# Patient Record
Sex: MALE | Race: WHITE | NOT HISPANIC OR LATINO | Employment: OTHER | URBAN - METROPOLITAN AREA
[De-identification: names, ages, dates, MRNs, and addresses within clinical notes are randomized per-mention and may not be internally consistent; named-entity substitution may affect disease eponyms.]

---

## 2017-01-26 ENCOUNTER — OFFICE VISIT - RIVER FALLS (OUTPATIENT)
Dept: FAMILY MEDICINE | Facility: CLINIC | Age: 37
End: 2017-01-26
Payer: MEDICARE

## 2017-01-30 ENCOUNTER — OFFICE VISIT - RIVER FALLS (OUTPATIENT)
Dept: FAMILY MEDICINE | Facility: CLINIC | Age: 37
End: 2017-01-30
Payer: MEDICARE

## 2017-01-30 ASSESSMENT — MIFFLIN-ST. JEOR: SCORE: 2211.48

## 2017-02-03 ENCOUNTER — OFFICE VISIT - RIVER FALLS (OUTPATIENT)
Dept: FAMILY MEDICINE | Facility: CLINIC | Age: 37
End: 2017-02-03
Payer: MEDICARE

## 2017-02-03 ASSESSMENT — MIFFLIN-ST. JEOR: SCORE: 2179.72

## 2017-02-24 ENCOUNTER — OFFICE VISIT - RIVER FALLS (OUTPATIENT)
Dept: FAMILY MEDICINE | Facility: CLINIC | Age: 37
End: 2017-02-24
Payer: MEDICARE

## 2017-02-24 ASSESSMENT — MIFFLIN-ST. JEOR: SCORE: 2202.4

## 2017-03-02 ENCOUNTER — OFFICE VISIT - RIVER FALLS (OUTPATIENT)
Dept: FAMILY MEDICINE | Facility: CLINIC | Age: 37
End: 2017-03-02
Payer: MEDICARE

## 2017-03-02 ASSESSMENT — MIFFLIN-ST. JEOR: SCORE: 2204.22

## 2017-03-09 ENCOUNTER — OFFICE VISIT - RIVER FALLS (OUTPATIENT)
Dept: FAMILY MEDICINE | Facility: CLINIC | Age: 37
End: 2017-03-09
Payer: MEDICARE

## 2017-03-17 ENCOUNTER — OFFICE VISIT - RIVER FALLS (OUTPATIENT)
Dept: FAMILY MEDICINE | Facility: CLINIC | Age: 37
End: 2017-03-17
Payer: MEDICARE

## 2017-03-17 ASSESSMENT — MIFFLIN-ST. JEOR: SCORE: 2224.18

## 2017-04-03 ENCOUNTER — OFFICE VISIT - RIVER FALLS (OUTPATIENT)
Dept: FAMILY MEDICINE | Facility: CLINIC | Age: 37
End: 2017-04-03
Payer: MEDICARE

## 2017-05-12 ENCOUNTER — OFFICE VISIT - RIVER FALLS (OUTPATIENT)
Dept: FAMILY MEDICINE | Facility: CLINIC | Age: 37
End: 2017-05-12
Payer: MEDICARE

## 2017-05-24 ENCOUNTER — OFFICE VISIT - RIVER FALLS (OUTPATIENT)
Dept: FAMILY MEDICINE | Facility: CLINIC | Age: 37
End: 2017-05-24
Payer: MEDICARE

## 2017-05-24 ASSESSMENT — MIFFLIN-ST. JEOR: SCORE: 2211.48

## 2017-06-29 ENCOUNTER — OFFICE VISIT - RIVER FALLS (OUTPATIENT)
Dept: FAMILY MEDICINE | Facility: CLINIC | Age: 37
End: 2017-06-29
Payer: MEDICARE

## 2017-06-29 ASSESSMENT — MIFFLIN-ST. JEOR: SCORE: 2206.94

## 2017-07-21 ENCOUNTER — OFFICE VISIT - RIVER FALLS (OUTPATIENT)
Dept: FAMILY MEDICINE | Facility: CLINIC | Age: 37
End: 2017-07-21
Payer: MEDICARE

## 2017-07-21 ASSESSMENT — MIFFLIN-ST. JEOR: SCORE: 2213.29

## 2017-08-24 ENCOUNTER — OFFICE VISIT - RIVER FALLS (OUTPATIENT)
Dept: FAMILY MEDICINE | Facility: CLINIC | Age: 37
End: 2017-08-24
Payer: MEDICARE

## 2017-09-18 ENCOUNTER — OFFICE VISIT - RIVER FALLS (OUTPATIENT)
Dept: FAMILY MEDICINE | Facility: CLINIC | Age: 37
End: 2017-09-18
Payer: MEDICARE

## 2017-09-18 ASSESSMENT — MIFFLIN-ST. JEOR: SCORE: 2247.76

## 2017-10-06 ENCOUNTER — OFFICE VISIT - RIVER FALLS (OUTPATIENT)
Dept: FAMILY MEDICINE | Facility: CLINIC | Age: 37
End: 2017-10-06
Payer: MEDICARE

## 2017-10-13 ENCOUNTER — OFFICE VISIT - RIVER FALLS (OUTPATIENT)
Dept: FAMILY MEDICINE | Facility: CLINIC | Age: 37
End: 2017-10-13
Payer: MEDICARE

## 2017-10-13 ASSESSMENT — MIFFLIN-ST. JEOR: SCORE: 2247.76

## 2017-11-01 ENCOUNTER — OFFICE VISIT - RIVER FALLS (OUTPATIENT)
Dept: FAMILY MEDICINE | Facility: CLINIC | Age: 37
End: 2017-11-01
Payer: MEDICARE

## 2017-11-01 ASSESSMENT — MIFFLIN-ST. JEOR: SCORE: 2252.3

## 2017-11-07 ENCOUNTER — OFFICE VISIT - RIVER FALLS (OUTPATIENT)
Dept: FAMILY MEDICINE | Facility: CLINIC | Age: 37
End: 2017-11-07
Payer: MEDICARE

## 2017-11-07 ASSESSMENT — MIFFLIN-ST. JEOR: SCORE: 2270.44

## 2017-12-04 ENCOUNTER — OFFICE VISIT - RIVER FALLS (OUTPATIENT)
Dept: FAMILY MEDICINE | Facility: CLINIC | Age: 37
End: 2017-12-04
Payer: MEDICARE

## 2017-12-04 ASSESSMENT — MIFFLIN-ST. JEOR: SCORE: 2235.97

## 2017-12-05 ENCOUNTER — OFFICE VISIT - RIVER FALLS (OUTPATIENT)
Dept: FAMILY MEDICINE | Facility: CLINIC | Age: 37
End: 2017-12-05
Payer: MEDICARE

## 2018-02-02 ENCOUNTER — AMBULATORY - RIVER FALLS (OUTPATIENT)
Dept: FAMILY MEDICINE | Facility: CLINIC | Age: 38
End: 2018-02-02
Payer: MEDICARE

## 2018-02-07 ENCOUNTER — OFFICE VISIT - RIVER FALLS (OUTPATIENT)
Dept: FAMILY MEDICINE | Facility: CLINIC | Age: 38
End: 2018-02-07
Payer: MEDICARE

## 2018-02-07 ASSESSMENT — MIFFLIN-ST. JEOR: SCORE: 2245.04

## 2018-02-09 ENCOUNTER — OFFICE VISIT - RIVER FALLS (OUTPATIENT)
Dept: FAMILY MEDICINE | Facility: CLINIC | Age: 38
End: 2018-02-09
Payer: MEDICARE

## 2018-02-13 ENCOUNTER — OFFICE VISIT - RIVER FALLS (OUTPATIENT)
Dept: FAMILY MEDICINE | Facility: CLINIC | Age: 38
End: 2018-02-13
Payer: MEDICARE

## 2018-02-15 ENCOUNTER — OFFICE VISIT - RIVER FALLS (OUTPATIENT)
Dept: FAMILY MEDICINE | Facility: CLINIC | Age: 38
End: 2018-02-15
Payer: MEDICARE

## 2018-02-16 ENCOUNTER — OFFICE VISIT - RIVER FALLS (OUTPATIENT)
Dept: FAMILY MEDICINE | Facility: CLINIC | Age: 38
End: 2018-02-16
Payer: MEDICARE

## 2018-02-20 ENCOUNTER — OFFICE VISIT - RIVER FALLS (OUTPATIENT)
Dept: FAMILY MEDICINE | Facility: CLINIC | Age: 38
End: 2018-02-20
Payer: MEDICARE

## 2018-02-20 ASSESSMENT — MIFFLIN-ST. JEOR: SCORE: 2229.62

## 2018-02-21 ENCOUNTER — OFFICE VISIT - RIVER FALLS (OUTPATIENT)
Dept: FAMILY MEDICINE | Facility: CLINIC | Age: 38
End: 2018-02-21
Payer: MEDICARE

## 2018-02-26 ENCOUNTER — AMBULATORY - RIVER FALLS (OUTPATIENT)
Dept: FAMILY MEDICINE | Facility: CLINIC | Age: 38
End: 2018-02-26
Payer: MEDICARE

## 2018-02-27 ENCOUNTER — OFFICE VISIT - RIVER FALLS (OUTPATIENT)
Dept: FAMILY MEDICINE | Facility: CLINIC | Age: 38
End: 2018-02-27
Payer: MEDICARE

## 2018-03-05 ENCOUNTER — OFFICE VISIT - RIVER FALLS (OUTPATIENT)
Dept: FAMILY MEDICINE | Facility: CLINIC | Age: 38
End: 2018-03-05
Payer: MEDICARE

## 2018-03-05 ASSESSMENT — MIFFLIN-ST. JEOR: SCORE: 2225.31

## 2018-03-12 ENCOUNTER — OFFICE VISIT - RIVER FALLS (OUTPATIENT)
Dept: FAMILY MEDICINE | Facility: CLINIC | Age: 38
End: 2018-03-12
Payer: MEDICARE

## 2018-03-12 ASSESSMENT — MIFFLIN-ST. JEOR: SCORE: 2225.31

## 2018-03-13 ENCOUNTER — OFFICE VISIT - RIVER FALLS (OUTPATIENT)
Dept: FAMILY MEDICINE | Facility: CLINIC | Age: 38
End: 2018-03-13
Payer: MEDICARE

## 2018-03-14 ENCOUNTER — OFFICE VISIT - RIVER FALLS (OUTPATIENT)
Dept: FAMILY MEDICINE | Facility: CLINIC | Age: 38
End: 2018-03-14
Payer: MEDICARE

## 2018-03-22 ENCOUNTER — OFFICE VISIT - RIVER FALLS (OUTPATIENT)
Dept: FAMILY MEDICINE | Facility: CLINIC | Age: 38
End: 2018-03-22
Payer: MEDICARE

## 2018-03-29 ENCOUNTER — OFFICE VISIT - RIVER FALLS (OUTPATIENT)
Dept: FAMILY MEDICINE | Facility: CLINIC | Age: 38
End: 2018-03-29
Payer: MEDICARE

## 2018-03-29 ASSESSMENT — MIFFLIN-ST. JEOR: SCORE: 2215.33

## 2018-04-10 ENCOUNTER — OFFICE VISIT - RIVER FALLS (OUTPATIENT)
Dept: FAMILY MEDICINE | Facility: CLINIC | Age: 38
End: 2018-04-10
Payer: MEDICARE

## 2018-04-12 ENCOUNTER — OFFICE VISIT - RIVER FALLS (OUTPATIENT)
Dept: FAMILY MEDICINE | Facility: CLINIC | Age: 38
End: 2018-04-12
Payer: MEDICARE

## 2018-04-13 ENCOUNTER — OFFICE VISIT - RIVER FALLS (OUTPATIENT)
Dept: FAMILY MEDICINE | Facility: CLINIC | Age: 38
End: 2018-04-13
Payer: MEDICARE

## 2018-04-17 ENCOUNTER — OFFICE VISIT - RIVER FALLS (OUTPATIENT)
Dept: FAMILY MEDICINE | Facility: CLINIC | Age: 38
End: 2018-04-17
Payer: MEDICARE

## 2018-04-24 ENCOUNTER — OFFICE VISIT - RIVER FALLS (OUTPATIENT)
Dept: FAMILY MEDICINE | Facility: CLINIC | Age: 38
End: 2018-04-24
Payer: MEDICARE

## 2018-04-24 ASSESSMENT — MIFFLIN-ST. JEOR: SCORE: 2180.86

## 2018-04-27 ENCOUNTER — OFFICE VISIT - RIVER FALLS (OUTPATIENT)
Dept: FAMILY MEDICINE | Facility: CLINIC | Age: 38
End: 2018-04-27
Payer: MEDICARE

## 2018-04-27 ASSESSMENT — MIFFLIN-ST. JEOR: SCORE: 2190.83

## 2018-04-28 LAB
CREAT SERPL-MCNC: 0.98 MG/DL (ref 0.6–1.35)
GLUCOSE BLD-MCNC: 83 MG/DL (ref 65–99)

## 2018-05-01 ENCOUNTER — OFFICE VISIT - RIVER FALLS (OUTPATIENT)
Dept: FAMILY MEDICINE | Facility: CLINIC | Age: 38
End: 2018-05-01
Payer: MEDICARE

## 2018-05-02 ENCOUNTER — OFFICE VISIT - RIVER FALLS (OUTPATIENT)
Dept: FAMILY MEDICINE | Facility: CLINIC | Age: 38
End: 2018-05-02
Payer: MEDICARE

## 2018-05-02 ASSESSMENT — MIFFLIN-ST. JEOR: SCORE: 2187.21

## 2018-05-04 ENCOUNTER — OFFICE VISIT - RIVER FALLS (OUTPATIENT)
Dept: FAMILY MEDICINE | Facility: CLINIC | Age: 38
End: 2018-05-04
Payer: MEDICARE

## 2018-05-09 ENCOUNTER — OFFICE VISIT - RIVER FALLS (OUTPATIENT)
Dept: FAMILY MEDICINE | Facility: CLINIC | Age: 38
End: 2018-05-09
Payer: MEDICARE

## 2018-05-11 ENCOUNTER — OFFICE VISIT - RIVER FALLS (OUTPATIENT)
Dept: FAMILY MEDICINE | Facility: CLINIC | Age: 38
End: 2018-05-11
Payer: MEDICARE

## 2018-05-15 ENCOUNTER — OFFICE VISIT - RIVER FALLS (OUTPATIENT)
Dept: FAMILY MEDICINE | Facility: CLINIC | Age: 38
End: 2018-05-15
Payer: MEDICARE

## 2018-05-16 ENCOUNTER — OFFICE VISIT - RIVER FALLS (OUTPATIENT)
Dept: FAMILY MEDICINE | Facility: CLINIC | Age: 38
End: 2018-05-16
Payer: MEDICARE

## 2018-05-22 ENCOUNTER — OFFICE VISIT - RIVER FALLS (OUTPATIENT)
Dept: FAMILY MEDICINE | Facility: CLINIC | Age: 38
End: 2018-05-22
Payer: MEDICARE

## 2018-05-25 ENCOUNTER — OFFICE VISIT - RIVER FALLS (OUTPATIENT)
Dept: FAMILY MEDICINE | Facility: CLINIC | Age: 38
End: 2018-05-25
Payer: MEDICARE

## 2018-05-25 ASSESSMENT — MIFFLIN-ST. JEOR: SCORE: 2179.95

## 2018-05-29 ENCOUNTER — OFFICE VISIT - RIVER FALLS (OUTPATIENT)
Dept: FAMILY MEDICINE | Facility: CLINIC | Age: 38
End: 2018-05-29
Payer: MEDICARE

## 2018-05-30 ENCOUNTER — OFFICE VISIT - RIVER FALLS (OUTPATIENT)
Dept: FAMILY MEDICINE | Facility: CLINIC | Age: 38
End: 2018-05-30
Payer: MEDICARE

## 2018-06-01 ENCOUNTER — OFFICE VISIT - RIVER FALLS (OUTPATIENT)
Dept: FAMILY MEDICINE | Facility: CLINIC | Age: 38
End: 2018-06-01
Payer: MEDICARE

## 2018-06-05 ENCOUNTER — OFFICE VISIT - RIVER FALLS (OUTPATIENT)
Dept: FAMILY MEDICINE | Facility: CLINIC | Age: 38
End: 2018-06-05
Payer: MEDICARE

## 2018-06-05 ASSESSMENT — MIFFLIN-ST. JEOR: SCORE: 2175.41

## 2018-06-12 ENCOUNTER — OFFICE VISIT - RIVER FALLS (OUTPATIENT)
Dept: FAMILY MEDICINE | Facility: CLINIC | Age: 38
End: 2018-06-12
Payer: MEDICARE

## 2018-06-19 ENCOUNTER — OFFICE VISIT - RIVER FALLS (OUTPATIENT)
Dept: FAMILY MEDICINE | Facility: CLINIC | Age: 38
End: 2018-06-19
Payer: MEDICARE

## 2018-06-29 ENCOUNTER — OFFICE VISIT - RIVER FALLS (OUTPATIENT)
Dept: FAMILY MEDICINE | Facility: CLINIC | Age: 38
End: 2018-06-29
Payer: MEDICARE

## 2018-07-02 ENCOUNTER — OFFICE VISIT - RIVER FALLS (OUTPATIENT)
Dept: FAMILY MEDICINE | Facility: CLINIC | Age: 38
End: 2018-07-02
Payer: MEDICARE

## 2018-07-02 ASSESSMENT — MIFFLIN-ST. JEOR: SCORE: 2193.56

## 2018-07-03 LAB
CHOLEST SERPL-MCNC: 182 MG/DL
CHOLEST/HDLC SERPL: 3.8 {RATIO}
HBA1C MFR BLD: 5 %
HDLC SERPL-MCNC: 48 MG/DL
LDLC SERPL CALC-MCNC: 108 MG/DL
NONHDLC SERPL-MCNC: 134 MG/DL
TRIGL SERPL-MCNC: 146 MG/DL

## 2018-07-06 ENCOUNTER — OFFICE VISIT - RIVER FALLS (OUTPATIENT)
Dept: FAMILY MEDICINE | Facility: CLINIC | Age: 38
End: 2018-07-06
Payer: MEDICARE

## 2018-07-09 ENCOUNTER — OFFICE VISIT - RIVER FALLS (OUTPATIENT)
Dept: FAMILY MEDICINE | Facility: CLINIC | Age: 38
End: 2018-07-09
Payer: MEDICARE

## 2018-07-09 ASSESSMENT — MIFFLIN-ST. JEOR: SCORE: 2199

## 2018-07-24 ENCOUNTER — OFFICE VISIT - RIVER FALLS (OUTPATIENT)
Dept: FAMILY MEDICINE | Facility: CLINIC | Age: 38
End: 2018-07-24
Payer: MEDICARE

## 2018-07-25 ENCOUNTER — OFFICE VISIT - RIVER FALLS (OUTPATIENT)
Dept: FAMILY MEDICINE | Facility: CLINIC | Age: 38
End: 2018-07-25
Payer: MEDICARE

## 2018-07-25 ASSESSMENT — MIFFLIN-ST. JEOR: SCORE: 2189.93

## 2018-07-26 ENCOUNTER — OFFICE VISIT - RIVER FALLS (OUTPATIENT)
Dept: FAMILY MEDICINE | Facility: CLINIC | Age: 38
End: 2018-07-26
Payer: MEDICARE

## 2018-07-31 ENCOUNTER — OFFICE VISIT - RIVER FALLS (OUTPATIENT)
Dept: FAMILY MEDICINE | Facility: CLINIC | Age: 38
End: 2018-07-31
Payer: MEDICARE

## 2018-08-02 ENCOUNTER — OFFICE VISIT - RIVER FALLS (OUTPATIENT)
Dept: FAMILY MEDICINE | Facility: CLINIC | Age: 38
End: 2018-08-02
Payer: MEDICARE

## 2018-08-30 ENCOUNTER — OFFICE VISIT - RIVER FALLS (OUTPATIENT)
Dept: FAMILY MEDICINE | Facility: CLINIC | Age: 38
End: 2018-08-30
Payer: MEDICARE

## 2018-09-10 ENCOUNTER — OFFICE VISIT - RIVER FALLS (OUTPATIENT)
Dept: FAMILY MEDICINE | Facility: CLINIC | Age: 38
End: 2018-09-10
Payer: MEDICARE

## 2018-09-14 ENCOUNTER — OFFICE VISIT - RIVER FALLS (OUTPATIENT)
Dept: FAMILY MEDICINE | Facility: CLINIC | Age: 38
End: 2018-09-14
Payer: MEDICARE

## 2018-09-25 ENCOUNTER — OFFICE VISIT - RIVER FALLS (OUTPATIENT)
Dept: FAMILY MEDICINE | Facility: CLINIC | Age: 38
End: 2018-09-25
Payer: MEDICARE

## 2018-09-25 ASSESSMENT — MIFFLIN-ST. JEOR: SCORE: 2229.84

## 2018-10-02 ENCOUNTER — OFFICE VISIT - RIVER FALLS (OUTPATIENT)
Dept: FAMILY MEDICINE | Facility: CLINIC | Age: 38
End: 2018-10-02
Payer: MEDICARE

## 2018-10-02 ASSESSMENT — MIFFLIN-ST. JEOR: SCORE: 2228.94

## 2018-10-08 ENCOUNTER — COMMUNICATION - RIVER FALLS (OUTPATIENT)
Dept: FAMILY MEDICINE | Facility: CLINIC | Age: 38
End: 2018-10-08
Payer: MEDICARE

## 2018-10-08 ENCOUNTER — OFFICE VISIT - RIVER FALLS (OUTPATIENT)
Dept: FAMILY MEDICINE | Facility: CLINIC | Age: 38
End: 2018-10-08
Payer: MEDICARE

## 2018-10-17 ENCOUNTER — OFFICE VISIT - RIVER FALLS (OUTPATIENT)
Dept: FAMILY MEDICINE | Facility: CLINIC | Age: 38
End: 2018-10-17
Payer: MEDICARE

## 2018-10-26 ENCOUNTER — OFFICE VISIT - RIVER FALLS (OUTPATIENT)
Dept: FAMILY MEDICINE | Facility: CLINIC | Age: 38
End: 2018-10-26
Payer: MEDICARE

## 2018-10-26 ASSESSMENT — MIFFLIN-ST. JEOR: SCORE: 2235.29

## 2018-10-30 ENCOUNTER — OFFICE VISIT - RIVER FALLS (OUTPATIENT)
Dept: FAMILY MEDICINE | Facility: CLINIC | Age: 38
End: 2018-10-30
Payer: MEDICARE

## 2019-05-01 ENCOUNTER — OFFICE VISIT - RIVER FALLS (OUTPATIENT)
Dept: FAMILY MEDICINE | Facility: CLINIC | Age: 39
End: 2019-05-01
Payer: MEDICARE

## 2019-08-15 ENCOUNTER — OFFICE VISIT - RIVER FALLS (OUTPATIENT)
Dept: FAMILY MEDICINE | Facility: CLINIC | Age: 39
End: 2019-08-15
Payer: MEDICARE

## 2019-08-15 ASSESSMENT — MIFFLIN-ST. JEOR: SCORE: 2243.45

## 2019-09-20 ENCOUNTER — OFFICE VISIT - RIVER FALLS (OUTPATIENT)
Dept: FAMILY MEDICINE | Facility: CLINIC | Age: 39
End: 2019-09-20
Payer: MEDICARE

## 2019-09-20 ASSESSMENT — MIFFLIN-ST. JEOR: SCORE: 2221.68

## 2019-10-01 ENCOUNTER — OFFICE VISIT - RIVER FALLS (OUTPATIENT)
Dept: FAMILY MEDICINE | Facility: CLINIC | Age: 39
End: 2019-10-01
Payer: MEDICARE

## 2019-10-01 ASSESSMENT — MIFFLIN-ST. JEOR: SCORE: 2244.36

## 2019-10-14 ENCOUNTER — COMMUNICATION - RIVER FALLS (OUTPATIENT)
Dept: FAMILY MEDICINE | Facility: CLINIC | Age: 39
End: 2019-10-14
Payer: MEDICARE

## 2019-10-31 ENCOUNTER — OFFICE VISIT - RIVER FALLS (OUTPATIENT)
Dept: FAMILY MEDICINE | Facility: CLINIC | Age: 39
End: 2019-10-31
Payer: MEDICARE

## 2019-10-31 ASSESSMENT — MIFFLIN-ST. JEOR: SCORE: 2248.9

## 2019-11-06 ENCOUNTER — OFFICE VISIT - RIVER FALLS (OUTPATIENT)
Dept: FAMILY MEDICINE | Facility: CLINIC | Age: 39
End: 2019-11-06
Payer: MEDICARE

## 2019-11-06 ASSESSMENT — MIFFLIN-ST. JEOR: SCORE: 2239.82

## 2019-11-08 ENCOUNTER — OFFICE VISIT - RIVER FALLS (OUTPATIENT)
Dept: FAMILY MEDICINE | Facility: CLINIC | Age: 39
End: 2019-11-08
Payer: MEDICARE

## 2019-11-08 ASSESSMENT — MIFFLIN-ST. JEOR: SCORE: 2265.22

## 2019-11-19 ENCOUNTER — OFFICE VISIT - RIVER FALLS (OUTPATIENT)
Dept: FAMILY MEDICINE | Facility: CLINIC | Age: 39
End: 2019-11-19
Payer: MEDICARE

## 2019-11-19 ASSESSMENT — MIFFLIN-ST. JEOR: SCORE: 2244.36

## 2020-02-13 ENCOUNTER — OFFICE VISIT - RIVER FALLS (OUTPATIENT)
Dept: FAMILY MEDICINE | Facility: CLINIC | Age: 40
End: 2020-02-13
Payer: MEDICARE

## 2020-02-13 ASSESSMENT — MIFFLIN-ST. JEOR: SCORE: 2292.44

## 2020-04-09 ENCOUNTER — OFFICE VISIT - RIVER FALLS (OUTPATIENT)
Dept: FAMILY MEDICINE | Facility: CLINIC | Age: 40
End: 2020-04-09
Payer: MEDICARE

## 2020-05-08 ENCOUNTER — OFFICE VISIT - RIVER FALLS (OUTPATIENT)
Dept: FAMILY MEDICINE | Facility: CLINIC | Age: 40
End: 2020-05-08
Payer: MEDICARE

## 2020-05-13 ENCOUNTER — OFFICE VISIT - RIVER FALLS (OUTPATIENT)
Dept: FAMILY MEDICINE | Facility: CLINIC | Age: 40
End: 2020-05-13
Payer: MEDICARE

## 2020-05-13 ASSESSMENT — MIFFLIN-ST. JEOR: SCORE: 2332.36

## 2020-05-20 ENCOUNTER — OFFICE VISIT - RIVER FALLS (OUTPATIENT)
Dept: FAMILY MEDICINE | Facility: CLINIC | Age: 40
End: 2020-05-20
Payer: MEDICARE

## 2020-06-01 ENCOUNTER — OFFICE VISIT - RIVER FALLS (OUTPATIENT)
Dept: FAMILY MEDICINE | Facility: CLINIC | Age: 40
End: 2020-06-01
Payer: MEDICARE

## 2020-06-04 ENCOUNTER — OFFICE VISIT - RIVER FALLS (OUTPATIENT)
Dept: FAMILY MEDICINE | Facility: CLINIC | Age: 40
End: 2020-06-04
Payer: MEDICARE

## 2020-06-04 ASSESSMENT — MIFFLIN-ST. JEOR: SCORE: 2326.01

## 2020-06-07 ENCOUNTER — COMMUNICATION - RIVER FALLS (OUTPATIENT)
Dept: FAMILY MEDICINE | Facility: CLINIC | Age: 40
End: 2020-06-07
Payer: MEDICARE

## 2020-06-07 LAB — SARS-COV-2 RNA SPEC QL NAA+PROBE: NOT DETECTED

## 2020-06-10 ENCOUNTER — COMMUNICATION - RIVER FALLS (OUTPATIENT)
Dept: FAMILY MEDICINE | Facility: CLINIC | Age: 40
End: 2020-06-10
Payer: MEDICARE

## 2020-06-15 ENCOUNTER — OFFICE VISIT - RIVER FALLS (OUTPATIENT)
Dept: FAMILY MEDICINE | Facility: CLINIC | Age: 40
End: 2020-06-15
Payer: MEDICARE

## 2020-06-25 ENCOUNTER — COMMUNICATION - RIVER FALLS (OUTPATIENT)
Dept: FAMILY MEDICINE | Facility: CLINIC | Age: 40
End: 2020-06-25
Payer: MEDICARE

## 2020-06-29 ENCOUNTER — OFFICE VISIT - RIVER FALLS (OUTPATIENT)
Dept: FAMILY MEDICINE | Facility: CLINIC | Age: 40
End: 2020-06-29
Payer: MEDICARE

## 2020-08-05 ENCOUNTER — OFFICE VISIT - RIVER FALLS (OUTPATIENT)
Dept: FAMILY MEDICINE | Facility: CLINIC | Age: 40
End: 2020-08-05
Payer: MEDICARE

## 2020-08-05 ASSESSMENT — MIFFLIN-ST. JEOR: SCORE: 2326.91

## 2020-08-06 ENCOUNTER — OFFICE VISIT - RIVER FALLS (OUTPATIENT)
Dept: FAMILY MEDICINE | Facility: CLINIC | Age: 40
End: 2020-08-06
Payer: MEDICARE

## 2020-09-02 ENCOUNTER — COMMUNICATION - RIVER FALLS (OUTPATIENT)
Dept: FAMILY MEDICINE | Facility: CLINIC | Age: 40
End: 2020-09-02
Payer: MEDICARE

## 2020-09-10 ENCOUNTER — OFFICE VISIT - RIVER FALLS (OUTPATIENT)
Dept: FAMILY MEDICINE | Facility: CLINIC | Age: 40
End: 2020-09-10
Payer: MEDICARE

## 2020-09-10 ASSESSMENT — MIFFLIN-ST. JEOR: SCORE: 2330.54

## 2020-10-09 ENCOUNTER — OFFICE VISIT - RIVER FALLS (OUTPATIENT)
Dept: FAMILY MEDICINE | Facility: CLINIC | Age: 40
End: 2020-10-09
Payer: MEDICARE

## 2020-10-09 ASSESSMENT — MIFFLIN-ST. JEOR: SCORE: 2321.47

## 2020-10-13 ENCOUNTER — OFFICE VISIT - RIVER FALLS (OUTPATIENT)
Dept: FAMILY MEDICINE | Facility: CLINIC | Age: 40
End: 2020-10-13
Payer: MEDICARE

## 2020-10-13 ENCOUNTER — AMBULATORY - RIVER FALLS (OUTPATIENT)
Dept: FAMILY MEDICINE | Facility: CLINIC | Age: 40
End: 2020-10-13
Payer: MEDICARE

## 2020-10-16 LAB — SARS-COV-2 RNA SPEC QL NAA+PROBE: NOT DETECTED

## 2020-10-20 ENCOUNTER — OFFICE VISIT - RIVER FALLS (OUTPATIENT)
Dept: FAMILY MEDICINE | Facility: CLINIC | Age: 40
End: 2020-10-20
Payer: MEDICARE

## 2020-10-20 ASSESSMENT — MIFFLIN-ST. JEOR: SCORE: 2344.15

## 2020-11-11 ENCOUNTER — OFFICE VISIT - RIVER FALLS (OUTPATIENT)
Dept: FAMILY MEDICINE | Facility: CLINIC | Age: 40
End: 2020-11-11
Payer: MEDICARE

## 2020-11-11 ASSESSMENT — MIFFLIN-ST. JEOR: SCORE: 2353.22

## 2020-11-30 ENCOUNTER — OFFICE VISIT - RIVER FALLS (OUTPATIENT)
Dept: FAMILY MEDICINE | Facility: CLINIC | Age: 40
End: 2020-11-30
Payer: MEDICARE

## 2020-11-30 ASSESSMENT — MIFFLIN-ST. JEOR: SCORE: 2343.24

## 2021-01-05 ENCOUNTER — OFFICE VISIT - RIVER FALLS (OUTPATIENT)
Dept: FAMILY MEDICINE | Facility: CLINIC | Age: 41
End: 2021-01-05
Payer: MEDICARE

## 2021-01-05 ASSESSMENT — MIFFLIN-ST. JEOR: SCORE: 2352.32

## 2021-01-26 ENCOUNTER — OFFICE VISIT - HEALTHEAST (OUTPATIENT)
Dept: BEHAVIORAL HEALTH | Facility: CLINIC | Age: 41
End: 2021-01-26

## 2021-01-26 DIAGNOSIS — F32.A DEPRESSION, UNSPECIFIED DEPRESSION TYPE: ICD-10-CM

## 2021-01-26 DIAGNOSIS — F41.1 GAD (GENERALIZED ANXIETY DISORDER): ICD-10-CM

## 2021-01-26 ASSESSMENT — ANXIETY QUESTIONNAIRES
2. NOT BEING ABLE TO STOP OR CONTROL WORRYING: NEARLY EVERY DAY
3. WORRYING TOO MUCH ABOUT DIFFERENT THINGS: NEARLY EVERY DAY
1. FEELING NERVOUS, ANXIOUS, OR ON EDGE: NEARLY EVERY DAY
IF YOU CHECKED OFF ANY PROBLEMS ON THIS QUESTIONNAIRE, HOW DIFFICULT HAVE THESE PROBLEMS MADE IT FOR YOU TO DO YOUR WORK, TAKE CARE OF THINGS AT HOME, OR GET ALONG WITH OTHER PEOPLE: VERY DIFFICULT
7. FEELING AFRAID AS IF SOMETHING AWFUL MIGHT HAPPEN: NEARLY EVERY DAY
5. BEING SO RESTLESS THAT IT IS HARD TO SIT STILL: NEARLY EVERY DAY
6. BECOMING EASILY ANNOYED OR IRRITABLE: NEARLY EVERY DAY
4. TROUBLE RELAXING: NEARLY EVERY DAY
GAD7 TOTAL SCORE: 21

## 2021-01-26 ASSESSMENT — PATIENT HEALTH QUESTIONNAIRE - PHQ9: SUM OF ALL RESPONSES TO PHQ QUESTIONS 1-9: 18

## 2021-02-10 ENCOUNTER — AMBULATORY - HEALTHEAST (OUTPATIENT)
Dept: BEHAVIORAL HEALTH | Facility: CLINIC | Age: 41
End: 2021-02-10

## 2021-02-16 ENCOUNTER — OFFICE VISIT - RIVER FALLS (OUTPATIENT)
Dept: FAMILY MEDICINE | Facility: CLINIC | Age: 41
End: 2021-02-16
Payer: MEDICARE

## 2021-03-24 ENCOUNTER — OFFICE VISIT - RIVER FALLS (OUTPATIENT)
Dept: FAMILY MEDICINE | Facility: CLINIC | Age: 41
End: 2021-03-24
Payer: MEDICARE

## 2021-05-18 ENCOUNTER — OFFICE VISIT - RIVER FALLS (OUTPATIENT)
Dept: FAMILY MEDICINE | Facility: CLINIC | Age: 41
End: 2021-05-18
Payer: MEDICARE

## 2021-05-26 ENCOUNTER — OFFICE VISIT - HEALTHEAST (OUTPATIENT)
Dept: BEHAVIORAL HEALTH | Facility: TELEHEALTH | Age: 41
End: 2021-05-26

## 2021-05-26 DIAGNOSIS — F41.1 GAD (GENERALIZED ANXIETY DISORDER): ICD-10-CM

## 2021-05-27 ASSESSMENT — PATIENT HEALTH QUESTIONNAIRE - PHQ9: SUM OF ALL RESPONSES TO PHQ QUESTIONS 1-9: 18

## 2021-05-28 ASSESSMENT — ANXIETY QUESTIONNAIRES: GAD7 TOTAL SCORE: 21

## 2021-06-02 ENCOUNTER — OFFICE VISIT - RIVER FALLS (OUTPATIENT)
Dept: FAMILY MEDICINE | Facility: CLINIC | Age: 41
End: 2021-06-02
Payer: MEDICARE

## 2021-06-03 ENCOUNTER — OFFICE VISIT - HEALTHEAST (OUTPATIENT)
Dept: BEHAVIORAL HEALTH | Facility: TELEHEALTH | Age: 41
End: 2021-06-03

## 2021-06-03 DIAGNOSIS — F41.1 GAD (GENERALIZED ANXIETY DISORDER): ICD-10-CM

## 2021-06-09 ENCOUNTER — OFFICE VISIT - RIVER FALLS (OUTPATIENT)
Dept: FAMILY MEDICINE | Facility: CLINIC | Age: 41
End: 2021-06-09
Payer: MEDICARE

## 2021-06-09 ENCOUNTER — COMMUNICATION - RIVER FALLS (OUTPATIENT)
Dept: FAMILY MEDICINE | Facility: CLINIC | Age: 41
End: 2021-06-09
Payer: MEDICARE

## 2021-06-09 ASSESSMENT — MIFFLIN-ST. JEOR: SCORE: 2362.29

## 2021-06-14 NOTE — PROGRESS NOTES
"Mental Health Psychotherapy Telephone Note    Patient: Daron Bond    : 1980 MRN: [id number]    Completed a 2 point identification verification: patient verbalized full name and date of birth.     Date: 21  Start time: 1500   Stop Time: 1552   Session # 1    The patient has been notified of the following:   \"We have found that certain health care needs can be provided without the need for a face to face visit.  This service lets us provide the care you need with a phone conversation.  I will have full access to your [facility] medical record during this entire phone call.   I will be taking notes for your medical record. Since this is like an office visit, we will bill your insurance company for this service.  There are potential benefits and risks of telephone visits (e.g. limits to patient confidentiality) that differ from in-person visits.?  Confidentiality still applies for telephone services, and nobody will record the visit.  It is important to be in a quiet, private space that is free of distractions (including cell phone or other devices) during the visit.?? If during the course of the call I believe a telephone visit is not appropriate, you will not be charged for this service\"  Consent has been obtained for this service by care team member: Yes, per verbal agreement   Session Type: Patient is participating in a telemedicine phone visit     Those present for this session: Patient and Therapist    Chief Complaint Patient is a 40 yr.old male who was referred by Dr. Mata Hearn for Psychotherapy in order to assist with mood instability and psychosocial stressors.  Patient stated that he has longstanding multiple medical issues which continue to cause him severe pain and anguish.  He also indicated that he is experiencing ongoing issues with neighbors that reside in his apartment complex who he believes purposely caused him distress making continual noise and negative comments.  Patient has " contacted the area police and apartment  landlord voicing his concerns although, stated that they are not taking his concerns seriously.  Patient has 2 children ages 10 and 8 who reside with their mother and visit patient on weekends.      New symptoms or complaints: Little interest and pleasure in doing things, feeling down and depressed and hopeless, issues with neighbor, trouble falling asleep and staying asleep, feeling tired with little energy, feeling bad about self, trouble concentrating on things,    Functional Impairment:   Personal: 4  Family: 3  Work: 0  Social:4    PHQ-9 scoring 18 indicating moderate depression  TYRELL-7 scoring 21 indicating severe anxiety   C-SSRS  patient did not indicate any past or current suicidal ideations or intentions    ASSESSMENT: Current Emotional / Mental Status (status of significant symptoms):              Risk status (Self / Other harm or suicidal ideation)              Patient indicated that he has concerns regarding issues with neighbors              Patient denies current or recent suicidal ideation or behaviors.              Patient denies current or recent homicidal ideation or behaviors.              Patient denies current or recent self injurious behavior or ideation.              Patient denies other safety concerns.              Patient has severe back issues               Patient has not indicated who his               Recommended that patient call 911 or go to the local ED should there be a change in any of these risk factors.                Attitude:                                   Cooperative               Orientation:                             Oriented x3              Speech                                   Clear              Rate / Production:                   Talkative              Volume:                                   Normal               Mood:                                      Irritable  Agitated              Thought Content:                    " Clear               Thought Form:                        Coherent  Obsessive               Insight:                                     Poor       Patient's impression of their current status: Patient stated, \"I was in so much pain life is terrible right now no longer I am feeling so bad.\"    Therapist impression of patients current state: The patient presented for an initial session with provider.  Patient is a 40-year-old male who was referred by   Dr. Mata Hearn to engage in individual psychotherapy to address symptoms of depression, anxiety, irritability and psychosocial stressors.  Patient indicated that he has multiple medical issues (chronic bilateral low back pain with bilateral sciatica) and a history of brain injury with cerebellar tonsillar ectopia) per medical record that are impacting his mood and disposition.    Patient indicated that it was important for him to talk to a Therapist in order to help decrease his frustration, irritability and anxiousness. Patient indicated that he is convinced that his neighbors are determined to disrupt his life and feels frustrated that authorities and his landlord or not taking his concerns seriously.  Throughout the intake and easily engaged in dialogue.  Therapist and patient verbally reviewed consent to privacy policy.  Patient reported understanding and provided verbal consent.  The patient has not engaged in outpatient psychotherapy services in the community so there is no diagnostic assessment on file available.  The patient completed the following questionnaires today PHQ-9, TYRELL-7 and C-SS RS. Patient indicated that he has no past or current plans of harming himself nor any intentions of harming others. Therapist will review patient's further history, mail out intake form and follow-up in order to complete a standard diagnostic assessment.  Goals for treatment will be established after the second or third follow-up session with this provider.    Type of " psychotherapeutic technique provided: Insight oriented and Solution-focused    Progress toward short term goals Not yet established until DA is completed.    Review of long term goals: Not yet established until DA is completed.        Diagnosis:  1. Generalized Anxiety Disorder   2. Depression unspecified    Plan and Follow up:    1.  Patient will complete Adult Intake form    2.  Patient will attempt to find alternate living    3.  Develop therapeutic relationship   4.  Next appointment in 2 weeks    Discharge Criteria/Planning: Patient will continue with follow-up until therapy can be discontinued without return of signs and symptoms.    I have reviewed the note as documented above. This accurately captures the substance of my conversation with the patient. As the provider I attest to compliance with applicable laws and regulations related to telemedicine.  Yumiko Keita [facility]  1/26/21

## 2021-06-15 NOTE — PROGRESS NOTES
Patient was scheduled for a follow-up visit today.  Unable to reach patient.  No voicemail available to leave message.    Yumiko BYNUM  2/10/21

## 2021-06-25 NOTE — PROGRESS NOTES
[facility] Primary Care: : Integrated Behavioral Health  05/26/2021      Behavioral Health Clinician Progress Note    Patient Name: Daron Bond           Service Type:  Individual      Service Location:   Phone call (patient / identified key support person reached)     Session Start Time: 10:00am  Session End Time: 10:57am      Session Length: 38 - 52     Attendees: Client    Visit Activities (Refresh list every visit): NEW and [facility] Only    Diagnostic Assessment Date: Next Session  Treatment Plan Review Date: NA  See Flowsheets for today's PHQ-9 and TYRELL-7 results  Previous PHQ-9:   PHQ-9 SCORE 1/26/2021   PHQ-9 Total Score 18     Previous TYRELL-7:   TYRELL-7 Total Score 1/26/2021   TYRELL-7 Total Score 21       DILIA LEVEL:  No flowsheet data found.    DATA  Extended Session (60+ minutes): No  Interactive Complexity: No  Crisis: No  [facility] Patient: No    Treatment Objective(s) Addressed in This Session:  Target Behavior(s): disease management/lifestyle changes related to mental health    Depressed Mood: Increase interest, engagement, and pleasure in doing things  Decrease frequency and intensity of feeling down, depressed, hopeless  Improve quantity and quality of night time sleep / decrease daytime naps  Feel less tired and more energy during the day   Improve diet, appetite, mindful eating, and / or meal planning  Identify negative self-talk and behaviors: challenge core beliefs, myths, and actions  Improve concentration, focus, and mindfulness in daily activities   Feel less fidgety, restless or slow in daily activities / interpersonal interactions  Anxiety: will experience a reduction in anxiety, will develop more effective coping skills to manage anxiety symptoms, will develop healthy cognitive patterns and beliefs and will increase ability to function adaptively  Anger Management: will learn strategies to resolve conflict adaptively and will learn and practice positive anger management  skills  Psychological distress related to Pain    Current Stressors / Issues:  [facility] introduced self and role to patient. Patient reported that he was actually hoping to get connected to a  and someone who could help him find a new place to live, but patient is possibly interested in working with a therapist. Patient reported that he is living in an unsafe environment and has been trying to find a new place to live for awhile now. Patient reported numerous stressors that have been going on over the past few years and with his chronic pain it has been hard to manage the stressors. Patient also reported experiencing trauma most of his life. [facility] and patient spent session processing through his current symptoms and stressors and discussing what may be helpful to patient.       Progress on Treatment Objective(s) / Homework:  New Objective established this session - CONTEMPLATION (Considering change and yet undecided); Intervened by assessing the negative and positive thinking (ambivalence) about behavior change    Motivational Interviewing    MI Intervention: Expressed Empathy/Understanding, Supported Autonomy, Collaboration, Evocation, Permission to raise concern or advise, Open-ended questions and Reflections: simple and complex     Change Talk Expressed by the Patient: Desire to change Ability to change Reasons to change    Provider Response to Change Talk: E - Evoked more info from patient about behavior change, A - Affirmed patient's thoughts, decisions, or attempts at behavior change, R - Reflected patient's change talk and S - Summarized patient's change talk statements    Also provided psychoeducation about behavioral health condition, symptoms, and treatment options    Care Plan review completed: Yes    Medication Review:  No changes to current psychiatric medication(s)    Medication Compliance:  Yes      Changes in Health Issues:   None reported    Chemical Use Review:   Substance Use: No substance use  concerns reported / identified     Tobacco Use: No current tobacco use.      Assessment: Current Emotional / Mental Status (status of significant symptoms):  Risk status (Self / Other harm or suicidal ideation)  Patient denies a history of suicidal ideation, suicide attempts, self-injurious behavior, homicidal ideation, homicidal behavior and and other safety concerns  Patient denies current concerns for personal safety.  Patient denies current or recent suicidal ideation or behaviors.  Patient denies current or recent homicidal ideation or behaviors.  Patient denies current or recent self injurious behavior or ideation.  Patient denies other safety concerns.  A safety and risk management plan has not been developed at this time, however patient was encouraged to call [place] [phone] should there be a change in any of these risk factors.    Appearance:   Unable to gather due to phone visit   Eye Contact:   Unable to gather due to phone visit   Psychomotor Behavior: Unable to gather due to phone visit   Attitude:   Cooperative   Orientation:   All  Speech   Rate / Production: Normal    Volume:  Normal   Mood:    Angry  Normal  Affect:    Appropriate   Thought Content:  Clear  Perservative   Thought Form:  Coherent  Logical   Insight:    Good     Diagnoses:  1. TYRELL (generalized anxiety disorder)        Collateral Reports Completed:  Routed note to PCP    Plan: (Homework, other):  Patient was given information about behavioral services and encouraged to schedule a follow up appointment with the clinic [facility] as needed.  He was also given information about mental health symptoms and treatment options .  CD Recommendations: No indications of CD issues. DA to be completed at next session. FLETCHER Oneil, [facility]      ______________________________________________________________________

## 2021-06-26 NOTE — PROGRESS NOTES
[facility] Primary Care: : Integrated Behavioral Health  06/03/2021      Behavioral Health Clinician Progress Note    Patient Name: Daron Bond           Service Type:  Individual      Service Location:   Phone call (patient / identified key support person reached)     Session Start Time: 11:00am  Session End Time: 11:18am      Session Length: 16 - 37     Attendees: Client    Visit Activities (Refresh list every visit): [facility] Only    Diagnostic Assessment Date: Next Session  Treatment Plan Review Date: NA  See Flowsheets for today's PHQ-9 and TYRELL-7 results  Previous PHQ-9:   PHQ-9 SCORE 1/26/2021   PHQ-9 Total Score 18     Previous TYRELL-7:   TYRELL-7 Total Score 1/26/2021   TYRELL-7 Total Score 21       DILIA LEVEL:  No flowsheet data found.    DATA  Extended Session (60+ minutes): No  Interactive Complexity: No  Crisis: No  [facility] Patient: No    Treatment Objective(s) Addressed in This Session:  Target Behavior(s): disease management/lifestyle changes related to mental health    Depressed Mood: Increase interest, engagement, and pleasure in doing things  Decrease frequency and intensity of feeling down, depressed, hopeless  Improve quantity and quality of night time sleep / decrease daytime naps  Feel less tired and more energy during the day   Improve diet, appetite, mindful eating, and / or meal planning  Identify negative self-talk and behaviors: challenge core beliefs, myths, and actions  Improve concentration, focus, and mindfulness in daily activities   Feel less fidgety, restless or slow in daily activities / interpersonal interactions  Anxiety: will experience a reduction in anxiety, will develop more effective coping skills to manage anxiety symptoms, will develop healthy cognitive patterns and beliefs and will increase ability to function adaptively  Anger Management: will learn strategies to resolve conflict adaptively and will learn and practice positive anger management skills  Psychological  distress related to Pain    Current Stressors / Issues:  Patient reported that he continues to remain the same. He reported that there continues to be many issues with his landlord and his neighbors. Patient continues to struggle with all of the symptoms. [facility] and patient spent session processing through his current stressors. [facility] did provided Beatriz Del Valles Kalee Curtis's number in case he would like to see what services they offer.     Progress on Treatment Objective(s) / Homework:  Minimal progress - CONTEMPLATION (Considering change and yet undecided); Intervened by assessing the negative and positive thinking (ambivalence) about behavior change    Motivational Interviewing    MI Intervention: Expressed Empathy/Understanding, Supported Autonomy, Collaboration, Evocation, Permission to raise concern or advise, Open-ended questions and Reflections: simple and complex     Change Talk Expressed by the Patient: Desire to change Ability to change Reasons to change    Provider Response to Change Talk: E - Evoked more info from patient about behavior change, A - Affirmed patient's thoughts, decisions, or attempts at behavior change, R - Reflected patient's change talk and S - Summarized patient's change talk statements    Also provided psychoeducation about behavioral health condition, symptoms, and treatment options    Care Plan review completed: Yes    Medication Review:  No changes to current psychiatric medication(s)    Medication Compliance:  Yes      Changes in Health Issues:   None reported    Chemical Use Review:   Substance Use: No substance use concerns reported / identified     Tobacco Use: No current tobacco use.      Assessment: Current Emotional / Mental Status (status of significant symptoms):  Risk status (Self / Other harm or suicidal ideation)  Patient denies a history of suicidal ideation, suicide attempts, self-injurious behavior, homicidal ideation, homicidal behavior and and other safety  concerns  Patient denies current concerns for personal safety.  Patient denies current or recent suicidal ideation or behaviors.  Patient denies current or recent homicidal ideation or behaviors.  Patient denies current or recent self injurious behavior or ideation.  Patient denies other safety concerns.  A safety and risk management plan has not been developed at this time, however patient was encouraged to call [place] [phone] should there be a change in any of these risk factors.    Appearance:   Unable to gather due to phone visit   Eye Contact:   Unable to gather due to phone visit   Psychomotor Behavior: Unable to gather due to phone visit   Attitude:   Cooperative   Orientation:   All  Speech   Rate / Production: Normal    Volume:  Normal   Mood:    Angry  Normal  Affect:    Appropriate   Thought Content:  Clear  Perservative   Thought Form:  Coherent  Logical   Insight:    Good     Diagnoses:  1. TYRELL (generalized anxiety disorder)        Collateral Reports Completed:  Not Applicable    Plan: (Homework, other):  Patient was given information about behavioral services and encouraged to schedule a follow up appointment with the clinic [facility] as needed.  He was also given information about mental health symptoms and treatment options .  CD Recommendations: No indications of CD issues. DA to be completed at next session. Polina Muniz, FLETCHER, [facility]      ______________________________________________________________________

## 2021-08-04 ENCOUNTER — COMMUNICATION - RIVER FALLS (OUTPATIENT)
Dept: FAMILY MEDICINE | Facility: CLINIC | Age: 41
End: 2021-08-04
Payer: MEDICARE

## 2021-08-04 ENCOUNTER — OFFICE VISIT - RIVER FALLS (OUTPATIENT)
Dept: FAMILY MEDICINE | Facility: CLINIC | Age: 41
End: 2021-08-04
Payer: MEDICARE

## 2021-09-20 ENCOUNTER — COMMUNICATION - RIVER FALLS (OUTPATIENT)
Dept: FAMILY MEDICINE | Facility: CLINIC | Age: 41
End: 2021-09-20
Payer: MEDICARE

## 2021-11-23 ENCOUNTER — COMMUNICATION - RIVER FALLS (OUTPATIENT)
Dept: FAMILY MEDICINE | Facility: CLINIC | Age: 41
End: 2021-11-23
Payer: MEDICARE

## 2021-11-24 ENCOUNTER — OFFICE VISIT - RIVER FALLS (OUTPATIENT)
Dept: FAMILY MEDICINE | Facility: CLINIC | Age: 41
End: 2021-11-24
Payer: MEDICARE

## 2021-11-29 ENCOUNTER — OFFICE VISIT - RIVER FALLS (OUTPATIENT)
Dept: FAMILY MEDICINE | Facility: CLINIC | Age: 41
End: 2021-11-29
Payer: MEDICARE

## 2022-01-04 ENCOUNTER — OFFICE VISIT - RIVER FALLS (OUTPATIENT)
Dept: FAMILY MEDICINE | Facility: CLINIC | Age: 42
End: 2022-01-04
Payer: MEDICARE

## 2022-01-08 LAB
6-ACETYL MORPHINE - HISTORICAL: NEGATIVE NG/ML
AMPHETAMINES UR QL: NEGATIVE NG/ML
BENZODIAZ UR QL SCN: NEGATIVE NG/ML
BUPRENORPHINE: NEGATIVE NG/ML
CANNABINOIDS QUAL UR: 411 NG/ML
COCAINE METAB URINE - HISTORICAL: NEGATIVE NG/ML
CODEINE CONFIRM URINE: NEGATIVE NG/ML
CREAT UR-MCNC: 137.8 MG/DL
ETHYL GLUCURONIDE UR QL CFM: NEGATIVE NG/ML
HYDROCODONE UR CFM-MCNC: NEGATIVE NG/ML
HYDROMORPHONE UR QL: NEGATIVE NG/ML
MDMA: NEGATIVE NG/ML
MORPHINE URINE: NEGATIVE NG/ML
NORHYDROCODONE - HISTORICAL: NEGATIVE NG/ML
NOROXYCODONE: 639 NG/ML
OPIATES UR QL SCN: ABNORMAL NG/ML
OXIDANTS URINE: NEGATIVE MCG/ML
OXYCODONE URINE - HISTORICAL: POSITIVE NG/ML
OXYCODONE, UR: 475 NG/ML
OXYMORPHONE - HISTORICAL: 956 NG/ML
PH UR STRIP: 5.3 [PH] (ref 4.5–9)
THC 50 URINE - HISTORICAL: POSITIVE NG/ML

## 2022-01-26 ENCOUNTER — OFFICE VISIT - RIVER FALLS (OUTPATIENT)
Dept: FAMILY MEDICINE | Facility: CLINIC | Age: 42
End: 2022-01-26
Payer: MEDICARE

## 2022-01-31 ENCOUNTER — COMMUNICATION - RIVER FALLS (OUTPATIENT)
Dept: FAMILY MEDICINE | Facility: CLINIC | Age: 42
End: 2022-01-31
Payer: MEDICARE

## 2022-02-02 ENCOUNTER — OFFICE VISIT - RIVER FALLS (OUTPATIENT)
Dept: FAMILY MEDICINE | Facility: CLINIC | Age: 42
End: 2022-02-02
Payer: MEDICARE

## 2022-02-11 VITALS
TEMPERATURE: 97.8 F | BODY MASS INDEX: 35.78 KG/M2 | TEMPERATURE: 97.3 F | BODY MASS INDEX: 36.11 KG/M2 | HEART RATE: 88 BPM | SYSTOLIC BLOOD PRESSURE: 140 MMHG | WEIGHT: 273.2 LBS | OXYGEN SATURATION: 97 % | OXYGEN SATURATION: 96 % | DIASTOLIC BLOOD PRESSURE: 88 MMHG | WEIGHT: 271.8 LBS | TEMPERATURE: 98.5 F | BODY MASS INDEX: 36.35 KG/M2 | HEIGHT: 73 IN | WEIGHT: 274 LBS | SYSTOLIC BLOOD PRESSURE: 132 MMHG | DIASTOLIC BLOOD PRESSURE: 72 MMHG | WEIGHT: 270 LBS | HEART RATE: 88 BPM | DIASTOLIC BLOOD PRESSURE: 87 MMHG | BODY MASS INDEX: 36.11 KG/M2 | WEIGHT: 274 LBS | HEART RATE: 102 BPM | SYSTOLIC BLOOD PRESSURE: 135 MMHG | HEART RATE: 100 BPM | HEART RATE: 82 BPM | SYSTOLIC BLOOD PRESSURE: 128 MMHG | HEART RATE: 100 BPM | DIASTOLIC BLOOD PRESSURE: 74 MMHG | HEIGHT: 73 IN | WEIGHT: 275.4 LBS | HEART RATE: 78 BPM | BODY MASS INDEX: 36.31 KG/M2 | DIASTOLIC BLOOD PRESSURE: 84 MMHG | BODY MASS INDEX: 36.21 KG/M2 | HEIGHT: 73 IN | WEIGHT: 273.2 LBS | DIASTOLIC BLOOD PRESSURE: 78 MMHG | DIASTOLIC BLOOD PRESSURE: 87 MMHG | SYSTOLIC BLOOD PRESSURE: 124 MMHG | HEART RATE: 86 BPM | SYSTOLIC BLOOD PRESSURE: 128 MMHG | SYSTOLIC BLOOD PRESSURE: 122 MMHG | TEMPERATURE: 97.2 F | DIASTOLIC BLOOD PRESSURE: 78 MMHG | TEMPERATURE: 97.7 F | BODY MASS INDEX: 36.47 KG/M2 | HEART RATE: 102 BPM | WEIGHT: 272.2 LBS | SYSTOLIC BLOOD PRESSURE: 118 MMHG | SYSTOLIC BLOOD PRESSURE: 130 MMHG | DIASTOLIC BLOOD PRESSURE: 90 MMHG | HEART RATE: 88 BPM | HEART RATE: 92 BPM | BODY MASS INDEX: 36.24 KG/M2 | WEIGHT: 275.2 LBS | TEMPERATURE: 98.2 F | WEIGHT: 272.2 LBS | DIASTOLIC BLOOD PRESSURE: 78 MMHG | SYSTOLIC BLOOD PRESSURE: 122 MMHG | BODY MASS INDEX: 36.53 KG/M2 | DIASTOLIC BLOOD PRESSURE: 88 MMHG | BODY MASS INDEX: 36.36 KG/M2 | DIASTOLIC BLOOD PRESSURE: 84 MMHG | HEIGHT: 73 IN | SYSTOLIC BLOOD PRESSURE: 122 MMHG | HEART RATE: 105 BPM | SYSTOLIC BLOOD PRESSURE: 120 MMHG

## 2022-02-12 VITALS
HEART RATE: 84 BPM | SYSTOLIC BLOOD PRESSURE: 142 MMHG | DIASTOLIC BLOOD PRESSURE: 80 MMHG | WEIGHT: 280.4 LBS | DIASTOLIC BLOOD PRESSURE: 90 MMHG | HEART RATE: 90 BPM | HEART RATE: 72 BPM | WEIGHT: 281.8 LBS | TEMPERATURE: 98.7 F | TEMPERATURE: 97.8 F | OXYGEN SATURATION: 97 % | TEMPERATURE: 98.6 F | BODY MASS INDEX: 37.25 KG/M2 | WEIGHT: 286 LBS | TEMPERATURE: 97.2 F | WEIGHT: 280.8 LBS | DIASTOLIC BLOOD PRESSURE: 78 MMHG | SYSTOLIC BLOOD PRESSURE: 142 MMHG | HEIGHT: 73 IN | SYSTOLIC BLOOD PRESSURE: 131 MMHG | SYSTOLIC BLOOD PRESSURE: 140 MMHG | BODY MASS INDEX: 38.74 KG/M2 | TEMPERATURE: 97.5 F | OXYGEN SATURATION: 93 % | HEART RATE: 80 BPM | HEART RATE: 99 BPM | HEART RATE: 100 BPM | HEART RATE: 86 BPM | DIASTOLIC BLOOD PRESSURE: 85 MMHG | BODY MASS INDEX: 36.88 KG/M2 | WEIGHT: 278 LBS | SYSTOLIC BLOOD PRESSURE: 130 MMHG | BODY MASS INDEX: 37.35 KG/M2 | WEIGHT: 282 LBS | OXYGEN SATURATION: 97 % | BODY MASS INDEX: 38.49 KG/M2 | HEIGHT: 73 IN | SYSTOLIC BLOOD PRESSURE: 120 MMHG | BODY MASS INDEX: 37.78 KG/M2 | DIASTOLIC BLOOD PRESSURE: 88 MMHG | TEMPERATURE: 97.7 F | TEMPERATURE: 98.3 F | DIASTOLIC BLOOD PRESSURE: 80 MMHG | DIASTOLIC BLOOD PRESSURE: 85 MMHG | BODY MASS INDEX: 37.37 KG/M2 | BODY MASS INDEX: 37.2 KG/M2 | HEIGHT: 72 IN | SYSTOLIC BLOOD PRESSURE: 134 MMHG | WEIGHT: 284.8 LBS | HEART RATE: 105 BPM | DIASTOLIC BLOOD PRESSURE: 82 MMHG | WEIGHT: 283.8 LBS | SYSTOLIC BLOOD PRESSURE: 126 MMHG | SYSTOLIC BLOOD PRESSURE: 130 MMHG | HEART RATE: 80 BPM | DIASTOLIC BLOOD PRESSURE: 76 MMHG

## 2022-02-12 VITALS
WEIGHT: 279 LBS | SYSTOLIC BLOOD PRESSURE: 126 MMHG | SYSTOLIC BLOOD PRESSURE: 128 MMHG | SYSTOLIC BLOOD PRESSURE: 128 MMHG | HEART RATE: 88 BPM | HEART RATE: 84 BPM | SYSTOLIC BLOOD PRESSURE: 126 MMHG | DIASTOLIC BLOOD PRESSURE: 74 MMHG | OXYGEN SATURATION: 97 % | HEART RATE: 100 BPM | TEMPERATURE: 97.1 F | HEIGHT: 73 IN | BODY MASS INDEX: 36.23 KG/M2 | WEIGHT: 269.8 LBS | SYSTOLIC BLOOD PRESSURE: 130 MMHG | DIASTOLIC BLOOD PRESSURE: 82 MMHG | TEMPERATURE: 98.3 F | HEART RATE: 83 BPM | HEART RATE: 93 BPM | HEART RATE: 80 BPM | SYSTOLIC BLOOD PRESSURE: 130 MMHG | WEIGHT: 269.4 LBS | HEIGHT: 73 IN | TEMPERATURE: 98 F | DIASTOLIC BLOOD PRESSURE: 84 MMHG | SYSTOLIC BLOOD PRESSURE: 132 MMHG | HEIGHT: 73 IN | DIASTOLIC BLOOD PRESSURE: 88 MMHG | HEART RATE: 72 BPM | TEMPERATURE: 97.2 F | OXYGEN SATURATION: 98 % | TEMPERATURE: 97.5 F | DIASTOLIC BLOOD PRESSURE: 84 MMHG | BODY MASS INDEX: 35.94 KG/M2 | HEART RATE: 84 BPM | WEIGHT: 281.4 LBS | BODY MASS INDEX: 36.95 KG/M2 | DIASTOLIC BLOOD PRESSURE: 80 MMHG | DIASTOLIC BLOOD PRESSURE: 92 MMHG | BODY MASS INDEX: 36.88 KG/M2 | SYSTOLIC BLOOD PRESSURE: 120 MMHG | HEART RATE: 80 BPM | DIASTOLIC BLOOD PRESSURE: 82 MMHG | BODY MASS INDEX: 36.08 KG/M2 | DIASTOLIC BLOOD PRESSURE: 66 MMHG | BODY MASS INDEX: 35.74 KG/M2 | BODY MASS INDEX: 36.08 KG/M2 | DIASTOLIC BLOOD PRESSURE: 86 MMHG | WEIGHT: 272 LBS | HEIGHT: 73 IN | WEIGHT: 272 LBS | BODY MASS INDEX: 36.13 KG/M2 | DIASTOLIC BLOOD PRESSURE: 100 MMHG | RESPIRATION RATE: 20 BRPM | BODY MASS INDEX: 35.79 KG/M2 | HEART RATE: 90 BPM | DIASTOLIC BLOOD PRESSURE: 78 MMHG | DIASTOLIC BLOOD PRESSURE: 76 MMHG | DIASTOLIC BLOOD PRESSURE: 82 MMHG | BODY MASS INDEX: 37.33 KG/M2 | SYSTOLIC BLOOD PRESSURE: 124 MMHG | BODY MASS INDEX: 35.92 KG/M2 | BODY MASS INDEX: 35.58 KG/M2 | OXYGEN SATURATION: 98 % | SYSTOLIC BLOOD PRESSURE: 142 MMHG | TEMPERATURE: 98.6 F | HEART RATE: 99 BPM | HEIGHT: 73 IN | OXYGEN SATURATION: 96 % | BODY MASS INDEX: 37.01 KG/M2 | DIASTOLIC BLOOD PRESSURE: 78 MMHG | SYSTOLIC BLOOD PRESSURE: 126 MMHG | TEMPERATURE: 98.4 F | WEIGHT: 273.4 LBS | TEMPERATURE: 98.2 F | HEART RATE: 84 BPM | HEART RATE: 84 BPM | BODY MASS INDEX: 37.22 KG/M2 | HEART RATE: 80 BPM | WEIGHT: 281 LBS | SYSTOLIC BLOOD PRESSURE: 130 MMHG | SYSTOLIC BLOOD PRESSURE: 126 MMHG | DIASTOLIC BLOOD PRESSURE: 80 MMHG | SYSTOLIC BLOOD PRESSURE: 126 MMHG | SYSTOLIC BLOOD PRESSURE: 130 MMHG | HEART RATE: 84 BPM | DIASTOLIC BLOOD PRESSURE: 78 MMHG | HEART RATE: 74 BPM | HEART RATE: 84 BPM | BODY MASS INDEX: 36.11 KG/M2 | WEIGHT: 272.6 LBS | HEART RATE: 96 BPM | TEMPERATURE: 98.2 F | HEIGHT: 73 IN | SYSTOLIC BLOOD PRESSURE: 128 MMHG | HEART RATE: 76 BPM | BODY MASS INDEX: 37.24 KG/M2 | HEART RATE: 76 BPM | WEIGHT: 274.8 LBS | SYSTOLIC BLOOD PRESSURE: 130 MMHG | DIASTOLIC BLOOD PRESSURE: 84 MMHG | HEIGHT: 73 IN | WEIGHT: 271.2 LBS | DIASTOLIC BLOOD PRESSURE: 82 MMHG | HEART RATE: 72 BPM | BODY MASS INDEX: 35.82 KG/M2 | TEMPERATURE: 98 F | WEIGHT: 272.2 LBS | TEMPERATURE: 97 F | BODY MASS INDEX: 36.45 KG/M2 | DIASTOLIC BLOOD PRESSURE: 82 MMHG | SYSTOLIC BLOOD PRESSURE: 132 MMHG | TEMPERATURE: 98.5 F | DIASTOLIC BLOOD PRESSURE: 80 MMHG | SYSTOLIC BLOOD PRESSURE: 126 MMHG | WEIGHT: 278.8 LBS | SYSTOLIC BLOOD PRESSURE: 124 MMHG | TEMPERATURE: 97.4 F | WEIGHT: 280.6 LBS | TEMPERATURE: 97.6 F | TEMPERATURE: 97.4 F | SYSTOLIC BLOOD PRESSURE: 122 MMHG | HEART RATE: 88 BPM | WEIGHT: 271 LBS | WEIGHT: 278 LBS | RESPIRATION RATE: 16 BRPM | WEIGHT: 270 LBS | SYSTOLIC BLOOD PRESSURE: 122 MMHG | WEIGHT: 281 LBS | WEIGHT: 278 LBS | OXYGEN SATURATION: 97 % | TEMPERATURE: 96.4 F | WEIGHT: 268.2 LBS | BODY MASS INDEX: 36.88 KG/M2 | TEMPERATURE: 98.7 F | TEMPERATURE: 97.9 F | BODY MASS INDEX: 37.24 KG/M2 | DIASTOLIC BLOOD PRESSURE: 68 MMHG

## 2022-02-12 VITALS
TEMPERATURE: 97.3 F | TEMPERATURE: 98.4 F | HEIGHT: 73 IN | HEART RATE: 98 BPM | WEIGHT: 279 LBS | HEART RATE: 88 BPM | BODY MASS INDEX: 37.35 KG/M2 | TEMPERATURE: 97.7 F | SYSTOLIC BLOOD PRESSURE: 120 MMHG | DIASTOLIC BLOOD PRESSURE: 83 MMHG | WEIGHT: 281.8 LBS | DIASTOLIC BLOOD PRESSURE: 80 MMHG | SYSTOLIC BLOOD PRESSURE: 128 MMHG | HEIGHT: 73 IN | HEART RATE: 80 BPM | DIASTOLIC BLOOD PRESSURE: 80 MMHG | BODY MASS INDEX: 36.98 KG/M2 | DIASTOLIC BLOOD PRESSURE: 88 MMHG | TEMPERATURE: 98.6 F | SYSTOLIC BLOOD PRESSURE: 114 MMHG | SYSTOLIC BLOOD PRESSURE: 130 MMHG | HEART RATE: 72 BPM | WEIGHT: 279.2 LBS | SYSTOLIC BLOOD PRESSURE: 136 MMHG | BODY MASS INDEX: 37.35 KG/M2 | HEIGHT: 72 IN | BODY MASS INDEX: 42.31 KG/M2 | DIASTOLIC BLOOD PRESSURE: 82 MMHG | RESPIRATION RATE: 16 BRPM | HEART RATE: 100 BPM | WEIGHT: 278.8 LBS | BODY MASS INDEX: 37.29 KG/M2

## 2022-02-12 VITALS
RESPIRATION RATE: 16 BRPM | HEART RATE: 88 BPM | WEIGHT: 283 LBS | DIASTOLIC BLOOD PRESSURE: 90 MMHG | HEART RATE: 72 BPM | HEIGHT: 72 IN | WEIGHT: 287 LBS | HEART RATE: 100 BPM | TEMPERATURE: 97.2 F | DIASTOLIC BLOOD PRESSURE: 88 MMHG | TEMPERATURE: 97.5 F | HEIGHT: 72 IN | BODY MASS INDEX: 39.01 KG/M2 | HEART RATE: 72 BPM | WEIGHT: 292.6 LBS | BODY MASS INDEX: 39.63 KG/M2 | HEIGHT: 72 IN | DIASTOLIC BLOOD PRESSURE: 90 MMHG | TEMPERATURE: 98.5 F | DIASTOLIC BLOOD PRESSURE: 98 MMHG | DIASTOLIC BLOOD PRESSURE: 80 MMHG | HEART RATE: 76 BPM | RESPIRATION RATE: 16 BRPM | BODY MASS INDEX: 38.87 KG/M2 | SYSTOLIC BLOOD PRESSURE: 140 MMHG | BODY MASS INDEX: 38.33 KG/M2 | HEIGHT: 72 IN | WEIGHT: 288 LBS | BODY MASS INDEX: 39.01 KG/M2 | HEIGHT: 72 IN | DIASTOLIC BLOOD PRESSURE: 84 MMHG | HEIGHT: 72 IN | TEMPERATURE: 98.1 F | HEART RATE: 74 BPM | SYSTOLIC BLOOD PRESSURE: 144 MMHG | TEMPERATURE: 98.2 F | TEMPERATURE: 97.3 F | SYSTOLIC BLOOD PRESSURE: 156 MMHG | SYSTOLIC BLOOD PRESSURE: 140 MMHG | WEIGHT: 288 LBS | WEIGHT: 289 LBS | SYSTOLIC BLOOD PRESSURE: 146 MMHG | RESPIRATION RATE: 16 BRPM | BODY MASS INDEX: 39.14 KG/M2 | RESPIRATION RATE: 16 BRPM | SYSTOLIC BLOOD PRESSURE: 146 MMHG

## 2022-02-12 VITALS
HEIGHT: 72 IN | OXYGEN SATURATION: 93 % | BODY MASS INDEX: 42.23 KG/M2 | HEIGHT: 72 IN | BODY MASS INDEX: 41.96 KG/M2 | DIASTOLIC BLOOD PRESSURE: 98 MMHG | SYSTOLIC BLOOD PRESSURE: 138 MMHG | DIASTOLIC BLOOD PRESSURE: 80 MMHG | SYSTOLIC BLOOD PRESSURE: 156 MMHG | OXYGEN SATURATION: 98 % | WEIGHT: 311.8 LBS | HEART RATE: 83 BPM | TEMPERATURE: 97.6 F | TEMPERATURE: 98.4 F | WEIGHT: 309.8 LBS | HEART RATE: 88 BPM

## 2022-02-12 VITALS
TEMPERATURE: 97.5 F | HEART RATE: 75 BPM | HEIGHT: 73 IN | HEART RATE: 70 BPM | BODY MASS INDEX: 37.59 KG/M2 | WEIGHT: 286.4 LBS | DIASTOLIC BLOOD PRESSURE: 82 MMHG | SYSTOLIC BLOOD PRESSURE: 130 MMHG | HEART RATE: 70 BPM | TEMPERATURE: 96.5 F | WEIGHT: 282 LBS | HEART RATE: 91 BPM | SYSTOLIC BLOOD PRESSURE: 124 MMHG | BODY MASS INDEX: 37.51 KG/M2 | DIASTOLIC BLOOD PRESSURE: 82 MMHG | BODY MASS INDEX: 37.72 KG/M2 | DIASTOLIC BLOOD PRESSURE: 87 MMHG | OXYGEN SATURATION: 97 % | TEMPERATURE: 98 F | HEART RATE: 81 BPM | SYSTOLIC BLOOD PRESSURE: 125 MMHG | WEIGHT: 286.4 LBS | DIASTOLIC BLOOD PRESSURE: 80 MMHG | BODY MASS INDEX: 37.69 KG/M2 | BODY MASS INDEX: 38.82 KG/M2 | OXYGEN SATURATION: 97 % | BODY MASS INDEX: 37.96 KG/M2 | SYSTOLIC BLOOD PRESSURE: 124 MMHG | DIASTOLIC BLOOD PRESSURE: 84 MMHG | TEMPERATURE: 97.7 F | WEIGHT: 284.4 LBS | WEIGHT: 281 LBS | BODY MASS INDEX: 38.31 KG/M2 | OXYGEN SATURATION: 98 % | SYSTOLIC BLOOD PRESSURE: 141 MMHG | SYSTOLIC BLOOD PRESSURE: 130 MMHG | WEIGHT: 281 LBS | BODY MASS INDEX: 37.59 KG/M2 | HEIGHT: 73 IN | WEIGHT: 283 LBS | DIASTOLIC BLOOD PRESSURE: 84 MMHG | HEART RATE: 76 BPM | TEMPERATURE: 98.3 F | TEMPERATURE: 98.1 F | TEMPERATURE: 97.1 F | WEIGHT: 290.2 LBS | SYSTOLIC BLOOD PRESSURE: 128 MMHG | HEART RATE: 74 BPM | WEIGHT: 283.6 LBS | HEART RATE: 80 BPM | BODY MASS INDEX: 37.93 KG/M2 | HEIGHT: 73 IN | TEMPERATURE: 98.1 F | SYSTOLIC BLOOD PRESSURE: 126 MMHG | DIASTOLIC BLOOD PRESSURE: 82 MMHG | DIASTOLIC BLOOD PRESSURE: 82 MMHG | HEART RATE: 88 BPM | SYSTOLIC BLOOD PRESSURE: 120 MMHG | TEMPERATURE: 97.8 F | DIASTOLIC BLOOD PRESSURE: 72 MMHG

## 2022-02-12 VITALS
SYSTOLIC BLOOD PRESSURE: 144 MMHG | BODY MASS INDEX: 41.99 KG/M2 | HEART RATE: 86 BPM | WEIGHT: 307 LBS | WEIGHT: 310 LBS | DIASTOLIC BLOOD PRESSURE: 88 MMHG | HEIGHT: 72 IN | TEMPERATURE: 98.9 F | HEIGHT: 72 IN | HEART RATE: 88 BPM | WEIGHT: 312 LBS | BODY MASS INDEX: 41.37 KG/M2 | TEMPERATURE: 97.1 F | OXYGEN SATURATION: 96 % | HEIGHT: 72 IN | BODY MASS INDEX: 41.31 KG/M2 | BODY MASS INDEX: 41.58 KG/M2 | WEIGHT: 305 LBS | DIASTOLIC BLOOD PRESSURE: 78 MMHG | HEIGHT: 72 IN | DIASTOLIC BLOOD PRESSURE: 98 MMHG | BODY MASS INDEX: 42.26 KG/M2 | RESPIRATION RATE: 16 BRPM | HEIGHT: 72 IN | OXYGEN SATURATION: 95 % | SYSTOLIC BLOOD PRESSURE: 166 MMHG | DIASTOLIC BLOOD PRESSURE: 88 MMHG | HEART RATE: 86 BPM | SYSTOLIC BLOOD PRESSURE: 142 MMHG | TEMPERATURE: 97.4 F | SYSTOLIC BLOOD PRESSURE: 142 MMHG | HEART RATE: 64 BPM

## 2022-02-12 VITALS
HEART RATE: 85 BPM | TEMPERATURE: 97.8 F | DIASTOLIC BLOOD PRESSURE: 82 MMHG | DIASTOLIC BLOOD PRESSURE: 94 MMHG | WEIGHT: 277 LBS | BODY MASS INDEX: 36.05 KG/M2 | SYSTOLIC BLOOD PRESSURE: 126 MMHG | BODY MASS INDEX: 36.77 KG/M2 | HEART RATE: 100 BPM | HEART RATE: 48 BPM | TEMPERATURE: 97.5 F | DIASTOLIC BLOOD PRESSURE: 88 MMHG | OXYGEN SATURATION: 94 % | HEIGHT: 73 IN | SYSTOLIC BLOOD PRESSURE: 142 MMHG | SYSTOLIC BLOOD PRESSURE: 128 MMHG | BODY MASS INDEX: 36.71 KG/M2 | HEIGHT: 73 IN | HEIGHT: 73 IN | OXYGEN SATURATION: 96 % | SYSTOLIC BLOOD PRESSURE: 150 MMHG | DIASTOLIC BLOOD PRESSURE: 98 MMHG | WEIGHT: 277.4 LBS | BODY MASS INDEX: 36.98 KG/M2 | TEMPERATURE: 97.9 F | BODY MASS INDEX: 36.97 KG/M2 | SYSTOLIC BLOOD PRESSURE: 138 MMHG | HEART RATE: 94 BPM | HEIGHT: 73 IN | TEMPERATURE: 96.9 F | WEIGHT: 272 LBS | HEART RATE: 80 BPM | WEIGHT: 279 LBS | DIASTOLIC BLOOD PRESSURE: 82 MMHG | WEIGHT: 276.4 LBS

## 2022-02-12 VITALS
HEART RATE: 87 BPM | DIASTOLIC BLOOD PRESSURE: 86 MMHG | BODY MASS INDEX: 38.17 KG/M2 | TEMPERATURE: 96.2 F | HEIGHT: 73 IN | DIASTOLIC BLOOD PRESSURE: 86 MMHG | HEART RATE: 72 BPM | SYSTOLIC BLOOD PRESSURE: 128 MMHG | DIASTOLIC BLOOD PRESSURE: 89 MMHG | HEIGHT: 73 IN | TEMPERATURE: 97.4 F | HEART RATE: 64 BPM | WEIGHT: 288.8 LBS | HEART RATE: 78 BPM | BODY MASS INDEX: 38.7 KG/M2 | SYSTOLIC BLOOD PRESSURE: 122 MMHG | RESPIRATION RATE: 16 BRPM | WEIGHT: 292 LBS | BODY MASS INDEX: 38.04 KG/M2 | HEIGHT: 73 IN | WEIGHT: 288 LBS | OXYGEN SATURATION: 96 % | RESPIRATION RATE: 16 BRPM | TEMPERATURE: 97.5 F | HEIGHT: 73 IN | SYSTOLIC BLOOD PRESSURE: 126 MMHG | BODY MASS INDEX: 38.04 KG/M2 | BODY MASS INDEX: 38.63 KG/M2 | SYSTOLIC BLOOD PRESSURE: 138 MMHG | TEMPERATURE: 96.5 F | WEIGHT: 287 LBS | DIASTOLIC BLOOD PRESSURE: 86 MMHG | WEIGHT: 287 LBS

## 2022-02-12 VITALS
HEIGHT: 72 IN | DIASTOLIC BLOOD PRESSURE: 86 MMHG | TEMPERATURE: 97.8 F | BODY MASS INDEX: 41.63 KG/M2 | WEIGHT: 307.4 LBS | HEART RATE: 89 BPM | OXYGEN SATURATION: 95 % | SYSTOLIC BLOOD PRESSURE: 136 MMHG

## 2022-02-12 VITALS
HEART RATE: 94 BPM | DIASTOLIC BLOOD PRESSURE: 82 MMHG | WEIGHT: 306 LBS | BODY MASS INDEX: 41.5 KG/M2 | HEIGHT: 72 IN | SYSTOLIC BLOOD PRESSURE: 136 MMHG | BODY MASS INDEX: 41.47 KG/M2 | TEMPERATURE: 97.6 F | HEIGHT: 72 IN | DIASTOLIC BLOOD PRESSURE: 62 MMHG | SYSTOLIC BLOOD PRESSURE: 140 MMHG | BODY MASS INDEX: 41.45 KG/M2 | TEMPERATURE: 97.6 F | WEIGHT: 306.2 LBS | HEART RATE: 76 BPM | HEIGHT: 72 IN | OXYGEN SATURATION: 95 %

## 2022-02-12 VITALS — HEIGHT: 72 IN | BODY MASS INDEX: 42.29 KG/M2

## 2022-02-12 VITALS
WEIGHT: 298.6 LBS | TEMPERATURE: 98.8 F | SYSTOLIC BLOOD PRESSURE: 148 MMHG | HEIGHT: 72 IN | BODY MASS INDEX: 40.44 KG/M2 | HEART RATE: 92 BPM | DIASTOLIC BLOOD PRESSURE: 76 MMHG

## 2022-02-12 VITALS
BODY MASS INDEX: 37.03 KG/M2 | WEIGHT: 278 LBS | SYSTOLIC BLOOD PRESSURE: 138 MMHG | HEIGHT: 73 IN | TEMPERATURE: 97.3 F | DIASTOLIC BLOOD PRESSURE: 80 MMHG | OXYGEN SATURATION: 96 % | HEART RATE: 112 BPM | HEART RATE: 76 BPM | DIASTOLIC BLOOD PRESSURE: 92 MMHG | SYSTOLIC BLOOD PRESSURE: 128 MMHG | HEIGHT: 73 IN | TEMPERATURE: 96.9 F | WEIGHT: 279.4 LBS | BODY MASS INDEX: 36.84 KG/M2 | HEART RATE: 95 BPM | DIASTOLIC BLOOD PRESSURE: 86 MMHG | SYSTOLIC BLOOD PRESSURE: 119 MMHG

## 2022-02-12 VITALS
WEIGHT: 288 LBS | OXYGEN SATURATION: 98 % | SYSTOLIC BLOOD PRESSURE: 124 MMHG | HEART RATE: 104 BPM | BODY MASS INDEX: 39.06 KG/M2 | TEMPERATURE: 97.3 F | DIASTOLIC BLOOD PRESSURE: 78 MMHG

## 2022-02-12 VITALS
HEART RATE: 88 BPM | HEIGHT: 72 IN | DIASTOLIC BLOOD PRESSURE: 82 MMHG | BODY MASS INDEX: 38.98 KG/M2 | TEMPERATURE: 97.5 F | SYSTOLIC BLOOD PRESSURE: 132 MMHG | WEIGHT: 287.8 LBS

## 2022-02-12 VITALS — HEIGHT: 72 IN | HEIGHT: 72 IN | BODY MASS INDEX: 42.53 KG/M2 | BODY MASS INDEX: 42.29 KG/M2 | WEIGHT: 314 LBS

## 2022-02-15 NOTE — PROGRESS NOTES
Patient:   JOEL SUTTON            MRN: [id number]            FIN: [id number]               Age:   39 years     Sex:  Male     :  1980   Associated Diagnoses:   Pre-operative examination; Chronic back pain; Lumbar herniated disc   Author:   Jaskaran CONWAY, Cricket MEDINA      Preoperative Information   Procedure/ Case: L4-5 hemilaminotomy   Location scheduled: [facility]   Date/ Time scheduled: 2019 8:00:00 AM   surgeon= Dr Whitley      Chief Complaint   10/31/2019 12:11 PM CDT  Pt here for a Preop Px for L4 to L5 Hemilaminotomy/Discectomy by Dr Whitley at [facility].  DOS 19        Review of Systems   Constitutional:  Negative.    Ear/Nose/Mouth/Throat:  Negative.    Respiratory:  Negative.    Cardiovascular:  Negative.    Gastrointestinal:  Negative.    Musculoskeletal:       Back pain: On the right side, In the lower region.       Health Status   Allergies:    Allergic Reactions (Selected)  Severity Not Documented  Bananas (No reactions were documented)  Bee Stings (No reactions were documented)  Latex (No reactions were documented)   Medications:  (Selected)   Prescriptions  Prescribed  Medrol Dosepak 4 mg oral tablet: = 1 packet(s), Oral, once, Instructions: as directed on package labeling, # 21 tab(s), 0 Refill(s), Type: Soft Stop, Pharmacy: [facility], [facility], 1 packet(s) Oral once,Instr:as directed on package labeling  ProAir HFA 90 mcg/inh inhalation aerosol: 2 puff(s), inh, qid, PRN: as needed for wheezing, # 1 EA, 2 Refill(s), Type: Maintenance, Pharmacy: [facility], [facility], 2 puff(s) Inhale qid,PRN:as needed for wheezing  diazePAM 5 mg oral tablet: = 1 tab(s) ( 5 mg ), Oral, q8 hrs, PRN: as needed for anxiety, # 10 tab(s), 0 Refill(s), Type: Maintenance, Pharmacy: [facility], [facility], 1 tab(s) Oral q8 hrs,PRN:as needed for anxiety   Problem list:    All Problems  Myalgia / SNOMED CT 204390549 / Confirmed  Smoking / SNOMED CT 818290258 /  Confirmed  Trochanteric bursitis, right hip / SNOMED CT 78439786 / Confirmed  Dysplastic nevus / SNOMED CT 3015191693 / Confirmed  Tobacco user / SNOMED CT 831706457 / Probable  Tension headache / SNOMED CT 3021716413 / Confirmed  Chronic back pain / SNOMED CT 919902825 / Confirmed  Marijuana use / SNOMED CT 5151638587 / Confirmed  Obese / SNOMED CT 0108955903 / Probable  Sebaceous cyst / SNOMED CT 9491800364 / Confirmed  Asthma, moderate persistent / SNOMED CT 1735133649 / Confirmed  Chronic insomnia / SNOMED CT 297327590 / Confirmed  Anxiety / SNOMED CT 71185807 / Confirmed  Low back pain / SNOMED CT 688218658 / Confirmed  Cerebellar tonsillar ectopia / SNOMED CT 58082805 / Confirmed      Histories   Past Medical History:    Active  Cerebellar tonsillar ectopia (57808243)  Anxiety (46736808)  Obese (2266838698)   Family History:    Heart disease  Father (Conor)  Diabetes mellitus type 2  Mother (Obdulia)     Procedure history:    Injection of cortisone (2672649318) on 6/8/2016 at 36 Years.  Transanal hemorrhoidal dearterialization (THD) on 9/8/2015 at 35 Years.  Esophagogastroduodenoscopy (139754730) on 8/4/2015 at 35 Years.  Comments:  8/5/2015 12:30 PM CDT - Mata Merino MD  Normal  Colonoscopy (909443858) on 8/4/2015 at 35 Years.  Comments:  8/13/2015 3:29 PM CDT - Genia Mac RN  Sedation: MAC  Indication: rectal bleeding  External & internal hemorrhoids; otherwise normal  Resume routine screening at age 50.  Hospitalized at [facility] in 2002 at 22 Years.  Right knee arthroscopy.  Surgery on eyes x3.  Comments:  12/15/2010 4:16 PM CST - Genia Ely RN  as infant   Social History:        Alcohol Assessment            Current                     Comments:                      06/16/2015 - Randall Salcedo MD                     6 drinks pks per weekend      Tobacco Assessment            Current                     Comments:                      06/16/2015 - Randall Salcedo MD                      1 ppd      Substance Abuse Assessment            Current                     Comments:                      06/16/2015 - Denisse ORTEGA, Randall                     Marijuana      Employment and Education Assessment            Work/School description: disabled.                     Comments:                      02/21/2011 - Krysten ORTEGA Wong                     has 1 child- 10 weeks old -      Home and Environment Assessment            Lives with Self, Significant other.                     Comments:                      02/21/2011 - Wong Bashir MD                     2 kids in household       Has  no history of anemia.  Has  no history of DVT or pulmonary embolism.  Has  no personal history of bleeding problems.   Has no personal history of anesthesia reactions.  Has  no  personal history of sleep apnea  Patient  does not have active tuberculosis.    S/he has not taken aspirin or aspirin-containing products in the last week.     S/he  has not taken Plavix (Clopidogrel) in the last 2 weeks.    S/he  has not taken warfarin in the past week.    S/he has  been on corticosteroids for more than 2 weeks recently. (will finish Medrol dosepak on 11/4)  S/he is not DNR before, during or after surgery.          Chest pain / SOB walking up 2 flights of steps? No  Pain in neck or jaw? No  CAD MI?  No  Afib? No  Heart Failure? No  Asthma  or Bronchitis? has asthma  Diabetes? No       Insulin/Orals?   Seizure Disorder? No  CKD?No  Thyroid Disease? No  Liver Disease? No  CVA? No      Physical Examination   Vital Signs   10/31/2019 12:11 PM CDT Temperature Tympanic 97.3 DegF  LOW    Peripheral Pulse Rate 72 bpm    Pulse Site Radial artery    Respiratory Rate 16 br/min    Systolic Blood Pressure 144 mmHg  HI    Diastolic Blood Pressure 90 mmHg  HI    Mean Arterial Pressure 108 mmHg    BP Site Right arm      Measurements from flowsheet : Measurements   10/31/2019 12:11 PM CDT Height Measured - Standard 72 in    Weight Measured -  Standard 289 lb    BSA 2.58 m2    Body Mass Index 39.19 kg/m2  HI      General:  No acute distress.    Eye:  Pupils are equal, round and reactive to light, Extraocular movements are intact.    HENT:  Tympanic membranes are clear, No pharyngeal erythema, No sinus tenderness.    Neck:  Supple, Non-tender, No lymphadenopathy.    Respiratory:  Lungs are clear to auscultation.    Cardiovascular:  Normal rate, Regular rhythm, No murmur.    Gastrointestinal:  Soft, Non-tender, Non-distended, Normal bowel sounds, No organomegaly.    Musculoskeletal:  Normal range of motion, pain over right low back.    Integumentary:  No rash.    Neurologic:  Cranial Nerves II-XII are grossly intact, Normal deep tendon reflexes.    Psychiatric:  Appropriate mood & affect.       Impression and Plan   Diagnosis     Pre-operative examination (RQO12-EB Z01.818).     Chronic back pain (MJG12-OH M54.9).     Lumbar herniated disc (GVI46-GW M51.26).     Condition:  Stable.    Orders     Orders   Charges (Evaluation and Management):  90506 office outpatient visit 25 minutes (Charge) (Order): Quantity: 1, Pre-operative examination  Lumbar herniated disc  Chronic back pain.     Approved for surgery without restrictions.

## 2022-02-15 NOTE — PROGRESS NOTES
Chief Complaint    TPI and cyst removal.  History of Present Illness          HPI pt has history of chronic myofascial pain and has found trigger point injections to be a useful tool to help control pain.  Would like to have repeat trigger point injections today.                       Review of systems is negative except as per HPI      Exam:      General: alert and oriented ×3 no acute distress.      HEENT: pupils are equal round and reactive to light extraocular motion is intact. Normocephalic and atraumatic.       Hearing is grossly normal and there is no otorrhea.       Nares are patent there is no rhinorrhea.       Mucous membranes are moist and pink.      Chest: has bilateral rise with no increased work of breathing.      Cardiovascular: normal perfusion and brisk capillary refill.      Musculoskeletal: no gross focal abnormalities and normal gait.      Neuro: no gross focal abnormalities and memory seems intact.      Psychiatric: speech is clear and coherent and fluent. Patient dressed appropriately for the weather. Mood is appropriate and affect is full.           Procedure note trigger point injections          Informed consent obtained including risks of pain, bleeding, infection, injury to surrounding tissue, and need for further treatment. Benefit of a trigger point injection goal is to reduce pain. This is just part of a treatment plan and for best results patient should also complete physical therapy. I cannot guarantee that will will be any pain relief.           Alternatives would include doing nothing, physical therapy, acupuncture, using heat or ice, continuing with oral medications such as muscle relaxants or nonsteroidal anti-inflammatory medications or topical medications such as a lidocaine patch or cream or menthol for diclofenac. She would like to try the trigger point injections.           Prep: Alcohol          Location identified by my palpation and communication with patient.  Symptomatic  trigger points that patient desired treatment at were located at:mid lumbar back and right lumbar back                      Each of these was injected with  a one-to-one solution of 1% lidocaine without epinephrine and 0.5% Marcaine without epinephrine.          Total number of injections:2          Total number of milliliters of the 1:1 solution of lidocaine and marcaine:8           Encouraged patient to treat the area with [place] cup ice massage tonight and to try to stretch the area out.           pain prior to treatment: moderate          pain following treatment at the trigger point injection sites:improved          Complications: none          Tolerated procedure well.       Procedure note excision of sebaceous cyst      Informed consent obtained  including risks of pain, bleeding, infection, injury to surrounding tissue, and need for further treatment. Benefit of this excision is to remove the suspected cyst so it does not continue to cycle and become inflamed and to confirm the diagnosis.  I cannot guarantee that the entire cyst will be excised.  Alternatives would include doing nothing, be referred to another physician or to be referred to a surgeon or dermatologist .  Pt would like to proceed with the excision today .       Location confirmed with patient, lesion on the left lower chin      Prep: Betadine       anesthesia provided with 1 ml 1%lidocaine with epinephrine after area prepped with alcohol      pt then prepped and draped in usual sterile fashion, circular blade used to excise the lesion in its entirety      skin reapproximated with 2 simple interrupted sutures of 5-black ethilon monofilament      bacitracin and bandaid placed, told to keep area clean and dry today then do bacitracin and bandaid changes daily, rtc in 3-5 days to check if sutures are ready to come out but sooner if any increased redness or drainage.      specimen sent to pathology to confirm diagnosis      EBL:<3ml      Tolerated  procedure well.           Assessment and Plan: myalgia and myofascial pain, s/p trigger point injections today.  Encouraged home exercise program and to bring in his paperwork from [place] [facility] so we can help him complete this and have him make appt with pain clinic, cont with the cymbalta,      left  chin lesion, suspect  sebaceous cyst s/p excision today, may rtc for similar procedure on right lower chin if he chooses to,         Physical Exam   Vitals & Measurements    T: 98.4   F (Tympanic)  HR: 84(Peripheral)  BP: 130/84        Assessment/Plan       Orders:         17120 unlisted px skin muc membrane +subq tissue (Charge), Quantity: 1, Sebaceous cyst         28782 unlisted px skin muc membrane +subq tissue (Charge), Quantity: 1, Low back pain  Myalgia         25277 office outpatient visit 25 minutes (Charge), Quantity: 1, Periorbital cellulitis  Myalgia  Conjunctivitis  Sebaceous cyst  Tobacco user  Low back pain  Patient Information     Name:JOEL SUTTON      Address:      [address] [place] [id number]     Sex:Male     YOB: 1980     [phone]     Emergency Contact:LEESA SUTTON     MRN:[id number]     FIN:[id number]     [facility]     Date of Service:05/16/2018      Primary Care Physician:       Nena King MD, [phone]      Attending Physician:       Nena King MD, [phone]  Problem List/Past Medical History    Ongoing     Anxiety     Asthma, moderate persistent     Cerebellar tonsillar ectopia     Chronic insomnia     Dysplastic nevus       Comments: removed from back     Low back pain     Marijuana use     Myalgia     Obese     Sebaceous cyst     Smoking     Tobacco user     Trochanteric bursitis, right hip    Historical     No qualifying data  Procedure/Surgical History     Injection of cortisone (06/08/2016)           Transanal hemorrhoidal dearterialization (THD) (09/08/2015)            Colonoscopy (08/04/2015)             Comments: Sedation: MAC<br/>Indication: rectal bleeding<br/>External &amp; internal hemorrhoids; otherwise normal<br/>Resume routine screening at age 50.     Esophagogastroduodenoscopy (08/04/2015)             Comments: Normal     Hospitalized at [facility] (2002)           Right knee arthroscopy           Surgery on eyes x3             Comments: as infant  Medications     Advil 200 mg oral tablet: 400 mg, 2 tab(s), po, q8 hrs, PRN: as needed for pain, 0 Refill(s).     Shoes for low back pain: See Instructions, Use as directed, 2 EA, 0 Refill(s).     Silvadene 1% topical cream: 1 mal, TOP, BID, 20 g, 0 Refill(s).     ProAir HFA 90 mcg/inh inhalation aerosol: 2 puff(s), inh, qid, PRN: as needed for wheezing, 1 EA, 2 Refill(s).     Qvar 80 mcg/inh inhalation aerosol: 1 puff(s), inh, bid, 1 EA, 5 Refill(s).     Symbicort 160 mcg-4.5 mcg/inh inhalation aerosol: 2 puff(s), inh, bid, to replace qvar  in the morning and the evening  use with spacer chamber  rinse mouth and throat after use, 3 EA, 3 Refill(s).     DULoxetine 40 mg oral delayed release capsule: 40 mg, 1 cap(s), po, daily, 30 cap(s), 1 Refill(s).     clindamycin 300 mg oral capsule: 300 mg, 1 cap(s), PO, q6hr, for 7 day(s), 28 cap(s), 0 Refill(s).     Vigamox 0.5% ophthalmic solution: 1 drop(s), right eye, tid, for 7 day(s), 3 mL, 0 Refill(s).          Allergies    Bee Stings    Latex  Social History    Smoking Status - 05/15/2018     Current every day smoker     Alcohol      Current, 06/16/2015     Employment and Education      Work/School description: disabled., 02/21/2011     Home and Environment      Lives with Self, Significant other., 02/21/2011     Substance Abuse      Current, 06/16/2015     Tobacco      Current, 06/16/2015  Family History    Diabetes mellitus type 2: Mother.    Heart disease: Father.  Immunizations      Vaccine Date Status      tetanus/diphth/pertuss (Tdap) adult/adol 11/17/2016 Given       influenza virus vaccine, inactivated 11/17/2016 Given      influenza virus vaccine, inactivated 01/06/2015 Given  Lab Results       Lab Results (Last 4 results within 90 days)        Sodium Level: 140 mmol/L [135 mmol/L - 146 mmol/L] (04/27/18 14:54:00 CDT)       Potassium Level: 4.4 mmol/L [3.5 mmol/L - 5.3 mmol/L] (04/27/18 14:54:00 CDT)       Chloride Level: 104 mmol/L [98 mmol/L - 110 mmol/L] (04/27/18 14:54:00 CDT)       CO2 Level: 31 mmol/L [20 mmol/L - 31 mmol/L] (04/27/18 14:54:00 CDT)       Glucose Level: 83 mg/dL [65 mg/dL - 99 mg/dL] (04/27/18 14:54:00 CDT)       BUN: 15 mg/dL [7 mg/dL - 25 mg/dL] (04/27/18 14:54:00 CDT)       Creatinine Level: 0.98 mg/dL [0.6 mg/dL - 1.35 mg/dL] (04/27/18 14:54:00 CDT)       BUN/Creat Ratio: NOT APPLICABLE [6  - 22] (04/27/18 14:54:00 CDT)       eGFR: 97 mL/min/1.73m2 [8.6 mg/dL - 10.3 mg/dL] (04/27/18 14:54:00 CDT)       eGFR African American: 113 mL/min/1.73m2 [6  - 22] (04/27/18 14:54:00 CDT)       Calcium Level: 9.7 mg/dL [8.6 mg/dL - 10.3 mg/dL] (04/27/18 14:54:00 CDT)

## 2022-02-15 NOTE — PROGRESS NOTES
Chief Complaint    discuss ongoing back pain issues.  having little relief post back surgery.  now having left leg pain/numbness.  has been using Oxycodone prn.  History of Present Illness      Here with ongoing low back pain.  He tried an inversion swing with some worsening of pain.  Pain has moved up to the thoracic area.  He was seen by spine surgery about 2 weeks ago and weight loss recommended before spinal fusion.  He would like a second opinion.  Anxiety has been worse due to the pain.  Review of Systems          ROS reviewed and negative except for symptoms noted in HPI.  Physical Exam   Vitals & Measurements    T: 97.6   F (Tympanic)  HR: 94(Peripheral)  BP: 140/62  SpO2: 95%     HT: 72 in  HT: 72 in  WT: 306.2 lb  BMI: 41.52             General:  Alert and oriented, No acute distress.             Eye: Normal conjunctiva.             HENT:  Oral mucosa is moist.             Neck:  Supple.             Respiratory:  Respirations are non-labored.             Cardiovascular:  Normal rate           Musculoskeletal:  Normal gait.  Paraspinous muscle tenderness, mid thoracic tender trigger point           Psychiatric:  Cooperative, Appropriate mood & affect, Normal judgment.  Assessment/Plan       1. Low back pain (M54.5)         This is a chronic problem and complete cervical, thoracic and lumbar MRI requested by spine surgery.  His anxiety has prevented him from completing the imaging.       2. Chronic insomnia (F51.04)        improve with better pain management       3. Anxiety (F41.1)         add duloxetine for better control of anxiety and pain       4. Myofascial pain (M79.18)        mid thoracic paraspinous trigger point injected with 1cc of bupivacaine after cleansing area with alcohol, adhesive dressing applied        Orders:         diclofenac topical, ( 2 gm ), Topical, qid, # 240 gm, 2 Refill(s), Type: Maintenance, Pharmacy: [facility], 2 gm Topical qid, 72, in, 08/05/20  13:48:00 CDT, Height Measured, Weight Measured, (Ordered)         diclofenac topical, ( 2 gm ), Topical, qid, # 240 gm, 0 Refill(s), Type: Hard Stop, Pharmacy: [facility], 72, in, 06/15/20 13:54:00 CDT, Height Measured, Weight Measured, (Completed)         DULoxetine, = 1 cap(s) ( 30 mg ), Oral, bid, # 60 cap(s), 0 Refill(s), Type: Maintenance, Pharmacy: [facility], 1 cap(s) Oral bid, 72, in, 08/05/20 13:48:00 CDT, Height Measured, 306.2, lb, 08/05/20 13:48:00 CDT, Weight Measured, (Ordered)         omeprazole, = 1 cap(s) ( 20 mg ), Oral, daily, # 30 cap(s), 1 Refill(s), Type: Maintenance, Pharmacy: [facility], 1 cap(s) Oral daily,x30 day(s), 72, in, 08/05/20 13:48:00 CDT, Height Measured, 306.2, lb, 08/05/20 13:48:00 CDT, Weight [place]..., (Ordered)         oxyCODONE, = 1 tab(s) ( 5 mg ), Oral, q8 hrs, PRN: as needed for pain, # 20 tab(s), 0 Refill(s), Type: Maintenance, Pharmacy: [facility], 1 tab(s) Oral q8 hrs,PRN:as needed for pain, 72, in, 08/05/20 13:48:00 CDT, Height Measured, Weight..., (Ordered)  Patient Information     Name:JOEL SUTTON      Address:      [address] [place] [id number]     Sex:Male     YOB: 1980     [phone]     Emergency Contact:[facility], CONTACT     MRN:[id number]     FIN:[id number]     Location:[facility]     Date of Service:08/05/2020      Primary Care Physician:       Jaskaran CONWAY, Cricket MEDINA, [phone]      Attending Physician:       Mata Hearn MD, [phone]  Problem List/Past Medical History    Ongoing     Anxiety     Asthma, moderate persistent     Cerebellar tonsillar ectopia     Chronic back pain     Chronic insomnia     Degenerative lumbar disc     Dysplastic nevus       Comments: removed from back     Low back pain     Marijuana use     Myalgia     Obese     Sebaceous cyst     Smoking      Tension headache     Tobacco user     Trochanteric bursitis, right hip    Historical     No qualifying data  Procedure/Surgical History     Injection of cortisone (06/08/2016)     Transanal hemorrhoidal dearterialization (THD) (09/08/2015)     Colonoscopy (08/04/2015)      Comments: Sedation: MAC      Indication: rectal bleeding      External & internal hemorrhoids; otherwise normal      Resume routine screening at age 50..     Esophagogastroduodenoscopy (08/04/2015)      Comments: Normal.     Hospitalized at [facility] (2002)     Right knee arthroscopy     Surgery on eyes x3      Comments: as infant.  Medications    acetaminophen 500 mg oral tablet, 1000 mg= 2 tab(s), Oral, q6 hrs, PRN,   Not taking    diazePAM 5 mg oral tablet, 5 mg= 1 tab(s), Oral, q8 hrs, PRN, 1 refills,   Not taking: takes only as needed.    diclofenac 1% topical gel, 2 gm, Topical, qid, 2 refills    DULoxetine 30 mg oral delayed release capsule, 30 mg= 1 cap(s), Oral, bid    omeprazole 20 mg oral delayed release capsule, 20 mg= 1 cap(s), Oral, daily, 1 refills    Orthotics, See Instructions    oxyCODONE 5 mg oral tablet, 5 mg= 1 tab(s), Oral, q8 hrs, PRN    Ventolin HFA 90 mcg/inh inhalation aerosol, 2 puff(s), NEB, qid, PRN  Allergies    Bananas    Bee Stings    Latex  Social History    Smoking Status - 08/05/2020     Current every day smoker     Alcohol      Past, 02/13/2020     Employment/School      Work/School description: disabled., 02/21/2011     Home/Environment      Lives with Self, Significant other., 02/21/2011     Substance Abuse      Current, 06/16/2015     Tobacco      Current, 06/16/2015  Family History    Diabetes mellitus type 2: Mother.    Heart disease: Father.  Immunizations      Vaccine Date Status          tetanus/diphth/pertuss (Tdap) adult/adol 11/17/2016 Given          influenza virus vaccine, inactivated 11/17/2016 Given          influenza virus vaccine, inactivated 01/06/2015 Given  Lab Results       Lab  Results (Last 4 results within 90 days)        Coronavirus SARS-CoV-2 (COVID-19): NOT DETECTED (06/04/20 12:32:00)

## 2022-02-15 NOTE — PROGRESS NOTES
Chief Complaint    Patient presents for trigger point injection.  History of Present Illness          HPI pt has history of chronic myofascial pain and has found trigger point injections to be a useful tool to help control pain.  Would like to have repeat trigger point injections today.  He also requests injection of toradol.                     Review of systems is negative except as per HPI      Exam:      General: alert and oriented ×3 no acute distress.      HEENT: pupils are equal round and reactive to light extraocular motion is intact. Normocephalic and atraumatic.       Hearing is grossly normal and there is no otorrhea.       Nares are patent there is no rhinorrhea.       Mucous membranes are moist and pink.      Chest: has bilateral rise with no increased work of breathing.      Cardiovascular: normal perfusion and brisk capillary refill.      Musculoskeletal: no gross focal abnormalities and normal gait.      Neuro: no gross focal abnormalities and memory seems intact.      Psychiatric: speech is clear and coherent and fluent. Patient dressed appropriately for the weather. Mood is appropriate and affect is full.           Procedure note           Informed consent obtained including risks of pain, bleeding, infection, injury to surrounding tissue, and need for further treatment. Benefit of a trigger point injection goal is to reduce pain. This is just part of a treatment plan and for best results patient should also complete physical therapy. I cannot guarantee that will will be any pain relief.           Alternatives would include doing nothing, physical therapy, acupuncture, using heat or ice, continuing with oral medications such as muscle relaxants or nonsteroidal anti-inflammatory medications or topical medications such as a lidocaine patch or cream or menthol for diclofenac. She would like to try the trigger point injections.           Prep: Alcohol          Location identified by my palpation and  communication with patient.  Symptomatic trigger points that patient desired treatment at were located at:           right upper GLuteous jyoti x 2 and right lortissimus dorsi x 1           Each of these was injected with  a one-to-one solution of 1% lidocaine without epinephrine and 0.5% Marcaine without epinephrine.          Total number of injections:3          Total number of milliliters of the 1:1 solution of lidocaine and marcaine:8           Encouraged patient to treat the area with [place]ia cup ice massage tonight and to try to stretch the area out.           pain prior to treatment: severe          pain following treatment at the trigger point injection sites:no improvement in right lower back, significant improvemen tin right lortissimus dorsi          Complications: none          Tolerated procedure well.          Asessment and Plan: myalgia and myofascial pain, s/p trigger point injections today.  Encouraged home exercise program and to keep annual exam appt as indicated and RTC if symptoms worsen or do not improve.   Physical Exam   Vitals & Measurements    T: 97.2(Tympanic)  HR: 83(Peripheral)  BP: 130/80  SpO2: 98%     HT: 72.8 in  WT: 271.2 lb  BMI: 35.97   Assessment/Plan       Chronic pain (G89.29)         Orders:          Referral (Request), 04/24/18 15:11:00 CDT, Referred to: Pain Management, Myalgia  Chronic pain                Low back pain (M54.5)         Orders:          ketorolac, 60 mg, im, once, (Completed)          71758 therapeutic prophylactic/dx injection subq/im (Charge), Quantity: 1, Low back pain           ketorolac tromethamine inj, 15 mg (Charge), Quantity: 4, Low back pain                Myalgia (M79.1)          encouraged pt to make appt with Pain specialist and also to make4 appt with Parris mendieta skin exam,         Orders:          Referral (Request), 04/24/18 15:11:00 CDT, Referred to: Pain Management, Myalgia  Chronic pain           Patient Information      Name:JOEL SUTTON      Address:      [address] [place] [address] [address] [place] [id number]     Sex:Male     YOB: 1980     [phone]     Emergency Contact:LEESA SUTTON     MRN:[id number]     FIN:[id number]     Location[email or web address]     Date of Service:04/24/2018      Primary Care Physician:       Nena King MD, [phone]      Attending Physician:       Nena King MD, [phone]  Problem List/Past Medical History    Ongoing     Anxiety     Cerebellar tonsillar ectopia     Chronic insomnia     Low back pain     Marijuana use     Myalgia     Obese     Smoking     Tobacco user     Trochanteric bursitis, right hip    Historical     No qualifying data  Procedure/Surgical History     Injection of cortisone (06/08/2016)           Transanal hemorrhoidal dearterialization (THD) (09/08/2015)           Colonoscopy (08/04/2015)             Comments: Sedation: MAC<br/>Indication: rectal bleeding<br/>External &amp; internal hemorrhoids; otherwise normal<br/>Resume routine screening at age 50.     Esophagogastroduodenoscopy (08/04/2015)             Comments: Normal     Hospitalized at [facility] (2002)           Right knee arthroscopy           Surgery on eyes x3             Comments: as infant  Medications     hydrocortisone 2.5% topical cream: 1 mal, top, tid, 30 gm, 1 Refill(s).     Qvar 80 mcg/inh inhalation aerosol: 1 puff(s), inh, bid, 1 EA, 3 Refill(s).     Advil 200 mg oral tablet: 400 mg, 2 tab(s), po, q8 hrs, PRN: as needed for pain, 0 Refill(s).     Vitamin B-12 1000 mcg oral tablet: 1,000 mcg, 1 tab(s), po, daily, 0 Refill(s).     ProAir HFA 90 mcg/inh inhalation aerosol: 2 puff(s), inh, qid, PRN: as needed for wheezing, 1 EA, 2 Refill(s).     DULoxetine 20 mg oral delayed release capsule: 20 mg, 1 cap(s), po, daily, 30 cap(s), 1 Refill(s).     Shoes for low back pain: See Instructions, Use as directed, 2 EA, 0 Refill(s).          Allergies     Bee Stings    Latex  Social History    Smoking Status - 04/24/2018     Current every day smoker     Alcohol - Denies Alcohol Use, 04/04/2011      Current, 06/16/2015     Employment and Education      Work/School description: disabled., 02/21/2011     Home and Environment      Lives with Self, Significant other., 02/21/2011     Substance Abuse      Current, 06/16/2015     Tobacco - Current, 12/15/2010      Current, 06/16/2015  Family History    Diabetes mellitus type 2: Mother.    Heart disease: Father.  Immunizations      Vaccine Date Status      tetanus/diphth/pertuss (Tdap) adult/adol 11/17/2016 Given      influenza virus vaccine, inactivated 11/17/2016 Given      influenza virus vaccine, inactivated 01/06/2015 Given

## 2022-02-15 NOTE — PROGRESS NOTES
"Chief Complaint    Pt here for FU on back pain. Pt Right eye pain and swelling from yesterday. Pt states his \"landlord was using a leaf blower yesterday\" and some of the debris blw in through his window into his eye  History of Present Illness      Patient presents to clinic today with right eye pain.  He says that earlier in the week his landlord was using a leaf blower and blue some debris in through his window.  Yesterday evening he developed some pain so went to the emergency room.  He says they did not use any drops in his eyes.  His symptoms have worsened since last night with increased pain and now also having some blurred vision.  He denies any foreign body sensation.  He has no photophobia.  He says that his eyelid has more swelling.  There was some crusting on his eyelashes this morning.  Physical Exam   Vitals & Measurements    T: 96.4   F (Tympanic)  HR: 80(Peripheral)  RR: 16  BP: 126/100            Review of systems is negative except as per HPI      Exam:      General: alert and oriented ×3 no acute distress.      HEENT: pupils are equal round and reactive to light extraocular motion is intact. Normocephalic and atraumatic.  Also see images captured today.  The right eye conjunctiva is injected.  His eyelid are both swollen and reddish purple in color and tender to the touch.  His funduscopic exam is normal.  His fluorescein strip with Wood lamp exam shows no evidence of corneal injury.  I do not see a foreign body.      Hearing is grossly normal and there is no otorrhea.       Nares are patent there is no rhinorrhea.       Mucous membranes are moist and pink.      Chest: has bilateral rise with no increased work of breathing.      Cardiovascular: normal perfusion and brisk capillary refill.      Musculoskeletal: no gross focal abnormalities and normal gait.      Neuro: no gross focal abnormalities and memory seems intact.      Psychiatric: speech is clear and coherent and fluent. Patient dressed " appropriately for the weather. Mood is appropriate and affect is full.         Assessment/Plan       Conjunctivitis (H10.31)        An very concerned about the possibility of a retained foreign body considering that his symptoms started with some debris being flown into his bedroom.  Encouraged patient to have stat referral to ophthalmology but he declines for now saying that he just wants to go home and get some rest.  I did place some erythromycin eye ointment into his right eye today and asked him to please make sure that he picked up his Vigamox before going to bed.  Also explained that I would like him to return to clinic tomorrow for him to be reevaluated however if his symptoms worsen overnight and he needs to go back to the emergency room.         Orders:          clindamycin, 1 cap(s) ( 300 mg ), PO, q6hr, # 28 cap(s), 0 Refill(s), Type: Maintenance, Pharmacy: [facility], 1 cap(s) po q6 hrs,x7 day(s), (Ordered)          moxifloxacin ophthalmic, 1 drop(s), right eye, tid, # 3 mL, 0 Refill(s), Type: Maintenance, Pharmacy: [facility], 1 drop(s) right eye tid,x7 day(s), (Ordered)          sulfamethoxazole-trimethoprim, 1 tab(s), PO, BID, # 14 tab(s), 0 Refill(s), Type: Maintenance, Pharmacy: [facility], 1 tab(s) po bid,x7 day(s), (Ordered)          18733 office outpatient visit 25 minutes (Charge), Quantity: 1, Conjunctivitis  Periorbital cellulitis          Return to Clinic (Request), RFV: recheck eye                Periorbital cellulitis (L03.213)         Orders:          clindamycin, 1 cap(s) ( 300 mg ), PO, q6hr, # 28 cap(s), 0 Refill(s), Type: Maintenance, Pharmacy: [facility], 1 cap(s) po q6 hrs,x7 day(s), (Ordered)          moxifloxacin ophthalmic, 1 drop(s), right eye, tid, # 3 mL, 0 Refill(s), Type: Maintenance, Pharmacy: [facility], 1 drop(s) right eye tid,x7 day(s),  (Ordered)          sulfamethoxazole-trimethoprim, 1 tab(s), PO, BID, # 14 tab(s), 0 Refill(s), Type: Maintenance, Pharmacy: [facility] [place], 1 tab(s) po bid,x7 day(s), (Ordered)          47930 office outpatient visit 25 minutes (Charge), Quantity: 1, Conjunctivitis  Periorbital cellulitis          Return to Clinic (Request), RFV: recheck eye           Patient Information     Name:JOEL SUTTON      Address:      [address] [place] [address] [address] [place] [id number]     Sex:Male     YOB: 1980     [phone]     Emergency Contact:LEESA SUTTON     MRN:[id number]     FIN:[id number]     Location:[facility]     Date of Service:05/11/2018      Primary Care Physician:       Nena King MD, [phone]      Attending Physician:       Nena King MD, [phone]  Problem List/Past Medical History    Ongoing     Anxiety     Asthma, moderate persistent     Cerebellar tonsillar ectopia     Chronic insomnia     Dysplastic nevus       Comments: removed from back     Low back pain     Marijuana use     Myalgia     Obese     Smoking     Tobacco user     Trochanteric bursitis, right hip    Historical     No qualifying data  Procedure/Surgical History     Injection of cortisone (06/08/2016)           Transanal hemorrhoidal dearterialization (THD) (09/08/2015)           Colonoscopy (08/04/2015)             Comments: Sedation: MAC<br/>Indication: rectal bleeding<br/>External &amp; internal hemorrhoids; otherwise normal<br/>Resume routine screening at age 50.     Esophagogastroduodenoscopy (08/04/2015)             Comments: Normal     Hospitalized at [facility] unit (2002)           Right knee arthroscopy           Surgery on eyes x3             Comments: as infant  Medications     Advil 200 mg oral tablet: 400 mg, 2 tab(s), po, q8 hrs, PRN: as needed for pain, 0 Refill(s).     Shoes for low back pain: See Instructions, Use as directed, 2 EA, 0  Refill(s).     Silvadene 1% topical cream: 1 mal, TOP, BID, 20 g, 0 Refill(s).     ProAir HFA 90 mcg/inh inhalation aerosol: 2 puff(s), inh, qid, PRN: as needed for wheezing, 1 EA, 2 Refill(s).     Qvar 80 mcg/inh inhalation aerosol: 1 puff(s), inh, bid, 1 EA, 5 Refill(s).     Symbicort 160 mcg-4.5 mcg/inh inhalation aerosol: 2 puff(s), inh, bid, to replace qvar  in the morning and the evening  use with spacer chamber  rinse mouth and throat after use, 3 EA, 3 Refill(s).     DULoxetine 40 mg oral delayed release capsule: 40 mg, 1 cap(s), po, daily, 30 cap(s), 1 Refill(s).     clindamycin 300 mg oral capsule: 300 mg, 1 cap(s), PO, q6hr, for 7 day(s), 28 cap(s), 0 Refill(s).     Vigamox 0.5% ophthalmic solution: 1 drop(s), right eye, tid, for 7 day(s), 3 mL, 0 Refill(s).     Bactrim  mg-160 mg oral tablet: 1 tab(s), PO, BID, for 7 day(s), 14 tab(s), 0 Refill(s).          Allergies    Bee Stings    Latex  Social History    Smoking Status - 05/11/2018     Current every day smoker     Alcohol      Current, 06/16/2015     Employment and Education      Work/School description: disabled., 02/21/2011     Home and Environment      Lives with Self, Significant other., 02/21/2011     Substance Abuse      Current, 06/16/2015     Tobacco      Current, 06/16/2015  Family History    Diabetes mellitus type 2: Mother.    Heart disease: Father.  Immunizations      Vaccine Date Status      tetanus/diphth/pertuss (Tdap) adult/adol 11/17/2016 Given      influenza virus vaccine, inactivated 11/17/2016 Given      influenza virus vaccine, inactivated 01/06/2015 Given  Lab Results       Lab Results (Last 4 results within 90 days)        Sodium Level: 140 mmol/L [135 mmol/L - 146 mmol/L] (04/27/18 14:54:00 CDT)       Potassium Level: 4.4 mmol/L [3.5 mmol/L - 5.3 mmol/L] (04/27/18 14:54:00 CDT)       Chloride Level: 104 mmol/L [98 mmol/L - 110 mmol/L] (04/27/18 14:54:00 CDT)       CO2 Level: 31 mmol/L [20 mmol/L - 31 mmol/L] (04/27/18  14:54:00 CDT)       Glucose Level: 83 mg/dL [65 mg/dL - 99 mg/dL] (04/27/18 14:54:00 CDT)       BUN: 15 mg/dL [7 mg/dL - 25 mg/dL] (04/27/18 14:54:00 CDT)       Creatinine Level: 0.98 mg/dL [0.6 mg/dL - 1.35 mg/dL] (04/27/18 14:54:00 CDT)       BUN/Creat Ratio: NOT APPLICABLE [6  - 22] (04/27/18 14:54:00 CDT)       eGFR: 97 mL/min/1.73m2 [8.6 mg/dL - 10.3 mg/dL] (04/27/18 14:54:00 CDT)       eGFR African American: 113 mL/min/1.73m2 [6  - 22] (04/27/18 14:54:00 CDT)       Calcium Level: 9.7 mg/dL [8.6 mg/dL - 10.3 mg/dL] (04/27/18 14:54:00 CDT)

## 2022-02-15 NOTE — PROGRESS NOTES
Patient:   JOEL SUTTON            MRN: [id number]            FIN: [id number]               Age:   40 years     Sex:  Male     :  1980   Associated Diagnoses:   None   Author:   Mata Hearn MD       -   Today's date:    2020.        -   To whom it may concern:        This patient is currently under my care and Was seen in my office on  2020.  .  He has chronic medical issues that have been worsening due to the constant noise coming from  the apartment directly above him.  If there is any way to solve this his health would be improved.       -   Shawn Hearn MD

## 2022-02-15 NOTE — PROGRESS NOTES
Patient:   JOEL SUTTON            MRN: [id number]            FIN: [id number]               Age:   41 years     Sex:  Male     :  1980   Associated Diagnoses:   None   Author:   Mata Hearn MD       -   Today's date:    2021.        -   To whom it may concern:        This patient is currently under my care and Was seen in my office on  2020.  .  He has chronic medical issues that have been worsening due to the constant noise coming from  the apartment directly above him.  If there is any way to solve this his health would be improved.       -   Shawn Hearn MD

## 2022-02-15 NOTE — PROGRESS NOTES
Patient:   JOEL SUTTON            MRN: [id number]            FIN: [id number]               Age:   38 years     Sex:  Male     :  1980   Associated Diagnoses:   None   Author:   Nena King MD      Visit Information      Date of Service: 02/15/2018 01:40 pm  Performing Location: [facility]  Encounter#: [id number]      Primary Care Provider (PCP):  Mata Hearn MD    NPI# [id number]      Referring Provider:  Nena King MD    NPI# [id number]      Procedure   Procedure   Time.     Indication: myofascial pain.     Informed consent: signed by patient.     Confirmed: patient, procedure, side, site.     Type of procedure: trigger point injection.     Physical Exam: vital signs Vital Signs   2/15/2018 1:43 PM CST Temperature Tympanic 98.1 DegF    Peripheral Pulse Rate 74 bpm    Pulse Site Radial artery    HR Method Manual    Systolic Blood Pressure 130 mmHg    Diastolic Blood Pressure 82 mmHg  HI    Mean Arterial Pressure 98 mmHg    BP Site Right arm    BP Method Manual      .     Preparation and technique: informed consent obtained, position sitting, sterile preparation of site with 70 % alcohol, hemostasis achieved using direct pressure, estimated blood loss minimal, adhesive bandagedressing applied.     Findings: painful and tender myofascial trigger points identified by palpation with communication from patient    Location: bilateral rhomboid x 2 each    Number of injections:4    pain prior to procedure:     moderate    pain following procedure:improved    number of milliliters of the 1:1 solution of 1% lidocaine without epiniephrine and 0.5% marcaine without epinephrine used in total: 8.     Procedure tolerated: well.     No Complications.     Biopsy procedure   Confirmed: patient.     Informed consent: signed by patient.     Indication: abnormal physical findings, itching.     Preparation and technique: skin prepped with alcohol, local anesthesia 1% lidocaine with epi in 1:1  solution with 0.5% marcaine total of 1 ml   , technique shave biopsy, hemostasis achieved using medication:  dry sol.     Site #  1: back, specimen sent to pathology, left upper back 6 mm x 8 mm.     Procedure tolerated: well.     No Complications.     No qualifying data available.          Impression and Plan   Diagnosis     return to clinic if symptoms worsen or do not improve.     Plan:  keep the wound covered with bacitracin and a bandaid while healing, change the bandaids daily and also as needed if they get wet or dirty.  return to clinic if there is increased pain or drainage or concernfor infection..    Counseled:  Patient, Family, use ice or heat as needed to help with pain, continue with home exercise program, .

## 2022-02-15 NOTE — NURSING NOTE
Comprehensive Intake Entered On:  8/15/2019 1:35 PM CDT    Performed On:  8/15/2019 1:25 PM CDT by Khadra Umanzor CMA               Summary   Chief Complaint :   c/o blood in stool since yesterday, increase in fatigue, also having back pain/right leg pain requesting MRI order   Menstrual Status :   N/A   Weight Measured :   287.8 lb(Converted to: 287 lb 13 oz, 130.54 kg)    Height Measured :   72 in(Converted to: 6 ft 0 in, 182.88 cm)    Body Mass Index :   39.03 kg/m2 (HI)    Body Surface Area :   2.57 m2   Systolic Blood Pressure :   132 mmHg (HI)    Diastolic Blood Pressure :   82 mmHg (HI)    Mean Arterial Pressure :   99 mmHg   Peripheral Pulse Rate :   88 bpm   BP Site :   Right arm   Pulse Site :   Radial artery   BP Method :   Manual   HR Method :   Manual   Temperature Tympanic :   97.5 DegF(Converted to: 36.4 DegC)  (LOW)    Khadra Umanzor CMA - 8/15/2019 1:25 PM CDT   Health Status   Allergies Verified? :   Yes   Medication History Verified? :   Yes   Medical History Verified? :   No   Pre-Visit Planning Status :   Not completed   Tobacco Use? :   Current every day smoker   Khadra Umanzor CMA - 8/15/2019 1:25 PM CDT   Demographics   Last Name :   Varun   Address :   [address] [place] [address]   First Name :   Daron Goyal Initial :   P   Responsible Party Date of Birth () :   1980 CST   City :   [place]   State :   [place]   Zip Code :   [id number]   Khadra Umanzor CMA - 8/15/2019 1:25 PM CDT   Providers Grid   Provider Name :    Dr. Cory David              Provider Specialty :    Ortho              Reason for Seeing Provider :    Right trochanteric bursitis                Khadra Umanzor CMA - 8/15/2019 1:25 PM CDT         Meds / Allergies   (As Of: 8/15/2019 1:35:15 PM CDT)   Allergies (Active)   Bananas  Estimated Onset Date:   Unspecified ; Created By:   Rowena Hooker LPN; Reaction Status:   Active ; Category:   Drug ; Substance:   Bananas ; Type:   Allergy ; Updated By:   Rowena Hooker LPN;  Reviewed Date:   8/15/2019 1:32 PM CDT      Bee Stings  Estimated Onset Date:   Unspecified ; Created By:   Ruthie Nam; Reaction Status:   Active ; Category:   Drug ; Substance:   Bee Stings ; Type:   Allergy ; Updated By:   Ruthie Nam; Reviewed Date:   8/15/2019 1:32 PM CDT      Latex  Estimated Onset Date:   Unspecified ; Created By:   Ruthie Nam; Reaction Status:   Active ; Category:   Drug ; Substance:   Latex ; Type:   Allergy ; Updated By:   Ruthie Nam; Reviewed Date:   8/15/2019 1:32 PM CDT        Medication List   (As Of: 8/15/2019 1:35:15 PM CDT)   Prescription/Discharge Order    albuterol  :   albuterol ; Status:   Prescribed ; Ordered As Mnemonic:   ProAir HFA 90 mcg/inh inhalation aerosol ; Simple Display Line:   2 puff(s), inh, qid, PRN: as needed for wheezing, 1 EA, 2 Refill(s) ; Ordering Provider:   Nena King MD; Catalog Code:   albuterol ; Order Dt/Tm:   8/30/2018 2:42:03 PM          omeprazole  :   omeprazole ; Status:   Prescribed ; Ordered As Mnemonic:   omeprazole 40 mg oral delayed release capsule ; Simple Display Line:   40 mg, 1 cap(s), PO, Daily, 90 cap(s), 3 Refill(s) ; Ordering Provider:   Nena King MD; Catalog Code:   omeprazole ; Order Dt/Tm:   10/17/2018 2:48:14 PM

## 2022-02-15 NOTE — PROGRESS NOTES
Patient:   JOEL SUTTON            MRN: [id number]            FIN: [id number]               Age:   36 years     Sex:  Male     :  1980   Associated Diagnoses:   Lumbar pain   Author:   Mata Hearn MD      Visit Information      Date of Service: 2017 04:33 pm  Performing Location: [facility]  Encounter#: [id number]      Primary Care Provider (PCP):  Mata Hearn MD    NPI# [id number]      Referring Provider:  No referring provider recorded for selected visit.      Chief Complaint   2017 4:41 PM CST    Pt c/o lower back spasms and shooting pains into his right leg. Flare up started about two weeks ago      History of Present Illness             The patient presents with back pain.  The back pain is located on the right and lumbar region.  The back pain is described as aching, colicky and cramping.  The severity of the back pain is moderate.  The back pain is improving.  Radiation of pain: to the buttocks and lateral thighs.  The context of the back pain: occurred not after starting new medication, not after trauma and not in association with illness.  Exacerbating factors consist of prolonged sitting and walking.  Relieving factors consist of analgesics and cold application.  Associated symptoms consist of denies bladder dysfunction, denies bowel dysfunction and denies decreased sensation.  Prior treatment consists of pain management.        Review of Systems   All other systems were reviewed and are negative.      Health Status   Allergies:    Allergic Reactions (Selected)  Severity Not Documented  Bee Stings (No reactions were documented)  Latex (No reactions were documented)   Medications:  (Selected)   Prescriptions  Prescribed  ProAir HFA 90 mcg/inh inhalation aerosol: 2 puff(s), inh, qid, # 1 EA, 3 Refill(s), Type: Maintenance, Pharmacy: [facility], 2 puff(s) inh qid  Qvar 80 mcg/inh inhalation aerosol: 1 puff(s), inh, bid, # 1 EA, 3  Refill(s), Type: Maintenance, Pharmacy: [facility], 1 puff(s) inh bid  cyclobenzaprine 10 mg oral tablet: 1 tab(s) ( 10 mg ), PO, TID, PRN: for spasm, # 30 tab(s), 1 Refill(s), Type: Maintenance, Pharmacy: [facility], 1 tab(s) po tid,PRN:for spasm  diazePAM 5 mg oral tablet: 1 tab(s) ( 5 mg ), PO, tid, PRN: for anxiety, # 10 tab(s), 1 Refill(s), Type: Maintenance, Pharmacy: [facility], 1 tab(s) po tid,PRN:for anxiety  hydrocortisone 2.5% topical cream: 1 mal, top, tid, # 30 gm, 1 Refill(s), Type: Maintenance, Pharmacy: [facility], 1 mal top tid   Problem list:    All Problems  Cerebellar tonsillar ectopia / SNOMED CT 27654012 / Confirmed  Anxiety / SNOMED CT 18344922 / Confirmed  Obese / ICD-9-.00 / Probable  Smoking / SNOMED CT 227585240 / Confirmed  Tobacco user / SNOMED CT 230035825 / Probable      Histories   Family History:    Heart disease  Father (Conor)  Diabetes mellitus type 2  Mother (Obdulia)     Procedure history:    Injection of cortisone (SNOMED CT 8431915693) on 6/8/2016 at 36 Years.  Transanal hemorrhoidal dearterialization (THD) on 9/8/2015 at 35 Years.  Esophagogastroduodenoscopy (SNOMED CT 880175629) on 8/4/2015 at 35 Years.  Comments:  8/5/2015 12:30 PM - Mata Merino MD  Normal  Colonoscopy (SNOMED CT 483378031) on 8/4/2015 at 35 Years.  Comments:  8/13/2015 3:29 PM - Genia Mac RN  Sedation: MAC  Indication: rectal bleeding  External & internal hemorrhoids; otherwise normal  Resume routine screening at age 50.  Hospitalized at [facility] in 2002 at 22 Years.  Right knee arthroscopy.  Surgery on eyes x3.  Comments:  12/15/2010 4:16 PM - Genia Ely RN  as infant   Social History:        Alcohol Assessment: Denies Alcohol Use            Current            Current                     Comments:                      06/16/2015 - Denisse ORTEGA, Randall                     6 drinks  pks per weekend      Tobacco Assessment: Current            Current                     Comments:                      06/16/2015 - Randall Salcedo MD                     1 ppd      Substance Abuse Assessment            Current, Marijuana            Current                     Comments:                      06/16/2015 - Randall Salcedo MD                     Marijuana      Employment and Education Assessment            Work/School description: disabled.                     Comments:                      02/21/2011 - Krysten ORTEGA Wong                     has 1 child- 10 weeks old -      Home and Environment Assessment            Lives with Self, Significant other.                     Comments:                      02/21/2011 - Wong Bashir MD                     2 kids in household        Physical Examination   Vital Signs   1/26/2017 4:41 PM CST Temperature Tympanic 97.9 DegF    Peripheral Pulse Rate 48 bpm  LOW    Pulse Site Radial artery    HR Method Manual    Systolic Blood Pressure 128 mmHg    Diastolic Blood Pressure 88 mmHg    Mean Arterial Pressure 101 mmHg    BP Site Right arm    BP Method Manual    Oxygen Saturation 94 %      Measurements from flowsheet : Measurements   1/26/2017 4:41 PM CST    Weight Measured - Standard                276.4 lb     General:  Alert and oriented, No acute distress.    Musculoskeletal:       Spine/torso exam: Lumbar ( Right, Tenderness, Numbness, Strength  5 /5, Normal range of motion ).    Neurologic:  Alert, Oriented.    Psychiatric:  Appropriate mood & affect.       Review / Management   Results review:  Lab results   12/15/2016 7:27 PM CST HSV I IgG <0.90    HSV II IgG <0.90   .       Impression and Plan   Diagnosis     Lumbar pain (SOI43-ZW M54.5).     Course:  Worsening.    Plan:  continue with PT, ketorolac today, prednisone, follow up if not improving.    Orders     Orders   Pharmacy:  predniSONE 20 mg oral tablet (Prescribe): 1 tab(s) ( 20 mg ), PO, Daily, x 7 day(s),  # 7 tab(s), 0 Refill(s), Type: Acute, Pharmacy: [facility] [place], 1 tab(s) po daily,x7 day(s)  Charges (Evaluation and Management):  87163 office outpatient visit 15 minutes (Charge) (Order): Quantity: 1, Lumbar pain.

## 2022-02-15 NOTE — PROGRESS NOTES
Patient:   JOEL SUTTON            MRN: [id number]            FIN: [id number]               Age:   38 years     Sex:  Male     :  1980   Associated Diagnoses:   Chronic back pain; Fatigue   Author:   Mata Merino MD      Visit Information      Date of Service: 2018 03:20 pm  Performing Location: [facility]  Encounter#: [id number]      Primary Care Provider (PCP):  Nena King MD    NPI# [id number]      Referring Provider:  Mata Merino MD    NPI# [id number]      Chief Complaint   2018 3:33 PM CDT     Medication consult per [facility]        History of Present Illness   Back pain began a couple years ago following recovery from surgery. Posture has been bad given his anxiety for past many years. MRI lumbar spine 2017 with spinal stenosis but no nerve impingement. Pain is in the right lower back and upper right leg. Has been getting trigger point injections  with some benefit. Has done Physical Therapy with Claudette Moran. Uses flexeril sparingly. Takes 1/2 tab couple times a week.    Feels tired all the time. TSH is normal.      Review of Systems   Constitutional:  No fever.    Gastrointestinal:  No vomiting.    Denies weakness in the legs  No incontinence      Health Status   Allergies:    Allergic Reactions (Selected)  Severity Not Documented  Bee Stings (No reactions were documented)  Latex (No reactions were documented)   Medications:  (Selected)   Prescriptions  Prescribed  DULoxetine 60 mg oral delayed release capsule: 1 cap(s) ( 60 mg ), po, daily, # 60 cap(s), 1 Refill(s), Type: Maintenance, Pharmacy: [facility], 1 cap(s) po daily  ProAir HFA 90 mcg/inh inhalation aerosol: 2 puff(s), inh, qid, PRN: as needed for wheezing, # 1 EA, 2 Refill(s), Type: Maintenance, Pharmacy: [facility], 2 puff(s) inh qid,PRN:as needed for wheezing  Qvar 80 mcg/inh inhalation aerosol: 1 puff(s), inh, bid, # 1 EA, 5 Refill(s), Type:  Maintenance, Pharmacy: [facility], 1 puff(s) inh bid  Shoes for low back pain: Shoes for low back pain, See Instructions, Instructions: Use as directed, Supply, # 2 EA, 0 Refill(s), Type: Maintenance, Use as directed  Symbicort 160 mcg-4.5 mcg/inh inhalation aerosol: 2 puff(s), inh, bid, Instructions: to replace qvar  in the morning and the evening  use with spacer chamber  rinse mouth and throat after use, # 3 EA, 3 Refill(s), Type: Maintenance, Pharmacy: [facility], 2 puff(s) inh bid,Ins...  cyclobenzaprine 5 mg oral tablet: 1 tab(s) ( 5 mg ), po, bid, PRN: for muscle spasm, # 60 tab(s), 1 Refill(s), Type: Maintenance, Pharmacy: [facility], 1 tab(s) po bid,PRN:for muscle spasm  lidocaine 5% topical film: 3 patch(es), Topical, daily, Instructions: remove patches after 12 hours, # 90 patch(es), 11 Refill(s), Type: Maintenance, Pharmacy: [facility], 3 patch(es) Topical daily,x30 day(s),Instr:remove patches after 12 hours  meloxicam 15 mg oral tablet: 1 tab(s) ( 15 mg ), PO, Daily, PRN: for pain, # 90 tab(s), 3 Refill(s), Type: Maintenance, Pharmacy: [facility], 1 tab(s) Oral daily,PRN:for pain   Problem list:    All Problems  Cerebellar tonsillar ectopia / SNOMED CT 72005453 / Confirmed  Sebaceous cyst / SNOMED CT 3857139333 / Confirmed  Marijuana use / SNOMED CT 1190536233 / Confirmed  Anxiety / SNOMED CT 89922190 / Confirmed  Low back pain / SNOMED CT 548842800 / Confirmed  Asthma, moderate persistent / SNOMED CT 3055184653 / Confirmed  Myalgia / SNOMED CT 193154660 / Confirmed  Dysplastic nevus / SNOMED CT 6816737663 / Confirmed  Obese / SNOMED CT 0329536619 / Probable  Chronic insomnia / SNOMED CT 993041021 / Confirmed  Smoking / SNOMED CT 376130382 / Confirmed  Tension headache / SNOMED CT 1080755899 / Confirmed  Tobacco user / SNOMED CT 550088489 / Probable  Trochanteric bursitis, right  hip / SNOMED CT 17391070 / Confirmed      Histories   Family History:    Heart disease  Father (Conor)  Diabetes mellitus type 2  Mother (Obdulia)     Procedure history:    Injection of cortisone (SNOMED CT 4576358129) performed by Joann on 6/8/2016 at 36 Years.  Transanal hemorrhoidal dearterialization (THD) performed by Vik Chavez MD on 9/8/2015 at 35 Years.  Esophagogastroduodenoscopy (SNOMED CT 167495068) on 8/4/2015 at 35 Years.  Comments:  8/5/2015 12:30 PM - Mata Merino MD  Normal  Colonoscopy (SNOMED CT 694915648) performed by Mata Merino MD on 8/4/2015 at 35 Years.  Comments:  8/13/2015 3:29 PM - Genia Mac RN  Sedation: MAC  Indication: rectal bleeding  External & internal hemorrhoids; otherwise normal  Resume routine screening at age 50.  Hospitalized at [facility] in 2002 at 22 Years.  Right knee arthroscopy.  Surgery on eyes x3.  Comments:  12/15/2010 4:16 PM - Genia Ely RN  as infant     Social History: Lives in apartment. Disability 2005 from Cerebellar Tonsillar ectopia. Single. Has two children (living with mom currently)      Physical Examination   Vital Signs   7/9/2018 3:33 PM CDT Peripheral Pulse Rate 86 bpm    Pulse Site Radial artery    HR Method Manual    Systolic Blood Pressure 132 mmHg  HI    Diastolic Blood Pressure 84 mmHg  HI    Mean Arterial Pressure 100 mmHg    BP Site Right arm    BP Method Manual      Measurements from flowsheet : Measurements   7/9/2018 3:33 PM CDT Height Measured - Standard 72.8 in    Weight Measured - Standard 275.2 lb    BSA 2.53 m2    Body Mass Index 36.5 kg/m2  HI      Neck:  No lymphadenopathy.    Respiratory:  Lungs are clear to auscultation.    Cardiovascular:  Normal rate, Regular rhythm.    Gastrointestinal:  Soft, Non-tender, Non-distended.    Musculoskeletal:  Normal range of motion, Normal strength, Normal gait, tender across the lower back.    Neurologic:  No focal deficits, Normal deep tendon reflexes, SLR negative.        Impression and Plan   Diagnosis     Chronic back pain (IXM84-DE M54.9).     Fatigue (USI72-PQ R53.83).       Chronic back pain: discussed Physical Therapy. Start amitriptyline and consider Butrans. Follow up in one month  Fatigue: check a CBC

## 2022-02-15 NOTE — LETTER
(Inserted Image. Unable to display)   November 17, 2021  JOEL SUTTON  [address] [place] [id number]        Dear JOEL,    Thank you for selecting [facility] for your healthcare needs.    Our records indicate you are due for the following services:     Follow-up office visit      (FYI   Regarding office visits: In some instances, a video visit or telephone visit may be offered as an option.)    To schedule an appointment or if you have further questions, please contact your clinic at [phone].    Powered by [employer]    Sincerely,    Mata Hearn MD

## 2022-02-15 NOTE — PROGRESS NOTES
"   Patient:   JOEL SUTTON            MRN: [id number]            FIN: [id number]               Age:   38 years     Sex:  Male     :  1980   Associated Diagnoses:   Anxiety; Cerebellar tonsillar ectopia; Chronic bilateral low back pain; Low back pain; Marijuana use; Myalgia   Author:   Nena King MD      Visit Information      Date of Service: 04/10/2018 01:40 pm  Performing Location: [facility]  Encounter#: [id number]      Primary Care Provider (PCP):  Nena King MD    NPSADE# [id number]      Referring Provider:  Nena King MD# [id number]      Chief Complaint   4/10/2018 1:46 PM CDT    Pt here for FU on his back pain.  Pt states he did not take his prednisone or his cymbalta given at last visit because he is \"worried about side effects\"        History of Present Illness   Patient here today with report of continued back and leg pain.  He did not start his prednisone or his duloxetine.  He reports that he was not sure how to take medicine because his dog tried to eat to the pill bottles which tore off the instructions.  He did bring his pill boxes with him today and asked me to help him set these up so that he could take medication appropriately.  He is feeling pretty down about how severe his pain as.  He plans to ask his kids mom to start walking them more often so that she can further concentrate on his own medical needs.  2.  Thinning and dad expressed that he has been on but he feels like his pain is so severe that he cannot keep up with them very well right now.  For example his morning 5-year-old walk-in with left by himself.  He says that she has a lock across the parking lot there are the appearance of mild that can help to watch over her but he prefers to wait with her control she gets on the back.  He would also like a shot of Toradol today.  He is not yet up to committing to seeing the pain medicine doctor over in [place].  He does not have here in [place] " and is not interested in counseling services at this time.  He reports that his pain is difficult to quantify because he tries to ignore it.  This morning his dog started to make some retching noises so he lunged for the dog to try to pick it up and move it to an area that would be hard for her instead of carpeting so that when the dog vomited it would be easier to clean up.  This causes some acute worsening back pain.  He has not yet started physical therapy.       he would like some trigger point injectins to some of theknots in his back and his right leg.        Review of Systems   Constitutional:  Negative except as documented in history of present illness.    Eye:  Negative except as documented in history of present illness.    Ear/Nose/Mouth/Throat:  Negative except as documented in history of present illness.    Respiratory:  Negative except as documented in history of present illness.    Cardiovascular:  Negative except as documented in history of present illness.    Gastrointestinal:  Negative except as documented in history of present illness.    Immunologic:  Negative except as documented in history of present illness.    Integumentary:  Negative except as documented in history of present illness.              Health Status   Allergies:    Allergic Reactions (Selected)  Severity Not Documented  Bee Stings (No reactions were documented)  Latex (No reactions were documented)   Medications:  (Selected)   Prescriptions  Prescribed  DULoxetine 20 mg oral delayed release capsule: 1 cap(s) ( 20 mg ), po, daily, # 30 cap(s), 1 Refill(s), Type: Maintenance, Pharmacy: [facility], 1 cap(s) po daily  ProAir HFA 90 mcg/inh inhalation aerosol: 2 puff(s), inh, qid, PRN: as needed for wheezing, # 1 EA, 2 Refill(s), Type: Maintenance, Pharmacy: [facility]  Qvar 80 mcg/inh inhalation aerosol: 1 puff(s), inh, bid, # 1 EA, 3 Refill(s), Type: Maintenance, [facility]: [facility], 1 puff(s) inh bid  hydrocortisone 2.5% topical cream: 1 mal, top, tid, # 30 gm, 1 Refill(s), Type: Maintenance, [facility]: [facility], 1 mal top tid  predniSONE 50 mg oral tablet: 1 tab(s) ( 50 mg ), po, daily, # 5 tab(s), 0 Refill(s), Type: Maintenance, [facility]acy: [facility], 1 tab(s) po daily,x5 day(s)  Documented Medications  Documented  Advil 200 mg oral tablet: 2 tab(s) ( 400 mg ), po, q8 hrs, PRN: as needed for pain, 0 Refill(s), Type: Maintenance  Vitamin B-12 1000 mcg oral tablet: 1 tab(s) ( 1,000 mcg ), po, daily, 0 Refill(s), Type: Maintenance   Problem list:    All Problems  Anxiety / SNOMED CT 05186909 / Confirmed  Cerebellar tonsillar ectopia / SNOMED CT 62492521 / Confirmed  Chronic insomnia / SNOMED CT 717964572 / Confirmed  Low back pain / SNOMED CT 661499502 / Confirmed  Marijuana use / SNOMED CT 1888148063 / Confirmed  Myalgia / SNOMED CT 153409005 / Confirmed  Obese / ICD-9-.00 / Probable  Smoking / SNOMED CT 917624775 / Confirmed  Tobacco user / SNOMED CT 358504839 / Probable  Trochanteric bursitis, right hip / SNOMED CT 47342689 / Confirmed      Histories   Past Medical History:    Active  Cerebellar tonsillar ectopia (19266958)  Anxiety (80000396)   Family History:    Heart disease  Father (Conor)  Diabetes mellitus type 2  Mother (Obdulia)     Procedure history:    Injection of cortisone (8596415309) on 6/8/2016 at 36 Years.  Transanal hemorrhoidal dearterialization (THD) on 9/8/2015 at 35 Years.  Esophagogastroduodenoscopy (308288591) on 8/4/2015 at 35 Years.  Comments:  8/5/2015 12:30 PM - Mata Merino MD  Normal  Colonoscopy (566027463) on 8/4/2015 at 35 Years.  Comments:  8/13/2015 3:29 PM - Estefania RN, Genia  Sedation: MAC  Indication: rectal bleeding  External & internal hemorrhoids; otherwise normal  Resume routine screening at age 50.  Hospitalized at [facility] unit in 2002 at 22 Years.  Right  knee arthroscopy.  Surgery on eyes x3.  Comments:  12/15/2010 4:16 PM - Jhoana RN, Genia  as infant   Social History:        Alcohol Assessment: Denies Alcohol Use            Current                     Comments:                      06/16/2015 - Randall Salcedo MD                     6 drinks pks per weekend      Tobacco Assessment: Current            Current                     Comments:                      06/16/2015 - Randall Salcedo MD                     1 ppd      Substance Abuse Assessment            Current                     Comments:                      06/16/2015 - Randall Salcedo MD                     Marijuana      Employment and Education Assessment            Work/School description: disabled.                     Comments:                      02/21/2011 - Gayathri Bashir MDrick                     has 1 child- 10 weeks old -      Home and Environment Assessment            Lives with Self, Significant other.                     Comments:                      02/21/2011 - Wong Bashir MD                     2 kids in household        Physical Examination   Vital Signs   4/10/2018 1:46 PM CDT Temperature Tympanic 98.5 DegF    Peripheral Pulse Rate 80 bpm    Pulse Site Radial artery    Respiratory Rate 20 br/min    Systolic Blood Pressure 130 mmHg    Diastolic Blood Pressure 82 mmHg  HI    Mean Arterial Pressure 98 mmHg    BP Site Right arm      Measurements from flowsheet : Measurements   4/10/2018 1:46 PM CDT    Weight Measured - Standard                278 lb     General:  Alert and oriented, No acute distress.    Eye:  Pupils are equal, round and reactive to light, Extraocular movements are intact, Normal conjunctiva.    HENT:  Normocephalic, hearing grossly normal during our conversation.    Respiratory:  Respirations are non-labored, Symmetrical chest wall expansion.    Cardiovascular:  Normal peripheral perfusion, brisk capillary refill.    Musculoskeletal:  Normal gait.    Integumentary:  Warm,  Dry, No rash.    Neurologic:  Alert, Oriented, No focal deficits, Cranial Nerves II-XII are grossly intact.    Cognition and Speech:  Speech clear and coherent, Functional cognition intact.    Psychiatric:  Cooperative, Appropriate mood & affect, Normal judgment, Non-suicidal.         Mood and affect: Flat.         Behavior: Pressured speech.         Judgment: Impulsive.         Abnormal / Psychotic thoughts: No homicidal ideation, No suicidal ideation.         Thought process: Distractible.       Health Maintenance      Recommendations     Pending (in the next year)        OverDue           Alcohol Misuse Screen (Male) due  11/17/17  and every 1  year(s)           Depression Screen (Male) due  11/17/17  and every 1  year(s)        Due            HIV Screen (if sexually active) (Male) due  04/10/18  and every 1  year(s)           Lipid Disorders Screen (Male) due  04/10/18  and every 1  year(s)           STD Counseling (if sexually active) (Male) due  04/10/18  and every 1  year(s)           Syphilis Screen (if sexually active) (Male) due  04/10/18  and every 1  year(s)           Type 2 Diabetes Mellitus Screen (Male) due  04/10/18  Variable frequency        Due In Future            Body Mass Index Check (Male) not due until  03/29/19  and every 1  year(s)     Satisfied (in the past 1 year)        Satisfied            Body Mass Index Check (Male) on  03/29/18.           Body Mass Index Check (Male) on  03/12/18.           Body Mass Index Check (Male) on  03/05/18.           Body Mass Index Check (Male) on  02/20/18.           Body Mass Index Check (Male) on  02/07/18.           Body Mass Index Check (Male) on  12/04/17.           Body Mass Index Check (Male) on  11/07/17.           Body Mass Index Check (Male) on  11/01/17.           Body Mass Index Check (Male) on  10/13/17.           Body Mass Index Check (Male) on  09/18/17.           Body Mass Index Check (Male) on  07/21/17.           Body Mass Index Check  (Male) on  06/29/17.           Body Mass Index Check (Male) on  05/24/17.           High Blood Pressure Screen (Male) on  04/10/18.           High Blood Pressure Screen (Male) on  03/29/18.           High Blood Pressure Screen (Male) on  03/22/18.           High Blood Pressure Screen (Male) on  03/14/18.           High Blood Pressure Screen (Male) on  03/12/18.           High Blood Pressure Screen (Male) on  03/05/18.           High Blood Pressure Screen (Male) on  02/27/18.           High Blood Pressure Screen (Male) on  02/21/18.           High Blood Pressure Screen (Male) on  02/20/18.           High Blood Pressure Screen (Male) on  02/16/18.           High Blood Pressure Screen (Male) on  02/15/18.           High Blood Pressure Screen (Male) on  02/13/18.           High Blood Pressure Screen (Male) on  02/09/18.           High Blood Pressure Screen (Male) on  02/07/18.           High Blood Pressure Screen (Male) on  12/04/17.           High Blood Pressure Screen (Male) on  11/01/17.           High Blood Pressure Screen (Male) on  10/13/17.           High Blood Pressure Screen (Male) on  10/06/17.           High Blood Pressure Screen (Male) on  09/18/17.           High Blood Pressure Screen (Male) on  08/24/17.           High Blood Pressure Screen (Male) on  07/21/17.           High Blood Pressure Screen (Male) on  06/29/17.           High Blood Pressure Screen (Male) on  05/24/17.           High Blood Pressure Screen (Male) on  05/12/17.           Obesity Screen and Counseling (Male) on  04/10/18.           Obesity Screen and Counseling (Male) on  03/29/18.           Obesity Screen and Counseling (Male) on  03/22/18.           Obesity Screen and Counseling (Male) on  03/14/18.           Obesity Screen and Counseling (Male) on  03/12/18.           Obesity Screen and Counseling (Male) on  03/05/18.           Obesity Screen and Counseling (Male) on  02/27/18.           Obesity Screen and Counseling (Male) on   02/21/18.           Obesity Screen and Counseling (Male) on  02/20/18.           Obesity Screen and Counseling (Male) on  02/16/18.           Obesity Screen and Counseling (Male) on  02/15/18.           Obesity Screen and Counseling (Male) on  02/13/18.           Obesity Screen and Counseling (Male) on  02/09/18.           Obesity Screen and Counseling (Male) on  02/07/18.           Obesity Screen and Counseling (Male) on  12/04/17.           Obesity Screen and Counseling (Male) on  11/07/17.           Obesity Screen and Counseling (Male) on  11/01/17.           Obesity Screen and Counseling (Male) on  10/13/17.           Obesity Screen and Counseling (Male) on  10/06/17.           Obesity Screen and Counseling (Male) on  09/18/17.           Obesity Screen and Counseling (Male) on  07/21/17.           Obesity Screen and Counseling (Male) on  06/29/17.           Obesity Screen and Counseling (Male) on  05/24/17.           Obesity Screen and Counseling (Male) on  05/12/17.          Procedure   Procedure   Time.     Indication: myofascial pain.     Informed consent: signed by patient.     Confirmed: patient, procedure, side, site.     Type of procedure: trigger point injection.     Physical Exam: vital signs Vital Signs   4/10/2018 1:46 PM CDT Temperature Tympanic 98.5 DegF    Peripheral Pulse Rate 80 bpm    Pulse Site Radial artery    Respiratory Rate 20 br/min    Systolic Blood Pressure 130 mmHg    Diastolic Blood Pressure 82 mmHg  HI    Mean Arterial Pressure 98 mmHg    BP Site Right arm      .     Preparation and technique: informed consent obtained, position sitting, sterile preparation of site with 70 % alcohol, hemostasis achieved using direct pressure, estimated blood loss minimal, adhesive bandagedressing applied.     Findings: painful and tender myofascial trigger points identified by palpation with communication from patient    Location:bilateral upper traps x 1 each, bilateral rhomboids x 1 each, right  upper lateral leg x 2    Number of injections:6    pain prior to procedure:    severe     pain following procedure:improved    number of milliliters of the 1:1 solution of 1% lidocaine without epiniephrine and 0.5% marcaine without epinephrine used in total: 8.     Procedure tolerated: well.     No Complications.     No qualifying data available.          Impression and Plan   Diagnosis     Anxiety (EWG31-KX F41.1).     Cerebellar tonsillar ectopia (EIQ57-VW Q04.8).     Chronic bilateral low back pain (ABF29-JI M54.5).     Low back pain (VUP90-TY M54.5).     Marijuana use (BXD48-QM F12.90).     Myalgia (WCE35-CY M79.1).     Summary:  Encouraged patient to try to establish care with pain specialist.  Also encouraged patient to do physical therapy.  I helped him set up his med box with his prednisone and duloxetine for the next week.  Would like patient to return to clinic in 1 week for follow-up.  He is doing well and adjusting his 20 mg of duloxetine daily at that time we can try to possibly increase this to 40 mg daily.  Chart review also shows that patient has been on gabapentin once before however he does not recall if it helped at all and how well it helps him with his pain and how well he tolerated it.   .    Course:  Improving.    Patient Instructions:       Counseled: Patient, Regarding diagnosis, Regarding treatment, Verbalized understanding.    Diagnosis     return to clinic if symptoms worsen or do not improve.     Plan:  15 minutes spent with patient in direct face to face contact >50% spent counseling in addition to time spent performing procedure today.    Counseled:  Patient, Family, use ice or heat as needed to help with pain, continue with home exercise program, .

## 2022-02-15 NOTE — NURSING NOTE
Comprehensive Intake Entered On:  6/29/2020 1:11 PM CDT    Performed On:  6/29/2020 1:06 PM CDT by Gonzalez RAMSAY, Fariba               Summary   Chief Complaint :   verbal consent given for telemed visit. f/u back pain, did see back specialist, recommended pt to lose weight prior to having any back surgery.    Menstrual Status :   N/A   Gonzalez RAMSAY, Fariba - 6/29/2020 1:06 PM CDT   Health Status   Allergies Verified? :   Yes   Medication History Verified? :   Yes   Medical History Verified? :   Yes   Pre-Visit Planning Status :   Completed   Tobacco Use? :   Current every day smoker   Gonzalez RAMSAY, Fariba - 6/29/2020 1:06 PM CDT   Consents   Consent for Immunization Exchange :   Consent Granted   Consent for Immunizations to Providers :   Consent Granted   Fariba Roth MA - 6/29/2020 1:06 PM CDT   Meds / Allergies   (As Of: 6/29/2020 1:11:20 PM CDT)   Allergies (Active)   Bananas  Estimated Onset Date:   Unspecified ; Created By:   Rowena Hooker LPN; Reaction Status:   Active ; Category:   Drug ; Substance:   Bananas ; Type:   Allergy ; Updated By:   Rowena Hooker LPN; Reviewed Date:   6/15/2020 1:55 PM CDT      Bee Stings  Estimated Onset Date:   Unspecified ; Created By:   Ruthie Nam; Reaction Status:   Active ; Category:   Drug ; Substance:   Bee Stings ; Type:   Allergy ; Updated By:   Ruthie Nam; Reviewed Date:   6/15/2020 1:55 PM CDT      Latex  Estimated Onset Date:   Unspecified ; Created By:   Ruthie Nam; Reaction Status:   Active ; Category:   Drug ; Substance:   Latex ; Type:   Allergy ; Updated By:   Ruthie Nam; Reviewed Date:   6/15/2020 1:55 PM CDT        Medication List   (As Of: 6/29/2020 1:11:20 PM CDT)   Prescription/Discharge Order    albuterol  :   albuterol ; Status:   Prescribed ; Ordered As Mnemonic:   Ventolin HFA 90 mcg/inh inhalation aerosol ; Simple Display Line:   2 puff(s), NEB, qid, PRN: AS NEEDED FOR WHEEZING, 1 EA, 0 Refill(s) ; Ordering Provider:   Dhiraj ORTEGA,  Mata; Catalog Code:   albuterol ; Order Dt/Tm:   4/9/2020 2:03:28 PM CDT          diazePAM  :   diazePAM ; Status:   Prescribed ; Ordered As Mnemonic:   diazePAM 5 mg oral tablet ; Simple Display Line:   5 mg, 1 tab(s), Oral, q8 hrs, PRN: as needed for anxiety, 12 tab(s), 1 Refill(s) ; Ordering Provider:   Mata Hearn MD; Catalog Code:   diazePAM ; Order Dt/Tm:   5/13/2020 2:53:28 PM CDT          diclofenac topical  :   diclofenac topical ; Status:   Prescribed ; Ordered As Mnemonic:   diclofenac 1% topical gel ; Simple Display Line:   2 gm, Topical, qid, 240 gm, 0 Refill(s) ; Ordering Provider:   Mata Hearn MD; Catalog Code:   diclofenac topical ; Order Dt/Tm:   6/15/2020 2:25:17 PM CDT          Miscellaneous Rx Supply  :   Miscellaneous Rx Supply ; Status:   Prescribed ; Ordered As Mnemonic:   Orthotics ; Simple Display Line:   See Instructions, to use as directed with shoes, 2 EA, 0 Refill(s) ; Ordering Provider:   Mata Hearn MD; Catalog Code:   Miscellaneous Rx Supply ; Order Dt/Tm:   2/13/2020 3:16:54 PM CST          oxyCODONE  :   oxyCODONE ; Status:   Prescribed ; Ordered As Mnemonic:   oxyCODONE 5 mg oral tablet ; Simple Display Line:   5 mg, 1 tab(s), Oral, q8 hrs, for 10 day(s), PRN: as needed for pain, 30 tab(s), 0 Refill(s) ; Ordering Provider:   Cricket Jessica PA-C; Catalog Code:   oxyCODONE ; Order Dt/Tm:   6/25/2020 11:58:17 AM CDT            Home Meds    acetaminophen  :   acetaminophen ; Status:   Documented ; Ordered As Mnemonic:   acetaminophen 500 mg oral tablet ; Simple Display Line:   1,000 mg, 2 tab(s), Oral, q6 hrs, PRN: as needed for pain, 0 Refill(s) ; Catalog Code:   acetaminophen ; Order Dt/Tm:   11/6/2019 1:37:34 PM CST            ID Risk Screen   Recent Travel History :   No recent travel   Family Member Travel History :   No recent travel   Other Exposure to Infectious Disease :   Unknown   Feyereisen MA, Fariba - 6/29/2020 1:06 PM CDT

## 2022-02-15 NOTE — PROGRESS NOTES
Patient:   JOEL SUTTON            MRN: [id number]            FIN: [id number]               Age:   38 years     Sex:  Male     :  1980   Associated Diagnoses:   None   Author:   Nena King MD      Visit Information      Date of Service: 2018 03:40 pm  Performing Location: [facility]  Encounter#: [id number]      Primary Care Provider (PCP):  Nena King MD    NPI# [id number]      Referring Provider:  Nena King MD, NPI# [id number]      Procedure   Procedure   Time.     Indication: myofascial pain.     Informed consent: signed by patient.     Confirmed: patient, procedure, side, site.     Type of procedure: trigger point injection.     Physical Exam: vital signs Vital Signs   2018 3:58 PM CDT Temperature Tympanic 97.6 DegF  LOW    Peripheral Pulse Rate 84 bpm    HR Method Electronic    Systolic Blood Pressure 142 mmHg  HI    Diastolic Blood Pressure 80 mmHg    Mean Arterial Pressure 101 mmHg    BP Site Right arm    BP Method Manual   2018 12:10 PM CDT Temperature Tympanic 97.0 DegF  LOW    Peripheral Pulse Rate 88 bpm    Pulse Site Radial artery    HR Method Electronic    Systolic Blood Pressure 126 mmHg    Diastolic Blood Pressure 82 mmHg  HI    Mean Arterial Pressure 97 mmHg    BP Site Right arm    BP Method Electronic      .     Preparation and technique: informed consent obtained, position sitting, sterile preparation of site with 70 % alcohol, hemostasis achieved using direct pressure, estimated blood loss minimal, adhesive bandagedressing applied.     Findings: painful and tender myofascial trigger points identified by palpation with communication from patient    Location:right thoracic paraspinal, lumbar midback, left upper gluteus max,     Number of injections:4    pain prior to procedure:  moderate       pain following procedure:improved    number of milliliters of the 1:1 solution of 1% lidocaine without epiniephrine and 0.5% marcaine without  epinephrine used in total: 6    attention then turned to right SI area, this was prepped with betadine, and this was injected with 2 ml of 1% lidocaine, 2 ml 0.5% marcaine and 1 ml of 40 mg / 1 ml kenalog.     Procedure tolerated: well.     pt also asked for a prescription for shoes for his low back pain, this was provided today..     No Complications.     No qualifying data available.          Impression and Plan   Diagnosis     return to clinic if symptoms worsen or do not improve.     Counseled:  Patient, Family, use ice or heat as needed to help with pain, continue with home exercise program, .

## 2022-02-15 NOTE — LETTER
(Inserted Image. Unable to display)   July 12, 2021    JOEL SUTTON  [address] [place] [address]            Dear JOEL,      Thank you for selecting [facility] for your healthcare needs.    Your Healthcare Provider has recommended that you schedule an appointment with the registered dietitian, Jillian Russo RD, CD, CDE.     Available services include:  - Medical nutrition therapy for a variety of chronic health conditions (hyperlipidemia, obesity, metabolic syndrome, pre-diabetes, hypertension, kidney disease, celiac disease, food allergies, and malnutrition.    - Nutritional education guides including: mindful-based eating practices, portion control, reading food labels and meal planning  - Motivational tools to help set and achieve personal health goals  - Weight loss strategies  - Risk reduction of chronic and acute health complications through healthy lifestyle choices  - Strategies to incorporate healthy eating and physical activity into everyday life    To schedule an appointment or if you have further questions, please contact your clinic at [phone].      Powered by [employer] [facility]    Sincerely,    [facility]

## 2022-02-15 NOTE — TELEPHONE ENCOUNTER
---------------------  From: Abbi Smallwood CMA (Phone Messages Pool ([address] [place] [address]))   To: [facility] Message Pool ([address] [place] [address]);     Sent: 5/24/2021 1:08:06 PM CDT  Subject: phone note- Diclofenac topical     Phone Message    PCP:    IRWIN      Time of Call:  1pm       Person Calling:  Daron  Phone number:  [phone]    Note:  Patient called asking for a stronger Diclofenac topical cream. States he and [facility] discussed sending in a 3%. Patient states pharmacy did not receive.     [facility]---------------------  From: Abbi Chong LPN ([facility] Message Pool ([address] [place] [address]))   To: Mata Hearn MD;     Sent: 5/24/2021 1:19:37 PM CDT  Subject: FW: phone note- Diclofenac topical---------------------  From: Mata Hearn MD   To: [facility] Message Pool ([address] [place] [address]);     Sent: 5/24/2021 1:58:04 PM CDT  Subject: RE: phone note- Diclofenac topical     Higher strength is not covered by his insurance.tried to notified patient no answer and voice mail set up.---------------------  From: Fariba Roht MA ([facility] Message Pool ([address] [place] [address]))   To: Mata Hearn MD;     Sent: 5/27/2021 2:17:52 PM CDT  Subject: FW: phone note- Diclofenac topical     Pt informed of [facility] recommendation.  Pt would still like rx to be sent in and if it's too expensive, then he won't get it.  Please advise sending in rx for 3% cream.---------------------  From: Mata Hearn MD   To: [facility] Message Pool ([address] [place] [address]);     Sent: 5/27/2021 2:58:44 PM CDT  Subject: RE: phone note- Diclofenac topical     OK to send in 3% cream

## 2022-02-15 NOTE — TELEPHONE ENCOUNTER
---------------------  From: Shana Mcfarland LPN (Phone Messages Pool ([address]))   To: Mata Hearn MD;     Sent: 5/7/2021 1:54:21 PM CDT  Subject: oxycodone refill     Phone Message    PCP:   IRWIN      Time of Call:  1:45pm       Person Calling:  pt  Phone number:  [phone]    Note:   Pt calling requesting refill of oxycodone. Told pt IRWIN is out of clinic until 5/18. Advised appt to discuss refill with another provider since he will be due this month for pain management med check. Pt asked if message can be sent to IRWIN. Told pt I can send the message but cannot say that he will be checking/responding to his messages. Pt asked that message be sent anyway and if he does not hear back by Monday or Tuesday he will schedule an appointment.    Pt says he knows if IRWIN gets the message he will just fill it.    Last Rx 3/24/21 oxycodone 5mg 1 tab PO q8hrs PRN for pain #20    Last office visit and reason:  3/24/21 Back Pain, anxiety

## 2022-02-15 NOTE — NURSING NOTE
Depression Screening Entered On:  2/13/2020 3:12 PM CST    Performed On:  2/13/2020 3:12 PM CST by Gonzalez RAMSAY, Fariba               Depression Screening   Little Interest - Pleasure in Activities :   Nearly every day   Feeling Down, Depressed, Hopeless :   More than half the days   Initial Depression Screen Score :   5    Trouble Falling or Staying Asleep :   Nearly every day   Feeling Tired or Little Energy :   Nearly every day   Poor Appetite or Overeating :   More than half the days   Feeling Bad About Yourself :   More than half the days   Trouble Concentrating :   More than half the days   Moving or Speaking Slowly :   Not at all   Thoughts Better Off Dead or Hurting Self :   Not at all   Detailed Depression Screen Score :   12    Total Depression Screen Score :   17    TYRELL Difficulty with Work, Home, Others :   Very difficult   Gonzalez RAMSAY, Fariba - 2/13/2020 3:12 PM CST

## 2022-02-15 NOTE — PROGRESS NOTES
Patient:   JOEL SUTTON            MRN: [id number]            FIN: [id number]               Age:   38 years     Sex:  Male     :  1980   Associated Diagnoses:   None   Author:   Nena King MD      Visit Information      Date of Service: 2018 01:37 pm  Performing Location: [facility]  Encounter#: [id number]      Primary Care Provider (PCP):  Nena King MD    NPI# [id number]      Referring Provider:  Nena King MD, NPI# [id number]   HPI pt here for repeat  trigger point injections, his back is not as much of a bother as it used to be, his leg is more of a problem, would like to concentrate on his leg, but does have one on his back he'd like us to inject today.  He would also like a shot of toradol for his back pain      Procedure   Procedure   Time.     Indication: myofascial pain.     Informed consent: signed by patient.     Confirmed: patient, procedure, side, site.     Type of procedure: trigger point injection.     Physical Exam: vital signs Vital Signs   3/22/2018 1:52 PM CDT Temperature Tympanic 98.2 DegF    Peripheral Pulse Rate 93 bpm    HR Method Electronic    Systolic Blood Pressure 126 mmHg    Diastolic Blood Pressure 78 mmHg    Mean Arterial Pressure 94 mmHg    BP Site Right arm    BP Method Manual    Oxygen Saturation 97 %      .     Preparation and technique: informed consent obtained, position sitting, sterile preparation of site with 70 % alcohol, hemostasis achieved using direct pressure, estimated blood loss minimal, adhesive bandagedressing applied.     Findings: painful and tender myofascial trigger points identified by palpation with communication from patient    Location: right mid back x 1 and right upper leg x 4    Number of injections:5    pain prior to procedure:     severe    pain following procedure:improved    number of milliliters of the 1:1 solution of 1% lidocaine without epiniephrine and 0.5% marcaine without epinephrine used in  total: 8.     Procedure tolerated: well.     No Complications.     No qualifying data available.          Impression and Plan   Diagnosis     return to clinic if symptoms worsen or do not improve.     Counseled:  Patient, Family, use ice or heat as needed to help with pain, continue with home exercise program, .

## 2022-02-15 NOTE — PROGRESS NOTES
Chief Complaint    verbal consent given for telemed visit.  would like MRI order for back and COVID testing.  was to see chiropractor but appt was cancelled d/t COVID outbreak close to where pt lives.   Today's visit was conducted via telephone due to the COVID-19 pandemic.  Patient's consent to telephone visit was obtained and documented.   Call Start Time: 1521   Call End Time:   1529  History of Present Illness      Patient is in need of orders for an MRI of the cervical, thoracic, and lumbar spine with IV sedation.  This is requested by spine surgeon.  We will get this ordered.  He will need a preprocedure examination COVID19 testing.  Assessment/Plan       Chronic back pain (M54.9)        Referral for procedure has been sent in.  Will schedule his testing and exam once we have the procedure scheduled.          Ordered:          Referral (Request), 05/20/20 15:30:00 CDT, Referred to: Radiology, Reason for referral: back and neck pain Patient needs MRI of cervical, throacic and lumbar spine with IV sedation., Additional instructions: He sees Dr. [facility]. Check to see if thi...           Patient Information     Name:JOEL SUTTON      Address:      [address] [place] [id number]     Sex:Male     YOB: 1980     [phone]     Emergency Contact:[facility]     MRN:[id number]     FIN:[id number]     Location:[facility]     Date of Service:05/20/2020      Primary Care Physician:       Cricket Jessica PA-C, [phone]      Attending Physician:       Mata Hearn MD, [phone]  Problem List/Past Medical History    Ongoing     Anxiety     Asthma, moderate persistent     Cerebellar tonsillar ectopia     Chronic back pain     Chronic insomnia     Dysplastic nevus       Comments: removed from back     Low back pain     Marijuana use     Myalgia     Obese     Sebaceous cyst     Smoking     Tension headache      Tobacco user     Trochanteric bursitis, right hip    Historical     No qualifying data  Procedure/Surgical History     Injection of cortisone (06/08/2016)     Transanal hemorrhoidal dearterialization (THD) (09/08/2015)     Colonoscopy (08/04/2015)      Comments: Sedation: MAC      Indication: rectal bleeding      External & internal hemorrhoids; otherwise normal      Resume routine screening at age 50..     Esophagogastroduodenoscopy (08/04/2015)      Comments: Normal.     Hospitalized at [facility] (2002)     Right knee arthroscopy     Surgery on eyes x3      Comments: as infant.  Medications    acetaminophen 500 mg oral tablet, 1000 mg= 2 tab(s), Oral, q6 hrs, PRN,   Not taking    diazePAM 5 mg oral tablet, 5 mg= 1 tab(s), Oral, q8 hrs, PRN, 1 refills,   Not taking    Orthotics, See Instructions    oxyCODONE 5 mg oral tablet, 5 mg= 1 tab(s), Oral, q6 hrs    Ventolin HFA 90 mcg/inh inhalation aerosol, 2 puff(s), NEB, qid, PRN  Allergies    Bananas    Bee Stings    Latex  Social History    Smoking Status - 05/20/2020     Current every day smoker     Alcohol      Past, 02/13/2020     Employment/School      Work/School description: disabled., 02/21/2011     Home/Environment      Lives with Self, Significant other., 02/21/2011     Substance Abuse      Current, 06/16/2015     Tobacco      Current, 06/16/2015  Family History    Diabetes mellitus type 2: Mother.    Heart disease: Father.  Immunizations      Vaccine Date Status          tetanus/diphth/pertuss (Tdap) adult/adol 11/17/2016 Given          influenza virus vaccine, inactivated 11/17/2016 Given          influenza virus vaccine, inactivated 01/06/2015 Given

## 2022-02-15 NOTE — PROGRESS NOTES
Chief Complaint    verbal consent given for telemed visit.  pain mgt--refill pain meds.  also needs letters for emotional support animals--now has a cat/kitten.   Visit type:  Telephone visit   Participants during visit:  patient   Location of patient:  home   Location of physician:  office   Call start time:  1445   Call end time:  1450   Today's visit was conducted by telephone conference due to the COVID-19 pandemic.  The patient's consent to proceed with the telephone conference was obtained and documented.      History of Present Illness       Patient requests refill of oxycodone today.  He has degenerative disc disease and chronic back pain.  He is trying to improve his situation with activity and exercise.  He also has generalized anxiety disorder and requests a letter for emotional support animal.       He has not filled the oxycodone since first week of June.  He uses it very sparingly.  Review of Systems       See HPI.  All other review of systems negative.  Assessment/Plan       1. Anxiety (F41.1)       2. Chronic back pain (M54.9)       3. Degenerative lumbar disc (M51.36)       Orders:         oxyCODONE, = 1 tab(s) ( 5 mg ), Oral, q8 hrs, PRN: as needed for pain, # 20 tab(s), 0 Refill(s), Type: Maintenance, [facility], 1 tab(s) Oral q8 hrs,PRN:as needed for pain, 72, in, 06/09/21 10:04:00 CDT, Height Measured, 314, l..., (Ordered)         oxyCODONE, = 1 tab(s) ( 5 mg ), Oral, q8 hrs, PRN: as needed for pain, # 20 tab(s), 0 Refill(s), Type: Hard Stop, [facility], 72, in, 06/02/21 9:18:00 CDT, Height Measured, 311.8, lb, 01/05/21 14:29:00 CST, Weight Measured, (Completed)       Oxycodone refilled for #20.  Letter dictated for emotional support animal  Patient Information     Name:JOEL SUTTON      Address:      [address] [place] [id number]     Sex:Male     YOB: 1980     [phone]     Emergency Contact:[facility], CONTACT     MRN:[id number]     FIN:[id number]     Location:[facility]     Date of Service:08/04/2021      Primary Care Physician:       Mata Hearn MD, [phone]      Attending Physician:       Mata Hearn MD, [phone]  Problem List/Past Medical History    Ongoing     Anxiety     Asthma, moderate persistent     Cerebellar tonsillar ectopia     Chronic back pain     Chronic insomnia     Degenerative lumbar disc     Dysplastic nevus       Comments: removed from back     Low back pain     Marijuana use     Myalgia     Myofascial pain syndrome     Obese     Sebaceous cyst     Smoking     Tension headache     Tobacco user     Trochanteric bursitis, right hip    Historical     No qualifying data  Procedure/Surgical History     Injection of cortisone (06/08/2016)     Transanal hemorrhoidal dearterialization (THD) (09/08/2015)     Colonoscopy (08/04/2015)      Comments: Sedation: MAC      Indication: rectal bleeding      External & internal hemorrhoids; otherwise normal      Resume routine screening at age 50..     Esophagogastroduodenoscopy (08/04/2015)      Comments: Normal.     Hospitalized at [facility] (2002)     Right knee arthroscopy     Surgery on eyes x3      Comments: as infant.  Medications    acetaminophen 500 mg oral tablet, 1000 mg= 2 tab(s), Oral, q6 hrs, PRN    Acular 0.5% ophthalmic solution, 1 drop(s), Eye-Right, qid, PRN, 1 refills    albuterol 90 mcg/inh inhalation aerosol, 2 puff(s), Inhale, qid, 3 refills    cyclobenzaprine 10 mg oral tablet, 10 mg= 1 tab(s), Oral, bid, PRN, 1 refills    diazePAM 5 mg oral tablet, 5 mg= 1 tab(s), Oral, daily, PRN    diclofenac 3% topical gel, 3 gm, Topical, qid, 2 refills    Orthotics, See Instructions    oxyCODONE 5 mg oral tablet, 5 mg= 1 tab(s), Oral, q8 hrs, PRN  Allergies    Bananas    Bee Stings    Latex  Social History    Smoking Status     Former smoker     Alcohol      Past      Electronic Cigarette/Vaping      Electronic Cigarette Use: Never.     Employment/School      Work/School description: disabled.     Home/Environment      Lives with Self, Significant other.     Substance Abuse      Current     Tobacco      10 or more cigarettes (1/2 pack or more)/day in last 30 days  Family History    Diabetes mellitus type 2: Mother.    Heart disease: Father.  Immunizations          Other Immunizations          Administration Dosage Date(s)          influenza virus vaccine, inactivated          01/06/2015, 11/17/2016          tetanus/diphth/pertuss (Tdap) adult/adol          11/17/2016

## 2022-02-15 NOTE — PROGRESS NOTES
Patient:   JOEL SUTTON            MRN: [id number]            FIN: [id number]               Age:   37 years     Sex:  Male     :  1980   Associated Diagnoses:   None   Author:   Mata Hearn MD           Chief Complaint  Would like a Toradol injection for back, pt has surgical consult in [place] at the end of the month. See phone message from today  History of Present Illness  Patient is here requesting an injection of ketorolac for his persistent back pain.  He has spinal stenosis and is scheduled to see Dr. Frias for pain consult.  He continues to have problems with anxiety.  Intermittent diazepam is been helpful for him.  Review of Systems  See HPI.  All other review of systems negative.  Problem List/Past Medical History  Ongoing  Anxiety  Cerebellar tonsillar ectopia  Obese  Smoking  Tobacco user  Resolved  No resolved problems  Procedure/Surgical History  Injection of cortisone (2016),  Transanal hemorrhoidal dearterialization (THD) (2015),  Colonoscopy (2015),  Esophagogastroduodenoscopy (2015),  Hospitalized at [facility] (),  Right knee arthroscopy,  Surgery on eyes x3.  Home Medications  cyclobenzaprine 10 mg oral tablet, 10 mg, 1 tab(s), po, tid, PRN  diazePAM 5 mg oral tablet, 5 mg, 1 tab(s), po, tid, PRN  hydrocortisone 2.5% topical cream, 1 mal, top, tid, 1 refills  ProAir HFA 90 mcg/inh inhalation aerosol, 2 puff(s), inh, qid, 3 refills  Qvar 80 mcg/inh inhalation aerosol, 1 puff(s), inh, bid, 3 refills  Allergies  Bee Stings  Latex  Social History  Alcohol  Current  Home and Environment  Lives with Self, Significant other.  Substance Abuse  Current, Marijuana  Tobacco - Current  Family History  Diabetes mellitus type 2: Mother.  Heart disease: Father.  Physical Exam  Vitals & Measurements  T: 96.9 (Tympanic)   HR: 80 (Peripheral)   BP: 126/82   HT: 72.5 in   WT: 277.4 lb   BMI: 37.1   Alert, oriented, anxious  Normal heart rate  Lungs are  clear  Tenderness in the low back  Normal strength of lower extremities  Assessment/Plan  Low back pain  Ketorolac injection ordered  Refill diazepam   Encouraged to keep his appointment with pain management  Follow-up if symptoms are not improving  Ordered:  Physical Therapy (Request)  Orders:  diazePAM, 1 tab(s) ( 5 mg ), PO, tid, PRN: for anxiety, # 10 tab(s), 0 Refill(s), Type: Maintenance, Pharmacy: [facility], 1 tab(s) po tid,PRN:for anxiety    Signature Line      Signed and Authored by Mata Hearn MD on 03/03/2017 06:39 AM CST    Charted Date: March 03, 2017 6:36 AM CST  Subject / Title: Back pain and anxiety  Performed By: Mata Hearn MD on March 03, 2017 6:39 AM CST  Electronically Signed By: Mata Hearn MD on March 03, 2017 6:39 AM CST  Visit Information: [id number], [facility], Outpatient, 3/2/2017 - 3/4/2017

## 2022-02-15 NOTE — TELEPHONE ENCOUNTER
---------------------  From: Kasie/Ankita RAMSAY (Phone Messages Pool ([facility]))   To: [facility] Message Pool ([facility]);     Sent: 6/2/2021 11:18:07 AM CDT  Subject: General Message     Phone Message    PCP: IRWIN    Time of Call: 1115    Phone Number: [facility]     Returned call at: n/a    Note: Romina from [facility] calls stating that she was trying to fill the Oxycodone Rx that was sent over but accidently canceled it.    Romina wondering if [facility] can resent rx over.     Please advise    Pharmacy: [facility]---------------------  From: Cecily Robb LPN ([facility] Message Pool ([facility]))   To: Mata Hearn MD;     Sent: 6/2/2021 11:34:26 AM CDT  Subject: FW: General Message  ** Submitted: **  Order:oxyCODONE (oxyCODONE 5 mg oral tablet)  1 tab(s)  Oral  q8 hrs  Qty:  20 tab(s)        Refills:  0          Substitutions Allowed     PRN  as needed for pain      Route To Pharmacy - [facility]    Signed by Mata Hearn MD  6/2/2021 5:45:00 PM [facility]---------------------  From: Mata Hearn MD   To: [facility] Message Pool ([facility]);     Sent: 6/2/2021 12:46:37 PM CDT  Subject: RE: General [id number] LM letting Romina know that new rx has been sent. They should let us know if not received.

## 2022-02-15 NOTE — TELEPHONE ENCOUNTER
Informed patient that [facility] & [facility] do not use IV Sedation.  [facility] does at certain locations (not [place]).  Patient is willing to schedule at [facility].  Orders are faxed to [facility] and they will contact patient.

## 2022-02-15 NOTE — PROGRESS NOTES
Patient:   JOEL SUTTON            MRN: [id number]            FIN: [id number]               Age:   38 years     Sex:  Male     :  1980   Associated Diagnoses:   Low back pain   Author:   Jules Garcia MD      Visit Information      Date of Service: 2018 02:58 pm  Performing Location: [facility]  Encounter#: [id number]      Primary Care Provider (PCP):  Nena King MD    NPI# [id number]      Referring Provider:  Jules Garcia MD    NPI# [id number]      Chief Complaint   3/12/2018 3:02 PM CDT    Injections-      Subjective   Chief complaint 3/12/2018 3:02 PM CDT    Injections-.      The patient is a 38-year-old here with some back pain.  He has been getting repeat trigger point injections for some chronic low back pain.  He also describes doing some stretching.  He has taken some muscle relaxers without a lot of benefit and has been on gabapentin at a relatively low dose.  His regular physician is not in the office this week, he says he is having a tough day and wants to be able to feel better.  He has found the trigger points really help a lot and he specifically is pointing to his lower back.  He describes the pain as bilateral.  There is no bowel or bladder dysfunction.  There has been no fever or chills.  There has been no specific trauma.          Health Status   Allergies:    Allergic Reactions (Selected)  Severity Not Documented  Bee Stings (No reactions were documented)  Latex (No reactions were documented)   Medications:  (Selected)   Prescriptions  Prescribed  ProAir HFA 90 mcg/inh inhalation aerosol: 2 puff(s), inh, qid, PRN: as needed for wheezing, # 1 EA, 2 Refill(s), Type: Maintenance, Pharmacy: [facility]  Qvar 80 mcg/inh inhalation aerosol: 1 puff(s), inh, bid, # 1 EA, 3 Refill(s), Type: Maintenance, Pharmacy: [facility], 1 puff(s) inh bid  cyclobenzaprine 10 mg oral tablet: 1 tab(s) ( 10 mg ), PO, TID, PRN:  for spasm, # 30 tab(s), 0 Refill(s), Type: Maintenance, [facility], 1 tab(s) po tid,PRN:for spasm  diazePAM 5 mg oral tablet: See Instructions, Instructions: TAKE ONE TABLET BY MOUTH THREE TIMES A DAY AS NEEDED FOR ANXIETY, # 10 tab(s), 0 Refill(s), Type: Soft Stop, [facility], TAKE ONE TABLET BY MOUTH THREE TIMES A DAY AS NEEDED FOR ANXIETY...  gabapentin 300 mg oral capsule: See Instructions, Instructions: 1 cap(s) po qd on day 1  1 cap po bid on day 2  then 1 cap po tid, # 90 EA, 1 Refill(s), Type: Maintenance, [facility], 1 cap(s) po qd on day 1; 1 cap po bid on day 2; then 1 cap po tid...  hydrocortisone 2.5% topical cream: 1 mal, top, tid, # 30 gm, 1 Refill(s), Type: Maintenance, [facility], 1 mal top tid  Documented Medications  Documented  Advil 200 mg oral tablet: 2 tab(s) ( 400 mg ), po, q8 hrs, PRN: as needed for pain, 0 Refill(s), Type: Maintenance  Vitamin B-12 1000 mcg oral tablet: 1 tab(s) ( 1,000 mcg ), po, daily, 0 Refill(s), Type: Maintenance   Problem list:    All Problems  Cerebellar tonsillar ectopia / 80591468 / Confirmed  Anxiety / 29313307 / Confirmed  Obese / 278.00 / Probable  Tobacco user / 004406948 / Probable  Smoking / 870435441 / Confirmed  Low back pain / 935895554 / Confirmed  Chronic insomnia / 740557179 / Confirmed  Myalgia / 645020260 / Confirmed  Trochanteric bursitis, right hip / 10872018 / Confirmed      Objective   Vital Signs   3/12/2018 3:02 PM CDT Peripheral Pulse Rate 72 bpm    Systolic Blood Pressure 126 mmHg    Diastolic Blood Pressure 68 mmHg    Mean Arterial Pressure 87 mmHg      Measurements from flowsheet : Measurements   3/12/2018 3:02 PM CDT Height Measured - Standard 72.8 in    Weight Measured - Standard 281 lb    BSA 2.56 m2    Body Mass Index 37.27 kg/m2  HI      He points to two areas in the lumbar area about 3 inches right  and left from the spine itself near the SI joint and then up superiorly another 3 inches for a total of four spots that he identifies as the tender knots.     General:  Alert and oriented, Mild distress.    Psychiatric:  Cooperative, Appropriate mood & affect, Normal judgment.       Impression and Plan   Assessment and Plan:          Diagnosis: Low back pain ([id number] M54.5).         Course: Low back pain.  I have identified the four more localized tender areas, especially right lower I can feel significant spasm.  I injected each one using lidocaine and then did some gentle massage and encouraged him to do more into the area.  The patient reported as he was leaving that he could already feel some better.  Also at his request I gave him Toradol.  He will follow up with his regular physician.  He seems to be developing an idea that he can drop in for trigger points almost on a daily basis and I tried to talk to him a little bit about maintenance at home and other possibilities he could do.  I certainly encouraged him to follow up with his regular physician for ongoing care.

## 2022-02-15 NOTE — PROGRESS NOTES
Chief Complaint    Patient is here for requesting trigger point injections and toradol.  History of Present Illness          HPI pt has history of chronic myofascial pain and has found trigger point injections to be a useful tool to help control pain.  Would like to have repeat trigger point injections today.  He has received paperwork from the [place] [facility] however he has not opened the envelope to see what the paperwork says.  She reports that his upper back pain is feeling pretty good but he is having more pain in his very lower back and upper buttocks region and this pain seems to be radiating down his right leg.  He has had minimal relief with trochanteric bursitis injections in the past.  He has been resistant to doing physical therapy in the past.  Discussed this with him again today and encouraged him to again consider physical therapy as injections have not been helpful and it seems to be challenging for him to follow through with establishing care with the pain clinic.  He agrees that the leg pain is affecting his quality of life and would like to be referred back to physical therapy to see if they have any suggestions for helping with his pain.  He reports no changes in the quality of the pain.  He has no loss of control of his bowel or bladder, no history of cancer, no saddle paresthesias.  Also noted today to have some thinning at the lateral edges of his bilateral eyebrows.  Chart review shows no recent thyroid testing.  Patient does also have however this may be related to his chronic marijuana use.  We have discussed cessation and encouraged this in the past, patient is not willing to make any changes with this at this time.  He does find the marijuana use helps him with chronic pain.  He did not have time to get thyroid testing done but says that he would like to be reminded to do this following her next appointment.  Today he identifies the areas that his upper buttocks that he would like to  try to have trigger point injections done.  Palpation of this area is limited due to body habitus so difficult to identify a discrete trigger point.  Discussed with patient that it would be less likely to be of benefit if I cannot find a palpable trigger point.  He would like to proceed with injections anyway.  Would also like Toradol needed.  We discussed that as he does bring such great relief with the shot of Toradol we could certainly reconsider him taking a daily meloxicam.  Did discuss that he would not then take that for the shots of Toradol.  He reports that he finds enough relief from the shots of Toradol when he is allowed to get them that he does not want to give these up to do a retrial of oral meloxicam or any other nonsteroidal anti-inflammatory.  He is currently tolerating his duloxetine.  It does seem to be improving his pain.  He also thinks that perhaps his mood is improved but says that this might be because he has not stressed with his kids not living with him now.  We will plan to continue the duloxetine but not change the dose at this time.       He also has a history of tension headaches, neck pain, upper back pain, thoracic back pain and lower back pain and bilateral leg pain.  Will include the physical therapy to incorporate this.            Chart review also reveals that patient has not had any screening for hyperlipidemia or diabetes mellitus so will plan to check this also when he gets his labs drawn with his next visit.           Review of systems is negative except as per HPI       Exam:       General: alert and oriented ×3 no acute distress.       HEENT: pupils are equal round and reactive to light extraocular motion is intact. Normocephalic and atraumatic.        Hearing is grossly normal and there is no otorrhea.        Nares are patent there is no rhinorrhea.        Mucous membranes are moist and pink.       Chest: has bilateral rise with no increased work of breathing.        Cardiovascular: normal perfusion and brisk capillary refill.       Musculoskeletal: no gross focal abnormalities and normal gait.       Neuro: no gross focal abnormalities and memory seems intact.       Psychiatric: speech is clear and coherent and fluent. Patient dressed appropriately for the weather. Mood is appropriate and affect is full.            Procedure note            Informed consent obtained including risks of pain, bleeding, infection, injury to surrounding tissue, and need for further treatment. Benefit of a trigger point injection goal is to reduce pain. This is just part of a treatment plan and for best results patient should also complete physical therapy. I cannot guarantee that will will be any pain relief.            Alternatives would include doing nothing, physical therapy, acupuncture, using heat or ice, continuing with oral medications such as muscle relaxants or nonsteroidal anti-inflammatory medications or topical medications such as a lidocaine patch or cream or menthol for diclofenac. She would like to try the trigger point injections.            Prep: Alcohol           Location identified by my palpation and communication with patient.  Symptomatic trigger points that patient desired treatment at were located at: bilateral upper buttok, right x 2 and left x 1                        Each of these was injected with  a one-to-one solution of 1% lidocaine without epinephrine and 0.5% Marcaine without epinephrine.           Total number of injections:3           Total number of milliliters of the 1:1 solution of lidocaine and marcaine:8            Encouraged patient to treat the area with [place] cup ice massage tonight and to try to stretch the area out.            pain prior to treatment: severe           pain following treatment at the trigger point injection sites: improved right leg pain           Complications: none           Tolerated procedure well.           Asessment and Plan: myalgia  and myofascial pain, s/p trigger point injections today.  Encouraged home exercise program and to keep annual exam appt as indicated and RTC if symptoms worsen or do not improve.   Physical Exam   Vitals & Measurements    T: 97.7   F (Tympanic)  HR: 88(Peripheral)  BP: 124/72        Assessment/Plan       Back pain (M54.5)         Orders:          ketorolac, 60 mg, im, once, (Completed)          92912 therapeutic prophylactic/dx injection subq/im (Charge), Quantity: 1, Back pain  Pain, upper back  Neck pain  Headache  Pain in right leg           ketorolac tromethamine inj, 15 mg (Charge), Quantity: 4, Back pain  Pain, upper back  Neck pain  Headache  Pain in right leg          Physical Therapy Evaluation and Treatment (Request), Back pain  Pain, upper back  Neck pain  Headache  Myalgia  Pain in right leg                Headache (R51)         Orders:          ketorolac, 60 mg, im, once, (Completed)          40009 therapeutic prophylactic/dx injection subq/im (Charge), Quantity: 1, Back pain  Pain, upper back  Neck pain  Headache  Pain in right leg           ketorolac tromethamine inj, 15 mg (Charge), Quantity: 4, Back pain  Pain, upper back  Neck pain  Headache  Pain in right leg          Physical Therapy Evaluation and Treatment (Request), Back pain  Pain, upper back  Neck pain  Headache  Myalgia  Pain in right leg                Myalgia (M79.1)         Orders:          Physical Therapy Evaluation and Treatment (Request), Back pain  Pain, upper back  Neck pain  Headache  Myalgia  Pain in right leg                Neck pain (M54.2)         Orders:          ketorolac, 60 mg, im, once, (Completed)          16056 therapeutic prophylactic/dx injection subq/im (Charge), Quantity: 1, Back pain  Pain, upper back  Neck pain  Headache  Pain in right leg           ketorolac tromethamine inj, 15 mg (Charge), Quantity: 4, Back pain  Pain, upper back  Neck pain  Headache  Pain  in right leg          Physical Therapy Evaluation and Treatment (Request), Back pain  Pain, upper back  Neck pain  Headache  Myalgia  Pain in right leg                Pain in right leg (M79.604)         Orders:          ketorolac, 60 mg, im, once, (Completed)          30659 therapeutic prophylactic/dx injection subq/im (Charge), Quantity: 1, Back pain  Pain, upper back  Neck pain  Headache  Pain in right leg           ketorolac tromethamine inj, 15 mg (Charge), Quantity: 4, Back pain  Pain, upper back  Neck pain  Headache  Pain in right leg          Physical Therapy Evaluation and Treatment (Request), Back pain  Pain, upper back  Neck pain  Headache  Myalgia  Pain in right leg                Pain, upper back (M54.6)         Orders:          ketorolac, 60 mg, im, once, (Completed)          76662 therapeutic prophylactic/dx injection subq/im (Charge), Quantity: 1, Back pain  Pain, upper back  Neck pain  Headache  Pain in right leg           ketorolac tromethamine inj, 15 mg (Charge), Quantity: 4, Back pain  Pain, upper back  Neck pain  Headache  Pain in right leg          Physical Therapy Evaluation and Treatment (Request), Back pain  Pain, upper back  Neck pain  Headache  Myalgia  Pain in right leg                Screening for diabetes mellitus (Z13.1)                Orders:         Return to Clinic (Request), RFV: rtc for direct ldl and hgba1c for screening, TSH for fatigue, Return in 1 week      Fifteen minutes spent with patient in direct face to face contact, in addition to the time spent performing the procedure today, greater than 50% of that time was  spent counseling and coordinating care.   Patient Information     Name:JOEL SUTTON      Address:      [address] [place] [id number]     Sex:Male     YOB: 1980     [phone]     Emergency Contact:LEESA SUTTON     MRN:[id number]     FIN:[id number]     Location:[facility]     Date of Service:06/19/2018      Primary Care Physician:       Nena King MD, [phone]      Attending Physician:       Nena King MD, [phone]  Problem List/Past Medical History    Ongoing     Anxiety     Asthma, moderate persistent     Cerebellar tonsillar ectopia     Chronic insomnia     Dysplastic nevus       Comments: removed from back     Low back pain     Marijuana use     Myalgia     Obese     Sebaceous cyst     Smoking     Tension headache     Tobacco user     Trochanteric bursitis, right hip    Historical     No qualifying data  Procedure/Surgical History     Injection of cortisone (06/08/2016)           Transanal hemorrhoidal dearterialization (THD) (09/08/2015)           Colonoscopy (08/04/2015)             Comments: Sedation: MAC<br/>Indication: rectal bleeding<br/>External &amp; internal hemorrhoids; otherwise normal<br/>Resume routine screening at age 50.     Esophagogastroduodenoscopy (08/04/2015)             Comments: Normal     Hospitalized at [facility] (2002)           Right knee arthroscopy           Surgery on eyes x3             Comments: as infant  Medications     Advil 200 mg oral tablet: 400 mg, 2 tab(s), po, q8 hrs, PRN: as needed for pain, 0 Refill(s).     Shoes for low back pain: See Instructions, Use as directed, 2 EA, 0 Refill(s).     ProAir HFA 90 mcg/inh inhalation aerosol: 2 puff(s), inh, qid, PRN: as needed for wheezing, 1 EA, 2 Refill(s).     Qvar 80 mcg/inh inhalation aerosol: 1 puff(s), inh, bid, 1 EA, 5 Refill(s).     Symbicort 160 mcg-4.5 mcg/inh inhalation aerosol: 2 puff(s), inh, bid, to replace qvar  in the morning and the evening  use with spacer chamber  rinse mouth and throat after use, 3 EA, 3 Refill(s).     DULoxetine 60 mg oral delayed release capsule: 60 mg, 1 cap(s), po, daily, 60 cap(s), 1 Refill(s).     cyclobenzaprine: po, 0 Refill(s).          Allergies    Bee Stings    Latex  Social History    Smoking  Status - 06/19/2018     Current every day smoker     Alcohol      Current, 06/16/2015     Employment and Education      Work/School description: disabled., 02/21/2011     Home and Environment      Lives with Self, Significant other., 02/21/2011     Substance Abuse      Current, 06/16/2015     Tobacco      Current, 06/16/2015  Family History    Diabetes mellitus type 2: Mother.    Heart disease: Father.  Immunizations      Vaccine Date Status      tetanus/diphth/pertuss (Tdap) adult/adol 11/17/2016 Given      influenza virus vaccine, inactivated 11/17/2016 Given      influenza virus vaccine, inactivated 01/06/2015 Given  Lab Results       Lab Results (Last 4 results within 90 days)        Sodium Level: 140 mmol/L [135 mmol/L - 146 mmol/L] (04/27/18 14:54:00 CDT)       Potassium Level: 4.4 mmol/L [3.5 mmol/L - 5.3 mmol/L] (04/27/18 14:54:00 CDT)       Chloride Level: 104 mmol/L [98 mmol/L - 110 mmol/L] (04/27/18 14:54:00 CDT)       CO2 Level: 31 mmol/L [20 mmol/L - 31 mmol/L] (04/27/18 14:54:00 CDT)       Glucose Level: 83 mg/dL [65 mg/dL - 99 mg/dL] (04/27/18 14:54:00 CDT)       BUN: 15 mg/dL [7 mg/dL - 25 mg/dL] (04/27/18 14:54:00 CDT)       Creatinine Level: 0.98 mg/dL [0.6 mg/dL - 1.35 mg/dL] (04/27/18 14:54:00 CDT)       BUN/Creat Ratio: NOT APPLICABLE [6  - 22] (04/27/18 14:54:00 CDT)       eGFR: 97 mL/min/1.73m2 [8.6 mg/dL - 10.3 mg/dL] (04/27/18 14:54:00 CDT)       eGFR African American: 113 mL/min/1.73m2 [6  - 22] (04/27/18 14:54:00 CDT)       Calcium Level: 9.7 mg/dL [8.6 mg/dL - 10.3 mg/dL] (04/27/18 14:54:00 CDT)

## 2022-02-15 NOTE — PROGRESS NOTES
"Chief Complaint    Verbal consent given for a telephone visit.  Pt is calling for refills on his pain medication and albuterol inhaler. Pt is also asking for a 'Note for my landlord regarding my condition\"   Visit type:  Telephone visit   Participants during visit:  patient   Location of patient:  home   Location of physician:  office   Call start time:  1505   Call end time:  1516   Today's visit was conducted by telephone conference due to the COVID-19 pandemic.  The patient's consent to proceed with the telephone conference was obtained and documented.      History of Present Illness      Telemedicine visit today for pain management.  Will has been doing a bit better lately.  He has been able to take his dog out for a walk.  Uses oxycodone very sparingly.  He continues to have troubles with his neighbors.  Review of Systems          ROS reviewed and negative except for symptoms noted in HPI.  Physical Exam   Vitals & Measurements    HT: 72 in  HT: 72 in   Assessment/Plan       1. Anxiety (F41.1)         advised starting SSRI/SNRI, declines medical treatment       2. Chronic back pain (M54.9)         limited supply of oxycodone       3. Myofascial pain syndrome (M79.18)       Orders:         oxyCODONE, = 1 tab(s) ( 5 mg ), Oral, q8 hrs, PRN: as needed for pain, # 20 tab(s), 0 Refill(s), Type: Maintenance, [facility] 1 tab(s) Oral q8 hrs,PRN:as needed for pain, 72, in, 02/16/21 14:36:00 CST, Height Measured, 311.8,..., (Ordered)         oxyCODONE, = 1 tab(s) ( 5 mg ), Oral, q8 hrs, PRN: as needed for pain, # 20 tab(s), 0 Refill(s), Type: Hard Stop, [facility], 72, in, 01/05/21 14:29:00 CST, Height Measured, 311.8, lb, 01/05/21 14:29:00 CST, Weight Measured, (Completed)  Patient Information     Name:JOEL SUTTON      Address:      [address] [place] [phone]     Sex:Male     YOB: 1980     " [phone]     Emergency Contact:[facility], CONTACT     MRN:[id number]     FIN:[id number]     Location:[facility]     Date of Service:02/16/2021      Primary Care Physician:       Mata Hearn MD, [phone]      Attending Physician:       Mata Hearn MD, [phone]  Problem List/Past Medical History    Ongoing     Anxiety     Asthma, moderate persistent     Cerebellar tonsillar ectopia     Chronic back pain     Chronic insomnia     Degenerative lumbar disc     Dysplastic nevus       Comments: removed from back     Low back pain     Marijuana use     Myalgia     Myofascial pain syndrome     Obese     Sebaceous cyst     Smoking     Tension headache     Tobacco user     Trochanteric bursitis, right hip    Historical     No qualifying data  Procedure/Surgical History     Injection of cortisone (06/08/2016)           Transanal hemorrhoidal dearterialization (THD) (09/08/2015)           Colonoscopy (08/04/2015)            Comments: Sedation: MAC      Indication: rectal bleeding      External & internal hemorrhoids; otherwise normal      Resume routine screening at age 50..     Esophagogastroduodenoscopy (08/04/2015)            Comments: Normal.     Hospitalized at [facility] (2002)           Right knee arthroscopy           Surgery on eyes x3            Comments: as infant.  Medications    acetaminophen 500 mg oral tablet, 1000 mg= 2 tab(s), Oral, q6 hrs, PRN    Acular 0.5% ophthalmic solution, 1 drop(s), Eye-Right, qid, PRN    diazePAM 5 mg oral tablet, 5 mg= 1 tab(s), Oral, daily, PRN    diclofenac 1% topical gel, 2 gm, Topical, qid, 2 refills    Orthotics, See Instructions    oxyCODONE 5 mg oral tablet, 5 mg= 1 tab(s), Oral, q8 hrs, PRN    Ventolin HFA 90 mcg/inh inhalation aerosol, 2 puff(s), NEB, qid, PRN  Allergies    Bananas    Bee Stings    Latex  Social History    Smoking Status     Current every day smoker     Alcohol      Past     Electronic  Cigarette/Vaping      Electronic Cigarette Use: Never.     Employment/School      Work/School description: disabled.     Home/Environment      Lives with Self, Significant other.     Substance Abuse      Current     Tobacco      10 or more cigarettes (1/2 pack or more)/day in last 30 days  Family History    Diabetes mellitus type 2: Mother.    Heart disease: Father.  Immunizations      Vaccine Date Status          tetanus/diphth/pertuss (Tdap) adult/adol 11/17/2016 Given          influenza virus vaccine, inactivated 11/17/2016 Given          influenza virus vaccine, inactivated 01/06/2015 Given
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
## 2022-02-15 NOTE — PROGRESS NOTES
Chief Complaint    F/u. Asking for trigger point injections.  History of Present Illness          HPI pt has history of chronic myofascial pain and has found trigger point injections to be a useful tool to help control pain.  Would like to have repeat trigger point injections today.                       Review of systems is negative except as per HPI      Exam:      General: alert and oriented ×3 no acute distress.      HEENT: pupils are equal round and reactive to light extraocular motion is intact. Normocephalic and atraumatic.       Hearing is grossly normal and there is no otorrhea.       Nares are patent there is no rhinorrhea.       Mucous membranes are moist and pink.      Chest: has bilateral rise with no increased work of breathing.      Cardiovascular: normal perfusion and brisk capillary refill.      Musculoskeletal: no gross focal abnormalities and normal gait.      Neuro: no gross focal abnormalities and memory seems intact.      Psychiatric: speech is clear and coherent and fluent. Patient dressed appropriately for the weather. Mood is appropriate and affect is full.           Procedure note           Informed consent obtained including risks of pain, bleeding, infection, injury to surrounding tissue, and need for further treatment. Benefit of a trigger point injection goal is to reduce pain. This is just part of a treatment plan and for best results patient should also complete physical therapy. I cannot guarantee that will will be any pain relief.           Alternatives would include doing nothing, physical therapy, acupuncture, using heat or ice, continuing with oral medications such as muscle relaxants or nonsteroidal anti-inflammatory medications or topical medications such as a lidocaine patch or cream or menthol for diclofenac. She would like to try the trigger point injections.           Prep: Alcohol          Location identified by my palpation and communication with patient.  Symptomatic  trigger points that patient desired treatment at were located at: pt's bilateral thoracic spine x 1 each and left upper back x 1                      Each of these was injected with  a one-to-one solution of 1% lidocaine without epinephrine and 0.5% Marcaine without epinephrine.          Total number of injections:3          Total number of milliliters of the 1:1 solution of lidocaine and marcaine:8           Encouraged patient to treat the area with Karen cup ice massage tonight and to try to stretch the area out.           pain prior to treatment: moderate          pain following treatment at the trigger point injection sites:upper back improved, mid back minimal change          Complications: none          Tolerated procedure well.          Asessment and Plan: myalgia and myofascial pain, s/p trigger point injections today.  Encouraged home exercise program and to keep annual exam appt as indicated and RTC if symptoms worsen or do not improve.   Physical Exam   Vitals & Measurements    HR: 82(Peripheral)  BP: 135/87     HT: 72.8 in  WT: 274 lb  BMI: 36.34   Assessment/Plan       Diabetes mellitus screening (Z13.1)         Orders:          Hemoglobin A1c* ([facility]), Specimen Type: Blood, Collection Date: 07/02/18 16:22:00 CDT                Fatigue (R53.83), Fatigue (R53.83)         Orders:          75062 office outpatient visit 15 minutes (Charge), Quantity: 1, Low back pain  Myalgia  Fatigue          TSH* ([facility]), Specimen Type: Serum, Collection Date: 07/02/18 16:22:00 CDT                Low back pain (M54.5)         Orders:          ketorolac, 60 mg, im, once, (Completed)          98875 therapeutic prophylactic/dx injection subq/im (Charge), Quantity: 1, Low back pain          17592 office outpatient visit 15 minutes (Charge), Quantity: 1, Low back pain  Myalgia  Fatigue           ketorolac tromethamine inj, 15 mg (Charge), Quantity: 4, Low back pain                Myalgia (M79.1)         Orders:           76946 office outpatient visit 15 minutes (Charge), Quantity: 1, Low back pain  Myalgia  Fatigue                Screening cholesterol level (Z13.220)         Orders:          Lipid panel with reflex to direct ldl* ([facility]), Specimen Type: Serum, Collection Date: 07/02/18 16:22:00 CDT           Patient Information     Name:JOEL SUTTON      Address:      [address] [place] [id number]     Sex:Male     YOB: 1980     [phone]     Emergency Contact:LEESA SUTTON     MRN:[id number]     FIN:[id number]     Location:[facility]     Date of Service:07/02/2018      Primary Care Physician:       Nena King MD, [phone]      Attending Physician:       Nena King MD, [phone]  Problem List/Past Medical History    Ongoing     Anxiety     Asthma, moderate persistent     Cerebellar tonsillar ectopia     Chronic insomnia     Dysplastic nevus       Comments: removed from back     Low back pain     Marijuana use     Myalgia     Obese     Sebaceous cyst     Smoking     Tension headache     Tobacco user     Trochanteric bursitis, right hip    Historical     No qualifying data  Procedure/Surgical History     Injection of cortisone (06/08/2016)           Transanal hemorrhoidal dearterialization (THD) (09/08/2015)           Colonoscopy (08/04/2015)            Comments:      Sedation: MAC      Indication: rectal bleeding      External & internal hemorrhoids; otherwise normal      Resume routine screening at age 50.     Esophagogastroduodenoscopy (08/04/2015)            Comments:      Normal     Hospitalized at [facility] unit (2002)           Right knee arthroscopy           Surgery on eyes x3            Comments:      as infant  Medications     Shoes for low back pain: See Instructions, Use as directed, 2 EA, 0 Refill(s).     ProAir HFA 90 mcg/inh inhalation aerosol: 2 puff(s), inh, qid, PRN: as needed for wheezing, 1 EA, 2 Refill(s).      Qvar 80 mcg/inh inhalation aerosol: 1 puff(s), inh, bid, 1 EA, 5 Refill(s).     Symbicort 160 mcg-4.5 mcg/inh inhalation aerosol: 2 puff(s), inh, bid, to replace qvar  in the morning and the evening  use with spacer chamber  rinse mouth and throat after use, 3 EA, 3 Refill(s).     DULoxetine 60 mg oral delayed release capsule: 60 mg, 1 cap(s), po, daily, 60 cap(s), 1 Refill(s).     cyclobenzaprine 5 mg oral tablet: 5 mg, 1 tab(s), po, bid, PRN: for muscle spasm, 60 tab(s), 1 Refill(s).     lidocaine 5% topical film: 3 patch(es), Topical, daily, for 30 day(s), remove patches after 12 hours, 90 patch(es), 11 Refill(s).     meloxicam 15 mg oral tablet: 15 mg, 1 tab(s), PO, Daily, PRN: for pain, 90 tab(s), 3 Refill(s).          Allergies    Bee Stings    Latex  Social History    Smoking Status - 07/02/2018     Current every day smoker     Alcohol      Current, 06/16/2015     Employment and Education      Work/School description: disabled., 02/21/2011     Home and Environment      Lives with Self, Significant other., 02/21/2011     Substance Abuse      Current, 06/16/2015     Tobacco      Current, 06/16/2015  Family History    Diabetes mellitus type 2: Mother.    Heart disease: Father.  Immunizations      Vaccine Date Status      tetanus/diphth/pertuss (Tdap) adult/adol 11/17/2016 Given      influenza virus vaccine, inactivated 11/17/2016 Given      influenza virus vaccine, inactivated 01/06/2015 Given  Lab Results       Lab Results (Last 4 results within 90 days)        Sodium Level: 140 mmol/L [135 mmol/L - 146 mmol/L] (04/27/18 14:54:00 CDT)       Potassium Level: 4.4 mmol/L [3.5 mmol/L - 5.3 mmol/L] (04/27/18 14:54:00 CDT)       Chloride Level: 104 mmol/L [98 mmol/L - 110 mmol/L] (04/27/18 14:54:00 CDT)       CO2 Level: 31 mmol/L [20 mmol/L - 31 mmol/L] (04/27/18 14:54:00 CDT)       Glucose Level: 83 mg/dL [65 mg/dL - 99 mg/dL] (04/27/18 14:54:00 CDT)       BUN: 15 mg/dL [7 mg/dL - 25 mg/dL] (04/27/18 14:54:00 CDT)        Creatinine Level: 0.98 mg/dL [0.6 mg/dL - 1.35 mg/dL] (04/27/18 14:54:00 CDT)       BUN/Creat Ratio: NOT APPLICABLE [6  - 22] (04/27/18 14:54:00 CDT)       eGFR: 97 mL/min/1.73m2 [8.6 mg/dL - 10.3 mg/dL] (04/27/18 14:54:00 CDT)       eGFR African American: 113 mL/min/1.73m2 [6  - 22] (04/27/18 14:54:00 CDT)       Calcium Level: 9.7 mg/dL [8.6 mg/dL - 10.3 mg/dL] (04/27/18 14:54:00 CDT)

## 2022-02-15 NOTE — RESULTS
-------------------------------- Original Report -------------------------------    EXAM:  MR LUMBAR SPINE WITHOUT CONTRAST  0.6T OPEN UPRIGHT    CLINICAL INFORMATION:  Low back and right lower extremity pain, tingling,  weakness and numbness.    CORRELATIVE IMAGES:  No prior imaging submitted.        TECHNICAL INFORMATION: T1, T2 and STIR sagittal and T1/T2 axial sections in  sitting position. Motion artifact present.     CONTRAST:  None.    SEDATION:  None.     INTERPRETATION:  Segmentation and Alignment:  No segmentation anomaly evident.   Flattening of lumbar lordosis may reflect seated position.      Disc Degeneration/Operative changes:  Moderate to marked vertical narrowing and  desiccation L4-5 and L5-S1.  Chronic endplate changes at both levels.        Sacrum and Sacroiliac joints:  Minimally included.         Conus/distal cord:  Motion artifact with no gross pathology. Conus terminates at  L1-2.    Osseous and paraspinous structures:  Bone marrow signal is unremarkable.  Paraspinal soft tissues are unremarkable.        L5-S1:  Trace retrolisthesis and mild annular bulging without significant  central compromise. Mild left foraminal stenosis. Facet joints are unremarkable.    L4-L5:  9 mm AP, broader right paracentral disc herniation with severe  impingement of right L5 nerve root, mild displacement of S1 nerve root. No  significant foraminal compromise. Facet joints are unremarkable.    L3-L4:  Normal disc height.  No bulge or herniation. No significant foraminal  compromise. Facet joints are unremarkable.    L2-L3:  Normal disc height.  No bulge or herniation. No significant foraminal  compromise. Facet joints are unremarkable.    L1-L2:  Shallow broad right paracentral protrusion less than 3 mm AP dimension  without stenosis. No significant foraminal compromise. Facet joints are  unremarkable.       Lower thoracic:  No significant lower thoracic disc pathology.    CONCLUSION:  Study challenged by motion  artifact, with these findings:  1.  Moderate to marked L4-5 disc degeneration with moderate broad rightward disc  herniation impinging L5 and S1 nerve roots.  2.  Marked L5-S1 disc degeneration without neural compression.  3.  Small right L1-2 disc protrusion without neural impingement.    RSP      Read by: Timbo Chavez M.D.  Reviewed and Electronically Signed by: Timbo Chavez M.D.

## 2022-02-15 NOTE — PROGRESS NOTES
Chief Complaint    TPI for back pain  History of Present Illness      Patient presents to clinic today feeling rather frustrated.  There is a neighbor in his apartment complex that has been behaving in a threatening manner her and very confrontational.  It has made him feel hypervigilant and has caused more tension in his life.  He is doing his best to avoid a however she has made it difficult to do this.  She confronted him frequently.  She is also done things like left clean tampons outside his door.  She has had some very foul and vulgar and threatening things about a 3-year-old child that lives in their complex.       Reports that he feels that his gastritis is flaring up.  He has had episodes of this in the past.  The last time he was and he was in the middle of an asthma exacerbation and was provided with some prednisone.  Discussed with him that his gastritis is likely worsened secondary to his prednisone use.  He reports that his breathing is much better.  He would like a shot of Toradol today however we discussed that given the inflammation and is got right now would be best to avoid this.  He would like to have some trigger point injections done today.  He has had these done in the past and has found them to be helpful.  We have had better success in his upper back and his lower back however he would like to have some injections done to his bilateral trapezii and his lumbar paraspinal muscles.       Risks of pain bleeding infection injury to surrounding tissue and need for further treatment were discussed with patient.  We also discussed alternatives thoroughly in the past including his referrals to neurosurgery and the pain clinic.  We have tried various different oral medications in the past and I continue to try to refer him to physical therapy.  For now the only thing she wants is trigger point injections.  Review of Systems      No fevers chills sore throat runny nose nausea vomiting diarrhea  constipation altered mental status suicidal or homicidal ideation  Physical Exam   Vitals & Measurements    T: 98.7   F (Tympanic)  HR: 105(Peripheral)  BP: 142/82  SpO2: 97%       General patient is alert and oriented x3 in no acute distress      HEENT normocephalic and atraumatic, pupils are equally round and reactive to light, extraocular motion is intact, hearing is grossly normal, mucous membranes are moist and pink.       Chest has bilateral rise with no increased work of breathing cardiovascular regular rate and rhythm no murmurs gallops or rubs normal tissue perfusion and brisk capillary refill       Neuro cranial nerves II through XII are grossly intact.  Memory seems intact.       Musculoskeletal patient has hypertonicity in his bilateral trapezius, rhomboids and lumbar paraspinal muscles.  Patient reports series she was most symptomatic at work on his bilateral trapezii and bilateral lumbar spinal muscles.       Procedure noTe trigger point injections       Risk benefits and alternatives were discussed with patient.       Areas to be injected were noted at the bilateral trapezii and the bilateral paraspinal lumbar muscles.  Total of 4 muscle groups.  Total of 4 injections.       These areas were first cleaned with rubbing alcohol and then the trigger points at each of these locations were each injected with 1.5 mL of a one-to-one solution of 1% lidocaine with 0.5% Marcaine.  The area was first prepped with rubbing alcohol.  Complications none, EBL less than 3 mL, patient tolerated procedure well and reports that there was some loosening of his muscles.  Assessment/Plan       Gastritis (K29.70)         Orders:          omeprazole, = 1 cap(s) ( 40 mg ), PO, Daily, # 90 cap(s), 3 Refill(s), Type: Maintenance, Pharmacy: [facility], 1 cap(s) Oral daily, (Ordered)                Orders:         azithromycin, Take 2 tablets on Day 1 and then 1 tablet, PO, Daily, Instructions: until  finished, # 6 tab(s), 0 Refill(s), Type: Maintenance, Pharmacy: [facility], Take 2 tablets on Day 1 and then 1 tablet Oral daily,Instr:until finished, (Completed)         predniSONE, = 1 tab(s) ( 50 mg ), PO, Daily, # 5 tab(s), 0 Refill(s), Type: Maintenance, Pharmacy: [facility], 1 tab(s) Oral daily,x5 day(s), (Completed)   Patient with chronic pain and myalgia her and she should some anxiety and mood disorder.  There is increased irritability at this time.  He is status post trigger point injections today for his pain which has improved his pain from today.  25 minutes was spent with patient in direct face-to-face contact of which greater than 50% of the time was spent counseling patient in addition to the time spent performing the procedure today.  Patient Information     Name:JOEL SUTTON      Address:      [address] [place] [id number]     Sex:Male     YOB: 1980     [phone]     Emergency Contact:LEESA SUTTON     MRN:[id number]     FIN:[id number]     Location:[facility]     Date of Service:10/17/2018      Primary Care Physician:       Nena King MD, [phone]      Attending Physician:       Nena King MD, [phone]  Problem List/Past Medical History    Ongoing     Anxiety     Asthma, moderate persistent     Cerebellar tonsillar ectopia     Chronic back pain     Chronic insomnia     Dysplastic nevus       Comments: removed from back     Low back pain     Marijuana use     Myalgia     Obese     Sebaceous cyst     Smoking     Tension headache     Tobacco user     Trochanteric bursitis, right hip    Historical     No qualifying data  Procedure/Surgical History     Injection of cortisone (06/08/2016)     Transanal hemorrhoidal dearterialization (THD) (09/08/2015)     Colonoscopy (08/04/2015)      Comments:      Sedation: MAC      Indication: rectal bleeding      External  & internal hemorrhoids; otherwise normal      Resume routine screening at age 50.     Esophagogastroduodenoscopy (08/04/2015)      Comments:      Normal     Hospitalized at [facility] (2002)     Right knee arthroscopy     Surgery on eyes x3      Comments:      as infant  Medications        ProAir HFA 90 mcg/inh inhalation aerosol: 2 puff(s), inh, qid, PRN: as needed for wheezing, 1 EA, 2 Refill(s).        omeprazole 40 mg oral delayed release capsule: 40 mg, 1 cap(s), PO, Daily, 90 cap(s), 3 Refill(s).                Allergies    Bananas    Bee Stings    Latex  Social History    Smoking Status - 10/17/2018     Current every day smoker     Alcohol      Current, 06/16/2015     Employment and Education      Work/School description: disabled., 02/21/2011     Home and Environment      Lives with Self, Significant other., 02/21/2011     Substance Abuse      Current, 06/16/2015     Tobacco      Current, 06/16/2015  Family History    Diabetes mellitus type 2: Mother.    Heart disease: Father.  Immunizations      Vaccine Date Status      tetanus/diphth/pertuss (Tdap) adult/adol 11/17/2016 Given      influenza virus vaccine, inactivated 11/17/2016 Given      influenza virus vaccine, inactivated 01/06/2015 Given  Lab Results          Lab Results (Last 4 results within 90 days)           Group A Strep POC: NOT DETECTED (10/08/18 12:11:00)          Culture Strep A: See comment (10/08/18 12:23:00)

## 2022-02-15 NOTE — TELEPHONE ENCOUNTER
---------------------  From: Kasie/Ankita RAMSAY (Phone Messages [employer] ([id number]_[facility]))   To: Jensen Murphy MD;     Sent: 10/16/2020 12:08:20 PM CDT  Subject: Multiple      Phone Message    PCP: IRWIN    Time of Call: 1148    Phone Number: [phone]    Returned call at: 1200    Note: Patient called stating that he is out of his pain med- oxycodone Last filled 9/10/20 #20. Patient also requesting a refill of diclofenac topical gel Last filled 8/5/20 #240 R2. Patient also wanting to get in to see IRWIN for an injection in back for muscle spasms or local pain.    Patient wanting to get in for injection today but no available appointments today.    Please advise on refill of oxycodone. Patient should have refills remaining of topical gel.---------------------  From: Jensen Murphy MD   To: Phone Messages Pool ([id number]_[place] [facility] [place]);     Sent: 10/16/2020 1:06:20 PM CDT  Subject: RE: Multiple      would need to be assessed in clinic prior to authorizing short term opiates w/o pain contractNotified pt. Has an appointment next week.

## 2022-02-15 NOTE — NURSING NOTE
Comprehensive Intake Entered On:  11/8/2019 3:43 PM CST    Performed On:  11/8/2019 3:36 PM CST by Noé ROSE Khadra               Summary   Temperature Tympanic :   98.2 DegF(Converted to: 36.8 DegC)    Noé ROSE Khadra - 11/8/2019 3:44 PM CST   Chief Complaint :   Follow up back surgery, pt states he has not had a bowel movement since surgery   Menstrual Status :   N/A   Weight Measured :   292.6 lb(Converted to: 292 lb 10 oz, 132.72 kg)    Height Measured :   72 in(Converted to: 6 ft 0 in, 182.88 cm)    Body Mass Index :   39.68 kg/m2 (HI)    Body Surface Area :   2.59 m2   Systolic Blood Pressure :   140 mmHg (HI)    Diastolic Blood Pressure :   80 mmHg   Mean Arterial Pressure :   100 mmHg   Peripheral Pulse Rate :   76 bpm   BP Site :   Right arm   Pulse Site :   Radial artery   BP Method :   Manual   HR Method :   Electronic   Noé ROSE Khadra - 11/8/2019 3:36 PM CST   Health Status   Allergies Verified? :   Yes   Medication History Verified? :   Yes   Medical History Verified? :   No   Pre-Visit Planning Status :   Completed   Tobacco Use? :   Current every day smoker   Noé ROSE Khadra - 11/8/2019 3:36 PM CST   Meds / Allergies   (As Of: 11/8/2019 3:43:44 PM CST)   Allergies (Active)   Bananas  Estimated Onset Date:   Unspecified ; Created By:   Rowena Hooker LPN; Reaction Status:   Active ; Category:   Drug ; Substance:   Bananas ; Type:   Allergy ; Updated By:   Rowena Hooker LPN; Reviewed Date:   11/8/2019 3:40 PM CST      Bee Stings  Estimated Onset Date:   Unspecified ; Created By:   Ruthie Nam CMA; Reaction Status:   Active ; Category:   Drug ; Substance:   Bee Stings ; Type:   Allergy ; Updated By:   Ruthie Nam CMA; Reviewed Date:   11/8/2019 3:40 PM CST      Latex  Estimated Onset Date:   Unspecified ; Created By:   Ruthie Nam CMA; Reaction Status:   Active ; Category:   Drug ; Substance:   Latex ; Type:   Allergy ; Updated By:   Ruthie Nam CMA; Reviewed Date:   11/8/2019  3:40 PM CST        Medication List   (As Of: 11/8/2019 3:43:44 PM CST)   Prescription/Discharge Order    albuterol  :   albuterol ; Status:   Prescribed ; Ordered As Mnemonic:   ProAir HFA 90 mcg/inh inhalation aerosol ; Simple Display Line:   2 puff(s), inh, qid, PRN: as needed for wheezing, 1 EA, 2 Refill(s) ; Ordering Provider:   Nena King MD; Catalog Code:   albuterol ; Order Dt/Tm:   8/30/2018 2:42:03 PM CDT          diazePAM  :   diazePAM ; Status:   Prescribed ; Ordered As Mnemonic:   diazePAM 5 mg oral tablet ; Simple Display Line:   5 mg, 1 tab(s), Oral, q8 hrs, PRN: as needed for anxiety, 10 tab(s), 0 Refill(s) ; Ordering Provider:   Cricket Jessica PA-C; Catalog Code:   diazePAM ; Order Dt/Tm:   10/1/2019 3:21:56 PM CDT            Home Meds    acetaminophen  :   acetaminophen ; Status:   Documented ; Ordered As Mnemonic:   acetaminophen 500 mg oral tablet ; Simple Display Line:   1,000 mg, 2 tab(s), Oral, q6 hrs, PRN: as needed for pain, 0 Refill(s) ; Catalog Code:   acetaminophen ; Order Dt/Tm:   11/6/2019 1:37:34 PM CST          cyclobenzaprine  :   cyclobenzaprine ; Status:   Documented ; Ordered As Mnemonic:   cyclobenzaprine 10 mg oral tablet ; Simple Display Line:   See Instructions, 0.5 tab(5mg) po TID PRN for muscle spasm, PRN: for spasm, 0 Refill(s) ; Catalog Code:   cyclobenzaprine ; Order Dt/Tm:   11/6/2019 1:46:42 PM CST          oxyCODONE  :   oxyCODONE ; Status:   Documented ; Ordered As Mnemonic:   oxyCODONE 5 mg oral tablet ; Simple Display Line:   See Instructions, take 1-2 tablets po every 4 hours PRN for pain, 0 Refill(s) ; Catalog Code:   oxyCODONE ; Order Dt/Tm:   11/6/2019 1:33:24 PM CST          polyethylene glycol 3350  :   polyethylene glycol 3350 ; Status:   Documented ; Ordered As Mnemonic:   MiraLax oral powder for reconstitution ; Simple Display Line:   17 gm, Oral, daily, 0 Refill(s) ; Catalog Code:   polyethylene glycol 3350 ; Order Dt/Tm:   11/8/2019 3:39:43 PM  CST

## 2022-02-15 NOTE — PROGRESS NOTES
Patient:   JOEL SUTTON            MRN: [id number]            FIN: [id number]               Age:   38 years     Sex:  Male     :  1980   Associated Diagnoses:   None   Author:   Nena King MD      Visit Information      Date of Service: 2018 02:20 pm  Performing Location: [facility]  Encounter#: [id number]      Primary Care Provider (PCP):  Nena King MD    NPI# [id number]      Referring Provider:  Nena King MD, NPI# [id number]      Procedure   Procedure   Indication: myofascial pain.     Informed consent: signed by patient.     Confirmed: patient, procedure, side, site.     Type of procedure: trigger point injection.     Physical Exam: vital signs Vital Signs   2018 2:29 PM CDT Temperature Tympanic 98.0 DegF    Peripheral Pulse Rate 100 bpm    Pulse Site Brachial artery    HR Method Manual    Systolic Blood Pressure 132 mmHg  HI    Diastolic Blood Pressure 78 mmHg    Mean Arterial Pressure 96 mmHg    BP Site Right arm    BP Method Manual      .     Preparation and technique: informed consent obtained, position sitting, sterile preparation of site with 70 % alcohol, hemostasis achieved using direct pressure, estimated blood loss minimal, adhesive bandagedressing applied.     Findings: painful and tender myofascial trigger points identified by palpation with communication from patient    Location:right rhomboid x 1, right lateral lower back near iliac crest x 1, right low back near upper gluteus jyoti     Number of injections:4    pain prior to procedure:  moderate       pain following procedure:improved, especially at the site near the iliac crest    number of milliliters of the 1:1 solution of 1% lidocaine without epiniephrine and 0.5% marcaine without epinephrine used in total: 6      also discussed recent BMP with patient, ok to have toradol 60 mg IM per patient request.  .     Procedure tolerated: well.     No Complications.     No qualifying data  available.          Impression and Plan   Diagnosis     return to clinic if symptoms worsen or do not improve.     Counseled:  Patient, Family, use ice or heat as needed to help with pain, continue with home exercise program, .

## 2022-02-15 NOTE — TELEPHONE ENCOUNTER
---------------------  From: Ruthie Crouch CMA (eRx Pool ([id number]_[facility]))   To: [facility] Message Pool ([id number]_[place] [facility] [place]);     Sent: 11/13/2019 2:11:21 PM CST  Subject: FW: Medication Management   Due Date/Time: 11/14/2019 1:39:00 PM CST     Medication Refill needing approval    PCP:   N/A    Medication:   cyclobenzaprine  Last Filled:  documented in chart    CSA on file?   no     Date of last office visit and reason:   11/8/19; s/p laminectomy, back pain  Date of last labs pertaining to condition:  7/2/18    Return to Clinic order placed?  no        ------------------------------------------  From: [facility]  To: Nena King MD  Sent: November 13, 2019 1:39:11 PM CST  Subject: Medication Management  Due: November 14, 2019 1:39:11 PM CST    ** On Hold Pending Signature **  Drug: cyclobenzaprine (cyclobenzaprine 5 mg oral tablet)  TAKE ONE TABLET BY MOUTH TWICE A DAY AS NEEDED FOR MUSCLE SPASM  Quantity: 60 unknown unit  Days Supply: 30  Refills: 1  Substitutions Allowed  Notes from Pharmacy:     Dispensed Drug: cyclobenzaprine (cyclobenzaprine 5 mg oral tablet)  TAKE ONE TABLET BY MOUTH TWICE A DAY AS NEEDED FOR MUSCLE SPASM  Quantity: 60 unknown unit  Days Supply: 30  Refills: 1  Substitutions Allowed  Notes from Pharmacy:   ---------------------------------------------------------------  From: Baron Sheffield ([facility] Message Pool ([id number]_[facility]))   To: Nena King MD;     Sent: 11/13/2019 4:30:24 PM CST  Subject: FW: Medication Management   Due Date/Time: 11/14/2019 1:39:00 PM CST     Please review. Ok to send?---------------------  From: Nena King MD   To: [facility] Message Pool [id number]     Sent: 11/13/2019 5:09:02 PM CST  Subject: FW: Medication Management   Due Date/Time: 11/14/2019 1:39:00 PM CST     please ask patient to request meds from his spinal surgeon.attempted to call patient at [phone] VM not set up.Spoke to patient at  [phone]. He states he will ask his surgeon for the medication. He would not like to be called in the mornings.

## 2022-02-15 NOTE — PROGRESS NOTES
Patient:   JOEL SUTTON            MRN: [id number]            FIN: [id number]               Age:   39 years     Sex:  Male     :  1980   Associated Diagnoses:   Rectal bleeding; Chronic back pain   Author:   Mata Merino MD      Visit Information      Date of Service: 08/15/2019 01:20 pm  Performing Location: [facility]  Encounter#: [id number]      Primary Care Provider (PCP):  Nena King MD    NPI# [id number]      Referring Provider:  Mata Merino MD    NPI# [id number]      Chief Complaint   8/15/2019 1:25 PM CDT    c/o blood in stool since yesterday, increase in fatigue, also having back pain/right leg pain requesting MRI order      History of Present Illness   Continues to have chronic back pain. Has been to chiropractor and physical therapy. Concerned about herniated disc. States getting weaker on the right leg. Has consult with back specialist in two weeks    Had diarrhea last night with some blood bright red likely from hemorrhoids. Bleeding has already decreased.    Gained weight on seroquel. Had side effects from clonazepam.      Review of Systems   Constitutional:  No fever.    Gastrointestinal:  No vomiting.    Integumentary:  No rash.    No incontinence      Health Status   Allergies:    Allergic Reactions (Selected)  Severity Not Documented  Bananas (No reactions were documented)  Bee Stings (No reactions were documented)  Latex (No reactions were documented)   Medications:  (Selected)   Prescriptions  Prescribed  ProAir HFA 90 mcg/inh inhalation aerosol: 2 puff(s), inh, qid, PRN: as needed for wheezing, # 1 EA, 2 Refill(s), Type: Maintenance, Pharmacy: [facility], 2 puff(s) Inhale qid,PRN:as needed for wheezing  omeprazole 40 mg oral delayed release capsule: = 1 cap(s) ( 40 mg ), PO, Daily, # 90 cap(s), 3 Refill(s), Type: Maintenance, Pharmacy: [facility], 1 cap(s) Oral daily   Problem list:    All Problems  Chronic  back pain / SNOMED CT 687024434 / Confirmed  Cerebellar tonsillar ectopia / SNOMED CT 09895677 / Confirmed  Sebaceous cyst / SNOMED CT 1669668245 / Confirmed  Marijuana use / SNOMED CT 0102718827 / Confirmed  Anxiety / SNOMED CT 58114228 / Confirmed  Low back pain / SNOMED CT 201294886 / Confirmed  Asthma, moderate persistent / SNOMED CT 2705687787 / Confirmed  Myalgia / SNOMED CT 873984170 / Confirmed  Dysplastic nevus / SNOMED CT 3317582281 / Confirmed  Obese / SNOMED CT 3733751814 / Probable  Chronic insomnia / SNOMED CT 672985131 / Confirmed  Smoking / SNOMED CT 760893951 / Confirmed  Tension headache / SNOMED CT 2918990084 / Confirmed  Tobacco user / SNOMED CT 763172143 / Probable  Trochanteric bursitis, right hip / SNOMED CT 64815834 / Confirmed      Histories   Procedure history:    Injection of cortisone (SNOMED CT 3775738598) performed by Joann on 6/8/2016 at 36 Years.  Transanal hemorrhoidal dearterialization (THD) performed by Vik Chavez MD on 9/8/2015 at 35 Years.  Esophagogastroduodenoscopy (SNOMED CT 435774542) on 8/4/2015 at 35 Years.  Comments:  8/5/2015 12:30 PM CDT - Mata Merino MD  Normal  Colonoscopy (SNOMED CT 411002699) performed by Mata Merino MD on 8/4/2015 at 35 Years.  Comments:  8/13/2015 3:29 PM CDT - Genia Mac RN  Sedation: MAC  Indication: rectal bleeding  External & internal hemorrhoids; otherwise normal  Resume routine screening at age 50.  Hospitalized at [facility] in 2002 at 22 Years.  Right knee arthroscopy.  Surgery on eyes x3.  Comments:  12/15/2010 4:16 PM [facility] - Genia Ely RN  as infant     Social History: Lives in apartment with children (3-4 days a week). Smoker. Quit marijuana      Physical Examination   Vital Signs   8/15/2019 1:25 PM CDT Temperature Tympanic 97.5 DegF  LOW    Peripheral Pulse Rate 88 bpm    Pulse Site Radial artery    HR Method Manual    Systolic Blood Pressure 132 mmHg  HI    Diastolic Blood Pressure 82 mmHg  HI    Mean  Arterial Pressure 99 mmHg    BP Site Right arm    BP Method Manual      Measurements from flowsheet : Measurements   8/15/2019 1:25 PM CDT Height Measured - Standard 72 in    Weight Measured - Standard 287.8 lb    BSA 2.57 m2    Body Mass Index 39.03 kg/m2  HI      General:  Alert and oriented, No acute distress.    Respiratory:  Lungs are clear to auscultation.    Cardiovascular:  Normal rate, Regular rhythm.    Gastrointestinal:  rectal normal.    Musculoskeletal:  moving slowly. mild tenderness paraspinus muscles on the right.    Neurologic:  No focal deficits.    Psychiatric:  Cooperative.       Review / Management   Results review:  Lab results   8/15/2019 2:20 PM CDT WBC 11.0    Hgb 16.4 g/dL    Platelet 167   .       Impression and Plan   Diagnosis     Rectal bleeding (RRZ79-PV K62.5).     Chronic back pain (NFW35-KX M54.9).       Chronic back pain: will schedule follow up MRI  Rectal bleeding: will monitor and follow up if persists. Had hemorrhoid surgery and colonoscopy 2015  Anxiety/Depression: states medications and counseling do not help him

## 2022-02-15 NOTE — NURSING NOTE
Comprehensive Intake Entered On:  6/1/2020 4:31 PM CDT    Performed On:  6/1/2020 4:28 PM CDT by Gonzalez RAMSAY, Fariba               Summary   Chief Complaint :   verbal consent given for telemed visit.  med check, needs refills on Oxycodone.   Menstrual Status :   N/A   Fariba Roth MA - 6/1/2020 4:28 PM CDT   Health Status   Allergies Verified? :   Yes   Medication History Verified? :   Yes   Medical History Verified? :   Yes   Pre-Visit Planning Status :   Completed   Tobacco Use? :   Current every day smoker   Fariba Roth MA - 6/1/2020 4:28 PM CDT   Consents   Consent for Immunization Exchange :   Consent Granted   Consent for Immunizations to Providers :   Consent Granted   Fariba Roth MA - 6/1/2020 4:28 PM CDT   Meds / Allergies   (As Of: 6/1/2020 4:31:59 PM CDT)   Allergies (Active)   Bananas  Estimated Onset Date:   Unspecified ; Created By:   Rowena Hooker LPN; Reaction Status:   Active ; Category:   Drug ; Substance:   Bananas ; Type:   Allergy ; Updated By:   Rowena Hooker LPN; Reviewed Date:   11/19/2019 5:25 PM CST      Bee Stings  Estimated Onset Date:   Unspecified ; Created By:   Ruthie Nam; Reaction Status:   Active ; Category:   Drug ; Substance:   Bee Stings ; Type:   Allergy ; Updated By:   uRthie Nam; Reviewed Date:   11/19/2019 5:25 PM CST      Latex  Estimated Onset Date:   Unspecified ; Created By:   Ruthie Nam; Reaction Status:   Active ; Category:   Drug ; Substance:   Latex ; Type:   Allergy ; Updated By:   Ruthie Nam; Reviewed Date:   11/19/2019 5:25 PM CST        Medication List   (As Of: 6/1/2020 4:31:59 PM CDT)   Prescription/Discharge Order    albuterol  :   albuterol ; Status:   Prescribed ; Ordered As Mnemonic:   Ventolin HFA 90 mcg/inh inhalation aerosol ; Simple Display Line:   2 puff(s), NEB, qid, PRN: AS NEEDED FOR WHEEZING, 1 EA, 0 Refill(s) ; Ordering Provider:   Mata Hearn MD; Catalog Code:   albuterol ; Order Dt/Tm:   4/9/2020 2:03:28  PM CDT          diazePAM  :   diazePAM ; Status:   Prescribed ; Ordered As Mnemonic:   diazePAM 5 mg oral tablet ; Simple Display Line:   5 mg, 1 tab(s), Oral, q8 hrs, PRN: as needed for anxiety, 12 tab(s), 1 Refill(s) ; Ordering Provider:   Mata Hearn MD; Catalog Code:   diazePAM ; Order Dt/Tm:   5/13/2020 2:53:28 PM CDT          Miscellaneous Rx Supply  :   Miscellaneous Rx Supply ; Status:   Prescribed ; Ordered As Mnemonic:   Orthotics ; Simple Display Line:   See Instructions, to use as directed with shoes, 2 EA, 0 Refill(s) ; Ordering Provider:   Mata Hearn MD; Catalog Code:   Miscellaneous Rx Supply ; Order Dt/Tm:   2/13/2020 3:16:54 PM CST          oxyCODONE  :   oxyCODONE ; Status:   Prescribed ; Ordered As Mnemonic:   oxyCODONE 5 mg oral tablet ; Simple Display Line:   5 mg, 1 tab(s), Oral, q6 hrs, 20 tab(s), 0 Refill(s) ; Ordering Provider:   Mata Hearn MD; Catalog Code:   oxyCODONE ; Order Dt/Tm:   5/13/2020 2:53:34 PM CDT            Home Meds    acetaminophen  :   acetaminophen ; Status:   Documented ; Ordered As Mnemonic:   acetaminophen 500 mg oral tablet ; Simple Display Line:   1,000 mg, 2 tab(s), Oral, q6 hrs, PRN: as needed for pain, 0 Refill(s) ; Catalog Code:   acetaminophen ; Order Dt/Tm:   11/6/2019 1:37:34 PM CST            ID Risk Screen   Recent Travel History :   No recent travel   Family Member Travel History :   No recent travel   Other Exposure to Infectious Disease :   Unknown   Gonzalez RAMSAY, Fariba - 6/1/2020 4:28 PM CDT

## 2022-02-15 NOTE — PROGRESS NOTES
Chief Complaint    verbal cconsent given for telemed visit. discuss MRI results, Continues to have back pain.   Today's visit was conducted via telephone due to the COVID-19 pandemic.  Patient's consent to telephone visit was obtained and documented.   Call Start Time: 1412   Call End Time:   1427  History of Present Illness      Patient was not able to complete his spine MRI last week.  He reports he was not sedated well enough.  He received midazolam and fentanyl.  He reports his neck and back are hurting more now than they did prior to the MRI.  It is unclear how much of the imaging was completed.  Review of Systems      See HPI.  All other review of systems negative.  Assessment/Plan       Anxiety (F41.1)         Currently his anxiety is worsening due to recent trouble obtaining MRI.       Chronic back pain (M54.9)         Trial of topical diclofenac         Review of imaging that has been completed, attempt to schedule another MRI using monitored anesthesia care due to his extreme anxiety       Orders:         diclofenac topical, ( 2 gm ), Topical, qid, # 240 gm, 0 Refill(s), Type: Maintenance, Pharmacy: [facility], 2 gm Topical qid, 72, in, 06/15/20 13:54:00 CDT, Height Measured, 306, lb, 06/04/20 11:59:00 CDT, Weight Measured, (Ordered)  Patient Information     Name:JOEL SUTTON      Address:      [address] [place] [id number]     Sex:Male     YOB: 1980     [phone]     Emergency Contact:[facility], CONTACT     MRN:[id number]     FIN:[id number]     Location:[facility]     Date of Service:06/15/2020      Primary Care Physician:       Jaskaran CONWAY, Cricket MEDINA, [phone]      Attending Physician:       Mata Hearn MD, [phone]  Problem List/Past Medical History    Ongoing     Anxiety     Asthma, moderate persistent     Cerebellar tonsillar ectopia     Chronic back pain     Chronic insomnia      Dysplastic nevus       Comments: removed from back     Low back pain     Marijuana use     Myalgia     Obese     Sebaceous cyst     Smoking     Tension headache     Tobacco user     Trochanteric bursitis, right hip    Historical     No qualifying data  Procedure/Surgical History     Injection of cortisone (06/08/2016)     Transanal hemorrhoidal dearterialization (THD) (09/08/2015)     Colonoscopy (08/04/2015)      Comments: Sedation: MAC      Indication: rectal bleeding      External & internal hemorrhoids; otherwise normal      Resume routine screening at age 50..     Esophagogastroduodenoscopy (08/04/2015)      Comments: Normal.     Hospitalized at [facility] (2002)     Right knee arthroscopy     Surgery on eyes x3      Comments: as infant.  Medications    acetaminophen 500 mg oral tablet, 1000 mg= 2 tab(s), Oral, q6 hrs, PRN,   Not taking    diazePAM 5 mg oral tablet, 5 mg= 1 tab(s), Oral, q8 hrs, PRN, 1 refills,   Still taking, not as prescribed: takes only as needed.    diclofenac 1% topical gel, 2 gm, Topical, qid    Orthotics, See Instructions    Ventolin HFA 90 mcg/inh inhalation aerosol, 2 puff(s), NEB, qid, PRN  Allergies    Bananas    Bee Stings    Latex  Social History    Smoking Status - 06/01/2020     Current every day smoker     Alcohol      Past, 02/13/2020     Employment/School      Work/School description: disabled., 02/21/2011     Home/Environment      Lives with Self, Significant other., 02/21/2011     Substance Abuse      Current, 06/16/2015     Tobacco      Current, 06/16/2015  Family History    Diabetes mellitus type 2: Mother.    Heart disease: Father.  Immunizations      Vaccine Date Status          tetanus/diphth/pertuss (Tdap) adult/adol 11/17/2016 Given          influenza virus vaccine, inactivated 11/17/2016 Given          influenza virus vaccine, inactivated 01/06/2015 Given  Lab Results          Lab Results (Last 4 results within 90 days)           Coronavirus SARS-CoV-2  (COVID-19): NOT DETECTED (06/04/20 12:32:00)

## 2022-02-15 NOTE — NURSING NOTE
Quick Intake Entered On:  6/9/2021 10:04 AM CDT    Performed On:  6/9/2021 10:04 AM CDT by Jillian Russo               Summary   Menstrual Status :   N/A   Weight Measured :   314 lb(Converted to: 314 lb 0 oz, 142.428 kg)    Height Measured :   72 in(Converted to: 6 ft 0 in, 182.88 cm)    Body Mass Index :   42.58 kg/m2 (HI)    Body Surface Area :   2.69 m2   Height/Length Estimated :   72 in(Converted to: 6 ft 0 in, 182.88 cm)    Jillian Russo - 6/9/2021 10:04 AM CDT

## 2022-02-15 NOTE — PROGRESS NOTES
Chief Complaint    discuss ongoing back pain.  also wants injections again.  History of Present Illness       Patient is here for follow up on his chronic back pain.  He has had some improvement with trigger point injections.       He continues to have trouble with his living situation and is looking for a new apartment       He would like refills of medications and inhaler  Review of Systems          ROS reviewed and negative except for symptoms noted in HPI.  Physical Exam   Vitals & Measurements    T: 97.6  F (Tympanic)  HR: 83 (Peripheral)  BP: 138/80  SpO2: 98%     HT: 72 in  HT: 72 in  WT: 311.8 lb  BMI: 42.28           General:  Alert and oriented, No acute distress.            Eye:  Normal conjunctiva, Vision unchanged.            HENT:  Normocephalic          Neck:  Supple, Non-tender           Respiratory:  Lungs are clear to auscultation, Respirations are non-labored.            Cardiovascular:  Normal rate, Regular rhythm, Normal peripheral perfusion.            Gastrointestinal:  Soft, Non-tender.             Musculoskeletal:  Normal range of motion, Normal strength.  tender trigger points over bilateral SI joints, right paraspinous area of mid back          Integumentary:  Warm, Dry, No rash.              Neurologic:  Alert, Oriented.            Psychiatric:  Cooperative, Appropriate mood & affect.    Assessment/Plan       1. Anxiety (F41.1)         Worsening due to living situation issues, advised counseling         Ordered:          Referral (Request), 01/05/21 15:08:00 CST, Referred to: Behavioral Health, Reason for referral: anxiety disorder, Anxiety                2. Chronic back pain (M54.9)         Encourage increasing activity and exercise                3. Degenerative lumbar disc (M51.36)         Referral for epidural steroid injection, pain well controlled         Ordered:          Referral (Request), 01/05/21 15:15:00 CST, Referred to: Pain Management, Referred to: Dr Frias, Reason for  referral: THOMAS, right L4, L5, Degenerative lumbar disc                4. Myofascial pain syndrome (M79.18)                  3 trigger point(s) injected with 1cc of bupivacaine after cleansing area with alcohol, adhesive dressing applied                5. Asthma, moderate persistent (J45.40)          stable, refill albuterol         symptoms worse with smoking         advised smoking cessation                Orders:         albuterol, 2 puff(s), NEB, qid, PRN: AS NEEDED FOR WHEEZING, # 1 EA, 0 Refill(s), Type: Maintenance, Pharmacy: [facility], 2 puff(s) NEB qid,PRN:AS NEEDED FOR WHEEZING, 72, in, 01/05/21 14:29:00 CST, Height Measured, 311.8, lb, 01/05/21..., (Ordered)         albuterol, 2 puff(s), NEB, qid, PRN: AS NEEDED FOR WHEEZING, # 1 EA, 0 Refill(s), Type: Hard Stop, Pharmacy: [facility], (Completed)         oxyCODONE, = 1 tab(s) ( 5 mg ), Oral, q8 hrs, PRN: as needed for pain, # 20 tab(s), 0 Refill(s), Type: Maintenance, Pharmacy: [facility], 1 tab(s) Oral q8 hrs,PRN:as needed for pain, 72, in, 01/05/21 14:29:00 CST, Height Measured, 311.8,..., (Ordered)         oxyCODONE, = 1 tab(s) ( 5 mg ), Oral, q8 hrs, PRN: as needed for pain, # 20 tab(s), 0 Refill(s), Type: Hard Stop, Pharmacy: [facility], 72, in, 11/30/20 14:30:00 CST, Height Measured, 309.8, lb, 11/30/20 14:30:00 CST, Weight Measured, (Completed)  Patient Information     Name:JOEL SUTTON      Address:      [address] [place] [id number]     Sex:Male     YOB: 1980     [phone]     Emergency Contact:[facility], CONTACT     MRN:[id number]     FIN:[id number]     Location:[facility]     Date of Service:01/05/2021      Primary Care Physician:       Mata Hearn MD, [phone]      Attending Physician:       Mata Hearn MD, [phone]  Problem List/Past Medical  History    Ongoing     Anxiety     Asthma, moderate persistent     Cerebellar tonsillar ectopia     Chronic back pain     Chronic insomnia     Degenerative lumbar disc     Dysplastic nevus       Comments: removed from back     Low back pain     Marijuana use     Myalgia     Myofascial pain syndrome     Obese     Sebaceous cyst     Smoking     Tension headache     Tobacco user     Trochanteric bursitis, right hip    Historical     No qualifying data  Procedure/Surgical History     Injection of cortisone (06/08/2016)     Transanal hemorrhoidal dearterialization (THD) (09/08/2015)     Colonoscopy (08/04/2015)      Comments: Sedation: MAC      Indication: rectal bleeding      External & internal hemorrhoids; otherwise normal      Resume routine screening at age 50..     Esophagogastroduodenoscopy (08/04/2015)      Comments: Normal.     Hospitalized at [facility] (2002)     Right knee arthroscopy     Surgery on eyes x3      Comments: as infant.  Medications    acetaminophen 500 mg oral tablet, 1000 mg= 2 tab(s), Oral, q6 hrs, PRN    Acular 0.5% ophthalmic solution, 1 drop(s), Eye-Right, qid, PRN    diazePAM 5 mg oral tablet, 5 mg= 1 tab(s), Oral, daily, PRN    diclofenac 1% topical gel, 2 gm, Topical, qid, 2 refills    Orthotics, See Instructions    oxyCODONE 5 mg oral tablet, 5 mg= 1 tab(s), Oral, q8 hrs, PRN    Ventolin HFA 90 mcg/inh inhalation aerosol, 2 puff(s), NEB, qid, PRN  Allergies    Bananas    Bee Stings    Latex  Social History    Smoking Status     Current every day smoker     Alcohol      Past     Electronic Cigarette/Vaping      Electronic Cigarette Use: Never.     Employment/School      Work/School description: disabled.     Home/Environment      Lives with Self, Significant other.     Substance Abuse      Current     Tobacco      10 or more cigarettes (1/2 pack or more)/day in last 30 days  Family History    Diabetes mellitus type 2: Mother.    Heart disease: Father.  Immunizations      Vaccine  Date Status          tetanus/diphth/pertuss (Tdap) adult/adol 11/17/2016 Given          influenza virus vaccine, inactivated 11/17/2016 Given          influenza virus vaccine, inactivated 01/06/2015 Given  Lab Results          Lab Results (Last 4 results within 90 days)           Coronavirus SARS-CoV-2 (COVID-19): NOT DETECTED (10/13/20 14:39:00)

## 2022-02-15 NOTE — PROGRESS NOTES
"   Patient:   JOEL SUTTON            MRN: [id number]            FIN: [id number]               Age:   37 years     Sex:  Male     :  1980   Associated Diagnoses:   None   Author:   Bang Woody MD      Visit Information      Date of Service: 2017 01:26 pm  Performing Location: [facility]  Encounter#: [id number]      Primary Care Provider (PCP):  Mata Hearn MD    NPI# [id number]      Referring Provider:  No referring provider recorded for selected visit.      Chief Complaint   4/3/2017 1:43 PM CDT     Pt c/o back pain. Would like toradol injection. Would also like meds refilled.        History of Present Illness   Pt comes in for a toradol injection for his low back pain. He periodically gets these when his back pain gets worse and he states it helps for 12 hrs. He denies numbness weakness or tingling in the legs or bowel/bladder symptoms. MRI showed L5-S1 degenerative changes and foraminal stenosis but pt declined an epidural steroid injection because systemic steroids at one point gave him \"'roid rage\"/    He has concerns about wrosening cyst on his face - procedure was done but he feels as though the issue has returned and there is another inflamed area nearby,      Review of Systems            Health Status   Allergies:    Allergic Reactions (Selected)  Severity Not Documented  Bee Stings (No reactions were documented)  Latex (No reactions were documented)   Medications:  (Selected)   Prescriptions  Prescribed  ProAir HFA 90 mcg/inh inhalation aerosol: 2 puff(s), inh, qid, # 1 EA, 3 Refill(s), Type: Maintenance, Pharmacy: [facility], 2 puff(s) inh qid  Qvar 80 mcg/inh inhalation aerosol: 1 puff(s), inh, bid, # 1 EA, 3 Refill(s), Type: Maintenance, Pharmacy: [facility], 1 puff(s) inh bid  bacitracin 500 units/g topical ointment: 1 mal, TOP, QID, # 15 g, 0 Refill(s), Type: Maintenance, Pharmacy: [facility], 1 mal top qid  cyclobenzaprine 10 mg oral tablet: 1 tab(s) ( 10 mg ), PO, TID, PRN: for spasm, # 30 tab(s), 0 Refill(s), Type: Maintenance, Pharmacy: [facility], 1 tab(s) po tid,PRN:for spasm  diazePAM 5 mg oral tablet: 1 tab(s) ( 5 mg ), PO, tid, PRN: for anxiety, # 10 tab(s), 0 Refill(s), Type: Maintenance  hydrocortisone 2.5% topical cream: 1 mal, top, tid, # 30 gm, 1 Refill(s), Type: Maintenance, Pharmacy: [facility], 1 mal top tid   Problem list:    All Problems  Cerebellar tonsillar ectopia / SNOMED CT 34138462 / Confirmed  Anxiety / SNOMED CT 80110102 / Confirmed  Obese / ICD-9-.00 / Probable  Smoking / SNOMED CT 936034274 / Confirmed  Tobacco user / SNOMED CT 389626205 / Probable      Histories   Past Medical History:    Active  Cerebellar tonsillar ectopia (75213340)  Anxiety (52799779)   Family History:    Heart disease  Father (Conor)  Diabetes mellitus type 2  Mother (Obdulia)     Procedure history:    Injection of cortisone (4143024046) on 6/8/2016 at 36 Years.  Transanal hemorrhoidal dearterialization (THD) on 9/8/2015 at 35 Years.  Esophagogastroduodenoscopy ([id number]) on 8/4/2015 at 35 Years.  Comments:  8/5/2015 12:30 PM - Mata Merino MD  Normal  Colonoscopy ([id number]) on 8/4/2015 at 35 Years.  Comments:  8/13/2015 3:29 PM - Genia Mac RN  Sedation: MAC  Indication: rectal bleeding  External & internal hemorrhoids; otherwise normal  Resume routine screening at age 50.  Hospitalized at [facility] unit in 2002 at 22 Years.  Right knee arthroscopy.  Surgery on eyes x3.  Comments:  12/15/2010 4:16 PM - Genia Ely RN  as infant   Social History:        Alcohol Assessment: Denies Alcohol Use            Current            Current                     Comments:                      06/16/2015 - Randall Salcedo MD                     6 drinks pks per weekend      Tobacco Assessment: Current            Current                      Comments:                      06/16/2015 - Randall Salcedo MD                     1 ppd      Substance Abuse Assessment            Current, Marijuana            Current                     Comments:                      06/16/2015 - Randall Salcedo MD                     Marijuana      Employment and Education Assessment            Work/School description: disabled.                     Comments:                      02/21/2011 - Krysten ORTEGA Wong                     has 1 child- 10 weeks old -      Home and Environment Assessment            Lives with Self, Significant other.                     Comments:                      02/21/2011 - Wong Bashir MD                     2 kids in household        Physical Examination   Vital Signs   4/3/2017 1:43 PM CDT Temperature Tympanic 97.7 DegF  LOW    Peripheral Pulse Rate 88 bpm    Systolic Blood Pressure 128 mmHg    Diastolic Blood Pressure 82 mmHg    Mean Arterial Pressure 97 mmHg      Low back - nontender  Neuro - strength, sensation, and reflexes ar eintact in the lwoer extremities  Skin - inflamed likely ingrown hair left jaw line      Impression and Plan   Low back pain - toradol administered 60 mg IM. Recomended check of kdiney functino but pt declined. Advised against overuse of NSAIDs. Advised him to reconsider epidural injection as this would be locally administered and hopefully not systemically absorbed to a major degree  Inflamed cyst - discussed again with patient that I do not do procedures like this on the face. Referred him to dermatology. Again recommended topical antibiotic ointment.

## 2022-02-15 NOTE — PROGRESS NOTES
Chief Complaint    Patient presents for trigger point injection.  History of Present Illness      Patient presents to clinic today with continued back pain at the right L1 area, also continued right leg pain.  He would like a steroid injection today.  He reports of the leg pain is now his biggest problem is the anterior upper legs.  The right is always worse than the left.  Patient does have point tenderness over his bilateral trochanteric bursa however on February 15 approximately 1-1/2 months ago we did a right trochanteric bursa injection and patient return to clinic 1 week later and reported no improvement in his pain.  He reports that overall a lot of his back pain has improved however he thinks that perhaps his lower back is a little worse.         Together we reviewed his previous MRI which does show some L4 and S1 central canal stenosis, facet arthropathy, and foraminal stenosis.         We discussed again that duloxetine might be a good choice because it can help with both his mood disorder and with no enough pain.  It can also help with myalgia type pain.         He reports that he is not able to go to [facility] to see a pain specialist to have further evaluation and treatment because he has too much anxiety to be able to get to [facility].  He is also angry and that public places or not allowing him to bring his copy into the building.  He feels that his puppy helps him with his social anxiety.  He also feels that his puppy helps him with controlling his anger.         He would like a shot of anything that I am willing to give him today to help with his pain.         We discussed that we seemed to have gotten the trigger point on his back that are going to respond to our trigger point injections and that the pain in his legs is most likely related to some radiculopathy.  Oral steroids are as effective as intramuscular steroids and are low risk.  Therefore, I am steroids are not really indicated.  I would be  happy to give him a shot of Toradol today.  This will be able to help him with some of the pain that he is feeling right now and hopefully will help him until he is able to start his prednisone.  Patient does report that he has occasionally had increased irritability while on.  Counseled patient that he should try to avoid settings in which he knows he is going to have a trigger that might make him more irritable.         If he has any thoughts of wanting to hurt himself or anybody else while on the prednisone should go to the emergency room.  His kids are out of town for the weekend or with her mom, so he would like to try to do the steroids now.  I did inform him that taking a course of oral steroids for greater than 5 days is of no additional benefit.  Patient today has also agreed to try a low dose of Cymbalta.  I originally recommended 30 mg p.o. daily as a starting dose however patient reported that he is very sensitive to medications.  When I said the 30 mg is the usual starting dose and that as a full-grown adult male he should be large enough to be able to tolerate that dose he said that that gave him a flash back to when he was younger and people told him to use acid and gave him several strips of acid to use it one time.  I agreed that we could certainly decrease his dose down to 20 mg.  For the first time today patient was also willing to have a referral placed to physical therapy.        Review of systems is as per HPI and otherwise negative        Exam general patient is alert and oriented ×3 in no acute distress        HEENT pupils equal round reactive to light extraocular motion is intact hearing is grossly normal mucous membranes are moist and pink        Chest has bilateral rise with no increased work of breathing        Cardiovascular normal tissue perfusion and brisk capillary refill        Musculoskeletal normal gait        Neuro cranial nerves II through XII are grossly intact        Psych  patient is a little disheveled, his memory is poor, his mood is angry, his affect at times is with laughter and humor and at other time with expression of hostility.  Insight is poor.        Assessment and plan        Patient with chronic pain suspect both of radiculopathy as well as some myalgia.  Patient will start on Cymbalta 20 mg daily and return to clinic in 1-2 weeks for follow-up or go to ER or return to clinic if symptoms worsen in the meantime.  We will also try a short burst of oral prednisone.  Physical therapy consult was placed today and patient was provided with a shot of Toradol.        25 minutes was spent with patient in direct face-to-face contact of which greater than 50% of the time was spent counseling patient.  Physical Exam   Vitals & Measurements    T: 97.4(Tympanic)  HR: 99(Peripheral)  BP: 128/80  SpO2: 98%     HT: 72.8 in  WT: 278.8 lb  BMI: 36.98   Assessment/Plan       Back pain         Ordered:          ketorolac, 60 mg, im, once          54254 therapeutic prophylactic/dx injection subq/im (Charge), Quantity: 1, Back pain           ketorolac tromethamine inj, 15 mg (Charge), Quantity: 4, Back pain          Physical Therapy Evaluation and Treatment (Request), Back pain  Pain, upper back  Neck pain  Headache  Myalgia                Headache         Ordered:          Physical Therapy Evaluation and Treatment (Request), Back pain  Pain, upper back  Neck pain  Headache  Myalgia                Lumbar radiculopathy         Ordered:          predniSONE, 1 tab(s) ( 50 mg ), po, daily, # 5 tab(s), 0 Refill(s), Type: Maintenance, Pharmacy: [facility], 1 tab(s) po daily,x5 day(s)                Myalgia         Ordered:          Physical Therapy Evaluation and Treatment (Request), Back pain  Pain, upper back  Neck pain  Headache  Myalgia                Neck pain         Ordered:          Physical Therapy Evaluation and Treatment (Request), Back pain  Pain,  upper back  Neck pain  Headache  Myalgia                Pain, upper back         Ordered:          Physical Therapy Evaluation and Treatment (Request), Back pain  Pain, upper back  Neck pain  Headache  Myalgia                Orders:         DULoxetine, 1 cap(s) ( 20 mg ), po, daily, # 30 cap(s), 1 Refill(s), Type: Maintenance, Pharmacy: [facility] [place], 1 cap(s) po daily  Patient Information     Name:JOEL SUTTON      Address:      [address] [place] [id number]     Sex:Male     YOB: 1980     [phone]     Emergency Contact:LEESA SUTTON     MRN:[id number]     FIN:[id number]     Location:[facility]     Date of Service:03/29/2018      Primary Care Physician:       Fernando ORTEGA [facility], [phone]  Problem List/Past Medical History    Ongoing     Anxiety     Cerebellar tonsillar ectopia     Chronic insomnia     Low back pain     Marijuana use     Myalgia     Obese     Smoking     Tobacco user     Trochanteric bursitis, right hip    Historical  Procedure/Surgical History     Injection of cortisone (06/08/2016)     Transanal hemorrhoidal dearterialization (THD) (09/08/2015)     Colonoscopy (08/04/2015)     Esophagogastroduodenoscopy (08/04/2015)     Hospitalized at [facility] (2002)     Right knee arthroscopy     Surgery on eyes x3  Medications        hydrocortisone 2.5% topical cream: 1 mal, top, tid, 30 gm, 1 Refill(s).        Qvar 80 mcg/inh inhalation aerosol: 1 puff(s), inh, bid, 1 EA, 3 Refill(s).        Advil 200 mg oral tablet: 400 mg, 2 tab(s), po, q8 hrs, PRN: as needed for pain, 0 Refill(s).        Vitamin B-12 1000 mcg oral tablet: 1,000 mcg, 1 tab(s), po, daily, 0 Refill(s).        ProAir HFA 90 mcg/inh inhalation aerosol: 2 puff(s), inh, qid, PRN: as needed for wheezing, 1 EA, 2 Refill(s).        DULoxetine 20 mg oral delayed release capsule: 20 mg, 1 cap(s), po, daily, 30 cap(s), 1 Refill(s).         predniSONE 50 mg oral tablet: 50 mg, 1 tab(s), po, daily, for 5 day(s), 5 tab(s), 0 Refill(s).                Allergies    Bee Stings    Latex  Social History    Smoking Status - 03/29/2018     Current every day smoker     Alcohol - Denies Alcohol Use, 12/04/2017      Current     Employment and Education - 12/04/2017      Work/School description: disabled.     Home and Environment - 12/04/2017      Lives with Self, Significant other.     Substance Abuse - 12/04/2017      Current     Tobacco - Current, 12/04/2017      Current  Family History    Diabetes mellitus type 2: Mother.    Heart disease: Father.  Immunizations      Vaccine Date Status      tetanus/diphth/pertuss (Tdap) adult/adol 11/17/2016 Given      influenza virus vaccine, inactivated 11/17/2016 Given      influenza virus vaccine, inactivated 01/06/2015 Given  Lab Results      Results (Last 90 days)      No results located.

## 2022-02-15 NOTE — PROGRESS NOTES
Patient:   JOEL SUTTON            MRN: [id number]            FIN: [id number]               Age:   38 years     Sex:  Male     :  1980   Associated Diagnoses:   Dysplastic nevus; Myalgia   Author:   Nena King MD      Visit Information      Date of Service: 2018 01:00 pm  Performing Location: [facility]  Encounter#: [id number]      Primary Care Provider (PCP):  Nena King MD    NPI# [id number]      Referring Provider:  Nena King MD    NPI# [id number]   shared with patient thathis biopsy shows a moderatley dysplastic nevus, he would like photofinder appt with Parris Terrazas      Procedure   Procedure   Indication: myofascial pain.     Informed consent: signed by patient.     Confirmed: patient, procedure, side, site.     Type of procedure: trigger point injection.     Physical Exam: vital signs Vital Signs   2018 1:09 PM CST Temperature Tympanic 97.7 DegF  LOW    Peripheral Pulse Rate 70 bpm    Pulse Site Radial artery    HR Method Manual    Systolic Blood Pressure 124 mmHg    Diastolic Blood Pressure 72 mmHg    Mean Arterial Pressure 89 mmHg    BP Site Right arm    BP Method Manual      .     Preparation and technique: informed consent obtained, position sitting, sterile preparation of site with 70 % alcohol, hemostasis achieved using direct pressure, estimated blood loss minimal, adhesive bandagedressing applied.     Findings: painful and tender myofascial trigger points identified by palpation with communication from patient    Location: lower lumbar back, right upper gluteus jyoti and left latissimus dorsi    Number of injections:3    pain prior to procedure:     moderate    pain following procedure:improved    number of milliliters of the 1:1 solution of 1% lidocaine without epiniephrine and 0.5% marcaine without epinephrine used in total: 8.     Procedure tolerated: well.     No Complications.     No qualifying data available.          Impression and Plan    Diagnosis     Dysplastic nevus (ESW17-QC D22.5).     Myalgia (PGX98-CC M79.1).     Diagnosis     return to clinic if symptoms worsen or do not improve.     Course:  Improving.    Orders     Orders (Selected)   Outpatient Orders  Ordered  RTC (Request): RFV: 40 min appt with juventino edwards for photofinder,  oderate dysplastic nevus of the back.     Counseled:  Patient, Family, use ice or heat as needed to help with pain, continue with home exercise program, .

## 2022-02-15 NOTE — TELEPHONE ENCOUNTER
Entered by Ruthie Crouch CMA on February 28, 2020 1:03:12 PM CST  ---------------------  From: Ruthie Crouch CMA   To: [facility]    Sent: 2/28/2020 1:03:12 PM CST  Subject: Medication Management     ** Submitted: **  Order:albuterol (Ventolin HFA 90 mcg/inh inhalation aerosol)  2 puff(s)  NEB  qid  Qty:  1 EA        Refills:  0          Substitutions Allowed     PRN  AS NEEDED FOR WHEEZING      Route To Pharmacy - [facility]    Signed by Ruthie Crouch CMA  2/28/2020 1:02:00 PM    ** Submitted: **  Complete:albuterol (ProAir HFA 90 mcg/inh inhalation aerosol)   Signed by Ruthie Crouch CMA  2/28/2020 1:03:00 PM    ** Not Approved:  **  albuterol (VENTOLIN HFA 108MCG/ACT AERS)  INHALE TWO PUFFS BY MOUTH FOUR TIMES A DAY AS NEEDED FOR WHEEZING  Qty:  18 gm        Days Supply:  25        Refills:  2          Substitutions Allowed     Route To [facility]   Signed by Ruthie Crouch CMA            ------------------------------------------  From: [facility]  To: Nena King MD  Sent: February 28, 2020 12:12:52 PM CST  Subject: Medication Management  Due: February 29, 2020 12:12:52 PM CST    ** On Hold Pending Signature **  Drug: albuterol (ProAir HFA 90 mcg/inh inhalation aerosol)  INHALE TWO PUFFS BY MOUTH FOUR TIMES A DAY AS NEEDED FOR WHEEZING  Quantity: 18 gm  Days Supply: 25  Refills: 2  Substitutions Allowed  Notes from Pharmacy:     Dispensed Drug: albuterol (Ventolin HFA 90 mcg/inh inhalation aerosol)  INHALE TWO PUFFS BY MOUTH FOUR TIMES A DAY AS NEEDED FOR WHEEZING  Quantity: 18 gm  Days Supply: 25  Refills: 2  Substitutions Allowed  Notes from Pharmacy:   ---------------------------------------------------------------  From: Ruthie Crouch CMA (eRx Pool ([id number]_[facility]))   To: IRWIN Kemp Pool ([id number]_[place] [facility] [place]);     Sent: 2/28/2020 1:03:44 PM CST  Subject: FW: Medication Management   Due  Date/Time: 2/29/2020 12:12:00 PM CST     Med Refill      Date of last office visit and reason:  2/13/20; anxiety f/u      Date of last Med Check / Px:   2/13/20  Date of last labs pertaining to med:  n/a    RTC order in chart:  no - please advise    For Protocol refill, has patient been contacted:  _---------------------  From: Gonzalez RAMSAY, Fariba (You[employer] Pool [id number]_[facility]))   To: Mata Hearn MD;     Sent: 3/2/2020 7:53:03 AM CST  Subject: FW: Medication Management   Due Date/Time: 2/29/2020 12:12:00 PM CST

## 2022-02-15 NOTE — TELEPHONE ENCOUNTER
---------------------  From: Peyton Jacobson CMA   Sent: 4/29/2019 3:57:25 PM CDT  Subject: Service dog      Patient walked into clinic today asking to speak with P or CSS.    I brought patient back to room were he stated he needed a note to have his dog with him at all times due to his condition. He states he does not have insurance and can not see P. I told patient I would discuss with her and give him a call.    Spoke to patient at [phone]. Informed patient that [facility] would need proof of dog being a registered service animal. For example, the dog needs to pass all necessary tests. Patient states that the training websites are fake and he just needs a note saying he can have the dog with him. I explained multiple times to patient that [facility] is not willing to write a letter for this at this time without registered proof of the animal. Patient was frustrated and asked to be transferred to scheduling to set something up.

## 2022-02-15 NOTE — PROGRESS NOTES
Chief Complaint    discuss anxiety, had spinal surgery 11/5/19.  needs note regarding service dog/ animal.  would also like rx refills.  History of Present Illness      Patient is here for his anxiety follow up.  He states that he doesn't feel well rested after sleeping 9hrs every night.  He would like his diazepam refilled to help control his anxiety.  He takes only as needed, especially if he has to go to appointments or with shopping.  He is also needing a letter for a service dog/ animal so that he can get EBT benefits at his apartment.      He still smokes but would like to cut down, has cut down on ETOH intake, does state that he has cut down on fast food consumption, is eating more raw vegetables and fruits, stopped drinking soda 5yrs ago.       He had spinal surgery, laminectomy, done in November 2019, does still have some back pain but not as bad as before.  Currently has some leg pain, mainly in his calves.  He feels he may have torn some muscles while doing squats.  He limits his exercise to walking his dog daily as well as cleaning his apartment.  He is interested in getting orthotics for his shoes so that his shoes fit better.         Review of Systems           See HPI.  All other review of systems negative.              Physical Exam   Vitals & Measurements    T: 98.8   F (Tympanic)  HR: 92(Peripheral)  BP: 148/76     HT: 72 in  WT: 298.6 lb  BMI: 40.49           General:  Alert and oriented, No acute distress.            Eye:  Pupils are equal, round and reactive to light, Normal conjunctiva.            HENT:  Oral mucosa is moist.            Neck:  Supple.            Respiratory:  Respirations are non-labored.            Cardiovascular:  Normal rate, Regular rhythm, No edema.            Gastrointestinal:  Non-distended.            Musculoskeletal:  Normal gait.  Bilateral calf pain, tenderness.          Integumentary:  Warm, No rash.            Psychiatric:  Cooperative,  Appropriate mood & affect, Normal judgment.           Patient s TYRELL, PHQ-9 and CAGE questionnaire reviewed and discussed with patient.     PHQ-9:  17    TYRELL:  12                Assessment/Plan       1. Anxiety (F41.1)         Will send in diazepam refills.  Will dictate letter for service/ animal.                2. Chronic back pain (M54.9)         Stable.                3. Obese (E66.9)         Continue watching diet, try to get 30+ minutes of daily activity to lose weight and to help improve BP.                4. Foot pain (M79.673)        Also meet up with PT to discuss orthotics fitting., prescription given to patient.               I, Fariba Roth MA, acted solely as a scribe for, and in the presence of Dr. Mata Hearn who performed the services.             I, Mata Hearn MD, personally performed the services described in this documentation.  The documentation was scribed in my presence and is both accurate and complete.                Patient Information     Name:JOEL SUTTON      Address:      [address] [place] [id number]     Sex:Male     YOB: 1980     [phone]     Emergency Contact:[facility] EMERGENCY, CONTACT     MRN:[id number]     FIN:[id number]     Location:[facility]     Date of Service:02/13/2020      Primary Care Physician:       NONE ,       Attending Physician:       Mata Hearn MD, [phone]  Problem List/Past Medical History    Ongoing     Anxiety     Asthma, moderate persistent     Cerebellar tonsillar ectopia     Chronic back pain     Chronic insomnia     Dysplastic nevus       Comments: removed from back     Low back pain     Marijuana use     Myalgia     Obese     Sebaceous cyst     Smoking     Tension headache     Tobacco user     Trochanteric bursitis, right hip    Historical     No qualifying data  Procedure/Surgical History     Injection of cortisone (06/08/2016)     Transanal  hemorrhoidal dearterialization (THD) (09/08/2015)     Colonoscopy (08/04/2015)      Comments: Sedation: MAC      Indication: rectal bleeding      External & internal hemorrhoids; otherwise normal      Resume routine screening at age 50..     Esophagogastroduodenoscopy (08/04/2015)      Comments: Normal.     Hospitalized at [facility] (2002)     Right knee arthroscopy     Surgery on eyes x3      Comments: as infant.  Medications    acetaminophen 500 mg oral tablet, 1000 mg= 2 tab(s), Oral, q6 hrs, PRN    cyclobenzaprine 10 mg oral tablet, See Instructions, PRN    diazePAM 5 mg oral tablet, 5 mg= 1 tab(s), Oral, q8 hrs, PRN    MiraLax oral powder for reconstitution, 17 gm, Oral, daily    Orthotics, See Instructions    oxyCODONE 5 mg oral tablet, See Instructions    ProAir HFA 90 mcg/inh inhalation aerosol, 2 puff(s), Inhale, qid, PRN, 2 refills  Allergies    Bananas    Bee Stings    Latex  Social History    Smoking Status - 02/13/2020     Current every day smoker     Alcohol      Past, 02/13/2020     Employment/School      Work/School description: disabled., 02/21/2011     Home/Environment      Lives with Self, Significant other., 02/21/2011     Substance Abuse      Current, 06/16/2015     Tobacco      Current, 06/16/2015  Family History    Diabetes mellitus type 2: Mother.    Heart disease: Father.  Immunizations      Vaccine Date Status          tetanus/diphth/pertuss (Tdap) adult/adol 11/17/2016 Given          influenza virus vaccine, inactivated 11/17/2016 Given          influenza virus vaccine, inactivated 01/06/2015 Given

## 2022-02-15 NOTE — PROGRESS NOTES
Chief Complaint    discuss ongoing back and shoulder issues.  has also been under more stress.  History of Present Illness       Here for follow up on his back pain and anxiety disorder.  He as no been sleeping well due to noisy neighbors.  He is requesting trigger point injections in trapezius.  He has been cautious with use of oxycodone.  Requests permission for a hot tub in his apartment.  Review of Systems          ROS reviewed and negative except for symptoms noted in HPI.  Physical Exam   Vitals & Measurements    T: 97.1  F (Tympanic)  HR: 86 (Peripheral)  BP: 166/88  SpO2: 96%     HT: 72 in  HT: 72 in  WT: 312 lb  BMI: 42.31             General:  Alert and oriented, No acute distress.             Eye: Normal conjunctiva.             HENT:  Oral mucosa is moist.             Neck:  Supple.             Respiratory:  Respirations are non-labored.             Cardiovascular:  Normal rate           Musculoskeletal:  Normal gait.   Tender trigger points bilateral trapezii and medial to the scapulae           Psychiatric:  Cooperative, Appropriate mood & affect, Normal judgment.  Assessment/Plan       1. Anxiety (F41.1)         We will continue with his relaxation therapies.       2. Myofascial pain syndrome (M79.18)          Trigger point injection with 1 cc of bupivacaine in each trapezial and scapular trigger point for a total of 4 injections today  Patient Information     Name:JOEL SUTTON      Address:      [address] [place] [phone]     Sex:Male     YOB: 1980     [phone]     Emergency Contact:[facility]GENC[facility], CONTACT     MRN:[id number]     FIN:[id number]     Location:[facility]     Date of Service:11/11/2020      Primary Care Physician:       Mata Hearn MD, [phone]      Attending Physician:       Mata Hearn MD, [phone]  Problem List/Past Medical History    Ongoing     Anxiety     Asthma, moderate  persistent     Cerebellar tonsillar ectopia     Chronic back pain     Chronic insomnia     Degenerative lumbar disc     Dysplastic nevus       Comments: removed from back     Low back pain     Marijuana use     Myalgia     Myofascial pain syndrome     Obese     Sebaceous cyst     Smoking     Tension headache     Tobacco user     Trochanteric bursitis, right hip    Historical     No qualifying data  Procedure/Surgical History     Injection of cortisone (06/08/2016)     Transanal hemorrhoidal dearterialization (THD) (09/08/2015)     Colonoscopy (08/04/2015)      Comments: Sedation: MAC      Indication: rectal bleeding      External & internal hemorrhoids; otherwise normal      Resume routine screening at age 50..     Esophagogastroduodenoscopy (08/04/2015)      Comments: Normal.     Hospitalized at [facility] (2002)     Right knee arthroscopy     Surgery on eyes x3      Comments: as infant.  Medications    acetaminophen 500 mg oral tablet, 1000 mg= 2 tab(s), Oral, q6 hrs, PRN    Acular 0.5% ophthalmic solution, 1 drop(s), Eye-Right, qid, PRN    diazePAM 5 mg oral tablet    diclofenac 1% topical gel, 2 gm, Topical, qid, 2 refills    Orthotics, See Instructions    oxyCODONE 5 mg oral tablet, 5 mg= 1 tab(s), Oral, q8 hrs, PRN    Ventolin HFA 90 mcg/inh inhalation aerosol, 2 puff(s), NEB, qid, PRN  Allergies    Bananas    Bee Stings    Latex  Social History    Smoking Status     Current every day smoker     Alcohol      Past     Employment/School      Work/School description: disabled.     Home/Environment      Lives with Self, Significant other.     Substance Abuse      Current     Tobacco      Current  Family History    Diabetes mellitus type 2: Mother.    Heart disease: Father.  Immunizations      Vaccine Date Status          tetanus/diphth/pertuss (Tdap) adult/adol 11/17/2016 Given          influenza virus vaccine, inactivated 11/17/2016 Given          influenza virus vaccine, inactivated 01/06/2015 Given  Lab  Results          Lab Results (Last 4 results within 90 days)           Coronavirus SARS-CoV-2 (COVID-19): NOT DETECTED (10/13/20 14:39:00)

## 2022-02-15 NOTE — NURSING NOTE
CAGE Assessment Entered On:  2/13/2020 3:12 PM CST    Performed On:  2/13/2020 3:12 PM CST by Fariba Roth MA               Assessment   Have you ever felt you should cut down on your drinking :   No   Have people annoyed you by criticizing your drinking :   No   Have you ever felt bad or guilty about your drinking :   No   Have you ever taken a drink first thing in the morning to steady your nerves or get rid of a hangover (Eye-opener) :   No   CAGE Score :   0    Fariba Roth MA - 2/13/2020 3:12 PM CST

## 2022-02-15 NOTE — NURSING NOTE
Comprehensive Intake Entered On:  8/5/2020 1:54 PM CDT    Performed On:  8/5/2020 1:48 PM CDT by Gonzalez RAMSAY, Fariba               Summary   Chief Complaint :   discuss ongoing back pain issues.  having little relief post back surgery.  now having left leg pain/numbness.  has been using Oxycodone prn.   Menstrual Status :   N/A   Weight Measured :   306.2 lb(Converted to: 306 lb 3 oz, 138.89 kg)    Height Measured :   72 in(Converted to: 6 ft 0 in, 182.88 cm)    Body Mass Index :   41.52 kg/m2 (HI)    Body Surface Area :   2.65 m2   Height/Length Estimated :   72 in(Converted to: 6 ft 0 in, 182.88 cm)    Systolic Blood Pressure :   140 mmHg (HI)    Diastolic Blood Pressure :   62 mmHg   Mean Arterial Pressure :   88 mmHg   Peripheral Pulse Rate :   94 bpm   BP Site :   Right arm   Pulse Site :   Radial artery   BP Method :   Manual   HR Method :   Manual   Temperature Tympanic :   97.6 DegF(Converted to: 36.4 DegC)  (LOW)    Oxygen Saturation :   95 %   Fariba Roth MA - 8/5/2020 1:48 PM CDT   Health Status   Allergies Verified? :   Yes   Medication History Verified? :   Yes   Medical History Verified? :   Yes   Pre-Visit Planning Status :   Completed   Tobacco Use? :   Current every day smoker   Fariba Roth MA - 8/5/2020 1:48 PM CDT   Consents   Consent for Immunization Exchange :   Consent Granted   Consent for Immunizations to Providers :   Consent Granted   Fariba Roth MA 8/5/2020 1:48 PM CDT   Meds / Allergies   (As Of: 8/5/2020 1:54:52 PM CDT)   Allergies (Active)   Bananas  Estimated Onset Date:   Unspecified ; Created By:   Rowena Hooker LPN; Reaction Status:   Active ; Category:   Drug ; Substance:   Bananas ; Type:   Allergy ; Updated By:   Rowena Hooker LPN; Reviewed Date:   6/15/2020 1:55 PM CDT      Bee Stings  Estimated Onset Date:   Unspecified ; Created By:   Ruthie Nam; Reaction Status:   Active ; Category:   Drug ; Substance:   Bee Stings ; Type:   Allergy ; Updated By:   Viv  Ruthie; Reviewed Date:   6/15/2020 1:55 PM CDT      Latex  Estimated Onset Date:   Unspecified ; Created By:   Ruthie Nam; Reaction Status:   Active ; Category:   Drug ; Substance:   Latex ; Type:   Allergy ; Updated By:   Ruthie Nam; Reviewed Date:   6/15/2020 1:55 PM CDT        Medication List   (As Of: 8/5/2020 1:54:52 PM CDT)   Prescription/Discharge Order    albuterol  :   albuterol ; Status:   Prescribed ; Ordered As Mnemonic:   Ventolin HFA 90 mcg/inh inhalation aerosol ; Simple Display Line:   2 puff(s), NEB, qid, PRN: AS NEEDED FOR WHEEZING, 1 EA, 0 Refill(s) ; Ordering Provider:   Mata Hearn MD; Catalog Code:   albuterol ; Order Dt/Tm:   4/9/2020 2:03:28 PM CDT          diazePAM  :   diazePAM ; Status:   Prescribed ; Ordered As Mnemonic:   diazePAM 5 mg oral tablet ; Simple Display Line:   5 mg, 1 tab(s), Oral, q8 hrs, PRN: as needed for anxiety, 12 tab(s), 1 Refill(s) ; Ordering Provider:   Mata Hearn MD; Catalog Code:   diazePAM ; Order Dt/Tm:   5/13/2020 2:53:28 PM CDT          diclofenac topical  :   diclofenac topical ; Status:   Prescribed ; Ordered As Mnemonic:   diclofenac 1% topical gel ; Simple Display Line:   2 gm, Topical, qid, 240 gm, 0 Refill(s) ; Ordering Provider:   Mata Hearn MD; Catalog Code:   diclofenac topical ; Order Dt/Tm:   6/29/2020 1:30:47 PM CDT          Miscellaneous Rx Supply  :   Miscellaneous Rx Supply ; Status:   Prescribed ; Ordered As Mnemonic:   Orthotics ; Simple Display Line:   See Instructions, to use as directed with shoes, 2 EA, 0 Refill(s) ; Ordering Provider:   Mata Hearn MD; Catalog Code:   Miscellaneous Rx Supply ; Order Dt/Tm:   2/13/2020 3:16:54 PM CST            Home Meds    acetaminophen  :   acetaminophen ; Status:   Documented ; Ordered As Mnemonic:   acetaminophen 500 mg oral tablet ; Simple Display Line:   1,000 mg, 2 tab(s), Oral, q6 hrs, PRN: as needed for pain, 0 Refill(s) ; Catalog Code:   acetaminophen ;  Order Dt/Tm:   11/6/2019 1:37:34 PM CST            ID Risk Screen   Recent Travel History :   No recent travel   Family Member Travel History :   No recent travel   Other Exposure to Infectious Disease :   Unknown   Gonzalez RAMSAY, Fariba - 8/5/2020 1:48 PM CDT   Social History   Social History   (As Of: 8/5/2020 1:54:52 PM CDT)   Alcohol:        Past   Comments:  6/16/2015 3:17 PM - Randall Salcedo MD: 6 drinks pks per weekend   (Last Updated: 2/13/2020 3:10:22 PM CST by Fariba Roth MA)          Tobacco:        Current   Comments:  6/16/2015 3:17 PM - Randall Salcedo MD: 1 ppd   (Last Updated: 6/16/2015 3:17:14 PM CDT by Randall Salceod MD)          Substance Abuse:        Current   Comments:  6/16/2015 3:18 PM - Randall Salcedo MD: Marijuana   (Last Updated: 6/16/2015 3:18:44 PM CDT by Randall Salcedo MD)          Employment/School:        Work/School description: disabled.   Comments:  2/21/2011 5:54 PM - Wong Bashir MD: has 1 child- 10 weeks old -   (Last Updated: 2/21/2011 5:54:14 PM CST by Wong Bashir MD)          Home/Environment:        Lives with Self, Significant other.   Comments:  2/21/2011 5:54 PM - Wong Bashir MD: 2 kids in household   (Last Updated: 2/21/2011 5:54:34 PM CST by Wong Bashir MD)

## 2022-02-15 NOTE — PROGRESS NOTES
Patient Information     Name:JOEL SUTTON      Address:      [address] [place] [id number]     Sex:Male     YOB: 1980     [phone]     Emergency Contact:LEESA SUTTON     MRN:[id number]     FIN:[id number]     Location:[facility]     Date of Service:06/12/2018      Primary Care Physician:       Nena King MD, [phone]      Attending Physician:       Nena King MD, [phone]  Procedure Name       HPI pt has history of chronic myofascial pain and has found trigger point injections to be a useful tool to help control pain.  Would like to have repeat trigger point injections today.  He also finds the toradol helpful and would liek to get another dose today, last was about 1 week ago.  His leg pain is better, says it was better for a few days, his low back continues to have pain.  We have had better success with treating his upper back wit the trigger point injections that the lower back.               Review of systems is negative except as per HPI   Exam:   General: alert and oriented ×3 no acute distress.   HEENT: pupils are equal round and reactive to light extraocular motion is intact. Normocephalic and atraumatic.    Hearing is grossly normal and there is no otorrhea.    Nares are patent there is no rhinorrhea.    Mucous membranes are moist and pink.   Chest: has bilateral rise with no increased work of breathing.   Cardiovascular: normal perfusion and brisk capillary refill.   Musculoskeletal: no gross focal abnormalities and normal gait.   Neuro: no gross focal abnormalities and memory seems intact.   Psychiatric: speech is clear and coherent and fluent. Patient dressed appropriately for the weather. Mood is appropriate and affect is full.        Procedure note        Informed consent obtained including risks of pain, bleeding, infection, injury to surrounding tissue, and need for further treatment. Benefit of a  trigger point injection goal is to reduce pain. This is just part of a treatment plan and for best results patient should also complete physical therapy. I cannot guarantee that will will be any pain relief.        Alternatives would include doing nothing, physical therapy, acupuncture, using heat or ice, continuing with oral medications such as muscle relaxants or nonsteroidal anti-inflammatory medications or topical medications such as a lidocaine patch or cream or menthol for diclofenac. He would like to try the trigger point injections.        Prep: Alcohol       Location identified by my palpation and communication with patient.  Symptomatic trigger points that patient desired treatment at were located at: bilateral upper traps x 1 each and bilateral rhomboids x 1 each                Each of these was injected with  a one-to-one solution of 1% lidocaine without epinephrine and 0.5% Marcaine without epinephrine.       Total number of injections:4       Total number of milliliters of the 1:1 solution of lidocaine and marcaine:        Encouraged patient to treat the area with [place] cup ice massage tonight and to try to stretch the area out.        pain prior to treatment: severe       pain following treatment at the trigger point injection sites:improved       Complications: none       Tolerated procedure well.       Assessment and Plan: myalgia and myofascial pain, s/p trigger point injections today.  Encouraged home exercise program and to keep annual exam appt as indicated and RTC if symptoms worsen or do not improve.   Also encouraged pt to contact [place] [facility] to see if they have received his completed paperwork.  suspect the lowerback pain is more radicular in nature and would like pt to see pain clinic, also encouraged PT again today, pt declined.  Assessment/Plan       Low back pain (M54.5)         Orders:          ketorolac, 60 mg, im, once, (Completed)          88612 therapeutic prophylactic/dx  injection subq/im (Charge), Quantity: 1, Low back pain           ketorolac tromethamine inj, 15 mg (Charge), Quantity: 4, Low back pain                Orders:         predniSONE, 1 tab(s) ( 50 mg ), PO, Daily, # 5 tab(s), 0 Refill(s), Type: Maintenance, Pharmacy: [facility], 1 tab(s) po daily,x5 day(s), (Completed)      Fifteen minutes spent with patient in direct face to face contact, in addition to the time spent performing the procedure today, greater than 50% of that time was  spent counseling and coordinating care.

## 2022-02-15 NOTE — PROGRESS NOTES
"   Patient:   JOEL SUTTON            MRN: [id number]            FIN: [id number]               Age:   39 years     Sex:  Male     :  1980   Associated Diagnoses:   Chronic back pain   Author:   Crciket Jessica PA-C      Chief Complaint   10/1/2019 2:50 PM CDT    Pt here for FU on back pain from .  Pt states pain is not getting any better.  Pt states pain is still radiating down into his right leg.  Pt states he stopped taking his gabapentin because he \"didnt think it was doing anything for him\"        History of Present Illness   Chief complaint and symptoms noted above and confirmed with patient   he was seen on ,  referred for MRI and to a back specialist, he is trying to arrange rides for those visits  he was started on gabapentin on  but he stopped because he didn't think it was doing anything for him  still has pain radiating into right leg  he did take 1/2 tab of 5 mg cyclobenzaprine today which helped a little, he does not tolerate the full dose      Review of Systems   Musculoskeletal:       Back pain: On the right side.    Psychiatric:  Anxiety.       Health Status   Allergies:    Allergic Reactions (All)  Severity Not Documented  Bananas (No reactions were documented)  Bee Stings (No reactions were documented)  Latex (No reactions were documented)   Medications:  (Selected)   Prescriptions  Prescribed  ProAir HFA 90 mcg/inh inhalation aerosol: 2 puff(s), inh, qid, PRN: as needed for wheezing, # 1 EA, 2 Refill(s), Type: Maintenance, Pharmacy: [facility], 2 puff(s) Inhale qid,PRN:as needed for wheezing   Problem list:    All Problems  Myalgia / SNOMED CT 973994918 / Confirmed  Smoking / SNOMED CT 854520834 / Confirmed  Trochanteric bursitis, right hip / SNOMED CT 40942069 / Confirmed  Dysplastic nevus / SNOMED CT 3015049937 / Confirmed  Tobacco user / SNOMED CT 736511306 / Probable  Tension headache / SNOMED CT 1736543744 / Confirmed  Chronic back pain / SNOMED CT " 997381793 / Confirmed  Marijuana use / SNOMED CT 7796024944 / Confirmed  Obese / SNOMED CT 6505298779 / Probable  Sebaceous cyst / SNOMED CT 4674424578 / Confirmed  Asthma, moderate persistent / SNOMED CT 3564504473 / Confirmed  Chronic insomnia / SNOMED CT 538936091 / Confirmed  Anxiety / SNOMED CT 70020269 / Confirmed  Low back pain / SNOMED CT 084962303 / Confirmed  Cerebellar tonsillar ectopia / SNOMED CT 63711259 / Confirmed      Histories   Past Medical History:    Active  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)  Anxiety (50254554)  Obese (4919429556)   Family History:    Heart disease  Father (Conor)  Diabetes mellitus type 2  Mother (Obdulia)        Physical Examination   Vital Signs   10/1/2019 2:50 PM CDT Temperature Tympanic 97.2 DegF  LOW    Peripheral Pulse Rate 72 bpm    Pulse Site Radial artery    Respiratory Rate 16 br/min    Systolic Blood Pressure 146 mmHg  HI    Diastolic Blood Pressure 98 mmHg  HI    Mean Arterial Pressure 114 mmHg    BP Site Right arm      Measurements from flowsheet : Measurements   10/1/2019 2:50 PM CDT Height Measured - Standard 72 in    Weight Measured - Standard 288 lb    BSA 2.57 m2    Body Mass Index 39.06 kg/m2  HI      General:  No acute distress.    Musculoskeletal:  good strength with heel and toe raise.  Patella and achilles DTRs symmetrical.  Good strength with resisted dorsal and plantar flexion. Good strength with resisted flexion and extension of lower legs. From a sitting position, straight leg raise on right leg causes some pain,  there is pain with palpation over L5/S1 region, with right side worse.       Impression and Plan   Diagnosis     Chronic back pain (CAH85-JW M54.9).     Summary:  he needs to get an MRI and consult with spine center, those orders are in place  will do a medrol dosepak and will give a small supply of 5 mg valium to use for anxiety[facility]  .    Orders     Orders   Pharmacy:  diazePAM 5 mg oral  tablet (Prescribe): = 1 tab(s) ( 5 mg ), Oral, q8 hrs, PRN: as needed for anxiety, # 10 tab(s), 0 Refill(s), Type: Maintenance, Pharmacy: [facility], 1 tab(s) Oral q8 hrs,PRN:as needed for anxiety  Medrol Dosepak 4 mg oral tablet (Prescribe): = 1 packet(s), Oral, once, Instructions: as directed on package labeling, # 21 tab(s), 0 Refill(s), Type: Soft Stop, Pharmacy: [facility], 1 packet(s) Oral once,Instr:as directed on package labeling.     Orders   Charges (Evaluation and Management):  66891 office outpatient visit 15 minutes (Charge) (Order): Quantity: 1, Chronic back pain.

## 2022-02-15 NOTE — NURSING NOTE
Comprehensive Intake Entered On:  6/15/2020 1:56 PM CDT    Performed On:  6/15/2020 1:54 PM CDT by Abbi Chong LPN               Summary   Chief Complaint :   verbal cconsent given for telemed visit. discuss MRI results, Continues to have back pain.    Menstrual Status :   N/A   Height Measured :   72 in(Converted to: 6 ft 0 in, 182.88 cm)    Height/Length Estimated :   72 in(Converted to: 6 ft 0 in, 182.88 cm)    Abbi Chong LPN - 6/15/2020 1:54 PM CDT   Health Status   Allergies Verified? :   Yes   Medication History Verified? :   Yes   Pre-Visit Planning Status :   Completed   Abbi Chong LPN - 6/15/2020 1:54 PM CDT   Consents   Consent for Immunization Exchange :   Consent Granted   Consent for Immunizations to Providers :   Consent Granted   Abbi Chong LPN - 6/15/2020 1:54 PM CDT   Meds / Allergies   (As Of: 6/15/2020 1:56:27 PM CDT)   Allergies (Active)   Bananas  Estimated Onset Date:   Unspecified ; Created By:   Rowena Hooker LPN; Reaction Status:   Active ; Category:   Drug ; Substance:   Bananas ; Type:   Allergy ; Updated By:   Rowena Hooker LPN; Reviewed Date:   6/15/2020 1:55 PM CDT      Bee Stings  Estimated Onset Date:   Unspecified ; Created By:   Ruthie Nam; Reaction Status:   Active ; Category:   Drug ; Substance:   Bee Stings ; Type:   Allergy ; Updated By:   Ruthie Nam; Reviewed Date:   6/15/2020 1:55 PM CDT      Latex  Estimated Onset Date:   Unspecified ; Created By:   Ruthie Nam; Reaction Status:   Active ; Category:   Drug ; Substance:   Latex ; Type:   Allergy ; Updated By:   Ruthie Nam; Reviewed Date:   6/15/2020 1:55 PM CDT        Medication List   (As Of: 6/15/2020 1:56:27 PM CDT)   Prescription/Discharge Order    diazePAM  :   diazePAM ; Status:   Prescribed ; Ordered As Mnemonic:   diazePAM 5 mg oral tablet ; Simple Display Line:   5 mg, 1 tab(s), Oral, q8 hrs, PRN: as needed for anxiety, 12 tab(s), 1 Refill(s) ; Ordering Provider:    Mata Hearn MD; Catalog Code:   diazePAM ; Order Dt/Tm:   5/13/2020 2:53:28 PM CDT          albuterol  :   albuterol ; Status:   Prescribed ; Ordered As Mnemonic:   Ventolin HFA 90 mcg/inh inhalation aerosol ; Simple Display Line:   2 puff(s), NEB, qid, PRN: AS NEEDED FOR WHEEZING, 1 EA, 0 Refill(s) ; Ordering Provider:   Mata Hearn MD; Catalog Code:   albuterol ; Order Dt/Tm:   4/9/2020 2:03:28 PM CDT          Miscellaneous Rx Supply  :   Miscellaneous Rx Supply ; Status:   Prescribed ; Ordered As Mnemonic:   Orthotics ; Simple Display Line:   See Instructions, to use as directed with shoes, 2 EA, 0 Refill(s) ; Ordering Provider:   Mata Hearn MD; Catalog Code:   Miscellaneous Rx Supply ; Order Dt/Tm:   2/13/2020 3:16:54 PM CST            Home Meds    acetaminophen  :   acetaminophen ; Status:   Documented ; Ordered As Mnemonic:   acetaminophen 500 mg oral tablet ; Simple Display Line:   1,000 mg, 2 tab(s), Oral, q6 hrs, PRN: as needed for pain, 0 Refill(s) ; Catalog Code:   acetaminophen ; Order Dt/Tm:   11/6/2019 1:37:34 PM CST            ID Risk Screen   Recent Travel History :   No recent travel   Family Member Travel History :   No recent travel   Other Exposure to Infectious Disease :   Unknown   Abbi Chong LPN - 6/15/2020 1:54 PM CDT

## 2022-02-15 NOTE — TELEPHONE ENCOUNTER
---------------------  From: Ankita Gill (Phone Messages Pool ([id number]_[facility]))   To: [facility] ([id number]_[place]);     Sent: 11/23/2021 4:16:21 PM CST  Subject: oxy refill      Phone Message    PCP: IRWIN    Time of Call: 1611    Phone Number: [phone]    Returned call at: n/a    Note: Call from pt. states that he is needing a refill of Oxy LF 11/9/21 #20. Per chart, due now for visit. patient states that he only has 1 tab left.    Please advise on extension until pt can get in with [facility]     Pharmacy: [facility]     Last office visit and reason: back pain, anxiety disorder 8/4/21     Transferred to: [facility] [place]---------------------  From: Gonzalez RAMSAY, Fariba ([facility] Message Pool ([id number]_[facility]))   To: Mata Hearn MD;     Sent: 11/23/2021 4:45:30 PM CST  Subject: FW: oxy refillPt calling at 8:42am stating he needs refill of oxycodone. Pt says he is in bad shape and needs this Rx. Told pt that he will need appt to get this filled since [facility] is out of clinic today. Told pt that typically with pain medications an appt is needed every 2months. Told him he has not been seen since August so covering provider will likely not refill without appt to discuss.    Pt transferred to scheduling

## 2022-02-15 NOTE — PROGRESS NOTES
Patient:   JOEL SUTTON            MRN: [id number]            FIN: [id number]               Age:   40 years     Sex:  Male     :  1980   Associated Diagnoses:   Eye redness   Author:   Cricket Jessica PA-C      Chief Complaint   10/9/2020 11:33 AM CDT   Pt here for Right eye edema,pain and some discharge that started this morning.      History of Present Illness   Chief complaint and symptoms noted above and confirmed with patient   right eye pain and some discharge (crusty)  that started this morning, noticed change in vision in right eye  has used some visine drops         Review of Systems   Constitutional:  Negative.    Eye:  Negative except as documented in history of present illness.    Ear/Nose/Mouth/Throat:  Negative.    Respiratory:  Cough.       Health Status   Allergies:    Allergic Reactions (Selected)  Severity Not Documented  Bananas (No reactions were documented)  Bee Stings (No reactions were documented)  Latex (No reactions were documented)   Medications:  (Selected)   Prescriptions  Prescribed  Orthotics: Orthotics, See Instructions, Instructions: to use as directed with shoes, Supply, # 2 EA, 0 Refill(s), Type: Maintenance  Ventolin HFA 90 mcg/inh inhalation aerosol: 2 puff(s), NEB, qid, PRN: AS NEEDED FOR WHEEZING, # 1 EA, 0 Refill(s), Type: Maintenance, Pharmacy: [facility], 2 puff(s) NEB qid,PRN:AS NEEDED FOR WHEEZING  diclofenac 1% topical gel: ( 2 gm ), Topical, qid, # 240 gm, 2 Refill(s), Type: Maintenance, Pharmacy: [facility], 2 gm Topical qid, 72, in, 20 13:48:00 CDT, Height Measured, Weight Measured  oxyCODONE 5 mg oral tablet: = 1 tab(s) ( 5 mg ), Oral, q8 hrs, PRN: as needed for pain, # 20 tab(s), 0 Refill(s), Type: Maintenance, Pharmacy: [facility], 1 tab(s) Oral q8 hrs,PRN:as needed for pain, 72, in, 09/10/20 12:29:00 CDT, Height Measured, 307, l...  Documented  Medications  Documented  acetaminophen 500 mg oral tablet: = 2 tab(s) ( 1,000 mg ), Oral, q6 hrs, PRN: as needed for pain, 0 Refill(s), Type: Maintenance   Problem list:    All Problems  Myalgia / SNOMED CT 921700999 / Confirmed  Smoking / SNOMED CT 383286890 / Confirmed  Trochanteric bursitis, right hip / SNOMED CT 68666937 / Confirmed  Dysplastic nevus / SNOMED CT 0736127394 / Confirmed  Tobacco user / SNOMED CT 447380305 / Probable  Tension headache / SNOMED CT 0343602877 / Confirmed  Chronic back pain / SNOMED CT 447993727 / Confirmed  Marijuana use / SNOMED CT 3255514621 / Confirmed  Obese / SNOMED CT 6743522260 / Probable  Sebaceous cyst / SNOMED CT 8742741700 / Confirmed  Asthma, moderate persistent / SNOMED CT 8558508210 / Confirmed  Chronic insomnia / SNOMED CT 921770767 / Confirmed  Anxiety / SNOMED CT 12174237 / Confirmed  Low back pain / SNOMED CT 088183479 / Confirmed  Degenerative lumbar disc / SNOMED CT 15140347 / Confirmed  Cerebellar tonsillar ectopia / SNOMED CT 19570237 / Confirmed      Histories   Past Medical History:    Active  Cerebellar tonsillar ectopia (34418052)  Anxiety (65102591)  Obese (1540407912)   Family History:    Heart disease  Father (Conor)  Diabetes mellitus type 2  Mother (Obdulia)     Procedure history:    Injection of cortisone (7025072914) on 6/8/2016 at 36 Years.  Transanal hemorrhoidal dearterialization (THD) on 9/8/2015 at 35 Years.  Esophagogastroduodenoscopy (997673954) on 8/4/2015 at 35 Years.  Comments:  8/5/2015 12:30 PM MARLENAT - Mata Merino MD  Normal  Colonoscopy (532765062) on 8/4/2015 at 35 Years.  Comments:  8/13/2015 3:29 PM Genia Boyce RN  Sedation: MAC  Indication: rectal bleeding  External & internal hemorrhoids; otherwise normal  Resume routine screening at age 50.  Hospitalized at [facility] in 2002 at 22 Years.  Right knee arthroscopy.  Surgery on eyes x3.  Comments:  12/15/2010 4:16 PM Genia Lemon RN  as infant      Physical  Examination   Vital Signs   10/9/2020 11:33 AM CDT Peripheral Pulse Rate 64 bpm    Pulse Site Radial artery    Respiratory Rate 16 br/min    Systolic Blood Pressure 142 mmHg  HI    Diastolic Blood Pressure 98 mmHg  HI    Mean Arterial Pressure 113 mmHg    BP Site Right arm      General:  No acute distress.    Eye:  Pupils are equal, round and reactive to light, Extraocular movements are intact, visual acuity without correction: OD: 20/40; OS: 20/25: OU: 20/25, right conjunctiva mildly injected, no mattering, Alcaine drops are placed in right eye.  Upper lid is everted, no foreign objects are seen.  Eye is stained with fluoroscein and illuminated with a black light, no corneal abrasion seen.   Flouoroscein is flushed away..    Psychiatric:  Appropriate mood & affect.       Impression and Plan   Diagnosis     Eye redness (WWC94-FQ H57.89).     Summary:  will treat with acular eye drops to help with the irritation, follow up if not improving or if vision worsens.    Orders     Orders   Pharmacy:  Acular 0.5% ophthalmic solution (Prescribe): 1 drop(s), right eye, QID, x 7 day(s), PRN: for itching, # 10 mL, 0 Refill(s), Type: Maintenance, Pharmacy: [facility], 1 drop(s) Eye-Right qid,x7 day(s),PRN:for itching, 72, in, 10/9/2020 11:33 AM CDT, Height Measured, 305, lb, 10/9/2020 11:33 AM CDT, Weight Measured.     Orders   Charges (Evaluation and Management):  22356 office outpatient visit 15 minutes (Charge) (Order): Quantity: 1, Eye redness.

## 2022-02-15 NOTE — PROGRESS NOTES
Patient:   JOEL SUTTON            MRN: [id number]            FIN: [id number]               Age:   38 years     Sex:  Male     :  1980   Associated Diagnoses:   Marijuana use; Myalgia; Low back pain   Author:   Nena King MD      Visit Information      Date of Service: 2018 01:36 pm  Performing Location: [facility]  Encounter#: [id number]      Primary Care Provider (PCP):  Nena King MD    NPI# [id number]      Referring Provider:  Nena King MD, NPI# [id number]      Chief Complaint   3/14/2018 1:43 PM CDT    Trigger Point Injections and Diazepam refill.        History of Present Illness             The patient presents for ER patient presents to clinic today for follow-up of his pain.  He reports that he spends most of his day trying not to think about how severe his pain is so he is unable to tell me for sure what his pain is coming into clinic today but thinks it is probably around 5 he is not sure following her injections how much they have improved but he thinks that overall his pain has been improving.  He also reports that he has been using marijuana to help him with his mood disorder.  He seems to find it improves his ability to deal with stressful situations.  She reports that he uses daily and has been beeping.  However despite this he is used up his prescriptions of diazepam and is wondering if I might refill this today.  I explained to him that I would not be able to give him diazepam for anxiety because it could interact with his marijuana increasing his risk of death.  Also explained again that I felt that duloxetine might be a very good choice for him both for his pain and for his mood.  Also asked him to consider referral for counseling, however at this time he late would like to decline.  He also reports that when he was younger he was told that he had to take medications for his mental health either because was court ordered or because he was a  minor and he does not want to be on any mental health medications besides some as needed diazepam at this time.   .        Review of Systems   Constitutional:  Negative except as documented in history of present illness.    Eye:  Negative except as documented in history of present illness.    Ear/Nose/Mouth/Throat:  Negative except as documented in history of present illness.    Respiratory:  Negative except as documented in history of present illness.    Cardiovascular:  Negative except as documented in history of present illness.    Gastrointestinal:  Negative except as documented in history of present illness.    Immunologic:  Negative except as documented in history of present illness.    Integumentary:  Negative except as documented in history of present illness.              Health Status   Allergies:    Allergic Reactions (Selected)  Severity Not Documented  Bee Stings (No reactions were documented)  Latex (No reactions were documented)   Medications:  (Selected)   Prescriptions  Prescribed  ProAir HFA 90 mcg/inh inhalation aerosol: 2 puff(s), inh, qid, PRN: as needed for wheezing, # 1 EA, 2 Refill(s), Type: Maintenance, Pharmacy: [facility]  Qvar 80 mcg/inh inhalation aerosol: 1 puff(s), inh, bid, # 1 EA, 3 Refill(s), Type: Maintenance, Pharmacy: [facility], 1 puff(s) inh bid  cyclobenzaprine 10 mg oral tablet: 1 tab(s) ( 10 mg ), PO, TID, PRN: for spasm, # 30 tab(s), 0 Refill(s), Type: Maintenance, Pharmacy: [facility], 1 tab(s) po tid,PRN:for spasm  diazePAM 5 mg oral tablet: See Instructions, Instructions: TAKE ONE TABLET BY MOUTH THREE TIMES A DAY AS NEEDED FOR ANXIETY, # 10 tab(s), 0 Refill(s), Type: Soft Stop, Pharmacy: [facility], TAKE ONE TABLET BY MOUTH THREE TIMES A DAY AS NEEDED FOR ANXIETY...  gabapentin 300 mg oral capsule: See Instructions, Instructions: 1 cap(s) po qd on day 1  1 cap po bid on  day 2  then 1 cap po tid, # 90 EA, 1 Refill(s), Type: Maintenance, Pharmacy: [facility], 1 cap(s) po qd on day 1; 1 cap po bid on day 2; then 1 cap po tid...  hydrocortisone 2.5% topical cream: 1 mal, top, tid, # 30 gm, 1 Refill(s), Type: Maintenance, Pharmacy: [facility], 1 mal top tid  Documented Medications  Documented  Advil 200 mg oral tablet: 2 tab(s) ( 400 mg ), po, q8 hrs, PRN: as needed for pain, 0 Refill(s), Type: Maintenance  Vitamin B-12 1000 mcg oral tablet: 1 tab(s) ( 1,000 mcg ), po, daily, 0 Refill(s), Type: Maintenance   Problem list:    All Problems  Anxiety / SNOMED CT 15212808 / Confirmed  Cerebellar tonsillar ectopia / SNOMED CT 37837136 / Confirmed  Chronic insomnia / SNOMED CT 942556286 / Confirmed  Low back pain / SNOMED CT 987011930 / Confirmed  Myalgia / SNOMED CT 866405170 / Confirmed  Obese / ICD-9-.00 / Probable  Smoking / SNOMED CT 877542332 / Confirmed  Tobacco user / SNOMED CT 045808742 / Probable  Trochanteric bursitis, right hip / SNOMED CT 57536487 / Confirmed      Histories   Past Medical History:    Active  Cerebellar tonsillar ectopia (52456658)  Anxiety (09793551)   Family History:    Heart disease  Father (Conor)  Diabetes mellitus type 2  Mother (Obdulia)     Procedure history:    Injection of cortisone (7334740718) on 6/8/2016 at 36 Years.  Transanal hemorrhoidal dearterialization (THD) on 9/8/2015 at 35 Years.  Esophagogastroduodenoscopy (717866488) on 8/4/2015 at 35 Years.  Comments:  8/5/2015 12:30 PM - Mata Merino MD  Normal  Colonoscopy ([id number]) on 8/4/2015 at 35 Years.  Comments:  8/13/2015 3:29 PM - Genia Mac RN  Sedation: MAC  Indication: rectal bleeding  External & internal hemorrhoids; otherwise normal  Resume routine screening at age 50.  Hospitalized at [facility] in 2002 at 22 Years.  Right knee arthroscopy.  Surgery on eyes x3.  Comments:  12/15/2010 4:16 PM - Jhoana FLANNERY, Genia  as  infant   Social History:        Alcohol Assessment: Denies Alcohol Use            Current                     Comments:                      06/16/2015 - Randall Salcedo MD                     6 drinks pks per weekend      Tobacco Assessment: Current            Current                     Comments:                      06/16/2015 - Randall Salcedo MD                     1 ppd      Substance Abuse Assessment            Current                     Comments:                      06/16/2015 - Randall Salcedo MD                     Marijuana      Employment and Education Assessment            Work/School description: disabled.                     Comments:                      02/21/2011 - Krysten ORTEGA Wong                     has 1 child- 10 weeks old -      Home and Environment Assessment            Lives with Self, Significant other.                     Comments:                      02/21/2011 - Wong Bashir MD                     2 kids in household        Physical Examination   Vital Signs   3/14/2018 1:43 PM CDT Temperature Tympanic 98.6 DegF    Peripheral Pulse Rate 76 bpm    Systolic Blood Pressure 124 mmHg    Diastolic Blood Pressure 76 mmHg    Mean Arterial Pressure 92 mmHg    BP Site Right arm    BP Method Manual    Oxygen Saturation 96 %      Measurements from flowsheet : Measurements   3/14/2018 1:43 PM CDT Height/Length Estimated 72.5 in    Weight Measured - Standard 278 lb      General:  Alert and oriented, No acute distress.    Eye:  Pupils are equal, round and reactive to light, Extraocular movements are intact, Normal conjunctiva.    HENT:  Normocephalic, hearing grossly normal during our conversation.    Respiratory:  Respirations are non-labored, Symmetrical chest wall expansion.    Cardiovascular:  Normal peripheral perfusion, brisk capillary refill.    Musculoskeletal:  Normal gait.    Integumentary:  Warm, Dry, No rash.    Neurologic:  Alert, Oriented, No focal deficits, Cranial Nerves II-XII are  grossly intact.    Cognition and Speech:  Speech clear and coherent, Functional cognition intact.    Psychiatric:  Cooperative, Appropriate mood & affect, Normal judgment, Non-suicidal.       Health Maintenance      Recommendations     Pending (in the next year)        OverDue           Alcohol Misuse Screen (Male) due  11/17/17  and every 1  year(s)           Depression Screen (Male) due  11/17/17  and every 1  year(s)        Due            HIV Screen (if sexually active) (Male) due  03/14/18  and every 1  year(s)           Lipid Disorders Screen (Male) due  03/14/18  and every 1  year(s)           STD Counseling (if sexually active) (Male) due  03/14/18  and every 1  year(s)           Syphilis Screen (if sexually active) (Male) due  03/14/18  and every 1  year(s)           Type 2 Diabetes Mellitus Screen (Male) due  03/14/18  Variable frequency        Due In Future            Body Mass Index Check (Male) not due until  03/12/19  and every 1  year(s)     Satisfied (in the past 1 year)        Satisfied            Body Mass Index Check (Male) on  03/12/18.           Body Mass Index Check (Male) on  03/05/18.           Body Mass Index Check (Male) on  02/20/18.           Body Mass Index Check (Male) on  02/07/18.           Body Mass Index Check (Male) on  12/04/17.           Body Mass Index Check (Male) on  11/07/17.           Body Mass Index Check (Male) on  11/01/17.           Body Mass Index Check (Male) on  10/13/17.           Body Mass Index Check (Male) on  09/18/17.           Body Mass Index Check (Male) on  07/21/17.           Body Mass Index Check (Male) on  06/29/17.           Body Mass Index Check (Male) on  05/24/17.           Body Mass Index Check (Male) on  03/17/17.           High Blood Pressure Screen (Male) on  03/14/18.           High Blood Pressure Screen (Male) on  03/12/18.           High Blood Pressure Screen (Male) on  03/05/18.           High Blood Pressure Screen (Male) on  02/27/18.            High Blood Pressure Screen (Male) on  02/21/18.           High Blood Pressure Screen (Male) on  02/20/18.           High Blood Pressure Screen (Male) on  02/16/18.           High Blood Pressure Screen (Male) on  02/15/18.           High Blood Pressure Screen (Male) on  02/13/18.           High Blood Pressure Screen (Male) on  02/09/18.           High Blood Pressure Screen (Male) on  02/07/18.           High Blood Pressure Screen (Male) on  12/04/17.           High Blood Pressure Screen (Male) on  11/01/17.           High Blood Pressure Screen (Male) on  10/13/17.           High Blood Pressure Screen (Male) on  10/06/17.           High Blood Pressure Screen (Male) on  09/18/17.           High Blood Pressure Screen (Male) on  08/24/17.           High Blood Pressure Screen (Male) on  07/21/17.           High Blood Pressure Screen (Male) on  06/29/17.           High Blood Pressure Screen (Male) on  05/24/17.           High Blood Pressure Screen (Male) on  05/12/17.           High Blood Pressure Screen (Male) on  04/03/17.           High Blood Pressure Screen (Male) on  03/17/17.           Obesity Screen and Counseling (Male) on  03/14/18.           Obesity Screen and Counseling (Male) on  03/12/18.           Obesity Screen and Counseling (Male) on  03/05/18.           Obesity Screen and Counseling (Male) on  02/27/18.           Obesity Screen and Counseling (Male) on  02/21/18.           Obesity Screen and Counseling (Male) on  02/20/18.           Obesity Screen and Counseling (Male) on  02/16/18.           Obesity Screen and Counseling (Male) on  02/15/18.           Obesity Screen and Counseling (Male) on  02/13/18.           Obesity Screen and Counseling (Male) on  02/09/18.           Obesity Screen and Counseling (Male) on  02/07/18.           Obesity Screen and Counseling (Male) on  12/04/17.           Obesity Screen and Counseling (Male) on  11/07/17.           Obesity Screen and Counseling (Male) on  11/01/17.            Obesity Screen and Counseling (Male) on  10/13/17.           Obesity Screen and Counseling (Male) on  10/06/17.           Obesity Screen and Counseling (Male) on  09/18/17.           Obesity Screen and Counseling (Male) on  07/21/17.           Obesity Screen and Counseling (Male) on  06/29/17.           Obesity Screen and Counseling (Male) on  05/24/17.           Obesity Screen and Counseling (Male) on  05/12/17.           Obesity Screen and Counseling (Male) on  03/17/17.          Procedure   Procedure   Time.     Indication: myofascial pain.     Informed consent: signed by patient.     Confirmed: patient, procedure, side, site.     Type of procedure: trigger point injection.     Physical Exam: vital signs Vital Signs   3/14/2018 1:43 PM CDT Temperature Tympanic 98.6 DegF    Peripheral Pulse Rate 76 bpm    Systolic Blood Pressure 124 mmHg    Diastolic Blood Pressure 76 mmHg    Mean Arterial Pressure 92 mmHg    BP Site Right arm    BP Method Manual    Oxygen Saturation 96 %      .     Preparation and technique: informed consent obtained, position sitting, sterile preparation of site with 70 % alcohol, hemostasis achieved using direct pressure, estimated blood loss minimal, adhesive bandagedressing applied.     Findings: painful and tender myofascial trigger points identified by palpation with communication from patient    Location: bilateral lateral latissimus dorsi x 1 each, right upper leg x 2, right lower back x 1    Number of injections:5    pain prior to procedure:  around 5     pain following procedure: unable to quantify but reports imporved    number of milliliters of the 1:1 solution of 1% lidocaine without epiniephrine and 0.5% marcaine without epinephrine used in total: 8.     Procedure tolerated: well.     No Complications.     ER patient presents to clinic today for follow-up of his pain.  He reports that he spends most of his day trying not to think about how severe his pain is so he is unable to  tell me for sure what his pain is coming into clinic today but thinks it is probably around 5 he is not sure following her injections how much they have improved but he thinks that overall his pain has been improving.  He also reports that he has been using marijuana to help him with his mood disorder.  He seems to find it improves his ability to deal with stressful situations.  She reports that he uses daily and has been beeping.  However despite this he is used up his prescriptions of diazepam and is wondering if I might refill this today.  I explained to him that I would not be able to give him diazepam for anxiety because it could interact with his marijuana increasing his risk of death.  Also explained again that I felt that duloxetine might be a very good choice for him both for his pain and for his mood.  Also asked him to consider referral for counseling, however at this time he late would like to decline.  He also reports that when he was younger he was told that he had to take medications for his mental health either because was court ordered or because he was a minor and he does not want to be on any mental health medications besides some as needed diazepam at this time.         Impression and Plan   Diagnosis     Marijuana use (GZW84-GF F12.90).     Myalgia (FMW90-RB M79.1).     Low back pain (QCZ60-SC M54.5).     Patient Instructions:       Counseled: Patient, Regarding diagnosis, Regarding treatment, Verbalized understanding.    Diagnosis     return to clinic if symptoms worsen or do not improve.     return to clinic in 1 year, sooner if needed, and may return to [facility] this Fall for annual influenza vaccine.     return to clinic if symptoms worsen or do not improve.     Counseled:  Patient, Family, use ice or heat as needed to help with pain, continue with home exercise program, .
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
## 2022-02-15 NOTE — NURSING NOTE
Comprehensive Intake Entered On:  6/4/2020 12:03 PM CDT    Performed On:  6/4/2020 11:59 AM CDT by Abbi Chong LPN               Summary   Chief Complaint :   Pre-op MRI with sedation 06/11/20 at [facility]. does need covid testing.    Menstrual Status :   N/A   Weight Measured :   306 lb(Converted to: 306 lb 0 oz, 138.80 kg)    Height Measured :   72 in(Converted to: 6 ft 0 in, 182.88 cm)    Body Mass Index :   41.5 kg/m2 (HI)    Body Surface Area :   2.65 m2   Systolic Blood Pressure :   136 mmHg   Diastolic Blood Pressure :   82 mmHg   Mean Arterial Pressure :   100 mmHg   Peripheral Pulse Rate :   76 bpm   BP Site :   Right arm   Pulse Site :   Radial artery   BP Method :   Manual   HR Method :   Manual   Temperature Tympanic :   97.6 DegF(Converted to: 36.4 DegC)  (LOW)    Abbi Chong LPN - 6/4/2020 11:59 AM CDT   Health Status   Allergies Verified? :   Yes   Medication History Verified? :   Yes   Pre-Visit Planning Status :   Completed   Abbi Chong LPN - 6/4/2020 11:59 AM CDT   Consents   Consent for Immunization Exchange :   Consent Granted   Consent for Immunizations to Providers :   Consent Granted   Abbi Chong LPN - 6/4/2020 11:59 AM CDT   Meds / Allergies   (As Of: 6/4/2020 12:03:23 PM CDT)   Allergies (Active)   Bananas  Estimated Onset Date:   Unspecified ; Created By:   Rowena Hooker LPN; Reaction Status:   Active ; Category:   Drug ; Substance:   Bananas ; Type:   Allergy ; Updated By:   Rowena Hooker LPN; Reviewed Date:   6/4/2020 12:02 PM CDT      Bee Stings  Estimated Onset Date:   Unspecified ; Created By:   Ruthie Nam; Reaction Status:   Active ; Category:   Drug ; Substance:   Bee Stings ; Type:   Allergy ; Updated By:   Ruthie Nam; Reviewed Date:   6/4/2020 12:02 PM CDT      Latex  Estimated Onset Date:   Unspecified ; Created By:   Ruthie Nam; Reaction Status:   Active ; Category:   Drug ; Substance:   Latex ; Type:   Allergy ; Updated By:   Viv  Ruthie; Reviewed Date:   6/4/2020 12:02 PM CDT        Medication List   (As Of: 6/4/2020 12:03:23 PM CDT)   Prescription/Discharge Order    oxyCODONE  :   oxyCODONE ; Status:   Prescribed ; Ordered As Mnemonic:   oxyCODONE 5 mg oral tablet ; Simple Display Line:   5 mg, 1 tab(s), Oral, q8 hrs, for 10 day(s), 30 tab(s), 0 Refill(s) ; Ordering Provider:   Mata Hearn MD; Catalog Code:   oxyCODONE ; Order Dt/Tm:   6/1/2020 4:53:17 PM CDT          diazePAM  :   diazePAM ; Status:   Prescribed ; Ordered As Mnemonic:   diazePAM 5 mg oral tablet ; Simple Display Line:   5 mg, 1 tab(s), Oral, q8 hrs, PRN: as needed for anxiety, 12 tab(s), 1 Refill(s) ; Ordering Provider:   Mata Hearn MD; Catalog Code:   diazePAM ; Order Dt/Tm:   5/13/2020 2:53:28 PM CDT          albuterol  :   albuterol ; Status:   Prescribed ; Ordered As Mnemonic:   Ventolin HFA 90 mcg/inh inhalation aerosol ; Simple Display Line:   2 puff(s), NEB, qid, PRN: AS NEEDED FOR WHEEZING, 1 EA, 0 Refill(s) ; Ordering Provider:   Mata Hearn MD; Catalog Code:   albuterol ; Order Dt/Tm:   4/9/2020 2:03:28 PM CDT          Miscellaneous Rx Supply  :   Miscellaneous Rx Supply ; Status:   Prescribed ; Ordered As Mnemonic:   Orthotics ; Simple Display Line:   See Instructions, to use as directed with shoes, 2 EA, 0 Refill(s) ; Ordering Provider:   Mata Hearn MD; Catalog Code:   Miscellaneous Rx Supply ; Order Dt/Tm:   2/13/2020 3:16:54 PM CST            Home Meds    acetaminophen  :   acetaminophen ; Status:   Documented ; Ordered As Mnemonic:   acetaminophen 500 mg oral tablet ; Simple Display Line:   1,000 mg, 2 tab(s), Oral, q6 hrs, PRN: as needed for pain, 0 Refill(s) ; Catalog Code:   acetaminophen ; Order Dt/Tm:   11/6/2019 1:37:34 PM CST            ID Risk Screen   Recent Travel History :   No recent travel   Family Member Travel History :   No recent travel   Other Exposure to Infectious Disease :   Unknown   Castillo AKERS,  Abbi - 6/4/2020 11:59 AM CDT

## 2022-02-15 NOTE — PROGRESS NOTES
"   Patient:   JOEL SUTTON            MRN: [id number]            FIN: [id number]               Age:   37 years     Sex:  Male     :  1980   Associated Diagnoses:   Obese   Author:   Jillian Russo      Visit Information   Visit type:  Medical Nutrition Therapy.    Referral source:  Jaskaran CONWAY, Cricket MEDINA.       Chief Complaint   Obesity       Interval History   Pt is here today motivated to learn lifestyle interventions to help promote weight loss.    Intake: skips breakfast, in general dislikes most fruits and vegetables but will have them mixed in dishes, loves meat will typically have 4# of steak per week and \"gorges\" on it.    Fluids: sparkling calorie free water, diet energy drinks, soda 1-2x/ week, occ sweets   Has not recorded intake   Exercise: none (chronic back pain)  Sleep: \"Insomnia\" per pt - very hard time going to sleep     Stress: mod/ high - health       Health Status   Allergies:    Allergic Reactions (Selected)  Severity Not Documented  Bee Stings (No reactions were documented)  Latex (No reactions were documented)   Medications:  (Selected)   Prescriptions  Prescribed  ProAir HFA 90 mcg/inh inhalation aerosol: 2 puff(s), inh, qid, PRN: as needed for wheezing, # 1 EA, 2 Refill(s), Type: Maintenance, Pharmacy: [facility]  Qvar 80 mcg/inh inhalation aerosol: 1 puff(s), inh, bid, # 1 EA, 3 Refill(s), Type: Maintenance, Pharmacy: [facility], 1 puff(s) inh bid  cyclobenzaprine 10 mg oral tablet: 1 tab(s) ( 10 mg ), PO, TID, PRN: for spasm, # 30 tab(s), 0 Refill(s), Type: Maintenance, Pharmacy: [facility], 1 tab(s) po tid,PRN:for spasm  diazePAM 5 mg oral tablet: See Instructions, Instructions: TAKE ONE TABLET BY MOUTH THREE TIMES A DAY AS NEEDED FOR ANXIETY, # 10 tab(s), 0 Refill(s), Type: Soft Stop, Pharmacy: [facility], TAKE ONE TABLET BY MOUTH THREE TIMES A DAY AS NEEDED FOR ANXIETY...  gabapentin " 300 mg oral capsule: See Instructions, Instructions: 1 cap(s) po qd on day 1  1 cap po bid on day 2  then 1 cap po tid, # 90 EA, 1 Refill(s), Type: Maintenance, Pharmacy: [facility], 1 cap(s) po qd on day 1; 1 cap po bid on day 2; then 1 cap po tid...  hydrocortisone 2.5% topical cream: 1 mal, top, tid, # 30 gm, 1 Refill(s), Type: Maintenance, Pharmacy: [facility], 1 mal top tid  Documented Medications  Documented  Advil 200 mg oral tablet: 2 tab(s) ( 400 mg ), po, q8 hrs, PRN: as needed for pain, 0 Refill(s), Type: Maintenance  Vitamin B-12 1000 mcg oral tablet: 1 tab(s) ( 1,000 mcg ), po, daily, 0 Refill(s), Type: Maintenance   Problem list:    All Problems  Cerebellar tonsillar ectopia / SNOMED CT 64871421 / Confirmed  Anxiety / SNOMED CT 72757685 / Confirmed  Low back pain / SNOMED CT 390675079 / Confirmed  Obese / ICD-9-.00 / Probable  Chronic insomnia / SNOMED CT 954599801 / Confirmed  Smoking / SNOMED CT 725550814 / Confirmed  Tobacco user / SNOMED CT 310654947 / Probable      Histories   Past Medical History:    Active  Cerebellar tonsillar ectopia (16442305)  Anxiety (41266175)   Family History:    Heart disease  Father (Conor)  Diabetes mellitus type 2  Mother (Obdulia)     Procedure history:    Injection of cortisone (SNOMED CT 5326476246) performed by Joann on 6/8/2016 at 36 Years.  Transanal hemorrhoidal dearterialization (THD) performed by Vik Chavez MD on 9/8/2015 at 35 Years.  Esophagogastroduodenoscopy (SNOMED CT 845325487) on 8/4/2015 at 35 Years.  Comments:  8/5/2015 12:30 PM - Mata Merino MD  Normal  Colonoscopy (SNOMED CT 475560896) performed by Mata Merino MD on 8/4/2015 at 35 Years.  Comments:  8/13/2015 3:29 PM - Genia Mac RN  Sedation: MAC  Indication: rectal bleeding  External & internal hemorrhoids; otherwise normal  Resume routine screening at age 50.  Hospitalized at [facility] in 2002 at 22  Years.  Right knee arthroscopy.  Surgery on eyes x3.  Comments:  12/15/2010 4:16 PM - Jhoana RN, Genia  as infant   Social History:        Alcohol Assessment: Denies Alcohol Use            Current            Current                     Comments:                      06/16/2015 - Randall Salcedo MD                     6 drinks pks per weekend      Tobacco Assessment: Current            Current                     Comments:                      06/16/2015 - Randall Salcedo MD                     1 ppd      Substance Abuse Assessment            Current, Marijuana            Current                     Comments:                      06/16/2015 - Randall Salcedo MD                     Marijuana      Employment and Education Assessment            Work/School description: disabled.                     Comments:                      02/21/2011 - Wong Bashir MD                     has 1 child- 10 weeks old -      Home and Environment Assessment            Lives with Self, Significant other.                     Comments:                      02/21/2011 - Wong Bashir MD                     2 kids in household        Physical Examination   Measurements from flowsheet : Measurements   11/7/2017 4:16 PM CST Height Measured - Standard 72.5 in    Weight Measured - Standard 292 lb    BSA 2.6 m2    Body Mass Index 39.05 kg/m2         Impression and Plan   Diagnosis     Obese (OIQ84-KA E66.9).       Today patient was instructed on lifestlyle interventions to help reduce the risks associated with being obese.  Family hx of diabetes.  Education of the following topis:  - Myplate, 2170 calorie meal plan, portion sizes, label reading   - weight loss tips, mindful eating  - daily weights, recording intake - myfitness pal  - exercise recommendations - as able yoga     Goals:   1.  Practice healthy stress management and get good quality sleep with the goal of 7-8 hours per night.    2.  Eat in a healthy way, per food plate method .   Keep a food record.  Eat 3 meals/ day.  A meal is three or more food groups; make it colorful for better nutrition.  Focus on portion control.  ~2170 calorie meal plan.  Follow weight loss tips and mindful eating.    3.  Goal weight 262 by  5/ 2018  4.  Follow up in 1 month      Professional Services   Time spent with pt 30 min   cc Cricket Jessica PA-C

## 2022-02-15 NOTE — PROGRESS NOTES
Chief Complaint    f/u back pain, would like MRI done.  not sleeping well d/t pain.  History of Present Illness       Patient is here for further discussion of his neck and shoulder pain.  He reports pain across the trapezii with radiation into his right upper extremity.  He describes the pain as tingling and shooting.  He has paresthesias in the median nerve distribution of the right hand.  He also has a tender trigger point in the right mid back.  He continues to have back pain.  Anxiety persists.  He still feels he needs an MRI of his neck.  This has been problematic due to his significant anxiety disorder.  Review of Systems       See HPI.  All other review of systems negative.  Physical Exam   Vitals & Measurements    T: 98.9  F (Tympanic)  HR: 88 (Peripheral)  BP: 144/88  SpO2: 95%     HT: 72 in  HT: 72 in  WT: 310 lb  BMI: 42.04        Alert, oriented, no acute distress       Normal heart rate       Nonlabored breathing       Tender trigger point mid back chest to the right of the spine       Bilateral tender trigger points in the trapezius       Upper extremity exam reveals normal strength, decreased sensation in the median nerve distribution on the right, positive Phalen test on the right, negative Tinel sign       He moves easily about the room  Assessment/Plan       1. Anxiety (F41.1)          He continues to decline medical treatment of his anxiety.       2. Carpal tunnel syndrome, right (G56.01)          Discussed etiology of his symptoms and the possibility that this may be radiating cervical spine.  His symptoms are more consistent with carpal tunnel syndrome.  Nighttime wrist splint advised.  He was dispensed 1 splint.  I have advised treatment of the carpal tunnel syndrome prior to pursuing MRI of the cervical spine.       3. Myofascial pain syndrome (M79.18)          Trigger point injection with 1 cc of bupivacaine in each of the trapezial trigger points and the paraspinous trigger point.  This was  done using sterile technique followed by adhesive bandage.  He tolerated the procedure well.       Orders:         oxyCODONE, = 1 tab(s) ( 5 mg ), Oral, q8 hrs, PRN: as needed for pain, # 20 tab(s), 0 Refill(s), Type: Hard Stop, Pharmacy: [facility], 72, in, 09/10/20 12:29:00 CDT, Height Measured, 307, lb, 09/10/20 12:29:00 CDT, Weight Measured, (Completed)         oxyCODONE, = 1 tab(s) ( 5 mg ), Oral, q8 hrs, PRN: as needed for pain, # 20 tab(s), 0 Refill(s), Type: Maintenance, Pharmacy: [facility], 1 tab(s) Oral q8 hrs,PRN:as needed for pain, 72, in, 10/20/20 14:51:00 CDT, Height Measured, 310, l..., (Ordered)  Patient Information     Name:JOEL SUTTON      Address:      [address] [place] [phone]     Sex:Male     YOB: 1980     [phone]     Emergency Contact:[facility], CONTACT     MRN:[id number]     FIN:[id number]     Location:[facility]     Date of Service:10/20/2020      Primary Care Physician:       Mata Hearn MD, [phone]      Attending Physician:       Mata Hearn MD, [phone]  Problem List/Past Medical History    Ongoing     Anxiety     Asthma, moderate persistent     Cerebellar tonsillar ectopia     Chronic back pain     Chronic insomnia     Degenerative lumbar disc     Dysplastic nevus       Comments: removed from back     Low back pain     Marijuana use     Myalgia     Obese     Sebaceous cyst     Smoking     Tension headache     Tobacco user     Trochanteric bursitis, right hip    Historical     No qualifying data  Procedure/Surgical History     Injection of cortisone (06/08/2016)     Transanal hemorrhoidal dearterialization (THD) (09/08/2015)     Colonoscopy (08/04/2015)      Comments: Sedation: MAC      Indication: rectal bleeding      External & internal hemorrhoids; otherwise normal      Resume routine screening at age 50..      Esophagogastroduodenoscopy (08/04/2015)      Comments: Normal.     Hospitalized at [facility] (2002)     Right knee arthroscopy     Surgery on eyes x3      Comments: as infant.  Medications    acetaminophen 500 mg oral tablet, 1000 mg= 2 tab(s), Oral, q6 hrs, PRN    Acular 0.5% ophthalmic solution, 1 drop(s), Eye-Right, qid, PRN    diclofenac 1% topical gel, 2 gm, Topical, qid, 2 refills    Orthotics, See Instructions    oxyCODONE 5 mg oral tablet, 5 mg= 1 tab(s), Oral, q8 hrs, PRN    Ventolin HFA 90 mcg/inh inhalation aerosol, 2 puff(s), NEB, qid, PRN  Allergies    Bananas    Bee Stings    Latex  Social History    Smoking Status     Current every day smoker     Alcohol      Past     Employment/School      Work/School description: disabled.     Home/Environment      Lives with Self, Significant other.     Substance Abuse      Current     Tobacco      Current  Family History    Diabetes mellitus type 2: Mother.    Heart disease: Father.  Immunizations      Vaccine Date Status          tetanus/diphth/pertuss (Tdap) adult/adol 11/17/2016 Given          influenza virus vaccine, inactivated 11/17/2016 Given          influenza virus vaccine, inactivated 01/06/2015 Given  Lab Results          Lab Results (Last 4 results within 90 days)           Coronavirus SARS-CoV-2 (COVID-19): NOT DETECTED (10/13/20 14:39:00)

## 2022-02-15 NOTE — PROGRESS NOTES
Chief Complaint    c/o worsening back pain over the past couple of days. worse w/ getting up in AM and sitting, recently seen by chiropractor today. received Toradol shot last Friday. also needs refill on Diazepam  History of Present Illness      Patient presents with this patient is low back pain.  He has known spinal stenosis.  THOMAS has been recommended in the past.  He never had this done due to transportation issues.  He has been more physically active in the last few weeks which has exacerbated his pain.  He was in last week and received ketorolac without much improvement.  Denies bowel or bladder problems.  He has no weakness of the lower extremities.  Review of Systems      See HPI.  All other review of systems negative.  Physical Exam   Vitals & Measurements    T: 98(Tympanic)  HR: 80(Peripheral)  BP: 120/82     HT: 72.5 in  WT: 286.4 lb  BMI: 38.3       Alert, oriented, no acute distress      Mildly anxious       Lower back tenderness with good range of motion       Negative straight leg raise       Normal lower extremity exam  Assessment/Plan       Back pain         Morphine 10 mg IM today        Referral for THOMAS        Follow-up if symptoms are not improving or worsen         Ordered:          Referral (Request), 02/07/18 14:42:00 CST, Referred to: Other, Reason for referral: THOMAS, spinal stenosis, Back pain           Patient Information     Name:JOEL SUTTON      Address:      [address] [place] [id number]     Sex:Male     YOB: 1980     [phone]     Emergency Contact:LEESA SUTTON     MRN:[id number]     FIN:[id number]     [facility]     Date of Service:02/07/2018      Primary Care Physician:       Mata Hearn MD, [phone]  Problem List/Past Medical History    Ongoing     Anxiety     Cerebellar tonsillar ectopia     Chronic insomnia     Low back pain     Obese     Smoking     Tobacco user     Historical  Procedure/Surgical History     Injection of cortisone (06/08/2016)     Transanal hemorrhoidal dearterialization (THD) (09/08/2015)     Colonoscopy (08/04/2015)     Esophagogastroduodenoscopy (08/04/2015)     Hospitalized at [facility] (2002)     Right knee arthroscopy     Surgery on eyes x3  Medications        ProAir HFA 90 mcg/inh inhalation aerosol: 2 puff(s), inh, qid, PRN: as needed for wheezing, 1 EA, 2 Refill(s).        Qvar 80 mcg/inh inhalation aerosol: 1 puff(s), inh, bid, 1 EA, 3 Refill(s).        Vitamin B-12 1000 mcg oral tablet: 1,000 mcg, 1 tab(s), po, daily, 0 Refill(s).        cyclobenzaprine 10 mg oral tablet: 10 mg, 1 tab(s), PO, TID, PRN: for spasm, 30 tab(s), 0 Refill(s).        diazePAM 5 mg oral tablet: See Instructions, TAKE ONE TABLET BY MOUTH THREE TIMES A DAY AS NEEDED FOR ANXIETY, 10 tab(s), 0 Refill(s).        gabapentin 300 mg oral capsule: See Instructions, 1 cap(s) po qd on day 1  1 cap po bid on day 2  then 1 cap po tid, 90 EA, 1 Refill(s).        hydrocortisone 2.5% topical cream: 1 mal, top, tid, 30 gm, 1 Refill(s).        Advil 200 mg oral tablet: 400 mg, 2 tab(s), po, q8 hrs, PRN: as needed for pain, 0 Refill(s).                Allergies    Bee Stings    Latex  Social History    Smoking Status - 02/07/2018     Current every day smoker     Alcohol - Denies Alcohol Use, 12/04/2017      Current     Employment and Education - 12/04/2017      Work/School description: disabled.     Home and Environment - 12/04/2017      Lives with Self, Significant other.     Substance Abuse - 12/04/2017      Current     Tobacco - Current, 12/04/2017      Current  Family History    Diabetes mellitus type 2: Mother.    Heart disease: Father.  Immunizations      Vaccine Date Status      tetanus/diphth/pertuss (Tdap) adult/adol 11/17/2016 Given      influenza virus vaccine, inactivated 11/17/2016 Given      influenza virus vaccine, inactivated 01/06/2015 Given  Lab Results      Results  (Last 90 days)      No results located.

## 2022-02-15 NOTE — PROGRESS NOTES
"   Patient:   JOEL SUTTON            MRN: [id number]            FIN: [id number]               Age:   37 years     Sex:  Male     :  1980   Associated Diagnoses:   Low back pain; Obese   Author:   Cricket Jessica PA-C      Chief Complaint   2017 4:52 PM CDT    Pt here for FU on low back pain that radiates into Right leg.  Pt states the back pain isnt as bad today but is having \"shooting pain\" down into Right leg      History of Present Illness   Mr. Sutton is a 38 yo Male with significant pmh of low  back pain that has recently been treated with a toradol injection three weeks ago who presents today with lower back pain that radiates down his right leg. He reports his pain is at a constant 3 that exacerbates to a 5 that starts near his right hip/low  back that radiates down his lateral right leg. He reported that it feels deep almost to his bone like multiple muscle tears. Described his low back pain as a sheering sensation like being cut by glass. Endorsed a limp with his right leg especially with longer walks. He mentioned cutting out red meat in his diet, and is working on eating more fruits and vegetables. He would like a referral for a Nutrionist.   He has been going to chiropractor about 2-3x/week  MRI in 2017 indicated degenerative disc disease at  L4-S1        He also reported a panic attack 2 weeks ago where he feared about death and how  he would eventually die. The attack lasted 5-10 minutes, and resolved on its own. Despite this recent attack, he hasn't had a panic attack in years and does not wish to talk about it. He described shame on his body image and a lack of exercise tolerance with chores around the house which has him feeling fairly depressed.       Review of Systems   Constitutional:  Negative.    Eye:  Negative.    Ear/Nose/Mouth/Throat:  Negative.    Respiratory:  Negative.    Cardiovascular:  Negative.    Gastrointestinal:  Negative.    Genitourinary:  Negative.  "   Hematology/Lymphatics:  Negative.    Immunologic:  Negative.    Musculoskeletal:  Negative except as documented in history of present illness.    Integumentary:  Negative.    Neurologic:  Negative.    Psychiatric:  Negative except as documented in history of present illness.          All other systems are negative      Health Status   Allergies:    Allergic Reactions (Selected)  Severity Not Documented  Bee Stings (No reactions were documented)  Latex (No reactions were documented)   Medications:  (Selected)   Prescriptions  Prescribed  ProAir HFA 90 mcg/inh inhalation aerosol: 2 puff(s), inh, qid, # 1 EA, 3 Refill(s), Type: Maintenance, Pharmacy: [facility], 2 puff(s) inh qid  Qvar 80 mcg/inh inhalation aerosol: 1 puff(s), inh, bid, # 1 EA, 3 Refill(s), Type: Maintenance, Pharmacy: [facility], 1 puff(s) inh bid  cyclobenzaprine 10 mg oral tablet: 1 tab(s) ( 10 mg ), PO, TID, PRN: for spasm, # 30 tab(s), 0 Refill(s), Type: Maintenance, Pharmacy: [facility], 1 tab(s) po tid,PRN:for spasm  diazePAM 5 mg oral tablet: See Instructions, Instructions: TAKE ONE TABLET BY MOUTH THREE TIMES A DAY AS NEEDED FOR ANXIETY, # 10 tab(s), 0 Refill(s), Type: Soft Stop, Pharmacy: [facility], TAKE ONE TABLET BY MOUTH THREE TIMES A DAY AS NEEDED FOR ANXIETY...  hydrocortisone 2.5% topical cream: 1 mal, top, tid, # 30 gm, 1 Refill(s), Type: Maintenance, Pharmacy: [facility], 1 mal top tid  Documented Medications  Documented  Advil 200 mg oral tablet: 2 tab(s) ( 400 mg ), po, q8 hrs, PRN: as needed for pain, 0 Refill(s), Type: Maintenance  Vitamin B-12 1000 mcg oral tablet: 1 tab(s) ( 1,000 mcg ), po, daily, 0 Refill(s), Type: Maintenance   Problem list:    All Problems  Cerebellar tonsillar ectopia / SNOMED CT 40134293 / Confirmed  Anxiety / SNOMED CT 78827357 / Confirmed  Low back pain / SNOMED CT 232916424 /  Confirmed  Obese / ICD-9-.00 / Probable  Chronic insomnia / SNOMED CT 392069733 / Confirmed  Smoking / SNOMED CT 585772580 / Confirmed  Tobacco user / SNOMED CT 940967759 / Probable      Histories   Past Medical History:    Active  Cerebellar tonsillar ectopia (05052943)  Anxiety (94353710)   Family History:    Heart disease  Father (Conor)  Diabetes mellitus type 2  Mother (Obdulia)     Procedure history:    Injection of cortisone (9264007295) on 6/8/2016 at 36 Years.  Transanal hemorrhoidal dearterialization (THD) on 9/8/2015 at 35 Years.  Esophagogastroduodenoscopy (253303822) on 8/4/2015 at 35 Years.  Comments:  8/5/2015 12:30 PM - aMta Merino MD  Normal  Colonoscopy (242190911) on 8/4/2015 at 35 Years.  Comments:  8/13/2015 3:29 PM - Genia Mac RN  Sedation: MAC  Indication: rectal bleeding  External & internal hemorrhoids; otherwise normal  Resume routine screening at age 50.  Hospitalized at [facility] in 2002 at 22 Years.  Right knee arthroscopy.  Surgery on eyes x3.  Comments:  12/15/2010 4:16 PM - Genia Ely RN  as infant   Social History:        Alcohol Assessment: Denies Alcohol Use            Current            Current                     Comments:                      06/16/2015 - Randall Salcedo MD                     6 drinks pks per weekend      Tobacco Assessment: Current            Current                     Comments:                      06/16/2015 - Randall Salcedo MD                     1 ppd      Substance Abuse Assessment            Current, Marijuana            Current                     Comments:                      06/16/2015 - Randall Salcedo MD                     Marijuana      Employment and Education Assessment            Work/School description: disabled.                     Comments:                      02/21/2011 - Wong Bashir MD                     has 1 child- 10 weeks old -      Home and Environment Assessment            Lives with Self,  Significant other.                     Comments:                      02/21/2011 - Krysten ORTEGA, Wong                     2 kids in household        Physical Examination   Vital Signs   11/1/2017 4:52 PM CDT Temperature Tympanic 97.5 DegF  LOW    Peripheral Pulse Rate 72 bpm    Pulse Site Radial artery    Respiratory Rate 16 br/min    Systolic Blood Pressure 128 mmHg    Diastolic Blood Pressure 86 mmHg    Mean Arterial Pressure 100 mmHg    BP Site Right arm      Measurements from flowsheet : Measurements   11/1/2017 4:52 PM CDT Height Measured - Standard 72.5 in    Weight Measured - Standard 288 lb    BSA 2.58 m2    Body Mass Index 38.52 kg/m2      General:  No acute distress.    Respiratory:  Lungs are clear to auscultation.    Cardiovascular:  Normal rate, Regular rhythm, No murmur.    Musculoskeletal:  good strength with heel and toe raise.  Patella and achilles DTRs symmetrical.  Good strength with resisted dorsal and plantar flexion. Good strength with resisted flexion and extension of lower legs. Straight leg raise elicits pain at 45 degrees on right leg, no pain until 75 degrees on left leg  pain with palpation across low back, more pain over right SIJ.    Psychiatric:  Appropriate mood & affect.       Impression and Plan   Diagnosis     Low back pain (PAU69-NR M54.5).     Obese (CZF93-RX E66.9).     Course:  Unchanged.    Plan:  will do a trial of gabapentin to help with radiating pain  will refer to spine specialist  will refer to dietitian for weight loss     will give him 60 mg toradol im for relief of acute back pain.    Orders     Orders   Pharmacy:  gabapentin 300 mg oral capsule (Prescribe): See Instructions, Instructions: 1 cap(s) po qd on day 1  1 cap po bid on day 2  then 1 cap po tid, # 90 EA, 1 Refill(s), Type: Maintenance, Pharmacy: [facility], 1 cap(s) po qd on day 1; 1 cap po bid on day 2; then 1 cap po tid.     Orders   Requests (Consults / Referrals):  Referral  (Request) (Order): 11/1/2017 5:49 PM CDT, Referred to: [facility], Obese.     Orders   Requests (Consults / Referrals):  Referral (Request) (Order): 11/1/2017 5:50 PM CDT, Referred to: [facility], Low back pain.     Orders   Charges (Evaluation and Management):  37733 office outpatient visit 15 minutes (Charge) (Order): Quantity: 1, Obese  Low back pain.     Orders   Charges (Evaluation and Management):  82774 office outpatient visit 15 minutes (Charge) (Order): Quantity: 1, Low back pain  Obese.

## 2022-02-15 NOTE — TELEPHONE ENCOUNTER
---------------------  From: Kait Jesnen CMA (eRx Pool ([id number]_[facility]))   To: [facility] Message Pool ([id number]_[place] [facility] [place]);     Sent: 12/22/2020 10:41:40 AM CST  Subject: FW: Medication Management   Due Date/Time: 12/21/2020 11:55:00 AM CST     Medication Refill needing approval    PCP:   [facility]    Medication:   diclofenac 1% gel  Last Filled:  8/5/20    Quantity:  240gm  Refills:  2    Date of last office visit and reason:   11/30/20 myofascial pain/anxiety GTG    Return to Clinic order placed?  no          ------------------------------------------  From: [facility]  To: Mata Hearn MD  Sent: December 20, 2020 11:55:41 AM CST  Subject: Medication Management  Due: December 16, 2020 8:25:31 PM CST     ** On Hold Pending Signature **     Drug: diclofenac topical (diclofenac 1% topical gel), APPLY 2 GRAMS TOPICALLY TO THE AFFECTED JOINTS AS DIRECTED FOUR TIMES DAILY  Quantity: 240 gm  Days Supply: 30  Refills: 2  Substitutions Allowed  Notes from Pharmacy:     Dispensed Drug: diclofenac topical (diclofenac 1% topical gel), APPLY 2 GRAMS TOPICALLY TO THE AFFECTED JOINTS AS DIRECTED FOUR TIMES DAILY  Quantity: 240 gm  Days Supply: 30  Refills: 2  Substitutions Allowed  Notes from Pharmacy:  ---------------------------------------------------------------  From: Abbi Chong LPN ([facility] Message Pool ([id number]_[facility]))   To: Mata Hearn MD;     Sent: 12/22/2020 10:52:24 AM CST  Subject: FW: Medication Management   Due Date/Time: 12/21/2020 11:55:00 AM CST---------------------  From: Mata Hearn MD   To: [facility]    Sent: 12/22/2020 1:25:03 PM CST  Subject: FW: Medication Management     ** Submitted: **  Complete:diclofenac topical (diclofenac 1% topical gel)   Signed by Mata Hearn MD  12/22/2020 7:25:00 PM [facility]    ** Approved with modifications: **  diclofenac topical (DICLOFENAC SODIUM 1% GEL)  APPLY 2 GRAMS TOPICALLY TO THE  AFFECTED JOINTS AS DIRECTED FOUR TIMES DAILY  Qty:  240 gm        Days Supply:  30        Refills:  2          Substitutions Allowed     Route To Pharmacy - [facility]

## 2022-02-15 NOTE — NURSING NOTE
Generalized Anxiety Disorder Screening Entered On:  2/13/2020 3:12 PM CST    Performed On:  2/13/2020 3:12 PM CST by Gonzalez RAMSAY, Fariba               Generalized Anxiety Disorder Screening   TYRELL Nervous, Anxious On Edge :   More than half the days   TYRELL Control Worrying B :   More than half the days   TYRELL Worrying Too Much :   More than half the days   TYRELL Restless :   More than half the days   TYRELL Easily Annoyed/Irritable :   Not at all   TYRELL Afraid :   Several days   TYRELL Trouble Relaxing :   Nearly every day   TYRELL Total Screening Score :   12    TYRELL Difficulty with Work, Home, Others :   Extremely difficult   Gonzalez RAMSAY, Fariba - 2/13/2020 3:12 PM CST

## 2022-02-15 NOTE — PROGRESS NOTES
Chief Complaint    Patient is here for trigger point injections. c/o feeling of torn muscle in right leg.  History of Present Illness          HPI pt has history of chronic myofascial pain and has found trigger point injections to be a useful tool to help control pain.  Would like to have repeat trigger point injections today.  he also reports yesterday he fell about a foot from a hammock onto the ground and overnight had worsening of his radicular leg pain, now severe.  says he smoked some marijuana early this am which allowed him to finally get some sleep.  he has had some relief with steroid bursts in the past. no loss of control of bowel or bladder and no saddle paresthesias.                         Review of systems is negative except as per HPI      Exam:      General: alert and oriented ×3 no acute distress.      HEENT: pupils are equal round and reactive to light extraocular motion is intact. Normocephalic and atraumatic.       Hearing is grossly normal and there is no otorrhea.       Nares are patent there is no rhinorrhea.       Mucous membranes are moist and pink.      Chest: has bilateral rise with no increased work of breathing.      Cardiovascular: normal perfusion and brisk capillary refill.      Musculoskeletal: no gross focal abnormalities and normal gait.      Neuro: no gross focal abnormalities and memory seems intact.      Psychiatric: speech is clear and coherent and fluent. Patient dressed appropriately for the weather. Mood is appropriate and affect is full.           Procedure note           Informed consent obtained including risks of pain, bleeding, infection, injury to surrounding tissue, and need for further treatment. Benefit of a trigger point injection goal is to reduce pain. This is just part of a treatment plan and for best results patient should also complete physical therapy. I cannot guarantee that will will be any pain relief.           Alternatives would include doing nothing,  physical therapy, acupuncture, using heat or ice, continuing with oral medications such as muscle relaxants or nonsteroidal anti-inflammatory medications or topical medications such as a lidocaine patch or cream or menthol for diclofenac. She would like to try the trigger point injections.           Prep: Alcohol          Location identified by my palpation and communication with patient.  Symptomatic trigger points that patient desired treatment at were located at:                      Each of these was injected with  a one-to-one solution of 1% lidocaine without epinephrine and 0.5% Marcaine without epinephrine.right gluteus jyoti, right vastus lateralis, right rectus femoris          Total number of injections:3          Total number of milliliters of the 1:1 solution of lidocaine and marcaine:8           Encouraged patient to treat the area with [place] cup ice massage tonight and to try to stretch the area out.           pain prior to treatment: severe          pain following treatment at the trigger point injection sites: improved at vastus lateralis, no change at the other 2 sites          Complications: none          Tolerated procedure well.          Asessment and Plan: myalgia and myofascial pain, s/p trigger point injections today.  Encouraged home exercise program and to keep annual exam appt as indicated and RTC if symptoms worsen or do not improve.   also radiculopathy, bay give prednisone burst, keep appt with NS and pain clinic  Physical Exam   Vitals & Measurements    T: 97.2   F (Tympanic)  HR: 100(Peripheral)  BP: 140/88     WT: 272.2 lb   Assessment/Plan       Marijuana use (F12.90)         Orders:          20957 office outpatient visit 15 minutes (Charge), Quantity: 1, Myalgia  Marijuana use  Radiculopathy                Myalgia (M79.1)         Orders:          19451 office outpatient visit 15 minutes (Charge), Quantity: 1, Myalgia  Marijuana use  Radiculopathy                Radiculopathy  (M54.10)         Orders:          ketorolac, 60 mg, im, once, (Completed)          predniSONE, 1 tab(s) ( 50 mg ), PO, Daily, # 5 tab(s), 0 Refill(s), Type: Maintenance, Pharmacy: [facility], 1 tab(s) Oral daily,x5 day(s), (Ordered)          58389 therapeutic prophylactic/dx injection subq/im (Charge), Quantity: 1, Radiculopathy          48197 office outpatient visit 15 minutes (Charge), Quantity: 1, Myalgia  Marijuana use  Radiculopathy           ketorolac tromethamine inj, 15 mg (Charge), Quantity: 4, Radiculopathy               Fifteen minutes spent with patient in direct face to face contact, in addition to the time spent performing the procedure today, greater than 50% of that time was  spent counseling and coordinating care.   Patient Information     Name:JOEL SUTTON      Address:      [address] [place] [id number]     Sex:Male     YOB: 1980     [phone]     Emergency Contact:LEESA SUTTON     MRN:[id number]     FIN:[id number]     Location:[facility]     Date of Service:07/31/2018      Primary Care Physician:       Nena King MD, [phone]      Attending Physician:       Nena King MD, [phone]  Problem List/Past Medical History    Ongoing     Anxiety     Asthma, moderate persistent     Cerebellar tonsillar ectopia     Chronic insomnia     Dysplastic nevus       Comments: removed from back     Low back pain     Marijuana use     Myalgia     Obese     Sebaceous cyst     Smoking     Tension headache     Tobacco user     Trochanteric bursitis, right hip    Historical     No qualifying data  Procedure/Surgical History     Injection of cortisone (06/08/2016)           Transanal hemorrhoidal dearterialization (THD) (09/08/2015)           Colonoscopy (08/04/2015)            Comments:      Sedation: MAC      Indication: rectal bleeding      External & internal hemorrhoids; otherwise  normal      Resume routine screening at age 50.     Esophagogastroduodenoscopy (08/04/2015)            Comments:      Normal     Hospitalized at [facility] (2002)           Right knee arthroscopy           Surgery on eyes x3            Comments:      as infant  Medications     Shoes for low back pain: See Instructions, Use as directed, 2 EA, 0 Refill(s).     ProAir HFA 90 mcg/inh inhalation aerosol: 2 puff(s), inh, qid, PRN: as needed for wheezing, 1 EA, 2 Refill(s).     Qvar 80 mcg/inh inhalation aerosol: 1 puff(s), inh, bid, 1 EA, 5 Refill(s).     Symbicort 160 mcg-4.5 mcg/inh inhalation aerosol: 2 puff(s), inh, bid, to replace qvar  in the morning and the evening  use with spacer chamber  rinse mouth and throat after use, 3 EA, 3 Refill(s).     DULoxetine 60 mg oral delayed release capsule: 60 mg, 1 cap(s), po, daily, 60 cap(s), 1 Refill(s).     cyclobenzaprine 5 mg oral tablet: 5 mg, 1 tab(s), po, bid, PRN: for muscle spasm, 60 tab(s), 1 Refill(s).     lidocaine 5% topical film: 3 patch(es), Topical, daily, for 30 day(s), remove patches after 12 hours, 90 patch(es), 11 Refill(s).     DULoxetine 60 mg oral delayed release capsule: 60 mg, 1 cap(s), po, bid, 60 cap(s), 3 Refill(s).     Keflex 500 mg oral capsule: 500 mg, 1 cap(s), PO, QID, for 10 day(s), 40 cap(s), 0 Refill(s).     predniSONE 50 mg oral tablet: 50 mg, 1 tab(s), PO, Daily, for 5 day(s), 5 tab(s), 0 Refill(s).          Allergies    Bee Stings    Latex  Social History    Smoking Status - 07/31/2018     Current every day smoker     Alcohol      Current, 06/16/2015     Employment and Education      Work/School description: disabled., 02/21/2011     Home and Environment      Lives with Self, Significant other., 02/21/2011     Substance Abuse      Current, 06/16/2015     Tobacco      Current, 06/16/2015  Family History    Diabetes mellitus type 2: Mother.    Heart disease: Father.  Immunizations      Vaccine Date Status      tetanus/diphth/pertuss  (Tdap) adult/adol 11/17/2016 Given      influenza virus vaccine, inactivated 11/17/2016 Given      influenza virus vaccine, inactivated 01/06/2015 Given  Lab Results       Lab Results (Last 4 results within 90 days)        Hgb A1c: 5 [0.4 mIU/L - 4.5 mIU/L] (07/02/18 16:24:00 CDT)       Cholesterol: 182 mg/dL [0.4 mIU/L - 4.5 mIU/L] (07/02/18 16:24:00 CDT)       Non-HDL Cholesterol: 134 High [0.4 mIU/L - 4.5 mIU/L] (07/02/18 16:24:00 CDT)       HDL: 48 mg/dL [0.4 mIU/L - 4.5 mIU/L] (07/02/18 16:24:00 CDT)       Cholesterol/HDL Ratio: 3.8 (07/02/18 16:24:00 CDT)       LDL: 108 High (07/02/18 16:24:00 CDT)       Triglyceride: 146 mg/dL [0.4 mIU/L - 4.5 mIU/L] (07/02/18 16:24:00 CDT)       TSH: 2.14 mIU/L [0.4 mIU/L - 4.5 mIU/L] (07/02/18 16:24:00 CDT)

## 2022-02-15 NOTE — LETTER
(Inserted Image. Unable to display)   February 13, 2020      JOEL SUTTON  [address] [place] [phone]        Dear JOEL,      Thank you for selecting [facility] for your healthcare needs.       You have generalized anxiety disorder and panic disorder.  A  animal is necessary to maintain control of your anxiety.          Please contact me or my assistant at [phone] if you have any questions or concerns.     Sincerely,        Mata Hearn MD

## 2022-02-15 NOTE — PROGRESS NOTES
Chief Complaint    c/o chest pain at night when lying down, worsening back/leg pain, has tried to have MRI done w/ no success.  affecting walking/standing.  also c/o right hip/leg swelling.  History of Present Illness      Continues to have trouble with his chest and back.  Has history of lumbar disc disease and MRI of the lumbar and cervical spine.  He was not able to complete the exam due to anxiety and movement during the exam.  He did take diazepam prior to his imaging procedure.       He has been experiencing mid chest pain when he lies on his back.  Pain resolves when he is up.  No SOB, angina like symptoms.  He reports generalized myalgia and arthralgia.  Feeling a bit better today.      He is requesting a higher level of sedation for an MRI  Review of Systems          ROS reviewed an negative except for symptoms noted in HPI.            Physical Exam   Vitals & Measurements    T: 97.8   F (Tympanic)  HR: 89(Peripheral)  BP: 136/86  SpO2: 95%     HT: 72 in  WT: 307.4 lb  BMI: 41.69       Alert, oriented, no acute distress       Normal heart rate       Nonlabored breathing        Diffuse lumbar and low thoracic tenderness        Cooperative, anxious affect  Assessment/Plan       1. Anxiety (F41.1)         I have refilled his diazepam.  He will discuss imaging with his spine surgeon.  He is requesting IV sedation for MRI of the spine so an adequate exam can be performed.  He was informed he will need to discuss this with his spine surgeon.                2. Chronic back pain (M54.9)         I have prescribed a limited number of oxycodone.  He has tolerated this in the past.         Ordered:          diazePAM, = 1 tab(s) ( 5 mg ), Oral, q8 hrs, PRN: as needed for anxiety, # 12 tab(s), 1 Refill(s), Type: Hard Stop, Pharmacy: [facility], (Completed)          diazePAM, = 1 tab(s) ( 5 mg ), Oral, q8 hrs, PRN: as needed for anxiety, # 12 tab(s), 1 Refill(s), Type: Maintenance, Pharmacy:  [facility], 1 tab(s) Oral q8 hrs,PRN:as needed for anxiety, (Ordered)                Orders:         oxyCODONE, = 1 tab(s) ( 5 mg ), Oral, q6 hrs, # 20 tab(s), 0 Refill(s), Type: Acute, Pharmacy: [facility], 1 tab(s) Oral q6 hrs, (Ordered)  Patient Information     Name:JOEL SUTTON      Address:      [address] [place] [address] [address] [place] [phone]     Sex:Male     YOB: 1980     [phone]     Emergency Contact:[facility] EMERGENCY, CONTACT     MRN:[id number]     FIN:[id number]     Location:[facility]     Date of Service:05/13/2020      Primary Care Physician:       Jaskaran CONWAY, Cricket MEDINA, [phone]      Attending Physician:       Mata Hearn MD, [phone]  Problem List/Past Medical History    Ongoing     Anxiety     Asthma, moderate persistent     Cerebellar tonsillar ectopia     Chronic back pain     Chronic insomnia     Dysplastic nevus       Comments: removed from back     Low back pain     Marijuana use     Myalgia     Obese     Sebaceous cyst     Smoking     Tension headache     Tobacco user     Trochanteric bursitis, right hip    Historical     No qualifying data  Procedure/Surgical History     Injection of cortisone (06/08/2016)     Transanal hemorrhoidal dearterialization (THD) (09/08/2015)     Colonoscopy (08/04/2015)      Comments: Sedation: MAC      Indication: rectal bleeding      External & internal hemorrhoids; otherwise normal      Resume routine screening at age 50..     Esophagogastroduodenoscopy (08/04/2015)      Comments: Normal.     Hospitalized at [facility] unit (2002)     Right knee arthroscopy     Surgery on eyes x3      Comments: as infant.  Medications    acetaminophen 500 mg oral tablet, 1000 mg= 2 tab(s), Oral, q6 hrs, PRN,   Not taking    diazePAM 5 mg oral tablet, 5 mg= 1 tab(s), Oral, q8 hrs, PRN, 1 refills    Orthotics, See Instructions    oxyCODONE 5 mg oral tablet, 5 mg=  1 tab(s), Oral, q6 hrs    Ventolin HFA 90 mcg/inh inhalation aerosol, 2 puff(s), NEB, qid, PRN  Allergies    Bananas    Bee Stings    Latex  Social History    Smoking Status - 05/13/2020     Current every day smoker     Alcohol      Past, 02/13/2020     Employment/School      Work/School description: disabled., 02/21/2011     Home/Environment      Lives with Self, Significant other., 02/21/2011     Substance Abuse      Current, 06/16/2015     Tobacco      Current, 06/16/2015  Family History    Diabetes mellitus type 2: Mother.    Heart disease: Father.  Immunizations      Vaccine Date Status          tetanus/diphth/pertuss (Tdap) adult/adol 11/17/2016 Given          influenza virus vaccine, inactivated 11/17/2016 Given          influenza virus vaccine, inactivated 01/06/2015 Given

## 2022-02-15 NOTE — TELEPHONE ENCOUNTER
---------------------  From: Baron Sheffield (Phone Messages Pool ([id number]_[facility]))   To: ALLYSSA Message Pool ([id number]_[facility]);     Sent: 6/25/2020 12:00:37 PM CDT  Subject: med refill      Medication Refill needing approval    PCP:   ALLYSSA but requested GTG (OC)     Medication:   Oxycodone (listed as 'completed' on med list)  Last Filled:  06/01/2020                  Quantity:  30                   Refills:  0  CSA on file?   No     Date of last office visit and reason:   06/15/2020; Chronic back pain, anxiety disorder   Date of last labs pertaining to condition:  n/a     Note:  Pt called requesting refill. Please advise.     Return to Clinic order placed?  No     Resource:   Pt   Phone:   [phone] called pt and informed him that refills were sent to his pharmacy.  Pt asked to be transferred to scheduling for appt with GTG next week.  Bruce Whiting CMA.

## 2022-02-15 NOTE — TELEPHONE ENCOUNTER
---------------------  From: Fariba Roth MA (Raoulrip Jovanni ([id number]_[facility]))   To: Mata Hearn MD;     Sent: 6/1/2020 4:51:08 PM CDT  Subject: FW: Medication Management   Due Date/Time: 6/2/2020 2:48:00 PM CDT           ------------------------------------------  From: [facility]  To: Mata Hearn MD  Sent: June 1, 2020 2:48:03 PM CDT  Subject: Medication Management  Due: May 30, 2020 3:28:37 PM CDT     ** On Hold Pending Signature **     Drug: oxyCODONE (oxyCODONE 5 mg oral tablet), TAKE ONE TABLET BY MOUTH EVERY 6 HOURS  Quantity: 20 unknown unit  Days Supply: 5  Refills: 0  Substitutions Allowed  Notes from Pharmacy:     Dispensed Drug: oxyCODONE (oxyCODONE 5 mg oral tablet), TAKE ONE TABLET BY MOUTH EVERY 6 HOURS  Quantity: 20 unknown unit  Days Supply: 5  Refills: 0  Substitutions Allowed  Notes from Pharmacy:  ------------------------------------------

## 2022-02-15 NOTE — TELEPHONE ENCOUNTER
---------------------  From: Makayla Anna RN (Phone Messages Pool ([id number]_[facility]))   Sent: 6/10/2020 2:33:58 PM CDT  Subject: Phone Message     Phone Message    PCP:   ALLYSSA      Time of Call:  1408       Person Calling:  Pt  Phone number:  [phone]    Returned call at: 1428    Note:   Pt called wanting to make sure that his COVID testing results were forwarded to [facility] prior to his MRI tomorrow. Called [facility] - results had not been received, they were unaware that he had done testing at clinic. Faxed results. Returned call to patient and informed him results being faxed.     Last office visit and reason:  6-4-20 Pre-op w/ZIM

## 2022-02-15 NOTE — PROGRESS NOTES
Patient:   JOEL SUTTON            MRN: [id number]            FIN: [id number]               Age:   37 years     Sex:  Male     :  1980   Associated Diagnoses:   Tobacco user; Anxiety; Back pain, chronic; Lumbago without sciatica   Author:   Jensen Murphy MD      Visit Information      Date of Service: 2017 04:51 pm  Performing Location: [facility]  Encounter#: [id number]      Primary Care Provider (PCP):  Mata Hearn MD    NPI# [id number]      Referring Provider:  No referring provider recorded for selected visit.      Chief Complaint   3/9/2017 4:58 PM CST     increased pains in lower back and moving down right leg and some in left leg.              Additional Information:No additional information recorded during visit.   Chief complaint and symptoms as noted above and confirmed with patient.  Recent lab and diagnostic studies reviewed with patient      History of Present Illness   3/24/2016: Vince presents to clinic with 48 hour history of typical for flulike symptoms including malaise, myalgias, nonproductive tight cough, shortness of breath in diffuse audible wheezing.  His 2 kids at home thankfully have not developed same symptoms.  He has been using albuterol much more liberally over the past several days.  He has been using Qvar with some modest improvement in breathing in the past.  He is not able to smoke because of throat soreness.  He has been using DayQuil and NyQuil.    2016: Presents with one-week history of right-sided lateral hip pains that he noticed after recent yoga stretching exercises.  Areas of exquisitely tender to touch over greater trochanteric region.  Painful elevate his leg.  No recent trauma.  He has taken ibuprofen intermittently with some improvement in pains.  He has been trying to reduce his amount of caloric beverages.  Questions whether using vaporized marijuana as a safer means of inhalation.    2016: Returns to clinic with present right  sided lateral pains; especially worse when getting up from a seated position.  Using ibuprofen 400mg 2-3x/day - no substantial relief.  Feels worse.    8/9/2016: Vince presents to clinic with multiple concerns.  He describes having anterior throat neck pain after wrapping a flotation device around his neck and trying to submerge his head swimming pool with his children 2 weeks ago.  No hoarseness or throat swelling.  No stridor or problems with swallowing.  He describes having increased stressors primarily related to having sole custody of his children over the summer time.  He feels like he has too many tasks to do and to little amount of time that he dedicates to complete them.  Also describes it can stressors from a friend currently addicted to multiple narcotics.    11/17/2016: Vince presents to clinic with return of right-sided lateral leg pain exacerbated by hip flexion.  He was seen by Snehal morrison in June who supported the diagnosis of trochanteric bursitis.  He has undergone intrabursal steroid injections that have helped transiently.  He has been working with a chiropractor related to his back who brought up concerns regarding a potential pinched nerve    3/9/2017: Vince presents to clinic with aggravation of acute on chronic mid and lower back pains.  He underwent an MRI this past month showing significant left-sided foraminal stenosis at the L5-S1 region.  He is scheduled to see a spine surgeon in the relatively near future.  He has had some sustaining benefit with intermittent Toradol injections.  Today describing pains more in the mid scapular region.         Review of Systems   Constitutional:  No fever, No chills, No sweats, No weakness, No fatigue, No decreased activity.    Eye:  Negative except as documented in history of present illness.    Ear/Nose/Mouth/Throat:  Negative except as documented in history of present illness.    Respiratory:  No shortness of breath, No cough, No wheezing.     Cardiovascular:  No chest pain, No palpitations, No peripheral edema, No syncope.    Gastrointestinal:  No nausea, No vomiting, No abdominal pain.    Genitourinary:  No dysuria, No hematuria.    Hematology/Lymphatics:  Negative except as documented in history of present illness.    Endocrine:  No excessive thirst, No polyuria.    Immunologic:  No recurrent fevers, No malaise.    Musculoskeletal:  Back pain, Joint pain, No neck pain, No muscle pain, No trauma.    Neurologic:  Alert and oriented X4, No numbness, No tingling, No headache.       Health Status   Allergies:    Allergic Reactions (Selected)  Severity Not Documented  Bee Stings (No reactions were documented)  Latex (No reactions were documented)   Medications:  (Selected)   Prescriptions  Prescribed  ProAir HFA 90 mcg/inh inhalation aerosol: 2 puff(s), inh, qid, # 1 EA, 3 Refill(s), Type: Maintenance, [facility], [facility](s) inh qid  Qvar 80 mcg/inh inhalation aerosol: 1 puff(s), inh, bid, # 1 EA, 3 Refill(s), Type: Maintenance, [facility], 1 puff(s) inh bid  cyclobenzaprine 10 mg oral tablet: 1 tab(s) ( 10 mg ), PO, TID, PRN: for spasm, # 30 tab(s), 0 Refill(s), Type: Maintenance, [facility], 1 tab(s) po tid,PRN:for spasm  diazePAM 5 mg oral tablet: 1 tab(s) ( 5 mg ), PO, tid, PRN: for anxiety, # 10 tab(s), 0 Refill(s), Type: Maintenance, [facility], 1 tab(s) po tid,PRN:for anxiety  hydrocortisone 2.5% topical cream: 1 mal, top, tid, # 30 gm, 1 Refill(s), Type: Maintenance, [facility], 1 mal top tid   Problem list:    All Problems  Cerebellar tonsillar ectopia / SNOMED CT 47707399 / Confirmed  Anxiety / SNOMED CT 80816982 / Confirmed  Obese / ICD-9-.00 / Probable  Smoking / SNOMED CT 743916103 / Confirmed  Tobacco user / SNOMED CT 804043795 / Probable      Histories   Past  Medical History:    Active  Cerebellar tonsillar ectopia (23056199)  Anxiety (71002305)   Family History:    Heart disease  Father (Conor)  Diabetes mellitus type 2  Mother (Obdulia)     Procedure history:    Injection of cortisone (8700365098) on 6/8/2016 at 36 Years.  Transanal hemorrhoidal dearterialization (THD) on 9/8/2015 at 35 Years.  Esophagogastroduodenoscopy (614580345) on 8/4/2015 at 35 Years.  Comments:  8/5/2015 12:30 PM - Mtaa Merino MD  Normal  Colonoscopy (464015203) on 8/4/2015 at 35 Years.  Comments:  8/13/2015 3:29 PM - Genia Mac RN  Sedation: MAC  Indication: rectal bleeding  External & internal hemorrhoids; otherwise normal  Resume routine screening at age 50.  Hospitalized at [facility] in 2002 at 22 Years.  Right knee arthroscopy.  Surgery on eyes x3.  Comments:  12/15/2010 4:16 PM - Genia Ely RN  as infant   Social History:        Alcohol Assessment: Denies Alcohol Use            Current            Current                     Comments:                      06/16/2015 - Randall Salcedo MD                     6 drinks pks per weekend      Tobacco Assessment: Current            Current                     Comments:                      06/16/2015 - Randall Salcedo MD                     1 ppd      Substance Abuse Assessment            Current, Marijuana            Current                     Comments:                      06/16/2015 - Randall Salcedo MD                     Marijuana      Employment and Education Assessment            Work/School description: disabled.                     Comments:                      02/21/2011 - Wong Bashir MD                     has 1 child- 10 weeks old -      Home and Environment Assessment            Lives with Self, Significant other.                     Comments:                      02/21/2011 - Wong Bashir MD                     2 kids in household        Physical Examination   vital signs stable, as noted above   Vital Signs    3/9/2017 4:58 PM CST Temperature Tympanic 98.4 DegF    Peripheral Pulse Rate 80 bpm    Systolic Blood Pressure 120 mmHg    Diastolic Blood Pressure 80 mmHg    Mean Arterial Pressure 93 mmHg    BP Site Right arm      Measurements from flowsheet : Measurements   3/9/2017 4:58 PM CST     Weight Measured - Standard                278.8 lb     General:  Alert and oriented, No acute distress.    Eye:  Extraocular movements are intact.    HENT:  Normocephalic.    Neck:  Supple.    Respiratory:  Lungs are clear to auscultation.    Cardiovascular:  No edema.    Musculoskeletal:  focal pains to palpation along mid-scapular region, positive straight leg raise bilaterally.    Neurologic:  Alert, Oriented.    Cognition and Speech:  Oriented, Speech clear and coherent.    Psychiatric:  Appropriate mood & affect.       Review / Management   Results review:  Lab results   12/15/2016 7:27 PM CST HSV I IgG <0.90    HSV II IgG <0.90   .       Impression and Plan   Diagnosis     Tobacco user (WLL17-VT Z72.0).     Anxiety (LIV06-GF F41.9).     Back pain, chronic (EDT83-FT M54.9).     Lumbago without sciatica (OPU10-EG M54.5).       .) acute on chronic back pains; both paraspinal features and chronic lumbar radicular pains   - toradol 60mg IM given in clinic; recommended oral NSAIDs prn   - using flexeril prn   - MRI (2/2017): focal L5-S1 foraminal stenosis - scheduled to see neurosurgery in the near future

## 2022-02-15 NOTE — CARE COORDINATION
Pt brought in two release of information forms that need to be completed for [facility]. They wanted to receive note from Dr. Frias in addtion to trigger point injection notes. He signed both releases-I filled out and fax to [facility] w/ fax conf. received. I also mailed originals to [facility].

## 2022-02-15 NOTE — TELEPHONE ENCOUNTER
Per patient's message request, order from August is sent to [facility] (interface) and he will contact [facility] and schedule.

## 2022-02-15 NOTE — PROGRESS NOTES
Chief Complaint    Trigger point injection, wondering if prednisone would help.  History of Present Illness      had Right SI steroid injection last week did not seem to help more than a  day or 2.  now has a tender knot at the bilateral upper ant thigh.      He does think that perhaps the prednisone worked but that its effects from only lasted for a day or 2 after he completed the medication.  Is wondering if he can have another course of prednisone.  He has not yet scheduled an appointment with the pain clinic.  He also has not yet scheduled an appointment for his skin exam.  He wonders if liposuction might help him lose some weight so that perhaps he can have less pain.  He is working with his insurance to cover this.  He denies any recurring rashes under his pannus.  Chart review does show that he had an appointment with the dietitian in November and was supposed to schedule a follow-up with her.  He reports that he got about that.  He also reports that he had a cookie last week with some marijuana in it and that it seemed to be quite helpful for his pain.  He is wondering why that might be.  Today he would like another shot of Toradol and also some trigger point injections on his bilateral upper legs.  We did discuss that it is possible that the pain in his legs resolved coming from his back.  He reports that the trigger point injections have been quite helpful in the past and would like these repeated today.  He is aware of the risks of pain bleeding infection injury to surrounding tissue.  He was also again encouraged to follow up where the pain clinic and with physical therapy.  Patient reports that he has been tolerating his duloxetine 20 mg daily.  He has not noticed any change in his mood or his pain with this medication.  However, he does not want to increase it to 40 mg daily yet.       Review of systems as per HPI and otherwise negative exam general patient is alert and oriented ×3 in no acute distress  HEENT hearing is grossly normal normocephalic and pupils are equally round and reactive to light and extraocular motion is intact mucous membranes are pink nares are patent there is no rhinorrhea chest has bilateral rise with his work of breathing cardiovascular normal tissue with brisk capillary refill neuro cranial nerves II through XII are grossly intact and there are no gross focal neurological deficits.  Psych patient is slightly disheveled, makes good eye contact, speech is little pressured, she does have poor insight, mood is irritable blunted.  No suicidal or homicidal ideation.       Procedure informed was obtained, Emilio is to be treated for 5 minutes on bilateral anterior upper thigh.  These were prepped with rubbing alcohol then to painful trigger point was palpated and mobilized the head with palpation on the right anterior upper leg anterior upper.  Each of these was injected with approximately 2 mL's of a one-to-one solution of 1% lidocaine without epinephrine and 0.5% Marcaine without epinephrine.  Hemostasis was achieved with direct pressure.       Patient tolerated the procedure well.  Reports that he thinks they have helped but it usually takes a day or 2 to be able to determine for sure.       Assessment and plan #1 chronic pain disorder, multifactorial related to lumbar stenosis and some degenerative disc disease myofascial pain.  Encouraged patient to reconsider counseling, he declines.  Patient will be walked over to referrals today to set up an appointment clinic.  He is also aware that he should make a follow-up appointment for a skin recheck with the dietitian for his obesity.  #2 obesity.  Shared with patient that I did not think to cover light both suction.  This is usually considered a cosmetic procedure unless someone has a recurring rash associated with her obesity.  He denies the presence of this.  Encouraged patient to become more physically active and to improve his eating.  Also  encouraged patient to follow-up with the dietitian.  Suggested that he follows a healthy plate campaign.  Physical Exam   Vitals & Measurements    T: 98.7(Tympanic)  HR: 90(Peripheral)  BP: 130/78     WT: 272 lb   Assessment/Plan       Myalgia (M79.1)       Obese (E66.9)       Orders:         predniSONE, 1 tab(s) ( 50 mg ), po, daily, # 5 tab(s), 0 Refill(s), Type: Maintenance, Pharmacy: [facility] [place], 1 tab(s) po daily,x5 day(s), (Completed)  Patient Information     Name:JOEL SUTTON      Address:      [address] [place] [id number]     Sex:Male     YOB: 1980     [phone]     Emergency Contact:LEESA SUTTON     MRN:[id number]     FIN:[id number]     Location:[facility]     Date of Service:04/17/2018      Primary Care Physician:       Nena King MD, [phone]      Attending Physician:       Nena King MD, [phone]  Problem List/Past Medical History    Ongoing     Anxiety     Cerebellar tonsillar ectopia     Chronic insomnia     Low back pain     Marijuana use     Myalgia     Obese     Smoking     Tobacco user     Trochanteric bursitis, right hip    Historical     No qualifying data  Procedure/Surgical History     Injection of cortisone (06/08/2016)           Transanal hemorrhoidal dearterialization (THD) (09/08/2015)           Colonoscopy (08/04/2015)             Comments: Sedation: MAC<br/>Indication: rectal bleeding<br/>External &amp; internal hemorrhoids; otherwise normal<br/>Resume routine screening at age 50.     Esophagogastroduodenoscopy (08/04/2015)             Comments: Normal     Hospitalized at [facility] (2002)           Right knee arthroscopy           Surgery on eyes x3             Comments: as infant  Medications     hydrocortisone 2.5% topical cream: 1 mal, top, tid, 30 gm, 1 Refill(s).     Qvar 80 mcg/inh inhalation aerosol: 1 puff(s), inh, bid, 1 EA, 3 Refill(s).     Advil  200 mg oral tablet: 400 mg, 2 tab(s), po, q8 hrs, PRN: as needed for pain, 0 Refill(s).     Vitamin B-12 1000 mcg oral tablet: 1,000 mcg, 1 tab(s), po, daily, 0 Refill(s).     ProAir HFA 90 mcg/inh inhalation aerosol: 2 puff(s), inh, qid, PRN: as needed for wheezing, 1 EA, 2 Refill(s).     DULoxetine 20 mg oral delayed release capsule: 20 mg, 1 cap(s), po, daily, 30 cap(s), 1 Refill(s).     Shoes for low back pain: See Instructions, Use as directed, 2 EA, 0 Refill(s).          Allergies    Bee Stings    Latex  Social History    Smoking Status - 04/17/2018     Current every day smoker     Alcohol - Denies Alcohol Use, 04/04/2011      Current, 06/16/2015     Employment and Education      Work/School description: disabled., 02/21/2011     Home and Environment      Lives with Self, Significant other., 02/21/2011     Substance Abuse      Current, 06/16/2015     Tobacco - Current, 12/15/2010      Current, 06/16/2015  Family History    Diabetes mellitus type 2: Mother.    Heart disease: Father.  Immunizations      Vaccine Date Status      tetanus/diphth/pertuss (Tdap) adult/adol 11/17/2016 Given      influenza virus vaccine, inactivated 11/17/2016 Given      influenza virus vaccine, inactivated 01/06/2015 Given  procedure note:  informed consent obtained, risks of pain, fever infection and need for further treatment, symptomatic trigger points identified in bilateral upper thighs, 2 on the right and 1 on the left, each prepped with rubbing alcohol, then each injected with 2 ml of a 1:1 solution of 1% lidocaine without epi and 0.5% marcaine, toelrated well.

## 2022-02-15 NOTE — PROGRESS NOTES
Chief Complaint    discuss getting neck injections  History of Present Illness       [facility].  He still feels that he needs surgery to fix his back.  Most recent MRI did not show significant changes.        He is requesting trigger point injections in the posterior neck.  This has helped in the past.        Lateral hip pain has been mildly problematic and exacerbated by chiropractic treatments.       Anxiety has worsened and he is interested in resuming therapy.  Review of Systems          ROS reviewed and negative except for symptoms noted in HPI.  Physical Exam   Vitals & Measurements    T: 98.4  F (Tympanic)  HR: 88 (Peripheral)  BP: 156/98  SpO2: 93%     HT: 72 in  HT: 72 in  WT: 309.8 lb  BMI: 42.01        Alert, oriented, no acute distress       Neck is supple with good range of motion, posterior trigger points present, most tender in the suboccipital region       Normal heart rate       Nonlabored breathing       Tenderness over the greater trochanters bilaterally left greater than right  Assessment/Plan       1. Anxiety (F41.1)          Anxiety seems to be a very big issue for him.  This affects his life on a daily basis.  He is not interested in pursuing medication.  Referral for behavioral therapy.       2. Myofascial pain syndrome (M79.18)          After informed consent was obtained I proceeded with 2 trigger point injections of 1 cc of bupivacaine in each of the suboccipital trigger points.  He tolerated this well.  This was done using sterile technique.       3. Neck pain (M54.2)          See above         I believe a lot of his neck pain is from poor posture and body mechanics.  He spends a lot of time looking down playing video games and using his phone.  We discussed the appropriate head positioning for use of his electronic devices.  I have advised physical therapy for strengthening of his neck and upper back.         Further imaging and surgery is  not my recommendation at this time.  PT and a weight loss program are advised as primary treatment.       Orders:         diazePAM, = 1 tab(s) ( 5 mg ), Oral, daily, PRN: as needed for anxiety, # 12 tab(s), 0 Refill(s), Type: Maintenance, Pharmacy: [facility], 1 tab(s) Oral daily,PRN:as needed for anxiety, 72, in, 11/30/20 14:30:00 CST, Height Measured, 30..., (Ordered)         oxyCODONE, = 1 tab(s) ( 5 mg ), Oral, q8 hrs, PRN: as needed for pain, # 20 tab(s), 0 Refill(s), Type: Maintenance, Pharmacy: [facility], 1 tab(s) Oral q8 hrs,PRN:as needed for pain, 72, in, 11/30/20 14:30:00 CST, Height Measured, 309.8,..., (Ordered)         oxyCODONE, = 1 tab(s) ( 5 mg ), Oral, q8 hrs, PRN: as needed for pain, # 20 tab(s), 0 Refill(s), Type: Hard Stop, Pharmacy: [facility], 72, in, 10/20/20 14:51:00 CDT, Height Measured, 310, lb, 10/20/20 14:51:00 CDT, Weight Measured, (Completed)         Referral (Request), 11/30/20 21:51:00 CST, Referred to: Psychology, Anxiety  Patient Information     Name:JOEL SUTTON      Address:      [address] [place] [id number]     Sex:Male     YOB: 1980     [phone]     Emergency Contact:[facility], CONTACT     MRN:[id number]     FIN:[id number]     Location:[facility]     Date of Service:11/30/2020      Primary Care Physician:       Mata Hearn MD, [phone]      Attending Physician:       Mata Hearn MD, [phone]  Problem List/Past Medical History    Ongoing     Anxiety     Asthma, moderate persistent     Cerebellar tonsillar ectopia     Chronic back pain     Chronic insomnia     Degenerative lumbar disc     Dysplastic nevus       Comments: removed from back     Low back pain     Marijuana use     Myalgia     Myofascial pain syndrome     Obese     Sebaceous cyst     Smoking     Tension headache     Tobacco user      Trochanteric bursitis, right hip    Historical     No qualifying data  Procedure/Surgical History     Injection of cortisone (06/08/2016)     Transanal hemorrhoidal dearterialization (THD) (09/08/2015)     Colonoscopy (08/04/2015)      Comments: Sedation: MAC      Indication: rectal bleeding      External & internal hemorrhoids; otherwise normal      Resume routine screening at age 50..     Esophagogastroduodenoscopy (08/04/2015)      Comments: Normal.     Hospitalized at [facility] (2002)     Right knee arthroscopy     Surgery on eyes x3      Comments: as infant.  Medications    acetaminophen 500 mg oral tablet, 1000 mg= 2 tab(s), Oral, q6 hrs, PRN    Acular 0.5% ophthalmic solution, 1 drop(s), Eye-Right, qid, PRN    diazePAM 5 mg oral tablet    diclofenac 1% topical gel, 2 gm, Topical, qid, 2 refills    Orthotics, See Instructions    oxyCODONE 5 mg oral tablet, 5 mg= 1 tab(s), Oral, q8 hrs, PRN    Ventolin HFA 90 mcg/inh inhalation aerosol, 2 puff(s), NEB, qid, PRN  Allergies    Bananas    Bee Stings    Latex  Social History    Smoking Status     Current every day smoker     Alcohol      Past     Electronic Cigarette/Vaping      Electronic Cigarette Use: Never.     Employment/School      Work/School description: disabled.     Home/Environment      Lives with Self, Significant other.     Substance Abuse      Current     Tobacco      10 or more cigarettes (1/2 pack or more)/day in last 30 days  Family History    Diabetes mellitus type 2: Mother.    Heart disease: Father.  Immunizations      Vaccine Date Status          tetanus/diphth/pertuss (Tdap) adult/adol 11/17/2016 Given          influenza virus vaccine, inactivated 11/17/2016 Given          influenza virus vaccine, inactivated 01/06/2015 Given  Lab Results          Lab Results (Last 4 results within 90 days)          Coronavirus SARS-CoV-2 (COVID-19): NOT DETECTED (10/13/20 14:39:00)
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
## 2022-02-15 NOTE — PROGRESS NOTES
Patient:   JOEL SUTTON            MRN: [id number]            FIN: [id number]               Age:   38 years     Sex:  Male     :  1980   Associated Diagnoses:   Acne; Hemorrhoids   Author:   Mata Merino MD      Visit Information      Date of Service: 09/10/2018 02:16 pm  Performing Location: [facility]  Encounter#: [id number]      Primary Care Provider (PCP):  Nena King MD    NPI# [id number]      Referring Provider:  Mata Merino MD    NPI# [id number]      Chief Complaint   9/10/2018 2:20 PM CDT    c/o hemorrhoid flare up, discuss right leg pain      History of Present Illness   Hemorrhoids have returned. Bowel movement stringy. Hemorrhoids returned over the past week. Has constipation. Diet is poor.    Has decreased from 2ppd to 3/4ppd smoking.  Moved from apartment to another (across the yo)    Hemorrhoid surgery 2015.       Review of Systems   Constitutional:  No fever.    Respiratory:  No shortness of breath.    Gastrointestinal:  No vomiting.       Health Status   Allergies:    Allergic Reactions (Selected)  Severity Not Documented  Bee Stings (No reactions were documented)  Latex (No reactions were documented)   Medications:  (Selected)   Prescriptions  Prescribed  Ativan 2 mg oral tablet: 1 tab(s) ( 2 mg ), PO, once, Instructions: take 1 our prior to MRI, PRN: for anxiety, # 1 tab(s), 0 Refill(s), Type: Soft Stop, Pharmacy: [facility], 1 tab(s) Oral once,PRN:for anxiety,Instr:take 1 our prior to MRI  DULoxetine 60 mg oral delayed release capsule: 1 cap(s) ( 60 mg ), po, daily, # 60 cap(s), 1 Refill(s), Type: Maintenance, Pharmacy: [facility], 1 cap(s) po daily  Lyrica 50 mg oral capsule: = 1 cap(s) ( 50 mg ), Oral, bid, # 60 cap(s), 1 Refill(s), Type: Maintenance, Pharmacy: [facility], 1 cap(s) Oral bid  ProAir HFA 90 mcg/inh inhalation aerosol: 2 puff(s), inh, qid, PRN: as needed for  wheezing, # 1 EA, 2 Refill(s), Type: Maintenance, Pharmacy: [facility], 2 puff(s) Inhale qid,PRN:as needed for wheezing  Qvar 80 mcg/inh inhalation aerosol: 1 puff(s), inh, bid, # 1 EA, 5 Refill(s), Type: Maintenance, Pharmacy: [facility], 1 puff(s) inh bid  Shoes for low back pain: Shoes for low back pain, See Instructions, Instructions: Use as directed, Supply, # 2 EA, 0 Refill(s), Type: Maintenance, Use as directed  Symbicort 160 mcg-4.5 mcg/inh inhalation aerosol: 2 puff(s), inh, bid, Instructions: to replace qvar  in the morning and the evening  use with spacer chamber  rinse mouth and throat after use, # 3 EA, 3 Refill(s), Type: Maintenance, Pharmacy: [facility], 2 puff(s) inh bid,Ins...  cyclobenzaprine 5 mg oral tablet: 1 tab(s) ( 5 mg ), po, bid, PRN: for muscle spasm, # 60 tab(s), 1 Refill(s), Type: Maintenance, Pharmacy: [facility], 1 tab(s) po bid,PRN:for muscle spasm  lidocaine 5% topical film: 3 patch(es), Topical, daily, Instructions: remove patches after 12 hours, # 90 patch(es), 11 Refill(s), Type: Maintenance, Pharmacy: [facility], 3 patch(es) Topical daily,x30 day(s),Instr:remove patches after 12 hours  valved holding chamber spacer: valved holding chamber spacer, See Instructions, Instructions: use with albuterol, Supply, # 1 EA, 0 Refill(s), Type: Maintenance, Pharmacy: [facility], use with albuterol   Problem list:    All Problems  Chronic back pain / SNOMED CT 241078906 / Confirmed  Cerebellar tonsillar ectopia / SNOMED CT 19433061 / Confirmed  Sebaceous cyst / SNOMED CT 3719884192 / Confirmed  Marijuana use / SNOMED CT 9456337577 / Confirmed  Anxiety / SNOMED CT 28441636 / Confirmed  Low back pain / SNOMED CT 029716706 / Confirmed  Asthma, moderate persistent / SNOMED CT 8325097046 / Confirmed  Myalgia / SNOMED CT 829617761 /  Confirmed  Dysplastic nevus / SNOMED CT 3541943184 / Confirmed  Obese / SNOMED CT 7202440704 / Probable  Chronic insomnia / SNOMED CT 044956632 / Confirmed  Smoking / SNOMED CT 380085671 / Confirmed  Tension headache / SNOMED CT 2451225326 / Confirmed  Tobacco user / SNOMED CT 541993487 / Probable  Trochanteric bursitis, right hip / SNOMED CT 84619054 / Confirmed      Histories   Procedure history:    Injection of cortisone (SNOMED CT 5911234298) performed by Joann on 6/8/2016 at 36 Years.  Transanal hemorrhoidal dearterialization (THD) performed by Vik Chavez MD on 9/8/2015 at 35 Years.  Esophagogastroduodenoscopy (SNOMED CT 704323126) on 8/4/2015 at 35 Years.  Comments:  8/5/2015 12:30 PM - Mata Merino MD  Normal  Colonoscopy (SNOMED CT 072735680) performed by Mata Merino MD on 8/4/2015 at 35 Years.  Comments:  8/13/2015 3:29 PM - Genia Mac RN  Sedation: MAC  Indication: rectal bleeding  External & internal hemorrhoids; otherwise normal  Resume routine screening at age 50.  Hospitalized at [facility] in 2002 at 22 Years.  Right knee arthroscopy.  Surgery on eyes x3.  Comments:  12/15/2010 4:16 PM - Genia Ely RN  as infant      Physical Examination   Vital Signs   9/10/2018 2:20 PM CDT Temperature Tympanic 97.7 DegF  LOW    Peripheral Pulse Rate 80 bpm    Pulse Site Radial artery    HR Method Manual    Systolic Blood Pressure 130 mmHg    Diastolic Blood Pressure 78 mmHg    Mean Arterial Pressure 95 mmHg    BP Site Right arm    BP Method Manual      Measurements from flowsheet : Measurements   9/10/2018 2:20 PM CDT    Weight Measured - Standard                280.8 lb     Respiratory:  Lungs are clear to auscultation.    Cardiovascular:  Normal rate, Regular rhythm.    Gastrointestinal:  Soft, Non-tender, Non-distended, rectal with external hemorrhoids. No anal fissure.    Musculoskeletal:  Normal gait.    Skin: inflammatory acne on the face      Review / Management   Results  review:  Lab results: 4/27/2018 2:54 PM CDT    Creatinine Level          0.98 mg/dL  .       Impression and Plan   Diagnosis     Acne (CUF99-SI L70.9).     Hemorrhoids (UPM69-TB K64.9).       Hemorrhoids: Anusol with Miralax. Follow up if not improving and consider General Surgery consult  Acne: start clindamycin cream    Requested a trochanteric bursa steroid injection. Discussed need to further evaluate and return for possible injection. Then asked for Toradol 60mg IM and given

## 2022-02-15 NOTE — TELEPHONE ENCOUNTER
---------------------  From: Rema Ruffin   To: Wilber ORTEGA Mata;     Sent: 9/16/2019 10:50:55 AM CDT  Subject: General Message     For your information, we have no record that your patient completed the MRI LUMBAR SPINE.  Per [facility], the procedure was cancelled.  Rema RuffinNoted

## 2022-02-15 NOTE — PROGRESS NOTES
Chief Complaint  Patient presents today for a cyst removal located on his right lateral mouth. The cyst has been present x 6 months. It is not painful but is annoying. He woke up with a pressure headache this AM, took Tylenol. She started a new fabric softner recently.  History of Present Illness  Patient is here for removal of an epidermoid cyst in the left cheek. ?Is been present for some time. ?Occasionally gets inflamed and enlarged.  Allergies  Bee Stings  Latex  Physical Exam  Vitals & Measurements  T:?97.5?(Tympanic)?  HR:?85?(Peripheral)?  BP:?142/94?  SpO2:?96%?  HT:?72.5?in?  WT:?279?lb?  BMI:?37.32?  Alert, oriented, no acute distress  6 mm subcutaneous cyst on the left cheek  Assessment/Plan  Epidermoid cyst  The area is prepped with Betadine. ?Anesthesia obtained with 1 cc of 1% lidocaine with epinephrine. ?A 4 mm incision was made over the cyst. ?The area was opened with blunt dissection and the cyst contents removed. ?The incision was closed with 2 interrupted 6-0 nylon sutures. ?Wound care as discussed. ?Follow-up in 1 week for removal of sutures.

## 2022-02-15 NOTE — NURSING NOTE
Comprehensive Intake Entered On:  11/6/2019 3:14 PM CST    Performed On:  11/6/2019 3:06 PM CST by Bruce Whiting CMA               Summary   Chief Complaint :   Pt here for post op visit for L4-L5 hemilaminotomy/Discectomy.  DOS 11/5/19.  Pt c/o swelling in back and pain since procedure.   Menstrual Status :   N/A   Weight Measured :   287 lb(Converted to: 287 lb 0 oz, 130.18 kg)    Height Measured :   72 in(Converted to: 6 ft 0 in, 182.88 cm)    Body Mass Index :   38.92 kg/m2 (HI)    Body Surface Area :   2.57 m2   Systolic Blood Pressure :   156 mmHg (HI)    Diastolic Blood Pressure :   88 mmHg (HI)    Mean Arterial Pressure :   111 mmHg   Peripheral Pulse Rate :   88 bpm   BP Site :   Right arm   Pulse Site :   Radial artery   Temperature Tympanic :   98.1 DegF(Converted to: 36.7 DegC)    Respiratory Rate :   16 br/min   Bruce Whiting CMA - 11/6/2019 3:06 PM CST   Health Status   Allergies Verified? :   Yes   Medication History Verified? :   Yes   Medical History Verified? :   Yes   Pre-Visit Planning Status :   Not completed   Tobacco Use? :   Current every day smoker   Tobacco Cessation Review :   Not ready to quit   Bruce Whiting CMA - 11/6/2019 3:06 PM CST   Meds / Allergies   (As Of: 11/6/2019 3:14:36 PM CST)   Allergies (Active)   Bananas  Estimated Onset Date:   Unspecified ; Created By:   Rowena Hooker LPN; Reaction Status:   Active ; Category:   Drug ; Substance:   Bananas ; Type:   Allergy ; Updated By:   Rowena Hooker LPN; Reviewed Date:   11/6/2019 3:14 PM CST      Bee Stings  Estimated Onset Date:   Unspecified ; Created By:   Ruthie Nam; Reaction Status:   Active ; Category:   Drug ; Substance:   Bee Stings ; Type:   Allergy ; Updated By:   Ruthie Nam; Reviewed Date:   11/6/2019 3:14 PM CST      Latex  Estimated Onset Date:   Unspecified ; Created By:   Ruthie Nam; Reaction Status:   Active ; Category:   Drug ; Substance:   Latex ; Type:   Allergy ; Updated By:   Viv  Ruthie; Reviewed Date:   11/6/2019 3:14 PM CST        Medication List   (As Of: 11/6/2019 3:14:36 PM CST)   Prescription/Discharge Order    methylPREDNISolone  :   methylPREDNISolone ; Status:   Completed ; Ordered As Mnemonic:   Medrol Dosepak 4 mg oral tablet ; Simple Display Line:   1 packet(s), Oral, once, as directed on package labeling, 21 tab(s), 0 Refill(s) ; Ordering Provider:   Cricket Jessica PA-C; Catalog Code:   methylPREDNISolone ; Order Dt/Tm:   10/1/2019 3:21:40 PM CDT          diazePAM  :   diazePAM ; Status:   Prescribed ; Ordered As Mnemonic:   diazePAM 5 mg oral tablet ; Simple Display Line:   5 mg, 1 tab(s), Oral, q8 hrs, PRN: as needed for anxiety, 10 tab(s), 0 Refill(s) ; Ordering Provider:   Cricket Jessica PA-C; Catalog Code:   diazePAM ; Order Dt/Tm:   10/1/2019 3:21:56 PM CDT          albuterol  :   albuterol ; Status:   Prescribed ; Ordered As Mnemonic:   ProAir HFA 90 mcg/inh inhalation aerosol ; Simple Display Line:   2 puff(s), inh, qid, PRN: as needed for wheezing, 1 EA, 2 Refill(s) ; Ordering Provider:   Nena King MD; Catalog Code:   albuterol ; Order Dt/Tm:   8/30/2018 2:42:03 PM CDT            Home Meds    aspirin-caffeine  :   aspirin-caffeine ; Status:   Processing ; Ordered As Mnemonic:   aspirin-caffeine 500 mg-32 mg oral tablet ; Action Display:   Complete ; Catalog Code:   aspirin-caffeine ; Order Dt/Tm:   11/6/2019 3:12:11 PM CST          cyclobenzaprine  :   cyclobenzaprine ; Status:   Documented ; Ordered As Mnemonic:   cyclobenzaprine 10 mg oral tablet ; Simple Display Line:   See Instructions, 0.5 tab(5mg) po TID PRN for muscle spasm, PRN: for spasm, 0 Refill(s) ; Catalog Code:   cyclobenzaprine ; Order Dt/Tm:   11/6/2019 1:46:42 PM CST          acetaminophen  :   acetaminophen ; Status:   Documented ; Ordered As Mnemonic:   acetaminophen 500 mg oral tablet ; Simple Display Line:   1,000 mg, 2 tab(s), Oral, q6 hrs, PRN: as needed for pain, 0 Refill(s) ; Catalog  Code:   acetaminophen ; Order Dt/Tm:   11/6/2019 1:37:34 PM CST          oxyCODONE  :   oxyCODONE ; Status:   Documented ; Ordered As Mnemonic:   oxyCODONE 5 mg oral tablet ; Simple Display Line:   See Instructions, take 1-2 tablets po every 4 hours PRN for pain, 0 Refill(s) ; Catalog Code:   oxyCODONE ; Order Dt/Tm:   11/6/2019 1:33:24 PM CST            Social History   Social History   (As Of: 11/6/2019 3:14:36 PM CST)   Alcohol:        Current   Comments:  6/16/2015 3:17 PM - Randall Salcedo MD: 6 drinks pks per weekend   (Last Updated: 6/16/2015 3:17:14 PM CDT by Randall Salcedo MD)          Tobacco:        Current   Comments:  6/16/2015 3:17 PM - Randall Salcedo MD: 1 ppd   (Last Updated: 6/16/2015 3:17:14 PM CDT by Randall Salcedo MD)          Substance Abuse:        Current   Comments:  6/16/2015 3:18 PM - Randall Salcedo MD: Marijuana   (Last Updated: 6/16/2015 3:18:44 PM CDT by Randall Salcedo MD)          Employment/School:        Work/School description: disabled.   Comments:  2/21/2011 5:54 PM - Wong Bashir MD: has 1 child- 10 weeks old -   (Last Updated: 2/21/2011 5:54:14 PM CST by Wong Bashir MD)          Home/Environment:        Lives with Self, Significant other.   Comments:  2/21/2011 5:54 PM - Wong Bashir MD: 2 kids in household   (Last Updated: 2/21/2011 5:54:34 PM CST by Wogn Bashir MD)

## 2022-02-15 NOTE — TELEPHONE ENCOUNTER
---------------------  From: iLv Daigle   To: [employer] ([id number] [facility] [place]);     Sent: 1/7/2021 3:58:24 PM CST  Subject: Referal Denial        PCP:   Dr Hearn      Time of Call:  1553       Person Calling:  Mando-Scheduling  Phone number:  _    Returned call at: _    Note:    FYI: Pt was refereed to [facility]. [facility] called stating that pt has been previously seen in clinic before and provider/clinic is refusing to see patient, stating there is nothing more they can do for him and pt should be refereed else where. Reason for refusal beyond this not provided.     Liv RN    Last office visit and reason:  _---------------------  From: Abbi Chong LPN ([employer] ([id number]))   To: Mata Hearn MD;     Sent: 1/7/2021 4:24:01 PM CST  Subject: FW: Referal Denialnoted

## 2022-02-15 NOTE — PROGRESS NOTES
Chief Complaint    verbal consent given for telemed visit.  med check, needs refills on Oxycodone.   Today's visit was conducted via telephone due to the COVID-19 pandemic.  Patient's consent to telephone visit was obtained and documented.   Call Start Time: 1641   Call End Time:   1647  History of Present Illness      Patient is requesting a refill of oxycodone.  He has been using it sparingly for the last couple of weeks.  He has spine MRI scheduled [facility] next week.  It will be done under sedation.  He has a pre-procedure exam scheduled for later this week.  Review of Systems      See HPI.  All other review of systems negative.  Assessment/Plan       Chronic back pain (M54.9)         I have refilled his medication for the next 10 days.  This should get him by until he has his appointment with spine surgery.  I have instructed he limit doses to no more than 3 daily.       Orders:         oxyCODONE, = 1 tab(s) ( 5 mg ), Oral, q8 hrs, x 10 day(s), # 30 tab(s), 0 Refill(s), Type: Acute, Pharmacy: [facility], 1 tab(s) Oral q8 hrs,x10 day(s), 72, in, 05/13/20 14:10:00 CDT, Height Measured, 307.4, lb, 05/13/20 14:10:00 CDT, We..., (Ordered)         oxyCODONE, = 1 tab(s) ( 5 mg ), Oral, q6 hrs, # 20 tab(s), 0 Refill(s), Type: Hard Stop, Pharmacy: [facility], (Completed)  Patient Information     Name:JOEL SUTTON      Address:      [address] [place] [phone]     Sex:Male     YOB: 1980     [phone]     Emergency Contact:[facility], CONTACT     MRN:[id number]     FIN:[id number]     Location:[facility]     Date of Service:06/01/2020      Primary Care Physician:       Jaskaran CONWAY, Cricket MEDINA, [phone]      Attending Physician:       Mata Hearn MD, [phone]  Problem List/Past Medical History    Ongoing     Anxiety     Asthma, moderate persistent     Cerebellar tonsillar  ectopia     Chronic back pain     Chronic insomnia     Dysplastic nevus       Comments: removed from back     Low back pain     Marijuana use     Myalgia     Obese     Sebaceous cyst     Smoking     Tension headache     Tobacco user     Trochanteric bursitis, right hip    Historical     No qualifying data  Procedure/Surgical History     Injection of cortisone (06/08/2016)     Transanal hemorrhoidal dearterialization (THD) (09/08/2015)     Colonoscopy (08/04/2015)      Comments: Sedation: MAC      Indication: rectal bleeding      External & internal hemorrhoids; otherwise normal      Resume routine screening at age 50..     Esophagogastroduodenoscopy (08/04/2015)      Comments: Normal.     Hospitalized at [facility] (2002)     Right knee arthroscopy     Surgery on eyes x3      Comments: as infant.  Medications    acetaminophen 500 mg oral tablet, 1000 mg= 2 tab(s), Oral, q6 hrs, PRN,   Not taking    diazePAM 5 mg oral tablet, 5 mg= 1 tab(s), Oral, q8 hrs, PRN, 1 refills,   Still taking, not as prescribed: takes only as needed.    Orthotics, See Instructions    oxyCODONE 5 mg oral tablet, 5 mg= 1 tab(s), Oral, q8 hrs    Ventolin HFA 90 mcg/inh inhalation aerosol, 2 puff(s), NEB, qid, PRN  Allergies    Bananas    Bee Stings    Latex  Social History    Smoking Status - 06/01/2020     Current every day smoker     Alcohol      Past, 02/13/2020     Employment/School      Work/School description: disabled., 02/21/2011     Home/Environment      Lives with Self, Significant other., 02/21/2011     Substance Abuse      Current, 06/16/2015     Tobacco      Current, 06/16/2015  Family History    Diabetes mellitus type 2: Mother.    Heart disease: Father.  Immunizations      Vaccine Date Status          tetanus/diphth/pertuss (Tdap) adult/adol 11/17/2016 Given          influenza virus vaccine, inactivated 11/17/2016 Given          influenza virus vaccine, inactivated 01/06/2015 Given

## 2022-02-15 NOTE — NURSING NOTE
Comprehensive Intake Entered On:  11/19/2019 5:27 PM CST    Performed On:  11/19/2019 5:21 PM CST by Abbi Chong LPN               Summary   Chief Complaint :   Suture removel on back. Surgery was on the 11/05/19.    Menstrual Status :   N/A   Weight Measured :   288 lb(Converted to: 288 lb 0 oz, 130.63 kg)    Height Measured :   72 in(Converted to: 6 ft 0 in, 182.88 cm)    Body Mass Index :   39.06 kg/m2 (HI)    Body Surface Area :   2.57 m2   Systolic Blood Pressure :   146 mmHg (HI)    Diastolic Blood Pressure :   84 mmHg (HI)    Mean Arterial Pressure :   105 mmHg   Peripheral Pulse Rate :   74 bpm   BP Site :   Right arm   Pulse Site :   Radial artery   BP Method :   Manual   HR Method :   Manual   Temperature Tympanic :   98.5 DegF(Converted to: 36.9 DegC)    Abbi Chong LPN - 11/19/2019 5:21 PM CST   Health Status   Allergies Verified? :   Yes   Medication History Verified? :   Yes   Pre-Visit Planning Status :   Completed   Abbi Chong LPN - 11/19/2019 5:21 PM CST   Consents   Consent for Immunization Exchange :   Consent Granted   Consent for Immunizations to Providers :   Consent Granted   Abbi Chong LPN - 11/19/2019 5:21 PM CST   Meds / Allergies   (As Of: 11/19/2019 5:27:28 PM CST)   Allergies (Active)   Bananas  Estimated Onset Date:   Unspecified ; Created By:   Rowena Hooker LPN; Reaction Status:   Active ; Category:   Drug ; Substance:   Bananas ; Type:   Allergy ; Updated By:   Rowena Hooker LPN; Reviewed Date:   11/19/2019 5:25 PM CST      Bee Stings  Estimated Onset Date:   Unspecified ; Created By:   Ruthie Nam; Reaction Status:   Active ; Category:   Drug ; Substance:   Bee Stings ; Type:   Allergy ; Updated By:   Ruthie Nam; Reviewed Date:   11/19/2019 5:25 PM CST      Latex  Estimated Onset Date:   Unspecified ; Created By:   Ruthie Nam; Reaction Status:   Active ; Category:   Drug ; Substance:   Latex ; Type:   Allergy ; Updated By:   Viv  Ruthie; Reviewed Date:   11/19/2019 5:25 PM CST        Medication List   (As Of: 11/19/2019 5:27:28 PM CST)   Prescription/Discharge Order    diazePAM  :   diazePAM ; Status:   Prescribed ; Ordered As Mnemonic:   diazePAM 5 mg oral tablet ; Simple Display Line:   5 mg, 1 tab(s), Oral, q8 hrs, PRN: as needed for anxiety, 10 tab(s), 0 Refill(s) ; Ordering Provider:   Jaskaran CONWAY, Cricket MEDINA; Catalog Code:   diazePAM ; Order Dt/Tm:   10/1/2019 3:21:56 PM CDT          albuterol  :   albuterol ; Status:   Prescribed ; Ordered As Mnemonic:   ProAir HFA 90 mcg/inh inhalation aerosol ; Simple Display Line:   2 puff(s), inh, qid, PRN: as needed for wheezing, 1 EA, 2 Refill(s) ; Ordering Provider:   Nena King MD; Catalog Code:   albuterol ; Order Dt/Tm:   8/30/2018 2:42:03 PM CDT            Home Meds    polyethylene glycol 3350  :   polyethylene glycol 3350 ; Status:   Documented ; Ordered As Mnemonic:   MiraLax oral powder for reconstitution ; Simple Display Line:   17 gm, Oral, daily, 0 Refill(s) ; Catalog Code:   polyethylene glycol 3350 ; Order Dt/Tm:   11/8/2019 3:39:43 PM CST          cyclobenzaprine  :   cyclobenzaprine ; Status:   Documented ; Ordered As Mnemonic:   cyclobenzaprine 10 mg oral tablet ; Simple Display Line:   See Instructions, 0.5 tab(5mg) po TID PRN for muscle spasm, PRN: for spasm, 0 Refill(s) ; Catalog Code:   cyclobenzaprine ; Order Dt/Tm:   11/6/2019 1:46:42 PM CST          acetaminophen  :   acetaminophen ; Status:   Documented ; Ordered As Mnemonic:   acetaminophen 500 mg oral tablet ; Simple Display Line:   1,000 mg, 2 tab(s), Oral, q6 hrs, PRN: as needed for pain, 0 Refill(s) ; Catalog Code:   acetaminophen ; Order Dt/Tm:   11/6/2019 1:37:34 PM CST          oxyCODONE  :   oxyCODONE ; Status:   Documented ; Ordered As Mnemonic:   oxyCODONE 5 mg oral tablet ; Simple Display Line:   See Instructions, take 1-2 tablets po every 4 hours PRN for pain, 0 Refill(s) ; Catalog Code:   oxyCODONE ;  Order Dt/Tm:   11/6/2019 1:33:24 PM CST

## 2022-02-15 NOTE — LETTER
(Inserted Image. Unable to display)   October 15, 2019      JOEL SUTTON  [address] [place] [phone]        Dear JOEL,      Thank you for selecting [facility] for your healthcare needs.     MRI Lumbar spine:    CONCLUSION:  Study challenged by motion artifact, with these findings:  1.  Moderate to marked L4-5 disc degeneration with moderate broad rightward disc  herniation impinging L5 and S1 nerve roots.  2.  Marked L5-S1 disc degeneration without neural compression.  3.  Small right L1-2 disc protrusion without neural impingement.    Please make a follow up appointment to discuss          Please contact me or my assistant at [phone] if you have any questions or concerns.     Sincerely,        Shawn Merino MD

## 2022-02-15 NOTE — PROGRESS NOTES
Chief Complaint    verbal consent given for telemed visit.  pain mgt f/u, refill Oxycodone.  also under tremendous stress, unsure of current living situation, has lack of sleep d/t neighbors.  History of Present Illness       Telephone visit from my office to his home occurring 10:40 AM to 11 AM       Patient is requesting oxycodone for his back pain       He has chronic back pain he has received from his regular physician 20 oxycodone had been once a month although his last refill was now 2 weeks ago.  Says it just flared he attributes that to a lot of stress in his life.  Is having trouble with neighbors.  He is worried about having a home for his 2 children and has not had good response from their mother.       He has not done any particular new activities he has not had any bowel or bladder dysfunction and no radiation of the pain down his legs.  He feels like taking oxycodone allows him to be productive and do the daily things he needs to do to take care of himself and his kids he does not think he is ever taken more than 2 in a day  Review of Systems       See HPI.  All other review of systems negative.  Physical Exam   Vitals & Measurements    HT: 72 in        Alert and normal cognition  Assessment/Plan       1. Anxiety (F41.1)          We talked about treating his anxiety possible helping some of his pain however he feels like he has been on multiple meds and does not want to add anything at this point he has seen counselors and is not interested in seeing a new one he will try to resolve some of his legal issues       2. Chronic back pain (M54.9)          I have given him 20 oxycodone and encouraged him to see his regular provider when he is back from vacation       Orders:         oxyCODONE, = 1 tab(s) ( 5 mg ), Oral, q8 hrs, PRN: as needed for pain, # 20 tab(s), 0 Refill(s), Type: Hard Stop, Pharmacy: [facility], 72, in, 06/09/21 10:04:00 CDT, Height Measured, 314, lb, 06/09/21  10:04:00 CDT, Weight Measured, (Completed)         oxyCODONE, = 1 tab(s) ( 5 mg ), Oral, q8 hrs, PRN: as needed for pain, # 20 tab(s), 0 Refill(s), Type: Maintenance, Pharmacy: [facility], 1 tab(s) Oral q8 hrs,PRN:as needed for pain, 72, in, 06/09/21 10:04:00 CDT, Height Measured, 314, l..., (Ordered)  Patient Information     Name:JOEL SUTTON      Address:      [address] [place] [address] [address] [place] [id number]     Sex:Male     YOB: 1980     [phone]     Emergency Contact:[facility], CONTACT     MRN:[id number]     FIN:[id number]     Location:[facility]     Date of Service:11/24/2021      Primary Care Physician:       Mata Hearn MD, [phone]      Attending Physician:       Jules Garcia MD, [phone]  Problem List/Past Medical History    Ongoing     Anxiety     Asthma, moderate persistent     Cerebellar tonsillar ectopia     Chronic back pain     Chronic insomnia     Degenerative lumbar disc     Dysplastic nevus       Comments: removed from back     Low back pain     Marijuana use     Myalgia     Myofascial pain syndrome     Obese     Sebaceous cyst     Smoking     Tension headache     Tobacco user     Trochanteric bursitis, right hip    Historical     No qualifying data  Procedure/Surgical History     Injection of cortisone (06/08/2016)     Transanal hemorrhoidal dearterialization (THD) (09/08/2015)     Colonoscopy (08/04/2015)      Comments: Sedation: MAC      Indication: rectal bleeding      External & internal hemorrhoids; otherwise normal      Resume routine screening at age 50..     Esophagogastroduodenoscopy (08/04/2015)      Comments: Normal.     Hospitalized at [facility] unit (2002)     Right knee arthroscopy     Surgery on eyes x3      Comments: as infant.  Medications    acetaminophen 500 mg oral tablet, 1000 mg= 2 tab(s), Oral, q6 hrs, PRN    Acular 0.5% ophthalmic solution, 1 drop(s), Eye-Right, qid, PRN, 1  refills    albuterol 90 mcg/inh inhalation aerosol, 2 puff(s), Inhale, qid, 3 refills    cyclobenzaprine 10 mg oral tablet, 10 mg= 1 tab(s), Oral, bid, PRN, 1 refills    diazePAM 5 mg oral tablet, 5 mg= 1 tab(s), Oral, daily, PRN    diclofenac 3% topical gel, 3 gm, Topical, qid, 2 refills    Orthotics, See Instructions    oxyCODONE 5 mg oral tablet, 5 mg= 1 tab(s), Oral, q8 hrs, PRN    tiZANidine 2 mg oral tablet  Allergies    Bananas    Bee Stings    Latex  Social History    Smoking Status     Current every day smoker     Alcohol      Past     Electronic Cigarette/Vaping      Electronic Cigarette Use: Never.     Employment/School      Work/School description: disabled.     Home/Environment      Lives with Self, Significant other.     Substance Abuse      Current     Tobacco      10 or more cigarettes (1/2 pack or more)/day in last 30 days  Family History    Diabetes mellitus type 2: Mother.    Heart disease: Father.  Immunizations          Other Immunizations          Administration Dosage Date(s)          influenza virus vaccine, inactivated          01/06/2015, 11/17/2016          tetanus/diphth/pertuss (Tdap) adult/adol          11/17/2016

## 2022-02-15 NOTE — PROGRESS NOTES
Patient:   JOEL SUTTON            MRN: [id number]            FIN: [id number]               Age:   40 years     Sex:  Male     :  1980   Associated Diagnoses:   None   Author:   Mata Hearn MD       -   Today's date:    2020.        -   To whom it may concern:        This patient is currently under my care and for chronic muscular pain.  A larger whirlpool type tub in his apartment would be helpful in treating his pain syndrome..  Please contact me if you have any questions or concerns.      -   Shawn Hearn MD

## 2022-02-15 NOTE — NURSING NOTE
Asthma Assessment Entered On:  10/20/2020 6:22 PM CDT    Performed On:  10/20/2020 6:21 PM CDT by Fariba Roth MA               History   ED Visits Past Month Asthma :   0   ED Visits Past Year Asthma :   0   Hospitalizations Past Year Asthma :   0   Number of Endotracheal Intubations :   0   Co-Morbidities Asthma :   Obesity   Fariba Roth MA - 10/20/2020 6:21 PM CDT

## 2022-02-15 NOTE — TELEPHONE ENCOUNTER
Entered by Mata Hearn MD on June 02, 2021 2:16:34 PM CDT  noted      ---------------------  From: Flavio ADDISON, Polina BYNUM   To: Mata Hearn MD;     Sent: 5/27/2021 9:28:20 PM CDT  Subject: General Message     Hi Dr. Hearn,  I wanted to let you know that I met with this patient this past week and we discussed all that is going on for him. He isn't quite sure therapy would be beneficial for him right now, but he did agree to meeting with me again next week. Let me know if you have any questions!  Thanks,  Polina

## 2022-02-15 NOTE — PROGRESS NOTES
Chief Complaint    Patient presents with ongoing lower back pain. Saw GTG 02/07/2018. Requests morphine again.  History of Present Illness      Patient seen today in clinic as a walk-in.  He presented to clinic with report of severe back pain and requesting a another injection of morphine.  Patient reports that transportation to things like physical therapy is been challenging because he has a history of post traumatic brain injury.  He does not have a car and says that he thinks that he might be able to feel comfortable driving in town but that driving as far as [place] would just be overwhelming for him.  It would cause way too much anxiety.  He reports that he supposed to get a steroid epidural injection done.  In the meantime however his pain is severe.  He reports the pain is grinding and severe sometimes sharp and shooting and will radiate from his mid lower back down his leg.  He also has pain in his thoracic back area.  He would be interested in trying acupuncture but unfortunately it is not covered by his insurance.  He is living on disability check and says that he cannot afford having procedures done that are not covered by his insurance.       He has no saddle paresthesias and no loss of control of bowel or bladder.  He finds that his position of greatest comfort is laying down with his feet up.       Review of systems is as per HPI and otherwise negative.       On exam general patient is in moderate distress secondary to pain.  He is grimacing and pacing around the room and bending over the exam table trying to find a position of comfort.  Chest has bilateral rise with no increased work of breathing cardiovascular normal perfusion and brisk capillary refill gait is normal he is moving all 4 extremities when turning attention to his back he has decreased flexion and extension tenderness over his bilateral sacroiliac joints his mid lumbar spine region and then both his bilateral thoracic and lumbar  paraspinal muscles.  Palpation over the mid lumbar spine does not reveal any step-offs.  In fact he seems to have some trigger points in the paraspinal muscles in this area that seem to be contributing to his pain.  Neuro cranial nerves II through XII are grossly intact he has no gross focal neurological deficits.  Psych patient seems to have poor insight into his condition.  Unable to determine if this is residual to his reported history of traumatic brain injury.  He initially seems very agitated secondary to the pain but following our trigger point injections he seems much more calm.       Review and management review of chart shows that he has some lumbar facet arthropathy as well as some central canal impingement and some foraminal stenosis in his lumbar area.       Procedure trigger point injection       Discussed with patient risks of pain bleeding infection injury to surrounding tissue and need for further treatment benefit would be that we would hope to improve some of the myofascial myalgia component of his pain however injections would be unlikely to help with his facet arthropathy or foraminal stenosis or central canal impingement.  However there does seem to be areas where myofascial pain is contributing to the severity of his pain at this time.  Alternatives would be include doing nothing his currently prescribed oral medications therapy, follow-up with his PCP.  He also reports that he has been referred to get a steroid epidural injection.  I did inform him that this would not be in place of the benefit that he might receive from a steroid epidural injection.  Alternatives include doing nothing, pleural to physical therapy, follow-up with his PCP.  Patient did decide to try her point injections today.       Procedure note trigger point injections       Areas to be injected were located by my palpation with communication palpable symptomatic trigger points.       Location include 3 right lumbar  paraspinal muscle trigger points, one left lumbar paraspinal trigger point, left thoracic.  Spinal trigger point, left gluteus jyoti trigger point.  A total of 8 mL of a one-to-one solution of lidocaine 1% without epinephrine and 0.5% Marcaine was used       Following the procedure patient reported that his pain was still present but had improved.  He seems significantly less agitated and asked when he could come back for further injections.  I informed patient that if the areas that we injected today provided relief lasting longer than 8-12 hours then it might be worthwhile to try these injections again.  I would not expect that any trigger points not injected today to have any benefit.       Complications none       Of note I did have to ask patient to stop texting on his cell phone during our procedure so that we would be able to communicate and perform the procedure.       Assessment and plan chronic pain multifactorial in nature including elements of myofascial pain and facet arthropathy and a lumbar stenosis and degenerative disc disease with central spinal cord impingement found on MRI.  Adding to this complexity this patient's history of traumatic brain injury.  I informed patient that I would not mix a narcotic with his diazepam.  We did not yet discuss that chronic opiates are poor choice for somebody with a traumatic brain injury.  Patient is welcome to follow-up with me as needed he can certainly follow up with his primary care provider as expected  Physical Exam   Vitals & Measurements    T: 98.1(Tympanic)  HR: 91(Peripheral)  BP: 124/82  SpO2: 97%     HT: 72.5 in  WT: 281 lb   Patient Information     Name:JOEL SUTTON      Address:      [address] [place] [id number]     Sex:Male     YOB: 1980     [phone]     Emergency Contact:LEESA SUTTON     MRN:[id number]     FIN:[id number]     Location:[facility]     Date of  Service:02/09/2018      Primary Care Physician:       Dhiraj ORTEGA, Maat, [phone]  Problem List/Past Medical History    Ongoing     Anxiety     Cerebellar tonsillar ectopia     Chronic insomnia     Low back pain     Obese     Smoking     Tobacco user    Historical  Procedure/Surgical History     Injection of cortisone (06/08/2016)     Transanal hemorrhoidal dearterialization (THD) (09/08/2015)     Colonoscopy (08/04/2015)     Esophagogastroduodenoscopy (08/04/2015)     Hospitalized at [facility] (2002)     Right knee arthroscopy     Surgery on eyes x3  Medications        ProAir HFA 90 mcg/inh inhalation aerosol: 2 puff(s), inh, qid, PRN: as needed for wheezing, 1 EA, 2 Refill(s).        Qvar 80 mcg/inh inhalation aerosol: 1 puff(s), inh, bid, 1 EA, 3 Refill(s).        Vitamin B-12 1000 mcg oral tablet: 1,000 mcg, 1 tab(s), po, daily, 0 Refill(s).        cyclobenzaprine 10 mg oral tablet: 10 mg, 1 tab(s), PO, TID, PRN: for spasm, 30 tab(s), 0 Refill(s).        diazePAM 5 mg oral tablet: See Instructions, TAKE ONE TABLET BY MOUTH THREE TIMES A DAY AS NEEDED FOR ANXIETY, 10 tab(s), 0 Refill(s).        gabapentin 300 mg oral capsule: See Instructions, 1 cap(s) po qd on day 1  1 cap po bid on day 2  then 1 cap po tid, 90 EA, 1 Refill(s).        hydrocortisone 2.5% topical cream: 1 mal, top, tid, 30 gm, 1 Refill(s).        Advil 200 mg oral tablet: 400 mg, 2 tab(s), po, q8 hrs, PRN: as needed for pain, 0 Refill(s).                Allergies    Bee Stings    Latex  Social History    Smoking Status - 02/09/2018     Current every day smoker     Alcohol - Denies Alcohol Use, 12/04/2017      Current     Employment and Education - 12/04/2017      Work/School description: disabled.     Home and Environment - 12/04/2017      Lives with Self, Significant other.     Substance Abuse - 12/04/2017      Current     Tobacco - Current, 12/04/2017      Current  Family History    Diabetes mellitus type 2: Mother.    Heart  disease: Father.  Immunizations      Vaccine Date Status      tetanus/diphth/pertuss (Tdap) adult/adol 11/17/2016 Given      influenza virus vaccine, inactivated 11/17/2016 Given      influenza virus vaccine, inactivated 01/06/2015 Given  Lab Results      Results (Last 90 days)      No results located.

## 2022-02-15 NOTE — PROGRESS NOTES
Paged [phone] and called back 1811. Continuing to have back pain. Toradol IM has helped and requesting. Discussed it can only be done in clinic and ER. Will go to ER tonight if needed otherwise follow up in clinic in the morning.

## 2022-02-15 NOTE — PROGRESS NOTES
Patient:   JOEL SUTTON            MRN: [id number]            FIN: [id number]               Age:   38 years     Sex:  Male     :  1980   Associated Diagnoses:   None   Author:   Nena King MD      Visit Information      Date of Service: 2018 12:40 pm  Performing Location: [facility]  Encounter#: [id number]      Primary Care Provider (PCP):  Nena King MD    NPI# [id number]      Referring Provider:  Nena King MD, NPI# [id number]      Procedure   Procedure   Time.     Indication: myofascial pain.     Informed consent: signed by patient.     Confirmed: patient, procedure, side, site.     Type of procedure: trigger point injection.     Physical Exam: vital signs Vital Signs   2018 12:56 PM CST Temperature Tympanic 98.3 DegF    Peripheral Pulse Rate 70 bpm    Systolic Blood Pressure 130 mmHg    Diastolic Blood Pressure 84 mmHg  HI    Mean Arterial Pressure 99 mmHg    BP Site Right arm    BP Method Manual    Oxygen Saturation 98 %   2018 12:46 PM CST Temperature Tympanic 97.8 DegF  LOW    Peripheral Pulse Rate 81 bpm    Pulse Site Brachial artery    HR Method Electronic    Systolic Blood Pressure 125 mmHg    Diastolic Blood Pressure 87 mmHg  HI    Mean Arterial Pressure 100 mmHg    BP Site Right arm    BP Method Electronic      .     Preparation and technique: informed consent obtained, position sitting, sterile preparation of site with 70 % alcohol, hemostasis achieved using direct pressure, estimated blood loss minimal, adhesive bandagedressing applied.     Findings: painful and tender myofascial trigger points identified by palpation with communication from patient    Location:bilateral occipital head and mid lower back    Number of injections:3    pain prior to procedure:    moderate     pain following procedure:improved    number of milliliters of the 1:1 solution of 1% lidocaine without epiniephrine and 0.5% marcaine without epinephrine used in total: .      Procedure tolerated: well.     No Complications.     No qualifying data available.          Impression and Plan   Diagnosis     return to clinic if symptoms worsen or do not improve.     Counseled:  Patient, Family, use ice or heat as needed to help with pain, continue with home exercise program, .

## 2022-02-15 NOTE — NURSING NOTE
Comprehensive Intake Entered On:  10/9/2020 11:40 AM CDT    Performed On:  10/9/2020 11:33 AM CDT by Bruce Whiting CMA               Summary   Chief Complaint :   Pt here for Right eye edema,pain and some discharge that started this morning.   Menstrual Status :   N/A   Weight Measured :   305 lb(Converted to: 305 lb 0 oz, 138.346 kg)    Height Measured :   72 in(Converted to: 6 ft 0 in, 182.88 cm)    Body Mass Index :   41.36 kg/m2 (HI)    Body Surface Area :   2.65 m2   Height/Length Estimated :   72 in(Converted to: 6 ft 0 in, 182.88 cm)    Systolic Blood Pressure :   142 mmHg (HI)    Diastolic Blood Pressure :   98 mmHg (HI)    Mean Arterial Pressure :   113 mmHg   Peripheral Pulse Rate :   64 bpm   BP Site :   Right arm   Pulse Site :   Radial artery   Respiratory Rate :   16 br/min   Bruce Whiting CMA - 10/9/2020 11:33 AM CDT   Health Status   Allergies Verified? :   Yes   Medication History Verified? :   Yes   Medical History Verified? :   Yes   Pre-Visit Planning Status :   Not completed   Tobacco Use? :   Current every day smoker   Bruce Whiting CMA - 10/9/2020 11:33 AM CDT   Meds / Allergies   (As Of: 10/9/2020 11:40:36 AM CDT)   Allergies (Active)   Bananas  Estimated Onset Date:   Unspecified ; Created By:   Rowena Hooker RN; Reaction Status:   Active ; Category:   Drug ; Substance:   Bananas ; Type:   Allergy ; Updated By:   Rowena Hooker RN; Reviewed Date:   10/9/2020 11:38 AM CDT      Bee Stings  Estimated Onset Date:   Unspecified ; Created By:   Ruthie Nam CMA; Reaction Status:   Active ; Category:   Drug ; Substance:   Bee Stings ; Type:   Allergy ; Updated By:   Ruthie Nam CMA; Reviewed Date:   10/9/2020 11:38 AM CDT      Latex  Estimated Onset Date:   Unspecified ; Created By:   Ruthie Nam CMA; Reaction Status:   Active ; Category:   Drug ; Substance:   Latex ; Type:   Allergy ; Updated By:   Ruthie Nam CMA; Reviewed Date:   10/9/2020 11:38 AM CDT        Medication List    (As Of: 10/9/2020 11:40:36 AM CDT)   Prescription/Discharge Order    oxyCODONE  :   oxyCODONE ; Status:   Prescribed ; Ordered As Mnemonic:   oxyCODONE 5 mg oral tablet ; Simple Display Line:   5 mg, 1 tab(s), Oral, q8 hrs, PRN: as needed for pain, 20 tab(s), 0 Refill(s) ; Ordering Provider:   Mata Hearn MD; Catalog Code:   oxyCODONE ; Order Dt/Tm:   9/10/2020 1:07:15 PM CDT          diclofenac topical  :   diclofenac topical ; Status:   Prescribed ; Ordered As Mnemonic:   diclofenac 1% topical gel ; Simple Display Line:   2 gm, Topical, qid, 240 gm, 2 Refill(s) ; Ordering Provider:   Mata Hearn MD; Catalog Code:   diclofenac topical ; Order Dt/Tm:   8/5/2020 4:32:01 PM CDT          albuterol  :   albuterol ; Status:   Prescribed ; Ordered As Mnemonic:   Ventolin HFA 90 mcg/inh inhalation aerosol ; Simple Display Line:   2 puff(s), NEB, qid, PRN: AS NEEDED FOR WHEEZING, 1 EA, 0 Refill(s) ; Ordering Provider:   Mata Hearn MD; Catalog Code:   albuterol ; Order Dt/Tm:   4/9/2020 2:03:28 PM CDT          Miscellaneous Rx Supply  :   Miscellaneous Rx Supply ; Status:   Prescribed ; Ordered As Mnemonic:   Orthotics ; Simple Display Line:   See Instructions, to use as directed with shoes, 2 EA, 0 Refill(s) ; Ordering Provider:   Mata Hearn MD; Catalog Code:   Miscellaneous Rx Supply ; Order Dt/Tm:   2/13/2020 3:16:54 PM CST            Home Meds    acetaminophen  :   acetaminophen ; Status:   Documented ; Ordered As Mnemonic:   acetaminophen 500 mg oral tablet ; Simple Display Line:   1,000 mg, 2 tab(s), Oral, q6 hrs, PRN: as needed for pain, 0 Refill(s) ; Catalog Code:   acetaminophen ; Order Dt/Tm:   11/6/2019 1:37:34 PM CST            Vision Testing POC   Corrective Lenses :   None   Eye, Left with Correction Visual Acuity :   20/25   Eye, Right with Correction Visual Acuity :   20/40   Eye, Bilat w/Correction Visual Acuity :   20/25   Bruce Whiting CMA - 10/9/2020 11:33 AM CDT   ID Risk  Screen   Recent Travel History :   No recent travel   Family Member Travel History :   No recent travel   Other Exposure to Infectious Disease :   Unknown   Bruce Whiting CMA - 10/9/2020 11:33 AM CDT   Social History   Social History   (As Of: 10/9/2020 11:40:36 AM CDT)   Alcohol:        Past   Comments:  6/16/2015 3:17 PM - Randall Salcedo MD: 6 drinks pks per weekend   (Last Updated: 2/13/2020 3:10:22 PM CST by Fariba Roth MA)          Tobacco:        Current   Comments:  6/16/2015 3:17 PM - Randall Salcedo MD: 1 ppd   (Last Updated: 6/16/2015 3:17:14 PM CDT by Randall Salcedo MD)          Substance Abuse:        Current   Comments:  6/16/2015 3:18 PM - Randall Salcedo MD: Marijuana   (Last Updated: 6/16/2015 3:18:44 PM CDT by Randall Salcedo MD)          Employment/School:        Work/School description: disabled.   Comments:  2/21/2011 5:54 PM - Wong Bashir MD: has 1 child- 10 weeks old -   (Last Updated: 2/21/2011 5:54:14 PM CST by Wong Bashir MD)          Home/Environment:        Lives with Self, Significant other.   Comments:  2/21/2011 5:54 PM - Wong Bashir MD: 2 kids in household   (Last Updated: 2/21/2011 5:54:34 PM CST by Wong Bashir MD)

## 2022-02-15 NOTE — RESULTS
-------------------------------- Original Report -------------------------------    EXAM: LIMITED MR LUMBAR SPINE WITHOUT CONTRAST    CLINICAL INFORMATION: Back pain and lower extremity pain.  Surgery in 2019 for  herniated disc.    COMPARISON:  MRI lumbar spine 10/14/2019.    TECHNICAL INFORMATION: Limited T1 and T2-weighted sagittal sections were  obtained through the lumbar spine in the sitting neutral position.  Images were  acquired on a 0.6 Lida open upright MRI system.  The patient was unable to  tolerate further imaging due to claustrophobia.    INTERPRETATION: There is motion degradation on the available images.  5 lumbar  levels is seen for this dictation.  Conus medullaris terminates at approximately  L1.  No evidence of acute fracture.    L5-S1: Moderate to advanced disc degeneration.  Mild disc bulging.  No central  stenosis.  Left foraminal stenosis.    L4-5: Prior laminectomy noted.  Moderate to advanced right asymmetric disc  degeneration Possible right posterior disc herniation with mild to moderate  central and right subarticular recess stenosis.  Possible mild right foraminal  stenosis.    L2-3 and L3-4: No obvious pathology.    L1-2: Small right paracentral disc protrusion without obvious impingement.    T10-11 to T12-L1: No obvious pathology.    CONCLUSION: Limited images obtained due to patient claustrophobia.  Postsurgical  changes at L4-5 with the following findings compared to 10/14/2019:  1.  There may be a recurrent right posterior disc herniation narrowing the right  subarticular recess.  Consider repeat imaging attempt if the patient is able to  tolerate.  2.  Moderate/advanced disc degeneration at L4-5 and L5-S1, which appears  slightly progressed at L4-5.  3.  Stable L1-2 right paracentral disc protrusion.      Read by: Long Roldan M.D.  Reviewed and Electronically Signed by: Long Roldan M.D.

## 2022-02-15 NOTE — PROGRESS NOTES
Patient:   JOEL SUTTON            MRN: [id number]            FIN: [id number]               Age:   38 years     Sex:  Male     :  1980   Associated Diagnoses:   Trochanteric bursitis, right hip; Low back pain   Author:   Jules Garcia MD      Chief Complaint   2018 1:11 PM CDT     pt here for tirgger point injections, usually gets them with       History of Present Illness   see chief complaint as noted above and confirmed with the patient     38 year old male presents today with request for trigger point injections for pain.  cares for him and has done many trigger point injections for him, he feels they give him relief to his everyday aches and pains. He recently was prescribed Prednisone by  to help with inflammation, he has not picked this up from the [facility] yet. Today he has pains in his right lower back, pain in his right upper back. He say's he has been reffered to a pain clinic at [place] and it is up to him to fill out the paperwork, he has had some troubles filling out paperwork so she informed him to schedule an appointment with care coordinators who would help him to get the paperwork filled out. He also has right leg pain and mentions that he has bilateral ear pain that started yesturday.       Review of Systems   Constitutional:  No fever.    Ear/Nose/Mouth/Throat:  No sore throat     Ear pain: Bilateral.    Respiratory:  No shortness of breath, No cough.    Gastrointestinal:  No nausea, No vomiting, No diarrhea.    Musculoskeletal:  right hip pain .         Back pain: On the right side, In the lower region.    Integumentary:  No rash.    Neurologic:  Alert and oriented X4, No headache.       Health Status   Allergies:    Allergic Reactions (Selected)  Severity Not Documented  Bee Stings (No reactions were documented)  Latex (No reactions were documented)   Medications:  (Selected)   Prescriptions  Prescribed  DULoxetine 60 mg oral delayed release  capsule: 1 cap(s) ( 60 mg ), po, daily, # 60 cap(s), 1 Refill(s), Type: Maintenance, Pharmacy: [facility], 1 cap(s) po daily  ProAir HFA 90 mcg/inh inhalation aerosol: 2 puff(s), inh, qid, PRN: as needed for wheezing, # 1 EA, 2 Refill(s), Type: Maintenance, Pharmacy: [facility], 2 puff(s) inh qid,PRN:as needed for wheezing  Qvar 80 mcg/inh inhalation aerosol: 1 puff(s), inh, bid, # 1 EA, 5 Refill(s), Type: Maintenance, Pharmacy: [facility], 1 puff(s) inh bid  Shoes for low back pain: Shoes for low back pain, See Instructions, Instructions: Use as directed, Supply, # 2 EA, 0 Refill(s), Type: Maintenance, Use as directed  Symbicort 160 mcg-4.5 mcg/inh inhalation aerosol: 2 puff(s), inh, bid, Instructions: to replace qvar  in the morning and the evening  use with spacer chamber  rinse mouth and throat after use, # 3 EA, 3 Refill(s), Type: Maintenance, Pharmacy: [facility], 2 puff(s) inh bid,Ins...  predniSONE 50 mg oral tablet: 1 tab(s) ( 50 mg ), PO, Daily, # 5 tab(s), 0 Refill(s), Type: Maintenance, Pharmacy: [facility], 1 tab(s) po daily,x5 day(s)  Documented Medications  Documented  Advil 200 mg oral tablet: 2 tab(s) ( 400 mg ), po, q8 hrs, PRN: as needed for pain, 0 Refill(s), Type: Maintenance   Problem list:    All Problems  Trochanteric bursitis, right hip / SNOMED CT 34497779 / Confirmed  Tobacco user / SNOMED CT 630596785 / Probable  Tension headache / SNOMED CT 5118724055 / Confirmed  Smoking / SNOMED CT 127516619 / Confirmed  Chronic insomnia / SNOMED CT 689491321 / Confirmed  Obese / SNOMED CT 2369229883 / Probable  Dysplastic nevus / SNOMED CT 1007176456 / Confirmed  Myalgia / SNOMED CT 280701407 / Confirmed  Asthma, moderate persistent / SNOMED CT 2742179258 / Confirmed  Low back pain / SNOMED CT 872409196 / Confirmed  Anxiety / SNOMED CT 24964551 / Confirmed  Marijuana use /  SNOMED CT 3872330621 / Confirmed  Sebaceous cyst / SNOMED CT 4731097459 / Confirmed  Cerebellar tonsillar ectopia / SNOMED CT 42374317 / Confirmed      Histories   Past Medical History:    Active  Cerebellar tonsillar ectopia (95808330)  Anxiety (81079646)  Obese (4482328802)   Family History:    Heart disease  Father (Conor)  Diabetes mellitus type 2  Mother (Obdulia)     Procedure history:    Injection of cortisone (1106393394) on 6/8/2016 at 36 Years.  Transanal hemorrhoidal dearterialization (THD) on 9/8/2015 at 35 Years.  Esophagogastroduodenoscopy (954286339) on 8/4/2015 at 35 Years.  Comments:  8/5/2015 12:30 PM - Mata Merino MD  Normal  Colonoscopy (919257898) on 8/4/2015 at 35 Years.  Comments:  8/13/2015 3:29 PM - Genia Mac RN  Sedation: MAC  Indication: rectal bleeding  External & internal hemorrhoids; otherwise normal  Resume routine screening at age 50.  Hospitalized at [facility] unit in 2002 at 22 Years.  Right knee arthroscopy.  Surgery on eyes x3.  Comments:  12/15/2010 4:16 PM - Genia Ely RN  as infant   Social History:        Alcohol Assessment            Current                     Comments:                      06/16/2015 - Randall Salcedo MD                     6 drinks pks per weekend      Tobacco Assessment            Current                     Comments:                      06/16/2015 - Randall Salcedo MD                     1 ppd      Substance Abuse Assessment            Current                     Comments:                      06/16/2015 - Randall Salcedo MD                     Marijuana      Employment and Education Assessment            Work/School description: disabled.                     Comments:                      02/21/2011 - Wong Bashir MD                     has 1 child- 10 weeks old -      Home and Environment Assessment            Lives with Self, Significant other.                     Comments:                      02/21/2011 - Wong Bashir MD                      2 kids in household        Physical Examination   Vital Signs   6/5/2018 1:11 PM CDT Peripheral Pulse Rate 88 bpm    Pulse Site Radial artery    Systolic Blood Pressure 118 mmHg    Diastolic Blood Pressure 74 mmHg    Mean Arterial Pressure 89 mmHg    BP Site Right arm    Oxygen Saturation 97 %      Measurements from flowsheet : Measurements   6/5/2018 1:11 PM CDT Height Measured - Standard 72.8 in    Weight Measured - Standard 270 lb    BSA 2.51 m2    Body Mass Index 35.81 kg/m2  HI      General:  Alert and oriented, No acute distress.    Eye:  Pupils are equal, round and reactive to light, Normal conjunctiva.    HENT:  Oral mucosa is moist.    Neck:  Supple.    Respiratory:  Respirations are non-labored.    Cardiovascular:  Normal rate, Regular rhythm, No edema.    Gastrointestinal:  Non-distended.    Musculoskeletal:  Normal gait.    Integumentary:  Warm, No rash.    Psychiatric:  Cooperative, Appropriate mood & affect, Normal judgment.       Review / Management   Results review      Impression and Plan       Diagnosis     Trochanteric bursitis, right hip (JYO03-LZ M70.61).     Low back pain (QTJ17-UF M54.5).     Plan:  injected right low back and injected right thigh (right thigh over trochanteric bursa)    Goals and risk of injection was discussed, paitent verbalized understanding and gave consent.      Skin clensed above injection site.  Injected into _  1_cc celestone, 1_cc lidocaine, _1 cc marcaine each site (times 2)    Patient tolerated procedure and noted immeadiate improvement.  Discussed therapy, home and/or PT, reviewed when to return and to call with any questions.    given Toradol 60mg     No ear infection seen    Advised he  the Prednisone to help with inflammation as prescribed by his regular physician.    Jane STUART Medical Assistant acted solely as a scribe for, and in presence of Dr. Jules Garcia who performed the services.

## 2022-02-15 NOTE — NURSING NOTE
Comprehensive Intake Entered On:  5/1/2019 1:33 PM CDT    Performed On:  5/1/2019 1:26 PM CDT by Peyton Jacobson CMA   Chief Complaint :   discuss the need of a service animal. PT referral    Menstrual Status :   N/A   Weight Measured :   288.0 lb(Converted to: 288 lb 0 oz, 130.63 kg)    Systolic Blood Pressure :   124 mmHg   Diastolic Blood Pressure :   78 mmHg   Mean Arterial Pressure :   93 mmHg   Peripheral Pulse Rate :   104 bpm (HI)    BP Site :   Right arm   BP Method :   Manual   HR Method :   Electronic   Temperature Tympanic :   97.3 DegF(Converted to: 36.3 DegC)  (LOW)    Oxygen Saturation :   98 %   Peyton Jacobson CMA - 5/1/2019 1:26 PM CDT   Health Status   Allergies Verified? :   Yes   Medication History Verified? :   Yes   Pre-Visit Planning Status :   Completed   Tobacco Use? :   Current every day smoker   Peyton Jacobson CMA - 5/1/2019 1:26 PM CDT   Consents   Consent for Immunization Exchange :   Consent Granted   Consent for Immunizations to Providers :   Consent Granted   Peyton Jacobson CMA 5/1/2019 1:26 PM CDT   Meds / Allergies   (As Of: 5/1/2019 1:33:47 PM CDT)   Allergies (Active)   Bananas  Estimated Onset Date:   Unspecified ; Created By:   Rowena Hooker LPN; Reaction Status:   Active ; Category:   Drug ; Substance:   Bananas ; Type:   Allergy ; Updated By:   Rowena Hooker LPN; Reviewed Date:   10/30/2018 2:48 PM CDT      Bee Stings  Estimated Onset Date:   Unspecified ; Created By:   Ruthie Nam; Reaction Status:   Active ; Category:   Drug ; Substance:   Bee Stings ; Type:   Allergy ; Updated By:   Ruthie Nam; Reviewed Date:   10/30/2018 2:48 PM CDT      Latex  Estimated Onset Date:   Unspecified ; Created By:   Ruthie Nam; Reaction Status:   Active ; Category:   Drug ; Substance:   Latex ; Type:   Allergy ; Updated By:   Ruthie Nam; Reviewed Date:   10/30/2018 2:48 PM CDT        Medication List   (As Of: 5/1/2019 1:33:47 PM CDT)    Prescription/Discharge Order    albuterol  :   albuterol ; Status:   Prescribed ; Ordered As Mnemonic:   ProAir HFA 90 mcg/inh inhalation aerosol ; Simple Display Line:   2 puff(s), inh, qid, PRN: as needed for wheezing, 1 EA, 2 Refill(s) ; Ordering Provider:   Nena King MD; Catalog Code:   albuterol ; Order Dt/Tm:   8/30/2018 2:42:03 PM          omeprazole  :   omeprazole ; Status:   Prescribed ; Ordered As Mnemonic:   omeprazole 40 mg oral delayed release capsule ; Simple Display Line:   40 mg, 1 cap(s), PO, Daily, 90 cap(s), 3 Refill(s) ; Ordering Provider:   Nena King MD; Catalog Code:   omeprazole ; Order Dt/Tm:   10/17/2018 2:48:14 PM

## 2022-02-15 NOTE — PROGRESS NOTES
Patient:   JOEL SUTTON            MRN: [id number]            FIN: [id number]               Age:   37 years     Sex:  Male     :  1980   Associated Diagnoses:   Smoking; Asthma, moderate persistent   Author:   Parris Lugo      Visit Information      Date of Service: 2017 03:00 pm  Performing Location: [facility]  Encounter#: [id number]      Primary Care Provider (PCP):  Mata Hearn MD    NPI# [id number]      Referring Provider:  Parris Lugo    NPI# [id number]      Chief Complaint   2017 3:10 PM CDT    Patient is here for increased trouble breathing since 17 hours. No relief asthma inhaler on 17. Made some difference using inhaler 17. some chest pains with breathlessness on 17.        History of Present Illness   Confirmed symptoms and concerns with patient as presented in CC above.  Hx of pneumonia 1 year ago  Has asthma and only uses his q norm periodically, has used albuterol a little more recently  no cough      Review of Systems   Constitutional:  No fever, No chills.    Ear/Nose/Mouth/Throat:  No ear pain, No nasal congestion, No sore throat.    Respiratory:  No shortness of breath, No cough, No wheezing.    Gastrointestinal:  No nausea, No vomiting, No diarrhea.             Health Status   Allergies:    Allergic Reactions (Selected)  Severity Not Documented  Bee Stings (No reactions were documented)  Latex (No reactions were documented)   Medications:  (Selected)   Prescriptions  Prescribed  ProAir HFA 90 mcg/inh inhalation aerosol: 2 puff(s), inh, qid, # 1 EA, 3 Refill(s), Type: Maintenance, Pharmacy: [facility], 2 puff(s) inh qid  Qvar 80 mcg/inh inhalation aerosol: 1 puff(s), inh, bid, # 1 EA, 3 Refill(s), Type: Maintenance, Pharmacy: [facility], 1 puff(s) inh bid  cyclobenzaprine 10 mg oral tablet: 1 tab(s) ( 10 mg ), PO, TID, PRN: for spasm, # 30 tab(s), 0 Refill(s), Type:  Maintenance, Pharmacy: [facility], 1 tab(s) po tid,PRN:for spasm  diazePAM 5 mg oral tablet: 1 tab(s) ( 5 mg ), PO, tid, PRN: for anxiety, # 10 tab(s), 0 Refill(s), Type: Maintenance, Pharmacy: [facility], 1 tab(s) po tid,PRN:for anxiety  hydrocortisone 2.5% topical cream: 1 mal, top, tid, # 30 gm, 1 Refill(s), Type: Maintenance, Pharmacy: [facility], 1 mal top tid  Documented Medications  Documented  Advil 200 mg oral tablet: 2 tab(s) ( 400 mg ), po, q8 hrs, PRN: as needed for pain, 0 Refill(s), Type: Maintenance   Problem list:    All Problems  Smoking / SNOMED CT 312182983 / Confirmed  Tobacco user / SNOMED CT 203446814 / Probable  Obese / ICD-9-.00 / Probable  Chronic insomnia / SNOMED CT 832450203 / Confirmed  Anxiety / SNOMED CT 74585199 / Confirmed  Low back pain / SNOMED CT 929817152 / Confirmed  Cerebellar tonsillar ectopia / SNOMED CT 19012228 / Confirmed      Histories   Past Medical History:    Active  Cerebellar tonsillar ectopia (97006984)  Anxiety (47265046)   Family History:    Heart disease  Father (Conor)  Diabetes mellitus type 2  Mother (Obdulia)     Procedure history:    Injection of cortisone (SNOMED CT 7604495649) performed by Joann on 6/8/2016 at 36 Years.  Transanal hemorrhoidal dearterialization (THD) performed by Vik Chavez MD on 9/8/2015 at 35 Years.  Esophagogastroduodenoscopy (SNOMED CT 123617942) on 8/4/2015 at 35 Years.  Comments:  8/5/2015 12:30 PM - Mata Merino MD  Normal  Colonoscopy (SNOMED CT 317983364) performed by Mata Merino MD on 8/4/2015 at 35 Years.  Comments:  8/13/2015 3:29 PM - Genia Mac RN  Sedation: MAC  Indication: rectal bleeding  External & internal hemorrhoids; otherwise normal  Resume routine screening at age 50.  Hospitalized at [facility] in 2002 at 22 Years.  Right knee arthroscopy.  Surgery on eyes x3.  Comments:  12/15/2010 4:16 PM - Jhoana FLANNERY,  Genia  as infant   Social History:        Alcohol Assessment: Denies Alcohol Use            Current            Current                     Comments:                      06/16/2015 - Randall Salcedo MD                     6 drinks pks per weekend      Tobacco Assessment: Current            Current                     Comments:                      06/16/2015 - Randall Salcedo MD                     1 ppd      Substance Abuse Assessment            Current, Marijuana            Current                     Comments:                      06/16/2015 - Randall Salcedo MD                     Marijuana      Employment and Education Assessment            Work/School description: disabled.                     Comments:                      02/21/2011 - Gayathri Bashir MDrick                     has 1 child- 10 weeks old -      Home and Environment Assessment            Lives with Self, Significant other.                     Comments:                      02/21/2011 - Wong Bashir MD                     2 kids in household        Physical Examination   Vital Signs   7/21/2017 3:10 PM CDT Temperature Tympanic 96.9 DegF  LOW    Peripheral Pulse Rate 76 bpm    Pulse Site Radial artery    HR Method Manual    Systolic Blood Pressure 138 mmHg    Diastolic Blood Pressure 92 mmHg  HI    Mean Arterial Pressure 107 mmHg    BP Site Right arm    BP Method Manual    Oxygen Saturation 96 %      Measurements from flowsheet : Measurements   7/21/2017 3:10 PM CDT Height Measured - Standard 72.5 in    Weight Measured - Standard 279.4 lb    BSA 2.54 m2    Body Mass Index 37.37 kg/m2      General:  Alert and oriented, No acute distress.    Eye:  Normal conjunctiva.    HENT:  Tympanic membranes are clear, Normal hearing, Oral mucosa is moist, No pharyngeal erythema, No sinus tenderness.    Neck:  Supple, Non-tender, No lymphadenopathy.    Respiratory:  Lungs are clear to auscultation, Respirations are non-labored, Breath sounds are equal, Symmetrical  chest wall expansion.    Cardiovascular:  Normal rate, Regular rhythm, No murmur.    Musculoskeletal:  Normal range of motion, Normal gait.    Integumentary:  Warm, Dry, Pink, No rash.    Neurologic:  Alert, Oriented.    Psychiatric:  Cooperative.       Impression and Plan   Diagnosis     Smoking (DGY97-YH Z72.0).     Asthma, moderate persistent (DAG14-WJ J45.40).     Patient Instructions:       Counseled: Patient, Regarding diagnosis, Regarding treatment, Regarding medications, Verbalized understanding, Counseled on symptomatic management. Return to clinic for re evaluation if worsening, simply not improving, or failure to resolve.   , educated on q norm/albuterol use  smoking cessation education/packet/quit line number  declines chantix.

## 2022-02-15 NOTE — LETTER
(Inserted Image. Unable to display)                                                                                                [address] [address]                                                January 07, 2021Re: JOEL SUTTON1980  [facility] [place], [place] [address]Ai: Dr. Vasquez following patient has been referred to your office/practice:  JOEL BARAJASNON Appointment:  Appointment is PendingLocation:  JayeshsonPlease refer to the attached  clinical documentation for a summary of JOEL's care.  Please do not hesitate to contact our office if any additional clinical questions arise.  All relevant records and transition of care documents should be mailed or faxed.Your assistance in providing continuity of care is appreciated. Sincerely, [facility] [place] [place] [address](P) [phone](F) [phone]

## 2022-02-15 NOTE — NURSING NOTE
Comprehensive Intake Entered On:  11/24/2021 10:50 AM CST    Performed On:  11/24/2021 10:43 AM CST by Gonzalez RAMSAY, Fariba               Summary   Chief Complaint :   verbal consent given for telemed visit.  pain mgt f/u, refill Oxycodone.  also under tremendous stress, unsure of current living situation, has lack of sleep d/t neighbors.   Menstrual Status :   N/A   Height/Length Estimated :   72 in(Converted to: 6 ft 0 in, 182.88 cm)    Fariba Roth MA - 11/24/2021 10:43 AM CST   Health Status   Allergies Verified? :   Yes   Medication History Verified? :   Yes   Medical History Verified? :   Yes   Pre-Visit Planning Status :   Not completed   Tobacco Use? :   Current every day smoker   Fairba Roth MA - 11/24/2021 10:43 AM CST   Consents   Consent for Immunization Exchange :   Consent Granted   Consent for Immunizations to Providers :   Consent Granted   Fariba Roth MA - 11/24/2021 10:43 AM CST   Meds / Allergies   (As Of: 11/24/2021 10:50:41 AM CST)   Allergies (Active)   Bananas  Estimated Onset Date:   Unspecified ; Created By:   Rowena Hooker LPN; Reaction Status:   Active ; Category:   Drug ; Substance:   Bananas ; Type:   Allergy ; Updated By:   Rowena Hooker LPN; Reviewed Date:   6/9/2021 11:35 AM CDT      Bee Stings  Estimated Onset Date:   Unspecified ; Created By:   Ruthie Nam; Reaction Status:   Active ; Category:   Drug ; Substance:   Bee Stings ; Type:   Allergy ; Updated By:   Ruthie Nam; Reviewed Date:   6/9/2021 11:35 AM CDT      Latex  Estimated Onset Date:   Unspecified ; Created By:   Ruthie Nam; Reaction Status:   Active ; Category:   Drug ; Substance:   Latex ; Type:   Allergy ; Updated By:   Ruthie Nam; Reviewed Date:   6/9/2021 11:35 AM CDT        Medication List   (As Of: 11/24/2021 10:50:41 AM CST)   Prescription/Discharge Order    albuterol  :   albuterol ; Status:   Prescribed ; Ordered As Mnemonic:   albuterol 90 mcg/inh inhalation aerosol ; Simple  Display Line:   2 puff(s), Inhale, qid, 2 EA, 3 Refill(s) ; Ordering Provider:   Mata Hearn MD; Catalog Code:   albuterol ; Order Dt/Tm:   3/25/2021 7:39:18 AM CDT          cyclobenzaprine  :   cyclobenzaprine ; Status:   Prescribed ; Ordered As Mnemonic:   cyclobenzaprine 10 mg oral tablet ; Simple Display Line:   10 mg, 1 tab(s), Oral, bid, PRN: for spasm, 20 tab(s), 1 Refill(s) ; Ordering Provider:   Mata Hearn MD; Catalog Code:   cyclobenzaprine ; Order Dt/Tm:   6/2/2021 10:12:16 AM CDT          diazePAM  :   diazePAM ; Status:   Prescribed ; Ordered As Mnemonic:   diazePAM 5 mg oral tablet ; Simple Display Line:   5 mg, 1 tab(s), Oral, daily, PRN: as needed for anxiety, 12 tab(s), 0 Refill(s) ; Ordering Provider:   Mata Hearn MD; Catalog Code:   diazePAM ; Order Dt/Tm:   11/30/2020 9:48:55 PM CST          diclofenac topical  :   diclofenac topical ; Status:   Prescribed ; Ordered As Mnemonic:   diclofenac 3% topical gel ; Simple Display Line:   3 gm, Topical, qid, 240 gm, 2 Refill(s) ; Ordering Provider:   Mata Hearn MD; Catalog Code:   diclofenac topical ; Order Dt/Tm:   5/27/2021 3:01:47 PM CDT          ketorolac ophthalmic  :   ketorolac ophthalmic ; Status:   Prescribed ; Ordered As Mnemonic:   Acular 0.5% ophthalmic solution ; Simple Display Line:   1 drop(s), right eye, QID, for 7 day(s), PRN: for itching, 10 mL, 1 Refill(s) ; Ordering Provider:   Mata Hearn MD; Catalog Code:   ketorolac ophthalmic ; Order Dt/Tm:   3/25/2021 7:40:40 AM CDT          Miscellaneous Rx Supply  :   Miscellaneous Rx Supply ; Status:   Prescribed ; Ordered As Mnemonic:   Orthotics ; Simple Display Line:   See Instructions, to use as directed with shoes, 2 EA, 0 Refill(s) ; Ordering Provider:   Mata Hearn MD; Catalog Code:   Miscellaneous Rx Supply ; Order Dt/Tm:   2/13/2020 3:16:54 PM CST          oxyCODONE  :   oxyCODONE ; Status:   Prescribed ; Ordered As Mnemonic:   oxyCODONE 5  mg oral tablet ; Simple Display Line:   5 mg, 1 tab(s), Oral, q8 hrs, PRN: as needed for pain, 20 tab(s), 0 Refill(s) ; Ordering Provider:   Mata Hearn MD; Catalog Code:   oxyCODONE ; Order Dt/Tm:   11/9/2021 11:31:03 AM CST            Home Meds    acetaminophen  :   acetaminophen ; Status:   Documented ; Ordered As Mnemonic:   acetaminophen 500 mg oral tablet ; Simple Display Line:   1,000 mg, 2 tab(s), Oral, q6 hrs, PRN: as needed for pain, 0 Refill(s) ; Catalog Code:   acetaminophen ; Order Dt/Tm:   11/6/2019 1:37:34 PM CST          tiZANidine  :   tiZANidine ; Status:   Documented ; Ordered As Mnemonic:   tiZANidine 2 mg oral tablet ; Simple Display Line:   0 Refill(s) ; Catalog Code:   tiZANidine ; Order Dt/Tm:   11/24/2021 10:47:45 AM CST            Social History   Social History   (As Of: 11/24/2021 10:50:41 AM CST)   Alcohol:        Past   Comments:  6/16/2015 3:17 PM - Randall Salcedo MD: 6 drinks pks per weekend   (Last Updated: 2/13/2020 3:10:22 PM CST by Fariba Roth MA)          Tobacco:        10 or more cigarettes (1/2 pack or more)/day in last 30 days   (Last Updated: 11/30/2020 2:34:51 PM CST by Fariba Roth MA)          Electronic Cigarette/Vaping:        Electronic Cigarette Use: Never.   (Last Updated: 11/30/2020 2:35:11 PM CST by Fariba Roth MA)          Substance Abuse:        Current   Comments:  6/16/2015 3:18 PM - Randall Salcedo MD: Marijuana   (Last Updated: 6/16/2015 3:18:44 PM CDT by Randall Salcedo MD)          Employment/School:        Work/School description: disabled.   Comments:  2/21/2011 5:54 PM - Wong Bashir MD: has 1 child- 10 weeks old -   (Last Updated: 2/21/2011 5:54:14 PM CST by Wong Bashir MD)          Home/Environment:        Lives with Self, Significant other.   Comments:  2/21/2011 5:54 PM - Wong Bashir MD: 2 kids in household   (Last Updated: 2/21/2011 5:54:34 PM CST by Wong Bashir MD)

## 2022-02-15 NOTE — PROGRESS NOTES
Patient:   JOEL SUTTON            MRN: [id number]            FIN: [id number]               Age:   37 years     Sex:  Male     :  1980   Associated Diagnoses:   None   Author:   Bang Woody MD      Visit Information      Date of Service: 2017 04:30 pm  Performing Location: [facility]  Encounter#: [id number]      Primary Care Provider (PCP):  Mata Hearn MD    NPI# [id number]      Referring Provider:  No referring provider recorded for selected visit.      Chief Complaint   3/17/2017 4:51 PM CDT    Pt here for toradol injection in back. Also two cysts on L side of face.        History of Present Illness   CC as above and reviewed w  patient   Pt presents for acute flare of chronic low back pain. He requests injection of toradol as this has helped him in the past.   Also notes that the epidermoid cyst he had removed seems to have recurred.       Review of Systems         No fevers.  Cyst is irritating but not very painful      Health Status   Allergies:    Allergic Reactions (Selected)  Severity Not Documented  Bee Stings (No reactions were documented)  Latex (No reactions were documented)   Medications:  (Selected)   Prescriptions  Prescribed  ProAir HFA 90 mcg/inh inhalation aerosol: 2 puff(s), inh, qid, # 1 EA, 3 Refill(s), Type: Maintenance, Pharmacy: [facility], 2 puff(s) inh qid  Qvar 80 mcg/inh inhalation aerosol: 1 puff(s), inh, bid, # 1 EA, 3 Refill(s), Type: Maintenance, Pharmacy: [facility], 1 puff(s) inh bid  cyclobenzaprine 10 mg oral tablet: 1 tab(s) ( 10 mg ), PO, TID, PRN: for spasm, # 30 tab(s), 0 Refill(s), Type: Maintenance, Pharmacy: [facility], 1 tab(s) po tid,PRN:for spasm  diazePAM 5 mg oral tablet: 1 tab(s) ( 5 mg ), PO, tid, PRN: for anxiety, # 10 tab(s), 0 Refill(s), Type: Maintenance, Pharmacy: [facility], 1 tab(s) po tid,PRN:for  anxiety  hydrocortisone 2.5% topical cream: 1 mal, top, tid, # 30 gm, 1 Refill(s), Type: Maintenance, Pharmacy: [facility] [place], 1 mal top tid   Problem list:    All Problems  Cerebellar tonsillar ectopia / SNOMED CT 18117202 / Confirmed  Anxiety / SNOMED CT 51387685 / Confirmed  Obese / ICD-9-.00 / Probable  Smoking / SNOMED CT 100287584 / Confirmed  Tobacco user / SNOMED CT 884167879 / Probable      Histories   Past Medical History:    Active  Cerebellar tonsillar ectopia (36980539)  Anxiety (86841046)   Family History:    Heart disease  Father (Conor)  Diabetes mellitus type 2  Mother (Obdulia)     Procedure history:    Injection of cortisone (9879720059) on 6/8/2016 at 36 Years.  Transanal hemorrhoidal dearterialization (THD) on 9/8/2015 at 35 Years.  Esophagogastroduodenoscopy (162075236) on 8/4/2015 at 35 Years.  Comments:  8/5/2015 12:30 PM - Mata Merino MD  Normal  Colonoscopy (206927519) on 8/4/2015 at 35 Years.  Comments:  8/13/2015 3:29 PM - Genia Mac RN  Sedation: MAC  Indication: rectal bleeding  External & internal hemorrhoids; otherwise normal  Resume routine screening at age 50.  Hospitalized at [facility] unit in 2002 at 22 Years.  Right knee arthroscopy.  Surgery on eyes x3.  Comments:  12/15/2010 4:16 PM - Genia Ely RN  as infant   Social History:        Alcohol Assessment: Denies Alcohol Use            Current            Current                     Comments:                      06/16/2015 - Randall Salcedo MD                     6 drinks pks per weekend      Tobacco Assessment: Current            Current                     Comments:                      06/16/2015 - Randall Salcedo MD                     1 ppd      Substance Abuse Assessment            Current, Marijuana            Current                     Comments:                      06/16/2015 - Randall Salcedo MD                     Marijuana      Employment and Education Assessment             Work/School description: disabled.                     Comments:                      02/21/2011 - Krysten ORTEGA, Wong                     has 1 child- 10 weeks old -      Home and Environment Assessment            Lives with Self, Significant other.                     Comments:                      02/21/2011 - Wong Bashir MD                     2 kids in household        Physical Examination   Vital Signs   3/17/2017 4:51 PM CDT Peripheral Pulse Rate 72 bpm    Pulse Site Radial artery    HR Method Manual    Systolic Blood Pressure 130 mmHg    Diastolic Blood Pressure 80 mmHg    Mean Arterial Pressure 97 mmHg    BP Site Right arm    BP Method Manual      Measurements from flowsheet : Measurements   3/17/2017 4:51 PM CDT Height Measured - Standard 72.5 in    Weight Measured - Standard 281.8 lb    BSA 2.55 m2    Body Mass Index 37.69 kg/m2      Gen a--ears well  Skin - there are 2 inflamed nodules in the area where he had a cyst removed before. Nontender.      Review / Management   Results review:  Lab results   12/15/2016 7:27 PM CST HSV I IgG <0.90    HSV II IgG <0.90   .       Impression and Plan   Low back pain - 60 mg toradol IM today, per his request. 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 Recommended warm compresses and antibacterial ointment. Referred to derm

## 2022-02-15 NOTE — NURSING NOTE
Comprehensive Intake Entered On:  2/13/2020 2:48 PM CST    Performed On:  2/13/2020 2:39 PM CST by Gonzalez RAMSAY, Fariba               Summary   Chief Complaint :   discuss anxiety, had spinal surgery 11/5/19.  needs note regarding service dog/ animal.  would also like rx refills.   Menstrual Status :   N/A   Weight Measured :   298.6 lb(Converted to: 298 lb 10 oz, 135.44 kg)    Height Measured :   72 in(Converted to: 6 ft 0 in, 182.88 cm)    Body Mass Index :   40.49 kg/m2 (HI)    Body Surface Area :   2.62 m2   Systolic Blood Pressure :   148 mmHg (HI)    Diastolic Blood Pressure :   76 mmHg   Mean Arterial Pressure :   100 mmHg   Peripheral Pulse Rate :   92 bpm   BP Site :   Right arm   Pulse Site :   Radial artery   BP Method :   Manual   HR Method :   Manual   Temperature Tympanic :   98.8 DegF(Converted to: 37.1 DegC)    Pain Present :   Yes actual or suspected pain   Primary Pain Location :   Leg   Fariba Roth MA - 2/13/2020 2:39 PM CST   Health Status   Allergies Verified? :   Yes   Medication History Verified? :   Yes   Medical History Verified? :   Yes   Pre-Visit Planning Status :   Completed   Tobacco Use? :   Current every day smoker   Fariba Roth MA - 2/13/2020 2:39 PM CST   Consents   Consent for Immunization Exchange :   Consent Granted   Consent for Immunizations to Providers :   Consent Granted   Fariba Roth MA - 2/13/2020 2:39 PM CST   Meds / Allergies   (As Of: 2/13/2020 2:48:06 PM CST)   Allergies (Active)   Bananas  Estimated Onset Date:   Unspecified ; Created By:   Rowena Hooker LPN; Reaction Status:   Active ; Category:   Drug ; Substance:   Bananas ; Type:   Allergy ; Updated By:   Rowena Hooker LPN; Reviewed Date:   11/19/2019 5:25 PM CST      Bee Stings  Estimated Onset Date:   Unspecified ; Created By:   Ruthie Nam; Reaction Status:   Active ; Category:   Drug ; Substance:   Bee Stings ; Type:   Allergy ; Updated By:   Ruthie Nam; Reviewed Date:    11/19/2019 5:25 PM CST      Latex  Estimated Onset Date:   Unspecified ; Created By:   Ruthie Nam; Reaction Status:   Active ; Category:   Drug ; Substance:   Latex ; Type:   Allergy ; Updated By:   Ruthie Nam; Reviewed Date:   11/19/2019 5:25 PM CST        Medication List   (As Of: 2/13/2020 2:48:06 PM CST)   Prescription/Discharge Order    albuterol  :   albuterol ; Status:   Prescribed ; Ordered As Mnemonic:   ProAir HFA 90 mcg/inh inhalation aerosol ; Simple Display Line:   2 puff(s), inh, qid, PRN: as needed for wheezing, 1 EA, 2 Refill(s) ; Ordering Provider:   Nena King MD; Catalog Code:   albuterol ; Order Dt/Tm:   8/30/2018 2:42:03 PM CDT          diazePAM  :   diazePAM ; Status:   Prescribed ; Ordered As Mnemonic:   diazePAM 5 mg oral tablet ; Simple Display Line:   5 mg, 1 tab(s), Oral, q8 hrs, PRN: as needed for anxiety, 10 tab(s), 0 Refill(s) ; Ordering Provider:   Cricket Jessica PA-C; Catalog Code:   diazePAM ; Order Dt/Tm:   10/1/2019 3:21:56 PM CDT            Home Meds    acetaminophen  :   acetaminophen ; Status:   Documented ; Ordered As Mnemonic:   acetaminophen 500 mg oral tablet ; Simple Display Line:   1,000 mg, 2 tab(s), Oral, q6 hrs, PRN: as needed for pain, 0 Refill(s) ; Catalog Code:   acetaminophen ; Order Dt/Tm:   11/6/2019 1:37:34 PM CST          cyclobenzaprine  :   cyclobenzaprine ; Status:   Documented ; Ordered As Mnemonic:   cyclobenzaprine 10 mg oral tablet ; Simple Display Line:   See Instructions, 0.5 tab(5mg) po TID PRN for muscle spasm, PRN: for spasm, 0 Refill(s) ; Catalog Code:   cyclobenzaprine ; Order Dt/Tm:   11/6/2019 1:46:42 PM CST          oxyCODONE  :   oxyCODONE ; Status:   Documented ; Ordered As Mnemonic:   oxyCODONE 5 mg oral tablet ; Simple Display Line:   See Instructions, take 1-2 tablets po every 4 hours PRN for pain, 0 Refill(s) ; Catalog Code:   oxyCODONE ; Order Dt/Tm:   11/6/2019 1:33:24 PM CST          polyethylene glycol 3350  :    polyethylene glycol 3350 ; Status:   Documented ; Ordered As Mnemonic:   MiraLax oral powder for reconstitution ; Simple Display Line:   17 gm, Oral, daily, 0 Refill(s) ; Catalog Code:   polyethylene glycol 3350 ; Order Dt/Tm:   11/8/2019 3:39:43 PM CST            Social History   Social History   (As Of: 2/13/2020 2:48:06 PM CST)   Alcohol:        Current   Comments:  6/16/2015 3:17 PM - Randall Salcedo MD: 6 drinks pks per weekend   (Last Updated: 6/16/2015 3:17:14 PM CDT by Randall Salcedo MD)          Tobacco:        Current   Comments:  6/16/2015 3:17 PM - Randall Salcedo MD: 1 ppd   (Last Updated: 6/16/2015 3:17:14 PM CDT by Randall Salcedo MD)          Substance Abuse:        Current   Comments:  6/16/2015 3:18 PM - Randall Salcedo MD: Marijuana   (Last Updated: 6/16/2015 3:18:44 PM CDT by Randall Salcedo MD)          Employment/School:        Work/School description: disabled.   Comments:  2/21/2011 5:54 PM - Wong Bashir MD: has 1 child- 10 weeks old -   (Last Updated: 2/21/2011 5:54:14 PM CST by Wong Bashir MD)          Home/Environment:        Lives with Self, Significant other.   Comments:  2/21/2011 5:54 PM - Wong Bashir MD: 2 kids in household   (Last Updated: 2/21/2011 5:54:34 PM CST by Wong Bashir MD)

## 2022-02-15 NOTE — TELEPHONE ENCOUNTER
---------------------  From: Mata Hearn MD   Sent: 6/18/2020 4:31:32 PM CDT  Subject: Patient Message - Results Notification        Informed patient of recent MRI results showing issues on R and L4L5.  Advised follow up appt with spine surgeon.

## 2022-02-15 NOTE — PROGRESS NOTES
Chief Complaint    Patient is here for suture removal and trigger point injections.  History of Present Illness          HPI pt has history of chronic myofascial pain and has found trigger point injections to be a useful tool to help control pain.  Would like to have repeat trigger point injections today.                    He also had a sebaceous cyst excised and needs to have the sutures removed.            Review of systems is negative except as per HPI       Exam:       General: alert and oriented ×3 no acute distress.       HEENT: pupils are equal round and reactive to light extraocular motion is intact. Normocephalic and atraumatic.        Hearing is grossly normal and there is no otorrhea.        Nares are patent there is no rhinorrhea.        Mucous membranes are moist and pink.       Chest: has bilateral rise with no increased work of breathing.       Cardiovascular: normal perfusion and brisk capillary refill.       Musculoskeletal: no gross focal abnormalities and normal gait.       Neuro: no gross focal abnormalities and memory seems intact.       Psychiatric: speech is clear and coherent and fluent. Patient dressed appropriately for the weather. Mood is appropriate and affect is full.       Skin right lower chin incision site well healed, s3 simple interrupted sutures removed without difficulty, has another inflammed sebaceous cyst in his left lower chin and 1 on his right cheek he would like me to remove            Procedure note            Informed consent obtained including risks of pain, bleeding, infection, injury to surrounding tissue, and need for further treatment. Benefit of a trigger point injection goal is to reduce pain. This is just part of a treatment plan and for best results patient should also complete physical therapy. I cannot guarantee that will will be any pain relief.            Alternatives would include doing nothing, physical therapy, acupuncture, using heat or ice, continuing  with oral medications such as muscle relaxants or nonsteroidal anti-inflammatory medications or topical medications such as a lidocaine patch or cream or menthol for diclofenac. He would like to try the trigger point injections.            Prep: Alcohol           Location identified by my palpation and communication with patient.  Symptomatic trigger points that patient desired treatment at were located at: bilateral trapezeii x 1 each and left rhomboid                        Each of these was injected with  a one-to-one solution of 1% lidocaine without epinephrine and 0.5% Marcaine without epinephrine.           Total number of injections:3           Total number of milliliters of the 1:1 solution of lidocaine and marcaine:8            Encouraged patient to treat the area with McLean cup ice massage tonight and to try to stretch the area out.            pain prior to treatment: moderate           pain following treatment at the trigger point injection sites:improved at all 3 sites           Complications: none           Tolerated procedure well.           Asessment and Plan: myalgia and myofascial pain, s/p trigger point injections today.  Encouraged home exercise program and to keep annual exam appt as indicated and RTC if symptoms worsen or do not improve.   CC are helping pt complete his [facility] paperwork.  encouraged PT today, pt still declines for now,       sebaceous cyst left lower jaw and right chin, would prefer pt see derm or plastics for the right cheek cyst due to concerns for scarring, will place consult, sutures removed from last weeks excsion, 3 simple interrupted sutures from his right lower chest today without any complications.  Physical Exam   Vitals & Measurements    T: 98.0   F (Tympanic)  HR: 72(Peripheral)  BP: 122/86     WT: 272.2 lb   Patient Information     Name:JOEL SUTTON PEACE      Address:      [address] [place] [id number]     Sex:Male     Date of  Birth:1980     [phone]     Emergency Contact:LEESA SUTTON     MRN:[id number]     FIN:[id number]     Location:[facility]     Date of Service:05/29/2018      Primary Care Physician:       Nena King MD, [phone]      Attending Physician:       Nena King MD, [phone]  Problem List/Past Medical History    Ongoing     Anxiety     Asthma, moderate persistent     Cerebellar tonsillar ectopia     Chronic insomnia     Dysplastic nevus       Comments: removed from back     Low back pain     Marijuana use     Myalgia     Obese     Sebaceous cyst     Smoking     Tobacco user     Trochanteric bursitis, right hip    Historical     No qualifying data  Procedure/Surgical History     Injection of cortisone (06/08/2016)           Transanal hemorrhoidal dearterialization (THD) (09/08/2015)           Colonoscopy (08/04/2015)             Comments: Sedation: MAC<br/>Indication: rectal bleeding<br/>External &amp; internal hemorrhoids; otherwise normal<br/>Resume routine screening at age 50.     Esophagogastroduodenoscopy (08/04/2015)             Comments: Normal     Hospitalized at [facility] (2002)           Right knee arthroscopy           Surgery on eyes x3             Comments: as infant  Medications     Advil 200 mg oral tablet: 400 mg, 2 tab(s), po, q8 hrs, PRN: as needed for pain, 0 Refill(s).     Shoes for low back pain: See Instructions, Use as directed, 2 EA, 0 Refill(s).     ProAir HFA 90 mcg/inh inhalation aerosol: 2 puff(s), inh, qid, PRN: as needed for wheezing, 1 EA, 2 Refill(s).     Qvar 80 mcg/inh inhalation aerosol: 1 puff(s), inh, bid, 1 EA, 5 Refill(s).     Symbicort 160 mcg-4.5 mcg/inh inhalation aerosol: 2 puff(s), inh, bid, to replace qvar  in the morning and the evening  use with spacer chamber  rinse mouth and throat after use, 3 EA, 3 Refill(s).     DULoxetine 60 mg oral delayed release capsule: 60 mg, 1 cap(s), po, daily, 60 cap(s), 1  Refill(s).          Allergies    Bee Stings    Latex  Social History    Smoking Status - 05/29/2018     Current every day smoker     Alcohol      Current, 06/16/2015     Employment and Education      Work/School description: disabled., 02/21/2011     Home and Environment      Lives with Self, Significant other., 02/21/2011     Substance Abuse      Current, 06/16/2015     Tobacco      Current, 06/16/2015  Family History    Diabetes mellitus type 2: Mother.    Heart disease: Father.  Immunizations      Vaccine Date Status      tetanus/diphth/pertuss (Tdap) adult/adol 11/17/2016 Given      influenza virus vaccine, inactivated 11/17/2016 Given      influenza virus vaccine, inactivated 01/06/2015 Given  Lab Results       Lab Results (Last 4 results within 90 days)        Sodium Level: 140 mmol/L [135 mmol/L - 146 mmol/L] (04/27/18 14:54:00 CDT)       Potassium Level: 4.4 mmol/L [3.5 mmol/L - 5.3 mmol/L] (04/27/18 14:54:00 CDT)       Chloride Level: 104 mmol/L [98 mmol/L - 110 mmol/L] (04/27/18 14:54:00 CDT)       CO2 Level: 31 mmol/L [20 mmol/L - 31 mmol/L] (04/27/18 14:54:00 CDT)       Glucose Level: 83 mg/dL [65 mg/dL - 99 mg/dL] (04/27/18 14:54:00 CDT)       BUN: 15 mg/dL [7 mg/dL - 25 mg/dL] (04/27/18 14:54:00 CDT)       Creatinine Level: 0.98 mg/dL [0.6 mg/dL - 1.35 mg/dL] (04/27/18 14:54:00 CDT)       BUN/Creat Ratio: NOT APPLICABLE [6  - 22] (04/27/18 14:54:00 CDT)       eGFR: 97 mL/min/1.73m2 [8.6 mg/dL - 10.3 mg/dL] (04/27/18 14:54:00 CDT)       eGFR African American: 113 mL/min/1.73m2 [6  - 22] (04/27/18 14:54:00 CDT)       Calcium Level: 9.7 mg/dL [8.6 mg/dL - 10.3 mg/dL] (04/27/18 14:54:00 CDT)

## 2022-02-15 NOTE — TELEPHONE ENCOUNTER
---------------------  From: Mata Hearn MD   To: [employer] Message Pool ([id number] [facility] [place]);     Sent: 2/18/2021 11:03:46 AM CST  Subject: General Message     Please have HI print note from 2/16 on clinic letterhead and send to:    [facility]  [facility] [place] [id number] [facility] [address]  [place]  [address]  copy for patient as well    resend letters from 11/30 and 11/11 to patient---------------------  From: Abbi Chong LPN ([employer] Message Pool ([facility]))   To: Shannan Jaeger;     Sent: 2/18/2021 11:04:48 AM CST  Subject: FW: General Message---------------------  From: Shannan Jaeger   To: [employer] Message Pool ([id number]);     Sent: 2/18/2021 12:19:46 PM CST  Subject: RE: General Message     I am mailing patient a release as I can't send medical records without this form, especially since Mental Health is involved. I will send these once I receive LINDA back from patient. I also can't put the 2/16/21 note on Letterhead. It will not allow me to copy and paste records in the EMR onto other forms. thanks---------------------  From: Fariba Roth MA ([employer] Message Pool ([id number]))   To: Mata Hearn MD;     Sent: 3/2/2021 12:21:28 PM CST  Subject: FW: General Message---------------------  From: Mata Hearn MD   To: [employer] Message Pool ([id number]);     Sent: 3/9/2021 2:39:41 PM CST  Subject: RE: General Message     OK to print and send without letter head---------------------  From: Fariba Roth MA ([employer] Message Pool ([id number]))   To: Shannan Jaeger;     Sent: 3/9/2021 4:19:34 PM CST  Subject: FW: General Message---------------------  From: Shannan Jaeger   To: [employer] Message Pool ([id number]_[place]);     Sent: 3/9/2021 4:40:08 PM CST  Subject: RE: General Message     these have been printed and mailed. thanks

## 2022-02-15 NOTE — NURSING NOTE
Asthma Control Test (ACT) Total Entered On:  10/20/2020 6:21 PM CDT    Performed On:  10/20/2020 6:20 PM CDT by Fariba Roth MA               Asthma Control Test (ACT) Total   Asthma Control Test Total (Adult) :   15    Fariba Roth MA - 10/20/2020 6:20 PM CDT

## 2022-02-15 NOTE — PROGRESS NOTES
Chief Complaint    back pain  History of Present Illness      Chief complaint as above reviewed and confirmed with patient.  Pt presents to the clinic with concerns re:ongoing back pain.  He has chronic intermittent back pain, known myofascial pain syndrome.  Has tried numerous intervetions including trigger point injections, PT, medications including NSAIDS/muscle relaxants and Chiropractory.  Has had worsening pain the last 2 days, radiates to the L leg, difficulty with ambulation, positional changes, bending and twisting.  Last took ibuprofen last night which was not helpful.  Likes to avoid using the flexeril if possible.  does not use controlled substances.  Referred to pain management but pt is not able to get in yet as he states that the appt he had was cancelled due to the provider and he did not reschedule as he was frustrated about the reschedule.  Character of pain is unchanged.  No tingling numbness or weakness of bowel or bladder. no fevers.  no abdominal pain.  No rash.    no pain with cough or sneeze.  Recent MRI in July 2018 revealed mild foraminal stenosis, mild spinal stenosis and mild degenertive disc disease.  Review of Systems      Review of systems is negative with the exception of those noted in HPI          Physical Exam   Vitals & Measurements    HR: 99(Peripheral)  BP: 126/85     HT: 72 in  WT: 286 lb  BMI: 38.78       Exam of the back reveals Rperivertebral muscle tenderness of the  L spine, T spine nontender        Pt able to forward flex: to touch knees      Resuming upright does increase pain.        Pain and limited rom with Flexion, extension, lateral flexion and rotation B.       Muscular strength, sensation and DTR are symmetrical and WNL for LE B.        SLR negative B for racidulopathy but pain in the low back       Figure 4 negative B        No CVAT       Abdomen: BS active, soft, nontender to light or deep palpation.  no pulsitile masses       Peripheral pulses intact at femoral  and DP pulses   Assessment/Plan       Back pain (M54.9)         recommended he be seen in PT, he thinks his pain is too severe to do PT and I relayed that this is often more helpful for pain.  He is very frustrated his provider is not there to do trigger point injections and I relayed that she is the only provider that does those in the clinic.  I did give toradol 60 mg IM which he tolerated well.  Last was !0-2-18. ice, heat, activity recommended.  Pt instructed to return to clinic for persistent or worsening symptoms.                    Orders:          ketorolac, 60 mg, im, once, (Completed)          66595 therapeutic prophylactic/dx injection subq/im (Charge), Quantity: 1, Back pain           ketorolac tromethamine inj, 15 mg (Charge), Quantity: 4, Back pain           Patient Information     Name:JOEL SUTTON      Address:      [address] [place] [id number]     Sex:Male     YOB: 1980     [phone]     Emergency Contact:LEESA SUTTON     MRN:[id number]     FIN:[id number]     Location:[facility]     Date of Service:10/26/2018      Primary Care Physician:       Nena King MD, [phone]      Attending Physician:       Eileen Cm PA-C, [phone]  Problem List/Past Medical History    Ongoing     Anxiety     Asthma, moderate persistent     Cerebellar tonsillar ectopia     Chronic back pain     Chronic insomnia     Dysplastic nevus       Comments: removed from back     Low back pain     Marijuana use     Myalgia     Obese     Sebaceous cyst     Smoking     Tension headache     Tobacco user     Trochanteric bursitis, right hip    Historical     No qualifying data  Procedure/Surgical History     Injection of cortisone (06/08/2016)           Transanal hemorrhoidal dearterialization (THD) (09/08/2015)           Colonoscopy (08/04/2015)            Comments:      Sedation: MAC      Indication: rectal bleeding      External  & internal hemorrhoids; otherwise normal      Resume routine screening at age 50.     Esophagogastroduodenoscopy (08/04/2015)            Comments:      Normal     Hospitalized at [facility] (2002)           Right knee arthroscopy           Surgery on eyes x3            Comments:      as infant  Medications     ProAir HFA 90 mcg/inh inhalation aerosol: 2 puff(s), inh, qid, PRN: as needed for wheezing, 1 EA, 2 Refill(s).     omeprazole 40 mg oral delayed release capsule: 40 mg, 1 cap(s), PO, Daily, 90 cap(s), 3 Refill(s).          Allergies    Bananas    Bee Stings    Latex  Social History    Smoking Status - 10/17/2018     Current every day smoker     Alcohol      Current, 06/16/2015     Employment and Education      Work/School description: disabled., 02/21/2011     Home and Environment      Lives with Self, Significant other., 02/21/2011     Substance Abuse      Current, 06/16/2015     Tobacco      Current, 06/16/2015  Family History    Diabetes mellitus type 2: Mother.    Heart disease: Father.  Immunizations      Vaccine Date Status      tetanus/diphth/pertuss (Tdap) adult/adol 11/17/2016 Given      influenza virus vaccine, inactivated 11/17/2016 Given      influenza virus vaccine, inactivated 01/06/2015 Given  Lab Results       Lab Results (Last 4 results within 90 days)        Group A Strep POC: NOT DETECTED (10/08/18 12:11:00 CDT)       Culture Strep A: See comment (10/08/18 12:23:00 CDT)

## 2022-02-15 NOTE — NURSING NOTE
Comprehensive Intake Entered On:  10/20/2020 2:57 PM CDT    Performed On:  10/20/2020 2:51 PM CDT by Gonzalez RAMSAY, Fariba               Summary   Chief Complaint :   f/u back pain, would like MRI done.  not sleeping well d/t pain.   Menstrual Status :   N/A   Weight Measured :   310 lb(Converted to: 310 lb 0 oz, 140.614 kg)    Height Measured :   72 in(Converted to: 6 ft 0 in, 182.88 cm)    Body Mass Index :   42.04 kg/m2 (HI)    Body Surface Area :   2.67 m2   Height/Length Estimated :   72 in(Converted to: 6 ft 0 in, 182.88 cm)    Systolic Blood Pressure :   144 mmHg (HI)    Diastolic Blood Pressure :   88 mmHg (HI)    Mean Arterial Pressure :   107 mmHg   Peripheral Pulse Rate :   88 bpm   BP Site :   Left arm   Pulse Site :   Radial artery   BP Method :   Manual   HR Method :   Manual   Temperature Tympanic :   98.9 DegF(Converted to: 37.2 DegC)    Oxygen Saturation :   95 %   Fariba Roth MA - 10/20/2020 2:51 PM CDT   Health Status   Allergies Verified? :   Yes   Medication History Verified? :   Yes   Medical History Verified? :   Yes   Pre-Visit Planning Status :   Completed   Tobacco Use? :   Current every day smoker   Fariba Roth MA - 10/20/2020 2:51 PM CDT   Consents   Consent for Immunization Exchange :   Consent Granted   Consent for Immunizations to Providers :   Consent Granted   Fariba Roth MA - 10/20/2020 2:51 PM CDT   Meds / Allergies   (As Of: 10/20/2020 2:57:31 PM CDT)   Allergies (Active)   Bananas  Estimated Onset Date:   Unspecified ; Created By:   Rowena Hooker RN; Reaction Status:   Active ; Category:   Drug ; Substance:   Bananas ; Type:   Allergy ; Updated By:   Rowena Hooker RN; Reviewed Date:   10/13/2020 2:26 PM CDT      Bee Stings  Estimated Onset Date:   Unspecified ; Created By:   Ruthie Nam CMA; Reaction Status:   Active ; Category:   Drug ; Substance:   Bee Stings ; Type:   Allergy ; Updated By:   Ruthie Nam CMA; Reviewed Date:   10/13/2020 2:26 PM CDT      Latex   Estimated Onset Date:   Unspecified ; Created By:   Ruthie Nam CMA; Reaction Status:   Active ; Category:   Drug ; Substance:   Latex ; Type:   Allergy ; Updated By:   Ruthie Nam CMA; Reviewed Date:   10/13/2020 2:26 PM CDT        Medication List   (As Of: 10/20/2020 2:57:31 PM CDT)   Prescription/Discharge Order    albuterol  :   albuterol ; Status:   Prescribed ; Ordered As Mnemonic:   Ventolin HFA 90 mcg/inh inhalation aerosol ; Simple Display Line:   2 puff(s), NEB, qid, PRN: AS NEEDED FOR WHEEZING, 1 EA, 0 Refill(s) ; Ordering Provider:   Mata Hearn MD; Catalog Code:   albuterol ; Order Dt/Tm:   4/9/2020 2:03:28 PM CDT          diclofenac topical  :   diclofenac topical ; Status:   Prescribed ; Ordered As Mnemonic:   diclofenac 1% topical gel ; Simple Display Line:   2 gm, Topical, qid, 240 gm, 2 Refill(s) ; Ordering Provider:   Mata Hearn MD; Catalog Code:   diclofenac topical ; Order Dt/Tm:   8/5/2020 4:32:01 PM CDT          ketorolac ophthalmic  :   ketorolac ophthalmic ; Status:   Prescribed ; Ordered As Mnemonic:   Acular 0.5% ophthalmic solution ; Simple Display Line:   1 drop(s), right eye, QID, for 7 day(s), PRN: for itching, 10 mL, 0 Refill(s) ; Ordering Provider:   Cricket Jessica PA-C; Catalog Code:   ketorolac ophthalmic ; Order Dt/Tm:   10/9/2020 11:52:51 AM CDT          Miscellaneous Rx Supply  :   Miscellaneous Rx Supply ; Status:   Prescribed ; Ordered As Mnemonic:   Orthotics ; Simple Display Line:   See Instructions, to use as directed with shoes, 2 EA, 0 Refill(s) ; Ordering Provider:   Mata Hearn MD; Catalog Code:   Miscellaneous Rx Supply ; Order Dt/Tm:   2/13/2020 3:16:54 PM CST          oxyCODONE  :   oxyCODONE ; Status:   Prescribed ; Ordered As Mnemonic:   oxyCODONE 5 mg oral tablet ; Simple Display Line:   5 mg, 1 tab(s), Oral, q8 hrs, PRN: as needed for pain, 20 tab(s), 0 Refill(s) ; Ordering Provider:   Mata Hearn MD; Catalog Code:    oxyCODONE ; Order Dt/Tm:   9/10/2020 1:07:15 PM CDT            Home Meds    acetaminophen  :   acetaminophen ; Status:   Documented ; Ordered As Mnemonic:   acetaminophen 500 mg oral tablet ; Simple Display Line:   1,000 mg, 2 tab(s), Oral, q6 hrs, PRN: as needed for pain, 0 Refill(s) ; Catalog Code:   acetaminophen ; Order Dt/Tm:   11/6/2019 1:37:34 PM CST            ID Risk Screen   Recent Travel History :   No recent travel   Family Member Travel History :   No recent travel   Other Exposure to Infectious Disease :   Unknown   Fariba Roth MA - 10/20/2020 2:51 PM CDT   Social History   Social History   (As Of: 10/20/2020 2:57:31 PM CDT)   Alcohol:        Past   Comments:  6/16/2015 3:17 PM - Randall Salcedo MD: 6 drinks pks per weekend   (Last Updated: 2/13/2020 3:10:22 PM CST by Fariba Roth MA)          Tobacco:        Current   Comments:  6/16/2015 3:17 PM - Randall Salcedo MD: 1 ppd   (Last Updated: 6/16/2015 3:17:14 PM CDT by Randall Salcedo MD)          Substance Abuse:        Current   Comments:  6/16/2015 3:18 PM - Randall Salcedo MD: Marijuana   (Last Updated: 6/16/2015 3:18:44 PM CDT by Randall Salcedo MD)          Employment/School:        Work/School description: disabled.   Comments:  2/21/2011 5:54 PM - Wong Bashir MD: has 1 child- 10 weeks old -   (Last Updated: 2/21/2011 5:54:14 PM CST by Wong Bashir MD)          Home/Environment:        Lives with Self, Significant other.   Comments:  2/21/2011 5:54 PM - Wong Bashir MD: 2 kids in household   (Last Updated: 2/21/2011 5:54:34 PM CST by Wong Bashir MD)

## 2022-02-15 NOTE — PROGRESS NOTES
Patient:   JOEL SUTTON            MRN: [id number]            FIN: [id number]               Age:   40 years     Sex:  Male     :  1980   Associated Diagnoses:   Chronic back pain; Low back pain; Preop examination   Author:   Jules Garcia MD      Preoperative Information   Indication for surgery   Accompanied by   Source of history          Chief Complaint   2020 11:59 AM CDT    Pre-op MRI with sedation 20 at [place]. does need covid testing.     planned MRI of entire spine due to problems      Review of Systems   Constitutional:  No fever, No chills.    Eye   Ear/Nose/Mouth/Throat:  No nasal congestion, No sore throat.    Respiratory:  No shortness of breath, No cough.    Cardiovascular:  No chest pain.    Breast   Gastrointestinal:  No nausea, No vomiting, No diarrhea, No constipation.    Genitourinary:  No dysuria.    Gynecologic   Hematology/Lymphatics:  No bruising tendency, No swollen lymph glands.    Endocrine   Immunologic:  No recurrent fevers, No recurrent infections.    Musculoskeletal:  left foot pain after dropped soap dish, whole body hurts some all the time.    Integumentary:  No rash.    Neurologic:  No tingling, No headache.    Psychiatric   All other systems.     Health Status   Allergies:    Allergic Reactions (Selected)  Severity Not Documented  Bananas (No reactions were documented)  Bee Stings (No reactions were documented)  Latex (No reactions were documented)   Medications:  (Selected)   Prescriptions  Prescribed  Orthotics: Orthotics, See Instructions, Instructions: to use as directed with shoes, Supply, # 2 EA, 0 Refill(s), Type: Maintenance  Ventolin HFA 90 mcg/inh inhalation aerosol: 2 puff(s), NEB, qid, PRN: AS NEEDED FOR WHEEZING, # 1 EA, 0 Refill(s), Type: Maintenance, Pharmacy: [facility], 2 puff(s) NEB qid,PRN:AS NEEDED FOR WHEEZING  diazePAM 5 mg oral tablet: = 1 tab(s) ( 5 mg ), Oral, q8 hrs, PRN: as needed for anxiety, # 12 tab(s), 1  Refill(s), Type: Maintenance, Pharmacy: [facility], 1 tab(s) Oral q8 hrs,PRN:as needed for anxiety  oxyCODONE 5 mg oral tablet: = 1 tab(s) ( 5 mg ), Oral, q8 hrs, x 10 day(s), # 30 tab(s), 0 Refill(s), Type: Acute, Pharmacy: [facility], 1 tab(s) Oral q8 hrs,x10 day(s), 72, in, 05/13/20 14:10:00 CDT, Height Measured, 307.4, lb, 05/13/20 14:10:00 CDT, We...  Documented Medications  Documented  acetaminophen 500 mg oral tablet: = 2 tab(s) ( 1,000 mg ), Oral, q6 hrs, PRN: as needed for pain, 0 Refill(s), Type: Maintenance,    Medications          *denotes recorded medication          Orthotics: See Instructions, to use as directed with shoes, 2 EA, 0 Refill(s).          *acetaminophen 500 mg oral tablet: 1,000 mg, 2 tab(s), Oral, q6 hrs, PRN: as needed for pain, 0 Refill(s).          Ventolin HFA 90 mcg/inh inhalation aerosol: 2 puff(s), NEB, qid, PRN: AS NEEDED FOR WHEEZING, 1 EA, 0 Refill(s).          diazePAM 5 mg oral tablet: 5 mg, 1 tab(s), Oral, q8 hrs, PRN: as needed for anxiety, 12 tab(s), 1 Refill(s).          oxyCODONE 5 mg oral tablet: 5 mg, 1 tab(s), Oral, q8 hrs, for 10 day(s), 30 tab(s), 0 Refill(s).       Problem list:    All Problems  Cerebellar tonsillar ectopia / 01199114 / Confirmed  Anxiety / 70389678 / Confirmed  Obese / 5649472528 / Probable  Tobacco user / 616055690 / Probable  Smoking / 189121560 / Confirmed  Low back pain / 561592484 / Confirmed  Chronic insomnia / 498077322 / Confirmed  Myalgia / 095024833 / Confirmed  Trochanteric bursitis, right hip / 58141236 / Confirmed  Marijuana use / 9726005477 / Confirmed  Dysplastic nevus / 1419394475 / Confirmed  removed from back  Asthma, moderate persistent / 4172154469 / Confirmed  Sebaceous cyst / 8579456842 / Confirmed  Tension headache / 7970023483 / Confirmed  Chronic back pain / 734308289 / Confirmed      Histories   Past Medical History:    Active  Cerebellar tonsillar ectopia  (87807451)  Anxiety (98972346)  Obese (5205176801)   Family History:    Heart disease  Father (Conor)  Diabetes mellitus type 2  Mother (Obdulia)     Procedure history:    Injection of cortisone (SNOMED CT 7250921391) performed by Joann on 6/8/2016 at 36 Years.  Transanal hemorrhoidal dearterialization (THD) performed by Vik Chavez MD on 9/8/2015 at 35 Years.  Esophagogastroduodenoscopy (SNOMED CT 532559596) on 8/4/2015 at 35 Years.  Comments:  8/5/2015 12:30 PM CDT - Mata Merino MD  Normal  Colonoscopy (SNOMED CT 844470460) performed by Mata Merino MD on 8/4/2015 at 35 Years.  Comments:  8/13/2015 3:29 PM CDT - Genia Mac RN  Sedation: MAC  Indication: rectal bleeding  External & internal hemorrhoids; otherwise normal  Resume routine screening at age 50.  Hospitalized at [facility] in 2002 at 22 Years.  Right knee arthroscopy.  Surgery on eyes x3.  Comments:  12/15/2010 4:16 PM CST - Genia Ely RN  as infant   Social History:        Alcohol Assessment            Past                     Comments:                      06/16/2015 - Randall Salcedo MD                     6 drinks pks per weekend      Tobacco Assessment            Current                     Comments:                      06/16/2015 - Randall Salcedo MD                     1 ppd      Substance Abuse Assessment            Current                     Comments:                      06/16/2015 - Randall Salcedo MD                     Marijuana      Employment and Education Assessment            Work/School description: disabled.                     Comments:                      02/21/2011 - Wong Bashir MD                     has 1 child- 10 weeks old -      Home and Environment Assessment            Lives with Self, Significant other.                     Comments:                      02/21/2011 - Wong Bashir MD                     2 kids in household       Has  no history of anemia.  Has no history of DVT or  pulmonary embolism.  Has a personal history of bleeding problems.   had bleeding stomach ulcers in past  Has no personal or family history of anesthesia reactions.  Patient does not have active tuberculosis.    S/he has not taken aspirin or aspirin containing products in the last week.     S/he has not taken Plavix (Clopidogrel) in the last 2 weeks.    S/he  has not taken warfarin in the past week.    S/he has not been on corticosteroids for more than 2 weeks recently.      S/he is not DNR before, during or after surgery.    Chest pain / SOB walking up 2 flights of steps? sedentary  Pain in neck or jaw?no  CAD MI?   no  Afib? no  Heart Failure? no  Asthma  or Bronchitis?  rare use of albuterol  Diabetes? no       Insulin/Orals?   Seizure Disorder? no  CKD? no  Thyroid Disease? no  Liver Disease no  CVA? no         Physical Examination   Vital Signs   6/4/2020 11:59 AM CDT Temperature Tympanic 97.6 DegF  LOW    Peripheral Pulse Rate 76 bpm    Pulse Site Radial artery    HR Method Manual    Systolic Blood Pressure 136 mmHg    Diastolic Blood Pressure 82 mmHg    Mean Arterial Pressure 100 mmHg    BP Site Right arm    BP Method Manual      Measurements from flowsheet : Measurements   6/4/2020 11:59 AM CDT Height Measured - Standard 72 in    Weight Measured - Standard 306 lb    BSA 2.65 m2    Body Mass Index 41.5 kg/m2  HI      General:  Alert and oriented, No acute distress.    Eye:  Pupils are equal, round and reactive to light, Normal conjunctiva.    HENT:  Oral mucosa is moist.    Neck:  Supple, No carotid bruit, No lymphadenopathy.    Respiratory:  Lungs are clear to auscultation, Respirations are non-labored.    Cardiovascular:  Normal rate, Regular rhythm, No edema.    Gastrointestinal:  Non-distended.    Integumentary:  Warm, No rash.    Psychiatric:  Cooperative, Appropriate mood & affect, Normal judgment.       Impression and Plan   Diagnosis     Chronic back pain (DRH20-PG M54.9).     Low back pain (LIP13-DI  M54.5).     Preop examination (HNW70-FV Z01.818).     Condition:  bmi elevated at 41.5,   has not tolerated sleep testing,  struggles with daytime sleepiness, insomnia  ok for MRI undersedation but may have OTILIO.

## 2022-02-15 NOTE — CARE COORDINATION
CC assisted pt in filling out paperwork for [facility]. All paperwork filled out and CC mailed for pt today.

## 2022-02-15 NOTE — PROGRESS NOTES
Patient:   JOEL SUTTON            MRN: [id number]            FIN: [id number]               Age:   37 years     Sex:  Male     :  1980   Associated Diagnoses:   Chronic back pain   Author:   Richard Caldwell MD      Visit Information      Date of Service: 2017 02:26 pm  Performing Location: [facility]  Encounter#: [id number]      Primary Care Provider (PCP):  Mata Hearn MD    NPI# [id number]      Referring Provider:  No referring provider recorded for selected visit.      Chief Complaint   2017 2:31 PM CDT    Ongoing back pain.        History of Present Illness   chief complaint and symptoms as noted above confirmed with patient   Ongoing but flare up several days  no weakness Some occas r leg pain      Review of Systems   Constitutional:  Negative except as documented in history of present illness.    Gastrointestinal:  Negative.    Genitourinary:  Negative.    Musculoskeletal:  Negative except as documented in history of present illness.    Neurologic:  Negative except as documented in history of present illness.             Health Status   Allergies:    Allergic Reactions (Selected)  Severity Not Documented  Bee Stings (No reactions were documented)  Latex (No reactions were documented)   Medications:  (Selected)   Prescriptions  Prescribed  Medrol Dosepak 4 mg oral tablet: 1 packet(s), PO, Once, Instructions: as directed on package labeling, # 21 tab(s), 0 Refill(s), Type: Soft Stop, Pharmacy: [facility], 1 packet(s) po once,Instr:as directed on package labeling  ProAir HFA 90 mcg/inh inhalation aerosol: 2 puff(s), inh, qid, # 1 EA, 3 Refill(s), Type: Maintenance, Pharmacy: [facility], 2 puff(s) inh qid  Qvar 80 mcg/inh inhalation aerosol: 1 puff(s), inh, bid, # 1 EA, 3 Refill(s), Type: Maintenance, Pharmacy: [facility], 1 puff(s) inh bid  bacitracin 500 units/g topical ointment: 1 mal, TOP,  QID, # 15 g, 0 Refill(s), Type: Maintenance, Pharmacy: [facility], 1 mal top qid  cyclobenzaprine 10 mg oral tablet: 1 tab(s) ( 10 mg ), PO, TID, PRN: for spasm, # 30 tab(s), 0 Refill(s), Type: Maintenance, Pharmacy: [facility], 1 tab(s) po tid,PRN:for spasm  diazePAM 5 mg oral tablet: 1 tab(s) ( 5 mg ), PO, tid, PRN: for anxiety, # 10 tab(s), 0 Refill(s), Type: Maintenance  hydrocortisone 2.5% topical cream: 1 mal, top, tid, # 30 gm, 1 Refill(s), Type: Maintenance, Pharmacy: [facility], 1 mal top tid   Problem list:    All Problems  Cerebellar tonsillar ectopia / SNOMED CT 26570174 / Confirmed  Anxiety / SNOMED CT 89668590 / Confirmed  Obese / ICD-9-.00 / Probable  Tobacco user / SNOMED CT 447099231 / Probable  Smoking / SNOMED CT 850300279 / Confirmed      Histories   Past Medical History:    Active  Cerebellar tonsillar ectopia (87251553)  Anxiety (76136150)   Family History:    Heart disease  Father (Conor)  Diabetes mellitus type 2  Mother (Obdulia)     Procedure history:    Injection of cortisone (SNOMED CT 1194509200) performed by Joann on 6/8/2016 at 36 Years.  Transanal hemorrhoidal dearterialization (THD) performed by Vik Chavez MD on 9/8/2015 at 35 Years.  Esophagogastroduodenoscopy (SNOMED CT 654952887) on 8/4/2015 at 35 Years.  Comments:  8/5/2015 12:30 PM - Mata Merino MD  Normal  Colonoscopy (SNOMED CT 447365538) performed by Mata Merino MD on 8/4/2015 at 35 Years.  Comments:  8/13/2015 3:29 PM - Genia Mac RN  Sedation: MAC  Indication: rectal bleeding  External & internal hemorrhoids; otherwise normal  Resume routine screening at age 50.  Hospitalized at [facility] in 2002 at 22 Years.  Right knee arthroscopy.  Surgery on eyes x3.  Comments:  12/15/2010 4:16 PM - Jhoana FLANNERY, Genia  as infant   Social History:        Alcohol Assessment: Denies Alcohol Use            Current             Current                     Comments:                      06/16/2015 - Randall Salcedo MD                     6 drinks pks per weekend      Tobacco Assessment: Current            Current                     Comments:                      06/16/2015 - Randall Salcedo MD                     1 ppd      Substance Abuse Assessment            Current, Marijuana            Current                     Comments:                      06/16/2015 - Randall Salcedo MD                     Marijuana      Employment and Education Assessment            Work/School description: disabled.                     Comments:                      02/21/2011 - Krysten ORTEGA Wong                     has 1 child- 10 weeks old -      Home and Environment Assessment            Lives with Self, Significant other.                     Comments:                      02/21/2011 - Wong Bashir MD                     2 kids in household        Physical Examination   Vital Signs   5/12/2017 2:31 PM CDT Temperature Tympanic 98.6 DegF    Peripheral Pulse Rate 98 bpm    Systolic Blood Pressure 114 mmHg    Diastolic Blood Pressure 83 mmHg    Mean Arterial Pressure 93 mmHg      Measurements from flowsheet : Measurements   5/12/2017 2:31 PM CDT    Weight Measured - Standard                279.2 lb     General:  Alert and oriented, No acute distress.    Musculoskeletal:       Spine/torso exam: Lumbar ( Bilateral, Lateral, L4, L5, Tenderness, Range of motion ( Diminished ), neg slr  lower extrem  cms otherwise intact ).    Neurologic:  Alert, Oriented, Cranial Nerves II-XII are grossly intact.       Impression and Plan   Diagnosis     Chronic back pain (ETT13-ZZ M54.9).     Course:  Not progressing as expected.    Plan:  cont pt   steroid taper  fu 1 week if not better sooner if worse.    Patient Instructions:       Counseled: Patient, Regarding diagnosis, Regarding treatment, Regarding medications, Activity.

## 2022-02-15 NOTE — NURSING NOTE
Comprehensive Intake Entered On:  11/30/2020 2:36 PM CST    Performed On:  11/30/2020 2:30 PM CST by Gonzalez RAMSAY, Fariba               Summary   Chief Complaint :   discuss getting neck injections   Menstrual Status :   N/A   Weight Measured :   309.8 lb(Converted to: 309 lb 13 oz, 140.523 kg)    Height Measured :   72 in(Converted to: 6 ft 0 in, 182.88 cm)    Body Mass Index :   42.01 kg/m2 (HI)    Body Surface Area :   2.67 m2   Height/Length Estimated :   72 in(Converted to: 6 ft 0 in, 182.88 cm)    Systolic Blood Pressure :   156 mmHg (HI)    Diastolic Blood Pressure :   98 mmHg (HI)    Mean Arterial Pressure :   117 mmHg   Peripheral Pulse Rate :   88 bpm   BP Site :   Right arm   Pulse Site :   Radial artery   BP Method :   Manual   HR Method :   Manual   Temperature Tympanic :   98.4 DegF(Converted to: 36.9 DegC)    Oxygen Saturation :   93 % (LOW)    Fariba Roth MA - 11/30/2020 2:30 PM CST   Health Status   Allergies Verified? :   Yes   Medication History Verified? :   Yes   Medical History Verified? :   Yes   Pre-Visit Planning Status :   Not completed   Tobacco Use? :   Current every day smoker   Fariba Roth MA - 11/30/2020 2:30 PM CST   Consents   Consent for Immunization Exchange :   Consent Granted   Consent for Immunizations to Providers :   Consent Granted   Fariba Roth MA - 11/30/2020 2:30 PM CST   Meds / Allergies   (As Of: 11/30/2020 2:36:52 PM CST)   Allergies (Active)   Bananas  Estimated Onset Date:   Unspecified ; Created By:   Rowena Hooker RN; Reaction Status:   Active ; Category:   Drug ; Substance:   Bananas ; Type:   Allergy ; Updated By:   Rowena Hooker RN; Reviewed Date:   10/13/2020 2:26 PM CDT      Bee Stings  Estimated Onset Date:   Unspecified ; Created By:   Ruthie Nam CMA; Reaction Status:   Active ; Category:   Drug ; Substance:   Bee Stings ; Type:   Allergy ; Updated By:   Ruthie Nam CMA; Reviewed Date:   10/13/2020 2:26 PM CDT      Latex  Estimated Onset  Date:   Unspecified ; Created By:   Ruthie Nam CMA; Reaction Status:   Active ; Category:   Drug ; Substance:   Latex ; Type:   Allergy ; Updated By:   Ruthie Nam CMA; Reviewed Date:   10/13/2020 2:26 PM CDT        Medication List   (As Of: 11/30/2020 2:36:52 PM CST)   Prescription/Discharge Order    albuterol  :   albuterol ; Status:   Prescribed ; Ordered As Mnemonic:   Ventolin HFA 90 mcg/inh inhalation aerosol ; Simple Display Line:   2 puff(s), NEB, qid, PRN: AS NEEDED FOR WHEEZING, 1 EA, 0 Refill(s) ; Ordering Provider:   Mata Hearn MD; Catalog Code:   albuterol ; Order Dt/Tm:   4/9/2020 2:03:28 PM CDT          diclofenac topical  :   diclofenac topical ; Status:   Prescribed ; Ordered As Mnemonic:   diclofenac 1% topical gel ; Simple Display Line:   2 gm, Topical, qid, 240 gm, 2 Refill(s) ; Ordering Provider:   Mata Hearn MD; Catalog Code:   diclofenac topical ; Order Dt/Tm:   8/5/2020 4:32:01 PM CDT          ketorolac ophthalmic  :   ketorolac ophthalmic ; Status:   Prescribed ; Ordered As Mnemonic:   Acular 0.5% ophthalmic solution ; Simple Display Line:   1 drop(s), right eye, QID, for 7 day(s), PRN: for itching, 10 mL, 0 Refill(s) ; Ordering Provider:   Cricket Jessica PA-C; Catalog Code:   ketorolac ophthalmic ; Order Dt/Tm:   10/9/2020 11:52:51 AM CDT          Miscellaneous Rx Supply  :   Miscellaneous Rx Supply ; Status:   Prescribed ; Ordered As Mnemonic:   Orthotics ; Simple Display Line:   See Instructions, to use as directed with shoes, 2 EA, 0 Refill(s) ; Ordering Provider:   Mata Hearn MD; Catalog Code:   Miscellaneous Rx Supply ; Order Dt/Tm:   2/13/2020 3:16:54 PM CST          oxyCODONE  :   oxyCODONE ; Status:   Prescribed ; Ordered As Mnemonic:   oxyCODONE 5 mg oral tablet ; Simple Display Line:   5 mg, 1 tab(s), Oral, q8 hrs, PRN: as needed for pain, 20 tab(s), 0 Refill(s) ; Ordering Provider:   Mata Hearn MD; Catalog Code:   oxyCODONE ; Order Dt/Tm:    10/20/2020 3:40:38 PM CDT            Home Meds    acetaminophen  :   acetaminophen ; Status:   Documented ; Ordered As Mnemonic:   acetaminophen 500 mg oral tablet ; Simple Display Line:   1,000 mg, 2 tab(s), Oral, q6 hrs, PRN: as needed for pain, 0 Refill(s) ; Catalog Code:   acetaminophen ; Order Dt/Tm:   11/6/2019 1:37:34 PM CST          diazePAM  :   diazePAM ; Status:   Documented ; Ordered As Mnemonic:   diazePAM 5 mg oral tablet ; Simple Display Line:   0 Refill(s) ; Catalog Code:   diazePAM ; Order Dt/Tm:   11/11/2020 1:11:00 PM CST            ID Risk Screen   Recent Travel History :   No recent travel   Family Member Travel History :   No recent travel   Other Exposure to Infectious Disease :   Unknown   Fariba Roth MA - 11/30/2020 2:30 PM CST   Social History   Social History   (As Of: 11/30/2020 2:36:52 PM CST)   Alcohol:        Past   Comments:  6/16/2015 3:17 PM - Randall Salcedo MD: 6 drinks pks per weekend   (Last Updated: 2/13/2020 3:10:22 PM CST by Fariba Roth MA)          Tobacco:        10 or more cigarettes (1/2 pack or more)/day in last 30 days   (Last Updated: 11/30/2020 2:34:51 PM CST by Fariba Roth MA)          Electronic Cigarette/Vaping:        Electronic Cigarette Use: Never.   (Last Updated: 11/30/2020 2:35:11 PM CST by Fariba Roth MA)          Substance Abuse:        Current   Comments:  6/16/2015 3:18 PM - Randall Salcedo MD: Marijuana   (Last Updated: 6/16/2015 3:18:44 PM CDT by Randall Salcedo MD)          Employment/School:        Work/School description: disabled.   Comments:  2/21/2011 5:54 PM - Wong Bashir MD: has 1 child- 10 weeks old -   (Last Updated: 2/21/2011 5:54:14 PM CST by Wong Bashir MD)          Home/Environment:        Lives with Self, Significant other.   Comments:  2/21/2011 5:54 PM - Wong Bashir MD: 2 kids in household   (Last Updated: 2/21/2011 5:54:34 PM CST by Megan Bashir MD

## 2022-02-15 NOTE — TELEPHONE ENCOUNTER
---------------------  From: Abbi Chong LPN (eRx Pool ([id number]_[facility]))   To: [facility] Message Pool ([id number]_[facility]);     Sent: 6/25/2020 10:09:14 AM CDT  Subject: FW: Medication Management   Due Date/Time: 6/25/2020 7:14:00 PM CDT     Medication Refill needing approval    PCP:   [email or web address]    Medication:   Oxycodone  Last Filled:  06/01/20   Quantity:  30  Refills:  0    Date of last office visit and reason:   06/04/20, pre-op  Date of last labs pertaining to condition:  _    Note:  _    Return to Clinic order placed?  _    Resource:   _  Phone:   _      ------------------------------------------  From: [facility]  To: Mata Hearn MD  Sent: June 24, 2020 7:14:15 PM CDT  Subject: Medication Management  Due: June 24, 2020 10:20:04 PM CDT     ** On Hold Pending Signature **     Drug: oxyCODONE (oxyCODONE 5 mg oral tablet), TAKE ONE TABLET BY MOUTH EVERY 6 HOURS  Quantity: 30 unknown unit  Days Supply: 7  Refills: 0  Substitutions Allowed  Notes from Pharmacy:     Dispensed Drug: oxyCODONE (oxyCODONE 5 mg oral tablet), TAKE ONE TABLET BY MOUTH EVERY 6 HOURS  Quantity: 30 unknown unit  Days Supply: 7  Refills: 0  Substitutions Allowed  Notes from Pharmacy:  ---------------------------------------------------------------  From: Bruce Whiting CMA ([facility] Message Pool ([id number]_[facility]))   To: Cricket Jessica PA-C;     Sent: 6/25/2020 10:20:56 AM CDT  Subject: FW: Medication Management   Due Date/Time: 6/25/2020 7:14:00 PM CDTRx renewed---------------------  From: Jaskaran CONWAY, Cricket MEDINA   To: [facility]    Sent: 6/25/2020 11:59:32 AM CDT  Subject: FW: Medication Management     ** Not Approved: filled by alternate method **  oxyCODONE (OXYCODONE HCL 5MG TABS)  TAKE ONE TABLET BY MOUTH EVERY 6 HOURS  Qty:  30 unknown unit        Days Supply:  7        Refills:  0          Substitutions Allowed     Route To Pharmacy - [facility]    Signed by Jaskaran CONWAY, Cricket MEDINA

## 2022-02-15 NOTE — PROGRESS NOTES
Chief Complaint    Pt here for f/u appt for back pain. Has Consult appt scheduled at [facility] on 10/10.  History of Present Illness          HPI pt has history of chronic myofascial pain and has found trigger point injections to be a useful tool to help control pain.  Would like to have repeat trigger point injections today.  Would also like repeat toradol.  continues to decline PT and is not sure if he is going to followthrough with NS or pain clinic, declines any oral meds for mood or pain, kids are still with their mom.                     Review of systems is negative except as per HPI      Exam:      General: alert and oriented ×3 no acute distress.      HEENT: pupils are equal round and reactive to light extraocular motion is intact. Normocephalic and atraumatic.       Hearing is grossly normal and there is no otorrhea.       Nares are patent there is no rhinorrhea.       Mucous membranes are moist and pink.      Chest: has bilateral rise with no increased work of breathing.      Cardiovascular: normal perfusion and brisk capillary refill.      Musculoskeletal: no gross focal abnormalities and normal gait.      Neuro: no gross focal abnormalities and memory seems intact.      Psychiatric: speech is clear and coherent and fluent. Patient dressed appropriately for the weather. Mood is appropriate and affect is full.           Procedure note           Informed consent obtained including risks of pain, bleeding, infection, injury to surrounding tissue, and need for further treatment. Benefit of a trigger point injection goal is to reduce pain. This is just part of a treatment plan and for best results patient should also complete physical therapy. I cannot guarantee that will will be any pain relief.           Alternatives would include doing nothing, physical therapy, acupuncture, using heat or ice, continuing with oral medications such as muscle relaxants or nonsteroidal anti-inflammatory  medications or topical medications such as a lidocaine patch or cream or menthol for diclofenac. She would like to try the trigger point injections.           Prep: Alcohol          Location identified by my palpation and communication with patient.  Symptomatic trigger points that patient desired treatment at were located at:bilateral traps, rhomboid major and minor                      Each of these was injected with  a one-to-one solution of 1% lidocaine without epinephrine and 0.5% Marcaine without epinephrine.          Total number of injections:6          Total number of milliliters of the 1:1 solution of lidocaine and marcaine:7           Encouraged patient to treat the area with Karen cup ice massage tonight and to try to stretch the area out.           pain prior to treatment: severe          pain following treatment at the trigger point injection sites:improved          Complications: none          Tolerated procedure well.          Asessment and Plan: myalgia and myofascial pain, s/p trigger point injections today.  Encouraged home exercise program and to keep annual exam appt as indicated and RTC if symptoms worsen or do not improve.   Physical Exam   Vitals & Measurements    T: 97.5   F (Tympanic)  HR: 90(Peripheral)  BP: 131/85     HT: 72.8 in  WT: 281.8 lb  BMI: 37.38   Assessment/Plan       Chronic back pain (M54.9)          myalgia, urged PT, pain clinic, NS, will myron to help pt as much as I can, but suggested to pt that these other places/treatments might offer additonal treatment options         Orders:          ketorolac, 60 mg, im, once, (Completed)          49802 therapeutic prophylactic/dx injection subq/im (Charge), Quantity: 1, Chronic back pain           ketorolac tromethamine inj, 15 mg (Charge), Quantity: 4, Chronic back pain           Patient Information     Name:JOEL SUTTON      Address:      [address] [place] [id number]     Sex:Male     Date of  Birth:1980     [phone]     Emergency Contact:LEESA SUTTON     MRN:[id number]     FIN:[id number]     Location:[facility]     Date of Service:10/02/2018      Primary Care Physician:       Nena King MD, [phone]      Attending Physician:       Nena King MD, [phone]  Problem List/Past Medical History    Ongoing     Anxiety     Asthma, moderate persistent     Cerebellar tonsillar ectopia     Chronic back pain     Chronic insomnia     Dysplastic nevus       Comments: removed from back     Low back pain     Marijuana use     Myalgia     Obese     Sebaceous cyst     Smoking     Tension headache     Tobacco user     Trochanteric bursitis, right hip    Historical     No qualifying data  Procedure/Surgical History     Injection of cortisone (06/08/2016)           Transanal hemorrhoidal dearterialization (THD) (09/08/2015)           Colonoscopy (08/04/2015)            Comments:      Sedation: MAC      Indication: rectal bleeding      External & internal hemorrhoids; otherwise normal      Resume routine screening at age 50.     Esophagogastroduodenoscopy (08/04/2015)            Comments:      Normal     Hospitalized at [facility] (2002)           Right knee arthroscopy           Surgery on eyes x3            Comments:      as infant  Medications     Shoes for low back pain: See Instructions, Use as directed, 2 EA, 0 Refill(s).     Qvar 80 mcg/inh inhalation aerosol: 1 puff(s), inh, bid, 1 EA, 5 Refill(s).     Symbicort 160 mcg-4.5 mcg/inh inhalation aerosol: 2 puff(s), inh, bid, to replace qvar  in the morning and the evening  use with spacer chamber  rinse mouth and throat after use, 3 EA, 3 Refill(s).     DULoxetine 60 mg oral delayed release capsule: 60 mg, 1 cap(s), po, daily, 60 cap(s), 1 Refill(s).     cyclobenzaprine 5 mg oral tablet: 5 mg, 1 tab(s), po, bid, PRN: for muscle spasm, 60 tab(s), 1 Refill(s).     lidocaine 5% topical film: 3 patch(es),  Topical, daily, for 30 day(s), remove patches after 12 hours, 90 patch(es), 11 Refill(s).     Ativan 2 mg oral tablet: 2 mg, 1 tab(s), PO, once, take 1 our prior to MRI, PRN: for anxiety, 1 tab(s), 0 Refill(s).     ProAir HFA 90 mcg/inh inhalation aerosol: 2 puff(s), inh, qid, PRN: as needed for wheezing, 1 EA, 2 Refill(s).     valved holding chamber spacer: See Instructions, use with albuterol, 1 EA, 0 Refill(s).     Lyrica 50 mg oral capsule: 50 mg, 1 cap(s), Oral, bid, 60 cap(s), 1 Refill(s).     hydrocortisone 2.5% topical cream: 1 mal, TOP, TID, 30 g, 0 Refill(s).     clindamycin 1% topical gel: 1 mal, TOP, BID, 30 g, 5 Refill(s).          Allergies    Bee Stings    Latex  Social History    Smoking Status - 10/02/2018     Current every day smoker     Alcohol      Current, 06/16/2015     Employment and Education      Work/School description: disabled., 02/21/2011     Home and Environment      Lives with Self, Significant other., 02/21/2011     Substance Abuse      Current, 06/16/2015     Tobacco      Current, 06/16/2015  Family History    Diabetes mellitus type 2: Mother.    Heart disease: Father.  Immunizations      Vaccine Date Status      tetanus/diphth/pertuss (Tdap) adult/adol 11/17/2016 Given      influenza virus vaccine, inactivated 11/17/2016 Given      influenza virus vaccine, inactivated 01/06/2015 Given

## 2022-02-15 NOTE — LETTER
(Inserted Image. Unable to display)   May 11, 2020      JOEL SUTTON  [address]   [place], [place] [id number]        Dear JOEL,      Thank you for selecting [facility] (previously [facility]) for your healthcare needs.       Your recent back MRI indicated a possible recurrent L4-5 right posterior disc herniation which may explain  your current symptoms.  A referral has been placed to [facility] for you to follow up with Dr Whitley who did  your most recent back surgery.          Please contact me or my assistant at [phone] if you have any questions or concerns.     Sincerely,        Cricket Jessica PA-C

## 2022-02-15 NOTE — PROGRESS NOTES
Chief Complaint    f/u back pain--has pain going down right leg. would like shot of Toradol, possible something stronger.  History of Present Illness      Patient is here with an exacerbation of his low back pain.  MRI from earlier this year showed lumbar spinal stenosis.  He complains of pain in her low back radiating into his right leg.  He has similar symptoms on the left side but not quite as severe.  It is hard for him to get comfortable.  He is requesting ketorolac injection today.  He has declined epidural steroid injections in the past.  Review of Systems      See HPI.  All other review of systems negative.  Physical Exam   Vitals & Measurements    T: 96.5(Tympanic)  HR: 88(Peripheral)  BP: 126/84     HT: 72.5 in  WT: 284.4 lb  BMI: 38.04       Alert, oriented, no acute distress      Normal nonlabored breathing      Tenderness in the low back with a mildly positive straight leg raise bilaterally.  Assessment/Plan       Low back pain         Ketorolac injection today, refill diazepam, advised following up THOMAS.         Ordered:          ketorolac, 60 mg, im, once          84859 therapeutic prophylactic/dx injection subq/im (Charge), Quantity: 1, Low back pain                Lumbar spinal stenosis                Orders:         diazePAM, See Instructions, Instructions: TAKE ONE TABLET BY MOUTH THREE TIMES A DAY AS NEEDED FOR ANXIETY, # 10 tab(s), 0 Refill(s), Type: Soft Stop, Pharmacy: [facility] [place], TAKE ONE TABLET BY MOUTH THREE TIMES A DAY AS NEEDED FOR ANXIETY  Patient Information     Name:JOEL SUTTON      Address:      [address] [place] [id number]     Sex:Male     YOB: 1980     [phone]     Emergency Contact:LEESA SUTTON     MRN:[id number]     FIN:[id number]     Location:[facility]     Date of Service:12/04/2017      Primary Care Physician:       Mata Hearn MD, [phone]  Problem List/Past Medical  History    Ongoing     Anxiety     Cerebellar tonsillar ectopia     Chronic insomnia     Low back pain     Obese     Smoking     Tobacco user    Historical  Procedure/Surgical History     Injection of cortisone (06/08/2016)     Transanal hemorrhoidal dearterialization (THD) (09/08/2015)     Colonoscopy (08/04/2015)     Esophagogastroduodenoscopy (08/04/2015)     Hospitalized at [facility] (2002)     Right knee arthroscopy     Surgery on eyes x3  Medications    Advil 200 mg oral tablet, 400 mg= 2 tab(s), po, q8 hrs, PRN    cyclobenzaprine 10 mg oral tablet, 10 mg= 1 tab(s), po, tid, PRN    diazePAM 5 mg oral tablet, See Instructions    gabapentin 300 mg oral capsule, See Instructions, 1 refills    hydrocortisone 2.5% topical cream, 1 mal, top, tid, 1 refills    ProAir HFA 90 mcg/inh inhalation aerosol, 2 puff(s), inh, qid, PRN, 2 refills    Qvar 80 mcg/inh inhalation aerosol, 1 puff(s), inh, bid, 3 refills    Vitamin B-12 1000 mcg oral tablet, 1000 mcg= 1 tab(s), po, daily  Allergies    Bee Stings    Latex  Social History    Smoking Status - 12/04/2017     Current every day smoker     Alcohol - Denies Alcohol Use, 12/04/2017      Current     Employment and Education - 12/04/2017      Work/School description: disabled.     Home and Environment - 12/04/2017      Lives with Self, Significant other.     Substance Abuse - 12/04/2017      Current     Tobacco - Current, 12/04/2017      Current  Family History    Diabetes mellitus type 2: Mother.    Heart disease: Father.  Immunizations      Vaccine Date Status      tetanus/diphth/pertuss (Tdap) adult/adol 11/17/2016 Given      influenza virus vaccine, inactivated 11/17/2016 Given      influenza virus vaccine, inactivated 01/06/2015 Given  Lab Results      Results (Last 90 days)      No results located.

## 2022-02-15 NOTE — PROGRESS NOTES
Chief Complaint    Patient is here requesting trigger point injection. c/o severe pain in right leg.  History of Present Illness        pt has history of migraine headaches. Presents to clinic today with headache.   he also has leg pain and back pain with h xo f radiculopathyk, last MRI donen 1.5 years ago.  has [facility]            Photophobia no                   Nausea  no            Medications tried  duloxetine, meloxicam,            Started  chronic daily headache            hemiparesis and slurred speech not present, symptoms typical of previous headaches.      also has painful lesion on right chin, x 4 days, worsening, hx of cyst at this location                                Review of systems is negative except as per HPI      Exam:      General: alert and oriented ×3 no acute distress.      HEENT: pupils are equal round and reactive to light extraocular motion is intact. Normocephalic and atraumatic.       Hearing is grossly normal and there is no otorrhea.       Nares are patent there is no rhinorrhea.       Mucous membranes are moist and pink.      Chest: has bilateral rise with no increased work of breathing.      Cardiovascular: normal perfusion and brisk capillary refill.      Musculoskeletal: no gross focal abnormalities and normal gait.      Neuro: no gross focal abnormalities and memory seems intact.  CN 2-12 grossly intact      Psychiatric: speech is clear and coherent and fluent. Patient dressed appropriately for the weather. Mood is appropriate and affect is full.               skin right lower chin 1x2 cm infected red  and tender sebaceous cyst         Procedure note            Informed consent obtained including risks of pain, bleeding, infection, injury to surrounding tissue, and need for further treatment. Benefit of the injection goal is to reduce pain.  I cannot guarantee that will will be any pain relief.            Alternatives would include doing  nothing, trying different types of oral or injectable or topical medications, trying acupuncture, going to ED, or referral to Neurology.      At this time patient would like to try occipital nerve block.             Prep: Alcohol      Location: right occipital head             Number of injections at this location:  1             pain prior to procedure:   9             pain following procedure:  3-4             number of milliliters of the 1:1 solution of 1% lidocaine without epinephrine and 0.5% marcaine without epinephrine used in total:  4             Location:  left occipital head             Number of injections at this location:  1             pain prior to procedure:   8             pain following procedure:  3             number of milliliters of the 1:1 solution of 1% lidocaine without epinephrine and 0.5% marcaine without epinephrine used in total:  4           Complications: none           Tolerated procedure well.        Encouraged pt  to consider referral to PT to get  home exercise program to reduce frequency of these headaches as there is a myofascial component to this.  Also, to keep annual exam appt as indicated and RTC if symptoms worsen or do not improve.   Physical Exam   Vitals & Measurements    HR: 78(Peripheral)  BP: 128/88     WT: 273.2 lb   Assessment/Plan       Cellulitis (L03.90)         Orders:          cephalexin, 1 cap(s) ( 500 mg ), PO, QID, # 40 cap(s), 0 Refill(s), Type: Maintenance, Pharmacy: [facility], 1 cap(s) Oral qid,x10 day(s), (Ordered)          Return to Clinic (Request), Return in 2 weeks                Radiculopathy (M54.16)         Orders:          MRI Lumbosacral Spine w/ Contrast (Request), Priority: Routine, Radiculopathy                Orders:         DULoxetine, 1 cap(s) ( 60 mg ), po, bid, # 60 cap(s), 3 Refill(s), Type: Maintenance, Pharmacy: [facility], 1 cap(s) Oral bid, (Ordered)  Patient Information      Name:JOEL SUTTON      Address:      [address] [place] [address] [address] [place] [id number]     Sex:Male     YOB: 1980     [phone]     Emergency Contact:LEESA SUTTON     MRN:[id number]     FIN:[id number]     Location:[facility]     Date of Service:07/24/2018      Primary Care Physician:       Nena King MD, [phone]      Attending Physician:       Nena King MD, [phone]  Problem List/Past Medical History    Ongoing     Anxiety     Asthma, moderate persistent     Cerebellar tonsillar ectopia     Chronic insomnia     Dysplastic nevus       Comments: removed from back     Low back pain     Marijuana use     Myalgia     Obese     Sebaceous cyst     Smoking     Tension headache     Tobacco user     Trochanteric bursitis, right hip    Historical     No qualifying data  Procedure/Surgical History     Injection of cortisone (06/08/2016)           Transanal hemorrhoidal dearterialization (THD) (09/08/2015)           Colonoscopy (08/04/2015)            Comments:      Sedation: MAC      Indication: rectal bleeding      External & internal hemorrhoids; otherwise normal      Resume routine screening at age 50.     Esophagogastroduodenoscopy (08/04/2015)            Comments:      Normal     Hospitalized at [facility] (2002)           Right knee arthroscopy           Surgery on eyes x3            Comments:      as infant  Medications     Shoes for low back pain: See Instructions, Use as directed, 2 EA, 0 Refill(s).     ProAir HFA 90 mcg/inh inhalation aerosol: 2 puff(s), inh, qid, PRN: as needed for wheezing, 1 EA, 2 Refill(s).     Qvar 80 mcg/inh inhalation aerosol: 1 puff(s), inh, bid, 1 EA, 5 Refill(s).     Symbicort 160 mcg-4.5 mcg/inh inhalation aerosol: 2 puff(s), inh, bid, to replace qvar  in the morning and the evening  use with spacer chamber  rinse mouth and throat after use, 3 EA, 3 Refill(s).     DULoxetine 60 mg oral delayed release  capsule: 60 mg, 1 cap(s), po, daily, 60 cap(s), 1 Refill(s).     cyclobenzaprine 5 mg oral tablet: 5 mg, 1 tab(s), po, bid, PRN: for muscle spasm, 60 tab(s), 1 Refill(s).     lidocaine 5% topical film: 3 patch(es), Topical, daily, for 30 day(s), remove patches after 12 hours, 90 patch(es), 11 Refill(s).     meloxicam 15 mg oral tablet: 15 mg, 1 tab(s), PO, Daily, PRN: for pain, 90 tab(s), 3 Refill(s).     DULoxetine 60 mg oral delayed release capsule: 60 mg, 1 cap(s), po, bid, 60 cap(s), 3 Refill(s).     Keflex 500 mg oral capsule: 500 mg, 1 cap(s), PO, QID, for 10 day(s), 40 cap(s), 0 Refill(s).          Allergies    Bee Stings    Latex  Social History    Smoking Status - 07/24/2018     Current every day smoker     Alcohol      Current, 06/16/2015     Employment and Education      Work/School description: disabled., 02/21/2011     Home and Environment      Lives with Self, Significant other., 02/21/2011     Substance Abuse      Current, 06/16/2015     Tobacco      Current, 06/16/2015  Family History    Diabetes mellitus type 2: Mother.    Heart disease: Father.  Immunizations      Vaccine Date Status      tetanus/diphth/pertuss (Tdap) adult/adol 11/17/2016 Given      influenza virus vaccine, inactivated 11/17/2016 Given      influenza virus vaccine, inactivated 01/06/2015 Given  Lab Results       Lab Results (Last 4 results within 90 days)        Sodium Level: 140 mmol/L [135 mmol/L - 146 mmol/L] (04/27/18 14:54:00 CDT)       Potassium Level: 4.4 mmol/L [3.5 mmol/L - 5.3 mmol/L] (04/27/18 14:54:00 CDT)       Chloride Level: 104 mmol/L [98 mmol/L - 110 mmol/L] (04/27/18 14:54:00 CDT)       CO2 Level: 31 mmol/L [20 mmol/L - 31 mmol/L] (04/27/18 14:54:00 CDT)       Glucose Level: 83 mg/dL [65 mg/dL - 99 mg/dL] (04/27/18 14:54:00 CDT)       BUN: 15 mg/dL [7 mg/dL - 25 mg/dL] (04/27/18 14:54:00 CDT)       Creatinine Level: 0.98 mg/dL [0.6 mg/dL - 1.35 mg/dL] (04/27/18 14:54:00 CDT)       BUN/Creat Ratio: NOT APPLICABLE  [6  - 22] (04/27/18 14:54:00 CDT)       eGFR: 97 mL/min/1.73m2 [8.6 mg/dL - 10.3 mg/dL] (04/27/18 14:54:00 CDT)       eGFR African American: 113 mL/min/1.73m2 [6  - 22] (04/27/18 14:54:00 CDT)       Calcium Level: 9.7 mg/dL [8.6 mg/dL - 10.3 mg/dL] (04/27/18 14:54:00 CDT)       Hgb A1c: 5 [65 mg/dL - 99 mg/dL] (07/02/18 16:24:00 CDT)       Cholesterol: 182 mg/dL [8.6 mg/dL - 10.3 mg/dL] (07/02/18 16:24:00 CDT)       Non-HDL Cholesterol: 134 High [20 mmol/L - 31 mmol/L] (07/02/18 16:24:00 CDT)       HDL: 48 mg/dL [65 mg/dL - 99 mg/dL] (07/02/18 16:24:00 CDT)       Cholesterol/HDL Ratio: 3.8 [0.6 mg/dL - 1.35 mg/dL] (07/02/18 16:24:00 CDT)       LDL: 108 High [0.6 mg/dL - 1.35 mg/dL] (07/02/18 16:24:00 CDT)       Triglyceride: 146 mg/dL [8.6 mg/dL - 10.3 mg/dL] (07/02/18 16:24:00 CDT)       TSH: 2.14 mIU/L [0.4 mIU/L - 4.5 mIU/L] (07/02/18 16:24:00 CDT)

## 2022-02-15 NOTE — TELEPHONE ENCOUNTER
---------------------  From: Fariba Roth MA (eRx Pool ([id number]_[facility]))   To: Mata Hearn MD;     Sent: 11/8/2021 4:45:05 PM CST  Subject: FW: Medication Management   Due Date/Time: 11/9/2021 1:34:00 PM CST     RTC placed due now, LR:  10/21/2021 for #20.  Please advise re: refill        ** Patient matched by Fariba Roth MA on 11/8/2021 4:42:58 PM CST **      ------------------------------------------  From: [facility]  To: Mata Hearn MD  Sent: November 8, 2021 1:34:10 PM CST  Subject: Medication Management  Due: October 21, 2021 8:18:07 PM CDT     ** On Hold Pending Signature **     Drug: oxyCODONE (oxyCODONE 5 mg oral tablet), TAKE ONE TABLET BY MOUTH EVERY 8 HOURS AS NEEDED FOR PAIN  Quantity: 20 tab(s)  Days Supply: 7  Refills: 0  Substitutions Allowed  Notes from Pharmacy:     Dispensed Drug: oxyCODONE (oxyCODONE 5 mg oral tablet), TAKE ONE TABLET BY MOUTH EVERY 8 HOURS AS NEEDED FOR PAIN  Quantity: 20 tab(s)  Days Supply: 7  Refills: 0  Substitutions Allowed  Notes from Pharmacy:  ------------------------------------------  ** Submitted: **  Order:oxyCODONE (oxyCODONE 5 mg oral tablet)  1 tab(s)  Oral  q8 hrs  Qty:  20 tab(s)        Refills:  0          Substitutions Allowed     PRN  as needed for pain      Route To Pharmacy - [facility]    Signed by Mata Hearn MD  11/9/2021 5:31:00 PM [facility]

## 2022-02-15 NOTE — CARE COORDINATION
Met with patient today to discuss caregiver services. Called and spoke to Glenroy at [facility]. He states there are programs to help cover the cost of caregivers is just a matter if patient qualifies. I gave Glenroy pt info (Daron was aware that I was going to contact [facility]). Glenroy said he can reach out to pt tomorrow. I do have info on [employer] I can give to Daron as well as phone # for [facility].     WIll also give Daron a couple of resources for rides to the [facility].

## 2022-02-15 NOTE — PROGRESS NOTES
Chief Complaint    verbal consent given for telemed visit--pain mgt, has been out of Oxycodone for awhile.  also needs refills on inhaler, Acular and diclofenac gel.  needs note re: light sensitivity--is trying to get DL, needs windows tinted   Visit type:  Telephone visit   Participants during visit:  patient   Location of patient:  home   Location of physician:  office   Call start time:  1456   Call end time:  1510   Today's visit was conducted by telephone conference due to the COVID-19 pandemic.  The patient's consent to proceed with the telephone conference was obtained and documented.      History of Present Illness      Patient has recently seen neurosurgery and is preparing for an anterior/posterior lumbar fusion.  He has almost stopped smoking.  He reports his mother recently passed away.  He now has custody of his children.  Requests medications refills.  Review of Systems          ROS reviewed and negative except for symptoms noted in HPI.  Assessment/Plan       1. Anxiety (F41.1)         continues to struggle with anxiety, considering counseling       2. Asthma, moderate persistent (J45.40)         stable, refill albuterol       3. Degenerative lumbar disc (M51.36)         He needs to stop smoking in before proceeding with surgery       4. Tobacco user (Z72.0)         Offered assistance if needed       Orders:         albuterol, 2 puff(s), Inhale, qid, # 1 EA, 3 Refill(s), Type: Hard Stop, Pharmacy: On license of [facility], 72, in, 02/16/21 14:36:00 CST, Height Measured, 311.8, lb, 01/05/21 14:29:00 CST, Weight Measured, (Completed)         albuterol, 2 puff(s), Inhale, qid, # 2 EA, 3 Refill(s), Type: Maintenance, Pharmacy: On license of [facility], 2 puff(s) Inhale qid, 72, in, 02/16/21 14:36:00 CST, Height Measured, 311.8, lb, 01/05/21 14:29:00 CST, Weight Measured, (Ordered)         diclofenac topical, ( 2 gm ), Topical, qid, Instructions: apply to lower back, # 240 gm, 2  Refill(s), Type: Hard Stop, [facility], in, 11/30/20 14:30:00 CST, Height Measured, 309.8, lb, 11/30/20 14:30:00 CST, Weight Measured, (Completed)         diclofenac topical, ( 2 gm ), Topical, qid, Instructions: apply to lower back, # 240 gm, 2 Refill(s), Type: Maintenance, [facility], 2 gm Topical qid,Instr:apply to lower back, 72, in, 02/16/21 14:36:00 CST, Height Measured, 311.8, lb,..., (Ordered)         ketorolac ophthalmic, 1 drop(s), right eye, QID, x 7 day(s), PRN: for itching, # 10 mL, 0 Refill(s), Type: Hard Stop, [facility], 72, in, 10/09/20 11:33:00 CDT, Height Measured, 305, lb, 10/09/20 11:33:00 CDT, Weight Measured, (Completed)         ketorolac ophthalmic, 1 drop(s), right eye, QID, PRN: for itching, # 10 mL, 1 Refill(s), Type: Maintenance, [facility], 1 drop(s) Eye-Right qid,x7 day(s),PRN:for itching, 72, in, 02/16/21 14:36:00 CST, Height Measured, 311.8, lb, 01/05/21..., (Ordered)         oxyCODONE, = 1 tab(s) ( 5 mg ), Oral, q8 hrs, PRN: as needed for pain, # 20 tab(s), 0 Refill(s), Type: Hard Stop, [facility] 72, in, 02/16/21 14:36:00 CST, Height Measured, 311.8, lb, 01/05/21 14:29:00 CST, Weight Measured, (Completed)         oxyCODONE, = 1 tab(s) ( 5 mg ), Oral, q8 hrs, PRN: as needed for pain, # 20 tab(s), 0 Refill(s), Type: Maintenance, Pharmacy: [facility], 1 tab(s) Oral q8 hrs,PRN:as needed for pain, 72, in, 02/16/21 14:36:00 CST, Height Measured, 311.8,..., (Ordered)  Patient Information     Name:JOEL SUTTON      Address:      [address] [place] [id number]     Sex:Male     YOB: 1980     [phone]     Emergency Contact:[facility], CONTACT     MRN:[id number]     FIN:[id number]     Location:[facility]     Date of  Service:03/24/2021      Primary Care Physician:       Mata Hearn MD, [phone]      Attending Physician:       Mata Hearn MD, [phone]  Problem List/Past Medical History    Ongoing     Anxiety     Asthma, moderate persistent     Cerebellar tonsillar ectopia     Chronic back pain     Chronic insomnia     Degenerative lumbar disc     Dysplastic nevus       Comments: removed from back     Low back pain     Marijuana use     Myalgia     Myofascial pain syndrome     Obese     Sebaceous cyst     Smoking     Tension headache     Tobacco user     Trochanteric bursitis, right hip    Historical     No qualifying data  Procedure/Surgical History     Injection of cortisone (06/08/2016)           Transanal hemorrhoidal dearterialization (THD) (09/08/2015)           Colonoscopy (08/04/2015)            Comments: Sedation: MAC      Indication: rectal bleeding      External & internal hemorrhoids; otherwise normal      Resume routine screening at age 50..     Esophagogastroduodenoscopy (08/04/2015)            Comments: Normal.     Hospitalized at [facility] (2002)           Right knee arthroscopy           Surgery on eyes x3            Comments: as infant.  Medications    acetaminophen 500 mg oral tablet, 1000 mg= 2 tab(s), Oral, q6 hrs, PRN    Acular 0.5% ophthalmic solution, 1 drop(s), Eye-Right, qid, PRN    albuterol 90 mcg/inh inhalation aerosol, 2 puff(s), Inhale, qid, 3 refills    diazePAM 5 mg oral tablet, 5 mg= 1 tab(s), Oral, daily, PRN    diclofenac 1% topical gel, 2 gm, Topical, qid, 2 refills    Orthotics, See Instructions    oxyCODONE 5 mg oral tablet, 5 mg= 1 tab(s), Oral, q8 hrs, PRN  Allergies    Bananas    Bee Stings    Latex  Social History    Smoking Status     Current every day smoker     Alcohol      Past     Electronic Cigarette/Vaping      Electronic Cigarette Use: Never.     Employment/School      Work/School description: disabled.     Home/Environment      Lives with Self,  Significant other.     Substance Abuse      Current     Tobacco      10 or more cigarettes (1/2 pack or more)/day in last 30 days  Family History    Diabetes mellitus type 2: Mother.    Heart disease: Father.  Immunizations      Vaccine Date Status          tetanus/diphth/pertuss (Tdap) adult/adol 11/17/2016 Given          influenza virus vaccine, inactivated 11/17/2016 Given          influenza virus vaccine, inactivated 01/06/2015 Given

## 2022-02-15 NOTE — NURSING NOTE
Comprehensive Intake Entered On:  9/10/2020 12:36 PM CDT    Performed On:  9/10/2020 12:29 PM CDT by Abbi Chong LPN               Summary   Chief Complaint :   Back pain continues, needs refills of pain medication.    Menstrual Status :   N/A   Weight Measured :   307 lb(Converted to: 307 lb 0 oz, 139.25 kg)    Height Measured :   72 in(Converted to: 6 ft 0 in, 182.88 cm)    Body Mass Index :   41.63 kg/m2 (HI)    Body Surface Area :   2.66 m2   Height/Length Estimated :   72 in(Converted to: 6 ft 0 in, 182.88 cm)    Systolic Blood Pressure :   142 mmHg (HI)    Diastolic Blood Pressure :   78 mmHg   Mean Arterial Pressure :   99 mmHg   Peripheral Pulse Rate :   86 bpm   BP Site :   Right arm   Pulse Site :   Radial artery   BP Method :   Manual   HR Method :   Manual   Temperature Tympanic :   97.4 DegF(Converted to: 36.3 DegC)  (LOW)    Abbi Chong LPN - 9/10/2020 12:29 PM CDT   Health Status   Allergies Verified? :   Yes   Medication History Verified? :   Yes   Pre-Visit Planning Status :   Completed   Abbi Chong LPN - 9/10/2020 12:29 PM CDT   Consents   Consent for Immunization Exchange :   Consent Granted   Consent for Immunizations to Providers :   Consent Granted   Abbi Chong LPN - 9/10/2020 12:29 PM CDT   Problems   (As Of: 9/10/2020 12:36:10 PM CDT)   Problems(Active)    Anxiety (SNOMED CT  :[id number] )  Name of Problem:   Anxiety ; Recorder:   Genia Mac RN; Confirmation:   Confirmed ; Classification:   Medical ; Code:   [id number] ; Contributor System:   eCert ; Last Updated:   10/14/2016 8:14 AM CDT ; Life Cycle Date:   12/15/2010 ; Life Cycle Status:   Active ; Vocabulary:   SNOMED CT        Asthma, moderate persistent (SNOMED CT  :3300266351 )  Name of Problem:   Asthma, moderate persistent ; Recorder:   Parris Lugo; Confirmation:   Confirmed ; Classification:   Medical ; Code:   [id number] ; Contributor System:   PowerChart ; Last Updated:   5/2/2018 3:13  PM CDT ; Life Cycle Status:   Active ; Responsible Provider:   Parris Lugo; Vocabulary:   SNOMED CT        Cerebellar tonsillar ectopia (SNOMED CT  :04125695 )  Name of Problem:   Cerebellar tonsillar ectopia ; Recorder:   Genia Mac RN; Confirmation:   Confirmed ; Classification:   Medical ; Code:   [id number] ; Contributor System:   PowerChart ; Last Updated:   8/4/2015 11:17 AM CDT ; Life Cycle Date:   12/15/2010 ; Life Cycle Status:   Active ; Vocabulary:   SNOMED CT        Chronic back pain (SNOMED CT  :352755611 )  Name of Problem:   Chronic back pain ; Recorder:   Nena King MD; Confirmation:   Confirmed ; Classification:   Medical ; Code:   [id number] ; Contributor System:   PowerChart ; Last Updated:   6/15/2020 2:53 PM CDT ; Life Cycle Status:   Active ; Responsible Provider:   Nena King MD; Vocabulary:   SNOMED CT        Chronic insomnia (SNOMED CT  :090057658 )  Name of Problem:   Chronic insomnia ; Recorder:   Randall Salcedo MD; Confirmation:   Confirmed ; Classification:   Medical ; Code:   831558880 ; Contributor System:   PowerChart ; Last Updated:   6/29/2017 4:36 PM CDT ; Life Cycle Date:   6/29/2017 ; Life Cycle Status:   Active ; Responsible Provider:   Randall Salcedo MD; Vocabulary:   SNOMED CT        Degenerative lumbar disc (SNOMED CT  :95690381 )  Name of Problem:   Degenerative lumbar disc ; Recorder:   Mata Haern MD; Confirmation:   Confirmed ; Classification:   Medical ; Code:   17118850 ; Contributor System:   PowerChart ; Last Updated:   6/29/2020 1:21 PM CDT ; Life Cycle Date:   6/29/2020 ; Life Cycle Status:   Active ; Responsible Provider:   Mata Hearn MD; Vocabulary:   SNOMED CT        Dysplastic nevus (SNOMED CT  :3384854994 )  Name of Problem:   Dysplastic nevus ; Onset Date:   2/1/2018 ; Recorder:   Parris Lugo; Confirmation:   Confirmed ; Classification:   Medical ; Code:   [id number] ; Contributor System:   PowerChart ; Last  Updated:   5/2/2018 3:03 PM CDT ; Life Cycle Status:   Active ; Responsible Provider:   Parirs Lugo; Vocabulary:   SNOMED CT   ; Comments:        5/2/2018 3:03 PM - Parris Lugo  removed from back      Low back pain (SNOMED CT  :413940868 )  Name of Problem:   Low back pain ; Recorder:   Cricket Jessica PA-C; Confirmation:   Confirmed ; Classification:   Medical ; Code:   214011217 ; Contributor System:   PowerChart ; Last Updated:   5/24/2017 7:04 PM CDT ; Life Cycle Status:   Active ; Responsible Provider:   Cricket Jessica PA-C; Vocabulary:   SNOMED CT        Marijuana use (SNOMED CT  :0633345596 )  Name of Problem:   Marijuana use ; Recorder:   Nena King MD; Confirmation:   Confirmed ; Classification:   Medical ; Code:   9814794212 ; Contributor System:   PowerChart ; Last Updated:   3/14/2018 5:46 PM CDT ; Life Cycle Date:   3/14/2018 ; Life Cycle Status:   Active ; Responsible Provider:   Nena King MD; Vocabulary:   SNOMED CT        Myalgia (SNOMED CT  :390370571 )  Name of Problem:   Myalgia ; Recorder:   Nena King MD; Confirmation:   Confirmed ; Classification:   Medical ; Code:   011983505 ; Contributor System:   PowerChart ; Last Updated:   2/13/2018 10:09 PM CST ; Life Cycle Status:   Active ; Responsible Provider:   Nena King MD; Vocabulary:   SNOMED CT        Obese (SNOMED CT  :3183727554 )  Name of Problem:   Obese ; Recorder:   SYSTEM; Confirmation:   Probable ; Classification:   Medical ; Code:   6185858460 ; Contributor System:   PowerChart ; Last Updated:   5/7/2018 7:34 AM CDT ; Life Cycle Status:   Active ; Vocabulary:   SNOMED CT        Sebaceous cyst (SNOMED CT  :1617371238 )  Name of Problem:   Sebaceous cyst ; Recorder:   Nena King MD; Confirmation:   Confirmed ; Classification:   Medical ; Code:   0620079642 ; Contributor System:   PowerChart ; Last Updated:   5/15/2018 7:52 PM CDT ; Life Cycle Status:   Active ; Responsible Provider:    Nena King MD; Vocabulary:   SNOMED CT        Smoking (SNOMED CT  :012492285 )  Name of Problem:   Smoking ; Recorder:   Randall Salcedo MD; Confirmation:   Confirmed ; Classification:   Medical ; Code:   689458475 ; Contributor System:   Qian Xiaoâ€™er ; Last Updated:   6/16/2015 3:29 PM CDT ; Life Cycle Date:   6/16/2015 ; Life Cycle Status:   Active ; Responsible Provider:   Randall Salcedo MD; Vocabulary:   SNOMED CT        Tension headache (SNOMED CT  :7892742101 )  Name of Problem:   Tension headache ; Recorder:   Nena King MD; Confirmation:   Confirmed ; Classification:   Medical ; Code:   7394074917 ; Contributor System:   PowerChart ; Last Updated:   5/31/2018 6:13 PM CDT ; Life Cycle Status:   Active ; Responsible Provider:   Nena King MD; Vocabulary:   SNOMED CT        Tobacco user (SNOMED CT  :371721592 )  Name of Problem:   Tobacco user ; Recorder:   SYSTEM; Confirmation:   Probable ; Classification:   Medical ; Code:   949339080 ; Last Updated:   6/23/2015 11:35 AM CDT ; Life Cycle Date:   6/16/2015 ; Life Cycle Status:   Active ; Vocabulary:   SNOMED CT        Trochanteric bursitis, right hip (SNOMED CT  :83603465 )  Name of Problem:   Trochanteric bursitis, right hip ; Recorder:   Nena King MD; Confirmation:   Confirmed ; Classification:   Medical ; Code:   13584266 ; Contributor System:   Qian Xiaoâ€™er ; Last Updated:   2/16/2018 9:31 PM CST ; Life Cycle Status:   Active ; Responsible Provider:   Nena King MD; Vocabulary:   SNOMED CT          Meds / Allergies   (As Of: 9/10/2020 12:36:10 PM CDT)   Allergies (Active)   Bananas  Estimated Onset Date:   Unspecified ; Created By:   Rowena Hooker RN; Reaction Status:   Active ; Category:   Drug ; Substance:   Bananas ; Type:   Allergy ; Updated By:   Rowena Hooker RN; Reviewed Date:   9/10/2020 12:34 PM CDT      Bee Stings  Estimated Onset Date:   Unspecified ; Created By:   Ruthie Nam CMA; Reaction Status:   Active ;  Category:   Drug ; Substance:   Bee Stings ; Type:   Allergy ; Updated By:   Ruthie Nam CMA; Reviewed Date:   9/10/2020 12:34 PM CDT      Latex  Estimated Onset Date:   Unspecified ; Created By:   Ruthie Nam CMA; Reaction Status:   Active ; Category:   Drug ; Substance:   Latex ; Type:   Allergy ; Updated By:   Ruthie Nam CMA; Reviewed Date:   9/10/2020 12:34 PM CDT        Medication List   (As Of: 9/10/2020 12:36:10 PM CDT)   Prescription/Discharge Order    omeprazole  :   omeprazole ; Status:   Completed ; Ordered As Mnemonic:   omeprazole 20 mg oral delayed release capsule ; Simple Display Line:   20 mg, 1 cap(s), Oral, daily, for 30 day(s), 30 cap(s), 1 Refill(s) ; Ordering Provider:   Mata Hearn MD; Catalog Code:   omeprazole ; Order Dt/Tm:   8/5/2020 2:30:10 PM CDT          DULoxetine  :   DULoxetine ; Status:   Completed ; Ordered As Mnemonic:   DULoxetine 30 mg oral delayed release capsule ; Simple Display Line:   30 mg, 1 cap(s), Oral, bid, 60 cap(s), 0 Refill(s) ; Ordering Provider:   aMta Heanr MD; Catalog Code:   DULoxetine ; Order Dt/Tm:   8/5/2020 2:26:41 PM CDT          diazePAM  :   diazePAM ; Status:   Completed ; Ordered As Mnemonic:   diazePAM 5 mg oral tablet ; Simple Display Line:   5 mg, 1 tab(s), Oral, q8 hrs, PRN: as needed for anxiety, 12 tab(s), 1 Refill(s) ; Ordering Provider:   Mata Hearn MD; Catalog Code:   diazePAM ; Order Dt/Tm:   5/13/2020 2:53:28 PM CDT          oxyCODONE  :   oxyCODONE ; Status:   Prescribed ; Ordered As Mnemonic:   oxyCODONE 5 mg oral tablet ; Simple Display Line:   5 mg, 1 tab(s), Oral, q8 hrs, PRN: as needed for pain, 10 tab(s), 0 Refill(s) ; Ordering Provider:   Breana Watkins MD; Catalog Code:   oxyCODONE ; Order Dt/Tm:   9/2/2020 1:46:34 PM CDT          diclofenac topical  :   diclofenac topical ; Status:   Prescribed ; Ordered As Mnemonic:   diclofenac 1% topical gel ; Simple Display Line:   2 gm, Topical, qid, 240 gm,  2 Refill(s) ; Ordering Provider:   Mata Hearn MD; Catalog Code:   diclofenac topical ; Order Dt/Tm:   8/5/2020 4:32:01 PM CDT          albuterol  :   albuterol ; Status:   Prescribed ; Ordered As Mnemonic:   Ventolin HFA 90 mcg/inh inhalation aerosol ; Simple Display Line:   2 puff(s), NEB, qid, PRN: AS NEEDED FOR WHEEZING, 1 EA, 0 Refill(s) ; Ordering Provider:   Mata Hearn MD; Catalog Code:   albuterol ; Order Dt/Tm:   4/9/2020 2:03:28 PM CDT          Miscellaneous Rx Supply  :   Miscellaneous Rx Supply ; Status:   Prescribed ; Ordered As Mnemonic:   Orthotics ; Simple Display Line:   See Instructions, to use as directed with shoes, 2 EA, 0 Refill(s) ; Ordering Provider:   Mata Hearn MD; Catalog Code:   Miscellaneous Rx Supply ; Order Dt/Tm:   2/13/2020 3:16:54 PM CST            Home Meds    acetaminophen  :   acetaminophen ; Status:   Documented ; Ordered As Mnemonic:   acetaminophen 500 mg oral tablet ; Simple Display Line:   1,000 mg, 2 tab(s), Oral, q6 hrs, PRN: as needed for pain, 0 Refill(s) ; Catalog Code:   acetaminophen ; Order Dt/Tm:   11/6/2019 1:37:34 PM CST            ID Risk Screen   Recent Travel History :   No recent travel   Family Member Travel History :   No recent travel   Other Exposure to Infectious Disease :   Unknown   Abbi Chong LPN - 9/10/2020 12:29 PM CDT

## 2022-02-15 NOTE — PROGRESS NOTES
Chief Complaint    back pains - Last MRI 2/2017, wondering if he could have another. Having shooting pains down both legs R>L. See's Chiro 2x/wk.  History of Present Illness      Presents with acute on chronic low back pain, especially worse for the past 2 days.  He has been doing ongoing work with a chiropractor approximately 2 times per week.  Will take muscle relaxants on a fairly regular basis.  This past evening took both Flexeril and tizanidine with only modest improvement in symptoms.  Did have MRI of back in February of this year showing significant L5 and S1 severe foraminal stenosis.  He has been pursuing both physical therapy and chiropractic evaluation.  Historically does respond acutely to Toradol and NSAIDs.  Specifically today requesting intramuscular Toradol shot  Review of Systems       Describes having focal radicular pain down both legs.  Denies any numbness.  No bowel or bladder involvement.  Otherwise review of systems unremarkable.  Physical Exam   Vitals & Measurements    T: 97.3(Tympanic)  HR: 112(Peripheral)  BP: 128/86       Appearing in pain.  No other acute distress      Regular rate and rhythm      Nonlabored breathing      Focal tenderness along the lower lumbar spine midline; positive straight leg raise bilaterally.  No focal weakness or paresthesias present.  Assessment/Plan       Low back pain         Not on frequent presentation for Bill.  Will treat with Toradol 60 mg IM today.  Recommended taking naproxen tomorrow.  Continued as needed use of muscle relaxants.  Did offer other noninvasive therapy including massage and/or acupuncture.  Next potential therapy option would be epidural injections.  He questions whether he should have a repeat MRI.  I think his MRI from February shows more targeted pathology.   Problem List/Past Medical History    Ongoing     Anxiety     Cerebellar tonsillar ectopia     Chronic insomnia     Low back pain     Obese     Smoking     Tobacco user     Historical  Procedure/Surgical History     36151 unlisted px skin muc membrane +subq tissue (Charge) (01/31/2017)     Injection of cortisone (06/08/2016)     Transanal hemorrhoidal dearterialization (THD) (09/08/2015)     Colonoscopy (08/04/2015)     Esophagogastroduodenoscopy (08/04/2015)     21516 unlisted px skin muc membrane +subq tissue (Charge) (06/08/2015)     Hospitalized at [facility] (2002)     Right knee arthroscopy     Surgery on eyes x3  Medications    Advil 200 mg oral tablet, 400 mg= 2 tab(s), po, q8 hrs, PRN    cyclobenzaprine 10 mg oral tablet, 10 mg= 1 tab(s), po, tid, PRN    diazePAM 5 mg oral tablet, 5 mg= 1 tab(s), po, tid, PRN    hydrocortisone 2.5% topical cream, 1 mal, top, tid, 1 refills    ProAir HFA 90 mcg/inh inhalation aerosol, 2 puff(s), inh, qid, 3 refills    Qvar 80 mcg/inh inhalation aerosol, 1 puff(s), inh, bid, 3 refills  Allergies    Bee Stings    Latex  Social History    Smoking Status - 08/24/2017     Current every day smoker     Alcohol - Denies Alcohol Use, 05/24/2016      Current      Current     Employment and Education - 05/24/2016      Work/School description: disabled.     Home and Environment - 05/24/2016      Lives with Self, Significant other.     Substance Abuse - 05/24/2016      Current, Marijuana      Current     Tobacco - Current, 05/24/2016      Current  Family History    Diabetes mellitus type 2: Mother.    Heart disease: Father.  Immunizations      Vaccine Date Status      tetanus/diphth/pertuss (Tdap) adult/adol 11/17/2016 Given      influenza virus vaccine, inactivated 11/17/2016 Given      influenza virus vaccine, inactivated 01/06/2015 Given  Lab Results      Results (Last 90 days)      No results located.

## 2022-02-15 NOTE — PROGRESS NOTES
Chief Complaint    Patient is here for eye infection follow up and trigger point injection. States he has stopped taking all oral medication except duloxetine.  History of Present Illness      Patient is here today for several reasons #1 to follow-up on his visit last week with his periorbital cellulitis and conjunctivitis.  He reports that over the weekend he had to discontinue the medications because it was causing diarrhea.  He has been continuing to do his eyedrops.  He does not have any foreign body sensation and his eyelid now appears much better.  He denies any vision changes.       He also has a lesion on his right lower jaw at that is red swollen painful enlarged.  He reports that he would love to do something about it today if possible because he has a date tonight.  He has a second area over on his left shin that also will flareup intermittently.  At this time the lesion on his left chin is not currently symptomatic.       In regards to his chronic pain he has been tolerating the duloxetine now at 40 mg daily and says that he does think that this is helping with his pain.  He has a few areas on his back that he would like to have injected with trigger point injections today.  He does find them to be beneficial.  He is also been referred to the [place] [facility] because I have explained to him that I have some concerns that his symptoms may also be related to some radiculopathy.  He has some forms at home that he has received from [place] [facility] that he needs to complete before his appointment can be scheduled.  He reported that he would have difficulty completing the forms himself so has been told that if he will bring them in and contact 1 of our care coordinators they will be able to help him fill out this paperwork.  Unfortunately, he has not yet made an appointment with the care coordinators or brought his paperwork in with him.       In regards to the lesion on his right lower chin I explained  to him that I feel this is an inflamed sebaceous cyst and that trying to excise this while acutely inflamed will increase the risk of as not getting the entire cyst sac and also give him a larger scar.  I encouraged him to resume the clindamycin antibiotics that he has available at home.  We can also try injecting it with some steroid to see if this can help with the inflammation.  Risks of pain bleeding infection and need for further treatment were reviewed with him.  He understands that a steroid injection could cause some skin atrophy or discoloration and that as with any procedure there are risks of pain bleeding infection and need for further treatment.  Also with looking actively inflamed and concerned about infection so again asked him to please resume his clindamycin.  He did wish to receive the steroid injection today.       In regards to the lesion on his left chin explained that this is a sebaceous cyst that is not currently inflamed which is the best time to try to have it removed I do have some time in my schedule later this evening that we could perform the procedure today if he would like to wait until that opening or he could reschedule for another day.  At first he was planning to have the procedure done today however his ride showed up early so he decided to reschedule.       he continues to smoke tobacco and use marijuana, cessation encouraged                       Review of systems is negative except as per HPI        Exam:        General: alert and oriented ×3 no acute distress.        HEENT: pupils are equal round and reactive to light extraocular motion is intact. Normocephalic and atraumatic. conjunctiva not injected, no eyelid inflammation        Hearing is grossly normal and there is no otorrhea.         Nares are patent there is no rhinorrhea.         Mucous membranes are moist and pink.        Chest: has bilateral rise with no increased work of breathing.        Cardiovascular: normal  perfusion and brisk capillary refill.        Musculoskeletal: back has tender trigger points in the bilateral lower thoracic paraspinal muscles,  normal gait.        Neuro: no gross focal abnormalities and memory seems intact.        Psychiatric: speech is clear and coherent and fluent. Patient dressed appropriately for the weather. Mood is appropriate and affect is full.        Skin:right lower chin red inflamed fluctuant sebaceous cyst approx 15 ml, left lower chin open comedome with some surrounding scarring, approx 5 mm.             Procedure note trigger point injection            Informed consent obtained including risks of pain, bleeding, infection, injury to surrounding tissue, and need for further treatment. Benefit of a trigger point injection goal is to reduce pain. This is just part of a treatment plan and for best results patient should also complete physical therapy. I cannot guarantee that will will be any pain relief.             Alternatives would include doing nothing, physical therapy, acupuncture, using heat or ice, continuing with oral medications such as muscle relaxants or nonsteroidal anti-inflammatory medications or topical medications such as a lidocaine patch or cream or menthol for diclofenac. She would like to try the trigger point injections.             Prep: Alcohol            Location identified by my palpation and communication with patient.  Symptomatic trigger points that patient desired treatment at were located at:bilateral lower thoracic paraspinal muscles             Each of these was injected with  a one-to-one solution of 1% lidocaine without epinephrine and 0.5% Marcaine without epinephrine.            Total number of injections:2            Total number of milliliters of the 1:1 solution of lidocaine and marcaine:7             Encouraged patient to treat the area with Karen cup ice massage tonight and to try to stretch the area out.             pain prior to treatment:  moderate            pain following treatment at the trigger point injection sites:much improved            Complications: none            Tolerated procedure well.            Assessment and Plan: myalgia and myofascial pain, s/p trigger point injections today.  Encouraged home exercise program and to keep annual exam appt as indicated and RTC if symptoms worsen or do not improve.        Procedure Note right lower chin intra-lesion injection       informed consent obtained including risk/benefit/alternatives       lesion prepped with alcohol       injected with 0.3 ml of a mixture of 0.5ml 40mg/1ml kenalog in 5 ml 1% lidocaine without epi into the right lower jaw lesion       tolerated well,       no complications,       EBL<2ml          Physical Exam   Vitals & Measurements    T: 98.2   F (Tympanic)  HR: 84(Peripheral)  BP: 130/82     WT: 268.2 lb   Assessment/Plan      #1 right periorbital cellulitis and conjunctivitis.  Encouraged patient to complete 1 week of eyedrops.  If his condition worsens I would like him to return to clinic right away.      #2 chronic pain multifactorial status post trigger point injections today.  Encouraged patient to contact 1 of the care coordinators to set up a time to complete his paperwork for the [place] pain [facility].  We will also plan to continue with his duloxetine.  Patient also has referrals to physical therapy but thus far has not been able to schedule that and follow through.      #3 sebaceous cysts 1 of them is inflamed.  Not these look small enough that they should be able to be excised with a punch blade.  Would like to do the splinting not currently inflamed.  He will schedule this at his convenience.      #4 tobacco and marijuana use cessation encouraged      25 minutes spent with patient in direct face to face contact, in addition to the time spent performing the procedures today, greater than 50% of that time was  spent counseling and coordinating care.   Patient  Information     Name:JOEL SUTTON      Address:      [address] [place] [address] [address] [place] [id number]     Sex:Male     YOB: 1980     [phone]     Emergency Contact:LEESA SUTTON     MRN:[id number]     FIN:[id number]     Location:[facility]     Date of Service:05/15/2018      Primary Care Physician:       Nena King MD, [phone]      Attending Physician:       Nena King MD, [phone]  Problem List/Past Medical History    Ongoing     Anxiety     Asthma, moderate persistent     Cerebellar tonsillar ectopia     Chronic insomnia     Dysplastic nevus       Comments: removed from back     Low back pain     Marijuana use     Myalgia     Obese     Smoking     Tobacco user     Trochanteric bursitis, right hip    Historical     No qualifying data  Procedure/Surgical History     Injection of cortisone (06/08/2016)     Transanal hemorrhoidal dearterialization (THD) (09/08/2015)     Colonoscopy (08/04/2015)       Comments: Sedation: MAC<br/>Indication: rectal bleeding<br/>External &amp; internal hemorrhoids; otherwise normal<br/>Resume routine screening at age 50.     Esophagogastroduodenoscopy (08/04/2015)       Comments: Normal     Hospitalized at [facility] (2002)     Right knee arthroscopy     Surgery on eyes x3       Comments: as infant  Medications        Advil 200 mg oral tablet: 400 mg, 2 tab(s), po, q8 hrs, PRN: as needed for pain, 0 Refill(s).        Shoes for low back pain: See Instructions, Use as directed, 2 EA, 0 Refill(s).        Silvadene 1% topical cream: 1 mal, TOP, BID, 20 g, 0 Refill(s).        ProAir HFA 90 mcg/inh inhalation aerosol: 2 puff(s), inh, qid, PRN: as needed for wheezing, 1 EA, 2 Refill(s).        Qvar 80 mcg/inh inhalation aerosol: 1 puff(s), inh, bid, 1 EA, 5 Refill(s).        Symbicort 160 mcg-4.5 mcg/inh inhalation aerosol: 2 puff(s), inh, bid, to replace qvar  in the morning and the evening  use with spacer  chamber  rinse mouth and throat after use, 3 EA, 3 Refill(s).        DULoxetine 40 mg oral delayed release capsule: 40 mg, 1 cap(s), po, daily, 30 cap(s), 1 Refill(s).        clindamycin 300 mg oral capsule: 300 mg, 1 cap(s), PO, q6hr, for 7 day(s), 28 cap(s), 0 Refill(s).        Vigamox 0.5% ophthalmic solution: 1 drop(s), right eye, tid, for 7 day(s), 3 mL, 0 Refill(s).        Bactrim  mg-160 mg oral tablet: 1 tab(s), PO, BID, for 7 day(s), 14 tab(s), 0 Refill(s).  Allergies    Bee Stings    Latex  Social History    Smoking Status - 05/15/2018     Current every day smoker     Alcohol      Current, 06/16/2015     Employment and Education      Work/School description: disabled., 02/21/2011     Home and Environment      Lives with Self, Significant other., 02/21/2011     Substance Abuse      Current, 06/16/2015     Tobacco      Current, 06/16/2015  Family History    Diabetes mellitus type 2: Mother.    Heart disease: Father.  Immunizations      Vaccine Date Status      tetanus/diphth/pertuss (Tdap) adult/adol 11/17/2016 Given      influenza virus vaccine, inactivated 11/17/2016 Given      influenza virus vaccine, inactivated 01/06/2015 Given  Lab Results          Lab Results (Last 4 results within 90 days)          Sodium Level: 140 mmol/L [135 mmol/L - 146 mmol/L] (04/27/18 14:54:00)          Potassium Level: 4.4 mmol/L [3.5 mmol/L - 5.3 mmol/L] (04/27/18 14:54:00)          Chloride Level: 104 mmol/L [98 mmol/L - 110 mmol/L] (04/27/18 14:54:00)          CO2 Level: 31 mmol/L [20 mmol/L - 31 mmol/L] (04/27/18 14:54:00)          Glucose Level: 83 mg/dL [65 mg/dL - 99 mg/dL] (04/27/18 14:54:00)          BUN: 15 mg/dL [7 mg/dL - 25 mg/dL] (04/27/18 14:54:00)          Creatinine Level: 0.98 mg/dL [0.6 mg/dL - 1.35 mg/dL] (04/27/18 14:54:00)          BUN/Creat Ratio: NOT APPLICABLE [6  - 22] (04/27/18 14:54:00)          eGFR: 97 mL/min/1.73m2 [8.6 mg/dL - 10.3 mg/dL] (04/27/18 14:54:00)          eGFR   American: 113 mL/min/1.73m2 [6  - 22] (04/27/18 14:54:00)          Calcium Level: 9.7 mg/dL [8.6 mg/dL - 10.3 mg/dL] (04/27/18 14:54:00)

## 2022-02-15 NOTE — TELEPHONE ENCOUNTER
---------------------  From: Ankita Gill (Phone Messages Pool ([id number]))   To: [employer] Message Pool ([id number]_[place] [facility] [place]);     Sent: 5/29/2020 3:41:42 PM CDT  Subject: General Message     Phone Message    PCP: Asking for IRWIN or ODALIS    Time of Call: 1521    Phone Number: [phone]    Returned call at: 1536    Note: Patient called stating that he needs to be screened for COVID before he has his MRI on 6/11, he is doing this at [facility].     Patient also wondering if his Pre Op is close enough to MRI procedure.     Please advise if ok to order the COVID test on Monday as pt is needing this done before procedure. Patient is aware GTG OC today.---------------------  From: Fariba Roth MA ([facility] Message Pool ([id number]))   To: Mata Hearn MD;     Sent: 6/1/2020 7:48:46 AM CDT  Subject: FW: General Message---------------------  From: Mata Hearn MD   To: [employer] Message Pool ([id number]_[place] [facility] [place]);     Sent: 6/1/2020 1:55:34 PM CDT  Subject: RE: General Message     OK to have done Monday.  He will need a pre op.  Does the COVID test need to be done at [facility]?Pt was informed of IRWIN recommendation, would need to have COVID testing done through [facility] as MRI is being done at [facility] and all COVID testing prior to procedures performed at [facility] needs to be done at an [facility] clinic.  Pt was given # for [facility] to call.  Has preop scheduled for 6/4/2020 w/ Dr. Garcia.

## 2022-02-15 NOTE — TELEPHONE ENCOUNTER
---------------------  From: Fariba Roth MA (eRx Pool ([id number]))   To: Mata Hearn MD;     Sent: 10/21/2021 3:11:22 PM CDT  Subject: FW: Medication Management   Due Date/Time: 10/22/2021 1:13:00 PM CDT     LV:  8/4/2021 for telemed vst---back pain, anxiety.  LR: 9/21/2021 #20.  No RTC placed but should be due for pain mgt visit.  Please advise re: further refill.        ** Patient matched by Fariba Roth MA on 10/21/2021 3:09:16 PM CDT **      ------------------------------------------  From: [facility]  To: Mata Hearn MD  Sent: October 21, 2021 1:13:08 PM CDT  Subject: Medication Management  Due: October 21, 2021 8:18:04 PM CDT     ** On Hold Pending Signature **     Drug: oxyCODONE (oxyCODONE 5 mg oral tablet), TAKE ONE TABLET BY MOUTH EVERY 8 HOURS AS NEEDED FOR PAIN  Quantity: 20 tab(s)  Days Supply: 7  Refills: 0  Substitutions Allowed  Notes from Pharmacy:     Dispensed Drug: oxyCODONE (oxyCODONE 5 mg oral tablet), TAKE ONE TABLET BY MOUTH EVERY 8 HOURS AS NEEDED FOR PAIN  Quantity: 20 tab(s)  Days Supply: 7  Refills: 0  Substitutions Allowed  Notes from Pharmacy:  ------------------------------------------  ** Submitted: **  Order:oxyCODONE (oxyCODONE 5 mg oral tablet)  1 tab(s)  Oral  q8 hrs  Qty:  20 tab(s)        Refills:  0          Substitutions Allowed     PRN  as needed for pain      Route To Pharmacy - [facility]    Signed by Mata Hearn MD  10/21/2021 11:14:00 PM [facility]---------------------  From: Mata Hearn MD   To: [facility]    Sent: 10/21/2021 6:15:38 PM CDT  Subject: FW: Medication Management     ** Not Approved: refilled **  oxyCODONE (OXYCODONE HCL 5MG TABS)  TAKE ONE TABLET BY MOUTH EVERY 8 HOURS AS NEEDED FOR PAIN  Qty:  20 tab(s)        Days Supply:  7        Refills:  0          Substitutions Allowed     Route To Pharmacy - [facility]

## 2022-02-15 NOTE — PROGRESS NOTES
Chief Complaint  requesting shot for back  History of Present Illness  Daron is a young man over a year with chronic back pain with radicular symptoms who presents for Toradol injection. ?He tells me he has a standing order. ?He was to this he had an MRI of the spine in February and was to see Dr. Frias but turned down he feels steroid injection medications. ?He has multiple social stressors is  with small children is on disability for some type of head injury. ?He has chronic insomnia and I discussed cognitive behavioral therapy with him which she declined. ?Tends to get pain in the right upper but not lower leg. ?No bowel or bladder symptoms. ?No fever history of cancer. ?He is planning to go back to see Dr.? Frias, and thinks he might have the steroid injection this time around. ?He has a bed which he thinks exacerbates his pain but he does not wish to do anything about that presently.  Review of Systems  No fevers, chills, chest pain, dyspnea, edema. ?Patient is very sedentary. ?Weight a plan to do so.  Problem List/Past Medical History  Ongoing  Anxiety  Cerebellar tonsillar ectopia  Chronic insomnia  Low back pain  Obese  Smoking  Tobacco user  Resolved  No resolved problems  Procedure/Surgical History  Injection of cortisone (06/08/2016),  Transanal hemorrhoidal dearterialization (THD) (09/08/2015),  Colonoscopy (08/04/2015),  Esophagogastroduodenoscopy (08/04/2015),  Hospitalized at [facility] (2002),  Right knee arthroscopy,  Surgery on eyes x3.  Home Medications  Advil 200 mg oral tablet, 400 mg, 2 tab(s), po, q8 hrs, PRN  cyclobenzaprine 10 mg oral tablet, 10 mg, 1 tab(s), po, tid, PRN  diazePAM 5 mg oral tablet, 5 mg, 1 tab(s), po, tid, PRN  hydrocortisone 2.5% topical cream, 1 mal, top, tid, 1 refills  ProAir HFA 90 mcg/inh inhalation aerosol, 2 puff(s), inh, qid, 3 refills  Qvar 80 mcg/inh inhalation aerosol, 1 puff(s), inh, bid, 3 refills  Allergies  Bee Stings  Latex  Social  "History  Alcohol  Current  Home and Environment  Lives with Self, Significant other.  Substance Abuse  Current, Marijuana  Tobacco - Current  Family History  Diabetes mellitus type 2: Mother.  Heart disease: Father.  Immunizations  Physical Exam  Vitals & Measurements  HR:?95?(Peripheral)?  BP:?119/80?  HT:?72.5?in?  WT:?278?lb?  BMI:?37.18?  Patient appears comfortable. ?Alert and oriented. ?Chest clear. ?Cardiac exam regular. ?Lumbar spine is diffusely nontender. ?Gait, toe, heel walking normal. ?Trendelenburg normal bilaterally. ?Strength is normal in both legs reflexes are 2+ and symmetric at the patella 1+ and symmetric at the Achilles. ?Sensation to monofilament and vibration is intact.  Lab Results  Diagnostic Results  MRI of the spine February 17, 2017 revealed \"degenerative change at the L4-5 and L5-S1 levels this is most pronounced at the L5-S1 level where there is mild central spinal canal stenosis moderate right neural foraminal stenosis and moderate to severe left foraminal stenosis.  Assessment/Plan  Anxiety  Unchanged. ?Patient does not wish to take medication.  Chronic insomnia  I advised cognitive behavioral therapy for insomnia which she declined.  Low back pain  Patient is given Toradol 60 mg IM is encouraged to follow-up with both his primary physician and .  "

## 2022-02-15 NOTE — PROGRESS NOTES
Chief Complaint    Patient is here for trigger point injections due to pain in right leg.  History of Present Illness          HPI pt has history of chronic myofascial pain and has found trigger point injections to be a useful tool to help control pain.  Would like to have repeat trigger point injections today.  currently taking his duloxetine 60 mg bid, as well as occ cyclobenzaprine.  his most symptomatic area is his bilateral lumbar back with pain radiating into both legs, wants me to give him triggerpoint injections here.  explained this is radicular pain, the trigger pointinjectins have been of minimal effect here, suggested we spend our time today revisiting medications to try to help with his pain while awaiting referrals  to pain clinic and NS.   has MRI scheduled for next week, needs something to help with claustraphobia.                      Review of systems is negative except as per HPI      Exam:      General: alert and oriented ×3 no acute distress.      HEENT: pupils are equal round and reactive to light extraocular motion is intact. Normocephalic and atraumatic.       Hearing is grossly normal and there is no otorrhea.       Nares are patent there is no rhinorrhea.       Mucous membranes are moist and pink.      Chest: has bilateral rise with no increased work of breathing.      Cardiovascular: normal perfusion and brisk capillary refill.      Musculoskeletal: no gross focal abnormalities and normal gait.      Neuro: no gross focal abnormalities and memory seems intact.  ttp at bilateral lumbar spine, no step offs,      Psychiatric: speech is clear and coherent and fluent. Patient dressed appropriately for the weather. Mood is appropriate and affect is full.           Procedure note           Informed consent obtained including risks of pain, bleeding, infection, injury to surrounding tissue, and need for further treatment. Benefit of a trigger point injection goal is to reduce pain. This is just  part of a treatment plan and for best results patient should also complete physical therapy. I cannot guarantee that will will be any pain relief.           Alternatives would include doing nothing, physical therapy, acupuncture, using heat or ice, continuing with oral medications such as muscle relaxants or nonsteroidal anti-inflammatory medications or topical medications such as a lidocaine patch or cream or menthol for diclofenac. She would like to try the trigger point injections.           Prep: Alcohol          Location identified by my palpation and communication with patient.  Symptomatic trigger points that patient desired treatment at were located at: bilateral trapezius x 1 each and bilateral rhomboid x 1 each, # of muscle groups 4                      Each of these was injected with  a one-to-one solution of 1% lidocaine without epinephrine and 0.5% Marcaine without epinephrine.          Total number of injections:4          Total number of milliliters of the 1:1 solution of lidocaine and marcaine:8           Encouraged patient to treat the area with [place] cup ice massage tonight and to try to stretch the area out.           pain prior to treatment: severe          pain following treatment at the trigger point injection sites:improved          Complications: none          Tolerated procedure well.          Physical Exam   Vitals & Measurements    T: 98.5   F (Tympanic)  HR: 92(Peripheral)  BP: 122/90        Assessment/Plan       Claustrophobia (F40.240)         Orders:          LORazepam, 1 tab(s) ( 2 mg ), PO, once, Instructions: take 1 our prior to MRI, PRN: for anxiety, # 1 tab(s), 0 Refill(s), Type: Soft Stop, Pharmacy: [facility], 1 tab(s) Oral once,PRN:for anxiety,Instr:take 1 our prior to MRI, (Ordered)                Low back pain (M54.5)         Orders:          gabapentin, See Instructions, Instructions: 1 cap(s) Oral qhs x 1, then bid x 3 days then tid, # 90 EA, 1  Refill(s), Type: Maintenance, Pharmacy: [facility] [place],  cap(s) Oral qhs x 1, then bid x 3 days then tid, (Ordered)                Myalgia (M79.1)         Orders:          [address]77 office outpatient visit 15 minutes (Charge), Quantity: 1, Myalgia  Marijuana use  Radiculopathy          Return to Clinic (Request), Return in 1 day for LPN visit for injection of 60 mg toradol IM for myalgia               Fifteen minutes spent with patient in direct face to face contact, in addition to the time spent performing the procedure today, greater than 50% of that time was  spent counseling and coordinating care.   rtc 1 week for follow up sooner if needed  Patient Information     Name:JOEL SUTTON      Address:      [address] [place] [id number]     Sex:Male     YOB: 1980     [phone]     Emergency Contact:LEESA SUTTON     MRN:[id number]     FIN:[id number]     Location:[facility]     Date of Service:08/02/2018      Primary Care Physician:       Nena King MD, [phone]      Attending Physician:       Nena King MD, [phone]  Problem List/Past Medical History    Ongoing     Anxiety     Asthma, moderate persistent     Cerebellar tonsillar ectopia     Chronic insomnia     Dysplastic nevus       Comments: removed from back     Low back pain     Marijuana use     Myalgia     Obese     Sebaceous cyst     Smoking     Tension headache     Tobacco user     Trochanteric bursitis, right hip    Historical     No qualifying data  Procedure/Surgical History     Injection of cortisone (06/08/2016)           Transanal hemorrhoidal dearterialization (THD) (09/08/2015)           Colonoscopy (08/04/2015)            Comments:      Sedation: MAC      Indication: rectal bleeding      External & internal hemorrhoids; otherwise normal      Resume routine screening at age 50.     Esophagogastroduodenoscopy (08/04/2015)             Comments:      Normal     Hospitalized at [facility] (2002)           Right knee arthroscopy           Surgery on eyes x3            Comments:      as infant  Medications     Shoes for low back pain: See Instructions, Use as directed, 2 EA, 0 Refill(s).     ProAir HFA 90 mcg/inh inhalation aerosol: 2 puff(s), inh, qid, PRN: as needed for wheezing, 1 EA, 2 Refill(s).     Qvar 80 mcg/inh inhalation aerosol: 1 puff(s), inh, bid, 1 EA, 5 Refill(s).     Symbicort 160 mcg-4.5 mcg/inh inhalation aerosol: 2 puff(s), inh, bid, to replace qvar  in the morning and the evening  use with spacer chamber  rinse mouth and throat after use, 3 EA, 3 Refill(s).     DULoxetine 60 mg oral delayed release capsule: 60 mg, 1 cap(s), po, daily, 60 cap(s), 1 Refill(s).     cyclobenzaprine 5 mg oral tablet: 5 mg, 1 tab(s), po, bid, PRN: for muscle spasm, 60 tab(s), 1 Refill(s).     lidocaine 5% topical film: 3 patch(es), Topical, daily, for 30 day(s), remove patches after 12 hours, 90 patch(es), 11 Refill(s).     DULoxetine 60 mg oral delayed release capsule: 60 mg, 1 cap(s), po, bid, 60 cap(s), 3 Refill(s).     Keflex 500 mg oral capsule: 500 mg, 1 cap(s), PO, QID, for 10 day(s), 40 cap(s), 0 Refill(s).     predniSONE 50 mg oral tablet: 50 mg, 1 tab(s), PO, Daily, for 5 day(s), 5 tab(s), 0 Refill(s).     gabapentin 300 mg oral capsule: See Instructions, 1 cap(s) Oral qhs x 1, then bid x 3 days then tid, 90 EA, 1 Refill(s).     Ativan 2 mg oral tablet: 2 mg, 1 tab(s), PO, once, take 1 our prior to MRI, PRN: for anxiety, 1 tab(s), 0 Refill(s).          Allergies    Bee Stings    Latex  Social History    Smoking Status - 08/02/2018     Current every day smoker     Alcohol      Current, 06/16/2015     Employment and Education      Work/School description: disabled., 02/21/2011     Home and Environment      Lives with Self, Significant other., 02/21/2011     Substance Abuse      Current, 06/16/2015     Tobacco      Current,  06/16/2015  Family History    Diabetes mellitus type 2: Mother.    Heart disease: Father.  Immunizations      Vaccine Date Status      tetanus/diphth/pertuss (Tdap) adult/adol 11/17/2016 Given      influenza virus vaccine, inactivated 11/17/2016 Given      influenza virus vaccine, inactivated 01/06/2015 Given  Lab Results       Lab Results (Last 4 results within 90 days)        Hgb A1c: 5 [0.4 mIU/L - 4.5 mIU/L] (07/02/18 16:24:00 CDT)       Cholesterol: 182 mg/dL [0.4 mIU/L - 4.5 mIU/L] (07/02/18 16:24:00 CDT)       Non-HDL Cholesterol: 134 High [0.4 mIU/L - 4.5 mIU/L] (07/02/18 16:24:00 CDT)       HDL: 48 mg/dL [0.4 mIU/L - 4.5 mIU/L] (07/02/18 16:24:00 CDT)       Cholesterol/HDL Ratio: 3.8 (07/02/18 16:24:00 CDT)       LDL: 108 High (07/02/18 16:24:00 CDT)       Triglyceride: 146 mg/dL [0.4 mIU/L - 4.5 mIU/L] (07/02/18 16:24:00 CDT)       TSH: 2.14 mIU/L [0.4 mIU/L - 4.5 mIU/L] (07/02/18 16:24:00 CDT)

## 2022-02-15 NOTE — PROGRESS NOTES
"Chief Complaint    Pt here for f/u appt for back pain. Check cysts on face. Lost the Clindamycin and Hydrocortisone Rx's that Dr. Merino prescribed at the beginning of the month-has not taken them.  History of Present Illness         He was unable to keep appointment with Dr. Merino, says aftab left him stranded for 2 hours.  Says the cysts on his face are really bothering him and he would like to have them excised.  His pain clinic appointment was cancelled at the last minute and he has been too mad at them to rechedule.  He has not yet seen PT and has not made appt with NS.      he has several \"cysts\" on his face he would like excised       pt has history of chronic myofascial pain and has found trigger point injections to be a useful tool to help control pain.  Would like to have repeat trigger point injections today.  He would also like to have repeat toradol injection.                     Review of systems is negative except as per HPI      Exam:      General: alert and oriented ×3 no acute distress.      HEENT: pupils are equal round and reactive to light extraocular motion is intact. Normocephalic and atraumatic.       Hearing is grossly normal and there is no otorrhea.       Nares are patent there is no rhinorrhea.       Mucous membranes are moist and pink.      Chest: has bilateral rise with no increased work of breathing.      Cardiovascular: normal perfusion and brisk capillary refill.      Musculoskeletal: no gross focal abnormalities and normal gait.      Neuro: no gross focal abnormalities and memory seems intact.      Psychiatric: speech is clear and coherent and fluent. Patient dressed appropriately for the weather. Mood is appropriate and affect is full.      skin approx 4 inflammed red nodules bilateral chin, no fluctuance           Procedure note           Informed consent obtained including risks of pain, bleeding, infection, injury to surrounding tissue, and need for further treatment. Benefit of " a trigger point injection goal is to reduce pain. This is just part of a treatment plan and for best results patient should also complete physical therapy. I cannot guarantee that will will be any pain relief.           Alternatives would include doing nothing, physical therapy, acupuncture, using heat or ice, continuing with oral medications such as muscle relaxants or nonsteroidal anti-inflammatory medications or topical medications such as a lidocaine patch or cream or menthol for diclofenac. he would like to try the trigger point injections.           Prep: Alcohol          Location identified by my palpation and communication with patient.  Symptomatic trigger points that patient desired treatment at were located at: bilateral traps x 1 each and bilateral rhomboids x 1 each                      Each of these was injected with  a one-to-one solution of 1% lidocaine without epinephrine and 0.5% Marcaine without epinephrine.          Total number of injections:4          Total number of milliliters of the 1:1 solution of lidocaine and marcaine:8           Encouraged patient to treat the area with [place] cup ice massage tonight and to try to stretch the area out.           pain prior to treatment: severe          pain following treatment at the trigger point injection sites:improved, felt looser          Complications: none          Tolerated procedure well.          Asessment and Plan: myalgia and myofascial pain, s/p trigger point injections today.  Encouraged home exercise program and to keep annual exam appt as indicated and RTC if symptoms worsen or do not improve.   also urged him to make appt with pain clinic and NS  Physical Exam   Vitals & Measurements    T: 97.8   F (Tympanic)  HR: 80(Peripheral)  BP: 134/76     HT: 72.8 in  WT: 282.0 lb  BMI: 37.41   Assessment/Plan       Acne (L70.0)        explained I did not think excsion would be appropriate, would like to have him try a topical antibiotic, he  declines and would like to see DERm consult placed                  Orders:          Referral (Request), 09/25/18 15:10:00 CDT, Referred to: Dermatology, Acne               chronic back pain, myalgia, DDD, given trigger point injections today , also encouraged pt to engage in PT, he declines,  Patient Information     Name:JOEL SUTTON      Address:      [facility] [place] [id number]     Sex:Male     YOB: 1980     [phone]     Emergency Contact:LEESA SUTTON     MRN:[id number]     FIN:[id number]     Location:[facility]     Date of Service:09/25/2018      Primary Care Physician:       Nena King MD, [phone]      Attending Physician:       Nena King MD, [phone]  Problem List/Past Medical History    Ongoing     Anxiety     Asthma, moderate persistent     Cerebellar tonsillar ectopia     Chronic back pain     Chronic insomnia     Dysplastic nevus       Comments: removed from back     Low back pain     Marijuana use     Myalgia     Obese     Sebaceous cyst     Smoking     Tension headache     Tobacco user     Trochanteric bursitis, right hip    Historical     No qualifying data  Procedure/Surgical History     Injection of cortisone (06/08/2016)           Transanal hemorrhoidal dearterialization (THD) (09/08/2015)           Colonoscopy (08/04/2015)            Comments:      Sedation: MAC      Indication: rectal bleeding      External & internal hemorrhoids; otherwise normal      Resume routine screening at age 50.     Esophagogastroduodenoscopy (08/04/2015)            Comments:      Normal     Hospitalized at [facility] (2002)           Right knee arthroscopy           Surgery on eyes x3            Comments:      as infant  Medications     Shoes for low back pain: See Instructions, Use as directed, 2 EA, 0 Refill(s).     Qvar 80 mcg/inh inhalation aerosol: 1 puff(s), inh, bid, 1 EA, 5 Refill(s).     Symbicort 160  mcg-4.5 mcg/inh inhalation aerosol: 2 puff(s), inh, bid, to replace qvar  in the morning and the evening  use with spacer chamber  rinse mouth and throat after use, 3 EA, 3 Refill(s).     DULoxetine 60 mg oral delayed release capsule: 60 mg, 1 cap(s), po, daily, 60 cap(s), 1 Refill(s).     cyclobenzaprine 5 mg oral tablet: 5 mg, 1 tab(s), po, bid, PRN: for muscle spasm, 60 tab(s), 1 Refill(s).     lidocaine 5% topical film: 3 patch(es), Topical, daily, for 30 day(s), remove patches after 12 hours, 90 patch(es), 11 Refill(s).     Ativan 2 mg oral tablet: 2 mg, 1 tab(s), PO, once, take 1 our prior to MRI, PRN: for anxiety, 1 tab(s), 0 Refill(s).     ProAir HFA 90 mcg/inh inhalation aerosol: 2 puff(s), inh, qid, PRN: as needed for wheezing, 1 EA, 2 Refill(s).     valved holding chamber spacer: See Instructions, use with albuterol, 1 EA, 0 Refill(s).     Lyrica 50 mg oral capsule: 50 mg, 1 cap(s), Oral, bid, 60 cap(s), 1 Refill(s).     hydrocortisone 2.5% topical cream: 1 mal, TOP, TID, 30 g, 0 Refill(s).     clindamycin 1% topical gel: 1 mal, TOP, BID, 30 g, 5 Refill(s).          Allergies    Bee Stings    Latex  Social History    Smoking Status - 09/25/2018     Current every day smoker     Alcohol      Current, 06/16/2015     Employment and Education      Work/School description: disabled., 02/21/2011     Home and Environment      Lives with Self, Significant other., 02/21/2011     Substance Abuse      Current, 06/16/2015     Tobacco      Current, 06/16/2015  Family History    Diabetes mellitus type 2: Mother.    Heart disease: Father.  Immunizations      Vaccine Date Status      tetanus/diphth/pertuss (Tdap) adult/adol 11/17/2016 Given      influenza virus vaccine, inactivated 11/17/2016 Given      influenza virus vaccine, inactivated 01/06/2015 Given  Lab Results       Lab Results (Last 4 results within 90 days)        Hgb A1c: 5 [0.4 mIU/L - 4.5 mIU/L] (07/02/18 16:24:00 CDT)       Cholesterol: 182 mg/dL [0.4 mIU/L  - 4.5 mIU/L] (07/02/18 16:24:00 CDT)       Non-HDL Cholesterol: 134 High [0.4 mIU/L - 4.5 mIU/L] (07/02/18 16:24:00 CDT)       HDL: 48 mg/dL [0.4 mIU/L - 4.5 mIU/L] (07/02/18 16:24:00 CDT)       Cholesterol/HDL Ratio: 3.8 (07/02/18 16:24:00 CDT)       LDL: 108 High (07/02/18 16:24:00 CDT)       Triglyceride: 146 mg/dL [0.4 mIU/L - 4.5 mIU/L] (07/02/18 16:24:00 CDT)       TSH: 2.14 mIU/L [0.4 mIU/L - 4.5 mIU/L] (07/02/18 16:24:00 CDT)

## 2022-02-15 NOTE — PROGRESS NOTES
Chief Complaint    back pain. lately has been worse. hurts in leg more. being more active and stretching.  History of Present Illness          HPI pt has history of chronic myofascial pain and has found trigger point injections to be a useful tool to help control pain.  Would like to have repeat trigger point injections today.                       Review of systems is negative except as per HPI      Exam:      General: alert and oriented ×3 no acute distress.      HEENT: pupils are equal round and reactive to light extraocular motion is intact. Normocephalic and atraumatic.       Hearing is grossly normal and there is no otorrhea.       Nares are patent there is no rhinorrhea.       Mucous membranes are moist and pink.      Chest: has bilateral rise with no increased work of breathing.      Cardiovascular: normal perfusion and brisk capillary refill.      Musculoskeletal: no gross focal abnormalities and normal gait.      Neuro: no gross focal abnormalities and memory seems intact.      Psychiatric: speech is clear and coherent and fluent. Patient dressed appropriately for the weather. Mood is appropriate and affect is full.           Procedure note           Informed consent obtained including risks of pain, bleeding, infection, injury to surrounding tissue, and need for further treatment. Benefit of a trigger point injection goal is to reduce pain. This is just part of a treatment plan and for best results patient should also complete physical therapy. I cannot guarantee that will will be any pain relief.           Alternatives would include doing nothing, physical therapy, acupuncture, using heat or ice, continuing with oral medications such as muscle relaxants or nonsteroidal anti-inflammatory medications or topical medications such as a lidocaine patch or cream or menthol for diclofenac. She would like to try the trigger point injections.           Prep: Alcohol          Location identified by my palpation and  communication with patient.  Symptomatic trigger points that patient desired treatment at were located at: Bilateral medial upper trapezii and bilateral rhomboid majors.                      Each of these was injected with  a one-to-one solution of 1% lidocaine without epinephrine and 0.5% Marcaine without epinephrine.          Total number of injections: 4 with 4 different muscle groups          Total number of milliliters of the 1:1 solution of lidocaine and marcaine: 8           Encouraged patient to treat the area with [place] cup ice massage tonight and to try to stretch the area out.           pain prior to treatment: severe          pain following treatment at the trigger point injection sites: Much improved          Complications: none          Tolerated procedure well.                 Asessment and Plan: myalgia and myofascial pain, s/p trigger point injections today.  Encouraged home exercise program and to keep annual exam appt as indicated and RTC if symptoms worsen or do not improve.  Also encouraged patient to be consider following up with pain clinic and neurosurgery.  Encouraged him to stop smoking marijuana.  Also encouraged him to stop smoking tobacco.  Physical Exam   Vitals & Measurements    T: 98.3   F (Tympanic)  HR: 72(Peripheral)  BP: 130/90     WT: 283.8 lb   Patient Information     Name:JOEL SUTTON      Address:      [address] [place] [id number]     Sex:Male     YOB: 1980     [phone]     Emergency Contact:LEESA SUTTON     MRN:[id number]     FIN:[id number]     Location:[facility]     Date of Service:10/30/2018      Primary Care Physician:       Nena King MD, [phone]      Attending Physician:       Nena King MD, [phone]  Problem List/Past Medical History    Ongoing     Anxiety     Asthma, moderate persistent     Cerebellar tonsillar ectopia     Chronic back pain     Chronic insomnia      Dysplastic nevus       Comments: removed from back     Low back pain     Marijuana use     Myalgia     Obese     Sebaceous cyst     Smoking     Tension headache     Tobacco user     Trochanteric bursitis, right hip    Historical     No qualifying data  Procedure/Surgical History     Injection of cortisone (06/08/2016)           Transanal hemorrhoidal dearterialization (THD) (09/08/2015)           Colonoscopy (08/04/2015)            Comments:      Sedation: MAC      Indication: rectal bleeding      External & internal hemorrhoids; otherwise normal      Resume routine screening at age 50.     Esophagogastroduodenoscopy (08/04/2015)            Comments:      Normal     Hospitalized at [facility] (2002)           Right knee arthroscopy           Surgery on eyes x3            Comments:      as infant  Medications     ProAir HFA 90 mcg/inh inhalation aerosol: 2 puff(s), inh, qid, PRN: as needed for wheezing, 1 EA, 2 Refill(s).     omeprazole 40 mg oral delayed release capsule: 40 mg, 1 cap(s), PO, Daily, 90 cap(s), 3 Refill(s).          Allergies    Bananas    Bee Stings    Latex  Social History    Smoking Status - 10/30/2018     Current every day smoker     Alcohol      Current, 06/16/2015     Employment and Education      Work/School description: disabled., 02/21/2011     Home and Environment      Lives with Self, Significant other., 02/21/2011     Substance Abuse      Current, 06/16/2015     Tobacco      Current, 06/16/2015  Family History    Diabetes mellitus type 2: Mother.    Heart disease: Father.  Immunizations      Vaccine Date Status      tetanus/diphth/pertuss (Tdap) adult/adol 11/17/2016 Given      influenza virus vaccine, inactivated 11/17/2016 Given      influenza virus vaccine, inactivated 01/06/2015 Given  Lab Results       Lab Results (Last 4 results within 90 days)        Group A Strep POC: NOT DETECTED (10/08/18 12:11:00 CDT)       Culture Strep A: See comment (10/08/18 12:23:00 CDT)

## 2022-02-15 NOTE — NURSING NOTE
Comprehensive Intake Entered On:  9/20/2019 3:22 PM CDT    Performed On:  9/20/2019 3:14 PM CDT by Bruce Whiting CMA               Summary   Chief Complaint :   Pt here for FU on low back pain and right leg pain from August.  Pt states he didnt get MRI of back ordered at last visit.  Pt states pain is not getting any better.   Menstrual Status :   N/A   Weight Measured :   283 lb(Converted to: 283 lb 0 oz, 128.37 kg)    Height Measured :   72 in(Converted to: 6 ft 0 in, 182.88 cm)    Body Mass Index :   38.38 kg/m2 (HI)    Body Surface Area :   2.55 m2   Systolic Blood Pressure :   140 mmHg (HI)    Diastolic Blood Pressure :   90 mmHg (HI)    Mean Arterial Pressure :   107 mmHg   Peripheral Pulse Rate :   100 bpm   BP Site :   Right arm   Pulse Site :   Radial artery   Temperature Tympanic :   97.5 DegF(Converted to: 36.4 DegC)  (LOW)    Respiratory Rate :   16 br/min   Bruce Whiting CMA - 9/20/2019 3:14 PM CDT   Health Status   Allergies Verified? :   Yes   Medication History Verified? :   Yes   Medical History Verified? :   Yes   Pre-Visit Planning Status :   Not completed   Tobacco Use? :   Current every day smoker   Tobacco Cessation Review :   Not ready to quit   Bruce Whiting CMA - 9/20/2019 3:14 PM CDT   Meds / Allergies   (As Of: 9/20/2019 3:22:52 PM CDT)   Allergies (Active)   Bananas  Estimated Onset Date:   Unspecified ; Created By:   Rowena Hooker LPN; Reaction Status:   Active ; Category:   Drug ; Substance:   Bananas ; Type:   Allergy ; Updated By:   Rowena Hooker LPN; Reviewed Date:   9/20/2019 3:20 PM CDT      Bee Stings  Estimated Onset Date:   Unspecified ; Created By:   Ruthie Nam; Reaction Status:   Active ; Category:   Drug ; Substance:   Bee Stings ; Type:   Allergy ; Updated By:   Ruthie Nam; Reviewed Date:   9/20/2019 3:20 PM CDT      Latex  Estimated Onset Date:   Unspecified ; Created By:   Ruthie Nam; Reaction Status:   Active ; Category:   Drug ; Substance:   Latex ;  Type:   Allergy ; Updated By:   Ruthie Nam; Reviewed Date:   9/20/2019 3:20 PM CDT        Medication List   (As Of: 9/20/2019 3:22:52 PM CDT)   Prescription/Discharge Order    diazePAM  :   diazePAM ; Status:   Processing ; Ordered As Mnemonic:   Valium 10 mg oral tablet ; Ordering Provider:   Mata Merino MD; Action Display:   Complete ; Catalog Code:   diazePAM ; Order Dt/Tm:   9/20/2019 3:19:23 PM          omeprazole  :   omeprazole ; Status:   Processing ; Ordered As Mnemonic:   omeprazole 40 mg oral delayed release capsule ; Ordering Provider:   Nena King MD; Action Display:   Complete ; Catalog Code:   omeprazole ; Order Dt/Tm:   9/20/2019 3:19:43 PM          albuterol  :   albuterol ; Status:   Prescribed ; Ordered As Mnemonic:   ProAir HFA 90 mcg/inh inhalation aerosol ; Simple Display Line:   2 puff(s), inh, qid, PRN: as needed for wheezing, 1 EA, 2 Refill(s) ; Ordering Provider:   Nena King MD; Catalog Code:   albuterol ; Order Dt/Tm:   8/30/2018 2:42:03 PM            Social History   Social History   (As Of: 9/20/2019 3:22:52 PM CDT)   Alcohol:        Current   Comments:  6/16/2015 3:17 PM - Randall Salcedo MD: 6 drinks pks per weekend   (Last Updated: 6/16/2015 3:17:14 PM CDT by Randall Salcedo MD)          Tobacco:        Current   Comments:  6/16/2015 3:17 PM - Randall Salcedo MD: 1 ppd   (Last Updated: 6/16/2015 3:17:14 PM CDT by Randall Salcedo MD)          Substance Abuse:        Current   Comments:  6/16/2015 3:18 PM - Randall Salcedo MD: Marijuana   (Last Updated: 6/16/2015 3:18:44 PM CDT by Randall Salcedo MD)          Employment/School:        Work/School description: disabled.   Comments:  2/21/2011 5:54 PM - Wong Bashir MD: has 1 child- 10 weeks old -   (Last Updated: 2/21/2011 5:54:14 PM CST by Wong Bashir MD)          Home/Environment:        Lives with Self, Significant other.   Comments:  2/21/2011 5:54 PM - Wong Bashir MD: 2 kids in household   (Last  Updated: 2/21/2011 5:54:34 PM CST by Wong Bashir MD)

## 2022-02-15 NOTE — TELEPHONE ENCOUNTER
---------------------  From: Kait Jensen CMA (Phone Messages Pool ([id number]_[facility]))   To: [facility] Message Pool ([id number]_[facility]);     Sent: 10/14/2019 10:20:46 AM CDT  Subject: MRI q     Phone Message    PCP:   [facility] - asked for [facility]      Time of Call:  1007       Person Calling:  pt  Phone number:  [phone]    Returned call at: _    Note:   MRI of lumbar spine ordered on 10/1/19 [facility] visit. Pt asking for entire back to be done since he also has neck pain. Please advise if ok to order thoracic and cervical too.     Last office visit and reason:  10/1/19 back pain G---------------------  From: Baron Sheffield ([facility] Message Pool ([id number]_[facility]))   To: Cricket Jessica PA-C;     Sent: 10/14/2019 1:00:49 PM CDT  Subject: FW: MRI q---------------------  From: Circket Jessica PA-C   To: [facility] Message Pool ([id number]_[facility]);     Sent: 10/14/2019 2:01:17 PM CDT  Subject: RE: MRI q     okay for thoracic and cervical spine MRI as well, please put in additional orderspt calls at [phone], states his appt is at 720 today.  He was advise of the approved order.  Told him we would be faxing a new order to [facility] with the addition  Talked with [facility] CSS and notified of the need of the new order and that is was scheduled later todayOrders placed. Informed referrals to fax this over.[facility] calling stating they only have orders for MRI lumbar spine. They state pt is saying he is to have MRI of his whole spine. They need orders faxed over to [phone].     Phone number [phone].    Orders faxed.Faxes failed x2.    Called [facility]- correct fax number [phone].  Orders faxed and confirmation received.

## 2022-02-15 NOTE — NURSING NOTE
Comprehensive Intake Entered On:  5/13/2020 2:17 PM CDT    Performed On:  5/13/2020 2:10 PM CDT by Gonzalez RAMSAY, Fariba               Summary   Chief Complaint :   c/o chest pain at night when lying down, worsening back/leg pain, has tried to have MRI done w/ no success.  affecting walking/standing.  also c/o right hip/leg swelling.   Menstrual Status :   N/A   Weight Measured :   307.4 lb(Converted to: 307 lb 6 oz, 139.43 kg)    Height Measured :   72 in(Converted to: 6 ft 0 in, 182.88 cm)    Body Mass Index :   41.69 kg/m2 (HI)    Body Surface Area :   2.66 m2   Systolic Blood Pressure :   136 mmHg   Diastolic Blood Pressure :   86 mmHg   Mean Arterial Pressure :   103 mmHg   Peripheral Pulse Rate :   89 bpm   BP Site :   Right arm   Pulse Site :   Radial artery   BP Method :   Manual   HR Method :   Manual   Temperature Tympanic :   97.8 DegF(Converted to: 36.6 DegC)  (LOW)    Oxygen Saturation :   95 %   Fariba Roth MA - 5/13/2020 2:10 PM CDT   Health Status   Allergies Verified? :   Yes   Medication History Verified? :   Yes   Medical History Verified? :   Yes   Pre-Visit Planning Status :   Not completed   Tobacco Use? :   Current every day smoker   Fariba Roth MA - 5/13/2020 2:10 PM CDT   Consents   Consent for Immunization Exchange :   Consent Granted   Consent for Immunizations to Providers :   Consent Granted   Fariba Roth MA - 5/13/2020 2:10 PM CDT   Meds / Allergies   (As Of: 5/13/2020 2:17:41 PM CDT)   Allergies (Active)   Bananas  Estimated Onset Date:   Unspecified ; Created By:   Rowena Hooker LPN; Reaction Status:   Active ; Category:   Drug ; Substance:   Bananas ; Type:   Allergy ; Updated By:   Rowena Hooker LPN; Reviewed Date:   11/19/2019 5:25 PM CST      Bee Stings  Estimated Onset Date:   Unspecified ; Created By:   Ruthie Nam; Reaction Status:   Active ; Category:   Drug ; Substance:   Bee Stings ; Type:   Allergy ; Updated By:   Ruthie Nam; Reviewed Date:   11/19/2019  5:25 PM CST      Latex  Estimated Onset Date:   Unspecified ; Created By:   Ruthie Nam; Reaction Status:   Active ; Category:   Drug ; Substance:   Latex ; Type:   Allergy ; Updated By:   Ruthie Nam; Reviewed Date:   11/19/2019 5:25 PM CST        Medication List   (As Of: 5/13/2020 2:17:41 PM CDT)   Prescription/Discharge Order    albuterol  :   albuterol ; Status:   Prescribed ; Ordered As Mnemonic:   Ventolin HFA 90 mcg/inh inhalation aerosol ; Simple Display Line:   2 puff(s), NEB, qid, PRN: AS NEEDED FOR WHEEZING, 1 EA, 0 Refill(s) ; Ordering Provider:   Mata Hearn MD; Catalog Code:   albuterol ; Order Dt/Tm:   4/9/2020 2:03:28 PM CDT          diazePAM  :   diazePAM ; Status:   Prescribed ; Ordered As Mnemonic:   diazePAM 5 mg oral tablet ; Simple Display Line:   5 mg, 1 tab(s), Oral, q8 hrs, PRN: as needed for anxiety, 12 tab(s), 1 Refill(s) ; Ordering Provider:   Mata Hearn MD; Catalog Code:   diazePAM ; Order Dt/Tm:   4/9/2020 2:03:35 PM CDT          Miscellaneous Rx Supply  :   Miscellaneous Rx Supply ; Status:   Prescribed ; Ordered As Mnemonic:   Orthotics ; Simple Display Line:   See Instructions, to use as directed with shoes, 2 EA, 0 Refill(s) ; Ordering Provider:   Mata Hearn MD; Catalog Code:   Miscellaneous Rx Supply ; Order Dt/Tm:   2/13/2020 3:16:54 PM CST            Home Meds    acetaminophen  :   acetaminophen ; Status:   Documented ; Ordered As Mnemonic:   acetaminophen 500 mg oral tablet ; Simple Display Line:   1,000 mg, 2 tab(s), Oral, q6 hrs, PRN: as needed for pain, 0 Refill(s) ; Catalog Code:   acetaminophen ; Order Dt/Tm:   11/6/2019 1:37:34 PM CST          oxyCODONE  :   oxyCODONE ; Status:   Documented ; Ordered As Mnemonic:   oxyCODONE 5 mg oral tablet ; Simple Display Line:   See Instructions, take 1-2 tablets po every 4 hours PRN for pain, 0 Refill(s) ; Catalog Code:   oxyCODONE ; Order Dt/Tm:   11/6/2019 1:33:24 PM CST          polyethylene glycol  3350  :   polyethylene glycol 3350 ; Status:   Completed ; Ordered As Mnemonic:   MiraLax oral powder for reconstitution ; Simple Display Line:   17 gm, Oral, daily, 0 Refill(s) ; Catalog Code:   polyethylene glycol 3350 ; Order Dt/Tm:   11/8/2019 3:39:43 PM CST            ID Risk Screen   Recent Travel History :   No recent travel   Family Member Travel History :   No recent travel   Other Exposure to Infectious Disease :   Unknown   Gonzalez RAMSAY, Fariba - 5/13/2020 2:10 PM CDT   Social History   Social History   (As Of: 5/13/2020 2:17:41 PM CDT)   Alcohol:        Past   Comments:  6/16/2015 3:17 PM - Randall Salcedo MD: 6 drinks pks per weekend   (Last Updated: 2/13/2020 3:10:22 PM CST by Fariba Roth MA)          Tobacco:        Current   Comments:  6/16/2015 3:17 PM - Randall Salcedo MD: 1 ppd   (Last Updated: 6/16/2015 3:17:14 PM CDT by Randall Salcedo MD)          Substance Abuse:        Current   Comments:  6/16/2015 3:18 PM - Randall Salcedo MD: Marijuana   (Last Updated: 6/16/2015 3:18:44 PM CDT by Randall Salcedo MD)          Employment/School:        Work/School description: disabled.   Comments:  2/21/2011 5:54 PM - Wong Bashir MD: has 1 child- 10 weeks old -   (Last Updated: 2/21/2011 5:54:14 PM CST by Wong Bashir MD)          Home/Environment:        Lives with Self, Significant other.   Comments:  2/21/2011 5:54 PM - Wong Bashir MD: 2 kids in household   (Last Updated: 2/21/2011 5:54:34 PM CST by Wong Bashir MD)

## 2022-02-15 NOTE — PROGRESS NOTES
Patient:   JOEL SUTTON            MRN: [id number]            FIN: [id number]               Age:   38 years     Sex:  Male     :  1980   Associated Diagnoses:   Low back pain   Author:   Stephany Aguero      Chief Complaint   2018 3:51 PM CDT     Back pain        History of Present Illness   PPC for chronic back pain,  this patient normally sees Dr King who has been workign with him oin pain contorl options usign low dose flexeril, he tells me one tablet is too potent and he has to split them in half, he took one last night but it caused him to sleep in so when he woke up and realized he should be seen today for pain relief, Dr King's appointments were filled  he is also using meloxicam and took last dose yesterday  he has had a medrol dose jeevan  18 after having had a steroid injection on 18 and describes reaction as rhoid rage  has intake appt with [facility] pain [facility] 18 and another appt 10/2018  pt denies difficulty with urination or stool  no recent trauma to cause increase in pain         Review of Systems   Constitutional:  Negative.    Respiratory:  Negative.    Cardiovascular:  Negative.    Gastrointestinal:  Negative.    Genitourinary:  Negative.    Immunologic:  Negative.    Musculoskeletal:       Back pain: In the lower region.    Integumentary:  Negative.    Neurologic:  Negative.    Psychiatric:  Negative.              Health Status   Allergies:    Allergic Reactions (Selected)  Severity Not Documented  Bee Stings (No reactions were documented)  Latex (No reactions were documented)   Medications:  (Selected)   Prescriptions  Prescribed  DULoxetine 60 mg oral delayed release capsule: 1 cap(s) ( 60 mg ), po, daily, # 60 cap(s), 1 Refill(s), Type: Maintenance, Pharmacy: [facility], 1 cap(s) po daily  ProAir HFA 90 mcg/inh inhalation aerosol: 2 puff(s), inh, qid, PRN: as needed for wheezing, # 1 EA, 2 Refill(s), Type: Maintenance, Pharmacy:  [facility], 2 puff(s) inh qid,PRN:as needed for wheezing  Qvar 80 mcg/inh inhalation aerosol: 1 puff(s), inh, bid, # 1 EA, 5 Refill(s), Type: Maintenance, Pharmacy: [facility], 1 puff(s) inh bid  Shoes for low back pain: Shoes for low back pain, See Instructions, Instructions: Use as directed, Supply, # 2 EA, 0 Refill(s), Type: Maintenance, Use as directed  Symbicort 160 mcg-4.5 mcg/inh inhalation aerosol: 2 puff(s), inh, bid, Instructions: to replace qvar  in the morning and the evening  use with spacer chamber  rinse mouth and throat after use, # 3 EA, 3 Refill(s), Type: Maintenance, Pharmacy: [facility], 2 puff(s) inh bid,Ins...  cyclobenzaprine 5 mg oral tablet: 1 tab(s) ( 5 mg ), po, bid, PRN: for muscle spasm, # 60 tab(s), 1 Refill(s), Type: Maintenance, Pharmacy: [facility], 1 tab(s) po bid,PRN:for muscle spasm  lidocaine 5% topical film: 3 patch(es), Topical, daily, Instructions: remove patches after 12 hours, # 90 patch(es), 11 Refill(s), Type: Maintenance, Pharmacy: [facility], 3 patch(es) Topical daily,x30 day(s),Instr:remove patches after 12 hours  meloxicam 15 mg oral tablet: 1 tab(s) ( 15 mg ), PO, Daily, PRN: for pain, # 90 tab(s), 3 Refill(s), Type: Maintenance, Pharmacy: [facility], 1 tab(s) Oral daily,PRN:for pain   Problem list:    All Problems  Anxiety / SNOMED CT 99599360 / Confirmed  Asthma, moderate persistent / SNOMED CT 9641760644 / Confirmed  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  Chronic insomnia / SNOMED CT 135110911 / Confirmed  Dysplastic nevus / SNOMED CT 5735723252 / Confirmed  Low back pain / SNOMED CT 690905247 / Confirmed  Marijuana use / SNOMED CT 2449218670 / Confirmed  Myalgia / SNOMED CT 365685831 / Confirmed  Obese / SNOMED CT 2560486708 / Probable  Sebaceous cyst / SNOMED CT 4192973261 /  Confirmed  Smoking / SNOMED CT 026309870 / Confirmed  Tension headache / SNOMED CT 4152416205 / Confirmed  Tobacco user / SNOMED CT 254714141 / Probable  Trochanteric bursitis, right hip / SNOMED CT 13902344 / Confirmed      Physical Examination   Vital Signs   7/6/2018 3:51 PM CDT Temperature Tympanic 97.8 DegF  LOW    Peripheral Pulse Rate 105 bpm  HI    Systolic Blood Pressure 130 mmHg    Diastolic Blood Pressure 87 mmHg  HI    Mean Arterial Pressure 101 mmHg      General:  Alert and oriented, uncomfortable, steady but tenative gait.    Eye:  Pupils are equal, round and reactive to light.    HENT:  Normocephalic.    Neck:  Supple.    Respiratory:  Lungs are clear to auscultation, Respirations are non-labored.    Musculoskeletal:  when I entered room pt is bent over end of exam table  no pain when I palpate cervical thoracic or lumbar spine  has pain over both SI joints with palpation but no deformities noted and no crepitus  pain over right trochanteric bursa  pain over paraspinous muscles lumbar region bilaterally with right greater than left  piriformis tenderness right greater than left.    Integumentary:  Warm, Dry, Pink.    Neurologic:  Alert, Oriented, Normal sensory, Normal motor function, No focal deficits, Cranial Nerves II-XII are grossly intact, Normal deep tendon reflexes, able to stand on toes and heels.    Psychiatric:  Cooperative, Appropriate mood & affect, Normal judgment.         toradol 60mg IM today, pt tolerated well      Impression and Plan   Diagnosis     Low back pain ([id number] M54.5).     Patient Instructions:       Counseled: Patient, Regarding diagnosis, Verbalized understanding.    Summary:  pt agreed to have toradol 60mg today delivered IM but cannot use meloxicam throughout the weekend to avoid kidney injury risk  I have requested he push his fluids this weekend  he tells me he is interested in steroid but given his recent reaction description I am not willing to prescribe these  I  discussed starting gabapentin with pt but he is nervous about fatigue side effects as he feels he cannot tolerated 5mg flexeril, i have asked him to consider this and research it  he would like to discuss this with Dr King at their next appointment.

## 2022-02-15 NOTE — TELEPHONE ENCOUNTER
"---------------------  From: Merle Tilley (Phone Messages Pool ([id number]_[facility]))   To: Breana Watkins MD;     Sent: 9/2/2020 1:31:12 PM CDT  Subject: Refill Request: Oxycodone     Phone Message    PCP:   IWRIN (OC-sent to )      Time of Call:  1:13pm       Person Calling:  Daron  Phone number:  [phone]    Note:   Patient states he is \"in desperate need of refill of my pain pills\".     Oxycodone 5mg last filled 8/5/20 #20 with zero refills.   Last visit: 8/5/20 for back pain/anxiety with [facility]  No current CSA on file  ** Submitted: **  Order:oxyCODONE (oxyCODONE 5 mg oral tablet)  1 tab(s)  Oral  q8 hrs  Qty:  10 tab(s)        Refills:  0          Substitutions Allowed     PRN  as needed for pain      Route To Pharmacy - [facility]    Signed by Breana Watkins MD  9/2/2020 6:46:00 PM [facility]        OK for #10 for the next 5 days. Should follow up with Dr. Hearn next week.---------------------  From: Breana Watkins MD   To: Phone Messages Pool ([id number]_[place] [facility] [place]);     Sent: 9/2/2020 1:47:29 PM CDT  Subject: RE: Refill Request: OxycodonePatient notified of Rx and recommendation.  "

## 2022-02-15 NOTE — TELEPHONE ENCOUNTER
---------------------  From: Mata Hearn MD   To: [facility] Message Pool ([id number]_[place] [facility] [place]);     Sent: 5/20/2020 3:34:56 PM CDT  Subject: General Message     I have ordered MRI for him with sedation.  Once the procedure is scheduled he will need a pre-procedure exam and COVID testing

## 2022-02-15 NOTE — PROGRESS NOTES
"   Patient:   JOEL SUTTON            MRN: [id number]            FIN: [id number]               Age:   41 years     Sex:  Male     :  1980   Associated Diagnoses:   Obese   Author:   Jillian Russo      Visit Information   Visit type:  Medical Nutrition Therapy.    Referral source:  Dhiraj ORTEGA, Mata.       Chief Complaint   Obesity       Interval History   Pt seen RD in 2017,  and continues to struggle with portion control.  Pt will potentially need/ want back surgery and surgeon would like to see some weight loss prior to surgery.    Pt is very distraught today because he was not able to bring his \"therapy\" dog (Lady) into the clinic and has her in the car with air conditioning running.  Pt also notes his parents past away within the last year and his brother has not given any of his inheritance - significant concerns to pt      Intake: Has not been tracking, vague today   Exercise: none (chronic back pain)  Sleep: \"Insomnia\" per pt - very hard time going to sleep     Stress: mod/ high - health, does have a dog (Lady) that has helped him for a therapy dog        Health Status   Allergies:    Allergic Reactions (Selected)  Severity Not Documented  Bananas (No reactions were documented)  Bee Stings (No reactions were documented)  Latex (No reactions were documented)   Medications:  (Selected)   Prescriptions  Prescribed  Acular 0.5% ophthalmic solution: 1 drop(s), right eye, QID, PRN: for itching, # 10 mL, 1 Refill(s), Type: Maintenance, Pharmacy: [facility], 1 drop(s) Eye-Right qid,x7 day(s),PRN:for itching, 72, in, 21 14:36:00 CST, Height Measured, 311.8, lb, 21...  Orthotics: Orthotics, See Instructions, Instructions: to use as directed with shoes, Supply, # 2 EA, 0 Refill(s), Type: Maintenance  albuterol 90 mcg/inh inhalation aerosol: 2 puff(s), Inhale, qid, # 2 EA, 3 Refill(s), Type: Maintenance, Pharmacy: [facility], 2 puff(s) Inhale " qid, 72, in, 02/16/21 14:36:00 CST, Height Measured, 311.8, lb, 01/05/21 14:29:00 CST, Weight Measured  cyclobenzaprine 10 mg oral tablet: = 1 tab(s) ( 10 mg ), Oral, bid, PRN: for spasm, # 20 tab(s), 1 Refill(s), Type: Maintenance, Pharmacy: [facility], 1 tab(s) Oral bid,PRN:for spasm, 72, in, 06/02/21 9:18:00 CDT, Height Measured, 311.8, lb, 01/05/21 14:29:00 C...  diazePAM 5 mg oral tablet: = 1 tab(s) ( 5 mg ), Oral, daily, PRN: as needed for anxiety, # 12 tab(s), 0 Refill(s), Type: Maintenance, Pharmacy: [facility], 1 tab(s) Oral daily,PRN:as needed for anxiety, 72, in, 11/30/20 14:30:00 CST, Height Measured, 30...  diclofenac 3% topical gel: ( 3 gm ), Topical, qid, # 240 gm, 2 Refill(s), Type: Maintenance, Pharmacy: [facility], Pt requesting a higher dose.  If too expensive, then pt won't get., 3 gm Topical qid, 72, in, 02/16/21 14:36:00 CST, Height Measured, 311....  oxyCODONE 5 mg oral tablet: = 1 tab(s) ( 5 mg ), Oral, q8 hrs, PRN: as needed for pain, # 20 tab(s), 0 Refill(s), Type: Maintenance, Pharmacy: [facility], 1 tab(s) Oral q8 hrs,PRN:as needed for pain, 72, in, 06/02/21 9:18:00 CDT, Height Measured, 311.8,...  Documented Medications  Documented  acetaminophen 500 mg oral tablet: = 2 tab(s) ( 1,000 mg ), Oral, q6 hrs, PRN: as needed for pain, 0 Refill(s), Type: Maintenance   Problem list:    All Problems  Trochanteric bursitis, right hip / SNOMED CT 01020139 / Confirmed  Tobacco user / SNOMED CT 656877835 / Probable  Tension headache / SNOMED CT 0836578925 / Confirmed  Smoking / SNOMED CT 025128487 / Confirmed  Sebaceous cyst / SNOMED CT 3333329392 / Confirmed  Obese / SNOMED CT 3651537128 / Probable  Myofascial pain syndrome / SNOMED CT 7323588372 / Confirmed  Myalgia / SNOMED CT 814435599 / Confirmed  Marijuana use / SNOMED CT 0829430035 / Confirmed  Low back pain / SNOMED CT 460063238 /  Confirmed  Dysplastic nevus / SNOMED CT 6182110141 / Confirmed  Degenerative lumbar disc / SNOMED CT 44555534 / Confirmed  Chronic insomnia / SNOMED CT 702634764 / Confirmed  Chronic back pain / SNOMED CT 154981050 / Confirmed  Cerebellar tonsillar ectopia / SNOMED CT 68238041 / Confirmed  Asthma, moderate persistent / SNOMED CT 4079422252 / Confirmed  Anxiety / SNOMED CT 72839229 / Confirmed      Histories   Past Medical History:    Active  Cerebellar tonsillar ectopia (47734750)  Anxiety (55219522)  Obese (6569197166)   Family History:    Heart disease  Father (Conor)  Diabetes mellitus type 2  Mother (Obdulia)     Procedure history:    Injection of cortisone (SNOMED CT 0813687701) performed by Joann on 6/8/2016 at 36 Years.  Transanal hemorrhoidal dearterialization (THD) performed by Vik Chavez MD on 9/8/2015 at 35 Years.  Esophagogastroduodenoscopy (SNOMED CT 610150762) on 8/4/2015 at 35 Years.  Comments:  8/5/2015 12:30 PM CDT - Mata Merino MD  Normal  Colonoscopy (SNOMED CT 442139770) performed by Mata Merino MD on 8/4/2015 at 35 Years.  Comments:  8/13/2015 3:29 PM CDT - Genia Mac RN  Sedation: MAC  Indication: rectal bleeding  External & internal hemorrhoids; otherwise normal  Resume routine screening at age 50.  Hospitalized at [facility] in 2002 at 22 Years.  Right knee arthroscopy.  Surgery on eyes x3.  Comments:  12/15/2010 4:16 PM [facility] - Genia Ely RN  as infant   Social History:        Electronic Cigarette/Vaping Assessment            Electronic Cigarette Use: Never.      Alcohol Assessment            Past                     Comments:                      06/16/2015 - Randall Salcedo MD                     6 drinks pks per weekend      Tobacco Assessment            10 or more cigarettes (1/2 pack or more)/day in last 30 days      Substance Abuse Assessment            Current                     Comments:                      06/16/2015 - Randall Salcedo MD                      Marijuana      Employment and Education Assessment            Work/School description: disabled.                     Comments:                      02/21/2011 - Krysten ORTEGA Wong                     has 1 child- 10 weeks old -      Home and Environment Assessment            Lives with Self, Significant other.                     Comments:                      02/21/2011 - Wong Bashir MD                     2 kids in household        Physical Examination   Measurements from flowsheet : Measurements   6/9/2021 10:04 AM CDT Height Measured - Standard 72 in    Height/Length Estimated 72 in    Weight Measured - Standard 314 lb    BSA 2.69 m2    Body Mass Index 42.58 kg/m2  HI         Review / Management   Results review      Impression and Plan   Diagnosis     Obese ([id number]-SE E66.9).       Today patient was instructed on lifestyle interventions to help reduce the risks associated with obesity.  Pt is provided with motivational counseling and large focus on mindful eating.  Pt's motivators are health improvement and being healthy for his children and reduce back pain.  Pt is also given meal and snack ideas and discussed ways to improve quality of intake.    Education of the following topcis:  - Myplate, ~1800 calorie controlled meal plan, portion sizes for each food group, label reading   - weight loss tips, mindful eating, motivational tips with reward system  - daily weights, recording intake  - exercise recommendations with slow initial steps to start, recommend a fitness tracker     Goals:   1. 8 cups water/ day  2.  1-2 meal replacement drinks daily  3.  1 fruit/ day   4.  2-4 cups of vegetables/ day   5.  1800 calorie meal plan     follow up in 1 month      Professional Services   Time spent with pt 30 min   cc Dr. Hearn

## 2022-02-15 NOTE — PROGRESS NOTES
Patient:   JOEL SUTTON            MRN: [id number]            FIN: [id number]               Age:   36 years     Sex:  Male     :  1980   Associated Diagnoses:   None   Author:   Bang Woody MD      Visit Information      Date of Service: 2017 03:09 pm  Performing Location: [facility]  Encounter#: [id number]      Primary Care Provider (PCP):  Mata Hearn MD    NPI# [id number]      Referring Provider:  No referring provider recorded for selected visit.      Chief Complaint   2/3/2017 3:24 PM CST     Pt c/o  lower back pain. Pt's pain will move to both legs but mostly in right leg. Would like injection for pain.        History of Present Illness   Pt c/o pain in his lowe rback and in his lateral right leg. This has been an ongoing problem for patient for months/years. Seems to be worsening a little. Prednisone helped some but he can't really tolerate the side effects. Has had toradol inj in the past which has been helpful and hw ould like to try that again. Requirsts repeat trochanteric bursa injection, states he got a few dyas of relief from it. Would like MRI of his back done.    Had epdermoid cyst removed 4 d ago on face and owuld like stiches out.       Review of Systems          - no bowel/bladder symtpoms  Neuro - no leg weakness      Health Status   Allergies:    Allergic Reactions (Selected)  Severity Not Documented  Bee Stings (No reactions were documented)  Latex (No reactions were documented)   Medications:  (Selected)   Prescriptions  Prescribed  ProAir HFA 90 mcg/inh inhalation aerosol: 2 puff(s), inh, qid, # 1 EA, 3 Refill(s), Type: Maintenance, Pharmacy: [facility], 2 puff(s) inh qid  Qvar 80 mcg/inh inhalation aerosol: 1 puff(s), inh, bid, # 1 EA, 3 Refill(s), Type: Maintenance, Pharmacy: [facility], 1 puff(s) inh bid  cyclobenzaprine 10 mg oral tablet: 1 tab(s) ( 10 mg ), PO, TID, PRN: for spasm, # 30 tab(s),  1 Refill(s), Type: Maintenance, Pharmacy: [facility], 1 tab(s) po tid,PRN:for spasm  diazePAM 5 mg oral tablet: 1 tab(s) ( 5 mg ), PO, tid, PRN: for anxiety, # 10 tab(s), 1 Refill(s), Type: Maintenance, Pharmacy: [facility], 1 tab(s) po tid,PRN:for anxiety  hydrocortisone 2.5% topical cream: 1 mal, top, tid, # 30 gm, 1 Refill(s), Type: Maintenance, Pharmacy: [facility], 1 mal top tid   Problem list:    All Problems  Cerebellar tonsillar ectopia / SNOMED CT 39480465 / Confirmed  Anxiety / SNOMED CT 64056613 / Confirmed  Obese / ICD-9-.00 / Probable  Smoking / SNOMED CT 886402457 / Confirmed  Tobacco user / SNOMED CT 930438784 / Probable      Histories   Past Medical History:    Active  Cerebellar tonsillar ectopia (20616407)  Anxiety (99504557)   Family History:    Heart disease  Father (Conor)  Diabetes mellitus type 2  Mother (Obdulia)     Procedure history:    Injection of cortisone (1498488706) on 6/8/2016 at 36 Years.  Transanal hemorrhoidal dearterialization (THD) on 9/8/2015 at 35 Years.  Esophagogastroduodenoscopy (148699569) on 8/4/2015 at 35 Years.  Comments:  8/5/2015 12:30 PM - Mata Merino MD  Normal  Colonoscopy (680559344) on 8/4/2015 at 35 Years.  Comments:  8/13/2015 3:29 PM - Genia Mac RN  Sedation: MAC  Indication: rectal bleeding  External & internal hemorrhoids; otherwise normal  Resume routine screening at age 50.  Hospitalized at [facility] unit in 2002 at 22 Years.  Right knee arthroscopy.  Surgery on eyes x3.  Comments:  12/15/2010 4:16 PM - Genia Ely RN  as infant   Social History:        Alcohol Assessment: Denies Alcohol Use            Current            Current                     Comments:                      06/16/2015 - Randall Salcedo MD                     6 drinks pks per weekend      Tobacco Assessment: Current            Current                     Comments:                       06/16/2015 - Randall Salcedo MD                     1 ppd      Substance Abuse Assessment            Current, Marijuana            Current                     Comments:                      06/16/2015 - Randall Salcedo MD                     Marijuana      Employment and Education Assessment            Work/School description: disabled.                     Comments:                      02/21/2011 - Krysten ORTEGA Wong                     has 1 child- 10 weeks old -      Home and Environment Assessment            Lives with Self, Significant other.                     Comments:                      02/21/2011 - Wong Bashir MD                     2 kids in household        Physical Examination   Vital Signs   2/3/2017 3:24 PM CST Peripheral Pulse Rate 94 bpm    Systolic Blood Pressure 138 mmHg    Diastolic Blood Pressure 82 mmHg    Mean Arterial Pressure 101 mmHg      Measurements from flowsheet : Measurements   2/3/2017 3:24 PM CST Height Measured - Standard 72.5 in    Weight Measured - Standard 272 lb    BSA 2.51 m2    Body Mass Index 36.38 kg/m2      Skin - well healed inciosn on face  BAck - tender lumbar spine. Good ROM.   Neuro - sterngth, senastion, and reflexes are normal in the lower extremities  Hip - able to bear weight easily. Tenderness more so over lower aspect of thigh than the greater trochanter      Review / Management   Results review:  Lab results   12/15/2016 7:27 PM CST HSV I IgG <0.90    HSV II IgG <0.90   .       Impression and Plan   Low back pain , hip pain - could be radiculopathy. Pt requests MRI which I think is reasonable, as he would be interested in an epidural injection. Not sure about the  daignosis of trochanteric bursitis, as pt only received minimal relief from injection and most pain seems to be distal ot this as he describes it today. Toradol inj done.  2 sutures removed from face. The more cephalad suture just fell out when I tugged on it with the forceps, I suspectt his was just  a superficial suture and I do not see evidence of anything retained. Pt will monitor for symptoms.

## 2022-02-15 NOTE — PROGRESS NOTES
Chief Complaint    Suture removel on back. Surgery was on the 11/05/19.  History of Present Illness      Patient is here for suture removal following spine surgery.  He had spinal decompression surgery on November 5.  He is recovering well.  Pain is improving.  Review of Systems      See HPI.  All other review of systems negative.  Physical Exam   Vitals & Measurements    T: 98.5   F (Tympanic)  HR: 74(Peripheral)  BP: 146/84     HT: 72 in  WT: 288 lb  BMI: 39.06       Alert, oriented, no acute distress      Anxious       Normal heart rate       Nonlabored breathing        4 cm well healed incision over the lumbar spine.  6 interrupted nylon sutures were easily removed.        Educated on wound care  Assessment/Plan       Visit for suture removal (Z48.02)         Sutures removed as above  Patient Information     Name:JOEL SUTTON      Address:      [address] [place] [id number]     Sex:Male     YOB: 1980     [phone]     Emergency Contact:[facility], CONTACT     MRN:[id number]     FIN:[id number]     Location:[facility]     Date of Service:11/19/2019      Primary Care Physician:       NONE ,       Attending Physician:       Mata Hearn MD, [phone]  Problem List/Past Medical History    Ongoing     Anxiety     Asthma, moderate persistent     Cerebellar tonsillar ectopia     Chronic back pain     Chronic insomnia     Dysplastic nevus       Comments: removed from back     Low back pain     Marijuana use     Myalgia     Obese     Sebaceous cyst     Smoking     Tension headache     Tobacco user     Trochanteric bursitis, right hip    Historical     No qualifying data  Procedure/Surgical History     Injection of cortisone (06/08/2016)     Transanal hemorrhoidal dearterialization (THD) (09/08/2015)     Colonoscopy (08/04/2015)      Comments: Sedation: MAC      Indication: rectal bleeding      External & internal hemorrhoids;  otherwise normal      Resume routine screening at age 50..     Esophagogastroduodenoscopy (08/04/2015)      Comments: Normal.     Hospitalized at [facility] (2002)     Right knee arthroscopy     Surgery on eyes x3      Comments: as infant.  Medications    acetaminophen 500 mg oral tablet, 1000 mg= 2 tab(s), Oral, q6 hrs, PRN    cyclobenzaprine 10 mg oral tablet, See Instructions, PRN    diazePAM 5 mg oral tablet, 5 mg= 1 tab(s), Oral, q8 hrs, PRN    MiraLax oral powder for reconstitution, 17 gm, Oral, daily    oxyCODONE 5 mg oral tablet, See Instructions    ProAir HFA 90 mcg/inh inhalation aerosol, 2 puff(s), Inhale, qid, PRN, 2 refills  Allergies    Bananas    Bee Stings    Latex  Social History    Smoking Status - 11/08/2019     Current every day smoker     Alcohol      Current, 06/16/2015     Employment/School      Work/School description: disabled., 02/21/2011     Home/Environment      Lives with Self, Significant other., 02/21/2011     Substance Abuse      Current, 06/16/2015     Tobacco      Current, 06/16/2015  Family History    Diabetes mellitus type 2: Mother.    Heart disease: Father.  Immunizations      Vaccine Date Status      tetanus/diphth/pertuss (Tdap) adult/adol 11/17/2016 Given      influenza virus vaccine, inactivated 11/17/2016 Given      influenza virus vaccine, inactivated 01/06/2015 Given

## 2022-02-15 NOTE — PROGRESS NOTES
Chief Complaint    refill on pain medication Oxycodone, also wants a refill on Cyclobenzaprine which is not currently listed on his active med list - verbal consent for telephone visit   Visit type:  Telephone visit   Participants during visit:  patient   Location of patient:  home   Location of physician:  office   Call start time:  1002   Call end time:  1008   Today's visit was conducted by telephone conference due to the COVID-19 pandemic.  The patient's consent to proceed with the telephone conference was obtained and documented.      History of Present Illness       Telemedicine visit today for follow-up on his chronic back pain and anxiety disorder.  Is requesting refill of his pain medication.  Over the last 2 to 3 weeks he is used almost all of his supply of oxycodone.  He is prescribed 20 at a time.  Usually this lasts him about a month.  He reports increased neck and back pain in the last few weeks due to increased family stressors.  He has also had appointment with behavioral health and is due again tomorrow for a telemedicine visit.  He thinks this may be helpful.  He is not interested in medication for his anxiety.       PDMP reviewed scription for early refills.  Review of Systems       See HPI.  All other review of systems negative.  Physical Exam   Vitals & Measurements    HT: 72 in  HT: 72 in   Assessment/Plan       Anxiety (F41.1)          Generalized anxiety disorder with increased family stressors         I have encouraged continued counseling and once again offered medical treatment which she declines       Chronic back pain (M54.9)          Chronic back pain worsened with increased stressors         Oxycodone refilled for #20         We have discussed judicious use of medication.  I have advised no more refills for the next 30 days.       Orders:         cyclobenzaprine, = 1 tab(s) ( 10 mg ), Oral, bid, PRN: for spasm, # 20 tab(s), 1 Refill(s), Type: Maintenance, Pharmacy: [facility], 1 tab(s) Oral bid,PRN:for spasm, 72, in, 06/02/21 9:18:00 CDT, Height Measured, 311.8, lb, 01/05/21 14:29:00 C..., (Ordered)         oxyCODONE, = 1 tab(s) ( 5 mg ), Oral, q8 hrs, PRN: as needed for pain, # 20 tab(s), 0 Refill(s), Type: Maintenance, Pharmacy: [facility], 1 tab(s) Oral q8 hrs,PRN:as needed for pain, 72, in, 06/02/21 9:18:00 CDT, Height Measured, 311.8,..., (Ordered)         oxyCODONE, = 1 tab(s) ( 5 mg ), Oral, q8 hrs, PRN: as needed for pain, # 20 tab(s), 0 Refill(s), Type: Hard Stop, Pharmacy: [facility], 72, in, 02/16/21 14:36:00 CST, Height Measured, 311.8, lb, 01/05/21 14:29:00 CST, Weight Measured, (Completed)  Patient Information     Name:JOEL SUTTON      Address:      [address] [place] [id number]     Sex:Male     YOB: 1980     [phone]     Emergency Contact:[facility], CONTACT     MRN:[id number]     FIN:[id number]     Location:[facility]     Date of Service:06/02/2021      Primary Care Physician:       Mata Hearn MD, [phone]      Attending Physician:       Mata Hearn MD, [phone]  Problem List/Past Medical History    Ongoing     Anxiety     Asthma, moderate persistent     Cerebellar tonsillar ectopia     Chronic back pain     Chronic insomnia     Degenerative lumbar disc     Dysplastic nevus       Comments: removed from back     Low back pain     Marijuana use     Myalgia     Myofascial pain syndrome     Obese     Sebaceous cyst     Smoking     Tension headache     Tobacco user     Trochanteric bursitis, right hip    Historical     No qualifying data  Procedure/Surgical History     Injection of cortisone (06/08/2016)     Transanal hemorrhoidal dearterialization (THD) (09/08/2015)     Colonoscopy (08/04/2015)      Comments: Sedation: MAC      Indication: rectal bleeding      External & internal hemorrhoids; otherwise normal       Resume routine screening at age 50..     Esophagogastroduodenoscopy (08/04/2015)      Comments: Normal.     Hospitalized at [facility] (2002)     Right knee arthroscopy     Surgery on eyes x3      Comments: as infant.  Medications    acetaminophen 500 mg oral tablet, 1000 mg= 2 tab(s), Oral, q6 hrs, PRN    Acular 0.5% ophthalmic solution, 1 drop(s), Eye-Right, qid, PRN, 1 refills    albuterol 90 mcg/inh inhalation aerosol, 2 puff(s), Inhale, qid, 3 refills    cyclobenzaprine 10 mg oral tablet, 10 mg= 1 tab(s), Oral, bid, PRN, 1 refills    diazePAM 5 mg oral tablet, 5 mg= 1 tab(s), Oral, daily, PRN    diclofenac 3% topical gel, 3 gm, Topical, qid, 2 refills    Orthotics, See Instructions    oxyCODONE 5 mg oral tablet, 5 mg= 1 tab(s), Oral, q8 hrs, PRN  Allergies    Bananas    Bee Stings    Latex  Social History    Smoking Status     Former smoker     Alcohol      Past     Electronic Cigarette/Vaping      Electronic Cigarette Use: Never.     Employment/School      Work/School description: disabled.     Home/Environment      Lives with Self, Significant other.     Substance Abuse      Current     Tobacco      10 or more cigarettes (1/2 pack or more)/day in last 30 days  Family History    Diabetes mellitus type 2: Mother.    Heart disease: Father.  Immunizations          Other Immunizations          Administration Dosage Date(s)          influenza virus vaccine, inactivated          01/06/2015, 11/17/2016          tetanus/diphth/pertuss (Tdap) adult/adol          11/17/2016

## 2022-02-15 NOTE — TELEPHONE ENCOUNTER
---------------------  From: Mata Hearn MD   To: [facility] [employer] Message Pool ([address] [place] [address]);     Sent: 6/15/2020 4:03:30 PM CDT  Subject: General Message     Please check with the hospital to see if there is any possibility that a MRI of the spine could be done with monitored anesthesia care due to extreme anxiety.---------------------  From: Abbi Chong LPN ([facility] Message Pool ([address] [place] [address]))   To: Mata Hearn MD;     Sent: 6/16/2020 8:11:17 AM CDT  Subject: RE: General Message     I talked to Ga at Radiology and he said that they don't have the capability to do it at this hospital.---------------------  From: Mata Hearn MD   To: [facility] They [facility] Message Pool ([address] [place] [address]);     Sent: 6/16/2020 2:51:31 PM CDT  Subject: RE: General Message     We need to find out if any local ([place] or [place]) have these capabilities other wise he will need to head back to [facility] ([facility])---------------------  From: Abbi Chong LPN (What They Like Message Pool ([address] [place] [address]))   To: Referral Coordinators Pool ([address] [place] [address]);     Sent: 6/16/2020 3:07:24 PM CDT  Subject: FW: General Message     Any idea where this can be done at?---------------------  From: Peyton Aquino (Referral Coordinators Pool ([address] [place] [address]))   To: What They Like Message Pool ([address] [place] [address]);     Sent: 6/16/2020 3:14:01 PM CDT  Subject: RE: General Message     [facility] or Keyla Carlton---------------------  From: Abbi Chong LPN (What They Like Message Pool ([id number]))   To: Mata Hearn MD;     Sent: 6/16/2020 3:18:09 PM CDT  Subject: FW: General Message

## 2022-02-15 NOTE — PROGRESS NOTES
Patient:   JOEL SUTTON            MRN: [id number]            FIN: [id number]               Age:   37 years     Sex:  Male     :  1980   Associated Diagnoses:   Low back pain   Author:   Breana Watkins MD      Chief Complaint   2017 2:20 PM CST    Pt c/o leg and back pain. Pt asking for injection for pain.        History of Present Illness   Patient is seen today with complaints of persistent severe right lower back and hip pain. This is been going on for months. He had a recent MRI that showed his lower lumbar spine had significant degenerative changes. He is awaiting a referral to the [facility] [place] to see Dr. Frias. He showed up today wanting A shot of Cortizone. What he describes as an injection given by one of the nursing staff that helps him for 24 hours or so. He also notes that he has had a shot into his hip previously at this facility. He is wondering why he can t get that type of shot today. He s not had a fever. He knows he s been under increased stress.he s worried about transportation as he has lost the information for the "[employer] program. He has been providing more care to his kids recently. That has been very stressful while he has been so painful.       Health Status   Allergies:    Allergic Reactions (All)  Severity Not Documented  Bee Stings (No reactions were documented)  Latex (No reactions were documented)   Medications:  (Selected)   Prescriptions  Prescribed  ProAir HFA 90 mcg/inh inhalation aerosol: 2 puff(s), inh, qid, # 1 EA, 3 Refill(s), Type: Maintenance, Pharmacy: [facility], 2 puff(s) inh qid  Qvar 80 mcg/inh inhalation aerosol: 1 puff(s), inh, bid, # 1 EA, 3 Refill(s), Type: Maintenance, Pharmacy: [facility], 1 puff(s) inh bid  cyclobenzaprine 10 mg oral tablet: 1 tab(s) ( 10 mg ), PO, TID, PRN: for spasm, # 30 tab(s), 0 Refill(s), Type: Maintenance, Pharmacy: [facility], 1 tab(s) po tid,PRN:for spasm  diazePAM 5 mg oral tablet: 1 tab(s) ( 5 mg ), PO, tid, PRN: for anxiety, # 10 tab(s), 0 Refill(s), Type: Maintenance, Pharmacy: [facility], 1 tab(s) po tid,PRN:for anxiety  hydrocortisone 2.5% topical cream: 1 mal, top, tid, # 30 gm, 1 Refill(s), Type: Maintenance, Pharmacy: [facility], 1 mal top tid   Problem list:    All Problems  Anxiety / SNOMED CT 40469548 / Confirmed  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  Transanal hemorrhoidal dearterialization (THD) on 9/8/2015 at 35 Years.  Esophagogastroduodenoscopy (048725213) on 8/4/2015 at 35 Years.  Comments:  8/5/2015 12:30 PM - Mata Merino MD  Normal  Colonoscopy ([id number]) on 8/4/2015 at 35 Years.  Comments:  8/13/2015 3:29 PM - Genia Mac RN  Sedation: MAC  Indication: rectal bleeding  External & internal hemorrhoids; otherwise normal  Resume routine screening at age 50.  Hospitalized at [facility] unit in 2002 at 22 Years.  Right knee arthroscopy.  Surgery on eyes x3.  Comments:  12/15/2010 4:16 PM - Genia Ely RN  as infant   Social History:        Alcohol Assessment: Denies Alcohol Use            Current            Current                     Comments:                      06/16/2015 - Randall Salcedo MD                     6 drinks pks per weekend      Tobacco Assessment: Current            Current                     Comments:                      06/16/2015 - Randall Salcedo MD                     1 ppd      Substance Abuse Assessment            Current, Marijuana            Current                     Comments:                      06/16/2015 - Randall Salcedo MD                     Marijuana      Employment  and Education Assessment            Work/School description: disabled.                     Comments:                      02/21/2011 - Krysten ORTEGA Wong                     has 1 child- 10 weeks old -      Home and Environment Assessment            Lives with Self, Significant other.                     Comments:                      02/21/2011 - Wong Bashir MD                     2 kids in household        Physical Examination   Vital Signs   2/24/2017 2:20 PM CST Temperature Tympanic 97.8 DegF  LOW    Peripheral Pulse Rate 100 bpm    Systolic Blood Pressure 150 mmHg  HI    Diastolic Blood Pressure 98 mmHg  HI    Mean Arterial Pressure 115 mmHg      Measurements from flowsheet : Measurements   2/24/2017 2:20 PM CST Height Measured - Standard 72.5 in    Weight Measured - Standard 277 lb    BSA 2.53 m2    Body Mass Index 37.05 kg/m2      General:  Alert and oriented, Moderate distress.    Musculoskeletal:  pain in low back bilaterally, normal ROM, worse pain into right side, ambulates without limp.       Impression and Plan   Diagnosis     Low back pain (AAN55-AF M54.5).     Course:  Not improving.    Plan:  Treated with toradol. Reviewed with patient that his cortisone injection into his hip was done by an orthopedic specialist. Care coordination staff found ride share information for patient. Does not want to repeat oral prednisone. Diazepam for panic attacks and cyclobenzaprine for muscle spasm have been refilled. Follow up if new concerns..    Orders     Orders (Selected)   Outpatient Orders  Completed  Toradol: 60 mg, im, once  Prescriptions  Prescribed  cyclobenzaprine 10 mg oral tablet: 1 tab(s) ( 10 mg ), PO, TID, PRN: for spasm, # 30 tab(s), 0 Refill(s), Type: Maintenance, Pharmacy: [facility], 1 tab(s) po tid,PRN:for spasm  diazePAM 5 mg oral tablet: 1 tab(s) ( 5 mg ), PO, tid, PRN: for anxiety, # 10 tab(s), 0 Refill(s), Type: Maintenance, Pharmacy: [facility]  [facility], 1 tab(s) po tid,PRN:for anxiety.

## 2022-02-15 NOTE — LETTER
(Inserted Image. Unable to display)                                                                                                [address] [facility] [address]                                                September 24, 2019Re: JOEL SUTTON1980 [facility]  [address]   [address] [address]Js:  [facility] The following patient has been referred to your office/practice:  JOEL SUTTON Appointment:  Appointment is PendingLocation:  [place] refer to the attached  clinical documentation for a summary of JOEL's care.  Please do not hesitate to contact our office if any additional clinical questions arise.  All relevant records and transition of care documents should be mailed or faxed.Your assistance in providing continuity of care is appreciated. Sincerely, [facility] [place] [facility] [place] [place] [address](P) [phone](F) [phone]

## 2022-02-15 NOTE — PROGRESS NOTES
Patient:   JOEL SUTTON            MRN: [id number]            FIN: [id number]               Age:   38 years     Sex:  Male     :  1980   Associated Diagnoses:   Myalgia   Author:   Nena King MD      Visit Information      Date of Service: 2018 12:40 pm  Performing Location: [facility]  Encounter#: [id number]      Primary Care Provider (PCP):  Nena King MD    NPI# [id number]      Referring Provider:  Nena King MD, NPI# [id number]      Procedure   Procedure   Time.     Indication: myofascial pain.     Informed consent: signed by patient.     Confirmed: patient, procedure, side, site.     Type of procedure: trigger point injection.     Physical Exam: vital signs Vital Signs   2018 12:46 PM CST Temperature Tympanic 97.8 DegF  LOW    Peripheral Pulse Rate 81 bpm    Pulse Site Brachial artery    HR Method Electronic    Systolic Blood Pressure 125 mmHg    Diastolic Blood Pressure 87 mmHg  HI    Mean Arterial Pressure 100 mmHg    BP Site Right arm    BP Method Electronic      .     Preparation and technique: informed consent obtained, position sitting, sterile preparation of site with 70 % alcohol, hemostasis achieved using direct pressure, estimated blood loss minimal, adhesive bandagedressing applied.     Findings: painful and tender myofascial trigger points identified by palpation with communication from patient    Location: bilateral rhomboids x 2 each side, right latissimmus dorsi x 1    Number of injections:5    pain prior to procedure:     moderate    pain following procedure:improved    number of milliliters of the 1:1 solution of 1% lidocaine without epiniephrine and 0.5% marcaine without epinephrine used in total: 8.     Procedure tolerated: well.     No Complications.     pt also reprts no significant improvement in leg pain with trochanteric bursitis injec tion  No qualifying data available.          Impression and Plan   Diagnosis     Myalgia  (KEK90-YW M79.1).     Diagnosis     return to clinic if symptoms worsen or do not improve.     Plan:  encouraged pt to consider duloxetine and referral to PT, he declines for now.    Counseled:  Patient, Family, use ice or heat as needed to help with pain, continue with home exercise program, .

## 2022-02-15 NOTE — LETTER
(Inserted Image. Unable to display)   May 03, 2019        JOEL SUTTON  [address]   [place], [place] [id number]        Dear JOEL,      Thank you for selecting [facility] (previously [facility] [place] [facility]) for your healthcare needs.    Our records indicate you are due for the following services:     Full Body Skin Exam ~ Your Healthcare Provider has recommended that you undergo a full body skin examination. This exam is an important tool in the early detection and follow-up of melanoma and other skin cancers. Please schedule a 20 minute appointment with one of our skin care providers.    To schedule an appointment or if you have further questions, please contact your primary clinic:   [facility]       [phone]   [facility]       [phone]              [facility]     [phone]      Powered by [employer]    Sincerely,    BLANCA Ernst
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
## 2022-02-15 NOTE — TELEPHONE ENCOUNTER
---------------------  From: Margo Rodas CMA (Phone Messages Pool ([id number]_[facility]))   To: [facility] Message Pool ([id number] [facility] [place]);     Sent: 12/22/2020 2:25:47 PM CST  Subject: General Message         Phone Message    PCP:   IRWIN      Time of Call:  2:13pm       Person Calling:  Chris from   Phone number:  [phone]    Returned call at: _    Note:   Chris LM stating that they got a prescription for Diclofenac sodium gel today . They are needing to know how many joints are effected to calculate quantity to give.  It looks like this was filled by a refill request from  today and filled the same way we have always filled it in the past. there is no indication on any of the past scripts about which joints are effected. Please advise.  CMcRoberts CMArecent the prescription with location

## 2022-02-15 NOTE — PROGRESS NOTES
Chief Complaint    back pain f/u.  History of Present Illness          HPI pt has history of chronic myofascial pain and has found trigger point injections to be a useful tool to help control pain.  Would like to have repeat trigger point injections today.  He would also like to have repeat toradol injection today.      He also reports he has received the packet from the pain clinic finally, but did not bring it  with him today.       His back pain is poorly controlled, but he is more active than he had been previously, walking his dog more and spending time grilling.  Thinks this is related more to the kids now living with their mom than the duloxetine, but is tolerating the duloxetine and is willing to increase it from 20 mg daily to 40 mg daily.                     Review of systems is negative except as per HPI      Exam:      General: alert and oriented ×3 no acute distress.      HEENT: pupils are equal round and reactive to light extraocular motion is intact. Normocephalic and atraumatic.       Hearing is grossly normal and there is no otorrhea.       Nares are patent there is no rhinorrhea.       Mucous membranes are moist and pink.      Chest: has bilateral rise with no increased work of breathing.      Cardiovascular: normal perfusion and brisk capillary refill.      Musculoskeletal: no gross focal abnormalities and normal gait.      Neuro: no gross focal abnormalities and memory seems intact.      Psychiatric: speech is clear and coherent and fluent. Patient dressed appropriately for the weather. Mood is appropriate and affect is full.           Procedure note           Informed consent obtained including risks of pain, bleeding, infection, injury to surrounding tissue, and need for further treatment. Benefit of a trigger point injection goal is to reduce pain. This is just part of a treatment plan and for best results patient should also complete physical therapy. I cannot guarantee that will will be any  pain relief.           Alternatives would include doing nothing, physical therapy, acupuncture, using heat or ice, continuing with oral medications such as muscle relaxants or nonsteroidal anti-inflammatory medications or topical medications such as a lidocaine patch or cream or menthol for diclofenac. he would like to try the trigger point injections.           Prep: Alcohol          Location identified by my palpation and communication with patient.  Symptomatic trigger points that patient desired treatment at were located at: right lower back x 2                      Each of these was injected with  a one-to-one solution of 1% lidocaine without epinephrine and 0.5% Marcaine without epinephrine.          Total number of injections:2          Total number of milliliters of the 1:1 solution of lidocaine and marcaine:8           Encouraged patient to treat the area with [place] cup ice massage tonight and to try to stretch the area out.           pain prior to treatment: severe          pain following treatment at the trigger point injection sites: moderate          Complications: none          Tolerated procedure well.          Asessment and Plan: myalgia and myofascial pain, s/p trigger point injections today.  Encouraged home exercise program and to keep annual exam appt as indicated and RTC if symptoms worsen or do not improve.   Encouraged him to contact case management to schedule a chance to meet with them when he next plans to come to clinic for appt to help him with his pain clinic paperwork.  Fifteen minutes spent with patient in direct face to face contact, in addition to the time spent performing the procedure today, greater than 50% of that time was  spent counseling and coordinating care.   will also increase duloxetine to 40 mg po qday.  Physical Exam   Vitals & Measurements    HR: 80(Peripheral)  BP: 124/82     WT: 270 lb   Assessment/Plan       Low back pain (M54.5)         Orders:          ketorolac,  60 mg, im, once, (Completed)          29848 therapeutic prophylactic/dx injection subq/im (Charge), Quantity: 1, Low back pain           ketorolac tromethamine inj, 15 mg (Charge), Quantity: 4, Low back pain                Orders:         DULoxetine, 1 cap(s) ( 20 mg ), po, daily, # 30 cap(s), 1 Refill(s), Type: Hard Stop, Pharmacy: [facility], (Completed)         DULoxetine, 1 cap(s) ( 40 mg ), po, daily, # 30 cap(s), 1 Refill(s), Type: Maintenance, Pharmacy: [facility], 1 cap(s) po daily, (Ordered)  Patient Information     Name:JOEL SUTTON      Address:      [address] [place] [id number]     Sex:Male     YOB: 1980     [phone]     Emergency Contact:LEESA SUTTON     MRN:[id number]     FIN:[id number]     Location:[facility]     Date of Service:05/09/2018      Primary Care Physician:       Nena King MD, [phone]      Attending Physician:       Nena King MD, [phone]  Problem List/Past Medical History    Ongoing     Anxiety     Asthma, moderate persistent     Cerebellar tonsillar ectopia     Chronic insomnia     Dysplastic nevus       Comments: removed from back     Low back pain     Marijuana use     Myalgia     Obese     Smoking     Tobacco user     Trochanteric bursitis, right hip    Historical     No qualifying data  Procedure/Surgical History     Injection of cortisone (06/08/2016)           Transanal hemorrhoidal dearterialization (THD) (09/08/2015)           Colonoscopy (08/04/2015)             Comments: Sedation: MAC<br/>Indication: rectal bleeding<br/>External &amp; internal hemorrhoids; otherwise normal<br/>Resume routine screening at age 50.     Esophagogastroduodenoscopy (08/04/2015)             Comments: Normal     Hospitalized at [facility] unit (2002)           Right knee arthroscopy           Surgery on eyes x3             Comments: as  infant  Medications     Advil 200 mg oral tablet: 400 mg, 2 tab(s), po, q8 hrs, PRN: as needed for pain, 0 Refill(s).     Shoes for low back pain: See Instructions, Use as directed, 2 EA, 0 Refill(s).     Silvadene 1% topical cream: 1 mal, TOP, BID, 20 g, 0 Refill(s).     ProAir HFA 90 mcg/inh inhalation aerosol: 2 puff(s), inh, qid, PRN: as needed for wheezing, 1 EA, 2 Refill(s).     Qvar 80 mcg/inh inhalation aerosol: 1 puff(s), inh, bid, 1 EA, 5 Refill(s).     Symbicort 160 mcg-4.5 mcg/inh inhalation aerosol: 2 puff(s), inh, bid, to replace qvar  in the morning and the evening  use with spacer chamber  rinse mouth and throat after use, 3 EA, 3 Refill(s).     DULoxetine 40 mg oral delayed release capsule: 40 mg, 1 cap(s), po, daily, 30 cap(s), 1 Refill(s).          Allergies    Bee Stings    Latex  Social History    Smoking Status - 05/04/2018     Current every day smoker     Alcohol      Current, 06/16/2015     Employment and Education      Work/School description: disabled., 02/21/2011     Home and Environment      Lives with Self, Significant other., 02/21/2011     Substance Abuse      Current, 06/16/2015     Tobacco      Current, 06/16/2015  Family History    Diabetes mellitus type 2: Mother.    Heart disease: Father.  Immunizations      Vaccine Date Status      tetanus/diphth/pertuss (Tdap) adult/adol 11/17/2016 Given      influenza virus vaccine, inactivated 11/17/2016 Given      influenza virus vaccine, inactivated 01/06/2015 Given  Lab Results       Lab Results (Last 4 results within 90 days)        Sodium Level: 140 mmol/L [135 mmol/L - 146 mmol/L] (04/27/18 14:54:00 CDT)       Potassium Level: 4.4 mmol/L [3.5 mmol/L - 5.3 mmol/L] (04/27/18 14:54:00 CDT)       Chloride Level: 104 mmol/L [98 mmol/L - 110 mmol/L] (04/27/18 14:54:00 CDT)       CO2 Level: 31 mmol/L [20 mmol/L - 31 mmol/L] (04/27/18 14:54:00 CDT)       Glucose Level: 83 mg/dL [65 mg/dL - 99 mg/dL] (04/27/18 14:54:00 CDT)       BUN: 15 mg/dL [7  mg/dL - 25 mg/dL] (04/27/18 14:54:00 CDT)       Creatinine Level: 0.98 mg/dL [0.6 mg/dL - 1.35 mg/dL] (04/27/18 14:54:00 CDT)       BUN/Creat Ratio: NOT APPLICABLE [6  - 22] (04/27/18 14:54:00 CDT)       eGFR: 97 mL/min/1.73m2 [8.6 mg/dL - 10.3 mg/dL] (04/27/18 14:54:00 CDT)       eGFR African American: 113 mL/min/1.73m2 [6  - 22] (04/27/18 14:54:00 CDT)       Calcium Level: 9.7 mg/dL [8.6 mg/dL - 10.3 mg/dL] (04/27/18 14:54:00 CDT)

## 2022-02-15 NOTE — NURSING NOTE
Comprehensive Intake Entered On:  10/13/2020 2:26 PM CDT    Performed On:  10/13/2020 2:25 PM CDT by Abbi Smallwood CMA   Chief Complaint :   Would like COVID testing; verbal consent given for telephone visit   Menstrual Status :   N/A   Height Measured :   72 in(Converted to: 6 ft 0 in, 182.88 cm)    Height/Length Estimated :   72 in(Converted to: 6 ft 0 in, 182.88 cm)    Abbi Smallwood CMA - 10/13/2020 2:25 PM CDT   Health Status   Allergies Verified? :   Yes   Medication History Verified? :   Yes   Medical History Verified? :   Yes   Tobacco Use? :   Current every day smoker   Abbi Smallwood CMA - 10/13/2020 2:25 PM CDT   Consents   Consent for Immunization Exchange :   Consent Granted   Consent for Immunizations to Providers :   Consent Granted   Abbi Smallwood CMA - 10/13/2020 2:25 PM CDT   Meds / Allergies   (As Of: 10/13/2020 2:26:27 PM CDT)   Allergies (Active)   Bananas  Estimated Onset Date:   Unspecified ; Created By:   Rowena Hooker LPN; Reaction Status:   Active ; Category:   Drug ; Substance:   Bananas ; Type:   Allergy ; Updated By:   Rowena Hooker LPN; Reviewed Date:   10/13/2020 2:26 PM CDT      Bee Stings  Estimated Onset Date:   Unspecified ; Created By:   Ruthie Nam; Reaction Status:   Active ; Category:   Drug ; Substance:   Bee Stings ; Type:   Allergy ; Updated By:   Ruthie Nam; Reviewed Date:   10/13/2020 2:26 PM CDT      Latex  Estimated Onset Date:   Unspecified ; Created By:   Ruthie Nam; Reaction Status:   Active ; Category:   Drug ; Substance:   Latex ; Type:   Allergy ; Updated By:   Ruthie Nam; Reviewed Date:   10/13/2020 2:26 PM CDT        Medication List   (As Of: 10/13/2020 2:26:27 PM CDT)   Prescription/Discharge Order    albuterol  :   albuterol ; Status:   Prescribed ; Ordered As Mnemonic:   Ventolin HFA 90 mcg/inh inhalation aerosol ; Simple Display Line:   2 puff(s), NEB, qid, PRN: AS NEEDED FOR WHEEZING, 1 EA, 0 Refill(s) ; Ordering  Provider:   Maat Hearn MD; Catalog Code:   albuterol ; Order Dt/Tm:   4/9/2020 2:03:28 PM CDT          diclofenac topical  :   diclofenac topical ; Status:   Prescribed ; Ordered As Mnemonic:   diclofenac 1% topical gel ; Simple Display Line:   2 gm, Topical, qid, 240 gm, 2 Refill(s) ; Ordering Provider:   Mata Hearn MD; Catalog Code:   diclofenac topical ; Order Dt/Tm:   8/5/2020 4:32:01 PM CDT          ketorolac ophthalmic  :   ketorolac ophthalmic ; Status:   Prescribed ; Ordered As Mnemonic:   Acular 0.5% ophthalmic solution ; Simple Display Line:   1 drop(s), right eye, QID, for 7 day(s), PRN: for itching, 10 mL, 0 Refill(s) ; Ordering Provider:   Cricket Jessica PA-C; Catalog Code:   ketorolac ophthalmic ; Order Dt/Tm:   10/9/2020 11:52:51 AM CDT          Miscellaneous Rx Supply  :   Miscellaneous Rx Supply ; Status:   Prescribed ; Ordered As Mnemonic:   Orthotics ; Simple Display Line:   See Instructions, to use as directed with shoes, 2 EA, 0 Refill(s) ; Ordering Provider:   Mata Hearn MD; Catalog Code:   Miscellaneous Rx Supply ; Order Dt/Tm:   2/13/2020 3:16:54 PM CST          oxyCODONE  :   oxyCODONE ; Status:   Prescribed ; Ordered As Mnemonic:   oxyCODONE 5 mg oral tablet ; Simple Display Line:   5 mg, 1 tab(s), Oral, q8 hrs, PRN: as needed for pain, 20 tab(s), 0 Refill(s) ; Ordering Provider:   Mata Hearn MD; Catalog Code:   oxyCODONE ; Order Dt/Tm:   9/10/2020 1:07:15 PM CDT            Home Meds    acetaminophen  :   acetaminophen ; Status:   Documented ; Ordered As Mnemonic:   acetaminophen 500 mg oral tablet ; Simple Display Line:   1,000 mg, 2 tab(s), Oral, q6 hrs, PRN: as needed for pain, 0 Refill(s) ; Catalog Code:   acetaminophen ; Order Dt/Tm:   11/6/2019 1:37:34 PM CST            ID Risk Screen   Recent Travel History :   No recent travel   Family Member Travel History :   No recent travel   Other Exposure to Infectious Disease :   Unknown   Abbi Smallwood CMA  10/13/2020 2:25 PM CDT

## 2022-02-15 NOTE — PROGRESS NOTES
Chief Complaint    verbal consent given for telemed visit.  med check--refill oxycodone, also discuss higher strength of diclofenac.  also wants referrals to therapist and dietician.   Visit type:  Telephone visit   Participants during visit:  patient   Location of patient:  home   Location of physician:  office   Call start time:  1410   Call end time:  1422   Today's visit was conducted by telephone conference due to the COVID-19 pandemic.  The patient's consent to proceed with the telephone conference was obtained and documented.      History of Present Illness       Patient has a telehealth visit today for follow-up on his chronic back pain, anxiety disorder, and obesity.  He is requesting referral to behavioral health help with his anxiety and to dietitian for counseling regarding weight loss.  He has stopped smoking now for about a month.  He is congratulated on this.  He is considering a future back surgery.  He has been using oxycodone very sparingly.  He is dealing with some family stressors.  Review of Systems       See HPI.  All other review of systems negative.  Physical Exam   Vitals & Measurements    HT: 72 in   Assessment/Plan       1. Anxiety (F41.1)          Generalized anxiety disorder worsening, referral to behavioral therapy         Ordered:          Referral (Request), 05/19/21 20:31:00 CDT, Referred to: Behavioral Health, Reason for referral: anxiety disorder, Anxiety                2. Chronic back pain (M54.9)          Chronic back pain managed with diclofenac and intermittent use of oxycodone         I have refilled the oxycodone, PDMP queried with no concerns                3. Myofascial pain syndrome (M79.18)                4. Tobacco user (Z72.0)          He has quit smoking and is been congratulated on this and encouraged to continue to be smoke-free                5. Obesity, morbid (E66.01)          He reports he has been able to lose some weight would like to continue to lose weight in  preparation for possible back surgery and to improve his general health         Referral to dietitian         Ordered:          Referral (Request), 05/19/21 20:32:00 CDT, Referred to: Dietary & Nutritional, Reason for referral: weight loss, Obesity, morbid                Orders:         diclofenac topical, ( 2 gm ), Topical, qid, Instructions: apply to lower back, # 240 gm, 2 Refill(s), Type: Maintenance, Pharmacy: [facility], 2 gm Topical qid,Instr:apply to lower back, 72, in, 02/16/21 14:36:00 CST, Height Measured, 311.8, lb,..., (Ordered)         diclofenac topical, ( 2 gm ), Topical, qid, Instructions: apply to lower back, # 240 gm, 2 Refill(s), Type: Hard Stop, Pharmacy: [facility], 72, in, 02/16/21 14:36:00 CST, Height Measured, 311.8, lb, 01/05/21 14:29:00 CST, Weight Measured, (Completed)         oxyCODONE, = 1 tab(s) ( 5 mg ), Oral, q8 hrs, PRN: as needed for pain, # 20 tab(s), 0 Refill(s), Type: Hard Stop, Pharmacy: [facility], 72, in, 02/16/21 14:36:00 CST, Height Measured, 311.8, lb, 01/05/21 14:29:00 CST, Weight Measured, (Completed)         oxyCODONE, = 1 tab(s) ( 5 mg ), Oral, q8 hrs, PRN: as needed for pain, # 20 tab(s), 0 Refill(s), Type: Maintenance, Pharmacy: [facility], 1 tab(s) Oral q8 hrs,PRN:as needed for pain, 72, in, 02/16/21 14:36:00 CST, Height Measured, 311.8,..., (Ordered)  Patient Information     Name:JOEL SUTTON      Address:      [address] [place] [phone]     Sex:Male     YOB: 1980     [phone]     Emergency Contact:[facility], CONTACT     MRN:[id number]     FIN:[id number]     Location:[facility]     Date of Service:05/18/2021      Primary Care Physician:       Mata Hearn MD, [phone]      Attending Physician:       Mata Hearn MD, [phone]  Problem List/Past Medical History    Ongoing      Anxiety     Asthma, moderate persistent     Cerebellar tonsillar ectopia     Chronic back pain     Chronic insomnia     Degenerative lumbar disc     Dysplastic nevus       Comments: removed from back     Low back pain     Marijuana use     Myalgia     Myofascial pain syndrome     Obese     Sebaceous cyst     Smoking     Tension headache     Tobacco user     Trochanteric bursitis, right hip    Historical     No qualifying data  Procedure/Surgical History     Injection of cortisone (06/08/2016)     Transanal hemorrhoidal dearterialization (THD) (09/08/2015)     Colonoscopy (08/04/2015)      Comments: Sedation: MAC      Indication: rectal bleeding      External & internal hemorrhoids; otherwise normal      Resume routine screening at age 50..     Esophagogastroduodenoscopy (08/04/2015)      Comments: Normal.     Hospitalized at [facility] (2002)     Right knee arthroscopy     Surgery on eyes x3      Comments: as infant.  Medications    acetaminophen 500 mg oral tablet, 1000 mg= 2 tab(s), Oral, q6 hrs, PRN    Acular 0.5% ophthalmic solution, 1 drop(s), Eye-Right, qid, PRN, 1 refills    albuterol 90 mcg/inh inhalation aerosol, 2 puff(s), Inhale, qid, 3 refills    diazePAM 5 mg oral tablet, 5 mg= 1 tab(s), Oral, daily, PRN    diclofenac 1% topical gel, 2 gm, Topical, qid, 2 refills    Orthotics, See Instructions    oxyCODONE 5 mg oral tablet, 5 mg= 1 tab(s), Oral, q8 hrs, PRN  Allergies    Bananas    Bee Stings    Latex  Social History    Smoking Status     Current every day smoker     Alcohol      Past     Electronic Cigarette/Vaping      Electronic Cigarette Use: Never.     Employment/School      Work/School description: disabled.     Home/Environment      Lives with Self, Significant other.     Substance Abuse      Current     Tobacco      10 or more cigarettes (1/2 pack or more)/day in last 30 days  Family History    Diabetes mellitus type 2: Mother.    Heart disease: Father.  Immunizations      Vaccine Date  Status          tetanus/diphth/pertuss (Tdap) adult/adol 11/17/2016 Given          influenza virus vaccine, inactivated 11/17/2016 Given          influenza virus vaccine, inactivated 01/06/2015 Given

## 2022-02-15 NOTE — PROGRESS NOTES
Chief Complaint    trigger point injections  History of Present Illness          HPI pt has history of chronic myofascial pain and has found trigger point injections to be a useful tool to help control pain.  Would like to have repeat trigger point injections today.                       Review of systems is negative except as per HPI      Exam:      General: alert and oriented ×3 no acute distress.      HEENT: pupils are equal round and reactive to light extraocular motion is intact. Normocephalic and atraumatic.       Hearing is grossly normal and there is no otorrhea.       Nares are patent there is no rhinorrhea.       Mucous membranes are moist and pink.      Chest: has bilateral rise with no increased work of breathing.      Cardiovascular: normal perfusion and brisk capillary refill.      Musculoskeletal: no gross focal abnormalities and normal gait.      Neuro: no gross focal abnormalities and memory seems intact.      Psychiatric: speech is clear and coherent and fluent. Patient dressed appropriately for the weather. Mood is appropriate and affect is full.           Procedure note           Informed consent obtained including risks of pain, bleeding, infection, injury to surrounding tissue, and need for further treatment. Benefit of a trigger point injection goal is to reduce pain. This is just part of a treatment plan and for best results patient should also complete physical therapy. He continues to decline PT.  I cannot guarantee that will will be any pain relief.           Alternatives would include doing nothing, physical therapy, acupuncture, using heat or ice, continuing with oral medications such as muscle relaxants or nonsteroidal anti-inflammatory medications or topical medications such as a lidocaine patch or cream or menthol for diclofenac. She would like to try the trigger point injections.           Prep: Alcohol          Location identified by my palpation and communication with patient.   Symptomatic trigger points that patient desired treatment at were located at:           right mid back latissimus dorsi x 3           Each of these was injected with  a one-to-one solution of 1% lidocaine without epinephrine and 0.5% Marcaine without epinephrine.          Total number of injections:3          Total number of milliliters of the 1:1 solution of lidocaine and marcaine:8           Encouraged patient to treat the area with [place] cup ice massage tonight and to try to stretch the area out.           pain prior to treatment: severe          pain following treatment at the trigger point injection sites:improved          Complications: none          Tolerated procedure well.          Asessment and Plan: myalgia and myofascial pain, s/p trigger point injections today.  Encouraged home exercise program and to keep annual exam appt as indicated and RTC if symptoms worsen or do not improve.   would also like pt to get BMP done before providing any more toradol.  pt also reports he  is interested in seeing a pain clinic in [place] if transportation can be arranged.  Physical Exam   Vitals & Measurements    T: 97.9(Tympanic)  HR: 84(Peripheral)  BP: 132/84     HT: 72.8 in  WT: 273.4 lb  BMI: 36.27   Assessment/Plan       Myalgia (M79.1)         Orders:          Basic Metabolic Panel* ([facility]), Specimen Type: Serum, Collection Date: 04/27/18 14:44:00 CDT           Patient Information     Name:JOEL SUTTON      Address:      [address] [place] [id number]     Sex:Male     YOB: 1980     [phone]     Emergency Contact:LEESA SUTTON     MRN:[id number]     FIN:[id number]     Location:[facility]     Date of Service:04/27/2018      Primary Care Physician:       Nena King MD, [phone]      Attending Physician:       Nena King MD, [phone]  Problem List/Past Medical History    Ongoing     Anxiety     Cerebellar tonsillar  ectopia     Chronic insomnia     Low back pain     Marijuana use     Myalgia     Obese     Smoking     Tobacco user     Trochanteric bursitis, right hip    Historical     No qualifying data  Procedure/Surgical History     Injection of cortisone (06/08/2016)           Transanal hemorrhoidal dearterialization (THD) (09/08/2015)           Colonoscopy (08/04/2015)             Comments: Sedation: MAC<br/>Indication: rectal bleeding<br/>External &amp; internal hemorrhoids; otherwise normal<br/>Resume routine screening at age 50.     Esophagogastroduodenoscopy (08/04/2015)             Comments: Normal     Hospitalized at [facility] (2002)           Right knee arthroscopy           Surgery on eyes x3             Comments: as infant  Medications     hydrocortisone 2.5% topical cream: 1 mal, top, tid, 30 gm, 1 Refill(s).     Qvar 80 mcg/inh inhalation aerosol: 1 puff(s), inh, bid, 1 EA, 3 Refill(s).     Advil 200 mg oral tablet: 400 mg, 2 tab(s), po, q8 hrs, PRN: as needed for pain, 0 Refill(s).     Vitamin B-12 1000 mcg oral tablet: 1,000 mcg, 1 tab(s), po, daily, 0 Refill(s).     ProAir HFA 90 mcg/inh inhalation aerosol: 2 puff(s), inh, qid, PRN: as needed for wheezing, 1 EA, 2 Refill(s).     DULoxetine 20 mg oral delayed release capsule: 20 mg, 1 cap(s), po, daily, 30 cap(s), 1 Refill(s).     Shoes for low back pain: See Instructions, Use as directed, 2 EA, 0 Refill(s).          Allergies    Bee Stings    Latex  Social History    Smoking Status - 04/27/2018     Current every day smoker     Alcohol - Denies Alcohol Use, 04/04/2011      Current, 06/16/2015     Employment and Education      Work/School description: disabled., 02/21/2011     Home and Environment      Lives with Self, Significant other., 02/21/2011     Substance Abuse      Current, 06/16/2015     Tobacco - Current, 12/15/2010      Current, 06/16/2015  Family History    Diabetes mellitus type 2: Mother.    Heart disease: Father.  Immunizations       Vaccine Date Status      tetanus/diphth/pertuss (Tdap) adult/adol 11/17/2016 Given      influenza virus vaccine, inactivated 11/17/2016 Given      influenza virus vaccine, inactivated 01/06/2015 Given

## 2022-02-15 NOTE — PROGRESS NOTES
History of Present Illness      patient present to clinic today for follow-up of his anxiety.  He reports that he would like a letter that allows him to bring his dog with him when he rides to the taxicab's.  He has a history of traumatic brain injury and anxiety and feels uncomfortable traveling and feels very upset about the taxi service no longer allowing his dog to come with him.  The patient does not drive.  We did discuss that the only types of animals that legally have to be allowed on taxi cab's would be service dogs and they are not I have done some research about service dogs and it looks like they need to have some specific training.  After they have completed this they are supposed to undergo some testing.  Therefore I cannot write him a letter stating that the taxi service had to allow his on to their car because I could not certify that his dog was a service dog.  I explained that the role that his dog seems to provide for him is that of an emotional support animal and that emotional support animals do not have the same laws protecting their use as this is a service dog.  He asked if I knew how he can get his dog tested for service dog testing and I apologize that I did not.  There are however many different organizations that provide this type of service and he could certainly look into this or ask the  at the [employer] to help him look into this some more.      He also reports that his back continues to provide discomfort.  He is hoping to have a shot of Toradol today.      Review of systems is negative except as per HPI including:  no fevers, chills, sore throat, runny nose, nausea, vomiting, constipation, diarrhea, rash or new skin lesions, chest pain, palpitations, slurred speech, new paresthesia, shortness of breath or wheeze.      Exam:      General: alert and oriented ×3 no acute distress.      HEENT: pupils are equal round and reactive to light extraocular motion is  intact. Normocephalic and atraumatic.       Hearing is grossly normal and there is no otorrhea.       Nares are patent there is no rhinorrhea.       Mucous membranes are moist and pink.      Chest: has bilateral rise with no increased work of breathing.      Cardiovascular: normal perfusion and brisk capillary refill.      Musculoskeletal: no gross focal abnormalities and normal gait.      Neuro: no gross focal abnormalities and memory seems intact.      Psychiatric: speech is clear and coherent and fluent. Patient dressed appropriately for the weather. Mood is frustrated and affect is irritable.                     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  Patient specifically wished to have a letter from me stating that he needed his dog with him when he rolled the taxi.  Explained to patient that I could not provide a letter that stated that however we did compromise and I did provide a letter stating that he does have that an anxiety disorder and that his dog reduces his symptoms.  See results letter typed up today.  25 minutes spent with patient in direct face to face contact, > 50% of time spent counseling and coordinating care.   Patient Information     Name:JOEL SUTTON      Address:      [address] [place] [id number]     Sex:Male     YOB: 1980     [phone]     Emergency Contact:LEESA SUTTON      MRN:[id number]     FIN:[id number]     Location:[facility]     Date of Service:05/01/2019      Primary Care Physician:       Nena King MD, [phone]      Attending Physician:       Nena King MD, [phone]  Problem List/Past Medical History    Ongoing     Anxiety     Asthma, moderate persistent     Cerebellar tonsillar ectopia     Chronic back pain     Chronic insomnia     Dysplastic nevus       Comments: removed from back     Low back pain     Marijuana use     Myalgia     Obese     Sebaceous cyst     Smoking     Tension headache     Tobacco user     Trochanteric bursitis, right hip    Historical     No qualifying data  Procedure/Surgical History     Injection of cortisone (06/08/2016)           Transanal hemorrhoidal dearterialization (THD) (09/08/2015)           Colonoscopy (08/04/2015)            Comments: Sedation: MAC      Indication: rectal bleeding      External & internal hemorrhoids; otherwise normal      Resume routine screening at age 50..     Esophagogastroduodenoscopy (08/04/2015)            Comments: Normal.     Hospitalized at [facility] (2002)           Right knee arthroscopy           Surgery on eyes x3            Comments: as infant.  Medications     ProAir HFA 90 mcg/inh inhalation aerosol: 2 puff(s), inh, qid, PRN: as needed for wheezing, 1 EA, 2 Refill(s).     omeprazole 40 mg oral delayed release capsule: 40 mg, 1 cap(s), PO, Daily, 90 cap(s), 3 Refill(s).      Allergies    Bananas    Bee Stings    Latex  Social History    Smoking Status - 05/01/2019     Current every day smoker     Alcohol      Current, 06/16/2015     Employment and Education      Work/School description: disabled., 02/21/2011     Home and Environment      Lives with Self, Significant other., 02/21/2011     Substance Abuse      Current, 06/16/2015     Tobacco      Current, 06/16/2015  Family History    Diabetes mellitus type 2: Mother.    Heart disease: Father.  Immunizations       Vaccine Date Status      tetanus/diphth/pertuss (Tdap) adult/adol 11/17/2016 Given      influenza virus vaccine, inactivated 11/17/2016 Given      influenza virus vaccine, inactivated 01/06/2015 Given

## 2022-02-15 NOTE — TELEPHONE ENCOUNTER
---------------------  From: Soraya Owusu RN (Phone Messages Pool ([id number]))   To: [facility] ([id number]);     Sent: 12/22/2021 1:59:54 PM CST  Subject: Prescription refill       PCP:   LARRY      Time of Call:  1355       Person Calling:  Pt  Phone number:  [phone]    Note:   Pt calling stating that after his appointment with [facility] he was going to call in a prescription but he was never able to pick it up and pharmacy is telling him that it is not there.  Contacted [facility] and they never received the prescription that was sent over on November 29th.   Can this please be resent?     Last office visit and reason:  11/29/21  Pain management---------------------  From: Jaskaran CONWAY, Cricket MEDINA ([facility] ([id number]))   To: Phone [facility] ([facility]);     Sent: 12/22/2021 2:29:02 PM CST  Subject: RE: Prescription refill     will send in #20,  he has had multiple Rxs in the past month, further refills need to come from [facility]Contacted pt and informed her that he should follow up with [facility] prior to running out of this prescription.  Pt agreed.

## 2022-02-15 NOTE — PROGRESS NOTES
Patient:   JOEL SUTTON            MRN: [id number]            FIN: [id number]               Age:   38 years     Sex:  Male     :  1980   Associated Diagnoses:   None   Author:   Nena King MD      Visit Information      Date of Service: 2018 12:00 pm  Performing Location: [facility]  Encounter#: [id number]      Primary Care Provider (PCP):  Nena King MD    NPI# [id number]      Referring Provider:  Nena King MD, NPI# [id number]      Procedure   Procedure   Time.     Indication: myofascial pain.     Informed consent: signed by patient.     Confirmed: patient, procedure, side, site.     Type of procedure: trigger point injection.     Physical Exam: vital signs Vital Signs   2018 12:10 PM CDT Temperature Tympanic 97.0 DegF  LOW    Peripheral Pulse Rate 88 bpm    Pulse Site Radial artery    HR Method Electronic    Systolic Blood Pressure 126 mmHg    Diastolic Blood Pressure 82 mmHg  HI    Mean Arterial Pressure 97 mmHg    BP Site Right arm    BP Method Electronic      .     Preparation and technique: informed consent obtained, position sitting, sterile preparation of site with 70 % alcohol, hemostasis achieved using direct pressure, estimated blood loss minimal, adhesive bandagedressing applied.     Findings: painful and tender myofascial trigger points identified by palpation with communication from patient    Location: bilateral lateral latissimus dorsi Right x 2 and left x 1, rt thoracic paraspinal x 1, right lower back x 1    Number of injections:4    pain prior to procedure:   severe       pain following procedure:moderate    number of milliliters of the 1:1 solution of 1% lidocaine without epiniephrine and 0.5% marcaine without epinephrine used in total: 8.     Procedure tolerated: well.     No Complications.     No qualifying data available.          Impression and Plan   Diagnosis     return to clinic if symptoms worsen or do not improve.     Counseled:   Patient, Family, use ice or heat as needed to help with pain, continue with home exercise program, .

## 2022-02-15 NOTE — LETTER
(Inserted Image. Unable to display)   June 07, 2020        JOEL SUTTON      [address] [place] [id number]        Dear JOEL,    Thank you for choosing [facility] for your healthcare needs. Below you will find the results of your recent test(s) done at our clinic.      Your Covid test is negative      Result Name Current Result Reference Range   Coronavirus SARS-CoV-2 (COVID-19)  NOT DETECTED 6/4/2020 NOT DETECTED -        Please contact me or my assistant at [phone] if you have any questions or concerns.     Sincerely,        Jules Garcia MD        What do your labs mean?  Below is a glossary of commonly ordered labs:  LDL   Bad Cholesterol   HDL   Good Cholesterol  AST/ALT   Liver Function   Cr/Creatinine   Kidney Function  Microalbumin   Kidney Function  BUN   Kidney Function  PSA   Prostate    TSH   Thyroid Hormone  HgbA1c   Diabetes Test   Hgb (Hemoglobin)   Red Blood Cells

## 2022-02-15 NOTE — NURSING NOTE
Comprehensive Intake Entered On:  6/2/2021 9:23 AM CDT    Performed On:  6/2/2021 9:18 AM CDT by Cecily Robb LPN               Summary   Chief Complaint :   refill on pain medication Oxycodone, also wants a refill on Cyclobenzaprine which is not currently listed on his active med list - verbal consent for telephone visit   Menstrual Status :   N/A   Height Measured :   72 in(Converted to: 6 ft 0 in, 182.88 cm)    Height/Length Estimated :   72 in(Converted to: 6 ft 0 in, 182.88 cm)    Cecily Robb LPN - 6/2/2021 9:18 AM CDT   Health Status   Allergies Verified? :   Yes   Medication History Verified? :   Yes   Medical History Verified? :   No   Pre-Visit Planning Status :   Completed   Tobacco Use? :   Former smoker   Cecily Robb LPN - 6/2/2021 9:18 AM CDT   Meds / Allergies   (As Of: 6/2/2021 9:23:33 AM CDT)   Allergies (Active)   Bananas  Estimated Onset Date:   Unspecified ; Created By:   Rowena Hooker RN; Reaction Status:   Active ; Category:   Drug ; Substance:   Bananas ; Type:   Allergy ; Updated By:   Rowena Hooker RN; Reviewed Date:   2/16/2021 2:40 PM CST      Bee Stings  Estimated Onset Date:   Unspecified ; Created By:   Ruthie Nam CMA; Reaction Status:   Active ; Category:   Drug ; Substance:   Bee Stings ; Type:   Allergy ; Updated By:   Ruthie Nam CMA; Reviewed Date:   2/16/2021 2:40 PM CST      Latex  Estimated Onset Date:   Unspecified ; Created By:   Ruthie Nam CMA; Reaction Status:   Active ; Category:   Drug ; Substance:   Latex ; Type:   Allergy ; Updated By:   Ruthie Nam CMA; Reviewed Date:   2/16/2021 2:40 PM CST        Medication List   (As Of: 6/2/2021 9:23:33 AM CDT)   Prescription/Discharge Order    Miscellaneous Rx Supply  :   Miscellaneous Rx Supply ; Status:   Prescribed ; Ordered As Mnemonic:   Orthotics ; Simple Display Line:   See Instructions, to use as directed with shoes, 2 EA, 0 Refill(s) ; Ordering Provider:   Mata Hearn MD; Catalog  Code:   Miscellaneous Rx Supply ; Order Dt/Tm:   2/13/2020 3:16:54 PM CST          albuterol  :   albuterol ; Status:   Prescribed ; Ordered As Mnemonic:   albuterol 90 mcg/inh inhalation aerosol ; Simple Display Line:   2 puff(s), Inhale, qid, 2 EA, 3 Refill(s) ; Ordering Provider:   Mata Hearn MD; Catalog Code:   albuterol ; Order Dt/Tm:   3/25/2021 7:39:18 AM CDT          ketorolac ophthalmic  :   ketorolac ophthalmic ; Status:   Prescribed ; Ordered As Mnemonic:   Acular 0.5% ophthalmic solution ; Simple Display Line:   1 drop(s), right eye, QID, for 7 day(s), PRN: for itching, 10 mL, 1 Refill(s) ; Ordering Provider:   Mata Hearn MD; Catalog Code:   ketorolac ophthalmic ; Order Dt/Tm:   3/25/2021 7:40:40 AM CDT          oxyCODONE  :   oxyCODONE ; Status:   Prescribed ; Ordered As Mnemonic:   oxyCODONE 5 mg oral tablet ; Simple Display Line:   5 mg, 1 tab(s), Oral, q8 hrs, PRN: as needed for pain, 20 tab(s), 0 Refill(s) ; Ordering Provider:   Mata Hearn MD; Catalog Code:   oxyCODONE ; Order Dt/Tm:   5/18/2021 2:27:03 PM CDT          diazePAM  :   diazePAM ; Status:   Prescribed ; Ordered As Mnemonic:   diazePAM 5 mg oral tablet ; Simple Display Line:   5 mg, 1 tab(s), Oral, daily, PRN: as needed for anxiety, 12 tab(s), 0 Refill(s) ; Ordering Provider:   Mata Hearn MD; Catalog Code:   diazePAM ; Order Dt/Tm:   11/30/2020 9:48:55 PM CST          diclofenac topical  :   diclofenac topical ; Status:   Prescribed ; Ordered As Mnemonic:   diclofenac 3% topical gel ; Simple Display Line:   3 gm, Topical, qid, 240 gm, 2 Refill(s) ; Ordering Provider:   Mata Hearn MD; Catalog Code:   diclofenac topical ; Order Dt/Tm:   5/27/2021 3:01:47 PM CDT            Home Meds    acetaminophen  :   acetaminophen ; Status:   Documented ; Ordered As Mnemonic:   acetaminophen 500 mg oral tablet ; Simple Display Line:   1,000 mg, 2 tab(s), Oral, q6 hrs, PRN: as needed for pain, 0 Refill(s) ; Catalog  Code:   acetaminophen ; Order Dt/Tm:   11/6/2019 1:37:34 PM CST            ID Risk Screen   Recent Travel History :   No recent travel   Family Member Travel History :   No recent travel   Other Exposure to Infectious Disease :   Unknown   COVID-19 Testing Status :   No positive COVID-19 test   Cecily Robb LPN - 6/2/2021 9:18 AM CDT

## 2022-02-15 NOTE — TELEPHONE ENCOUNTER
Entered by Fariba Roth MA on February 22, 2021 8:59:25 AM CST  ---------------------  From: Fariba Roth MA   To: [facility]    Sent: 2/22/2021 8:59:25 AM CST  Subject: Medication Management     ** Submitted: **  Order:albuterol (albuterol 90 mcg/inh inhalation aerosol)  2 puff(s)  Inhale  qid  Qty:  1 EA        Refills:  3          Substitutions Allowed     Route To [facility]    Signed by Fariba Roth MA  2/22/2021 2:58:00 PM [facility]    ** Submitted: **  Complete:albuterol (Ventolin HFA 90 mcg/inh inhalation aerosol)   Signed by Fariba Roth MA  2/22/2021 2:59:00 PM [facility]    ** Not Approved:  **  albuterol (ALBUTEROL SULFATE  AERS)  INHALE TWO PUFFS BY MOUTH FOUR TIMES A DAY AS NEEDED FOR WHEEZING  Qty:  18 unknown unit        Days Supply:  25        Refills:  0          Substitutions Allowed     Route To [facility]   Signed by Fariba Roth MA            ------------------------------------------  From: [facility]  To: Mata Hearn MD  Sent: February 19, 2021 1:04:51 PM CST  Subject: Medication Management  Due: February 19, 2021 9:56:32 PM CST     ** On Hold Pending Signature **     Drug: albuterol (Ventolin HFA 90 mcg/inh inhalation aerosol), INHALE TWO PUFFS BY MOUTH FOUR TIMES A DAY AS NEEDED FOR WHEEZING  Quantity: 18 unknown unit  Days Supply: 25  Refills: 0  Substitutions Allowed  Notes from Pharmacy:     Dispensed Drug: albuterol (albuterol 90 mcg/inh inhalation aerosol), INHALE TWO PUFFS BY MOUTH FOUR TIMES A DAY AS NEEDED FOR WHEEZING  Quantity: 18 unknown unit  Days Supply: 25  Refills: 0  Substitutions Allowed  Notes from Pharmacy:  ------------------------------------------Med Refill      Date of last office visit and reason:  2/16/2021 w/ GTG for pain mgt, anxiety f/u      Date of last Med Check / Px:   _  Date of last labs pertaining to med:  _    Note:   _    RTC order in chart:  RTC due April 2021    For Protocol refill, has patient been contacted:  _

## 2022-02-15 NOTE — PROGRESS NOTES
Chief Complaint    pt here for trigger point injections in back, also says pain in right leg, left leg is having some pain in it as well  History of Present Illness          HPI pt has history of chronic myofascial pain and has found trigger point injections to be a useful tool to help control pain.  Would like to have repeat trigger point injections today.             he also has a right chin infection, says he forgot to pickup his antibiotic yesterday.       he is wondering what else he can do for pain, says it is very poorly controlled at this time, but has not yet increased his duloxetine.          Review of systems is negative except as per HPI      Exam:      General: alert and oriented ×3 no acute distress.      HEENT: pupils are equal round and reactive to light extraocular motion is intact. Normocephalic and atraumatic.       Hearing is grossly normal and there is no otorrhea.       Nares are patent there is no rhinorrhea.       Mucous membranes are moist and pink.      Chest: has bilateral rise with no increased work of breathing.      Cardiovascular: normal perfusion and brisk capillary refill.      Musculoskeletal: no gross focal abnormalities and normal gait.      Neuro: no gross focal abnormalities and memory seems intact.      Psychiatric: speech is clear and coherent and fluent. Patient dressed appropriately for the weather. Mood is appropriate and affect is full.           Procedure note           Informed consent obtained including risks of pain, bleeding, infection, injury to surrounding tissue, and need for further treatment. Benefit of a trigger point injection goal is to reduce pain. This is just part of a treatment plan and for best results patient should also complete physical therapy. I cannot guarantee that will will be any pain relief.           Alternatives would include doing nothing, physical therapy, acupuncture, using heat or ice, continuing with oral medications such as muscle  relaxants or nonsteroidal anti-inflammatory medications or topical medications such as a lidocaine patch or cream or menthol for diclofenac. She would like to try the trigger point injections.           Prep: Alcohol          Location identified by my palpation and communication with patient.  Symptomatic trigger points that patient desired treatment at were located at:2 muscle groups, right latissimus dorsi and right gluteus minimus                      Each of these was injected with  a one-to-one solution of 1% lidocaine without epinephrine and 0.5% Marcaine without epinephrine.          Total number of injections:2          Total number of milliliters of the 1:1 solution of lidocaine and marcaine:8           Encouraged patient to treat the area with [place] cup ice massage tonight and to try to stretch the area out.           pain prior to treatment: severe          pain following treatment at the trigger point injection sites:improved          Complications: none          Tolerated procedure well.            Assessment/Plan       Low back pain (M54.5)          Asessment and Plan: myalgia and myofascial pain, s/p trigger point injections today.  Encouraged home exercise program and to keep annual exam appt as indicated and RTC if symptoms worsen or do not improve.    keep appt with pain clinic scheduled for next month, again encouraged PT, pt declined stating transportation is an issue         Orders:          23578 injection single/mlt trigger point 1/2 muscles (Charge), Quantity: 1, Myalgia  Marijuana use  Low back pain          51066 office outpatient visit 15 minutes (Charge), Quantity: 1, Modifier(s): 25, Myalgia  Marijuana use  Low back pain                Marijuana use (F12.90)         Orders:          80079 injection single/mlt trigger point 1/2 muscles (Charge), Quantity: 1, Myalgia  Marijuana use  Low back pain          19049 office outpatient visit 15 minutes (Charge), Quantity: 1, Modifier(s):  25, Myalgia  Marijuana use  Low back pain                Myalgia (M79.1)         Orders:          82978 injection single/mlt trigger point 1/2 muscles (Charge), Quantity: 1, Myalgia  Marijuana use  Low back pain          86074 office outpatient visit 15 minutes (Charge), Quantity: 1, Modifier(s): 25, Myalgia  Marijuana use  Low back pain                Orders:         cephalexin, 1 cap(s) ( 500 mg ), PO, QID, # 40 cap(s), 0 Refill(s), Type: Maintenance, Pharmacy: [facility], 1 cap(s) Oral qid,x10 day(s), (Ordered)         DULoxetine, 1 cap(s) ( 60 mg ), po, bid, # 60 cap(s), 3 Refill(s), Type: Maintenance, Pharmacy: [facility], 1 cap(s) Oral bid, (Ordered)         04022 unlisted px skin muc membrane +subq tissue (Charge), Quantity: 1, Radiculopathy  Cellulitis         MRI Lumbosacral Spine w/ Contrast (Request), Priority: Routine, Radiculopathy         Return to Clinic (Request), Return in 2 weeks      Fifteen minutes spent with patient in direct face to face contact, in addition to the time spent performing the procedure today, greater than 50% of that time was  spent counseling and coordinating care.   Patient Information     Name:JOEL SUTTON      Address:      [address] [place] [id number]     Sex:Male     YOB: 1980     [phone]     Emergency Contact:LEESA SUTTON     MRN:[id number]     FIN:[id number]     Location:[facility]     Date of Service:07/25/2018      Primary Care Physician:       Nena King MD, [phone]      Attending Physician:       Nena King MD, [phone]  Problem List/Past Medical History    Ongoing     Anxiety     Asthma, moderate persistent     Cerebellar tonsillar ectopia     Chronic insomnia     Dysplastic nevus       Comments: removed from back     Low back pain     Marijuana use     Myalgia     Obese     Sebaceous cyst     Smoking      Tension headache     Tobacco user     Trochanteric bursitis, right hip    Historical     No qualifying data  Procedure/Surgical History     Injection of cortisone (06/08/2016)           Transanal hemorrhoidal dearterialization (THD) (09/08/2015)           Colonoscopy (08/04/2015)            Comments:      Sedation: MAC      Indication: rectal bleeding      External & internal hemorrhoids; otherwise normal      Resume routine screening at age 50.     Esophagogastroduodenoscopy (08/04/2015)            Comments:      Normal     Hospitalized at [facility] (2002)           Right knee arthroscopy           Surgery on eyes x3            Comments:      as infant  Medications     Shoes for low back pain: See Instructions, Use as directed, 2 EA, 0 Refill(s).     ProAir HFA 90 mcg/inh inhalation aerosol: 2 puff(s), inh, qid, PRN: as needed for wheezing, 1 EA, 2 Refill(s).     Qvar 80 mcg/inh inhalation aerosol: 1 puff(s), inh, bid, 1 EA, 5 Refill(s).     Symbicort 160 mcg-4.5 mcg/inh inhalation aerosol: 2 puff(s), inh, bid, to replace qvar  in the morning and the evening  use with spacer chamber  rinse mouth and throat after use, 3 EA, 3 Refill(s).     DULoxetine 60 mg oral delayed release capsule: 60 mg, 1 cap(s), po, daily, 60 cap(s), 1 Refill(s).     cyclobenzaprine 5 mg oral tablet: 5 mg, 1 tab(s), po, bid, PRN: for muscle spasm, 60 tab(s), 1 Refill(s).     lidocaine 5% topical film: 3 patch(es), Topical, daily, for 30 day(s), remove patches after 12 hours, 90 patch(es), 11 Refill(s).     meloxicam 15 mg oral tablet: 15 mg, 1 tab(s), PO, Daily, PRN: for pain, 90 tab(s), 3 Refill(s).     DULoxetine 60 mg oral delayed release capsule: 60 mg, 1 cap(s), po, bid, 60 cap(s), 3 Refill(s).     Keflex 500 mg oral capsule: 500 mg, 1 cap(s), PO, QID, for 10 day(s), 40 cap(s), 0 Refill(s).          Allergies    Bee Stings    Latex  Social History    Smoking Status - 07/25/2018     Current every day smoker     Alcohol       Current, 06/16/2015     Employment and Education      Work/School description: disabled., 02/21/2011     Home and Environment      Lives with Self, Significant other., 02/21/2011     Substance Abuse      Current, 06/16/2015     Tobacco      Current, 06/16/2015  Family History    Diabetes mellitus type 2: Mother.    Heart disease: Father.  Immunizations      Vaccine Date Status      tetanus/diphth/pertuss (Tdap) adult/adol 11/17/2016 Given      influenza virus vaccine, inactivated 11/17/2016 Given      influenza virus vaccine, inactivated 01/06/2015 Given  Lab Results       Lab Results (Last 4 results within 90 days)        Sodium Level: 140 mmol/L [135 mmol/L - 146 mmol/L] (04/27/18 14:54:00 CDT)       Potassium Level: 4.4 mmol/L [3.5 mmol/L - 5.3 mmol/L] (04/27/18 14:54:00 CDT)       Chloride Level: 104 mmol/L [98 mmol/L - 110 mmol/L] (04/27/18 14:54:00 CDT)       CO2 Level: 31 mmol/L [20 mmol/L - 31 mmol/L] (04/27/18 14:54:00 CDT)       Glucose Level: 83 mg/dL [65 mg/dL - 99 mg/dL] (04/27/18 14:54:00 CDT)       BUN: 15 mg/dL [7 mg/dL - 25 mg/dL] (04/27/18 14:54:00 CDT)       Creatinine Level: 0.98 mg/dL [0.6 mg/dL - 1.35 mg/dL] (04/27/18 14:54:00 CDT)       BUN/Creat Ratio: NOT APPLICABLE [6  - 22] (04/27/18 14:54:00 CDT)       eGFR: 97 mL/min/1.73m2 [8.6 mg/dL - 10.3 mg/dL] (04/27/18 14:54:00 CDT)       eGFR African American: 113 mL/min/1.73m2 [6  - 22] (04/27/18 14:54:00 CDT)       Calcium Level: 9.7 mg/dL [8.6 mg/dL - 10.3 mg/dL] (04/27/18 14:54:00 CDT)       Hgb A1c: 5 [65 mg/dL - 99 mg/dL] (07/02/18 16:24:00 CDT)       Cholesterol: 182 mg/dL [8.6 mg/dL - 10.3 mg/dL] (07/02/18 16:24:00 CDT)       Non-HDL Cholesterol: 134 High [20 mmol/L - 31 mmol/L] (07/02/18 16:24:00 CDT)       HDL: 48 mg/dL [65 mg/dL - 99 mg/dL] (07/02/18 16:24:00 CDT)       Cholesterol/HDL Ratio: 3.8 [0.6 mg/dL - 1.35 mg/dL] (07/02/18 16:24:00 CDT)       LDL: 108 High [0.6 mg/dL - 1.35 mg/dL] (07/02/18 16:24:00 CDT)       Triglyceride: 146  mg/dL [8.6 mg/dL - 10.3 mg/dL] (07/02/18 16:24:00 CDT)       TSH: 2.14 mIU/L [0.4 mIU/L - 4.5 mIU/L] (07/02/18 16:24:00 CDT)

## 2022-02-15 NOTE — NURSING NOTE
Comprehensive Intake Entered On:  11/11/2020 1:12 PM CST    Performed On:  11/11/2020 1:05 PM CST by Gonzalez RAMSAY, Fariba               Summary   Chief Complaint :   discuss ongoing back and shoulder issues.  has also been under more stress.   Menstrual Status :   N/A   Weight Measured :   312 lb(Converted to: 312 lb 0 oz, 141.521 kg)    Height Measured :   72 in(Converted to: 6 ft 0 in, 182.88 cm)    Body Mass Index :   42.31 kg/m2 (HI)    Body Surface Area :   2.68 m2   Height/Length Estimated :   72 in(Converted to: 6 ft 0 in, 182.88 cm)    Systolic Blood Pressure :   166 mmHg (HI)    Diastolic Blood Pressure :   88 mmHg (HI)    Mean Arterial Pressure :   114 mmHg   Peripheral Pulse Rate :   86 bpm   BP Site :   Right arm   Pulse Site :   Radial artery   BP Method :   Manual   HR Method :   Manual   Temperature Tympanic :   97.1 DegF(Converted to: 36.2 DegC)  (LOW)    Oxygen Saturation :   96 %   Fariba Roth MA - 11/11/2020 1:05 PM CST   Health Status   Allergies Verified? :   Yes   Medication History Verified? :   Yes   Medical History Verified? :   Yes   Pre-Visit Planning Status :   Completed   Tobacco Use? :   Current every day smoker   Fariba Roth MA - 11/11/2020 1:05 PM CST   Consents   Consent for Immunization Exchange :   Consent Granted   Consent for Immunizations to Providers :   Consent Granted   Fariba Roth MA - 11/11/2020 1:05 PM CST   Meds / Allergies   (As Of: 11/11/2020 1:12:57 PM CST)   Allergies (Active)   Bananas  Estimated Onset Date:   Unspecified ; Created By:   Rowena Hooker LPN; Reaction Status:   Active ; Category:   Drug ; Substance:   Bananas ; Type:   Allergy ; Updated By:   Rowena Hooker LPN; Reviewed Date:   10/13/2020 2:26 PM CDT      Bee Stings  Estimated Onset Date:   Unspecified ; Created By:   Ruthie Nam; Reaction Status:   Active ; Category:   Drug ; Substance:   Bee Stings ; Type:   Allergy ; Updated By:   Ruthie Nam; Reviewed Date:   10/13/2020 2:26 PM  CDT      Latex  Estimated Onset Date:   Unspecified ; Created By:   Ruthie Nam; Reaction Status:   Active ; Category:   Drug ; Substance:   Latex ; Type:   Allergy ; Updated By:   Ruthie Nam; Reviewed Date:   10/13/2020 2:26 PM CDT        Medication List   (As Of: 11/11/2020 1:12:57 PM CST)   Prescription/Discharge Order    albuterol  :   albuterol ; Status:   Prescribed ; Ordered As Mnemonic:   Ventolin HFA 90 mcg/inh inhalation aerosol ; Simple Display Line:   2 puff(s), NEB, qid, PRN: AS NEEDED FOR WHEEZING, 1 EA, 0 Refill(s) ; Ordering Provider:   Mtaa Hearn MD; Catalog Code:   albuterol ; Order Dt/Tm:   4/9/2020 2:03:28 PM CDT          diclofenac topical  :   diclofenac topical ; Status:   Prescribed ; Ordered As Mnemonic:   diclofenac 1% topical gel ; Simple Display Line:   2 gm, Topical, qid, 240 gm, 2 Refill(s) ; Ordering Provider:   Mata Hearn MD; Catalog Code:   diclofenac topical ; Order Dt/Tm:   8/5/2020 4:32:01 PM CDT          ketorolac ophthalmic  :   ketorolac ophthalmic ; Status:   Prescribed ; Ordered As Mnemonic:   Acular 0.5% ophthalmic solution ; Simple Display Line:   1 drop(s), right eye, QID, for 7 day(s), PRN: for itching, 10 mL, 0 Refill(s) ; Ordering Provider:   Cricket Jessica PA-C; Catalog Code:   ketorolac ophthalmic ; Order Dt/Tm:   10/9/2020 11:52:51 AM CDT          Miscellaneous Rx Supply  :   Miscellaneous Rx Supply ; Status:   Prescribed ; Ordered As Mnemonic:   Orthotics ; Simple Display Line:   See Instructions, to use as directed with shoes, 2 EA, 0 Refill(s) ; Ordering Provider:   Mata Hearn MD; Catalog Code:   Miscellaneous Rx Supply ; Order Dt/Tm:   2/13/2020 3:16:54 PM CST          oxyCODONE  :   oxyCODONE ; Status:   Prescribed ; Ordered As Mnemonic:   oxyCODONE 5 mg oral tablet ; Simple Display Line:   5 mg, 1 tab(s), Oral, q8 hrs, PRN: as needed for pain, 20 tab(s), 0 Refill(s) ; Ordering Provider:   Mata Hearn MD; Catalog Code:    oxyCODONE ; Order Dt/Tm:   10/20/2020 3:40:38 PM CDT            Home Meds    acetaminophen  :   acetaminophen ; Status:   Documented ; Ordered As Mnemonic:   acetaminophen 500 mg oral tablet ; Simple Display Line:   1,000 mg, 2 tab(s), Oral, q6 hrs, PRN: as needed for pain, 0 Refill(s) ; Catalog Code:   acetaminophen ; Order Dt/Tm:   11/6/2019 1:37:34 PM CST          diazePAM  :   diazePAM ; Status:   Documented ; Ordered As Mnemonic:   diazePAM 5 mg oral tablet ; Simple Display Line:   0 Refill(s) ; Catalog Code:   diazePAM ; Order Dt/Tm:   11/11/2020 1:11:00 PM CST            ID Risk Screen   Recent Travel History :   No recent travel   Family Member Travel History :   No recent travel   Other Exposure to Infectious Disease :   Unknown   Fariba Roth MA - 11/11/2020 1:05 PM CST   Social History   Social History   (As Of: 11/11/2020 1:12:57 PM CST)   Alcohol:        Past   Comments:  6/16/2015 3:17 PM - Randall Salcedo MD: 6 drinks pks per weekend   (Last Updated: 2/13/2020 3:10:22 PM CST by Fariba Roth MA)          Tobacco:        Current   Comments:  6/16/2015 3:17 PM - Randall Salcedo MD: 1 ppd   (Last Updated: 6/16/2015 3:17:14 PM CDT by Randall Salcedo MD)          Substance Abuse:        Current   Comments:  6/16/2015 3:18 PM - Randall Salcedo MD: Marijuana   (Last Updated: 6/16/2015 3:18:44 PM CDT by Randall Salcedo MD)          Employment/School:        Work/School description: disabled.   Comments:  2/21/2011 5:54 PM - Wong Bashir MD: has 1 child- 10 weeks old -   (Last Updated: 2/21/2011 5:54:14 PM CST by Wong Bashir MD)          Home/Environment:        Lives with Self, Significant other.   Comments:  2/21/2011 5:54 PM - Wong Bashir MD: 2 kids in household   (Last Updated: 2/21/2011 5:54:34 PM CST by Wong Bashir MD)

## 2022-02-15 NOTE — TELEPHONE ENCOUNTER
---------------------  From: Ruthie Crouch CMA (eRx Pool ([id number]_[facility]))   To: [facility] Message Pool ([id number]_[place] [facility] [place]);     Sent: 9/20/2021 1:50:39 PM CDT  Subject: FW: Medication Management   Due Date/Time: 9/20/2021 11:01:00 AM CDT       Last visit 8/4/21 anxiety (filled #20)        ** Patient matched by Ruthie Crouch CMA on 9/20/2021 1:49:19 PM CDT **      ------------------------------------------  From: [facility]  To: Mata Hearn MD  Sent: September 17, 2021 11:01:27 AM CDT  Subject: Medication Management  Due: September 17, 2021 11:19:24 PM CDT     ** On Hold Pending Signature **     Drug: oxyCODONE (oxyCODONE 5 mg oral tablet), TAKE ONE TABLET BY MOUTH EVERY 8 HOURS AS NEEDED FOR PAIN  Quantity: 20 tab(s)  Days Supply: 7  Refills: 0  Substitutions Allowed  Notes from Pharmacy:     Dispensed Drug: oxyCODONE (oxyCODONE 5 mg oral tablet), TAKE ONE TABLET BY MOUTH EVERY 8 HOURS AS NEEDED FOR PAIN  Quantity: 20 tab(s)  Days Supply: 7  Refills: 0  Substitutions Allowed  Notes from Pharmacy:  ---------------------------------------------------------------  From: Abbi Chong LPN ([facility] Message Pool ([id number]_[facility]))   To: Mata Hearn MD;     Sent: 9/20/2021 2:25:20 PM CDT  Subject: FW: Medication Management   Due Date/Time: 9/20/2021 11:01:00 AM CDT  ** Submitted: **  Order:oxyCODONE (oxyCODONE 5 mg oral tablet)  1 tab(s)  Oral  q8 hrs  Qty:  20 tab(s)        Refills:  0          Substitutions Allowed     PRN  as needed for pain      Route To Pharmacy - [facility] - [place]    Signed by Mata Hearn MD  9/21/2021 3:05:00 PM [facility]

## 2022-02-15 NOTE — PROGRESS NOTES
Chief Complaint    Back pain continues, needs refills of pain medication.  History of Present Illness      Here for follow up on his back pain.  Pain has been a bit better and he has noticed improvement in pain issues when anxiety is better controlled.  Neck and shoulders are stiff and painful.  Chronic headaches persist but are more intermittent.  He has been trying to reduce his use of opiates.   Review of Systems          ROS reviewed and negative except for symptoms noted in HPI.  Physical Exam   Vitals & Measurements    T: 97.4  F (Tympanic)  HR: 86 (Peripheral)  BP: 142/78     HT: 72 in  HT: 72 in  WT: 307 lb  BMI: 41.63             General:  Alert and oriented, No acute distress.             Eye: Normal conjunctiva.             HENT:  Oral mucosa is moist.             Neck:  Supple.             Respiratory:  Respirations are non-labored.             Cardiovascular:  Normal rate           Musculoskeletal:  Normal gait.  Multiple tender trigger points           Psychiatric:  Cooperative, Appropriate mood & affect, Normal judgment.  Assessment/Plan       1. Anxiety (F41.1)         stable        This appears to be the major problem.  He declines medication for better management       2. Degenerative lumbar disc (M51.36)         stable        advised increased activity        diclofenac for pain        intermittent opiate therapy       3. 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 Tobacco user (Z72.0)         advised cessation, patient not ready at this time       Orders:         oxyCODONE, = 1 tab(s) ( 5 mg ), Oral, q8 hrs, PRN: as needed for pain, # 10 tab(s), 0 Refill(s), Type: Hard Stop, Pharmacy: [facility] - [place], [address], in, 08/05/20 13:48:00 CDT, Height Measured, 306.2, lb, 08/05/20 13:48:00 CDT, Weight Measured, (Completed)         oxyCODONE, = 1 tab(s) ( 5 mg ), Oral, q8 hrs, PRN: as needed for pain, # 20 tab(s), 0 Refill(s), Type: Maintenance, Pharmacy: [facility] - [place], 1 tab(s) Oral q8 hrs,PRN:as needed for pain, 72, in, 09/10/20 12:29:00 CDT, Height Measured, 307, l..., (Ordered)  Patient Information     Name:JOEL SUTTON      Address:      [address]       [place], [place] [id number]     Sex:Male     YOB: 1980     [phone]     Emergency Contact:[facility], CONTACT     MRN:[id number]     FIN:[id number]     Location:[facility]     Date of Service:09/10/2020      Primary Care Physician:       Mata Hearn MD, [phone]      Attending Physician:       Mata Hearn MD, [phone]  Problem List/Past Medical History    Ongoing     Anxiety     Asthma, moderate persistent     Cerebellar tonsillar ectopia     Chronic back pain     Chronic insomnia     Degenerative lumbar disc     Dysplastic nevus       Comments: removed from back     Low back pain     Marijuana use     Myalgia     Obese     Sebaceous cyst     Smoking     Tension headache     Tobacco user     Trochanteric bursitis, right hip    Historical     No qualifying data  Procedure/Surgical History     Injection of cortisone (06/08/2016)     Transanal hemorrhoidal dearterialization (THD) (09/08/2015)     Colonoscopy (08/04/2015)      Comments: Sedation: MAC      Indication: rectal bleeding      External & internal hemorrhoids; otherwise normal      Resume routine screening at age 50..     Esophagogastroduodenoscopy (08/04/2015)      Comments: Normal.     Hospitalized at [facility] (2002)     Right knee arthroscopy     Surgery on eyes x3      Comments: as infant.  Medications    acetaminophen 500 mg oral tablet, 1000 mg= 2 tab(s), Oral, q6 hrs, PRN    diclofenac 1% topical gel, 2 gm, Topical, qid, 2 refills    Orthotics, See Instructions    oxyCODONE 5 mg oral tablet, 5 mg= 1 tab(s), Oral, q8 hrs, PRN    Ventolin HFA 90 mcg/inh inhalation aerosol, 2 puff(s), NEB, qid, PRN  Allergies    Bananas    Bee Stings     Latex  Social History    Smoking Status     Current every day smoker     Alcohol      Past     Employment/School      Work/School description: disabled.     Home/Environment      Lives with Self, Significant other.     Substance Abuse      Current     Tobacco      Current  Family History    Diabetes mellitus type 2: Mother.    Heart disease: Father.  Immunizations      Vaccine Date Status          tetanus/diphth/pertuss (Tdap) adult/adol 11/17/2016 Given          influenza virus vaccine, inactivated 11/17/2016 Given          influenza virus vaccine, inactivated 01/06/2015 Given

## 2022-02-15 NOTE — TELEPHONE ENCOUNTER
---------------------  From: Khadra Umanzor CMA (Phone Messages Pool ([id number]_[facility]))   Sent: 8/5/2020 4:46:28 PM CDT  Subject: topical med/oxycodone     Phone message    PCP: asked for IRWIN     Person calling: Román  Phone number: [phone]  Time message left: 1605  Return call time: 1640    Reason: román called and left a message stating that he was in to see IRWIN today  and he was suppose to send in a topical pain medication along with oxycodone, he stated that he received the 2 new medications  that IRWIN had sent in but the pharmacy did not have the topical or the oxycodone.    Called and spoke with Román apologized that IRWIN just sent medications at 1628  and to call the pharmacy prior to going to get them to make sure that they are ready,   he verbalized a good understanding and will call prior as he uses the taxi for transportation.     Transferred to: Hieu Umanzor CMA

## 2022-02-15 NOTE — PROGRESS NOTES
Patient:   JOEL SUTTON            MRN: [id number]            FIN: [id number]               Age:   38 years     Sex:  Male     :  1980   Associated Diagnoses:   Asthma, moderate persistent; Dysplastic nevus; Skin cancer screening; Second degree burn of arm   Author:   Parris Lugo      Visit Information      Date of Service: 2018 02:00 pm  Performing Location: [facility]  Encounter#: [id number]      Primary Care Provider (PCP):  Fernando ORTEGA, Nena KOVACSI# [phone]   Source of history:  Self.       Chief Complaint       2018 2:07 PM CDT Skin exam/dermoscopy per P. States that he is not due for trigger point injection until friday but is looking for possible treatment today-- not able to see two providers in one day.   2018 2:29 PM CDT Patient is here for trigger point injections and is requestiong toradol shot.      Here for a skin examination for cancer/suspicious moles      History of Present Illness   He is here for a skin exam, referred by Dr King for hx of dysplastic nevus with moderate atypia on his back 2018  He denies a personal or family history of skin cancer  he rarely spends time in the sun but when he does he uses oil initially to try to get a tan to help with his skin (I did discourage this and spend time counseling regarding sun risk and melanoma features). Should use sun screen.  he has no new lesions of concern  A FOTOFINDER exam was scheduled today but there was some concern regarding adequate insurance coverage for this and so it was decided to stick with a 20 min full body exam with dermascope which he is agreeable to.    During his exam today  1.  he asked if I did trigger point injections, wondered if that could be done again today, I told him he would need to schedule with Dr King  2.  Wondered if he should have a prostate exam, or at least get that scheduled  3. Asked if I would refill his asthma meds. He needs them when he gets  anxious which has been lately. He uses the Q norm bid when he has it, albuterol justwhen needed. ACT test shows asthma is not in good control, states he used Qvar today. WIll refill and should repeat test in 3 months. See AAP  4.  Asked if I would check and see who prescribed his Valium last, he needs more, could I fill that for him too. I told him I could not, he should schedule with his primary (he decided to schedule with DR Hearn)      Health Status   Allergies:    Allergic Reactions (Selected)  Severity Not Documented  Bee Stings (No reactions were documented)  Latex (No reactions were documented)   Medications:  (Selected)   Prescriptions  Prescribed  DULoxetine 20 mg oral delayed release capsule: 1 cap(s) ( 20 mg ), po, daily, # 30 cap(s), 1 Refill(s), Type: Maintenance, Pharmacy: [facility], 1 cap(s) po daily  ProAir HFA 90 mcg/inh inhalation aerosol: 2 puff(s), inh, qid, PRN: as needed for wheezing, # 1 EA, 2 Refill(s), Type: Maintenance, Pharmacy: [facility], 2 puff(s) inh qid,PRN:as needed for wheezing  Qvar 80 mcg/inh inhalation aerosol: 1 puff(s), inh, bid, # 1 EA, 5 Refill(s), Type: Maintenance, Pharmacy: [facility], 1 puff(s) inh bid  Shoes for low back pain: Shoes for low back pain, See Instructions, Instructions: Use as directed, Supply, # 2 EA, 0 Refill(s), Type: Maintenance, Use as directed  Silvadene 1% topical cream: 1 mal, TOP, BID, # 20 g, 0 Refill(s), Type: Maintenance, Pharmacy: [facility], 1 mal top bid  Documented Medications  Documented  Advil 200 mg oral tablet: 2 tab(s) ( 400 mg ), po, q8 hrs, PRN: as needed for pain, 0 Refill(s), Type: Maintenance,    Medications          *denotes recorded medication          DULoxetine 20 mg oral delayed release capsule: 20 mg, 1 cap(s), po, daily, 30 cap(s), 1 Refill(s).          Shoes for low back pain: See Instructions, Use as directed, 2 EA, 0  Refill(s).          ProAir HFA 90 mcg/inh inhalation aerosol: 2 puff(s), inh, qid, PRN: as needed for wheezing, 1 EA, 2 Refill(s).          Qvar 80 mcg/inh inhalation aerosol: 1 puff(s), inh, bid, 1 EA, 5 Refill(s).          *Advil 200 mg oral tablet: 400 mg, 2 tab(s), po, q8 hrs, PRN: as needed for pain, 0 Refill(s).          Silvadene 1% topical cream: 1 mal, TOP, BID, 20 g, 0 Refill(s).     Problem list:    All Problems  Cerebellar tonsillar ectopia / SNOMED CT 07933788 / Confirmed  Anxiety / SNOMED CT 66430889 / Confirmed  Obese / ICD-9-.00 / Probable  Tobacco user / SNOMED CT 015387083 / Probable  Smoking / SNOMED CT 637958110 / Confirmed  Low back pain / SNOMED CT 808495039 / Confirmed  Chronic insomnia / SNOMED CT 539488196 / Confirmed  Myalgia / SNOMED CT 301689519 / Confirmed  Trochanteric bursitis, right hip / SNOMED CT 11797583 / Confirmed  Marijuana use / SNOMED CT 7728238590 / Confirmed  Dysplastic nevus / SNOMED CT 1609417793 / Confirmed  removed from back      Histories   Past Medical History:    Active  Cerebellar tonsillar ectopia (22021828)  Anxiety (59726021)   Family History:    Heart disease  Father (Conor)  Diabetes mellitus type 2  Mother (Obdulia)     Procedure history:    Injection of cortisone (SNOMED CT 3522944592) performed by Joann on 6/8/2016 at 36 Years.  Transanal hemorrhoidal dearterialization (THD) performed by Vik Chavez MD on 9/8/2015 at 35 Years.  Esophagogastroduodenoscopy (SNOMED CT 773242487) on 8/4/2015 at 35 Years.  Comments:  8/5/2015 12:30 PM - Mata Merino MD  Normal  Colonoscopy (SNOMED CT 705303759) performed by Mata Merino MD on 8/4/2015 at 35 Years.  Comments:  8/13/2015 3:29 PM - Genia Mac RN  Sedation: MAC  Indication: rectal bleeding  External & internal hemorrhoids; otherwise normal  Resume routine screening at age 50.  Hospitalized at [facility] unit in 2002 at 22 Years.  Right knee arthroscopy.  Surgery on eyes  x3.  Comments:  12/15/2010 4:16 PM - Jhoana RN, Genia  as infant   Social History:        Alcohol Assessment: Denies Alcohol Use            Current                     Comments:                      06/16/2015 - Randall Salcedo MD                     6 drinks pks per weekend      Tobacco Assessment: Current            Current                     Comments:                      06/16/2015 - Randall Salcedo MD                     1 ppd      Substance Abuse Assessment            Current                     Comments:                      06/16/2015 - Randall Salcedo MD                     Marijuana      Employment and Education Assessment            Work/School description: disabled.                     Comments:                      02/21/2011 - Wong Bashir MD                     has 1 child- 10 weeks old -      Home and Environment Assessment            Lives with Self, Significant other.                     Comments:                      02/21/2011 - Wong Bashir MD                     2 kids in household        Physical Examination   Vital Signs   5/2/2018 2:07 PM CDT Temperature Tympanic 97.1 DegF  LOW    Peripheral Pulse Rate 84 bpm    Pulse Site Radial artery    HR Method Manual    Systolic Blood Pressure 128 mmHg    Diastolic Blood Pressure 84 mmHg  HI    Mean Arterial Pressure 99 mmHg    BP Site Right arm    BP Method Manual   5/1/2018 2:29 PM CDT Temperature Tympanic 98.0 DegF    Peripheral Pulse Rate 100 bpm    Pulse Site Brachial artery    HR Method Manual    Systolic Blood Pressure 132 mmHg  HI    Diastolic Blood Pressure 78 mmHg    Mean Arterial Pressure 96 mmHg    BP Site Right arm    BP Method Manual      Measurements from flowsheet : Measurements   5/2/2018 2:07 PM CDT Height Measured - Standard 72.8 in    Weight Measured - Standard 272.6 lb    BSA 2.52 m2    Body Mass Index 36.16 kg/m2  HI   5/1/2018 2:29 PM CDT Weight Measured - Standard 274.8 lb      General:  Alert and oriented, No acute  distress, Skin.         Appearance: Within normal limits.    Integumentary:  Warm, Dry, Intact.         Skin phototype: Skin phototype- I. Always burns but never tans. Pale skin, red hair, freckles.         Integumentary exam: General appearance of patient is well developed, alert and attentive, well nourished  Pt is interested in having full skin exam     Skin is examined and specific lesions evaluated with dermoscope.     Conjunctiva and eye lids--no lesions of concern  scalp and face--no lesions of concern  Neck--no lesions of concern  Chest--no lesions of concern  Abdomen--no lesions of concern  Genitalia-not examined  back--no lesions of concern, he does have multiple flat nevi and some raised nevi and skin tags, none that are worrisome  right upper extremity--no lesions of concern  left upper extremity--no lesions of concern  right lower extremity--no lesions of concern  left lower extremity--no lesions of concern   hands--no lesions of concern  (he did choose to have leave his boots on today--feet not examined)    no cervical lymph nodes palpable  Heart RRR  Lungs clear bilat with good air movement  SKin warm pink and dry  he does have a pink and inflamed 4 cm long by 5 mm wide 2nd degree burn right inner arm, he states it is about 5 days old, he washed it once an dput on lotion, it look just mildly infected.       Impression and Plan   Diagnosis     Asthma, moderate persistent (FRI56-EW J45.40).     Second degree burn of arm (KXN84-MD T22.20XA).     Dysplastic nevus (DJP57-ZP D23.9).     Skin cancer screening (JGR09-IT Z12.83).     Course:  Unchanged.    Plan:  discussed sun protection, ABCDE of moles, follow up as needed  counseled on asthma care/meds and burn on right arm  see primary for benzo refill/anxiety check , See ACT and AAP.    Patient Instructions:       Counseled: Patient, Verbalized understanding.    Orders     Orders (Selected)   Outpatient Orders  Ordered  Return to Clinic (Request): RFV: 20  min skin exam with NCB, Return in 1 year  Return to Clinic (Request): RFV: recheck asthma, Return in 3 months  Prescriptions  Prescribed  ProAir HFA 90 mcg/inh inhalation aerosol: 2 puff(s), inh, qid, PRN: as needed for wheezing, # 1 EA, 2 Refill(s), Type: Maintenance, Pharmacy: [facility], 2 puff(s) inh qid,PRN:as needed for wheezing  Qvar 80 mcg/inh inhalation aerosol: 1 puff(s), inh, bid, # 1 EA, 5 Refill(s), Type: Maintenance, Pharmacy: [facility], 1 puff(s) inh bid  Silvadene 1% topical cream: 1 mal, TOP, BID, # 20 g, 0 Refill(s), Type: Maintenance, Pharmacy: [facility], 1 mal top bid.

## 2022-02-15 NOTE — PROGRESS NOTES
Chief Complaint    Patient here for follow up. Patient states he needs prescription refill on duloxetine.  History of Present Illness          HPI pt has history of chronic myofascial pain and has found trigger point injections to be a useful tool to help control pain.  Would like to have repeat trigger point injections today.        he also reports his mood is more poorly controlled because his kids will be coming to visit this weekend and he is nervous about this.  wouldlike to increase the duloxetine.      he has sutures in place from a right lower chin sebaceous cyst excsion jose this week                     Review of systems is negative except as per HPI      Exam:      General: alert and oriented ×3 no acute distress.      HEENT: pupils are equal round and reactive to light extraocular motion is intact. Normocephalic and atraumatic.       Hearing is grossly normal and there is no otorrhea.       Nares are patent there is no rhinorrhea.       Mucous membranes are moist and pink.      Chest: has bilateral rise with no increased work of breathing.      Cardiovascular: normal perfusion and brisk capillary refill.      Musculoskeletal: no gross focal abnormalities and normal gait.      Neuro: no gross focal abnormalities and memory seems intact.      Psychiatric: speech is clear and coherent and fluent. Patient dressed appropriately for the weather. Mood is anxious and affect is blunted.      skin right chin incision has sutures in place, does not quite seem ready to come out , recommend rtc 2-3 days for suture eremoval           Procedure note           Informed consent obtained including risks of pain, bleeding, infection, injury to surrounding tissue, and need for further treatment. Benefit of a trigger point injection goal is to reduce pain. This is just part of a treatment plan and for best results patient should also complete physical therapy. I cannot guarantee that will will be any pain relief.            Alternatives would include doing nothing, physical therapy, acupuncture, using heat or ice, continuing with oral medications such as muscle relaxants or nonsteroidal anti-inflammatory medications or topical medications such as a lidocaine patch or cream or menthol for diclofenac. She would like to try the trigger point injections.           Prep: Alcohol          Location identified by my palpation and communication with patient.  Symptomatic trigger points that patient desired treatment at were located at: bilateral thracic paraspinal uscles, left x 1 and right x 2                      Each of these was injected with  a one-to-one solution of 1% lidocaine without epinephrine and 0.5% Marcaine without epinephrine.          Total number of injections:3          Total number of milliliters of the 1:1 solution of lidocaine and marcaine:8           Encouraged patient to treat the area with Avery cup ice massage tonight and to try to stretch the area out.           pain prior to treatment: severe          pain following treatment at the trigger point injection sites:improved at 2 upper sites          Complications: none          Tolerated procedure well.          Asessment and Plan: myalgia and myofascial pain, s/p trigger point injections today.  Encouraged home exercise program and to keep annual exam appt as indicated and RTC if symptoms worsen or do not improve.   Physical Exam   Vitals & Measurements    T: 97.4   F (Tympanic)  HR: 88(Peripheral)  BP: 126/66     HT: 72.8 in  WT: 271.0 lb  BMI: 35.95   Assessment/Plan       Low back pain (M54.5)          pt brought in his packet from [place], did not want to stay today to complete it so will have CC contact pt next week to set up a time to help pt with his paperwork.         Orders:          ketorolac, 60 mg, im, once, (Completed)          35848 therapeutic prophylactic/dx injection subq/im (Charge), Quantity: 1, Low back pain           ketorolac tromethamine inj,  15 mg (Charge), Quantity: 4, Low back pain                Orders:         DULoxetine, 1 cap(s) ( 60 mg ), po, daily, # 60 cap(s), 1 Refill(s), Type: Hard Stop, Pharmacy: [facility], (Completed)         DULoxetine, 1 cap(s) ( 60 mg ), po, daily, # 60 cap(s), 1 Refill(s), Type: Maintenance, Pharmacy: [facility], 1 cap(s) po daily, (Ordered)         DULoxetine, 1 cap(s) ( 40 mg ), po, daily, # 30 cap(s), 1 Refill(s), Type: Hard Stop, Pharmacy: [facility], (Completed)  Patient Information     Name:JOEL SUTTON      Address:      [address] [place] [id number]     Sex:Male     YOB: 1980     [phone]     Emergency Contact:LEESA SUTTON     MRN:[id number]     FIN:[id number]     Location:[facility]     Date of Service:05/25/2018      Primary Care Physician:       Nena King MD, [phone]      Attending Physician:       Nena King MD, [phone]  Problem List/Past Medical History    Ongoing     Anxiety     Asthma, moderate persistent     Cerebellar tonsillar ectopia     Chronic insomnia     Dysplastic nevus       Comments: removed from back     Low back pain     Marijuana use     Myalgia     Obese     Sebaceous cyst     Smoking     Tobacco user     Trochanteric bursitis, right hip    Historical     No qualifying data  Procedure/Surgical History     Injection of cortisone (06/08/2016)           Transanal hemorrhoidal dearterialization (THD) (09/08/2015)           Colonoscopy (08/04/2015)             Comments: Sedation: MAC<br/>Indication: rectal bleeding<br/>External &amp; internal hemorrhoids; otherwise normal<br/>Resume routine screening at age 50.     Esophagogastroduodenoscopy (08/04/2015)             Comments: Normal     Hospitalized at [facility] (2002)           Right knee arthroscopy           Surgery on eyes x3             Comments: as  infant  Medications     Advil 200 mg oral tablet: 400 mg, 2 tab(s), po, q8 hrs, PRN: as needed for pain, 0 Refill(s).     Shoes for low back pain: See Instructions, Use as directed, 2 EA, 0 Refill(s).     ProAir HFA 90 mcg/inh inhalation aerosol: 2 puff(s), inh, qid, PRN: as needed for wheezing, 1 EA, 2 Refill(s).     Qvar 80 mcg/inh inhalation aerosol: 1 puff(s), inh, bid, 1 EA, 5 Refill(s).     Symbicort 160 mcg-4.5 mcg/inh inhalation aerosol: 2 puff(s), inh, bid, to replace qvar  in the morning and the evening  use with spacer chamber  rinse mouth and throat after use, 3 EA, 3 Refill(s).     DULoxetine 60 mg oral delayed release capsule: 60 mg, 1 cap(s), po, daily, 60 cap(s), 1 Refill(s).          Allergies    Bee Stings    Latex  Social History    Smoking Status - 05/25/2018     Current every day smoker     Alcohol      Current, 06/16/2015     Employment and Education      Work/School description: disabled., 02/21/2011     Home and Environment      Lives with Self, Significant other., 02/21/2011     Substance Abuse      Current, 06/16/2015     Tobacco      Current, 06/16/2015  Family History    Diabetes mellitus type 2: Mother.    Heart disease: Father.  Immunizations      Vaccine Date Status      tetanus/diphth/pertuss (Tdap) adult/adol 11/17/2016 Given      influenza virus vaccine, inactivated 11/17/2016 Given      influenza virus vaccine, inactivated 01/06/2015 Given  Lab Results       Lab Results (Last 4 results within 90 days)        Sodium Level: 140 mmol/L [135 mmol/L - 146 mmol/L] (04/27/18 14:54:00 CDT)       Potassium Level: 4.4 mmol/L [3.5 mmol/L - 5.3 mmol/L] (04/27/18 14:54:00 CDT)       Chloride Level: 104 mmol/L [98 mmol/L - 110 mmol/L] (04/27/18 14:54:00 CDT)       CO2 Level: 31 mmol/L [20 mmol/L - 31 mmol/L] (04/27/18 14:54:00 CDT)       Glucose Level: 83 mg/dL [65 mg/dL - 99 mg/dL] (04/27/18 14:54:00 CDT)       BUN: 15 mg/dL [7 mg/dL - 25 mg/dL] (04/27/18 14:54:00 CDT)       Creatinine Level:  0.98 mg/dL [0.6 mg/dL - 1.35 mg/dL] (04/27/18 14:54:00 CDT)       BUN/Creat Ratio: NOT APPLICABLE [6  - 22] (04/27/18 14:54:00 CDT)       eGFR: 97 mL/min/1.73m2 [8.6 mg/dL - 10.3 mg/dL] (04/27/18 14:54:00 CDT)       eGFR African American: 113 mL/min/1.73m2 [6  - 22] (04/27/18 14:54:00 CDT)       Calcium Level: 9.7 mg/dL [8.6 mg/dL - 10.3 mg/dL] (04/27/18 14:54:00 CDT)

## 2022-02-15 NOTE — PROGRESS NOTES
Chief Complaint    Pt c/o low back pain. Pain has been worse. Was given prednisone in the ER.  History of Present Illness      Patient here for follow-up on his back pain from the ER.  He is feeling a lot better.  He attributes this to do some traction using a yoga swing.  Seems to have made a world of difference for him.  He would like a refill of his Valium.  Prescription database was reviewed.  He also would like referral for dietitian.  Review of Systems      Denies significant neurologic symptoms.  Or radicular symptoms.  Physical Exam   Vitals & Measurements    T: 96.2(Tympanic)  HR: 78(Peripheral)  BP: 126/86  SpO2: 96%     HT: 72.5 in  WT: 287 lb  BMI: 38.38       Normal gait.  Assessment/Plan       Low back pain         No has a history of a chronic low back problems.  MRI done in February indicated some central and foraminal spinal stenosis.  However, he does not at this point displaying a huge level of neurologic symptoms.  He seems to deny claudication symptoms.  He seemed to get a lot of relief with doing some exercises and traction.  Encouraged and then in this I encouraged him to work on his posture.  I did refill his diazepam which he uses sparingly.         Ordered:          68360 office outpatient visit 15 minutes (Charge), Quantity: 1, Low back pain  Obesity                Obesity         Ordered:          12577 office outpatient visit 15 minutes (Charge), Quantity: 1, Low back pain  Obesity          Referral (Request), 09/18/17 16:01:00 CDT, Referred to: Dietary & Nutritional, Obesity                Orders:         diazePAM, See Instructions, Instructions: TAKE ONE TABLET BY MOUTH THREE TIMES A DAY AS NEEDED FOR ANXIETY, # 10 tab(s), 0 Refill(s), Type: Soft Stop         diazePAM, See Instructions, Instructions: TAKE ONE TABLET BY MOUTH THREE TIMES A DAY AS NEEDED FOR ANXIETY, # 10 unknown unit, Type: Hard Stop, Pharmacy: [facility] [place]  Problem List/Past Medical History     Ongoing     Anxiety     Cerebellar tonsillar ectopia     Chronic insomnia     Low back pain     Obese     Smoking     Tobacco user    Historical  Procedure/Surgical History     Injection of cortisone (06/08/2016)     Transanal hemorrhoidal dearterialization (THD) (09/08/2015)     Colonoscopy (08/04/2015)     Esophagogastroduodenoscopy (08/04/2015)     Hospitalized at [facility] (2002)     Right knee arthroscopy     Surgery on eyes x3  Medications    Advil 200 mg oral tablet, 400 mg= 2 tab(s), po, q8 hrs, PRN    cyclobenzaprine 10 mg oral tablet, 10 mg= 1 tab(s), po, tid, PRN    diazePAM 5 mg oral tablet, See Instructions    hydrocortisone 2.5% topical cream, 1 mal, top, tid, 1 refills    predniSONE, po, daily    ProAir HFA 90 mcg/inh inhalation aerosol, 2 puff(s), inh, qid, 3 refills    Qvar 80 mcg/inh inhalation aerosol, 1 puff(s), inh, bid, 3 refills  Allergies    Bee Stings    Latex  Social History    Smoking Status - 09/18/2017     Current every day smoker     Alcohol - Denies Alcohol Use, 05/24/2016      Current      Current     Employment and Education - 05/24/2016      Work/School description: disabled.     Home and Environment - 05/24/2016      Lives with Self, Significant other.     Substance Abuse - 05/24/2016      Current, Marijuana      Current     Tobacco - Current, 05/24/2016      Current  Family History    Diabetes mellitus type 2: Mother.    Heart disease: Father.  Immunizations      Vaccine Date Status      tetanus/diphth/pertuss (Tdap) adult/adol 11/17/2016 Given      influenza virus vaccine, inactivated 11/17/2016 Given      influenza virus vaccine, inactivated 01/06/2015 Given  Lab Results      Results (Last 90 days)      No results located.

## 2022-02-15 NOTE — TELEPHONE ENCOUNTER
"---------------------  From: Jade Barajas CMA   To: [facility] Message Pool ([id number]);     Sent: 4/27/2020 4:20:38 PM CDT  Subject: Back pain/MRI     PCP:   ALLYSSA      Time of Call:  1607       Person Calling:  Patient  Phone number:  [phone]    Returned call at: 1610    Note:   Patient called stating his back and neck are very painful following surgery in November. He is requesting an order for MRI. Notified patient a message would be sent and addressed tomorrow when [facility] returns.    Last office visit and reason:  4/9/20 Anxiety Telemed w/ [facility]---------------------  From: Laura Whiting CMA ([facility] Message Pool ([id number]))   To: Cricket Jessica PA-C;     Sent: 4/28/2020 8:11:32 AM CDT  Subject: FW: Back pain/MRI---------------------  From: Cricket Jessica PA-C   To: [employer] Message Pool ([id number]);     Sent: 4/28/2020 8:17:31 AM CDT  Subject: RE: Back pain/MRI     he should contact his spine surgeon at [facility] first if he is having   a return of back pain after his surgeryI called pt and gave him [facility] message.  Pt agreeable and will \"give them a call\"  [phone]  laura Whiting CMA  "

## 2022-02-15 NOTE — PROGRESS NOTES
"Chief Complaint    Follow up back pain  History of Present Illness          HPI pt has history of chronic myofascial pain and has found trigger point injections to be a useful tool to help control pain.  Would like to have repeat trigger point injections today.             He has had a rough morning.  He got a phone call from [facility] that his doctor was not in so he would have to reschedule his much anticipated appointment with the pain clinic.  They did offer to reschedule for the following day.  Unfortunately he paid somebody $100 to take him to the appointment on Tuesday and he is not sure if he will get that money back and if the medial be available to take him to the appointment.  He has not bothered to make an appointment with neurosurgery because he was anticipating that the pain clinic would be able to help him.  He does not have very good coping skills and said to the person calling him from [facility] \"why don t I just kill myself\" and then hung.m up.  Shortly thereafter police were knocking at his door to do a wellness check.  Today he would like to concentrate some trigger point injections on his upper back and neck.       Controlling his pain and mood, he feels, is best done with adequate amounts of marijuana.            Review of systems is negative except as per HPI        Exam:        General: alert and oriented ×3 no acute distress.        HEENT: pupils are equal round and reactive to light extraocular motion is intact. Normocephalic and atraumatic.         Hearing is grossly normal and there is no otorrhea.         Nares are patent there is no rhinorrhea.         Mucous membranes are moist and pink.        Chest: has bilateral rise with no increased work of breathing.        Cardiovascular: normal perfusion and brisk capillary refill.        Musculoskeletal: no gross focal abnormalities and normal gait.  Patient initially in his.  Cervical spine and trapezii.        Neuro: no gross focal abnormalities " and memory seems intact.        Psychiatric: speech is clear and coherent and fluent. Patient dressed appropriately for the weather. Mood is appropriate and affect is full.             Procedure note             Informed consent obtained including risks of pain, bleeding, infection, injury to surrounding tissue, and need for further treatment. Benefit of a trigger point injection goal is to reduce pain. This is just part of a treatment plan and for best results patient should also complete physical therapy. I cannot guarantee that will will be any pain relief.             Alternatives would include doing nothing, physical therapy, acupuncture, using heat or ice, continuing with oral medications such as muscle relaxants or nonsteroidal anti-inflammatory medications or topical medications such as a lidocaine patch or cream or menthol for diclofenac. He would like to try the trigger point injections.             Prep: Alcohol            Location identified by my palpation and communication with patient.  Symptomatic trigger points that patient desired treatment at were located at:            Bilateral paracervical spine and bilateral trapezii 1 injectioN at each muscle group for a total of 4 injections             Each of these was injected with  a one-to-one solution of 1% lidocaine without epinephrine and 0.5% Marcaine without epinephrine.            Total number of injections: 4            Total number of milliliters of the 1:1 solution of lidocaine and marcaine: 8             Encouraged patient to treat the area with [place] cup ice massage tonight and to try to stretch the area out.             pain prior to treatment: severe            pain following treatment at the trigger point injection sites:resolved            Complications: none            Tolerated procedure well.            Asessment and Plan: myalgia and myofascial pain, s/p trigger point injections today.  Encouraged home exercise program and to keep  annual exam appt as indicated and RTC if symptoms worsen or do not improve.   Physical Exam   Vitals & Measurements    T: 98.6   F (Tympanic)  HR: 86(Peripheral)  BP: 140/80  SpO2: 97%     WT: 280.4 lb   Assessment/Plan       Back pain (M54.9)        Chronic pain, some spinal stenosis and degenerative disc disease and poor coping skills and marijuana dependence.  Also encouraged patient to see spinal surgeon and to discuss risks benefits and alternatives of [place] with Dr. Merino.  Unsure if his marijuana use is a contraindication for pursuing this.  Have encouraged him to seek counseling in the past and he consistently declines.         Orders:          ketorolac, 60 mg, im, once, (Completed)          06063 therapeutic prophylactic/dx injection subq/im (Charge), Quantity: 1, Back pain           ketorolac tromethamine inj, 15 mg (Charge), Quantity: 4, Back pain           Patient Information     Name:JOEL SUTTON      Address:      [address] [place] [id number]     Sex:Male     YOB: 1980     [phone]     Emergency Contact:LEESA SUTTON     MRN:[id number]     FIN:[id number]     Location:[facility]     Date of Service:09/14/2018      Primary Care Physician:       Nena King MD, [phone]      Attending Physician:       Nena King MD, [phone]  Problem List/Past Medical History    Ongoing     Anxiety     Asthma, moderate persistent     Cerebellar tonsillar ectopia     Chronic back pain     Chronic insomnia     Dysplastic nevus       Comments: removed from back     Low back pain     Marijuana use     Myalgia     Obese     Sebaceous cyst     Smoking     Tension headache     Tobacco user     Trochanteric bursitis, right hip    Historical     No qualifying data  Procedure/Surgical History     Injection of cortisone (06/08/2016)           Transanal hemorrhoidal dearterialization (THD) (09/08/2015)           Colonoscopy  (08/04/2015)            Comments:      Sedation: MAC      Indication: rectal bleeding      External & internal hemorrhoids; otherwise normal      Resume routine screening at age 50.     Esophagogastroduodenoscopy (08/04/2015)            Comments:      Normal     Hospitalized at [facility] (2002)           Right knee arthroscopy           Surgery on eyes x3            Comments:      as infant  Medications     Shoes for low back pain: See Instructions, Use as directed, 2 EA, 0 Refill(s).     Qvar 80 mcg/inh inhalation aerosol: 1 puff(s), inh, bid, 1 EA, 5 Refill(s).     Symbicort 160 mcg-4.5 mcg/inh inhalation aerosol: 2 puff(s), inh, bid, to replace qvar  in the morning and the evening  use with spacer chamber  rinse mouth and throat after use, 3 EA, 3 Refill(s).     DULoxetine 60 mg oral delayed release capsule: 60 mg, 1 cap(s), po, daily, 60 cap(s), 1 Refill(s).     cyclobenzaprine 5 mg oral tablet: 5 mg, 1 tab(s), po, bid, PRN: for muscle spasm, 60 tab(s), 1 Refill(s).     lidocaine 5% topical film: 3 patch(es), Topical, daily, for 30 day(s), remove patches after 12 hours, 90 patch(es), 11 Refill(s).     Ativan 2 mg oral tablet: 2 mg, 1 tab(s), PO, once, take 1 our prior to MRI, PRN: for anxiety, 1 tab(s), 0 Refill(s).     ProAir HFA 90 mcg/inh inhalation aerosol: 2 puff(s), inh, qid, PRN: as needed for wheezing, 1 EA, 2 Refill(s).     valved holding chamber spacer: See Instructions, use with albuterol, 1 EA, 0 Refill(s).     Lyrica 50 mg oral capsule: 50 mg, 1 cap(s), Oral, bid, 60 cap(s), 1 Refill(s).     hydrocortisone 2.5% topical cream: 1 mal, TOP, TID, 30 g, 0 Refill(s).     clindamycin 1% topical gel: 1 mal, TOP, BID, 30 g, 5 Refill(s).          Allergies    Bee Stings    Latex  Social History    Smoking Status - 09/14/2018     Current every day smoker     Alcohol      Current, 06/16/2015     Employment and Education      Work/School description: disabled., 02/21/2011     Home and Environment       Lives with Self, Significant other., 02/21/2011     Substance Abuse      Current, 06/16/2015     Tobacco      Current, 06/16/2015  Family History    Diabetes mellitus type 2: Mother.    Heart disease: Father.  Immunizations      Vaccine Date Status      tetanus/diphth/pertuss (Tdap) adult/adol 11/17/2016 Given      influenza virus vaccine, inactivated 11/17/2016 Given      influenza virus vaccine, inactivated 01/06/2015 Given  Lab Results       Lab Results (Last 4 results within 90 days)        Hgb A1c: 5 [0.4 mIU/L - 4.5 mIU/L] (07/02/18 16:24:00 CDT)       Cholesterol: 182 mg/dL [0.4 mIU/L - 4.5 mIU/L] (07/02/18 16:24:00 CDT)       Non-HDL Cholesterol: 134 High [0.4 mIU/L - 4.5 mIU/L] (07/02/18 16:24:00 CDT)       HDL: 48 mg/dL [0.4 mIU/L - 4.5 mIU/L] (07/02/18 16:24:00 CDT)       Cholesterol/HDL Ratio: 3.8 (07/02/18 16:24:00 CDT)       LDL: 108 High (07/02/18 16:24:00 CDT)       Triglyceride: 146 mg/dL [0.4 mIU/L - 4.5 mIU/L] (07/02/18 16:24:00 CDT)       TSH: 2.14 mIU/L [0.4 mIU/L - 4.5 mIU/L] (07/02/18 16:24:00 CDT)

## 2022-02-15 NOTE — NURSING NOTE
Comprehensive Intake Entered On:  5/20/2020 2:52 PM CDT    Performed On:  5/20/2020 2:47 PM CDT by Gonzalez RAMSAY, Fariba               Summary   Chief Complaint :   verbal consent given for telemed visit.  would like MRI order for back and COVID testing.  was to see chiropractor but appt was cancelled d/t COVID outbreak close to where pt lives.   Menstrual Status :   N/A   Gonzalez RAMSAY, Fariba - 5/20/2020 2:47 PM CDT   Health Status   Allergies Verified? :   Yes   Medication History Verified? :   Yes   Medical History Verified? :   Yes   Pre-Visit Planning Status :   Not completed   Tobacco Use? :   Current every day smoker   Gonzalez RAMSAY, Fariba - 5/20/2020 2:47 PM CDT   Consents   Consent for Immunization Exchange :   Consent Granted   Consent for Immunizations to Providers :   Consent Granted   Fariba Roth MA - 5/20/2020 2:47 PM CDT   Meds / Allergies   (As Of: 5/20/2020 2:52:03 PM CDT)   Allergies (Active)   Bananas  Estimated Onset Date:   Unspecified ; Created By:   Rowena Hooker LPN; Reaction Status:   Active ; Category:   Drug ; Substance:   Bananas ; Type:   Allergy ; Updated By:   Rowena Hooker LPN; Reviewed Date:   11/19/2019 5:25 PM CST      Bee Stings  Estimated Onset Date:   Unspecified ; Created By:   Ruthie Nam; Reaction Status:   Active ; Category:   Drug ; Substance:   Bee Stings ; Type:   Allergy ; Updated By:   Ruthie Nam; Reviewed Date:   11/19/2019 5:25 PM CST      Latex  Estimated Onset Date:   Unspecified ; Created By:   Ruthie Nam; Reaction Status:   Active ; Category:   Drug ; Substance:   Latex ; Type:   Allergy ; Updated By:   Ruthie Nam; Reviewed Date:   11/19/2019 5:25 PM CST        Medication List   (As Of: 5/20/2020 2:52:03 PM CDT)   Prescription/Discharge Order    albuterol  :   albuterol ; Status:   Prescribed ; Ordered As Mnemonic:   Ventolin HFA 90 mcg/inh inhalation aerosol ; Simple Display Line:   2 puff(s), NEB, qid, PRN: AS NEEDED FOR WHEEZING, 1 EA, 0  Refill(s) ; Ordering Provider:   Mata Hearn MD; Catalog Code:   albuterol ; Order Dt/Tm:   4/9/2020 2:03:28 PM CDT          diazePAM  :   diazePAM ; Status:   Prescribed ; Ordered As Mnemonic:   diazePAM 5 mg oral tablet ; Simple Display Line:   5 mg, 1 tab(s), Oral, q8 hrs, PRN: as needed for anxiety, 12 tab(s), 1 Refill(s) ; Ordering Provider:   Mata Hearn MD; Catalog Code:   diazePAM ; Order Dt/Tm:   5/13/2020 2:53:28 PM CDT          Miscellaneous Rx Supply  :   Miscellaneous Rx Supply ; Status:   Prescribed ; Ordered As Mnemonic:   Orthotics ; Simple Display Line:   See Instructions, to use as directed with shoes, 2 EA, 0 Refill(s) ; Ordering Provider:   Mata Hearn MD; Catalog Code:   Miscellaneous Rx Supply ; Order Dt/Tm:   2/13/2020 3:16:54 PM CST          oxyCODONE  :   oxyCODONE ; Status:   Prescribed ; Ordered As Mnemonic:   oxyCODONE 5 mg oral tablet ; Simple Display Line:   5 mg, 1 tab(s), Oral, q6 hrs, 20 tab(s), 0 Refill(s) ; Ordering Provider:   Mata Hearn MD; Catalog Code:   oxyCODONE ; Order Dt/Tm:   5/13/2020 2:53:34 PM CDT            Home Meds    acetaminophen  :   acetaminophen ; Status:   Documented ; Ordered As Mnemonic:   acetaminophen 500 mg oral tablet ; Simple Display Line:   1,000 mg, 2 tab(s), Oral, q6 hrs, PRN: as needed for pain, 0 Refill(s) ; Catalog Code:   acetaminophen ; Order Dt/Tm:   11/6/2019 1:37:34 PM CST            ID Risk Screen   Recent Travel History :   No recent travel   Family Member Travel History :   No recent travel   Other Exposure to Infectious Disease :   Unknown   Gonzalez RAMSAY, Fariba - 5/20/2020 2:47 PM CDT

## 2022-02-15 NOTE — TELEPHONE ENCOUNTER
---------------------  From: Khadra Umanzor CMA   Sent: 10/13/2020 3:43:19 PM CDT  Subject: Curbside testing     Pt had curbside testing done at the clinic today for PUI for COVID-19 per [facility]. 02 Sat = 93. Specimen sent to [facility]. Faxed forms to [facility]. Priority #_.

## 2022-02-15 NOTE — PROGRESS NOTES
Chief Complaint    Patient is here for trigger point injections, suture removal, and cyst removal.  History of Present Illness          HPI pt has history of chronic myofascial pain and has found trigger point injections to be a useful tool to help control pain.  Would like to have repeat trigger point injections today.                       Review of systems is negative except as per HPI      Exam:      General: alert and oriented ×3 no acute distress.      HEENT: pupils are equal round and reactive to light extraocular motion is intact. Normocephalic and atraumatic.       Hearing is grossly normal and there is no otorrhea.       Nares are patent there is no rhinorrhea.       Mucous membranes are moist and pink.      Chest: has bilateral rise with no increased work of breathing.      Cardiovascular: normal perfusion and brisk capillary refill.      Musculoskeletal: no gross focal abnormalities and normal gait.      Neuro: no gross focal abnormalities and memory seems intact.      Psychiatric: speech is clear and coherent and fluent. Patient dressed appropriately for the weather. Mood is appropriate and affect is full.           Procedure note           Informed consent obtained including risks of pain, bleeding, infection, injury to surrounding tissue, and need for further treatment. Benefit of a trigger point injection goal is to reduce pain. This is just part of a treatment plan and for best results patient should also complete physical therapy. I cannot guarantee that will will be any pain relief.           Alternatives would include doing nothing, physical therapy, acupuncture, using heat or ice, continuing with oral medications such as muscle relaxants or nonsteroidal anti-inflammatory medications or topical medications such as a lidocaine patch or cream or menthol for diclofenac. He would like to try the trigger point injections.           Prep: Alcohol          Location identified by my palpation and  communication with patient.  Symptomatic trigger points that patient desired treatment at were located at: mid lumbar back x 1 and bilateral upper gluteus jyoti x 1 each                      Each of these was injected with  a one-to-one solution of 1% lidocaine without epinephrine and 0.5% Marcaine without epinephrine.          Total number of injections:3          Total number of milliliters of the 1:1 solution of lidocaine and marcaine:8           Encouraged patient to treat the area with [place] cup ice massage tonight and to try to stretch the area out.           pain prior to treatment: severe          pain following treatment at the trigger point injection sites:no change at bilateral gluts, improved at mid lumbar back          Complications: none          Tolerated procedure well.          Assessment and Plan: myalgia and myofascial pain, s/p trigger point injections today.  Encouraged home exercise program and to keep annual exam appt as indicated and RTC if symptoms worsen or do not improve. also encouraged pt to bring in paperwork from [facility] so we can has CC to help him withthis.      Procedure note excision of sebaceous cyst      Informed consent obtained  including risks of pain, bleeding, infection, injury to surrounding tissue, and need for further treatment. Benefit of this excision is to remove the suspected cyst so it does not continue to cycle and become inflamed and to confirm the diagnosis.  I cannot guarantee that the entire cyst will be excised.  Alternatives would include doing nothing, be referred to another physician or to be referred to a surgeon or dermatologist .  Pt would like to proceed with the excision today .       Location confirmed with patient, lesion on the right  lower chin      Prep: Betadine       anesthesia provided with 1 ml 1%lidocaine with epinephrine after area prepped with alcohol      pt then prepped and draped in usual sterile fashion, circular 8 mm blade used to  excise the lesion in its entirety      skin reapproximated with 3 simple interrupted sutures of 5-black ethylon monofilament      bacitracin and bandaid placed, told to keep area clean and dry today then do bacitracin and bandaid changes daily, rtc in 3-5 days to check if sutures are ready to come out but sooner if any increased redness or drainage.      specimen sent to pathology to confirm diagnosis      EBL:<3ml      Tolerated procedure well.            Assessment and Plan: myalgia and myofascial pain, s/p trigger point injections today.  Encouraged home exercise program and to bring in his paperwork from [facility] so we can help him complete this and have him make appt with pain clinic, cont with the cymbalta,      right chin lesion, suspect  sebaceous cyst s/p excision today,      left chin path reviewed with pt, shows ruptured follicular cyst, 2 sutures removed today without difficulty, incision healing nicely with good reapproximation and no evidence of infection.  Physical Exam   Vitals & Measurements    T: 97.5   F (Tympanic)  HR: 96(Peripheral)  BP: 128/82     WT: 269.4 lb       Review of systems is negative except as per HPI      Exam:      General: alert and oriented ×3 no acute distress.      HEENT: pupils are equal round and reactive to light extraocular motion is intact. Normocephalic and atraumatic.       Hearing is grossly normal and there is no otorrhea.       Nares are patent there is no rhinorrhea.       Mucous membranes are moist and pink.      Chest: has bilateral rise with no increased work of breathing.      Cardiovascular: normal perfusion and brisk capillary refill.      Musculoskeletal: no gross focal abnormalities and normal gait.  ttp at mid lumbar back and bilateral upper gluteus jyoti      Neuro: no gross focal abnormalities and memory seems intact.      Psychiatric: speech is clear and coherent and fluent. Patient dressed appropriately for the weather. Mood is appropriate  and affect is full.         Assessment/Plan       Myalgia (M79.1)         Orders:          61695 unlisted px skin muc membrane +subq tissue (Charge), Quantity: 1, Myalgia                Neoplasm of uncertain behavior (D48.5)         Orders:          61106 unlisted px skin muc membrane +subq tissue (Charge), Quantity: 1, Neoplasm of uncertain behavior          Tissue pathology* ([facility]), Specimen Type: Miscellaneous Specimen, Collection Date: 05/22/18 17:50:00 CDT                Orders:         clindamycin, 1 cap(s) ( 300 mg ), PO, q6hr, # 28 cap(s), 0 Refill(s), Type: Maintenance, Pharmacy: [facility], 1 cap(s) po q6 hrs,x7 day(s), (Completed)         moxifloxacin ophthalmic, 1 drop(s), right eye, tid, # 3 mL, 0 Refill(s), Type: Maintenance, Pharmacy: [facility], 1 drop(s) right eye tid,x7 day(s), (Completed)         Tissue pathology* ([facility]), Specimen Type: Miscellaneous Specimen, Collection Date: 05/16/18 16:06:00 CDT  Patient Information     Name:JOEL SUTTON      Address:      [address] [place] [id number]     Sex:Male     YOB: 1980     [phone]     Emergency Contact:LESEA SUTTON     MRN:[id number]     FIN:[id number]     Location:[facility]     Date of Service:05/22/2018      Primary Care Physician:       Nena King MD, [phone]      Attending Physician:       Nena King MD, [phone]  Problem List/Past Medical History    Ongoing     Anxiety     Asthma, moderate persistent     Cerebellar tonsillar ectopia     Chronic insomnia     Dysplastic nevus       Comments: removed from back     Low back pain     Marijuana use     Myalgia     Obese     Sebaceous cyst     Smoking     Tobacco user     Trochanteric bursitis, right hip    Historical     No qualifying data  Procedure/Surgical History     Injection of cortisone (06/08/2016)           Transanal hemorrhoidal  dearterialization (THD) (09/08/2015)           Colonoscopy (08/04/2015)             Comments: Sedation: MAC<br/>Indication: rectal bleeding<br/>External &amp; internal hemorrhoids; otherwise normal<br/>Resume routine screening at age 50.     Esophagogastroduodenoscopy (08/04/2015)             Comments: Normal     Hospitalized at [facility] (2002)           Right knee arthroscopy           Surgery on eyes x3             Comments: as infant  Medications     Advil 200 mg oral tablet: 400 mg, 2 tab(s), po, q8 hrs, PRN: as needed for pain, 0 Refill(s).     Shoes for low back pain: See Instructions, Use as directed, 2 EA, 0 Refill(s).     ProAir HFA 90 mcg/inh inhalation aerosol: 2 puff(s), inh, qid, PRN: as needed for wheezing, 1 EA, 2 Refill(s).     Qvar 80 mcg/inh inhalation aerosol: 1 puff(s), inh, bid, 1 EA, 5 Refill(s).     Symbicort 160 mcg-4.5 mcg/inh inhalation aerosol: 2 puff(s), inh, bid, to replace qvar  in the morning and the evening  use with spacer chamber  rinse mouth and throat after use, 3 EA, 3 Refill(s).     DULoxetine 40 mg oral delayed release capsule: 40 mg, 1 cap(s), po, daily, 30 cap(s), 1 Refill(s).          Allergies    Bee Stings    Latex  Social History    Smoking Status - 05/22/2018     Current every day smoker     Alcohol      Current, 06/16/2015     Employment and Education      Work/School description: disabled., 02/21/2011     Home and Environment      Lives with Self, Significant other., 02/21/2011     Substance Abuse      Current, 06/16/2015     Tobacco      Current, 06/16/2015  Family History    Diabetes mellitus type 2: Mother.    Heart disease: Father.  Immunizations      Vaccine Date Status      tetanus/diphth/pertuss (Tdap) adult/adol 11/17/2016 Given      influenza virus vaccine, inactivated 11/17/2016 Given      influenza virus vaccine, inactivated 01/06/2015 Given  Lab Results       Lab Results (Last 4 results within 90 days)        Sodium Level: 140 mmol/L [135 mmol/L -  146 mmol/L] (04/27/18 14:54:00 CDT)       Potassium Level: 4.4 mmol/L [3.5 mmol/L - 5.3 mmol/L] (04/27/18 14:54:00 CDT)       Chloride Level: 104 mmol/L [98 mmol/L - 110 mmol/L] (04/27/18 14:54:00 CDT)       CO2 Level: 31 mmol/L [20 mmol/L - 31 mmol/L] (04/27/18 14:54:00 CDT)       Glucose Level: 83 mg/dL [65 mg/dL - 99 mg/dL] (04/27/18 14:54:00 CDT)       BUN: 15 mg/dL [7 mg/dL - 25 mg/dL] (04/27/18 14:54:00 CDT)       Creatinine Level: 0.98 mg/dL [0.6 mg/dL - 1.35 mg/dL] (04/27/18 14:54:00 CDT)       BUN/Creat Ratio: NOT APPLICABLE [6  - 22] (04/27/18 14:54:00 CDT)       eGFR: 97 mL/min/1.73m2 [8.6 mg/dL - 10.3 mg/dL] (04/27/18 14:54:00 CDT)       eGFR African American: 113 mL/min/1.73m2 [6  - 22] (04/27/18 14:54:00 CDT)       Calcium Level: 9.7 mg/dL [8.6 mg/dL - 10.3 mg/dL] (04/27/18 14:54:00 CDT)      lesion excised was 0.5cm by 0.5 cm
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
## 2022-02-15 NOTE — NURSING NOTE
Patient presents for dressing of shave biopsy site at left scapular area. Patient has dime size shave biopsy site that is healing by first intention with no signs of infection, erythema or drainage. He requests a water proof dressing. Site is cleansed with alcohol and dressed with folded sterile gauze 3x3 and transparent dressing. Patient will change this dressing in 24 hours or sooner if dressing becomes wet. Patient advised to keep site clean and dry.

## 2022-02-15 NOTE — PROGRESS NOTES
Patient:   JOEL SUTTON            MRN: [id number]            FIN: [id number]               Age:   37 years     Sex:  Male     :  1980   Associated Diagnoses:   Chronic back pain   Author:   Richard Caldwell MD      Visit Information      Date of Service: 10/06/2017 04:10 pm  Performing Location: [facility]  Encounter#: [id number]      Primary Care Provider (PCP):  Mata Hearn MD    NPI# [id number]      Referring Provider:  Richard Caldwell MD    NPI# [id number]      Chief Complaint   10/6/2017 4:21 PM CDT    Back pain.        History of Present Illness   chief complaint and symptoms as noted above confirmed with patient   Ongoing but flare up several days  no weakness Some occas r leg pain      Review of Systems   Constitutional:  Negative except as documented in history of present illness.    Gastrointestinal:  Negative.    Genitourinary:  Negative.    Musculoskeletal:  Negative except as documented in history of present illness.    Neurologic:  Negative except as documented in history of present illness.              Health Status   Allergies:    Allergic Reactions (Selected)  Severity Not Documented  Bee Stings (No reactions were documented)  Latex (No reactions were documented)   Medications:  (Selected)   Prescriptions  Prescribed  ProAir HFA 90 mcg/inh inhalation aerosol: 2 puff(s), inh, qid, # 1 EA, 3 Refill(s), Type: Maintenance, Pharmacy: [facility], 2 puff(s) inh qid  Qvar 80 mcg/inh inhalation aerosol: 1 puff(s), inh, bid, # 1 EA, 3 Refill(s), Type: Maintenance, Pharmacy: [facility], 1 puff(s) inh bid  cyclobenzaprine 10 mg oral tablet: 1 tab(s) ( 10 mg ), PO, TID, PRN: for spasm, # 30 tab(s), 0 Refill(s), Type: Maintenance, Pharmacy: [facility], 1 tab(s) po tid,PRN:for spasm  diazePAM 5 mg oral tablet: See Instructions, Instructions: TAKE ONE TABLET BY MOUTH THREE TIMES A DAY AS NEEDED FOR  ANXIETY, # 10 tab(s), 0 Refill(s), Type: Soft Stop  hydrocortisone 2.5% topical cream: 1 mal, top, tid, # 30 gm, 1 Refill(s), Type: Maintenance, Pharmacy: [facility] [place], 1 mal top tid  Documented Medications  Documented  Advil 200 mg oral tablet: 2 tab(s) ( 400 mg ), po, q8 hrs, PRN: as needed for pain, 0 Refill(s), Type: Maintenance   Problem list:    All Problems  Cerebellar tonsillar ectopia / SNOMED CT 57479401 / Confirmed  Anxiety / SNOMED CT 02042593 / Confirmed  Low back pain / SNOMED CT 674581412 / Confirmed  Obese / ICD-9-.00 / Probable  Chronic insomnia / SNOMED CT 499954464 / Confirmed  Smoking / SNOMED CT 670522566 / Confirmed  Tobacco user / SNOMED CT 793988194 / Probable      Histories   Past Medical History:    Active  Cerebellar tonsillar ectopia (47685145)  Anxiety (56455177)   Family History:    Heart disease  Father (Conor)  Diabetes mellitus type 2  Mother (Obdulia)     Procedure history:    Injection of cortisone (SNOMED CT 0671374155) performed by Joann on 6/8/2016 at 36 Years.  Transanal hemorrhoidal dearterialization (THD) performed by Vik Chavez MD on 9/8/2015 at 35 Years.  Esophagogastroduodenoscopy (SNOMED CT 931596210) on 8/4/2015 at 35 Years.  Comments:  8/5/2015 12:30 PM - Mata Merino MD  Normal  Colonoscopy (SNOMED CT 233640470) performed by Mata Merino MD on 8/4/2015 at 35 Years.  Comments:  8/13/2015 3:29 PM - Genia Mac RN  Sedation: MAC  Indication: rectal bleeding  External & internal hemorrhoids; otherwise normal  Resume routine screening at age 50.  Hospitalized at [facility] unit in 2002 at 22 Years.  Right knee arthroscopy.  Surgery on eyes x3.  Comments:  12/15/2010 4:16 PM - Genia Ely RN  as infant   Social History:        Alcohol Assessment: Denies Alcohol Use            Current            Current                     Comments:                      06/16/2015 - Randall Salcedo MD                     6 drinks pks per  weekend      Tobacco Assessment: Current            Current                     Comments:                      06/16/2015 - Randall Salcedo MD                     1 ppd      Substance Abuse Assessment            Current, Marijuana            Current                     Comments:                      06/16/2015 - Randall Salcedo MD                     Marijuana      Employment and Education Assessment            Work/School description: disabled.                     Comments:                      02/21/2011 - Krysten ORTEGA Wong                     has 1 child- 10 weeks old -      Home and Environment Assessment            Lives with Self, Significant other.                     Comments:                      02/21/2011 - Wong Bashir MD                     2 kids in household        Physical Examination   Vital Signs   10/6/2017 4:21 PM CDT Temperature Tympanic 97.4 DegF  LOW    Peripheral Pulse Rate 87 bpm    HR Method Electronic    Systolic Blood Pressure 138 mmHg    Diastolic Blood Pressure 89 mmHg    Mean Arterial Pressure 105 mmHg    BP Method Electronic      Measurements from flowsheet : Measurements   10/6/2017 4:21 PM CDT    Weight Measured - Standard                288.8 lb     General:  Alert and oriented, No acute distress.    Musculoskeletal:       Spine/torso exam: Lumbar ( Bilateral, Lateral, L4, L5, Tenderness, Range of motion ( Diminished ), neg slr  lower extrem  cms otherwise intact ).    Neurologic:  Alert, Oriented, Cranial Nerves II-XII are grossly intact.       Impression and Plan   Diagnosis     Chronic back pain (NOB32-BV M54.9).     Course:  Not progressing as expected.    Plan:  restart pt  toradol here  fu 1 week if not better sooner if worse.    Patient Instructions:       Counseled: Patient, Regarding diagnosis, Regarding treatment, Regarding medications, Activity.

## 2022-02-15 NOTE — PROGRESS NOTES
Chief Complaint      verbal consent given for telemed visit. f/u back pain, did see back specialist, recommended pt to lose weight prior to having any back surgery.      Patient is being evaluated via a billable telephone visit.      This telephone visit will be conducted via a call between you and your physician.      The patient has been notified of the following:      Today's visit was conducted via telephone due to the COVID-19 pandemic. Patient's consent to telephone visit was obtained and documented.  We have found that certain health care needs can be provided without need for a physical exam.  This service lets us provide the care your need with a short telephone conversation.  If a prescription is necessary, we can send it directly to your pharmacy.  If lab work is needed, we can place an order in your chart and you can schedule an appointment for the test/tests at a later time      Call Start Time: 1315      Call End Time: 1328      History of Present Illness      Follow up regarding back pain.  Spine surgery has discussed possible repeat surgery if he is able to lose 50 lbs.  A spinal fusion was recommended.  He requests visit with dietician.  Walking has helped a bit.  Has not been using pain medication as much.      Review of Systems            ROS reviewed an negative except for symptoms noted in HPI.                   Assessment/Plan      1. Chronic back pain (M54.9)          weight loss, increase activity, referral to dietician, pursue spine surgery recommendations        Ordered:      Physical Therapy (Request), Degenerative lumbar disc  Chronic back pain      Referral (Request), 06/29/20 13:23:00 CDT, Referred to: [facility], Chronic back pain             2. Degenerative lumbar disc (M51.36)         discussed CT results        Ordered:      Physical Therapy (Request), Degenerative lumbar disc  Chronic back pain             Orders:        diclofenac topical, ( 2 gm ), Topical, qid,  # 240 gm, 0 Refill(s), Type: Maintenance, Pharmacy: [facility], 2 gm Topical qid, 72, in, 06/15/20 13:54:00 CDT, Height Measured, Weight Measured, (Ordered)        diclofenac topical, ( 2 gm ), Topical, qid, # 240 gm, 0 Refill(s), Type: Hard Stop, Pharmacy: [facility], 72, in, 06/15/20 13:54:00 CDT, Height Measured, 306, lb, 06/04/20 11:59:00 CDT, Weight Measured, (Completed)        oxyCODONE, = 1 tab(s) ( 5 mg ), Oral, q8 hrs, x 10 day(s), PRN: as needed for pain, # 30 tab(s), 0 Refill(s), Type: Hard Stop, Pharmacy: [facility], 72, in, 06/15/20 13:54:00 CDT, Height Measured, Weight Measured, (Completed)        oxyCODONE, = 1 tab(s) ( 5 mg ), Oral, q8 hrs, PRN: as needed for pain, # 30 tab(s), 0 Refill(s), Type: Acute, Pharmacy: [facility], 1 tab(s) Oral q8 hrs,PRN:as needed for pain, 72, in, 06/15/20 13:54:00 CDT, Height Measured, Weight Measured, (Ordered)      Patient Information      Name:  JOEL SUTTON      Address:       [address] [place] [id number]      Sex: Male      YOB: 1980      Phone: [phone]      Emergency Contact: [facility], CONTACT      MRN: [id number]      FIN: [id number]      Location: [facility]      Date of Service: 06/29/2020      Primary Care Physician:       Jaskaran CONWAY, Cricket MEDINA, [phone]      Attending Physician:       Mata Hearn MD, [phone]      Problem List/Past Medical History      Ongoing       Anxiety       Asthma, moderate persistent       Cerebellar tonsillar ectopia       Chronic back pain       Chronic insomnia       Degenerative lumbar disc       Dysplastic nevus        Comments: removed from back       Low back pain       Marijuana use       Myalgia       Obese       Sebaceous cyst       Smoking       Tension headache       Tobacco user       Trochanteric bursitis, right hip       Historical        No qualifying data      Procedure/Surgical History        Injection of cortisone (06/08/2016)                  Transanal hemorrhoidal dearterialization (THD) (09/08/2015)                  Colonoscopy (08/04/2015)          Comments: Sedation: MAC        Indication: rectal bleeding        External & internal hemorrhoids; otherwise normal        Resume routine screening at age 50..        Esophagogastroduodenoscopy (08/04/2015)          Comments: Normal.        Hospitalized at [facility] (2002)                  Right knee arthroscopy                  Surgery on eyes x3          Comments: as infant.      Medications       acetaminophen 500 mg oral tablet, 1000 mg= 2 tab(s), Oral, q6 hrs, PRN,   Not taking       diazePAM 5 mg oral tablet, 5 mg= 1 tab(s), Oral, q8 hrs, PRN, 1 refills,   Not taking: takes only as needed.       diclofenac 1% topical gel, 2 gm, Topical, qid       Orthotics, See Instructions       oxyCODONE 5 mg oral tablet, 5 mg= 1 tab(s), Oral, q8 hrs, PRN       Ventolin HFA 90 mcg/inh inhalation aerosol, 2 puff(s), NEB, qid, PRN      Allergies      Bananas      Bee Stings      Latex      Social History       Smoking Status - 06/29/2020        Current every day smoker      Alcohol       Past, 02/13/2020      Employment/School       Work/School description: disabled., 02/21/2011      Home/Environment       Lives with Self, Significant other., 02/21/2011      Substance Abuse       Current, 06/16/2015      Tobacco       Current, 06/16/2015      Family History       Diabetes mellitus type 2: Mother.       Heart disease: Father.      Immunizations           Vaccine           Date           Status               tetanus/diphth/pertuss (Tdap) adult/adol               11/17/2016               Given               influenza virus vaccine, inactivated               11/17/2016               Given               influenza virus vaccine, inactivated               01/06/2015               Given       Lab Results          Lab Results (Last 4 results within 90 days)           Coronavirus SARS-CoV-2 (COVID-19): NOT DETECTED (06/04/20 12:32:00)  Signature Line  Signed and Authored by Mata Hearn MD on 06/29/2020 01:33 PM CDT      Charted Date:      June 29, 2020 1:25 PM CDT      Subject / Title:      Office Visit Note      Performed By:      Mata Hearn MD on June 29, 2020 1:33 PM CDT      Electronically Signed By:      Mata Hearn MD on June 29, 2020 1:33 PM CDT      Visit Information:      [id number], [facility], Outpatient, 6/29/2020 - 7/1/2020

## 2022-02-15 NOTE — PROGRESS NOTES
Chief Complaint    Patient is here for pain in right leg and numbness.  History of Present Illness      continued back pain, pain now with inreased numbness traveling down legs, amitryptaline seemed to do nothing, duloxetine has been helpful, stopped the amitryptaline and increased duloxetine, again reviewed results of MRI with L5/S1 bilateral foraminal stenosis and central canal stenosis,      pt has history of chronic myofascial pain and has found trigger point injections to be a useful tool to help control pain.  Would like to have repeat trigger point injections today.  injections done esterday continue to provide relief at these areas.                     Review of systems is negative except as per HPI      Exam:      General: alert and oriented ×3 no acute distress.      HEENT: pupils are equal round and reactive to light extraocular motion is intact. Normocephalic and atraumatic.       Hearing is grossly normal and there is no otorrhea.       Nares are patent there is no rhinorrhea.       Mucous membranes are moist and pink.      Chest: has bilateral rise with no increased work of breathing.      Cardiovascular: normal perfusion and brisk capillary refill.      Musculoskeletal: no gross focal abnormalities and normal gait.      Neuro: no gross focal abnormalities and memory seems intact.      Psychiatric: speech is clear and coherent and fluent. Patient dressed appropriately for the weather. Mood is appropriate and affect is full.           Procedure note           Informed consent obtained including risks of pain, bleeding, infection, injury to surrounding tissue, and need for further treatment. Benefit of a trigger point injection goal is to reduce pain. This is just part of a treatment plan and for best results patient should also complete physical therapy. I cannot guarantee that will will be any pain relief.           Alternatives would include doing nothing, physical therapy, acupuncture, using heat or  ice, continuing with oral medications such as muscle relaxants or nonsteroidal anti-inflammatory medications or topical medications such as a lidocaine patch or cream or menthol for diclofenac. She would like to try the trigger point injections.           Prep: Alcohol          Location identified by my palpation and communication with patient.  Symptomatic trigger points that patient desired treatment at were located at: right latissimus dorsi, erector spina and erector oblique ( 3 muscle group)                      Each of these was injected with  a one-to-one solution of 1% lidocaine without epinephrine and 0.5% Marcaine without epinephrine.          Total number of injections: 3          Total number of milliliters of the 1:1 solution of lidocaine and marcaine:8           Encouraged patient to treat the area with Karen cup ice massage tonight and to try to stretch the area out.           pain prior to treatment: severe          pain following treatment at the trigger point injection sites:improvce          Complications: none          Tolerated procedure well.            Physical Exam   Vitals & Measurements    T: 97.3   F (Tympanic)  HR: 100(Peripheral)  BP: 128/84        Assessment/Plan       Lumbar stenosis (M48.061)           myalgia and myofascial pain, explained how this is different fromt he pain going down his legs that is radicular,  s/p trigger point injections today. He continues to prefer marijuana to any opiate.   Encouraged PT program and to keep annual exam appt as indicated and RTC if symptoms worsen or do not improve.         Orders:          Referral (Request), 07/26/18 16:38:00 CDT, Referred to: Neurological Surgery, Lumbar stenosis                Orders:      Fifteen minutes spent with patient in direct face to face contact, in addition to the time spent performing the procedure today, greater than 50% of that time was  spent counseling and coordinating care.   Patient Information      Name:JOEL SUTTON      Address:      [address] [place] [address] [address] [place] [id number]     Sex:Male     YOB: 1980     [phone]     Emergency Contact:LEESA SUTTON     MRN:[id number]     FIN:[id number]     Location:[facility]     Date of Service:07/26/2018      Primary Care Physician:       Nena King MD, [phone]      Attending Physician:       Nena King MD, [phone]  Problem List/Past Medical History    Ongoing     Anxiety     Asthma, moderate persistent     Cerebellar tonsillar ectopia     Chronic insomnia     Dysplastic nevus       Comments: removed from back     Low back pain     Marijuana use     Myalgia     Obese     Sebaceous cyst     Smoking     Tension headache     Tobacco user     Trochanteric bursitis, right hip    Historical     No qualifying data  Procedure/Surgical History     Injection of cortisone (06/08/2016)           Transanal hemorrhoidal dearterialization (THD) (09/08/2015)           Colonoscopy (08/04/2015)            Comments:      Sedation: MAC      Indication: rectal bleeding      External & internal hemorrhoids; otherwise normal      Resume routine screening at age 50.     Esophagogastroduodenoscopy (08/04/2015)            Comments:      Normal     Hospitalized at [facility] (2002)           Right knee arthroscopy           Surgery on eyes x3            Comments:      as infant  Medications     Shoes for low back pain: See Instructions, Use as directed, 2 EA, 0 Refill(s).     ProAir HFA 90 mcg/inh inhalation aerosol: 2 puff(s), inh, qid, PRN: as needed for wheezing, 1 EA, 2 Refill(s).     Qvar 80 mcg/inh inhalation aerosol: 1 puff(s), inh, bid, 1 EA, 5 Refill(s).     Symbicort 160 mcg-4.5 mcg/inh inhalation aerosol: 2 puff(s), inh, bid, to replace qvar  in the morning and the evening  use with spacer chamber  rinse mouth and throat after use, 3 EA, 3 Refill(s).     DULoxetine 60 mg oral delayed release  capsule: 60 mg, 1 cap(s), po, daily, 60 cap(s), 1 Refill(s).     cyclobenzaprine 5 mg oral tablet: 5 mg, 1 tab(s), po, bid, PRN: for muscle spasm, 60 tab(s), 1 Refill(s).     lidocaine 5% topical film: 3 patch(es), Topical, daily, for 30 day(s), remove patches after 12 hours, 90 patch(es), 11 Refill(s).     meloxicam 15 mg oral tablet: 15 mg, 1 tab(s), PO, Daily, PRN: for pain, 90 tab(s), 3 Refill(s).     DULoxetine 60 mg oral delayed release capsule: 60 mg, 1 cap(s), po, bid, 60 cap(s), 3 Refill(s).     Keflex 500 mg oral capsule: 500 mg, 1 cap(s), PO, QID, for 10 day(s), 40 cap(s), 0 Refill(s).          Allergies    Bee Stings    Latex  Social History    Smoking Status - 07/26/2018     Current every day smoker     Alcohol      Current, 06/16/2015     Employment and Education      Work/School description: disabled., 02/21/2011     Home and Environment      Lives with Self, Significant other., 02/21/2011     Substance Abuse      Current, 06/16/2015     Tobacco      Current, 06/16/2015  Family History    Diabetes mellitus type 2: Mother.    Heart disease: Father.  Immunizations      Vaccine Date Status      tetanus/diphth/pertuss (Tdap) adult/adol 11/17/2016 Given      influenza virus vaccine, inactivated 11/17/2016 Given      influenza virus vaccine, inactivated 01/06/2015 Given  Lab Results       Lab Results (Last 4 results within 90 days)        Hgb A1c: 5 [0.4 mIU/L - 4.5 mIU/L] (07/02/18 16:24:00 CDT)       Cholesterol: 182 mg/dL [0.4 mIU/L - 4.5 mIU/L] (07/02/18 16:24:00 CDT)       Non-HDL Cholesterol: 134 High [0.4 mIU/L - 4.5 mIU/L] (07/02/18 16:24:00 CDT)       HDL: 48 mg/dL [0.4 mIU/L - 4.5 mIU/L] (07/02/18 16:24:00 CDT)       Cholesterol/HDL Ratio: 3.8 (07/02/18 16:24:00 CDT)       LDL: 108 High (07/02/18 16:24:00 CDT)       Triglyceride: 146 mg/dL [0.4 mIU/L - 4.5 mIU/L] (07/02/18 16:24:00 CDT)       TSH: 2.14 mIU/L [0.4 mIU/L - 4.5 mIU/L] (07/02/18 16:24:00 CDT)

## 2022-02-15 NOTE — NURSING NOTE
"Comprehensive Intake Entered On:  10/1/2019 2:57 PM CDT    Performed On:  10/1/2019 2:50 PM CDT by Bruce Whiting CMA               Summary   Chief Complaint :   Pt here for FU on back pain from 9/20.  Pt states pain is not getting any better.  Pt states pain is still radiating down into his right leg.  Pt states he stopped taking his gabapentin because he \"didnt think it was doing anything for him\"   Menstrual Status :   N/A   Weight Measured :   288 lb(Converted to: 288 lb 0 oz, 130.63 kg)    Height Measured :   72 in(Converted to: 6 ft 0 in, 182.88 cm)    Body Mass Index :   39.06 kg/m2 (HI)    Body Surface Area :   2.57 m2   Systolic Blood Pressure :   146 mmHg (HI)    Diastolic Blood Pressure :   98 mmHg (HI)    Mean Arterial Pressure :   114 mmHg   Peripheral Pulse Rate :   72 bpm   BP Site :   Right arm   Pulse Site :   Radial artery   Temperature Tympanic :   97.2 DegF(Converted to: 36.2 DegC)  (LOW)    Respiratory Rate :   16 br/min   Bruce Whiting CMA - 10/1/2019 2:50 PM CDT   Health Status   Allergies Verified? :   Yes   Medication History Verified? :   Yes   Medical History Verified? :   Yes   Pre-Visit Planning Status :   Not completed   Tobacco Use? :   Current every day smoker   Bruce Whiting CMA - 10/1/2019 2:50 PM CDT   Meds / Allergies   (As Of: 10/1/2019 2:57:46 PM CDT)   Allergies (Active)   Bananas  Estimated Onset Date:   Unspecified ; Created By:   Rowena Hooker LPN; Reaction Status:   Active ; Category:   Drug ; Substance:   Bananas ; Type:   Allergy ; Updated By:   Rowena Hooker LPN; Reviewed Date:   10/1/2019 2:55 PM CDT      Bee Stings  Estimated Onset Date:   Unspecified ; Created By:   Ruthie Nam; Reaction Status:   Active ; Category:   Drug ; Substance:   Bee Stings ; Type:   Allergy ; Updated By:   Ruthie Nam; Reviewed Date:   10/1/2019 2:55 PM CDT      Latex  Estimated Onset Date:   Unspecified ; Created By:   Ruthie Nam; Reaction Status:   Active ; Category:   " Drug ; Substance:   Latex ; Type:   Allergy ; Updated By:   Ruthie Nam; Reviewed Date:   10/1/2019 2:55 PM CDT        Medication List   (As Of: 10/1/2019 2:57:46 PM CDT)   Prescription/Discharge Order    gabapentin  :   gabapentin ; Status:   Prescribed ; Ordered As Mnemonic:   gabapentin 300 mg oral capsule ; Simple Display Line:   See Instructions, 1 cap(s) po qd day, 1 cap bid for one day, then 1 cap tid, 90 EA, 1 Refill(s) ; Ordering Provider:   Cricket Jessica PA-C; Catalog Code:   gabapentin ; Order Dt/Tm:   9/20/2019 3:37:54 PM          albuterol  :   albuterol ; Status:   Prescribed ; Ordered As Mnemonic:   ProAir HFA 90 mcg/inh inhalation aerosol ; Simple Display Line:   2 puff(s), inh, qid, PRN: as needed for wheezing, 1 EA, 2 Refill(s) ; Ordering Provider:   Nena King MD; Catalog Code:   albuterol ; Order Dt/Tm:   8/30/2018 2:42:03 PM            Social History   Social History   (As Of: 10/1/2019 2:57:46 PM CDT)   Alcohol:        Current   Comments:  6/16/2015 3:17 PM - Randall Salcedo MD: 6 drinks pks per weekend   (Last Updated: 6/16/2015 3:17:14 PM CDT by Randall Salcedo MD)          Tobacco:        Current   Comments:  6/16/2015 3:17 PM - Randall Salcedo MD: 1 ppd   (Last Updated: 6/16/2015 3:17:14 PM CDT by Randall Salcedo MD)          Substance Abuse:        Current   Comments:  6/16/2015 3:18 PM - Randall Salcedo MD: Marijuana   (Last Updated: 6/16/2015 3:18:44 PM CDT by Randall Salcedo MD)          Employment/School:        Work/School description: disabled.   Comments:  2/21/2011 5:54 PM - Wong Bashir MD: has 1 child- 10 weeks old -   (Last Updated: 2/21/2011 5:54:14 PM CST by Wong Bashir MD)          Home/Environment:        Lives with Self, Significant other.   Comments:  2/21/2011 5:54 PM - Wong Bashir MD: 2 kids in household   (Last Updated: 2/21/2011 5:54:34 PM CST by Wong Bashir MD)

## 2022-02-15 NOTE — LETTER
(Inserted Image. Unable to display)       May 01, 2019        JOEL SUTTON  [address] [place] [phone]      Dear JOEL,      Thank you for selecting [facility] for your healthcare needs.      This is the letter you requested regarding your history of anxiety.  You do have an anxiety disorder and your dog does help you with your anxiety symptoms.          Please contact my practice at [phone] if you have any questions or concerns.     Sincerely,        Nena King MD

## 2022-02-15 NOTE — TELEPHONE ENCOUNTER
---------------------  From: Fariba Roth MA (Raoulx Jovanni ([address]))   To: Mata Hearn MD;     Sent: 2/15/2021 4:07:44 PM CST  Subject: FW: Medication Management   Due Date/Time: 2/16/2021 3:36:00 PM CST     Pt has telemed visit tomorrow for med check.      ------------------------------------------  [facility]  To: Mata Hearn MD  Sent: February 15, 2021 3:36:45 PM CST  Subject: Medication Management  Due: January 30, 2021 4:21:54 PM CST     ** On Hold Pending Signature **     Drug: oxyCODONE (oxyCODONE 5 mg oral tablet), TAKE ONE TABLET BY MOUTH EVERY 8 HOURS AS NEEDED FOR PAIN  Quantity: 20 unknown unit  Days Supply: 6  Refills: 0  Substitutions Allowed  Notes from Pharmacy:     Dispensed Drug: oxyCODONE (oxyCODONE 5 mg oral tablet), TAKE ONE TABLET BY MOUTH EVERY 8 HOURS AS NEEDED FOR PAIN  Quantity: 20 unknown unit  Days Supply: 6  Refills: 0  Substitutions Allowed  Notes from Pharmacy:  ---------------------------------------------------------------  From: Mata Hearn MD   [facility]    Sent: 2/16/2021 6:10:10 PM CST  Subject: FW: Medication Management     ** Not Approved: refilled **  oxyCODONE (OXYCODONE HCL 5MG TABS)  TAKE ONE TABLET BY MOUTH EVERY 8 HOURS AS NEEDED FOR PAIN  Qty:  20 unknown unit        Days Supply:  6        Refills:  0          Substitutions Allowed     [facility]

## 2022-02-15 NOTE — NURSING NOTE
Comprehensive Intake Entered On:  10/31/2019 12:17 PM CDT    Performed On:  10/31/2019 12:11 PM CDT by Bruce Whiting CMA               Summary   Chief Complaint :   Pt here for a Preop Px for L4 to L5 Hemilaminotomy/Discectomy by Dr Whitley at [facility].  DOS 11-5-19   Menstrual Status :   N/A   Weight Measured :   289 lb(Converted to: 289 lb 0 oz, 131.09 kg)    Height Measured :   72 in(Converted to: 6 ft 0 in, 182.88 cm)    Body Mass Index :   39.19 kg/m2 (HI)    Body Surface Area :   2.58 m2   Systolic Blood Pressure :   144 mmHg (HI)    Diastolic Blood Pressure :   90 mmHg (HI)    Mean Arterial Pressure :   108 mmHg   Peripheral Pulse Rate :   72 bpm   BP Site :   Right arm   Pulse Site :   Radial artery   Temperature Tympanic :   97.3 DegF(Converted to: 36.3 DegC)  (LOW)    Respiratory Rate :   16 br/min   Bruce Whiting CMA - 10/31/2019 12:11 PM CDT   Health Status   Allergies Verified? :   Yes   Medication History Verified? :   Yes   Medical History Verified? :   Yes   Pre-Visit Planning Status :   Completed   Tobacco Use? :   Current every day smoker   Tobacco Cessation Review :   Not ready to quit   Bruce Whiting CMA - 10/31/2019 12:11 PM CDT   Meds / Allergies   (As Of: 10/31/2019 12:17:54 PM CDT)   Allergies (Active)   Bananas  Estimated Onset Date:   Unspecified ; Created By:   Rowena Hooker LPN; Reaction Status:   Active ; Category:   Drug ; Substance:   Bananas ; Type:   Allergy ; Updated By:   Rowena Hooker LPN; Reviewed Date:   10/31/2019 12:15 PM CDT      Bee Stings  Estimated Onset Date:   Unspecified ; Created By:   Ruthie Nam; Reaction Status:   Active ; Category:   Drug ; Substance:   Bee Stings ; Type:   Allergy ; Updated By:   Ruthie Nam; Reviewed Date:   10/31/2019 12:15 PM CDT      Latex  Estimated Onset Date:   Unspecified ; Created By:   Ruthie Nam; Reaction Status:   Active ; Category:   Drug ; Substance:   Latex ; Type:   Allergy ; Updated By:   Ruthie Nam;  Reviewed Date:   10/31/2019 12:15 PM CDT        Medication List   (As Of: 10/31/2019 12:17:54 PM CDT)   Prescription/Discharge Order    diazePAM  :   diazePAM ; Status:   Prescribed ; Ordered As Mnemonic:   diazePAM 5 mg oral tablet ; Simple Display Line:   5 mg, 1 tab(s), Oral, q8 hrs, PRN: as needed for anxiety, 10 tab(s), 0 Refill(s) ; Ordering Provider:   Cricket Jessica PA-C; Catalog Code:   diazePAM ; Order Dt/Tm:   10/1/2019 3:21:56 PM CDT          methylPREDNISolone  :   methylPREDNISolone ; Status:   Prescribed ; Ordered As Mnemonic:   Medrol Dosepak 4 mg oral tablet ; Simple Display Line:   1 packet(s), Oral, once, as directed on package labeling, 21 tab(s), 0 Refill(s) ; Ordering Provider:   Cricket Jessica PA-C; Catalog Code:   methylPREDNISolone ; Order Dt/Tm:   10/1/2019 3:21:40 PM CDT          albuterol  :   albuterol ; Status:   Prescribed ; Ordered As Mnemonic:   ProAir HFA 90 mcg/inh inhalation aerosol ; Simple Display Line:   2 puff(s), inh, qid, PRN: as needed for wheezing, 1 EA, 2 Refill(s) ; Ordering Provider:   Nimisha King MDica; Catalog Code:   albuterol ; Order Dt/Tm:   8/30/2018 2:42:03 PM CDT            Social History   Social History   (As Of: 10/31/2019 12:17:54 PM CDT)   Alcohol:        Current   Comments:  6/16/2015 3:17 PM - Randall Salcedo MD: 6 drinks pks per weekend   (Last Updated: 6/16/2015 3:17:14 PM CDT by Randall Salcedo MD)          Tobacco:        Current   Comments:  6/16/2015 3:17 PM - Randall Salcedo MD: 1 ppd   (Last Updated: 6/16/2015 3:17:14 PM CDT by Randall Salcedo MD)          Substance Abuse:        Current   Comments:  6/16/2015 3:18 PM - Randall Salcedo MD: Marijuana   (Last Updated: 6/16/2015 3:18:44 PM CDT by Randall Salcedo MD)          Employment/School:        Work/School description: disabled.   Comments:  2/21/2011 5:54 PM - Wong Bashir MD: has 1 child- 10 weeks old -   (Last Updated: 2/21/2011 5:54:14 PM CST by Wong Bashir MD)           Home/Environment:        Lives with Self, Significant other.   Comments:  2/21/2011 5:54 PM - Wong Bashir MD: 2 kids in household   (Last Updated: 2/21/2011 5:54:34 PM CST by Wong Bashir MD)

## 2022-02-15 NOTE — LETTER
(Inserted Image. Unable to display)                                                                                                [address] [facility] [address] [facility] [address]                                                May 11, 2020Re: JOEL SUTTON1980 [id number] [place] [address]Uc: Dr. WhitleyThe following patient has been referred to your office/practice:  JOEL LESLEY Appointment:  Appointment is PendingPlease refer to the attached  clinical documentation for a summary of JOEL's care.  Please do not hesitate to contact our office if any additional clinical questions arise.  All relevant records and transition of care documents should be mailed or faxed.Your assistance in providing continuity of care is appreciated. Sincerely, [facility] [place] [place] [address](P) [phone](F) [phone]

## 2022-02-15 NOTE — NURSING NOTE
Generalized Anxiety Disorder Screening Entered On:  4/9/2020 1:41 PM CDT    Performed On:  4/9/2020 1:41 PM CDT by Fariba Roth MA               Generalized Anxiety Disorder Screening   TYRELL Nervous, Anxious On Edge :   Nearly every day   TYRELL Control Worrying B :   Nearly every day   TYRELL Worrying Too Much :   Nearly every day   TYRELL Trouble Relaxing :   Nearly every day   TYRELL Restless :   Several days   TYRELL Easily Annoyed/Irritable :   More than half the days   TYRELL Afraid :   Nearly every day   TYRELL Total Screening Score :   18    Fariba Roth MA - 4/9/2020 1:41 PM CDT

## 2022-02-15 NOTE — PROGRESS NOTES
Chief Complaint    verbal consent given for telemed visit.  anxiety f/u--getting worse, not able to have f/u visits with specialists for back pain, PT, mental health.  does need refills on Valium and inhalers.  ACT form done via phone, score= 18.   Today's visit was conducted via telephone due to the COVID-19 pandemic.  Patient's consent to telephone visit was obtained and documented.   Call Start Time: 1354   Call End Time:   1415  History of Present Illness      Patient has an appointment to follow-up on his anxiety.  He continues to have trouble with situational stressors.  He is having trouble with his landlord.  He also continues have trouble with back pain.  He has not had any follow-up appointments with spine surgery given the current pandemic.  He also has not had his orthotics made that were ordered at his last visit.  He has been using albuterol inhaler a bit more lately.  Diazepam has been useful several times a week to a calm down.  Review of Systems      See HPI.  All other review of systems negative.  Assessment/Plan       1. Anxiety (F41.1)        Patient is a majority of the visit discussing his anxiety.  He will continue to use diazepam intermittently.  He finds it quite useful.                 2. Chronic back pain (M54.9)         I have advised he contact his spine surgeon and describe his symptoms.  He will need to find out if any imaging is needed.  We will continue with acetaminophen as needed for his pain.         Ordered:          diazePAM, = 1 tab(s) ( 5 mg ), Oral, q8 hrs, PRN: as needed for anxiety, # 12 tab(s), 1 Refill(s), Type: Maintenance, Pharmacy: [facility], 1 tab(s) Oral q8 hrs,PRN:as needed for anxiety, (Ordered)          diazePAM, = 1 tab(s) ( 5 mg ), Oral, q8 hrs, PRN: as needed for anxiety, # 10 tab(s), 0 Refill(s), Type: Hard Stop, Pharmacy: [facility], (Completed)                3. Foot pain (M79.673)                Orders:          albuterol, 2 puff(s), NEB, qid, PRN: AS NEEDED FOR WHEEZING, # 1 EA, 0 Refill(s), Type: Maintenance, Pharmacy: [facility], 2 puff(s) NEB qid,PRN:AS NEEDED FOR WHEEZING, (Ordered)         albuterol, 2 puff(s), NEB, qid, PRN: AS NEEDED FOR WHEEZING, # 1 EA, 0 Refill(s), Type: Hard Stop, Pharmacy: [facility], (Completed)  Patient Information     Name:JOEL SUTTON      Address:      [address] [place] [address] [address] [place] [id number]     Sex:Male     YOB: 1980     [phone]     Emergency Contact:[facility], CONTACT     MRN:[id number]     FIN:[id number]     Location:[facility]     Date of Service:04/09/2020      Primary Care Physician:       Cricket Jessica PA-C, [phone]      Attending Physician:       Mata Hearn MD, [phone]  Problem List/Past Medical History    Ongoing     Anxiety     Asthma, moderate persistent     Cerebellar tonsillar ectopia     Chronic back pain     Chronic insomnia     Dysplastic nevus       Comments: removed from back     Low back pain     Marijuana use     Myalgia     Obese     Sebaceous cyst     Smoking     Tension headache     Tobacco user     Trochanteric bursitis, right hip    Historical     No qualifying data  Procedure/Surgical History     Injection of cortisone (06/08/2016)     Transanal hemorrhoidal dearterialization (THD) (09/08/2015)     Colonoscopy (08/04/2015)      Comments: Sedation: MAC      Indication: rectal bleeding      External & internal hemorrhoids; otherwise normal      Resume routine screening at age 50..     Esophagogastroduodenoscopy (08/04/2015)      Comments: Normal.     Hospitalized at [facility] (2002)     Right knee arthroscopy     Surgery on eyes x3      Comments: as infant.  Medications    acetaminophen 500 mg oral tablet, 1000 mg= 2 tab(s), Oral, q6 hrs, PRN,   Not taking    diazePAM 5 mg oral tablet, 5 mg= 1 tab(s), Oral, q8  hrs, PRN, 1 refills    MiraLax oral powder for reconstitution, 17 gm, Oral, daily    Orthotics, See Instructions    oxyCODONE 5 mg oral tablet, See Instructions    Ventolin HFA 90 mcg/inh inhalation aerosol, 2 puff(s), NEB, qid, PRN  Allergies    Bananas    Bee Stings    Latex  Social History    Smoking Status - 04/09/2020     Current every day smoker     Alcohol      Past, 02/13/2020     Employment/School      Work/School description: disabled., 02/21/2011     Home/Environment      Lives with Self, Significant other., 02/21/2011     Substance Abuse      Current, 06/16/2015     Tobacco      Current, 06/16/2015  Family History    Diabetes mellitus type 2: Mother.    Heart disease: Father.  Immunizations      Vaccine Date Status          tetanus/diphth/pertuss (Tdap) adult/adol 11/17/2016 Given          influenza virus vaccine, inactivated 11/17/2016 Given          influenza virus vaccine, inactivated 01/06/2015 Given

## 2022-02-15 NOTE — NURSING NOTE
Comprehensive Intake Entered On:  3/24/2021 2:31 PM CDT    Performed On:  3/24/2021 2:24 PM CDT by Gonzalez RAMSAY, Fariba               Summary   Chief Complaint :   verbal consent given for telemed visit--pain mgt, has been out of Oxycodone for awhile.  also needs refills on inhaler, Acular and diclofenac gel.  needs note re: light sensitivity--is trying to get DL, needs windows tinted   Menstrual Status :   N/A   Height/Length Estimated :   72 in(Converted to: 6 ft 0 in, 182.88 cm)    Fariba Roth MA - 3/24/2021 2:24 PM CDT   Health Status   Allergies Verified? :   Yes   Medication History Verified? :   Yes   Medical History Verified? :   Yes   Pre-Visit Planning Status :   Not completed   Tobacco Use? :   Current every day smoker   Fariba Roth MA - 3/24/2021 2:24 PM CDT   Consents   Consent for Immunization Exchange :   Consent Granted   Consent for Immunizations to Providers :   Consent Granted   Fariba Roth MA - 3/24/2021 2:24 PM CDT   Meds / Allergies   (As Of: 3/24/2021 2:31:46 PM CDT)   Allergies (Active)   Bananas  Estimated Onset Date:   Unspecified ; Created By:   Rowena Hooker LPN; Reaction Status:   Active ; Category:   Drug ; Substance:   Bananas ; Type:   Allergy ; Updated By:   Rowena Hooker LPN; Reviewed Date:   2/16/2021 2:40 PM CST      Bee Stings  Estimated Onset Date:   Unspecified ; Created By:   Ruthie Nam; Reaction Status:   Active ; Category:   Drug ; Substance:   Bee Stings ; Type:   Allergy ; Updated By:   Ruthie Nam; Reviewed Date:   2/16/2021 2:40 PM CST      Latex  Estimated Onset Date:   Unspecified ; Created By:   Ruthie Nam; Reaction Status:   Active ; Category:   Drug ; Substance:   Latex ; Type:   Allergy ; Updated By:   Ruthie Nam; Reviewed Date:   2/16/2021 2:40 PM CST        Medication List   (As Of: 3/24/2021 2:31:46 PM CDT)   Prescription/Discharge Order    albuterol  :   albuterol ; Status:   Prescribed ; Ordered As Mnemonic:   albuterol 90  mcg/inh inhalation aerosol ; Simple Display Line:   2 puff(s), Inhale, qid, 1 EA, 3 Refill(s) ; Ordering Provider:   Mata Hearn MD; Catalog Code:   albuterol ; Order Dt/Tm:   2/22/2021 8:58:58 AM CST          diazePAM  :   diazePAM ; Status:   Prescribed ; Ordered As Mnemonic:   diazePAM 5 mg oral tablet ; Simple Display Line:   5 mg, 1 tab(s), Oral, daily, PRN: as needed for anxiety, 12 tab(s), 0 Refill(s) ; Ordering Provider:   Mata Hearn MD; Catalog Code:   diazePAM ; Order Dt/Tm:   11/30/2020 9:48:55 PM CST          diclofenac topical  :   diclofenac topical ; Status:   Prescribed ; Ordered As Mnemonic:   diclofenac 1% topical gel ; Simple Display Line:   2 gm, Topical, qid, apply to lower back, 240 gm, 2 Refill(s) ; Ordering Provider:   Mata Hearn MD; Catalog Code:   diclofenac topical ; Order Dt/Tm:   12/22/2020 3:38:26 PM CST          ketorolac ophthalmic  :   ketorolac ophthalmic ; Status:   Prescribed ; Ordered As Mnemonic:   Acular 0.5% ophthalmic solution ; Simple Display Line:   1 drop(s), right eye, QID, for 7 day(s), PRN: for itching, 10 mL, 0 Refill(s) ; Ordering Provider:   Cricket Jessica PA-C; Catalog Code:   ketorolac ophthalmic ; Order Dt/Tm:   10/9/2020 11:52:51 AM CDT          Miscellaneous Rx Supply  :   Miscellaneous Rx Supply ; Status:   Prescribed ; Ordered As Mnemonic:   Orthotics ; Simple Display Line:   See Instructions, to use as directed with shoes, 2 EA, 0 Refill(s) ; Ordering Provider:   Mata Hearn MD; Catalog Code:   Miscellaneous Rx Supply ; Order Dt/Tm:   2/13/2020 3:16:54 PM CST          oxyCODONE  :   oxyCODONE ; Status:   Prescribed ; Ordered As Mnemonic:   oxyCODONE 5 mg oral tablet ; Simple Display Line:   5 mg, 1 tab(s), Oral, q8 hrs, PRN: as needed for pain, 20 tab(s), 0 Refill(s) ; Ordering Provider:   Mata Hearn MD; Catalog Code:   oxyCODONE ; Order Dt/Tm:   2/16/2021 4:14:53 PM CST            Home Meds    acetaminophen  :    acetaminophen ; Status:   Documented ; Ordered As Mnemonic:   acetaminophen 500 mg oral tablet ; Simple Display Line:   1,000 mg, 2 tab(s), Oral, q6 hrs, PRN: as needed for pain, 0 Refill(s) ; Catalog Code:   acetaminophen ; Order Dt/Tm:   11/6/2019 1:37:34 PM CST            ID Risk Screen   Recent Travel History :   No recent travel   Family Member Travel History :   No recent travel   Other Exposure to Infectious Disease :   Unknown   COVID-19 Testing Status :   No positive COVID-19 test   Fariba Roth MA - 3/24/2021 2:24 PM CDT   Social History   Social History   (As Of: 3/24/2021 2:31:46 PM CDT)   Alcohol:        Past   Comments:  6/16/2015 3:17 PM - Randall Salcedo MD: 6 drinks pks per weekend   (Last Updated: 2/13/2020 3:10:22 PM CST by Fariba Roth MA)          Tobacco:        10 or more cigarettes (1/2 pack or more)/day in last 30 days   (Last Updated: 11/30/2020 2:34:51 PM CST by Fariba Roth MA)          Electronic Cigarette/Vaping:        Electronic Cigarette Use: Never.   (Last Updated: 11/30/2020 2:35:11 PM CST by Fariba Roth MA)          Substance Abuse:        Current   Comments:  6/16/2015 3:18 PM - Randall Salcedo MD: Marijuana   (Last Updated: 6/16/2015 3:18:44 PM CDT by Randall Salcedo MD)          Employment/School:        Work/School description: disabled.   Comments:  2/21/2011 5:54 PM - Wong Bashir MD: has 1 child- 10 weeks old -   (Last Updated: 2/21/2011 5:54:14 PM CST by Wong Bashir MD)          Home/Environment:        Lives with Self, Significant other.   Comments:  2/21/2011 5:54 PM - Wong Bashir MD: 2 kids in household   (Last Updated: 2/21/2011 5:54:34 PM CST by Wong Bashir MD)

## 2022-02-15 NOTE — PROGRESS NOTES
Chief Complaint    Patient presents for trigger point injections and Toradol.  History of Present Illness          HPI pt has history of chronic myofascial pain and has found trigger point injections to be a useful tool to help control pain.  Would like to have repeat trigger point injections today.  he also reports that his leg pain is radiating down to the right ankle, in the past when he has had this pain oral steroids have helped.  He would also like a shot of toradol today.  no loss of control of bowel or bladder, no hx of cancer                     Review of systems is negative except as per HPI      Exam:      General: alert and oriented ×3 no acute distress.      HEENT: pupils are equal round and reactive to light extraocular motion is intact. Normocephalic and atraumatic.       Hearing is grossly normal and there is no otorrhea.       Nares are patent there is no rhinorrhea.       Mucous membranes are moist and pink.      Chest: has bilateral rise with no increased work of breathing.      Cardiovascular: normal perfusion and brisk capillary refill.      Musculoskeletal: no gross focal abnormalities and normal gait.      Neuro: no gross focal abnormalities and memory seems intact.      Psychiatric: speech is clear and coherent and fluent. Patient dressed appropriately for the weather. Mood is appropriate and affect is full.           Procedure note           Informed consent obtained including risks of pain, bleeding, infection, injury to surrounding tissue, and need for further treatment. Benefit of a trigger point injection goal is to reduce pain. This is just part of a treatment plan and for best results patient should also complete physical therapy. I cannot guarantee that will will be any pain relief.           Alternatives would include doing nothing, physical therapy, acupuncture, using heat or ice, continuing with oral medications such as muscle relaxants or nonsteroidal anti-inflammatory medications  or topical medications such as a lidocaine patch or cream or menthol for diclofenac. She would like to try the trigger point injections.           Prep: Alcohol          Location identified by my palpation and communication with patient.  Symptomatic trigger points that patient desired treatment at were located at: bilateral paracervical spinal muscles x 1 each and left paraspinal muscle in the lumbar back x 1                      Each of these was injected with  a one-to-one solution of 1% lidocaine without epinephrine and 0.5% Marcaine without epinephrine.          Total number of injections:3          Total number of milliliters of the 1:1 solution of lidocaine and marcaine: 8           Encouraged patient to treat the area with Preston cup ice massage tonight and to try to stretch the area out.           pain prior to treatment: severe          pain following treatment at the trigger point injection sites:resolved          Complications: none          Tolerated procedure well.          Asessment and Plan: myalgia and myofascial pain, s/p trigger point injections today.  Encouraged home exercise program and to keep annual exam appt as indicated and RTC if symptoms worsen or do not improve.   Physical Exam   Vitals & Measurements    BP: 122/78     WT: 271.8 lb   Assessment/Plan       Low back pain (M54.5)         Orders:          ketorolac, 30 mg, im, once, (Completed)          [address] therapeutic prophylactic/dx injection subq/im (Charge), Quantity: 1, Low back pain           ketorolac tromethamine inj, 15 mg (Charge), Quantity: 2, Low back pain                Myalgia (M79.1)          s/p trigger point injections todavida, pain clinic consult pending, cont with the duloxetine         Orders:                Radiculopathy (M54.10)         Orders:          predniSONE, 1 tab(s) ( 50 mg ), PO, Daily, # 5 tab(s), 0 Refill(s), Type: Maintenance, Pharmacy: [facility] [place], 1 tab(s) po daily,x5 day(s),  Ordered          [address]26 office outpatient visit 25 minutes (Charge), Quantity: 1, Radiculopathy           Patient Information     Name:JOEL SUTTON      Address:      [address] [place] [address] [address] [place] [id number]     Sex:Male     YOB: 1980     [phone]     Emergency Contact:LEESA SUTTON     MRN:[id number]     FIN:[id number]     Location:[facility]     Date of Service:06/01/2018      Primary Care Physician:       Nena King MD, [phone]      Attending Physician:       Nena King MD, [phone]  Problem List/Past Medical History    Ongoing     Anxiety     Asthma, moderate persistent     Cerebellar tonsillar ectopia     Chronic insomnia     Dysplastic nevus       Comments: removed from back     Low back pain     Marijuana use     Myalgia     Obese     Sebaceous cyst     Smoking     Tension headache     Tobacco user     Trochanteric bursitis, right hip    Historical     No qualifying data  Procedure/Surgical History     Injection of cortisone (06/08/2016)           Transanal hemorrhoidal dearterialization (THD) (09/08/2015)           Colonoscopy (08/04/2015)             Comments: Sedation: MAC<br/>Indication: rectal bleeding<br/>External &amp; internal hemorrhoids; otherwise normal<br/>Resume routine screening at age 50.     Esophagogastroduodenoscopy (08/04/2015)             Comments: Normal     Hospitalized at [facility] (2002)           Right knee arthroscopy           Surgery on eyes x3             Comments: as infant  Medications     Advil 200 mg oral tablet: 400 mg, 2 tab(s), po, q8 hrs, PRN: as needed for pain, 0 Refill(s).     Shoes for low back pain: See Instructions, Use as directed, 2 EA, 0 Refill(s).     ProAir HFA 90 mcg/inh inhalation aerosol: 2 puff(s), inh, qid, PRN: as needed for wheezing, 1 EA, 2 Refill(s).     Qvar 80 mcg/inh inhalation aerosol: 1 puff(s), inh, bid, 1 EA, 5 Refill(s).     Symbicort 160 mcg-4.5  mcg/inh inhalation aerosol: 2 puff(s), inh, bid, to replace qvar  in the morning and the evening  use with spacer chamber  rinse mouth and throat after use, 3 EA, 3 Refill(s).     DULoxetine 60 mg oral delayed release capsule: 60 mg, 1 cap(s), po, daily, 60 cap(s), 1 Refill(s).     predniSONE 50 mg oral tablet: 50 mg, 1 tab(s), PO, Daily, for 5 day(s), 5 tab(s), 0 Refill(s).          Allergies    Bee Stings    Latex  Social History    Smoking Status - 06/01/2018     Current every day smoker     Alcohol      Current, 06/16/2015     Employment and Education      Work/School description: disabled., 02/21/2011     Home and Environment      Lives with Self, Significant other., 02/21/2011     Substance Abuse      Current, 06/16/2015     Tobacco      Current, 06/16/2015  Family History    Diabetes mellitus type 2: Mother.    Heart disease: Father.  Immunizations      Vaccine Date Status      tetanus/diphth/pertuss (Tdap) adult/adol 11/17/2016 Given      influenza virus vaccine, inactivated 11/17/2016 Given      influenza virus vaccine, inactivated 01/06/2015 Given  Lab Results       Lab Results (Last 4 results within 90 days)        Sodium Level: 140 mmol/L [135 mmol/L - 146 mmol/L] (04/27/18 14:54:00 CDT)       Potassium Level: 4.4 mmol/L [3.5 mmol/L - 5.3 mmol/L] (04/27/18 14:54:00 CDT)       Chloride Level: 104 mmol/L [98 mmol/L - 110 mmol/L] (04/27/18 14:54:00 CDT)       CO2 Level: 31 mmol/L [20 mmol/L - 31 mmol/L] (04/27/18 14:54:00 CDT)       Glucose Level: 83 mg/dL [65 mg/dL - 99 mg/dL] (04/27/18 14:54:00 CDT)       BUN: 15 mg/dL [7 mg/dL - 25 mg/dL] (04/27/18 14:54:00 CDT)       Creatinine Level: 0.98 mg/dL [0.6 mg/dL - 1.35 mg/dL] (04/27/18 14:54:00 CDT)       BUN/Creat Ratio: NOT APPLICABLE [6  - 22] (04/27/18 14:54:00 CDT)       eGFR: 97 mL/min/1.73m2 [8.6 mg/dL - 10.3 mg/dL] (04/27/18 14:54:00 CDT)       eGFR African American: 113 mL/min/1.73m2 [6  - 22] (04/27/18 14:54:00 CDT)       Calcium Level: 9.7 mg/dL  [8.6 mg/dL - 10.3 mg/dL] (04/27/18 14:54:00 CDT)

## 2022-02-15 NOTE — NURSING NOTE
Comprehensive Intake Entered On:  8/4/2021 2:35 PM CDT    Performed On:  8/4/2021 2:31 PM CDT by Fariba Roth MA               Summary   Chief Complaint :   verbal consent given for telemed visit.  pain mgt--refill pain meds.  also needs letters for emotional support animals--now has a cat/kitten.   Menstrual Status :   N/A   Fariba Roth MA - 8/4/2021 2:31 PM CDT   Consents   Consent for Immunization Exchange :   Consent Granted   Consent for Immunizations to Providers :   Consent Granted   Fariba Roth MA - 8/4/2021 2:31 PM CDT   Meds / Allergies   (As Of: 8/4/2021 2:35:49 PM CDT)   Allergies (Active)   Bananas  Estimated Onset Date:   Unspecified ; Created By:   Rowena Hooker LPN; Reaction Status:   Active ; Category:   Drug ; Substance:   Bananas ; Type:   Allergy ; Updated By:   Rowena Hooker LPN; Reviewed Date:   6/9/2021 11:35 AM CDT      Bee Stings  Estimated Onset Date:   Unspecified ; Created By:   Ruthie Nam; Reaction Status:   Active ; Category:   Drug ; Substance:   Bee Stings ; Type:   Allergy ; Updated By:   Ruthie Nam; Reviewed Date:   6/9/2021 11:35 AM CDT      Latex  Estimated Onset Date:   Unspecified ; Created By:   Ruthie Nam; Reaction Status:   Active ; Category:   Drug ; Substance:   Latex ; Type:   Allergy ; Updated By:   Ruthie Nam; Reviewed Date:   6/9/2021 11:35 AM CDT        Medication List   (As Of: 8/4/2021 2:35:49 PM CDT)   Prescription/Discharge Order    albuterol  :   albuterol ; Status:   Prescribed ; Ordered As Mnemonic:   albuterol 90 mcg/inh inhalation aerosol ; Simple Display Line:   2 puff(s), Inhale, qid, 2 EA, 3 Refill(s) ; Ordering Provider:   Mata Hearn MD; Catalog Code:   albuterol ; Order Dt/Tm:   3/25/2021 7:39:18 AM CDT          cyclobenzaprine  :   cyclobenzaprine ; Status:   Prescribed ; Ordered As Mnemonic:   cyclobenzaprine 10 mg oral tablet ; Simple Display Line:   10 mg, 1 tab(s), Oral, bid, PRN: for spasm, 20 tab(s), 1  Refill(s) ; Ordering Provider:   Mata Hearn MD; Catalog Code:   cyclobenzaprine ; Order Dt/Tm:   6/2/2021 10:12:16 AM CDT          diazePAM  :   diazePAM ; Status:   Prescribed ; Ordered As Mnemonic:   diazePAM 5 mg oral tablet ; Simple Display Line:   5 mg, 1 tab(s), Oral, daily, PRN: as needed for anxiety, 12 tab(s), 0 Refill(s) ; Ordering Provider:   Mata Hearn MD; Catalog Code:   diazePAM ; Order Dt/Tm:   11/30/2020 9:48:55 PM CST          diclofenac topical  :   diclofenac topical ; Status:   Prescribed ; Ordered As Mnemonic:   diclofenac 3% topical gel ; Simple Display Line:   3 gm, Topical, qid, 240 gm, 2 Refill(s) ; Ordering Provider:   Mata Hearn MD; Catalog Code:   diclofenac topical ; Order Dt/Tm:   5/27/2021 3:01:47 PM CDT          ketorolac ophthalmic  :   ketorolac ophthalmic ; Status:   Prescribed ; Ordered As Mnemonic:   Acular 0.5% ophthalmic solution ; Simple Display Line:   1 drop(s), right eye, QID, for 7 day(s), PRN: for itching, 10 mL, 1 Refill(s) ; Ordering Provider:   Mata Hearn MD; Catalog Code:   ketorolac ophthalmic ; Order Dt/Tm:   3/25/2021 7:40:40 AM CDT          Miscellaneous Rx Supply  :   Miscellaneous Rx Supply ; Status:   Prescribed ; Ordered As Mnemonic:   Orthotics ; Simple Display Line:   See Instructions, to use as directed with shoes, 2 EA, 0 Refill(s) ; Ordering Provider:   Mata Hearn MD; Catalog Code:   Miscellaneous Rx Supply ; Order Dt/Tm:   2/13/2020 3:16:54 PM CST          oxyCODONE  :   oxyCODONE ; Status:   Prescribed ; Ordered As Mnemonic:   oxyCODONE 5 mg oral tablet ; Simple Display Line:   5 mg, 1 tab(s), Oral, q8 hrs, PRN: as needed for pain, 20 tab(s), 0 Refill(s) ; Ordering Provider:   Mata Hearn MD; Catalog Code:   oxyCODONE ; Order Dt/Tm:   6/2/2021 12:45:52 PM CDT            Home Meds    acetaminophen  :   acetaminophen ; Status:   Documented ; Ordered As Mnemonic:   acetaminophen 500 mg oral tablet ; Simple  Display Line:   1,000 mg, 2 tab(s), Oral, q6 hrs, PRN: as needed for pain, 0 Refill(s) ; Catalog Code:   acetaminophen ; Order Dt/Tm:   11/6/2019 1:37:34 PM CST            Social History   Social History   (As Of: 8/4/2021 2:35:49 PM CDT)   Alcohol:        Past   Comments:  6/16/2015 3:17 PM - Randall Salcedo MD: 6 drinks pks per weekend   (Last Updated: 2/13/2020 3:10:22 PM CST by Fariba Roth MA)          Tobacco:        10 or more cigarettes (1/2 pack or more)/day in last 30 days   (Last Updated: 11/30/2020 2:34:51 PM CST by Fariba Roth MA)          Electronic Cigarette/Vaping:        Electronic Cigarette Use: Never.   (Last Updated: 11/30/2020 2:35:11 PM CST by Fariba Roth MA)          Substance Abuse:        Current   Comments:  6/16/2015 3:18 PM - Randall Salcedo MD: Marijuana   (Last Updated: 6/16/2015 3:18:44 PM CDT by Randall Salcedo MD)          Employment/School:        Work/School description: disabled.   Comments:  2/21/2011 5:54 PM - Wong Bashir MD: has 1 child- 10 weeks old -   (Last Updated: 2/21/2011 5:54:14 PM CST by Wong Bashir MD)          Home/Environment:        Lives with Self, Significant other.   Comments:  2/21/2011 5:54 PM - Wong Bashir MD: 2 kids in household   (Last Updated: 2/21/2011 5:54:34 PM CST by Wong Bashir MD)

## 2022-02-15 NOTE — NURSING NOTE
Comprehensive Intake Entered On:  5/18/2021 1:48 PM CDT    Performed On:  5/18/2021 1:43 PM CDT by Gonzalez RAMSAY, Fariba               Summary   Chief Complaint :   verbal consent given for telemed visit.  med check--refill oxycodone, also discuss higher strength of diclofenac.  also wants referrals to therapist and dietician.   Menstrual Status :   N/A   Height/Length Estimated :   72 in(Converted to: 6 ft 0 in, 182.88 cm)    Fariba Roth MA - 5/18/2021 1:43 PM CDT   Health Status   Allergies Verified? :   Yes   Medication History Verified? :   Yes   Medical History Verified? :   Yes   Pre-Visit Planning Status :   Completed   Tobacco Use? :   Current every day smoker   Fariba Roth MA - 5/18/2021 1:43 PM CDT   Consents   Consent for Immunization Exchange :   Consent Granted   Consent for Immunizations to Providers :   Consent Granted   Fariba Roth MA - 5/18/2021 1:43 PM CDT   Meds / Allergies   (As Of: 5/18/2021 1:48:48 PM CDT)   Allergies (Active)   Bananas  Estimated Onset Date:   Unspecified ; Created By:   Rowena Hooker LPN; Reaction Status:   Active ; Category:   Drug ; Substance:   Bananas ; Type:   Allergy ; Updated By:   Rowena Hooker LPN; Reviewed Date:   2/16/2021 2:40 PM CST      Bee Stings  Estimated Onset Date:   Unspecified ; Created By:   Ruthie Nam; Reaction Status:   Active ; Category:   Drug ; Substance:   Bee Stings ; Type:   Allergy ; Updated By:   Ruthie Nam; Reviewed Date:   2/16/2021 2:40 PM CST      Latex  Estimated Onset Date:   Unspecified ; Created By:   Ruthie Nam; Reaction Status:   Active ; Category:   Drug ; Substance:   Latex ; Type:   Allergy ; Updated By:   Ruthie Nam; Reviewed Date:   2/16/2021 2:40 PM CST        Medication List   (As Of: 5/18/2021 1:48:48 PM CDT)   Prescription/Discharge Order    albuterol  :   albuterol ; Status:   Prescribed ; Ordered As Mnemonic:   albuterol 90 mcg/inh inhalation aerosol ; Simple Display Line:   2 puff(s),  Inhale, qid, 2 EA, 3 Refill(s) ; Ordering Provider:   Mata Hearn MD; Catalog Code:   albuterol ; Order Dt/Tm:   3/25/2021 7:39:18 AM CDT          diazePAM  :   diazePAM ; Status:   Prescribed ; Ordered As Mnemonic:   diazePAM 5 mg oral tablet ; Simple Display Line:   5 mg, 1 tab(s), Oral, daily, PRN: as needed for anxiety, 12 tab(s), 0 Refill(s) ; Ordering Provider:   Mata Hearn MD; Catalog Code:   diazePAM ; Order Dt/Tm:   11/30/2020 9:48:55 PM CST          diclofenac topical  :   diclofenac topical ; Status:   Prescribed ; Ordered As Mnemonic:   diclofenac 1% topical gel ; Simple Display Line:   2 gm, Topical, qid, apply to lower back, 240 gm, 2 Refill(s) ; Ordering Provider:   Mata Hearn MD; Catalog Code:   diclofenac topical ; Order Dt/Tm:   3/25/2021 7:39:29 AM CDT          ketorolac ophthalmic  :   ketorolac ophthalmic ; Status:   Prescribed ; Ordered As Mnemonic:   Acular 0.5% ophthalmic solution ; Simple Display Line:   1 drop(s), right eye, QID, for 7 day(s), PRN: for itching, 10 mL, 1 Refill(s) ; Ordering Provider:   Mata Hearn MD; Catalog Code:   ketorolac ophthalmic ; Order Dt/Tm:   3/25/2021 7:40:40 AM CDT          Miscellaneous Rx Supply  :   Miscellaneous Rx Supply ; Status:   Prescribed ; Ordered As Mnemonic:   Orthotics ; Simple Display Line:   See Instructions, to use as directed with shoes, 2 EA, 0 Refill(s) ; Ordering Provider:   Mata Hearn MD; Catalog Code:   Miscellaneous Rx Supply ; Order Dt/Tm:   2/13/2020 3:16:54 PM CST          oxyCODONE  :   oxyCODONE ; Status:   Prescribed ; Ordered As Mnemonic:   oxyCODONE 5 mg oral tablet ; Simple Display Line:   5 mg, 1 tab(s), Oral, q8 hrs, PRN: as needed for pain, 20 tab(s), 0 Refill(s) ; Ordering Provider:   Mata Hearn MD; Catalog Code:   oxyCODONE ; Order Dt/Tm:   3/24/2021 4:34:40 PM CDT            Home Meds    acetaminophen  :   acetaminophen ; Status:   Documented ; Ordered As Mnemonic:    acetaminophen 500 mg oral tablet ; Simple Display Line:   1,000 mg, 2 tab(s), Oral, q6 hrs, PRN: as needed for pain, 0 Refill(s) ; Catalog Code:   acetaminophen ; Order Dt/Tm:   11/6/2019 1:37:34 PM CST            ID Risk Screen   Recent Travel History :   No recent travel   Family Member Travel History :   No recent travel   Other Exposure to Infectious Disease :   Unknown   COVID-19 Testing Status :   No positive COVID-19 test   Fariba Roth MA - 5/18/2021 1:43 PM CDT   Social History   Social History   (As Of: 5/18/2021 1:48:48 PM CDT)   Alcohol:        Past   Comments:  6/16/2015 3:17 PM - Randall Salcedo MD: 6 drinks pks per weekend   (Last Updated: 2/13/2020 3:10:22 PM CST by Fariba Roth MA)          Tobacco:        10 or more cigarettes (1/2 pack or more)/day in last 30 days   (Last Updated: 11/30/2020 2:34:51 PM CST by Fariba Roth MA)          Electronic Cigarette/Vaping:        Electronic Cigarette Use: Never.   (Last Updated: 11/30/2020 2:35:11 PM CST by Fariba Roth MA)          Substance Abuse:        Current   Comments:  6/16/2015 3:18 PM - Randall Salcedo MD: Marijuana   (Last Updated: 6/16/2015 3:18:44 PM CDT by Randall Salcedo MD)          Employment/School:        Work/School description: disabled.   Comments:  2/21/2011 5:54 PM - Wong Bashir MD: has 1 child- 10 weeks old -   (Last Updated: 2/21/2011 5:54:14 PM CST by Wong Bashir MD)          Home/Environment:        Lives with Self, Significant other.   Comments:  2/21/2011 5:54 PM - Wong Bashir MD: 2 kids in household   (Last Updated: 2/21/2011 5:54:34 PM CST by Wong Bashir MD)

## 2022-02-15 NOTE — NURSING NOTE
Comprehensive Intake Entered On:  11/29/2021 10:17 AM CST    Performed On:  11/29/2021 10:11 AM CST by Abbi Chong LPN               Summary   Chief Complaint :   pain managment, chronic back pain. patient stated that he needs surgery but unable to do at this time so would like to discuss further pain medication refills   Menstrual Status :   N/A   Height Measured :   72 in(Converted to: 6 ft 0 in, 182.88 cm)    Height/Length Estimated :   72 in(Converted to: 6 ft 0 in, 182.88 cm)    Abbi Chong LPN - 11/29/2021 10:11 AM CST   Health Status   Allergies Verified? :   Yes   Medication History Verified? :   Yes   Pre-Visit Planning Status :   Completed   Abbi Chong LPN - 11/29/2021 10:11 AM CST   Consents   Consent for Immunization Exchange :   Consent Granted   Consent for Immunizations to Providers :   Consent Granted   Abbi Chong LPN - 11/29/2021 10:11 AM CST   Meds / Allergies   (As Of: 11/29/2021 10:17:32 AM CST)   Allergies (Active)   Bananas  Estimated Onset Date:   Unspecified ; Created By:   Rowena Hooker LPN; Reaction Status:   Active ; Category:   Drug ; Substance:   Bananas ; Type:   Allergy ; Updated By:   Rowena Hooker LPN; Reviewed Date:   6/9/2021 11:35 AM CDT      Bee Stings  Estimated Onset Date:   Unspecified ; Created By:   Ruthie Nam; Reaction Status:   Active ; Category:   Drug ; Substance:   Bee Stings ; Type:   Allergy ; Updated By:   Ruthie Nam; Reviewed Date:   6/9/2021 11:35 AM CDT      Latex  Estimated Onset Date:   Unspecified ; Created By:   Ruthie Nam; Reaction Status:   Active ; Category:   Drug ; Substance:   Latex ; Type:   Allergy ; Updated By:   Ruthie Nam; Reviewed Date:   6/9/2021 11:35 AM CDT        Medication List   (As Of: 11/29/2021 10:17:32 AM CST)   Prescription/Discharge Order    albuterol  :   albuterol ; Status:   Prescribed ; Ordered As Mnemonic:   albuterol 90 mcg/inh inhalation aerosol ; Simple Display Line:   2  puff(s), Inhale, qid, 2 EA, 3 Refill(s) ; Ordering Provider:   Mata Hearn MD; Catalog Code:   albuterol ; Order Dt/Tm:   3/25/2021 7:39:18 AM CDT          cyclobenzaprine  :   cyclobenzaprine ; Status:   Prescribed ; Ordered As Mnemonic:   cyclobenzaprine 10 mg oral tablet ; Simple Display Line:   10 mg, 1 tab(s), Oral, bid, PRN: for spasm, 20 tab(s), 1 Refill(s) ; Ordering Provider:   Mata Hearn MD; Catalog Code:   cyclobenzaprine ; Order Dt/Tm:   6/2/2021 10:12:16 AM CDT          diazePAM  :   diazePAM ; Status:   Prescribed ; Ordered As Mnemonic:   diazePAM 5 mg oral tablet ; Simple Display Line:   5 mg, 1 tab(s), Oral, daily, PRN: as needed for anxiety, 12 tab(s), 0 Refill(s) ; Ordering Provider:   Mata Hearn MD; Catalog Code:   diazePAM ; Order Dt/Tm:   11/30/2020 9:48:55 PM CST          diclofenac topical  :   diclofenac topical ; Status:   Prescribed ; Ordered As Mnemonic:   diclofenac 3% topical gel ; Simple Display Line:   3 gm, Topical, qid, 240 gm, 2 Refill(s) ; Ordering Provider:   Mata Hearn MD; Catalog Code:   diclofenac topical ; Order Dt/Tm:   5/27/2021 3:01:47 PM CDT          ketorolac ophthalmic  :   ketorolac ophthalmic ; Status:   Prescribed ; Ordered As Mnemonic:   Acular 0.5% ophthalmic solution ; Simple Display Line:   1 drop(s), right eye, QID, for 7 day(s), PRN: for itching, 10 mL, 1 Refill(s) ; Ordering Provider:   Mata Hearn MD; Catalog Code:   ketorolac ophthalmic ; Order Dt/Tm:   3/25/2021 7:40:40 AM CDT          Miscellaneous Rx Supply  :   Miscellaneous Rx Supply ; Status:   Prescribed ; Ordered As Mnemonic:   Orthotics ; Simple Display Line:   See Instructions, to use as directed with shoes, 2 EA, 0 Refill(s) ; Ordering Provider:   Mata Hearn MD; Catalog Code:   Miscellaneous Rx Supply ; Order Dt/Tm:   2/13/2020 3:16:54 PM CST          oxyCODONE  :   oxyCODONE ; Status:   Prescribed ; Ordered As Mnemonic:   oxyCODONE 5 mg oral tablet ;  Simple Display Line:   5 mg, 1 tab(s), Oral, q8 hrs, PRN: as needed for pain, 20 tab(s), 0 Refill(s) ; Ordering Provider:   Jules Garcia MD; Catalog Code:   oxyCODONE ; Order Dt/Tm:   11/24/2021 11:32:17 AM CST            Home Meds    acetaminophen  :   acetaminophen ; Status:   Documented ; Ordered As Mnemonic:   acetaminophen 500 mg oral tablet ; Simple Display Line:   1,000 mg, 2 tab(s), Oral, q6 hrs, PRN: as needed for pain, 0 Refill(s) ; Catalog Code:   acetaminophen ; Order Dt/Tm:   11/6/2019 1:37:34 PM CST          tiZANidine  :   tiZANidine ; Status:   Documented ; Ordered As Mnemonic:   tiZANidine 2 mg oral tablet ; Simple Display Line:   0 Refill(s) ; Catalog Code:   tiZANidine ; Order Dt/Tm:   11/24/2021 10:47:45 AM CST

## 2022-02-15 NOTE — PROGRESS NOTES
Patient:   JOEL SUTTON            MRN: [id number]            FIN: [id number]               Age:   39 years     Sex:  Male     :  1980   Associated Diagnoses:   Low back pain; S/P lumbar laminectomy   Author:   Cricket Jessica PA-C      Chief Complaint   2019 3:36 PM CST    Follow up back surgery, pt states he has not had a bowel movement since surgery        History of Present Illness   Chief complaint and symptoms noted above and confirmed with patient   had L4 laminectomy on   still having back pain,  pain radiating down his leg has returned  has not had a BM since the surgery  he did take miralax yesterday and today, advised to take milk of magnesia      Review of Systems   Constitutional:  Chills, No fever.    Musculoskeletal:  Back pain.       Health Status   Allergies:    Allergic Reactions (All)  Severity Not Documented  Bananas (No reactions were documented)  Bee Stings (No reactions were documented)  Latex (No reactions were documented)   Medications:  (Selected)   Prescriptions  Prescribed  ProAir HFA 90 mcg/inh inhalation aerosol: 2 puff(s), inh, qid, PRN: as needed for wheezing, # 1 EA, 2 Refill(s), Type: Maintenance, Pharmacy: [facility], 2 puff(s) Inhale qid,PRN:as needed for wheezing  diazePAM 5 mg oral tablet: = 1 tab(s) ( 5 mg ), Oral, q8 hrs, PRN: as needed for anxiety, # 10 tab(s), 0 Refill(s), Type: Maintenance, Pharmacy: [facility], 1 tab(s) Oral q8 hrs,PRN:as needed for anxiety  Documented Medications  Documented  MiraLax oral powder for reconstitution: ( 17 gm ), Oral, daily, 0 Refill(s), Type: Maintenance  acetaminophen 500 mg oral tablet: = 2 tab(s) ( 1,000 mg ), Oral, q6 hrs, PRN: as needed for pain, 0 Refill(s), Type: Maintenance  cyclobenzaprine 10 mg oral tablet: See Instructions, Instructions: 0.5 tab(5mg) po TID PRN for muscle spasm, PRN: for spasm, 0 Refill(s), Type: Maintenance  oxyCODONE 5 mg oral tablet: See  Instructions, Instructions: take 1-2 tablets po every 4 hours PRN for pain, 0 Refill(s), Type: Maintenance   Problem list:    All Problems  Myalgia / SNOMED CT 156098998 / Confirmed  Smoking / SNOMED CT 690650219 / Confirmed  Trochanteric bursitis, right hip / SNOMED CT 87984250 / Confirmed  Dysplastic nevus / SNOMED CT 3611441058 / Confirmed  Tobacco user / SNOMED CT 200860426 / Probable  Tension headache / SNOMED CT 5838605273 / Confirmed  Chronic back pain / SNOMED CT 139608355 / Confirmed  Marijuana use / SNOMED CT 9531598976 / Confirmed  Obese / SNOMED CT 3576871316 / Probable  Sebaceous cyst / SNOMED CT 9122942163 / Confirmed  Asthma, moderate persistent / SNOMED CT 0202606431 / Confirmed  Chronic insomnia / SNOMED CT 107950911 / Confirmed  Anxiety / SNOMED CT 65856746 / Confirmed  Low back pain / SNOMED CT 031137325 / Confirmed  Cerebellar tonsillar ectopia / SNOMED CT 88287768 / Confirmed      Histories   Past Medical History:    Active  Cerebellar tonsillar ectopia (15053062)  Anxiety (91491116)  Obese (7968312229)   Family History:    Heart disease  Father (Conor)  Diabetes mellitus type 2  Mother (Obdulia)        Physical Examination   Vital Signs   11/8/2019 3:36 PM CST Temperature Tympanic 98.2 DegF    Peripheral Pulse Rate 76 bpm    Pulse Site Radial artery    HR Method Electronic    Systolic Blood Pressure 140 mmHg  HI    Diastolic Blood Pressure 80 mmHg    Mean Arterial Pressure 100 mmHg    BP Site Right arm    BP Method Manual      Measurements from flowsheet : Measurements   11/8/2019 3:36 PM CST Height Measured - Standard 72 in    Weight Measured - Standard 292.6 lb    BSA 2.59 m2    Body Mass Index 39.68 kg/m2  HI      General:  No acute distress.    Respiratory:  Lungs are clear to auscultation.    Cardiovascular:  Normal rate, Regular rhythm, No murmur.    Musculoskeletal:  bandage is removed from low back,  swelling is reduced from 2 days ago.  sutures are intact, no drainage, area is still  very tender to touch,  tegaderm is placed over the wound.    Psychiatric:  Appropriate mood & affect.       Impression and Plan   Diagnosis     Low back pain ([id number]-DF M54.5).     S/P lumbar laminectomy ([id number]-IX Z98.890).     Summary:  remove tegaderm in 3 days,  and then use bandage  for the constipation, continue with miralax,  use milk of magnesia q 6 hrs until he has a BM  follow up with surgeon in 10 days.    Orders     Orders   Charges (Evaluation and Management):  08592 office outpatient visit 15 minutes (Charge) (Order): Quantity: 1, Low back pain  S/P lumbar laminectomy.

## 2022-02-15 NOTE — TELEPHONE ENCOUNTER
---------------------  From: Laura Vázquez CMA (Phone Messages Pool ([id number]))   To: [employer] Message Pool ([facility]);     Sent: 8/4/2021 3:39:51 PM CDT  Subject: General Message     Phone Message    Note:   Pt called to say that IRWIN was going to compose a letter for his landlord and then get it faxed  to them. Pt states that his landlords fax machine is broken so he was in the parking lot hoping to get a copy  of the letter to take with him.  I informed pt that the letter has not made it into his chart and IRWIN and his team are done in clinic.  Told pt that a message could be sent for Thursday and pt will follow up with a phone call in the morning.---------------------  From: Abbi Chong LPN ([employer] Message Pool ([id number]))   To: Shannan Jaeger;     Sent: 8/5/2021 9:15:26 AM CDT  Subject: FW: General Message     letter was dictated yesterday---------------------  From: Shannan Jaeger   To: [employer] Message Pool ([id number]);     Sent: 8/5/2021 9:38:54 AM CDT  Subject: RE: General Message     ok. I will watch for it. thanks---------------------  From: Shannan Jaeger   To: [employer] Message Pool ([id number]);     Sent: 8/9/2021 2:29:02 PM CDT  Subject: RE: General Message     This was taken care of on Friday 8/6/21. Patient was called to  letter. thanks

## 2022-02-15 NOTE — NURSING NOTE
Comprehensive Intake Entered On:  4/9/2020 1:41 PM CDT    Performed On:  4/9/2020 1:29 PM CDT by Gonzalez RAMSAY, Fariba               Summary   Chief Complaint :   verbal consent given for telemed visit.  anxiety f/u--getting worse, not able to have f/u visits with specialists for back pain, PT, mental health.  does need refills on Valium and inhalers.  ACT form done via phone, score= 18.   Menstrual Status :   N/A   Fariba Roth MA - 4/9/2020 1:29 PM CDT   Health Status   Allergies Verified? :   Yes   Medication History Verified? :   Yes   Medical History Verified? :   Yes   Pre-Visit Planning Status :   Not completed   Tobacco Use? :   Current every day smoker   Fariba Roth MA - 4/9/2020 1:29 PM CDT   Consents   Consent for Immunization Exchange :   Consent Granted   Consent for Immunizations to Providers :   Consent Granted   Fariba Roth MA - 4/9/2020 1:29 PM CDT   Meds / Allergies   (As Of: 4/9/2020 1:41:14 PM CDT)   Allergies (Active)   Bananas  Estimated Onset Date:   Unspecified ; Created By:   Rowena Hooker LPN; Reaction Status:   Active ; Category:   Drug ; Substance:   Bananas ; Type:   Allergy ; Updated By:   Rowena Hooker LPN; Reviewed Date:   11/19/2019 5:25 PM CST      Bee Stings  Estimated Onset Date:   Unspecified ; Created By:   Ruthie Nam CMA; Reaction Status:   Active ; Category:   Drug ; Substance:   Bee Stings ; Type:   Allergy ; Updated By:   Ruthie Nam CMA; Reviewed Date:   11/19/2019 5:25 PM CST      Latex  Estimated Onset Date:   Unspecified ; Created By:   Ruthie Nam CMA; Reaction Status:   Active ; Category:   Drug ; Substance:   Latex ; Type:   Allergy ; Updated By:   Ruthie Nam CMA; Reviewed Date:   11/19/2019 5:25 PM CST        Medication List   (As Of: 4/9/2020 1:41:14 PM CDT)   Prescription/Discharge Order    albuterol  :   albuterol ; Status:   Prescribed ; Ordered As Mnemonic:   Ventolin HFA 90 mcg/inh inhalation aerosol ; Simple Display Line:   2 puff(s),  NEB, qid, PRN: AS NEEDED FOR WHEEZING, 1 EA, 0 Refill(s) ; Ordering Provider:   Nena King MD; Catalog Code:   albuterol ; Order Dt/Tm:   2/28/2020 1:02:53 PM CST          diazePAM  :   diazePAM ; Status:   Prescribed ; Ordered As Mnemonic:   diazePAM 5 mg oral tablet ; Simple Display Line:   5 mg, 1 tab(s), Oral, q8 hrs, PRN: as needed for anxiety, 10 tab(s), 0 Refill(s) ; Ordering Provider:   Mata Hearn MD; Catalog Code:   diazePAM ; Order Dt/Tm:   2/13/2020 8:50:15 PM CST          Miscellaneous Rx Supply  :   Miscellaneous Rx Supply ; Status:   Prescribed ; Ordered As Mnemonic:   Orthotics ; Simple Display Line:   See Instructions, to use as directed with shoes, 2 EA, 0 Refill(s) ; Ordering Provider:   Mata Hearn MD; Catalog Code:   Miscellaneous Rx Supply ; Order Dt/Tm:   2/13/2020 3:16:54 PM CST            Home Meds    acetaminophen  :   acetaminophen ; Status:   Documented ; Ordered As Mnemonic:   acetaminophen 500 mg oral tablet ; Simple Display Line:   1,000 mg, 2 tab(s), Oral, q6 hrs, PRN: as needed for pain, 0 Refill(s) ; Catalog Code:   acetaminophen ; Order Dt/Tm:   11/6/2019 1:37:34 PM CST          cyclobenzaprine  :   cyclobenzaprine ; Status:   Documented ; Ordered As Mnemonic:   cyclobenzaprine 10 mg oral tablet ; Simple Display Line:   See Instructions, 0.5 tab(5mg) po TID PRN for muscle spasm, PRN: for spasm, 0 Refill(s) ; Catalog Code:   cyclobenzaprine ; Order Dt/Tm:   11/6/2019 1:46:42 PM CST          oxyCODONE  :   oxyCODONE ; Status:   Documented ; Ordered As Mnemonic:   oxyCODONE 5 mg oral tablet ; Simple Display Line:   See Instructions, take 1-2 tablets po every 4 hours PRN for pain, 0 Refill(s) ; Catalog Code:   oxyCODONE ; Order Dt/Tm:   11/6/2019 1:33:24 PM CST          polyethylene glycol 3350  :   polyethylene glycol 3350 ; Status:   Documented ; Ordered As Mnemonic:   MiraLax oral powder for reconstitution ; Simple Display Line:   17 gm, Oral, daily, 0 Refill(s)  ; Catalog Code:   polyethylene glycol 3350 ; Order Dt/Tm:   11/8/2019 3:39:43 PM CST            Social History   Social History   (As Of: 4/9/2020 1:41:14 PM CDT)   Alcohol:        Past   Comments:  6/16/2015 3:17 PM - Randall Salcedo MD: 6 drinks pks per weekend   (Last Updated: 2/13/2020 3:10:22 PM CST by Fariba Roth MA)          Tobacco:        Current   Comments:  6/16/2015 3:17 PM - Randall Salcedo MD: 1 ppd   (Last Updated: 6/16/2015 3:17:14 PM CDT by Randall Salcedo MD)          Substance Abuse:        Current   Comments:  6/16/2015 3:18 PM - Randall Salcedo MD: Marijuana   (Last Updated: 6/16/2015 3:18:44 PM CDT by Randall Salcedo MD)          Employment/School:        Work/School description: disabled.   Comments:  2/21/2011 5:54 PM - Wong Bashir MD: has 1 child- 10 weeks old -   (Last Updated: 2/21/2011 5:54:14 PM CST by Wong Bashir MD)          Home/Environment:        Lives with Self, Significant other.   Comments:  2/21/2011 5:54 PM - Wong Bashir MD: 2 kids in household   (Last Updated: 2/21/2011 5:54:34 PM CST by Wong Bashir MD)

## 2022-02-15 NOTE — NURSING NOTE
Comprehensive Intake Entered On:  1/5/2021 2:35 PM CST    Performed On:  1/5/2021 2:29 PM CST by Gonzalez RAMSAY, Fariba               Summary   Chief Complaint :   discuss ongoing back pain.  also wants injections again.   Menstrual Status :   N/A   Weight Measured :   311.8 lb(Converted to: 311 lb 13 oz, 141.430 kg)    Height Measured :   72 in(Converted to: 6 ft 0 in, 182.88 cm)    Body Mass Index :   42.28 kg/m2 (HI)    Body Surface Area :   2.68 m2   Height/Length Estimated :   72 in(Converted to: 6 ft 0 in, 182.88 cm)    Systolic Blood Pressure :   138 mmHg (HI)    Diastolic Blood Pressure :   80 mmHg   Mean Arterial Pressure :   99 mmHg   Peripheral Pulse Rate :   83 bpm   BP Site :   Right arm   Pulse Site :   Radial artery   BP Method :   Manual   HR Method :   Manual   Temperature Tympanic :   97.6 DegF(Converted to: 36.4 DegC)  (LOW)    Oxygen Saturation :   98 %   Fariba Roth MA - 1/5/2021 2:29 PM CST   Health Status   Allergies Verified? :   Yes   Medication History Verified? :   Yes   Medical History Verified? :   Yes   Pre-Visit Planning Status :   Completed   Tobacco Use? :   Current every day smoker   Fariba Roth MA - 1/5/2021 2:29 PM CST   Consents   Consent for Immunization Exchange :   Consent Granted   Consent for Immunizations to Providers :   Consent Granted   Fariba Roth MA - 1/5/2021 2:29 PM CST   Meds / Allergies   (As Of: 1/5/2021 2:35:11 PM CST)   Allergies (Active)   Bananas  Estimated Onset Date:   Unspecified ; Created By:   Rowena Hooker LPN; Reaction Status:   Active ; Category:   Drug ; Substance:   Bananas ; Type:   Allergy ; Updated By:   Rowena Hooker LPN; Reviewed Date:   10/13/2020 2:26 PM CDT      Bee Stings  Estimated Onset Date:   Unspecified ; Created By:   Ruthie Nam; Reaction Status:   Active ; Category:   Drug ; Substance:   Bee Stings ; Type:   Allergy ; Updated By:   Ruthie Nam; Reviewed Date:   10/13/2020 2:26 PM CDT      Latex  Estimated Onset  Date:   Unspecified ; Created By:   Ruthie Nam; Reaction Status:   Active ; Category:   Drug ; Substance:   Latex ; Type:   Allergy ; Updated By:   Ruthie Nam; Reviewed Date:   10/13/2020 2:26 PM CDT        Medication List   (As Of: 1/5/2021 2:35:11 PM CST)   Prescription/Discharge Order    albuterol  :   albuterol ; Status:   Prescribed ; Ordered As Mnemonic:   Ventolin HFA 90 mcg/inh inhalation aerosol ; Simple Display Line:   2 puff(s), NEB, qid, PRN: AS NEEDED FOR WHEEZING, 1 EA, 0 Refill(s) ; Ordering Provider:   Mata Hearn MD; Catalog Code:   albuterol ; Order Dt/Tm:   4/9/2020 2:03:28 PM CDT          diazePAM  :   diazePAM ; Status:   Prescribed ; Ordered As Mnemonic:   diazePAM 5 mg oral tablet ; Simple Display Line:   5 mg, 1 tab(s), Oral, daily, PRN: as needed for anxiety, 12 tab(s), 0 Refill(s) ; Ordering Provider:   Mata Hearn MD; Catalog Code:   diazePAM ; Order Dt/Tm:   11/30/2020 9:48:55 PM CST          diclofenac topical  :   diclofenac topical ; Status:   Prescribed ; Ordered As Mnemonic:   diclofenac 1% topical gel ; Simple Display Line:   2 gm, Topical, qid, apply to lower back, 240 gm, 2 Refill(s) ; Ordering Provider:   Mata Hearn MD; Catalog Code:   diclofenac topical ; Order Dt/Tm:   12/22/2020 3:38:26 PM CST          ketorolac ophthalmic  :   ketorolac ophthalmic ; Status:   Prescribed ; Ordered As Mnemonic:   Acular 0.5% ophthalmic solution ; Simple Display Line:   1 drop(s), right eye, QID, for 7 day(s), PRN: for itching, 10 mL, 0 Refill(s) ; Ordering Provider:   Cricket Jessica PA-C; Catalog Code:   ketorolac ophthalmic ; Order Dt/Tm:   10/9/2020 11:52:51 AM CDT          Miscellaneous Rx Supply  :   Miscellaneous Rx Supply ; Status:   Prescribed ; Ordered As Mnemonic:   Orthotics ; Simple Display Line:   See Instructions, to use as directed with shoes, 2 EA, 0 Refill(s) ; Ordering Provider:   Mata Hearn MD; Catalog Code:   Miscellaneous Rx Supply  ; Order Dt/Tm:   2/13/2020 3:16:54 PM CST          oxyCODONE  :   oxyCODONE ; Status:   Prescribed ; Ordered As Mnemonic:   oxyCODONE 5 mg oral tablet ; Simple Display Line:   5 mg, 1 tab(s), Oral, q8 hrs, PRN: as needed for pain, 20 tab(s), 0 Refill(s) ; Ordering Provider:   Mata Hearn MD; Catalog Code:   oxyCODONE ; Order Dt/Tm:   11/30/2020 9:49:48 PM CST            Home Meds    acetaminophen  :   acetaminophen ; Status:   Documented ; Ordered As Mnemonic:   acetaminophen 500 mg oral tablet ; Simple Display Line:   1,000 mg, 2 tab(s), Oral, q6 hrs, PRN: as needed for pain, 0 Refill(s) ; Catalog Code:   acetaminophen ; Order Dt/Tm:   11/6/2019 1:37:34 PM CST            ID Risk Screen   Recent Travel History :   No recent travel   Family Member Travel History :   No recent travel   Other Exposure to Infectious Disease :   Unknown   Fariba Roth MA - 1/5/2021 2:29 PM CST   Social History   Social History   (As Of: 1/5/2021 2:35:11 PM CST)   Alcohol:        Past   Comments:  6/16/2015 3:17 PM - Randall Salcedo MD: 6 drinks pks per weekend   (Last Updated: 2/13/2020 3:10:22 PM CST by Fariba Roth MA)          Tobacco:        10 or more cigarettes (1/2 pack or more)/day in last 30 days   (Last Updated: 11/30/2020 2:34:51 PM CST by Fariba Roth MA)          Electronic Cigarette/Vaping:        Electronic Cigarette Use: Never.   (Last Updated: 11/30/2020 2:35:11 PM CST by Fariba Roth MA)          Substance Abuse:        Current   Comments:  6/16/2015 3:18 PM - Randall Salcedo MD: Marijuana   (Last Updated: 6/16/2015 3:18:44 PM CDT by Randall Salcedo MD)          Employment/School:        Work/School description: disabled.   Comments:  2/21/2011 5:54 PM - Wong Bashir MD: has 1 child- 10 weeks old -   (Last Updated: 2/21/2011 5:54:14 PM CST by Wong Bashir MD)          Home/Environment:        Lives with Self, Significant other.   Comments:  2/21/2011 5:54 PM - Wong Bashir MD: 2 kids in household    (Last Updated: 2/21/2011 5:54:34 PM CST by Wong Bashir MD)

## 2022-02-15 NOTE — PROGRESS NOTES
Chief Complaint    back injections, 10-5-2018 feels like coming down with strep or something, sore throat, fever/chills  History of Present Illness          HPI pt has history of chronic myofascial pain and has found trigger point injections to be a useful tool to help control pain.  Would like to have repeat trigger point injections today.         At this time his pain is severe in his upper back.       He has not yet followed through with physical therapy or neurosurgery consult or rescheduled to his pain clinic consult.       Is also had subjective fevers, chills,  cough, occasionally productive greenish yellow sputum.  Wheezing, sore throat runny nose.  He has a history of tobacco abuse, cessation encouraged today.  He also has a history of marijuana abuse.  Cessation encouraged.  She reports a history of asthma.                       Review of systems is negative except as per HPI       Exam:       General: alert and oriented ×3 no acute distress.       HEENT: pupils are equal round and reactive to light extraocular motion is intact. Normocephalic and atraumatic.        Hearing is grossly normal and there is no otorrhea.        Nares are patent there is no rhinorrhea.        Mucous membranes are moist and pink.       Chest: has bilateral rise with no increase in breathing.  He has diffuse mild end expiratory wheezing.       Cardiovascular: normal perfusion and brisk capillary refill.       Musculoskeletal: no gross focal abnormalities and normal gait.  He has hypertonicity in his bilateral upper back with tender trigger points that are symptomatic palpated in his bilateral rhomboid majors and trapezius.       Neuro: no gross focal abnormalities and memory seems intact.       Psychiatric: speech is clear and coherent and fluent. Patient dressed appropriately for the weather. Mood is sad and irritable.  And affect is full.            Procedure note            Informed consent obtained including risks of pain,  bleeding, infection, injury to surrounding tissue, and need for further treatment. Benefit of a trigger point injection goal is to reduce pain. This is just part of a treatment plan and for best results patient should also complete physical therapy. I cannot guarantee that will will be any pain relief.            Alternatives would include doing nothing, physical therapy, acupuncture, using heat or ice, continuing with oral medications such as muscle relaxants or nonsteroidal anti-inflammatory medications or topical medications such as a lidocaine patch or cream or menthol for diclofenac. She would like to try the trigger point injections.            Prep: Alcohol           Location identified by my palpation and communication with patient.  Symptomatic trigger points that patient desired treatment at were located at: bilateral rhomboid major and trapezius                        Each of these was injected with  a one-to-one solution of 1% lidocaine without epinephrine and 0.5% Marcaine without epinephrine.           Total number of injections:4           Total number of milliliters of the 1:1 solution of lidocaine and marcaine:8            Encouraged patient to treat the area with [place] cup ice massage tonight and to try to stretch the area out.            pain prior to treatment: severe           pain following treatment at the trigger point injection sites:improved           Complications: none           Tolerated procedure well.            Review of Systems      As per HPI otherwise negative.  No nausea vomiting diarrhea constipation no new skin lesions or rashes no changes in bowel or bladder habits no chest pain or heart palpitations  Physical Exam   Vitals & Measurements    T: 97.2   F (Tympanic)  HR: 100(Peripheral)  BP: 120/80  SpO2: 93%     WT: 284.8 lb   Assessment/Plan       Asthma, moderate persistent (J45.40)        Hard to know at this time or how much of his shortness of breath may be related to an  asthma flare versus possibly an element of the COPD exacerbation.  We will plan to get patient treated and then will have him consider PFTs.  Would like to check these when he is at his functional baseline.  Encouraged tobacco and marijuana smoking cessation.                Bronchitis (J40)         Orders:          29896 office outpatient visit 15 minutes (Charge), Quantity: 1, Bronchitis  Sore throat  Tobacco user  Neck pain  Chronic back pain                Chronic back pain (M54.9)         Orders:          65561 unlisted px skin muc membrane +subq tissue (Charge), Quantity: 1, Chronic back pain  Neck pain          10367 office outpatient visit 15 minutes (Charge), Quantity: 1, Bronchitis  Sore throat  Tobacco user  Neck pain  Chronic back pain                Low back pain (M54.5)         Orders:          Miscellaneous Prescription, Shoes for low back pain, See Instructions, Instructions: Use as directed, Supply, # 2 EA, 0 Refill(s), Type: Maintenance, Use as directed, (Completed)                Marijuana use (F12.90)          encouraged cessation                Neck pain (M54.2)           Asessment and Plan: myalgia and myofascial pain, s/p trigger point injections today.  Encouraged home exercise program being learned with physical therapy eval         Orders:          11755 office outpatient visit 15 minutes (Charge), Quantity: 1, Bronchitis  Sore throat  Tobacco user  Neck pain  Chronic back pain                Obese (E66.9)        Encouraged weight loss                Smoking (Z72.0), Tobacco user (Z72.0)          encouraged cessation         Orders:          44228 office outpatient visit 15 minutes (Charge), Quantity: 1, Bronchitis  Sore throat  Tobacco user  Neck pain  Chronic back pain                Sore throat (J02.9)        Rapid strep is negative cultures pending suspect that this is part of his acute illness.  Would like patient to return to clinic if her symptoms worsen or do not  improve.         Orders:          26035 office outpatient visit 15 minutes (Charge), Quantity: 1, Bronchitis  Sore throat  Tobacco user  Neck pain  Chronic back pain          POC, GROUP A STREP* ([facility]), Specimen Type: Swab, Collection Date: 10/08/18 11:50:00 CDT                Orders:         azithromycin, Take 2 tablets on Day 1 and then 1 tablet, PO, Daily, Instructions: until finished, # 6 tab(s), 0 Refill(s), Type: Maintenance, Pharmacy: [facility], Take 2 tablets on Day 1 and then 1 tablet Oral daily,Instr:until finished, (Ordered)         cyclobenzaprine, 1 tab(s) ( 5 mg ), po, bid, PRN: for muscle spasm, # 60 tab(s), 1 Refill(s), Type: Maintenance, Pharmacy: [facility], 1 tab(s) po bid,PRN:for muscle spasm, (Completed)         DULoxetine, 1 cap(s) ( 60 mg ), po, daily, # 60 cap(s), 1 Refill(s), Type: Maintenance, Pharmacy: [facility], 1 cap(s) po daily, (Completed)         lidocaine topical, 3 patch(es), Topical, daily, Instructions: remove patches after 12 hours, # 90 patch(es), 11 Refill(s), Type: Maintenance, Pharmacy: [facility], 3 patch(es) Topical daily,x30 day(s),Instr:remove patches after 12 hours, (Completed)         LORazepam, 1 tab(s) ( 2 mg ), PO, once, Instructions: take 1 our prior to MRI, PRN: for anxiety, # 1 tab(s), 0 Refill(s), Type: Soft Stop, Pharmacy: [facility], 1 tab(s) Oral once,PRN:for anxiety,Instr:take 1 our prior to MRI, (Completed)         Miscellaneous Prescription, valved holding chamber spacer, See Instructions, Instructions: use with albuterol, Supply, # 1 EA, 0 Refill(s), Type: Maintenance, Pharmacy: [facility], use with albuterol, (Completed)         predniSONE, = 1 tab(s) ( 50 mg ), PO, Daily, # 5 tab(s), 0 Refill(s), Type: Maintenance, Pharmacy: [facility], 1 tab(s) Oral daily,x5 day(s),  (Ordered)         pregabalin, = 1 cap(s) ( 50 mg ), Oral, bid, # 60 cap(s), 1 Refill(s), Type: Maintenance, Pharmacy: [facility] [place], 1 cap(s) Oral bid, (Completed)  Patient Information     Name:JOEL SUTTON      Address:      [address] [place] [address]     Sex:Male     YOB: 1980     [phone]     Emergency Contact:LEESA SUTTON     MRN:[id number]     FIN:[id number]     Location:[facility]     Date of Service:10/08/2018      Primary Care Physician:       Nena King MD, [phone]      Attending Physician:       Nena King MD, [phone]  Problem List/Past Medical History    Ongoing     Anxiety     Asthma, moderate persistent     Cerebellar tonsillar ectopia     Chronic back pain     Chronic insomnia     Dysplastic nevus       Comments: removed from back     Low back pain     Marijuana use     Myalgia     Obese     Sebaceous cyst     Smoking     Tension headache     Tobacco user     Trochanteric bursitis, right hip    Historical     No qualifying data  Procedure/Surgical History     Injection of cortisone (06/08/2016)           Transanal hemorrhoidal dearterialization (THD) (09/08/2015)           Colonoscopy (08/04/2015)            Comments:      Sedation: MAC      Indication: rectal bleeding      External & internal hemorrhoids; otherwise normal      Resume routine screening at age 50.     Esophagogastroduodenoscopy (08/04/2015)            Comments:      Normal     Hospitalized at [facility] (2002)           Right knee arthroscopy           Surgery on eyes x3            Comments:      as infant  Medications     ProAir HFA 90 mcg/inh inhalation aerosol: 2 puff(s), inh, qid, PRN: as needed for wheezing, 1 EA, 2 Refill(s).     predniSONE 50 mg oral tablet: 50 mg, 1 tab(s), PO, Daily, for 5 day(s), 5 tab(s), 0 Refill(s).     Zithromax Z-Kei 250 mg oral tablet: Take 2 tablets on Day 1 and then 1  tablet, PO, Daily, until finished, 6 tab(s), 0 Refill(s).          Allergies    Bananas    Bee Stings    Latex  Social History    Smoking Status - 10/08/2018     Current every day smoker     Alcohol      Current, 06/16/2015     Employment and Education      Work/School description: disabled., 02/21/2011     Home and Environment      Lives with Self, Significant other., 02/21/2011     Substance Abuse      Current, 06/16/2015     Tobacco      Current, 06/16/2015  Family History    Diabetes mellitus type 2: Mother.    Heart disease: Father.  Immunizations      Vaccine Date Status      tetanus/diphth/pertuss (Tdap) adult/adol 11/17/2016 Given      influenza virus vaccine, inactivated 11/17/2016 Given      influenza virus vaccine, inactivated 01/06/2015 Given

## 2022-02-15 NOTE — PROGRESS NOTES
Patient:   JOEL SUTTON            MRN: [id number]            FIN: [id number]               Age:   40 years     Sex:  Male     :  1980   Associated Diagnoses:   Encounter for screening for other viral diseases   Author:   Johnny Alas PA-C      Visit Information      Date of Service: 10/13/2020 01:39 pm  Performing Location: [facility]  Encounter#: [id number]      Primary Care Provider (PCP):  Mata Hearn MD    NPI# [id number]      Referring Provider:  Johnny Alas PA-C    NPI# [id number]   Visit type:  Telephone Encounter.    Source of history:  Patient.    Location of patient:  Home  Call Start Time:   1431  Call End Time:    _1436      Chief Complaint   Potential exposure to C-19. Daughter sent home from school with nasal stuffiness last week. No symptoms in patient at present. Wants to be tested. Daughter tested yesterday.      History of Present Illness   Today's visit was conducted via telephone due to the COVID-19 pandemic. Patient's consent to telephone visit was obtained and documented.      Reason for visit:  See above      Review of Systems   Constitutional:  Negative.    Eye:  Negative.    Ear/Nose/Mouth/Throat:  Negative.    Respiratory:  Negative.    Cardiovascular:  Negative.    Gastrointestinal:  Negative.    Genitourinary:  Negative.    Hematology/Lymphatics:  Negative.    Endocrine:  Negative.    Immunologic:  Negative.    Musculoskeletal:  Negative.    Integumentary:  Negative.    Neurologic:  Negative.    Psychiatric:  Negative.    All other systems reviewed and negative      Impression and Plan   Diagnosis     Encounter for screening for other viral diseases (WMV05-PD Z11.59).     Patient Instructions:       Counseled: Patient, Activity.    Summary:  set up for C-19 testing. Self quarantine. Return/call if symptoms develop, chest tightness, SOB etc. Tylenol. Fluids..       Health Status   Allergies:    Allergic Reactions (Selected)  Severity Not  Documented  Bananas (No reactions were documented)  Bee Stings (No reactions were documented)  Latex (No reactions were documented)   Medications:  (Selected)   Prescriptions  Prescribed  Acular 0.5% ophthalmic solution: 1 drop(s), right eye, QID, PRN: for itching, # 10 mL, 0 Refill(s), Type: Maintenance, Pharmacy: [facility], 1 drop(s) Eye-Right qid,x7 day(s),PRN:for itching, 72, in, 10/09/20 11:33:00 CDT, Height Measured, 305, lb, 10/09/20 1...  Orthotics: Orthotics, See Instructions, Instructions: to use as directed with shoes, Supply, # 2 EA, 0 Refill(s), Type: Maintenance  Ventolin HFA 90 mcg/inh inhalation aerosol: 2 puff(s), NEB, qid, PRN: AS NEEDED FOR WHEEZING, # 1 EA, 0 Refill(s), Type: Maintenance, Pharmacy: [facility], 2 puff(s) NEB qid,PRN:AS NEEDED FOR WHEEZING  diclofenac 1% topical gel: ( 2 gm ), Topical, qid, # 240 gm, 2 Refill(s), Type: Maintenance, Pharmacy: [facility], 2 gm Topical qid, 72, in, 08/05/20 13:48:00 CDT, Height Measured, Weight Measured  oxyCODONE 5 mg oral tablet: = 1 tab(s) ( 5 mg ), Oral, q8 hrs, PRN: as needed for pain, # 20 tab(s), 0 Refill(s), Type: Maintenance, Pharmacy: [facility], 1 tab(s) Oral q8 hrs,PRN:as needed for pain, 72, in, 09/10/20 12:29:00 CDT, Height Measured, 307, l...  Documented Medications  Documented  acetaminophen 500 mg oral tablet: = 2 tab(s) ( 1,000 mg ), Oral, q6 hrs, PRN: as needed for pain, 0 Refill(s), Type: Maintenance   Problem list:    All Problems  Trochanteric bursitis, right hip / SNOMED CT 38828223 / Confirmed  Tobacco user / SNOMED CT 824742633 / Probable  Tension headache / SNOMED CT 0265345364 / Confirmed  Smoking / SNOMED CT 464102028 / Confirmed  Sebaceous cyst / SNOMED CT 1577860410 / Confirmed  Obese / SNOMED CT 3966707782 / Probable  Myalgia / SNOMED CT 003515099 / Confirmed  Marijuana use / SNOMED CT 7340133492 / Confirmed  Low back  pain / SNOMED CT 610080393 / Confirmed  Dysplastic nevus / SNOMED CT 9399896363 / Confirmed  Degenerative lumbar disc / SNOMED CT 41298124 / Confirmed  Chronic insomnia / SNOMED CT 346369309 / Confirmed  Chronic back pain / SNOMED CT 250175193 / Confirmed  Cerebellar tonsillar ectopia / SNOMED CT 96221018 / Confirmed  Asthma, moderate persistent / SNOMED CT 8924616002 / Confirmed  Anxiety / SNOMED CT 73546328 / Confirmed      Histories   Past Medical History:    Active  Cerebellar tonsillar ectopia (73434123)  Anxiety (21140838)  Obese (6028033325)   Family History:    Heart disease  Father (Conor)  Diabetes mellitus type 2  Mother (Obdulia)     Procedure history:    Injection of cortisone (5833477987) on 6/8/2016 at 36 Years.  Transanal hemorrhoidal dearterialization (THD) on 9/8/2015 at 35 Years.  Esophagogastroduodenoscopy (860131850) on 8/4/2015 at 35 Years.  Comments:  8/5/2015 12:30 PM CDT - Mata Merino MD  Normal  Colonoscopy (021121425) on 8/4/2015 at 35 Years.  Comments:  8/13/2015 3:29 PM CDT - Genia Mac RN  Sedation: MAC  Indication: rectal bleeding  External & internal hemorrhoids; otherwise normal  Resume routine screening at age 50.  Hospitalized at [facility] in 2002 at 22 Years.  Right knee arthroscopy.  Surgery on eyes x3.  Comments:  12/15/2010 4:16 PM [facility] - Genia Ely RN  as infant   Social History:        Alcohol Assessment            Past                     Comments:                      06/16/2015 - Randall Salcedo MD                     6 drinks pks per weekend      Tobacco Assessment            Current                     Comments:                      06/16/2015 - Randall Salcedo MD                     1 ppd      Substance Abuse Assessment            Current                     Comments:                      06/16/2015 - Randall Salcedo MD                     Marijuana      Employment and Education Assessment            Work/School description: disabled.                      Comments:                      02/21/2011 - Wong Bashir MD                     has 1 child- 10 weeks old -      Home and Environment Assessment            Lives with Self, Significant other.                     Comments:                      02/21/2011 - Wong Bashir MD                     2 kids in household        Health Maintenance      Recommendations     Pending (in the next year)        OverDue           Lipid Disorders Screen (Male) due  07/02/19  and every 1  year(s)           Type 2 Diabetes Mellitus Screen (Male) due  07/02/19  and every 1  year(s)           Influenza Vaccine due  08/31/20  and every 1  year(s)        Due            Asthma - Assessment due  10/13/20  and every 1  year(s)           Asthma - Spirometry due  10/13/20  and every 1  year(s)        Due In Future            Alcohol Misuse Screen (Male) not due until  02/13/21  and every 1  year(s)           Depression Screen (Male) not due until  02/13/21  and every 1  year(s)           Body Mass Index Check (Male) not due until  10/09/21  and every 1  year(s)           High Blood Pressure Screen (Male) not due until  10/09/21  and every 1  year(s)           Obesity Screen and Counseling (Male) not due until  10/09/21  and every 1  year(s)     Satisfied (in the past 1 year)        Satisfied            Alcohol Misuse Screen (Male) on  02/13/20.           Body Mass Index Check (Male) on  10/09/20.           Body Mass Index Check (Male) on  09/10/20.           Body Mass Index Check (Male) on  08/05/20.           Body Mass Index Check (Male) on  06/04/20.           Body Mass Index Check (Male) on  05/13/20.           Body Mass Index Check (Male) on  02/13/20.           Body Mass Index Check (Male) on  11/19/19.           Body Mass Index Check (Male) on  11/08/19.           Body Mass Index Check (Male) on  11/06/19.           Body Mass Index Check (Male) on  10/31/19.           Depression Screen (Male) on  02/13/20.           Depression Screen  (Male) on  02/13/20.           Depression Screen (Male) on  02/13/20.           High Blood Pressure Screen (Male) on  10/09/20.           High Blood Pressure Screen (Male) on  09/10/20.           High Blood Pressure Screen (Male) on  08/05/20.           High Blood Pressure Screen (Male) on  06/04/20.           High Blood Pressure Screen (Male) on  05/13/20.           High Blood Pressure Screen (Male) on  02/13/20.           High Blood Pressure Screen (Male) on  11/19/19.           High Blood Pressure Screen (Male) on  11/08/19.           High Blood Pressure Screen (Male) on  11/06/19.           High Blood Pressure Screen (Male) on  10/31/19.           Obesity Screen and Counseling (Male) on  10/09/20.           Obesity Screen and Counseling (Male) on  09/10/20.           Obesity Screen and Counseling (Male) on  08/05/20.           Obesity Screen and Counseling (Male) on  06/04/20.           Obesity Screen and Counseling (Male) on  05/13/20.           Obesity Screen and Counseling (Male) on  02/13/20.           Obesity Screen and Counseling (Male) on  11/19/19.           Obesity Screen and Counseling (Male) on  11/08/19.           Obesity Screen and Counseling (Male) on  11/06/19.           Obesity Screen and Counseling (Male) on  10/31/19.        Canceled            HIV Screen (if sexually active) (Male) on  10/30/19.           STD Counseling (if sexually active) (Male) on  10/30/19.           Syphilis Screen (if sexually active) (Male) on  10/30/19.

## 2022-02-15 NOTE — PROGRESS NOTES
Patient:   JOEL SUTTON            MRN: [id number]            FIN: [id number]               Age:   37 years     Sex:  Male     :  1980   Associated Diagnoses:   Low back pain   Author:   Cricket Jessica PA-C      Chief Complaint   2017 6:44 PM CDT    Pt here for low back pain that radiates into Right leg that was getting worse again yesterday      History of Present Illness   The patient presents with low back pain.     has ongoing low back pain for several  months and has seen many providers  has been going to PT and the chiropractor   MRI indicated degenerative disc disease at  L4-S1  he has seen Dr Frias once and needs to follow up with him    he would like to discuss other options for his back pain  pain is radiating down right leg  he came in tonight to receive a torodal injection for his back pain      Review of Systems   Constitutional:  No fever.    Gastrointestinal:  No change in bowel habits.    Genitourinary:  No urinary incontinence.       Health Status   Allergies:    Allergic Reactions (Selected)  Severity Not Documented  Bee Stings (No reactions were documented)  Latex (No reactions were documented)   Medications:  (Selected)   Prescriptions  Prescribed  Medrol Dosepak 4 mg oral tablet: 1 packet(s), PO, Once, Instructions: as directed on package labeling, # 21 tab(s), 0 Refill(s), Type: Soft Stop, Pharmacy: [facility], 1 packet(s) po once,Instr:as directed on package labeling  ProAir HFA 90 mcg/inh inhalation aerosol: 2 puff(s), inh, qid, # 1 EA, 3 Refill(s), Type: Maintenance, Pharmacy: [facility], 2 puff(s) inh qid  Qvar 80 mcg/inh inhalation aerosol: 1 puff(s), inh, bid, # 1 EA, 3 Refill(s), Type: Maintenance, Pharmacy: [facility], 1 puff(s) inh bid  bacitracin 500 units/g topical ointment: 1 mal, TOP, QID, # 15 g, 0 Refill(s), Type: Maintenance, Pharmacy: [facility], 1 mal top  qid  cyclobenzaprine 10 mg oral tablet: 1 tab(s) ( 10 mg ), PO, TID, PRN: for spasm, # 30 tab(s), 0 Refill(s), Type: Maintenance, Pharmacy: [facility], 1 tab(s) po tid,PRN:for spasm  diazePAM 5 mg oral tablet: 1 tab(s) ( 5 mg ), PO, tid, PRN: for anxiety, # 10 tab(s), 0 Refill(s), Type: Maintenance, called to pharmacy (Rx)  hydrocortisone 2.5% topical cream: 1 mal, top, tid, # 30 gm, 1 Refill(s), Type: Maintenance, Pharmacy: [facility], 1 mal top tid   Problem list:    All Problems  Smoking / SNOMED CT 048801031 / Confirmed  Tobacco user / SNOMED CT 703675257 / Probable  Obese / ICD-9-.00 / Probable  Anxiety / SNOMED CT 35029328 / Confirmed  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  Transanal hemorrhoidal dearterialization (THD) on 9/8/2015 at 35 Years.  Esophagogastroduodenoscopy (102013292) on 8/4/2015 at 35 Years.  Comments:  8/5/2015 12:30 PM - Mata Merino MD  Normal  Colonoscopy (302707168) on 8/4/2015 at 35 Years.  Comments:  8/13/2015 3:29 PM - Genia Mac RN  Sedation: MAC  Indication: rectal bleeding  External & internal hemorrhoids; otherwise normal  Resume routine screening at age 50.  Hospitalized at [facility] unit in 2002 at 22 Years.  Right knee arthroscopy.  Surgery on eyes x3.  Comments:  12/15/2010 4:16 PM - Genia Ely RN  as infant   Social History:        Alcohol Assessment: Denies Alcohol Use            Current            Current                     Comments:                      06/16/2015 - Randall Salcedo MD                     6 drinks pks per weekend      Tobacco Assessment: Current            Current                      Comments:                      06/16/2015 - Randall Salcedo MD                     1 ppd      Substance Abuse Assessment            Current, Marijuana            Current                     Comments:                      06/16/2015 - Randall Salcedo MD                     Marijuana      Employment and Education Assessment            Work/School description: disabled.                     Comments:                      02/21/2011 - Krysten ORTEGA Wong                     has 1 child- 10 weeks old -      Home and Environment Assessment            Lives with Self, Significant other.                     Comments:                      02/21/2011 - Wong Bashir MD                     2 kids in household        Physical Examination   Vital Signs   5/24/2017 6:44 PM CDT Temperature Tympanic 97.3 DegF  LOW    Peripheral Pulse Rate 100 bpm    Pulse Site Radial artery    Respiratory Rate 16 br/min    Systolic Blood Pressure 136 mmHg    Diastolic Blood Pressure 88 mmHg    Mean Arterial Pressure 104 mmHg    BP Site Right arm      Measurements from flowsheet : Measurements   5/24/2017 6:44 PM CDT Height Measured - Standard 72.5 in    Weight Measured - Standard 279 lb    BSA 2.54 m2    Body Mass Index 37.32 kg/m2      General:  Mild distress.    Musculoskeletal:  good strength with heel and toe raise.  Patella and achilles DTRs symmetrical.  Good strength with resisted dorsal and plantar flexion. Good strength with resisted flexion and extension of lower legs. Straight leg raise does not elicit pain up to 60 degrees bilaterally.  There is pain with palpation over L5/S1.       Impression and Plan   Diagnosis     Low back pain (KEO46-ZA M54.5).     Plan   Summary:  he is given a toradol injection  will try Norflex instead of cyclobenzaprine, will refer again to Dr Frias  continue with PT and home stretching.    Orders     Orders   Requests (Consults / Referrals):  Referral (Request) (Order): 5/24/2017 7:04 PM CDT, Referred to:  Pain Management, Referred to: Dr Frias, Additional instructions: please call after noon, Low back pain.     Orders   Pharmacy:  Norflex 100 mg oral tablet, extended release (Prescribe): 1 tab(s) ( 100 mg ), PO, BID, PRN: for muscle pain, # 30 tab(s), 0 Refill(s), Type: Maintenance, Pharmacy: [facility], 1 tab(s) po bid,PRN:for muscle pain.     Orders   Charges (Evaluation and Management):  [id number] office outpatient visit 15 minutes (Charge) (Order): Quantity: 1, Low back pain.

## 2022-02-15 NOTE — PROGRESS NOTES
Patient:   JOEL SUTTON            MRN: [id number]            FIN: [id number]               Age:   38 years     Sex:  Male     :  1980   Associated Diagnoses:   Myalgia; Trochanteric bursitis, right hip   Author:   Nena King MD      Visit Information      Date of Service: 2018 12:39 pm  Performing Location: [facility]  Encounter#: [id number]      Primary Care Provider (PCP):  Mata Hearn MD    NPI# [id number]      Referring Provider:  Nena King MD    NPI# [id number]      Date of Service: 2018 12:39 pm  Performing Location: [facility]  Encounter#: [id number]      Primary Care Provider (PCP):  Mata Hearn MD    NPI# [id number]      Referring Provider:  Nena King MD    NPI# [id number]      Physical Examination   Vital Signs   2018 12:48 PM CST Temperature Tympanic 97.5 DegF  LOW    Peripheral Pulse Rate 75 bpm    Systolic Blood Pressure 128 mmHg    Diastolic Blood Pressure 82 mmHg  HI    Mean Arterial Pressure 97 mmHg    BP Site Right arm    BP Method Manual    Oxygen Saturation 97 %   2/15/2018 1:43 PM CST Temperature Tympanic 98.1 DegF    Peripheral Pulse Rate 74 bpm    Pulse Site Radial artery    HR Method Manual    Systolic Blood Pressure 130 mmHg    Diastolic Blood Pressure 82 mmHg  HI    Mean Arterial Pressure 98 mmHg    BP Site Right arm    BP Method Manual      Measurements from flowsheet : Measurements   2018 12:48 PM CST Height/Length Estimated 72.5 in    Weight Measured 282 lb   2/15/2018 1:43 PM CST Weight Measured 286.4 lb         Procedure   Procedure   Time.     Indication: myofascial pain.     Informed consent: signed by patient.     Confirmed: patient, procedure, side, site.     Type of procedure: trigger point injection.     Physical Exam: vital signs Vital Signs   2018 12:48 PM CST Temperature Tympanic 97.5 DegF  LOW    Peripheral Pulse Rate 75 bpm    Systolic Blood Pressure 128 mmHg    Diastolic  Blood Pressure 82 mmHg  HI    Mean Arterial Pressure 97 mmHg    BP Site Right arm    BP Method Manual    Oxygen Saturation 97 %   2/15/2018 1:43 PM CST Temperature Tympanic 98.1 DegF    Peripheral Pulse Rate 74 bpm    Pulse Site Radial artery    HR Method Manual    Systolic Blood Pressure 130 mmHg    Diastolic Blood Pressure 82 mmHg  HI    Mean Arterial Pressure 98 mmHg    BP Site Right arm    BP Method Manual      .     Preparation and technique: informed consent obtained, position sitting, sterile preparation of site with 70 % alcohol, hemostasis achieved using direct pressure, estimated blood loss minimal, adhesive bandagedressing applied.     Findings: painful and tender myofascial trigger points identified by palpation with communication from patient    Location: left and right lateral latissimus dorsi and left rhomboid x 1 each    Number of injections:3    pain prior to procedure:     moderate    pain following procedure:improved    number of milliliters of the 1:1 solution of 1% lidocaine without epiniephrine and 0.5% marcaine without epinephrine used in total: 4.     Procedure tolerated: well.     No Complications.     Joint aspiration/ injection procedure   Confirmed: patient, procedure, side, site, PROCEDURE: right trochanteric bursitis injection.     Informed consent: signed by patient.     Indication: symptomatic relief.     Location: right trochanteric bursa, exam confirmed tenderness over the lateral trochanteric exacerbated by active hip abduction.     Preparation and technique: skin prep povidone iodine (Betadine).     Procedure tolerated: well.     No Complications.     No qualifying data available.       PROCEDURE: 4 ml of a 1:1 solution of 1% lidocaine without epiniephrine and 0.5% marcaine without epinephrine with 1 ml of 40 mg/1ml triamcinalone injected in to trochanteric bursa using sterile technique      Impression and Plan   Diagnosis     Myalgia (MTE67-ZP M79.1).     Trochanteric bursitis,  right hip (KJY73-LW M70.61).     Diagnosis     return to clinic if symptoms worsen or do not improve.     Counseled:  Patient, Family, Regarding diagnosis, Regarding treatment, use ice or heat as needed to help with pain, continue with home exercise program, , encouraged pt to consider duloxetine and referral to PT, he plans to rtc next week for more trigger point injections, he continues to feel that we are helping him and that he is more comfortable now than he was a week ago..

## 2022-02-15 NOTE — TELEPHONE ENCOUNTER
---------------------  From: Khadra Umanzor CMA (Phone Messages Pool ([id number]))   Sent: 5/6/2019 3:52:19 PM CDT  Subject: PT order     Phone message    PCP: [facility]    Person calling: Daron  Phone number: [phone]  Time message left: 1329  Return call time: _    Reason: Daron called and left a message stating that he was just in to see [facility] and he needs a new PT order.  called and spoke with Daron informed him that order is in chart, wondering what facility he wants order to go to, he requested to have it sent to [facility] and [facility] that he will call both facilities and decide which one he would like to go to.  [facility] and other faxed to [facility] [phone], delivery confirmation printed.      Transferred to:             SHAHBAZ Umanzor CMA

## 2022-02-15 NOTE — TELEPHONE ENCOUNTER
---------------------  From: Peyton Aparicio CMA (eRx Pool ([id number]))   To: [facility] ([id number]);     Sent: 8/5/2020 3:54:03 PM CDT  Subject: FW: Medication Management   Due Date/Time: 8/6/2020 3:52:00 PM CDT     Had appt today 8/5/2020- please advise.       ------------------------------------------  From: [facility]  To: Mata Hearn MD  Sent: August 5, 2020 3:52:22 PM CDT  Subject: Medication Management  Due: July 28, 2020 10:29:52 PM CDT     ** On Hold Pending Signature **     Drug: oxyCODONE (oxyCODONE 5 mg oral tablet), TAKE ONE TABLET BY MOUTH EVERY 8 HOURS AS NEEDED FOR PAIN  Quantity: 30 unknown unit  Days Supply: 10  Refills: 0  Substitutions Allowed  Notes from Pharmacy:     Dispensed Drug: oxyCODONE (oxyCODONE 5 mg oral tablet), TAKE ONE TABLET BY MOUTH EVERY 8 HOURS AS NEEDED FOR PAIN  Quantity: 30 unknown unit  Days Supply: 10  Refills: 0  Substitutions Allowed  Notes from Pharmacy:  ---------------------------------------------------------------  From: Fariba Roth MA ([facility] [place] Pool ([id number]_[facility]))   To: Mata Hearn MD;     Sent: 8/5/2020 3:59:31 PM CDT  Subject: FW: Medication Management   Due Date/Time: 8/6/2020 3:52:00 PM CDT  ** Submitted: **  Order:oxyCODONE (oxyCODONE 5 mg oral tablet)  1 tab(s)  Oral  q8 hrs  Qty:  20 tab(s)        Refills:  0          Substitutions Allowed     PRN  as needed for pain      Route To Pharmacy - [facility]    Signed by Mata Hearn MD  8/5/2020 9:28:00 PM [facility]---------------------  From: Mata Hearn MD   To: [facility] [place]    Sent: 8/5/2020 4:29:55 PM CDT  Subject: FW: Medication Management     ** Not Approved: refilled **  oxyCODONE (OXYCODONE HCL 5MG TABS)  TAKE ONE TABLET BY MOUTH EVERY 8 HOURS AS NEEDED FOR PAIN  Qty:  30 unknown unit        Days Supply:  10        Refills:  0          Substitutions Allowed      Route To Pharmacy - [facility]

## 2022-02-15 NOTE — PROGRESS NOTES
Patient:   JOEL SUTTON            MRN: [id number]            FIN: [id number]               Age:   38 years     Sex:  Male     :  1980   Associated Diagnoses:   Chronic pain, not elsewhere classified; Myalgia   Author:   Nena King MD      Visit Information      Date of Service: 2018 12:39 pm  Performing Location: [facility]  Encounter#: [id number]      Primary Care Provider (PCP):  Mata Hearn MD    NPI# [id number]      Referring Provider:  Nena King MD    NPI# [id number]      Chief Complaint   2018 1:03 PM CST    Patient is here for back pain follow up. REquesting trigger point injections.        History of Present Illness   pt thinks the previous injections were helpful and would like some new trigger points injected today, .  Discussed with patient:  Risks include risk of pain bleeding infection injury to surrounding tissue and need for further treatment.   There may be no benefit with the treatment.  Indication is for treatment of patient's chronic condition.      Alternatives include doing nothing, trying massage therapy, Physical therapy, cupping, oral or topical medication, acupuncture, using a theracane, and trying heat or ice, and referral to another specialist.    Benefits may include decreased pain and improvement in symptoms.     .       .            Review of Systems   Constitutional:  Negative except as documented in history of present illness.    Eye:  Negative except as documented in history of present illness.    Ear/Nose/Mouth/Throat:  Negative except as documented in history of present illness.    Respiratory:  Negative except as documented in history of present illness.    Cardiovascular:  Negative except as documented in history of present illness.    Gastrointestinal:  Negative except as documented in history of present illness.    Immunologic:  Negative except as documented in history of present illness.    Integumentary:  Negative  except as documented in history of present illness.    Neurologic:  Negative except as documented in history of present illness.              Health Status   Allergies:    Allergic Reactions (Selected)  Severity Not Documented  Bee Stings (No reactions were documented)  Latex (No reactions were documented)   Medications:  (Selected)   Prescriptions  Prescribed  ProAir HFA 90 mcg/inh inhalation aerosol: 2 puff(s), inh, qid, PRN: as needed for wheezing, # 1 EA, 2 Refill(s), Type: Maintenance, Pharmacy: [facility]  Qvar 80 mcg/inh inhalation aerosol: 1 puff(s), inh, bid, # 1 EA, 3 Refill(s), Type: Maintenance, Pharmacy: [facility], 1 puff(s) inh bid  cyclobenzaprine 10 mg oral tablet: 1 tab(s) ( 10 mg ), PO, TID, PRN: for spasm, # 30 tab(s), 0 Refill(s), Type: Maintenance, Pharmacy: [facility], 1 tab(s) po tid,PRN:for spasm  diazePAM 5 mg oral tablet: See Instructions, Instructions: TAKE ONE TABLET BY MOUTH THREE TIMES A DAY AS NEEDED FOR ANXIETY, # 10 tab(s), 0 Refill(s), Type: Soft Stop, Pharmacy: [facility], TAKE ONE TABLET BY MOUTH THREE TIMES A DAY AS NEEDED FOR ANXIETY...  gabapentin 300 mg oral capsule: See Instructions, Instructions: 1 cap(s) po qd on day 1  1 cap po bid on day 2  then 1 cap po tid, # 90 EA, 1 Refill(s), Type: Maintenance, Pharmacy: [facility], 1 cap(s) po qd on day 1; 1 cap po bid on day 2; then 1 cap po tid...  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  Transanal hemorrhoidal dearterialization (THD) on 9/8/2015 at 35 Years.  Esophagogastroduodenoscopy (045926883) on 8/4/2015 at 35 Years.  Comments:  8/5/2015 12:30 PM - Mata Merino MD  Normal  Colonoscopy (517097281) on 8/4/2015 at 35 Years.  Comments:  8/13/2015 3:29 PM - Genia Mac RN  Sedation: MAC  Indication: rectal bleeding  External & internal hemorrhoids; otherwise normal  Resume routine screening at age 50.  Hospitalized at [facility] in 2002 at 22 Years.  Right knee arthroscopy.  Surgery on eyes x3.  Comments:  12/15/2010 4:16 PM - Genia Ely RN  as infant   Social History:        Alcohol Assessment: Denies Alcohol Use            Current                     Comments:                      06/16/2015 - Randall Salcedo MD                     6 drinks pks per weekend      Tobacco Assessment: Current            Current                     Comments:                      06/16/2015 - Randall Salcedo MD                     1 ppd      Substance Abuse Assessment            Current                     Comments:                      06/16/2015 - Randall Salcedo MD                     Marijuana      Employment and Education Assessment            Work/School description: disabled.                     Comments:                      02/21/2011 - Wong Bashir MD                     has 1 child- 10 weeks old  -      Home and Environment Assessment            Lives with Self, Significant other.                     Comments:                      02/21/2011 - Krysten ORTEGA, Wong                     2 kids in household        Physical Examination   Vital Signs   2/13/2018 1:03 PM CST Temperature Tympanic 97.1 DegF  LOW    Peripheral Pulse Rate 76 bpm    Pulse Site Radial artery    HR Method Manual    Systolic Blood Pressure 141 mmHg  HI    Diastolic Blood Pressure 80 mmHg    Mean Arterial Pressure 100 mmHg    BP Site Right arm    BP Method Manual      Measurements from flowsheet : Measurements   2/13/2018 1:03 PM CST    Weight Measured - Standard                290.2 lb     General:  Alert and oriented, No acute distress.    Eye:  Pupils are equal, round and reactive to light, Extraocular movements are intact, Normal conjunctiva.    HENT:  Normocephalic, hearing grossly normal during our conversation, hearing grossly normal during our conversation.    Respiratory:  Respirations are non-labored, Symmetrical chest wall expansion.    Cardiovascular:  Normal peripheral perfusion, brisk capillary refill.    Musculoskeletal:  Normal gait, tender trigger points at trapezius, gluts, paraspinous muscles, also right trochanteric bursitis.    Integumentary:  Warm, Dry, No rash.    Neurologic:  Alert, Oriented, No focal deficits.    Psychiatric:  Cooperative, Appropriate mood & affect, Normal judgment, Non-suicidal.       Health Maintenance      Recommendations     Pending (in the next year)        OverDue           Alcohol Misuse Screen (Male) due  11/17/17  and every 1  year(s)           Depression Screen (Male) due  11/17/17  and every 1  year(s)        Due            HIV Screen (if sexually active) (Male) due  02/13/18  and every 1  year(s)           Lipid Disorders Screen (Male) due  02/13/18  and every 1  year(s)           STD Counseling (if sexually active) (Male) due  02/13/18  and every 1  year(s)           Syphilis Screen (if  sexually active) (Male) due  02/13/18  and every 1  year(s)           Type 2 Diabetes Mellitus Screen (Male) due  02/13/18  Variable frequency        Due In Future            Body Mass Index Check (Male) not due until  02/07/19  and every 1  year(s)     Satisfied (in the past 1 year)        Satisfied            Body Mass Index Check (Male) on  02/07/18.           Body Mass Index Check (Male) on  12/04/17.           Body Mass Index Check (Male) on  11/07/17.           Body Mass Index Check (Male) on  11/01/17.           Body Mass Index Check (Male) on  10/13/17.           Body Mass Index Check (Male) on  09/18/17.           Body Mass Index Check (Male) on  07/21/17.           Body Mass Index Check (Male) on  06/29/17.           Body Mass Index Check (Male) on  05/24/17.           Body Mass Index Check (Male) on  03/17/17.           Body Mass Index Check (Male) on  03/02/17.           Body Mass Index Check (Male) on  02/24/17.           High Blood Pressure Screen (Male) on  02/13/18.           High Blood Pressure Screen (Male) on  02/09/18.           High Blood Pressure Screen (Male) on  02/07/18.           High Blood Pressure Screen (Male) on  12/04/17.           High Blood Pressure Screen (Male) on  11/01/17.           High Blood Pressure Screen (Male) on  10/13/17.           High Blood Pressure Screen (Male) on  10/06/17.           High Blood Pressure Screen (Male) on  09/18/17.           High Blood Pressure Screen (Male) on  08/24/17.           High Blood Pressure Screen (Male) on  07/21/17.           High Blood Pressure Screen (Male) on  06/29/17.           High Blood Pressure Screen (Male) on  05/24/17.           High Blood Pressure Screen (Male) on  05/12/17.           High Blood Pressure Screen (Male) on  04/03/17.           High Blood Pressure Screen (Male) on  03/17/17.           High Blood Pressure Screen (Male) on  03/09/17.           High Blood Pressure Screen (Male) on  03/02/17.           High Blood  Pressure Screen (Male) on  02/24/17.           Obesity Screen and Counseling (Male) on  02/13/18.           Obesity Screen and Counseling (Male) on  02/09/18.           Obesity Screen and Counseling (Male) on  02/07/18.           Obesity Screen and Counseling (Male) on  12/04/17.           Obesity Screen and Counseling (Male) on  11/07/17.           Obesity Screen and Counseling (Male) on  11/01/17.           Obesity Screen and Counseling (Male) on  10/13/17.           Obesity Screen and Counseling (Male) on  10/06/17.           Obesity Screen and Counseling (Male) on  09/18/17.           Obesity Screen and Counseling (Male) on  07/21/17.           Obesity Screen and Counseling (Male) on  06/29/17.           Obesity Screen and Counseling (Male) on  05/24/17.           Obesity Screen and Counseling (Male) on  05/12/17.           Obesity Screen and Counseling (Male) on  03/17/17.           Obesity Screen and Counseling (Male) on  03/09/17.           Obesity Screen and Counseling (Male) on  03/02/17.           Obesity Screen and Counseling (Male) on  02/24/17.          Procedure   Procedure   Time.     Indication: myofascial pain.     Informed consent: signed by patient.     Confirmed: patient, procedure, side, site.     Type of procedure: trigger point injection.     Physical Exam: vital signs Vital Signs   2/13/2018 1:03 PM CST Temperature Tympanic 97.1 DegF  LOW    Peripheral Pulse Rate 76 bpm    Pulse Site Radial artery    HR Method Manual    Systolic Blood Pressure 141 mmHg  HI    Diastolic Blood Pressure 80 mmHg    Mean Arterial Pressure 100 mmHg    BP Site Right arm    BP Method Manual      .     Preparation and technique: informed consent obtained, position sitting, sterile preparation of site with 70 % alcohol, hemostasis achieved using direct pressure, estimated blood loss minimal, adhesive bandagedressing applied.     Findings: painful and tender myofascial trigger points identified by palpation with  communication from patient    Location: bilateral thoracic paraspinal muscles and right gluteous jyoti    Number of injections:3    pain prior to procedure:     moderate    pain following procedure:improved    number of milliliters of the 1:1 solution of 1% lidocaine without epiniephrine and 0.5% marcaine without epinephrine used in total: 8.     Procedure tolerated: well.     No Complications.     No qualifying data available.          Review / Management   Results review:       Interpretation: reviewed current medications .    Course:  Progressing as expected.       Impression and Plan   Diagnosis     Chronic pain, not elsewhere classified (RDT57-DT G89.2).     Myalgia (EVX18-MG M79.1).     Treatment goals:  Decrease pain intensity, Improve psychological state, Improve quality of life.    Diagnosis     return to clinic if symptoms worsen or do not improve.     Plan   Counseled:  Patient, Family, use ice or heat as needed to help with pain, continue with home exercise program, .

## 2022-02-15 NOTE — PROGRESS NOTES
"   Patient:   JOEL SUTTON            MRN: [id number]            FIN: [id number]               Age:   37 years     Sex:  Male     :  1980   Associated Diagnoses:   Low back pain   Author:   Cricket Jessica PA-C      Chief Complaint   10/13/2017 4:00 PM CDT   Pt here for FU on low back pain from 10/6.  Pt states he has shooting pain\" down his right leg        History of Present Illness   Chief complaint and symptoms noted above and confirmed with patient   has ongoing pain radiating down into right leg  he is planning on starting PT this week  saw Dr Frias last week         Review of Systems   Constitutional:  No fever.    Gastrointestinal:  No change in bowel habits.    Genitourinary:  No urinary incontinence.       Health Status   Allergies:    Allergic Reactions (Selected)  Severity Not Documented  Bee Stings (No reactions were documented)  Latex (No reactions were documented)   Medications:  (Selected)   Prescriptions  Prescribed  ProAir HFA 90 mcg/inh inhalation aerosol: 2 puff(s), inh, qid, # 1 EA, 3 Refill(s), Type: Maintenance, Pharmacy: [facility], 2 puff(s) inh qid  Qvar 80 mcg/inh inhalation aerosol: 1 puff(s), inh, bid, # 1 EA, 3 Refill(s), Type: Maintenance, Pharmacy: [facility], 1 puff(s) inh bid  cyclobenzaprine 10 mg oral tablet: 1 tab(s) ( 10 mg ), PO, TID, PRN: for spasm, # 30 tab(s), 0 Refill(s), Type: Maintenance, Pharmacy: [facility], 1 tab(s) po tid,PRN:for spasm  diazePAM 5 mg oral tablet: See Instructions, Instructions: TAKE ONE TABLET BY MOUTH THREE TIMES A DAY AS NEEDED FOR ANXIETY, # 10 tab(s), 0 Refill(s), Type: Soft Stop  hydrocortisone 2.5% topical cream: 1 mal, top, tid, # 30 gm, 1 Refill(s), Type: Maintenance, Pharmacy: [facility], 1 mal top tid  Documented Medications  Documented  Advil 200 mg oral tablet: 2 tab(s) ( 400 mg ), po, q8 hrs, PRN: as needed for pain, 0 Refill(s), " Type: Maintenance  Vitamin B-12 1000 mcg oral tablet: 1 tab(s) ( 1,000 mcg ), po, daily, 0 Refill(s), Type: Maintenance   Problem list:    All Problems  Smoking / SNOMED CT 952797065 / Confirmed  Tobacco user / SNOMED CT 741484029 / Probable  Obese / ICD-9-.00 / Probable  Chronic insomnia / SNOMED CT 831056564 / Confirmed  Anxiety / SNOMED CT 10468048 / Confirmed  Low back pain / SNOMED CT 804525028 / Confirmed  Cerebellar tonsillar ectopia / SNOMED CT 34742988 / Confirmed      Histories   Past Medical History:    Active  Cerebellar tonsillar ectopia (73685106)  Anxiety (83663049)   Family History:    Heart disease  Father (Conor)  Diabetes mellitus type 2  Mother (Obdulia)     Procedure history:    Injection of cortisone (2502457862) on 6/8/2016 at 36 Years.  Transanal hemorrhoidal dearterialization (THD) on 9/8/2015 at 35 Years.  Esophagogastroduodenoscopy (175249241) on 8/4/2015 at 35 Years.  Comments:  8/5/2015 12:30 PM - Mata Merino MD  Normal  Colonoscopy (882447273) on 8/4/2015 at 35 Years.  Comments:  8/13/2015 3:29 PM - Genia Mac RN  Sedation: MAC  Indication: rectal bleeding  External & internal hemorrhoids; otherwise normal  Resume routine screening at age 50.  Hospitalized at [facility] unit in 2002 at 22 Years.  Right knee arthroscopy.  Surgery on eyes x3.  Comments:  12/15/2010 4:16 PM - Genia Ely RN  as infant   Social History:        Alcohol Assessment: Denies Alcohol Use            Current            Current                     Comments:                      06/16/2015 - Randall Salcedo MD                     6 drinks pks per weekend      Tobacco Assessment: Current            Current                     Comments:                      06/16/2015 - Randall Salcedo MD                     1 ppd      Substance Abuse Assessment            Current, Marijuana            Current                     Comments:                      06/16/2015 - Randall Salcedo MD                      Marijuana      Employment and Education Assessment            Work/School description: disabled.                     Comments:                      02/21/2011 - Wong Bashir MD                     has 1 child- 10 weeks old -      Home and Environment Assessment            Lives with Self, Significant other.                     Comments:                      02/21/2011 - Wong Bashir MD                     2 kids in household  ,        Alcohol Assessment: Denies Alcohol Use            Current            Current                     Comments:                      06/16/2015 - Randall Salcedo MD                     6 drinks pks per weekend      Tobacco Assessment: Current            Current                     Comments:                      06/16/2015 - Randall Salcedo MD                     1 ppd      Substance Abuse Assessment            Current, Marijuana            Current                     Comments:                      06/16/2015 - Randall Salcedo MD                     Marijuana      Employment and Education Assessment            Work/School description: disabled.                     Comments:                      02/21/2011 - Wong Bashir MD                     has 1 child- 10 weeks old -      Home and Environment Assessment            Lives with Self, Significant other.                     Comments:                      02/21/2011 - Wong Bashir MD                     2 kids in household        Physical Examination   Vital Signs   10/13/2017 4:00 PM CDT Temperature Tympanic 96.5 DegF  LOW    Peripheral Pulse Rate 64 bpm    Pulse Site Radial artery    Respiratory Rate 16 br/min    Systolic Blood Pressure 122 mmHg    Diastolic Blood Pressure 86 mmHg    Mean Arterial Pressure 98 mmHg    BP Site Right arm      Measurements from flowsheet : Measurements   10/13/2017 4:00 PM CDT Height Measured - Standard 72.5 in    Weight Measured - Standard 287 lb    BSA 2.58 m2    Body Mass Index 38.38 kg/m2      General:   No acute distress.    Musculoskeletal:  good strength with heel and toe raise.  Patella and achilles DTRs symmetrical.  Good strength with resisted dorsal and plantar flexion. Good strength with resisted flexion and extension of lower legs. Straight leg raise elicits pain at 30 degrees in right leg but no pain to 75 degrees on left leg  pain with palpation over L3/4.    Neurologic:  Normal deep tendon reflexes.       Impression and Plan   Diagnosis     Low back pain (VDK93-FH M54.5).     Summary:  will reprint order for PT, will give 60 mg toradol im,  will refill diazepam, [place] PDMP consulted, no irregularities noted  .    Orders     Orders   Pharmacy:  diazePAM 5 mg oral tablet (Prescribe): See Instructions, Instructions: TAKE ONE TABLET BY MOUTH THREE TIMES A DAY AS NEEDED FOR ANXIETY, # 10 tab(s), 0 Refill(s), Type: Soft Stop, [facility], TAKE ONE TABLET BY MOUTH THREE TIMES A DAY AS NEEDED FOR ANXIETY  diazePAM 5 mg oral tablet (Modify): See Instructions, Instructions: TAKE ONE TABLET BY MOUTH THREE TIMES A DAY AS NEEDED FOR ANXIETY, # 10 tab(s), 0 Refill(s), Type: Hard Stop.     Orders   Charges (Evaluation and Management):  69603 office outpatient visit 15 minutes (Charge) (Order): Quantity: 1, Low back pain.

## 2022-02-15 NOTE — TELEPHONE ENCOUNTER
---------------------  From: Shana Mcfarland LPN (Phone Messages Pool ([id number]_[facility]))   To: Phone Messages Pool ([id number]_[place] [facility] [place]);     Sent: 5/22/2020 2:55:21 PM CDT  Subject: MRI     Phone Message    PCP:   pt      Time of Call:  2:40pm       Person Calling:  pt  Phone number:  [phone]    Returned call at: 2:49pm    Note:   Pt LM stating he is suppose to have MRI done and has an order to be anesthetized. Pt asking where to call/schedule.    Returned call and informed pt per referral note on 5/21/20 orders sent to [facility] and only certain locations do IV sedation- [facility] does not. Pt says he called [facility] and they only offer partial sedation. Pt is going to call [facility] and see if they offer full sedation.    Pt does not think he will be able to do partial sedation with the troubles he has had in the past with MRI's.    Pt will call back if he has further questions.    Last office visit and reason:  5/20/20 Chronic back painPatient calls Referrals requesting the MRI orders be faxed to Radiology at [facility] and he plans to call [facility] and discuss.  Orders are faxed to [phone].Patient calls to inform me that [facility] did not receive the MRI orders.  Orders are refaxed to [phone].

## 2022-02-15 NOTE — PROGRESS NOTES
Chief Complaint    Pt would like Toradol injection.  History of Present Illness          HPI pt has history of chronic myofascial pain and has found trigger point injections to be a useful tool to help control pain.  Would like to have repeat trigger point injections today.                       Review of systems is negative except as per HPI      Exam:      General: alert and oriented ×3 no acute distress.      HEENT: pupils are equal round and reactive to light extraocular motion is intact. Normocephalic and atraumatic.       Hearing is grossly normal and there is no otorrhea.       Nares are patent there is no rhinorrhea.       Mucous membranes are moist and pink.      Chest: has bilateral rise with no increased work of breathing.      Cardiovascular: normal perfusion and brisk capillary refill.      Musculoskeletal: no gross focal abnormalities and normal gait.      Neuro: no gross focal abnormalities and memory seems intact.      Psychiatric: speech is clear and coherent and fluent. Patient dressed appropriately for the weather. Mood is appropriate and affect is full.           Procedure note           Informed consent obtained including risks of pain, bleeding, infection, injury to surrounding tissue, and need for further treatment. Benefit of a trigger point injection goal is to reduce pain. This is just part of a treatment plan and for best results patient should also complete physical therapy. I cannot guarantee that will will be any pain relief.           Alternatives would include doing nothing, physical therapy, acupuncture, using heat or ice, continuing with oral medications such as muscle relaxants or nonsteroidal anti-inflammatory medications or topical medications such as a lidocaine patch or cream or menthol for diclofenac. She would like to try the trigger point injections.           Prep: Alcohol          Location identified by my palpation and communication with patient.  Symptomatic trigger  points that patient desired treatment at were located at: bilateral occipital head                      Each of these was injected with  a one-to-one solution of 1% lidocaine without epinephrine and 0.5% Marcaine without epinephrine.          Total number of injections:2          Total number of milliliters of the 1:1 solution of lidocaine and marcaine:8           Encouraged patient to treat the area with Karen cup ice massage tonight and to try to stretch the area out.           pain prior to treatment: moderate          pain following treatment at the trigger point injection sites:improved          Complications: none          Tolerated procedure well.          Asessment and Plan: myalgia and myofascial pain, s/p trigger point injections today.  Encouraged home exercise program and to keep annual exam appt as indicated and RTC if symptoms worsen or do not improve.   Physical Exam   Vitals & Measurements    HR: 76(Peripheral)  BP: 126/88     WT: 272 lb   Assessment/Plan       Low back pain (M54.5)         Orders:          ketorolac, 60 mg, im, once, (Completed)          94413 therapeutic prophylactic/dx injection subq/im (Charge), Quantity: 1, Low back pain           ketorolac tromethamine inj, 15 mg (Charge), Quantity: 4, Low back pain                Myalgia (M79.1)        Status post trigger point injection today                Tension headache (G44.209)        Status post trigger point injection today                Orders:         Referral (Request), 05/29/18 20:43:00 CDT, Referred to: Dermatology, Sebaceous cyst  Patient Information     Name:JOEL SUTTON      Address:      [address] [place] [id number]     Sex:Male     YOB: 1980     [phone]     Emergency Contact:LEESA SUTTON     MRN:[id number]     FIN:[id number]     Location:[facility]     Date of Service:05/30/2018      Primary Care Physician:       Nena King MD, [phone]      Attending Physician:       Fernando ORTEGA, [facility], [phone]  Problem List/Past Medical History    Ongoing     Anxiety     Asthma, moderate persistent     Cerebellar tonsillar ectopia     Chronic insomnia     Dysplastic nevus       Comments: removed from back     Low back pain     Marijuana use     Myalgia     Obese     Sebaceous cyst     Smoking     Tobacco user     Trochanteric bursitis, right hip    Historical     No qualifying data  Procedure/Surgical History     Injection of cortisone (06/08/2016)           Transanal hemorrhoidal dearterialization (THD) (09/08/2015)           Colonoscopy (08/04/2015)             Comments: Sedation: MAC<br/>Indication: rectal bleeding<br/>External &amp; internal hemorrhoids; otherwise normal<br/>Resume routine screening at age 50.     Esophagogastroduodenoscopy (08/04/2015)             Comments: Normal     Hospitalized at [facility] (2002)           Right knee arthroscopy           Surgery on eyes x3             Comments: as infant  Medications     Advil 200 mg oral tablet: 400 mg, 2 tab(s), po, q8 hrs, PRN: as needed for pain, 0 Refill(s).     Shoes for low back pain: See Instructions, Use as directed, 2 EA, 0 Refill(s).     ProAir HFA 90 mcg/inh inhalation aerosol: 2 puff(s), inh, qid, PRN: as needed for wheezing, 1 EA, 2 Refill(s).     Qvar 80 mcg/inh inhalation aerosol: 1 puff(s), inh, bid, 1 EA, 5 Refill(s).     Symbicort 160 mcg-4.5 mcg/inh inhalation aerosol: 2 puff(s), inh, bid, to replace qvar  in the morning and the evening  use with spacer chamber  rinse mouth and throat after use, 3 EA, 3 Refill(s).     DULoxetine 60 mg oral delayed release capsule: 60 mg, 1 cap(s), po, daily, 60 cap(s), 1 Refill(s).          Allergies    Bee Stings    Latex  Social History    Smoking Status - 05/29/2018     Current every day smoker     Alcohol      Current, 06/16/2015     Employment and Education      Work/School description: disabled., 02/21/2011     Home  and Environment      Lives with Self, Significant other., 02/21/2011     Substance Abuse      Current, 06/16/2015     Tobacco      Current, 06/16/2015  Family History    Diabetes mellitus type 2: Mother.    Heart disease: Father.  Immunizations      Vaccine Date Status      tetanus/diphth/pertuss (Tdap) adult/adol 11/17/2016 Given      influenza virus vaccine, inactivated 11/17/2016 Given      influenza virus vaccine, inactivated 01/06/2015 Given  Lab Results       Lab Results (Last 4 results within 90 days)        Sodium Level: 140 mmol/L [135 mmol/L - 146 mmol/L] (04/27/18 14:54:00 CDT)       Potassium Level: 4.4 mmol/L [3.5 mmol/L - 5.3 mmol/L] (04/27/18 14:54:00 CDT)       Chloride Level: 104 mmol/L [98 mmol/L - 110 mmol/L] (04/27/18 14:54:00 CDT)       CO2 Level: 31 mmol/L [20 mmol/L - 31 mmol/L] (04/27/18 14:54:00 CDT)       Glucose Level: 83 mg/dL [65 mg/dL - 99 mg/dL] (04/27/18 14:54:00 CDT)       BUN: 15 mg/dL [7 mg/dL - 25 mg/dL] (04/27/18 14:54:00 CDT)       Creatinine Level: 0.98 mg/dL [0.6 mg/dL - 1.35 mg/dL] (04/27/18 14:54:00 CDT)       BUN/Creat Ratio: NOT APPLICABLE [6  - 22] (04/27/18 14:54:00 CDT)       eGFR: 97 mL/min/1.73m2 [8.6 mg/dL - 10.3 mg/dL] (04/27/18 14:54:00 CDT)       eGFR African American: 113 mL/min/1.73m2 [6  - 22] (04/27/18 14:54:00 CDT)       Calcium Level: 9.7 mg/dL [8.6 mg/dL - 10.3 mg/dL] (04/27/18 14:54:00 CDT)

## 2022-02-15 NOTE — TELEPHONE ENCOUNTER
---------------------  From: Johnny Alas PA-C   To: scanR Pool ([id number] [facility] [place]);     Sent: 10/16/2020 12:23:15 PM CDT  Subject: General Message     Please call with negative C-19 results.    ALEXANDRE---------------------  From: Shana Mcfarland LPN (Phone Messages Pool ([id number]_[facility]))   To: K[facility] Message Pool ([id number]);     Sent: 10/16/2020 12:24:56 PM CDT  Subject: FW: General MessageTime: 12:53 pm  Note: Patient was notified.

## 2022-02-15 NOTE — PROGRESS NOTES
"   Patient:   JOEL SUTTON            MRN: [id number]            FIN: [id number]               Age:   40 years     Sex:  Male     :  1980   Associated Diagnoses:   Obese   Author:   Jillian Russo      Visit Information   Visit type:  Medical Nutrition Therapy.    Referral source:  Dhiraj ORTEGA, Mata.       Chief Complaint   Obesity       Interval History   Pt seen RD in 2017 and continues to struggle with portion control.  Pt will potentially need/ want back surgery and surgeon told pt he would first need to lose 50#.   Pt states he is ready to make some changes to help promote weight loss.    Intake: skips breakfast, in general dislikes most fruits and vegetables but will have them mixed in dishes, loves meat and starches.    Fluids: sparkling calorie free water, Regular energy drink q am, juice 1/2 cup daily   Has not recorded intake   Exercise: none (chronic back pain)  Sleep: \"Insomnia\" per pt - very hard time going to sleep     Stress: mod/ high - health, does have a 1 yo dog (Lady) that has helped him for a therapy dog        Health Status   Allergies:    Allergic Reactions (Selected)  Severity Not Documented  Bananas (No reactions were documented)  Bee Stings (No reactions were documented)  Latex (No reactions were documented)   Medications:  (Selected)   Prescriptions  Prescribed  DULoxetine 30 mg oral delayed release capsule: = 1 cap(s) ( 30 mg ), Oral, bid, # 60 cap(s), 0 Refill(s), Type: Maintenance, Pharmacy: [facility], 1 cap(s) Oral bid, 72, in, 20 13:48:00 CDT, Height Measured, 306.2, lb, 20 13:48:00 CDT, Weight Measured  Orthotics: Orthotics, See Instructions, Instructions: to use as directed with shoes, Supply, # 2 EA, 0 Refill(s), Type: Maintenance  Ventolin HFA 90 mcg/inh inhalation aerosol: 2 puff(s), NEB, qid, PRN: AS NEEDED FOR WHEEZING, # 1 EA, 0 Refill(s), Type: Maintenance, Pharmacy: [facility], 2 puff(s) NEB qid,PRN:AS " NEEDED FOR WHEEZING  diazePAM 5 mg oral tablet: = 1 tab(s) ( 5 mg ), Oral, q8 hrs, PRN: as needed for anxiety, # 12 tab(s), 1 Refill(s), Type: Maintenance, Pharmacy: [facility], 1 tab(s) Oral q8 hrs,PRN:as needed for anxiety  diclofenac 1% topical gel: ( 2 gm ), Topical, qid, # 240 gm, 2 Refill(s), Type: Maintenance, Pharmacy: [facility], 2 gm Topical qid, 72, in, 08/05/20 13:48:00 CDT, Height Measured, Weight Measured  omeprazole 20 mg oral delayed release capsule: = 1 cap(s) ( 20 mg ), Oral, daily, # 30 cap(s), 1 Refill(s), Type: Maintenance, Pharmacy: [facility], 1 cap(s) Oral daily,x30 day(s), 72, in, 08/05/20 13:48:00 CDT, Height Measured, 306.2, lb, 08/05/20 13:48:00 CDT, Weight Kenyetta...  oxyCODONE 5 mg oral tablet: = 1 tab(s) ( 5 mg ), Oral, q8 hrs, PRN: as needed for pain, # 20 tab(s), 0 Refill(s), Type: Maintenance, Pharmacy: [facility], 1 tab(s) Oral q8 hrs,PRN:as needed for pain, 72, in, 08/05/20 13:48:00 CDT, Height Measured, Weight...  Documented Medications  Documented  acetaminophen 500 mg oral tablet: = 2 tab(s) ( 1,000 mg ), Oral, q6 hrs, PRN: as needed for pain, 0 Refill(s), Type: Maintenance   Problem list:    All Problems  Chronic back pain / SNOMED CT 382771460 / Confirmed  Cerebellar tonsillar ectopia / SNOMED CT 04258486 / Confirmed  Degenerative lumbar disc / SNOMED CT 84041780 / Confirmed  Sebaceous cyst / SNOMED CT 9030675221 / Confirmed  Marijuana use / SNOMED CT 9643458187 / Confirmed  Anxiety / SNOMED CT 62502624 / Confirmed  Low back pain / SNOMED CT 772361076 / Confirmed  Asthma, moderate persistent / SNOMED CT 8781999392 / Confirmed  Myalgia / SNOMED CT 110121252 / Confirmed  Dysplastic nevus / SNOMED CT 8460719306 / Confirmed  Obese / SNOMED CT 5380973595 / Probable  Chronic insomnia / SNOMED CT 806210057 / Confirmed  Smoking / SNOMED CT 935510777 / Confirmed  Tension headache / SNOMED  CT 2608111148 / Confirmed  Tobacco user / SNOMED CT 391569634 / Probable  Trochanteric bursitis, right hip / SNOMED CT 98519814 / Confirmed      Histories   Past Medical History:    Active  Cerebellar tonsillar ectopia (06812862)  Anxiety (03603115)  Obese (9350580766)   Family History:    Heart disease  Father (Conor)  Diabetes mellitus type 2  Mother (Obdulia)     Procedure history:    Injection of cortisone (SNOMED CT 3695811143) performed by Joann on 6/8/2016 at 36 Years.  Transanal hemorrhoidal dearterialization (THD) performed by Vik Chavez MD on 9/8/2015 at 35 Years.  Esophagogastroduodenoscopy (SNOMED CT 378598137) on 8/4/2015 at 35 Years.  Comments:  8/5/2015 12:30 PM CDT - Mata Merino MD  Normal  Colonoscopy (SNOMED CT 556405090) performed by Mata Merino MD on 8/4/2015 at 35 Years.  Comments:  8/13/2015 3:29 PM CDT - Genia Mac RN  Sedation: MAC  Indication: rectal bleeding  External & internal hemorrhoids; otherwise normal  Resume routine screening at age 50.  Hospitalized at [facility] in 2002 at 22 Years.  Right knee arthroscopy.  Surgery on eyes x3.  Comments:  12/15/2010 4:16 PM CST - Genia Ely RN  as infant   Social History:        Alcohol Assessment            Past                     Comments:                      06/16/2015 - Randall Salcedo MD                     6 drinks pks per weekend      Tobacco Assessment            Current                     Comments:                      06/16/2015 - Randall Salcedo MD                     1 ppd      Substance Abuse Assessment            Current                     Comments:                      06/16/2015 - Randall Salcedo MD                     Marijuana      Employment and Education Assessment            Work/School description: disabled.                     Comments:                      02/21/2011 - Krysten ORTEGA Wong                     has 1 child- 10 weeks old -      Home and Environment Assessment            Lives  with Self, Significant other.                     Comments:                      02/21/2011 - Krystne ORTEGA, Wong                     2 kids in household        Physical Examination   Vital Signs   8/5/2020 1:48 PM CDT Temperature Tympanic 97.6 DegF  LOW    Peripheral Pulse Rate 94 bpm    Pulse Site Radial artery    HR Method Manual    Systolic Blood Pressure 140 mmHg  HI    Diastolic Blood Pressure 62 mmHg    Mean Arterial Pressure 88 mmHg    BP Site Right arm    BP Method Manual    Oxygen Saturation 95 %      Measurements from flowsheet : Measurements   8/5/2020 1:48 PM CDT Height Measured - Standard 72 in    Height/Length Estimated 72 in    Weight Measured - Standard 306.2 lb    BSA 2.65 m2    Body Mass Index 41.52 kg/m2  HI         Review / Management   Results review:  Lab results: 6/4/2020 12:32 PM CDT    Coronavirus SARS-CoV-2 (COVID-19)         NOT DETECTED  .       Impression and Plan   Diagnosis     Obese (TIQ16-NW E66.9).       Today patient was instructed on lifestyle interventions to help reduce the risks associated with being obese.  Pt is provided with motivational counseling and large focus on mindful eating.  Pt's motivators are health improvement and being healthy for his children.  Pt is also given meal and snack ideas and discussed ways to improve quality of intake.    Education of the following topcis:  - Myplate, ~1800 calorie controlled meal plan, portion sizes for each food group, label reading   - weight loss tips, mindful eating, motivational tips with reward system  - daily weights, recording intake  - exercise recommendations with slow initial steps to start, recommend a fitness tracker     Goals:   1.  Practice healthy stress management and get good quality sleep with the goal of 7-8 hours per night.    2.  Eat in a healthy way, per food plate method .  Keep a food record.  Eat 3 meals/ day.  A meal is three or more food groups; make it colorful for better nutrition.  Focus on portion  control.  ~1800 calorie meal plan.  Follow weight loss tips and mindful eating.    3.  Goal weight 276 by  2/2021  4.  Follow up in 2 month      Professional Services   Time spent with pt 60 min   cc Dr. Hearn

## 2022-02-15 NOTE — PROGRESS NOTES
Chief Complaint    pain managment, chronic back pain. patient stated that he needs surgery but unable to do at this time so would like to discuss further pain medication refills   Visit type:  Telephone visit   Participants during visit:  patient   Location of patient:  home   Location of physician:  office   Call start time:  1036   Call end time:  1050   Today's visit was conducted by telephone conference due to the COVID-19 pandemic.  The patient's consent to proceed with the telephone conference was obtained and documented.  History of Present Illness       Patient has a telemedicine visit today for follow-up on his chronic pain and anxiety.  He has been using oxycodone 5 mg tabs up to 2 daily for his pain.  He reports is worsening lately due to increased stressors.  Continues with icing, stretching and muscle relaxers.  Review of Systems       See HPI.  All other review of systems negative.  Physical Exam   Vitals & Measurements    HT: 72 in  HT: 72 in   Assessment/Plan       1. Anxiety (F41.1)          Unchanged       2. Chronic back pain (M54.9)          Pain due to degenerative disc disease exacerbated by inactivity and obesity         Encouraged increased activity, continue with massage and icing         Discussed concerns regarding chronic opiate therapy         Prescription for 20 oxycodone filled on November 24.  New prescription will be sent in to be filled in about a week.       3. Degenerative lumbar disc (M51.36)  Patient Information     Name:JOEL SUTTON      Address:      [address] [place] [id number]     Sex:Male     YOB: 1980     [phone]     Emergency Contact:[facility], CONTACT     MRN:[id number]     FIN:[id number]     Location:[facility]     Date of Service:11/29/2021      Primary Care Physician:       Mata Hearn MD, [phone]      Attending Physician:       Mata Hearn MD, [phone]  Problem List/Past  Medical History    Ongoing     Anxiety     Asthma, moderate persistent     Cerebellar tonsillar ectopia     Chronic back pain     Chronic insomnia     Degenerative lumbar disc     Dysplastic nevus       Comments: removed from back     Low back pain     Marijuana use     Myalgia     Myofascial pain syndrome     Obese     Sebaceous cyst     Smoking     Tension headache     Tobacco user     Trochanteric bursitis, right hip    Historical     No qualifying data  Procedure/Surgical History     Injection of cortisone (06/08/2016)     Transanal hemorrhoidal dearterialization (THD) (09/08/2015)     Colonoscopy (08/04/2015)      Comments: Sedation: MAC      Indication: rectal bleeding      External & internal hemorrhoids; otherwise normal      Resume routine screening at age 50..     Esophagogastroduodenoscopy (08/04/2015)      Comments: Normal.     Hospitalized at [facility] (2002)     Right knee arthroscopy     Surgery on eyes x3      Comments: as infant.  Medications    acetaminophen 500 mg oral tablet, 1000 mg= 2 tab(s), Oral, q6 hrs, PRN    Acular 0.5% ophthalmic solution, 1 drop(s), Eye-Right, qid, PRN, 1 refills    albuterol 90 mcg/inh inhalation aerosol, 2 puff(s), Inhale, qid, 3 refills    cyclobenzaprine 10 mg oral tablet, 10 mg= 1 tab(s), Oral, bid, PRN, 1 refills    diazePAM 5 mg oral tablet, 5 mg= 1 tab(s), Oral, daily, PRN    diclofenac 3% topical gel, 3 gm, Topical, qid, 2 refills    Orthotics, See Instructions    oxyCODONE 5 mg oral tablet, 5 mg= 1 tab(s), Oral, q8 hrs, PRN    tiZANidine 2 mg oral tablet  Allergies    Bananas    Bee Stings    Latex  Social History    Smoking Status     Current every day smoker     Alcohol      Past     Electronic Cigarette/Vaping      Electronic Cigarette Use: Never.     Employment/School      Work/School description: disabled.     Home/Environment      Lives with Self, Significant other.     Substance Abuse      Current     Tobacco      10 or more cigarettes (1/2 pack  or more)/day in last 30 days  Family History    Diabetes mellitus type 2: Mother.    Heart disease: Father.  Immunizations          Other Immunizations          Administration Dosage Date(s)          influenza virus vaccine, inactivated          01/06/2015, 11/17/2016          tetanus/diphth/pertuss (Tdap) adult/adol          11/17/2016

## 2022-03-02 VITALS
HEIGHT: 72 IN | BODY MASS INDEX: 42.66 KG/M2 | HEART RATE: 94 BPM | HEIGHT: 72 IN | BODY MASS INDEX: 42.59 KG/M2 | DIASTOLIC BLOOD PRESSURE: 112 MMHG | WEIGHT: 315 LBS | SYSTOLIC BLOOD PRESSURE: 158 MMHG

## 2022-03-02 NOTE — PROGRESS NOTES
Patient:   JOEL SUTTON            MRN: [id number]            FIN: [id number]               Age:   41 years     Sex:  Male     :  1980   Associated Diagnoses:   None   Author:   Jules Garcia MD       -   Today's date:    2022.        -   To whom it may concern:        This patient is currently under my care and Was seen in my office on  2022.  .  Due to his health issues, patient requires adequate rest to improve   Please contact me if you have any questions or concerns.      -   Sincerely,

## 2022-03-02 NOTE — PROGRESS NOTES
Chief Complaint    verbal consent given for telemed visit. discuss pain mgt, referral to PT.  has tried to get into pain clinic but not able to get approval.  also c/o having manic episodes on/off, not sleeping well.   Visit type:  Telephone visit   Participants during visit:  patient   Location of patient:  home   Location of physician:  office   Call start time:  1014   Call end time:  1028   Today's visit was conducted by telephone conference due to the COVID-19 pandemic.  The patient's consent to proceed with the telephone conference was obtained and documented.  History of Present Illness       Patient continues to have trouble with his back pain.  He was not able to complete referral for pain injections.  His anxiety continues to trouble him.  He has some family stressors currently diet seem to set him off.  He is not sleeping well.  Review of Systems       See HPI.  All other review of systems negative.  Assessment/Plan       Anxiety (F41.1)          Trial of hydroxyzine to help with sleep, continue current pain management and follow-up as needed       Chronic back pain (M54.9)       Orders:         albuterol, 2 puff(s), Inhale, qid, # 2 EA, 3 Refill(s), Type: Hard Stop, Pharmacy: [facility], 72, in, 02/16/21 14:36:00 CST, Height Measured, 311.8, lb, 01/05/21 14:29:00 CST, Weight Measured, (Completed)         albuterol, 2 puff(s), Inhale, qid, # 2 EA, 3 Refill(s), Type: Maintenance, Pharmacy: [facility], 2 puff(s) Inhale qid, 72, in, 01/26/22 12:31:00 CST, Height Measured, 326.6, lb, 01/26/22 12:31:00 CST, Weight Measured, (Ordered)         albuterol, 2 puff(s), Inhale, qid, # 2 EA, 3 Refill(s), Type: Hard Stop, Pharmacy: [facility], 72, in, 01/26/22 12:31:00 CST, Height Measured, 326.6, lb, 01/26/22 12:31:00 CST, Weight Measured, (Completed)         hydrOXYzine, = 1 cap(s) ( 25 mg ), Oral, qid, PRN: for anxiety, # 20 cap(s), 1  Refill(s), Type: Acute, Pharmacy: [facility] [place], 1 cap(s) Oral qid,PRN:for anxiety, 72, in, 01/26/22 12:31:00 CST, Height Measured, 326.6, lb, 01/26/22 12:31:00 CS..., (Ordered)  Patient Information     Name:JOEL SUTTON      Address:      [address] [place] [address] [address] [place] [phone]     Sex:Male     YOB: 1980     [phone]     Emergency Contact:[facility], CONTACT     MRN:[id number]     FIN:[id number]     Location:[facility]     Date of Service:02/02/2022      Primary Care Physician:       Mata Hearn MD, [phone]      Attending Physician:       Mata Hearn MD, [phone]  Problem List/Past Medical History    Ongoing     Anxiety     Asthma, moderate persistent     Cerebellar tonsillar ectopia     Chronic back pain     Chronic insomnia     Degenerative lumbar disc     Dysplastic nevus       Comments: removed from back     Low back pain     Marijuana use     Myalgia     Myofascial pain syndrome     Obese     Sebaceous cyst     Smoking     Tension headache     Tobacco user     Trochanteric bursitis, right hip    Historical     No qualifying data  Procedure/Surgical History     Injection of cortisone (06/08/2016)     Transanal hemorrhoidal dearterialization (THD) (09/08/2015)     Colonoscopy (08/04/2015)      Comments: Sedation: MAC      Indication: rectal bleeding      External & internal hemorrhoids; otherwise normal      Resume routine screening at age 50..     Esophagogastroduodenoscopy (08/04/2015)      Comments: Normal.     Hospitalized at [facility] unit (2002)     Right knee arthroscopy     Surgery on eyes x3      Comments: as infant.  Medications    acetaminophen 500 mg oral tablet, 1000 mg= 2 tab(s), Oral, q6 hrs, PRN    Acular 0.5% ophthalmic solution, 1 drop(s), Eye-Right, qid, PRN, 1 refills    albuterol 90 mcg/inh inhalation aerosol, 2 puff(s), Inhale, qid, 3 refills    cyclobenzaprine 10 mg oral tablet, 10  mg= 1 tab(s), Oral, bid, PRN, 1 refills    diazePAM 5 mg oral tablet, 5 mg= 1 tab(s), Oral, daily, PRN    diclofenac 3% topical gel, 3 gm, Topical, qid, 2 refills    hydrOXYzine pamoate 25 mg oral capsule, 25 mg= 1 cap(s), Oral, qid, PRN, 1 refills    Orthotics, See Instructions    oxyCODONE 5 mg oral tablet, 5 mg= 1 tab(s), Oral, q8 hrs, PRN    tiZANidine 2 mg oral tablet  Allergies    Bananas    Bee Stings    Latex  Social History    Smoking Status     Current every day smoker     Alcohol      Past     Electronic Cigarette/Vaping      Electronic Cigarette Use: Never.     Employment/School      Work/School description: disabled.     Home/Environment      Lives with Self, Significant other.     Substance Abuse      Current     Tobacco      10 or more cigarettes (1/2 pack or more)/day in last 30 days  Family History    Diabetes mellitus type 2: Mother.    Heart disease: Father.  Lab Results          Lab Results (Last 4 results within 90 days)           U pH: 5.3 [4.5  - 9] (01/04/22 17:43:00)          U Creatinine: 137.8 mg/dL (01/04/22 17:43:00)          Marijuana Comments: See comment (01/04/22 17:43:00)          Oxycodone Comments: See comment (01/04/22 17:43:00)          U Alcohol Metabolities: NEGATIVE (01/04/22 17:43:00)          U Amphetamines Screen: NEGATIVE (01/04/22 17:43:00)          U Benzodia Scrn: NEGATIVE (01/04/22 17:43:00)          U Buprenorphine: NEGATIVE (01/04/22 17:43:00)          U Cannabis Metabolite Scrn: POSITIVE Abnormal (01/04/22 17:43:00)          U Cannabis Metabolites: 411 ng/mL High (01/04/22 17:43:00)          U Cocaine Metabolite Scrn: NEGATIVE (01/04/22 17:43:00)          U Codeine Scrn: NEGATIVE (01/04/22 17:43:00)          U Hydrocodone Scrn: NEGATIVE (01/04/22 17:43:00)          U Hydromorphone Scrn: NEGATIVE (01/04/22 17:43:00)          U MDMA: NEGATIVE (01/04/22 17:43:00)          U Morphine Scrn: NEGATIVE (01/04/22 17:43:00)          U 6-Acetylmorphine: NEGATIVE (01/04/22  17:43:00)          U Norhydrocodone: NEGATIVE (01/04/22 17:43:00)          U NorOxycodone Scrn: 639 ng/mL High (01/04/22 17:43:00)          U Opiates Scrn: NEGATIVE CONFIRMED (01/04/22 17:43:00)          U Opiates Comments: See comment (01/04/22 17:43:00)          U Oxidants: NEGATIVE (01/04/22 17:43:00)          U Oxycodone: 475 ng/mL High (01/04/22 17:43:00)          U Oxycodone Scrn: POSITIVE Abnormal (01/04/22 17:43:00)          U Oxymorphone Scrn: 956 ng/mL High (01/04/22 17:43:00)          Drug Panel Comments: See comment (01/04/22 17:43:00)  Immunizations          Other Immunizations          Administration Dosage Date(s)          influenza virus vaccine, inactivated          01/06/2015, 11/17/2016          tetanus/diphth/pertuss (Tdap) adult/adol          11/17/2016

## 2022-03-02 NOTE — TELEPHONE ENCOUNTER
---------------------  From: Fariba Roth MA (Carlos Baig ([id number]_[facility]))   To: Mata Hearn MD;     Sent: 1/4/2022 9:10:11 AM CST  Subject: FW: Medication Management   Due Date/Time: 1/4/2022 11:20:00 AM CST     Please see phone message and advise.        ** Patient matched by Fariba Roth MA on 1/4/2022 9:09:49 AM CST **      ------------------------------------------  From: [facility]  To: Mata Hearn MD  Sent: December 31, 2021 11:20:37 AM CST  Subject: Medication Management  Due: December 15, 2021 8:09:06 PM CST     ** On Hold Pending Signature **     Drug: oxyCODONE (oxyCODONE 5 mg oral tablet), TAKE ONE TABLET BY MOUTH EVERY 8 HOURS AS NEEDED FOR PAIN  Quantity: 20 tab(s)  Days Supply: 7  Refills: 0  Substitutions Allowed  Notes from Pharmacy:     Dispensed Drug: oxyCODONE (oxyCODONE 5 mg oral tablet), TAKE ONE TABLET BY MOUTH EVERY 8 HOURS AS NEEDED FOR PAIN  Quantity: 20 tab(s)  Days Supply: 7  Refills: 0  Substitutions Allowed  Notes from Pharmacy:  ---------------------------------------------------------------  From: Mata Hearn MD   To: [facility]    Sent: 1/4/2022 1:16:53 PM CST  Subject: FW: Medication Management     ** Not Approved: refilled **  oxyCODONE (OXYCODONE HCL 5MG TABS)  TAKE ONE TABLET BY MOUTH EVERY 8 HOURS AS NEEDED FOR PAIN  Qty:  20 tab(s)        Days Supply:  7        Refills:  0          Substitutions Allowed     Route To Pharmacy - [facility]

## 2022-03-02 NOTE — NURSING NOTE
Comprehensive Intake Entered On:  2/2/2022 9:50 AM CST    Performed On:  2/2/2022 9:46 AM CST by Gonzalez RAMSAY, Fariba               Summary   Chief Complaint :   verbal consent given for telemed visit. discuss pain mgt, referral to PT.  has tried to get into pain clinic but not able to get approval.  also c/o having manic episodes on/off, not sleeping well.   Menstrual Status :   N/A   Fariba Roth MA - 2/2/2022 9:46 AM CST   Health Status   Allergies Verified? :   Yes   Medication History Verified? :   Yes   Medical History Verified? :   Yes   Pre-Visit Planning Status :   Completed   Tobacco Use? :   Current every day smoker   Gonzalez RAMSAY, Fariba - 2/2/2022 9:46 AM CST   Consents   Consent for Immunization Exchange :   Consent Granted   Consent for Immunizations to Providers :   Consent Granted   Fariba Roth MA - 2/2/2022 9:46 AM CST   Meds / Allergies   (As Of: 2/2/2022 9:50:15 AM CST)   Allergies (Active)   Bananas  Estimated Onset Date:   Unspecified ; Created By:   Rowena Hooker LPN; Reaction Status:   Active ; Category:   Drug ; Substance:   Bananas ; Type:   Allergy ; Updated By:   Rowena Hooker LPN; Reviewed Date:   6/9/2021 11:35 AM CDT      Bee Stings  Estimated Onset Date:   Unspecified ; Created By:   Ruthie Nam; Reaction Status:   Active ; Category:   Drug ; Substance:   Bee Stings ; Type:   Allergy ; Updated By:   Ruthie Nam; Reviewed Date:   6/9/2021 11:35 AM CDT      Latex  Estimated Onset Date:   Unspecified ; Created By:   Ruthie Nam; Reaction Status:   Active ; Category:   Drug ; Substance:   Latex ; Type:   Allergy ; Updated By:   Ruthie Nam; Reviewed Date:   6/9/2021 11:35 AM CDT        Medication List   (As Of: 2/2/2022 9:50:15 AM CST)   Prescription/Discharge Order    albuterol  :   albuterol ; Status:   Prescribed ; Ordered As Mnemonic:   albuterol 90 mcg/inh inhalation aerosol ; Simple Display Line:   2 puff(s), Inhale, qid, 2 EA, 3 Refill(s) ; Ordering Provider:    Mata Hearn MD; Catalog Code:   albuterol ; Order Dt/Tm:   2/2/2022 9:48:18 AM CST          albuterol  :   albuterol ; Status:   Completed ; Ordered As Mnemonic:   albuterol 90 mcg/inh inhalation aerosol ; Simple Display Line:   2 puff(s), Inhale, qid, 2 EA, 3 Refill(s) ; Ordering Provider:   Mata Hearn MD; Catalog Code:   albuterol ; Order Dt/Tm:   3/25/2021 7:39:18 AM CDT          cyclobenzaprine  :   cyclobenzaprine ; Status:   Prescribed ; Ordered As Mnemonic:   cyclobenzaprine 10 mg oral tablet ; Simple Display Line:   10 mg, 1 tab(s), Oral, bid, PRN: for spasm, 20 tab(s), 1 Refill(s) ; Ordering Provider:   Mata Hearn MD; Catalog Code:   cyclobenzaprine ; Order Dt/Tm:   6/2/2021 10:12:16 AM CDT          diazePAM  :   diazePAM ; Status:   Prescribed ; Ordered As Mnemonic:   diazePAM 5 mg oral tablet ; Simple Display Line:   5 mg, 1 tab(s), Oral, daily, PRN: as needed for anxiety, 12 tab(s), 0 Refill(s) ; Ordering Provider:   Mata Hearn MD; Catalog Code:   diazePAM ; Order Dt/Tm:   11/30/2020 9:48:55 PM CST          diclofenac topical  :   diclofenac topical ; Status:   Prescribed ; Ordered As Mnemonic:   diclofenac 3% topical gel ; Simple Display Line:   3 gm, Topical, qid, 240 gm, 2 Refill(s) ; Ordering Provider:   Mata Hearn MD; Catalog Code:   diclofenac topical ; Order Dt/Tm:   5/27/2021 3:01:47 PM CDT          ketorolac ophthalmic  :   ketorolac ophthalmic ; Status:   Prescribed ; Ordered As Mnemonic:   Acular 0.5% ophthalmic solution ; Simple Display Line:   1 drop(s), right eye, QID, for 7 day(s), PRN: for itching, 10 mL, 1 Refill(s) ; Ordering Provider:   Mata Hearn MD; Catalog Code:   ketorolac ophthalmic ; Order Dt/Tm:   3/25/2021 7:40:40 AM CDT          Miscellaneous Rx Supply  :   Miscellaneous Rx Supply ; Status:   Prescribed ; Ordered As Mnemonic:   Orthotics ; Simple Display Line:   See Instructions, to use as directed with shoes, 2 EA, 0 Refill(s)  ; Ordering Provider:   Mata Hearn MD; Catalog Code:   Miscellaneous Rx Supply ; Order Dt/Tm:   2/13/2020 3:16:54 PM CST          oxyCODONE  :   oxyCODONE ; Status:   Prescribed ; Ordered As Mnemonic:   oxyCODONE 5 mg oral tablet ; Simple Display Line:   5 mg, 1 tab(s), Oral, q8 hrs, fill in 1 month, PRN: as needed for pain, 30 tab(s), 0 Refill(s) ; Ordering Provider:   Jules Garcia MD; Catalog Code:   oxyCODONE ; Order Dt/Tm:   1/26/2022 12:51:27 PM CST            Home Meds    acetaminophen  :   acetaminophen ; Status:   Documented ; Ordered As Mnemonic:   acetaminophen 500 mg oral tablet ; Simple Display Line:   1,000 mg, 2 tab(s), Oral, q6 hrs, PRN: as needed for pain, 0 Refill(s) ; Catalog Code:   acetaminophen ; Order Dt/Tm:   11/6/2019 1:37:34 PM CST          tiZANidine  :   tiZANidine ; Status:   Documented ; Ordered As Mnemonic:   tiZANidine 2 mg oral tablet ; Simple Display Line:   0 Refill(s) ; Catalog Code:   tiZANidine ; Order Dt/Tm:   11/24/2021 10:47:45 AM CST            Social History   Social History   (As Of: 2/2/2022 9:50:15 AM CST)   Alcohol:        Past   Comments:  6/16/2015 3:17 PM - Randall Salcedo MD: 6 drinks pks per weekend   (Last Updated: 2/13/2020 3:10:22 PM CST by Fariba Rtoh MA)          Tobacco:        10 or more cigarettes (1/2 pack or more)/day in last 30 days   (Last Updated: 1/4/2022 10:25:21 AM CST by Maryanne Sifuentes CMA)          Electronic Cigarette/Vaping:        Electronic Cigarette Use: Never.   (Last Updated: 1/4/2022 10:25:23 AM CST by Maryanne Sifuentes CMA)          Substance Abuse:        Current   Comments:  6/16/2015 3:18 PM - Randall Salcedo MD: Marijuana   (Last Updated: 6/16/2015 3:18:44 PM CDT by Randall Salcedo MD)          Employment/School:        Work/School description: disabled.   Comments:  2/21/2011 5:54 PM - Wong Bashir MD: has 1 child- 10 weeks old -   (Last Updated: 2/21/2011 5:54:14 PM CST by Wong Bashir MD)          Home/Environment:         Lives with Self, Significant other.   Comments:  2/21/2011 5:54 PM - Wong Bashir MD: 2 kids in household   (Last Updated: 2/21/2011 5:54:34 PM CST by Wong Bashir MD)

## 2022-03-02 NOTE — TELEPHONE ENCOUNTER
---------------------  From: Tanner FLANNERY, Maggy SCOTT (Phone Messages Pool ([id number]_[facility]))   To: [facility] Message Pool ([id number]_[place] [facility] [place]);     Sent: 1/4/2022 8:31:12 AM CST  Subject: Pain meds     Pt called at 0828 asking for Oxy refill  Per note form 12/22/21 pt to have visit with [facility] for further refills  Informed pt, transferred to scheduling---------------------  From: Gonzalez RAMSAY, Fariba ([facility] Message Pool ([id number]_[facility]))   To: Mata Hearn MD;     Sent: 1/4/2022 9:59:55 AM CST  Subject: FW: Pain meds     Please see ERx refill request.  No appt made yetappt with [facility] today for phone visit at 1020  tried calling x 2 - first VM full, unable to LM.  second phone kept ringing at 1019 - will try back again in a few min

## 2022-03-02 NOTE — NURSING NOTE
Comprehensive Intake Entered On:  1/4/2022 10:27 AM CST    Performed On:  1/4/2022 10:23 AM CST by Maryanne Sifuentes CMA               Summary   Chief Complaint :   Lower back pain - history of herniated disc, needs to have surgery - hurting all over - Requesting refill on pain med - verbal consent for phone visit given   Menstrual Status :   N/A   Height Measured :   72 in(Converted to: 6 ft 0 in, 182.88 cm)    Height/Length Estimated :   72 in(Converted to: 6 ft 0 in, 182.88 cm)    Maryanne Sifuentes CMA - 1/4/2022 10:23 AM CST   Health Status   Allergies Verified? :   Yes   Medication History Verified? :   Yes   Medical History Verified? :   Yes   Pre-Visit Planning Status :   Completed   Maryanne Sifuentes CMA - 1/4/2022 10:23 AM CST   Consents   Consent for Immunization Exchange :   Consent Granted   Consent for Immunizations to Providers :   Consent Granted   Maryanne Sifuentes CMA - 1/4/2022 10:23 AM CST   Social History   Social History   (As Of: 1/4/2022 10:27:59 AM CST)   Alcohol:        Past   Comments:  6/16/2015 3:17 PM - Randall Salcedo MD: 6 drinks pks per weekend   (Last Updated: 2/13/2020 3:10:22 PM CST by Fariba Roth MA)          Tobacco:        10 or more cigarettes (1/2 pack or more)/day in last 30 days   (Last Updated: 1/4/2022 10:25:21 AM CST by Maryanne Sifuentes CMA)          Electronic Cigarette/Vaping:        Electronic Cigarette Use: Never.   (Last Updated: 1/4/2022 10:25:23 AM CST by Maryanne Sifuentes CMA)          Substance Abuse:        Current   Comments:  6/16/2015 3:18 PM - Randall Salcedo MD: Marijuana   (Last Updated: 6/16/2015 3:18:44 PM CDT by Randall Salcedo MD)          Employment/School:        Work/School description: disabled.   Comments:  2/21/2011 5:54 PM - Wong Bashir MD: has 1 child- 10 weeks old -   (Last Updated: 2/21/2011 5:54:14 PM CST by Wong Bashir MD)          Home/Environment:        Lives with Self, Significant other.   Comments:  2/21/2011 5:54 PM - Krysten ORTEGA, Wong: 2 kids in  household   (Last Updated: 2/21/2011 5:54:34 PM CST by Wong Bashir MD)

## 2022-03-02 NOTE — NURSING NOTE
Comprehensive Intake Entered On:  1/26/2022 12:37 PM CST    Performed On:  1/26/2022 12:31 PM CST by Gonzalez RAMSAY, Fariba               Summary   Chief Complaint :   pain mgt f/u.  also c/o low back pain, inflammation.  has taken Ibuprofen, Advil to help relieve pain, which helps some.   Menstrual Status :   N/A   Weight Measured :   326.6 lb(Converted to: 326 lb 10 oz, 148.143 kg)    Height Measured :   72 in(Converted to: 6 ft 0 in, 182.88 cm)    Body Mass Index :   44.29 kg/m2 (HI)    Body Surface Area :   2.74 m2   Height/Length Estimated :   72 in(Converted to: 6 ft 0 in, 182.88 cm)    Systolic Blood Pressure :   158 mmHg (HI)    Diastolic Blood Pressure :   112 mmHg (HI)    Mean Arterial Pressure :   127 mmHg   Peripheral Pulse Rate :   94 bpm   BP Site :   Right arm   Pulse Site :   Brachial artery   BP Method :   Electronic   HR Method :   Electronic   Fariba Roth MA - 1/26/2022 12:31 PM CST   Health Status   Allergies Verified? :   Yes   Medication History Verified? :   Yes   Medical History Verified? :   Yes   Pre-Visit Planning Status :   Completed   Tobacco Use? :   Current every day smoker   Fariba Roth MA - 1/26/2022 12:31 PM CST   Consents   Consent for Immunization Exchange :   Consent Granted   Consent for Immunizations to Providers :   Consent Granted   Fariba Roth MA - 1/26/2022 12:31 PM CST   Meds / Allergies   (As Of: 1/26/2022 12:37:49 PM CST)   Allergies (Active)   Bananas  Estimated Onset Date:   Unspecified ; Created By:   Rowena Hooker RN; Reaction Status:   Active ; Category:   Drug ; Substance:   Bananas ; Type:   Allergy ; Updated By:   Rowena Hooker RN; Reviewed Date:   6/9/2021 11:35 AM CDT      Bee Stings  Estimated Onset Date:   Unspecified ; Created By:   Ruthie Nam CMA; Reaction Status:   Active ; Category:   Drug ; Substance:   Bee Stings ; Type:   Allergy ; Updated By:   Ruthie Nam CMA; Reviewed Date:   6/9/2021 11:35 AM CDT      Latex  Estimated Onset Date:    Unspecified ; Created By:   Ruthie Nam CMA; Reaction Status:   Active ; Category:   Drug ; Substance:   Latex ; Type:   Allergy ; Updated By:   Ruthie Nam CMA; Reviewed Date:   6/9/2021 11:35 AM CDT        Medication List   (As Of: 1/26/2022 12:37:49 PM CST)   Prescription/Discharge Order    albuterol  :   albuterol ; Status:   Prescribed ; Ordered As Mnemonic:   albuterol 90 mcg/inh inhalation aerosol ; Simple Display Line:   2 puff(s), Inhale, qid, 2 EA, 3 Refill(s) ; Ordering Provider:   Mata Hearn MD; Catalog Code:   albuterol ; Order Dt/Tm:   3/25/2021 7:39:18 AM CDT          cyclobenzaprine  :   cyclobenzaprine ; Status:   Prescribed ; Ordered As Mnemonic:   cyclobenzaprine 10 mg oral tablet ; Simple Display Line:   10 mg, 1 tab(s), Oral, bid, PRN: for spasm, 20 tab(s), 1 Refill(s) ; Ordering Provider:   Mata Hearn MD; Catalog Code:   cyclobenzaprine ; Order Dt/Tm:   6/2/2021 10:12:16 AM CDT          diazePAM  :   diazePAM ; Status:   Prescribed ; Ordered As Mnemonic:   diazePAM 5 mg oral tablet ; Simple Display Line:   5 mg, 1 tab(s), Oral, daily, PRN: as needed for anxiety, 12 tab(s), 0 Refill(s) ; Ordering Provider:   Mata Hearn MD; Catalog Code:   diazePAM ; Order Dt/Tm:   11/30/2020 9:48:55 PM CST          diclofenac topical  :   diclofenac topical ; Status:   Prescribed ; Ordered As Mnemonic:   diclofenac 3% topical gel ; Simple Display Line:   3 gm, Topical, qid, 240 gm, 2 Refill(s) ; Ordering Provider:   Mata Hearn MD; Catalog Code:   diclofenac topical ; Order Dt/Tm:   5/27/2021 3:01:47 PM CDT          ketorolac ophthalmic  :   ketorolac ophthalmic ; Status:   Prescribed ; Ordered As Mnemonic:   Acular 0.5% ophthalmic solution ; Simple Display Line:   1 drop(s), right eye, QID, for 7 day(s), PRN: for itching, 10 mL, 1 Refill(s) ; Ordering Provider:   Mata Hearn MD; Catalog Code:   ketorolac ophthalmic ; Order Dt/Tm:   3/25/2021 7:40:40 AM CDT           Miscellaneous Rx Supply  :   Miscellaneous Rx Supply ; Status:   Prescribed ; Ordered As Mnemonic:   Orthotics ; Simple Display Line:   See Instructions, to use as directed with shoes, 2 EA, 0 Refill(s) ; Ordering Provider:   Mata Hearn MD; Catalog Code:   Miscellaneous Rx Supply ; Order Dt/Tm:   2/13/2020 3:16:54 PM CST          oxyCODONE  :   oxyCODONE ; Status:   Prescribed ; Ordered As Mnemonic:   oxyCODONE 5 mg oral tablet ; Simple Display Line:   5 mg, 1 tab(s), Oral, q8 hrs, PRN: as needed for pain, 20 tab(s), 0 Refill(s) ; Ordering Provider:   Jules Garcia MD; Catalog Code:   oxyCODONE ; Order Dt/Tm:   1/4/2022 11:18:36 AM CST            Home Meds    acetaminophen  :   acetaminophen ; Status:   Documented ; Ordered As Mnemonic:   acetaminophen 500 mg oral tablet ; Simple Display Line:   1,000 mg, 2 tab(s), Oral, q6 hrs, PRN: as needed for pain, 0 Refill(s) ; Catalog Code:   acetaminophen ; Order Dt/Tm:   11/6/2019 1:37:34 PM CST          tiZANidine  :   tiZANidine ; Status:   Documented ; Ordered As Mnemonic:   tiZANidine 2 mg oral tablet ; Simple Display Line:   0 Refill(s) ; Catalog Code:   tiZANidine ; Order Dt/Tm:   11/24/2021 10:47:45 AM CST            Social History   Social History   (As Of: 1/26/2022 12:37:49 PM CST)   Alcohol:        Past   Comments:  6/16/2015 3:17 PM - Randall Salcedo MD: 6 drinks pks per weekend   (Last Updated: 2/13/2020 3:10:22 PM CST by Fariba Roth MA)          Tobacco:        10 or more cigarettes (1/2 pack or more)/day in last 30 days   (Last Updated: 1/4/2022 10:25:21 AM CST by Maryanne Sifuentes CMA)          Electronic Cigarette/Vaping:        Electronic Cigarette Use: Never.   (Last Updated: 1/4/2022 10:25:23 AM CST by Maryanne Sifuentes CMA)          Substance Abuse:        Current   Comments:  6/16/2015 3:18 PM - Randall Salcedo MD: Marijuana   (Last Updated: 6/16/2015 3:18:44 PM CDT by Randall Salcedo MD)          Employment/School:        Work/School  description: disabled.   Comments:  2/21/2011 5:54 PM - Wong Bashir MD: has 1 child- 10 weeks old -   (Last Updated: 2/21/2011 5:54:14 PM CST by Wong Bashir MD)          Home/Environment:        Lives with Self, Significant other.   Comments:  2/21/2011 5:54 PM - Wong Bashir MD: 2 kids in household   (Last Updated: 2/21/2011 5:54:34 PM CST by Wong Bashir MD)

## 2022-03-02 NOTE — LETTER
(Inserted Image. Unable to display)   [address] [place] [address]  February 01, 2022      JOEL SUTTON  [address] [place] [address]        Dear JOEL,      Thank you for selecting [facility] for your healthcare needs.       Unfortunately the pain clinic did not feel like they had something to offer you so the referral is canceled.          Please contact me or my assistant at [phone] if you have any questions or concerns.     Sincerely,        Jules Garcia MD

## 2022-03-02 NOTE — TELEPHONE ENCOUNTER
---------------------  From: Maryanne Sifuentes CMA   Sent: 1/4/2022 12:10:12 PM CST  Subject: General Message-Rx/note     Odalis told pt that he would need to come in today for a urine Drug screen in order for him to fill his oxycodone.  Order placed for Drug screen.  Left envelope with  for script and note and instructed that pt can receive, if he comes in for labPt came in around 530pm and left urine sample at lab - pt then came up front after specimen was left and collected an envelope from ODALIS regarding his oxycodone.
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
## 2022-03-02 NOTE — TELEPHONE ENCOUNTER
---------------------  From: Fariba Roth MA (Carlos Baig ([id number]_[facility]))   To: Mata Hearn MD;     Sent: 2/16/2022 7:07:31 AM CST  Subject: FW: Medication Management   Due Date/Time: 2/16/2022 7:57:00 PM CST     LV:  2/2/2022 for telemed visit, anxiety.  Please advise on further refills        ** Patient matched by Fariba Roth MA on 2/16/2022 7:06:09 AM CST **      ------------------------------------------  From: [facility]  To: Mata Hearn MD  Sent: February 15, 2022 7:57:03 PM CST  Subject: Medication Management  Due: February 16, 2022 12:00:45 AM CST     ** On Hold Pending Signature **     Drug: hydrOXYzine (Vistaril 25 mg oral capsule), TAKE ONE CAPSULE BY MOUTH FOUR TIMES A DAY AS NEEDED FOR ANXIETY  Quantity: 20 cap(s)  Days Supply: 5  Refills: 0  Substitutions Allowed  Notes from Pharmacy:     Dispensed Drug: hydrOXYzine (hydrOXYzine pamoate 25 mg oral capsule), TAKE ONE CAPSULE BY MOUTH FOUR TIMES A DAY AS NEEDED FOR ANXIETY  Quantity: 20 cap(s)  Days Supply: 5  Refills: 1  Substitutions Allowed  Notes from Pharmacy:  ---------------------------------------------------------------  From: Mata Hearn MD   To: [facility]    Sent: 2/16/2022 7:45:46 AM CST  Subject: FW: Medication Management     ** Submitted: **  Complete:hydrOXYzine (hydrOXYzine pamoate 25 mg oral capsule)   Signed by Mata Hearn MD  2/16/2022 1:45:00 PM [facility]    ** Approved with modifications: **  hydrOXYzine (HYDROXYZINE PAMOATE 25MG CAPS)  TAKE ONE CAPSULE BY MOUTH FOUR TIMES A DAY AS NEEDED FOR ANXIETY  Qty:  20 cap(s)        Days Supply:  5        Refills:  1          Substitutions Allowed     Route To Pharmacy - [facility]

## 2022-03-02 NOTE — TELEPHONE ENCOUNTER
---------------------  From: Shana Mcfarland LPN (Phone Messages Pool ([id number]_[facility]))   Sent: 2/16/2022 1:38:20 PM CST  Subject: hydroxyzine     Phone Message    PCP:   IRWIN      Time of Call:  1:30pm       Person Calling:  pt    Note:   Pt calling stating he had requested refill at pharmacy of hydroxyzine. Told pt Rx was sent in at 7:45am.  Pt asking if it is ok to take 1 more or 1 less a day. Told pt Rx is for 1 tab PO QID PRN. Told him that it is fine if he dose not take 4 doses in a day but he should not be using more than 4 in a day.     Pt asked if ibuprofen is ok to take with it- told him to check with pharmacy to see if there are any contraindications.    Last office visit and reason:  2/2/22 anxiety

## 2022-03-02 NOTE — TELEPHONE ENCOUNTER
---------------------  From: Ellie Redd RN (Phone Messages Pool ([id number]_[facility]))   To: [facility]AL[facility] Message Pool (32224_[place]);     Sent: 1/31/2022 3:30:33 PM CST  Subject: Pain clinic Referral     Time of Call:  130  Return call at:_     Person Calling:  Patient  Phone number:  [phone]    Note:   He is requesting that the referral to the  Pain Clinic in [place] be placed for him as mentioned in the visit with Radha Colon. He was able to secure transportation.     Last office visit and reason:  Back pain 1/26/22Phone message w/ similar message sent to ODALIS.
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
## 2022-03-02 NOTE — TELEPHONE ENCOUNTER
"---------------------  From: Jeanette King RN (Phone Messages Pool ([id number]_[facility]))   To: [facility] Message Pool ([id number]_[place]);     Sent: 1/31/2022 3:30:43 PM CST  Subject: pain center referral       PCP:   ODALIS      Time of Call:  2:46pm       Person Calling:  pt  Phone number: [phone]    Returned call at: 3:25pm    Note:   VM received from pt, requesting ODAL[facility] to send a referral to the pain center in [place]. Stated he and ODALIS have talked at length about referral. Pt stated he has transportation and requested [facility] to send referral.    Please advise on referral.     Last office visit and reason:  1/26/22 pain mgmt [facility]---------------------  From: Fariba Roth MA ([facility] Message Pool ([id number]))   To: Jules Garcia MD;     Sent: 1/31/2022 3:38:35 PM CST  Subject: FW: pain center referral  ** Submitted: **  Order:Referral (Request)  Details:  1/31/2022 3:47 PM CST, Referred to: Pain Management, Reason for referral: chronic back pain, Chronic back pain         Signed by Jules Garcia MD  1/31/2022 9:47:00 PM [facility]---------------------  From: Jules Garcia MD   To: [facility] Message Pool ([id number]_[place]);     Sent: 1/31/2022 3:47:47 PM CST  Subject: RE: pain center referral     put in orderPt informed of referral placed and also given number to [facility] to set up appt.Call received at 0903 from pain intervention clinic.  They are \"declining the referral because we have nothing to offer him\".  They asked that [facility] notify pt.---------------------  From: Maryanne Sifuentes CMA ([facility] Message Pool ([id number]_[facility]))   To: Jules Garcia MD;     Sent: 2/1/2022 9:53:57 AM CST  Subject: FW: pain center referralpt calling and advised of the denial for pain clinic   I recommended he set up appt with his PCP to discuss per Odalis  transferred to scheduling  "

## 2022-03-07 DIAGNOSIS — F41.9 ANXIETY: ICD-10-CM

## 2022-03-07 DIAGNOSIS — F41.9 ANXIETY: Primary | ICD-10-CM

## 2022-03-07 RX ORDER — HYDROXYZINE PAMOATE 25 MG/1
25 CAPSULE ORAL 4 TIMES DAILY PRN
Qty: 20 CAPSULE | Refills: 1 | Status: SHIPPED | OUTPATIENT
Start: 2022-03-07 | End: 2022-03-23

## 2022-03-07 NOTE — TELEPHONE ENCOUNTER
Routing refill request to provider for review/approval because:  Drug not on the [facility] refill protocol   Last Written Prescription Date: 2/16/22  Last Fill Quantity: 20,  # refills:    Last office visit: Visit date not found with prescribing provider:  2/2/22 anxiety [facility]     Please advise on refill request.

## 2022-03-07 NOTE — TELEPHONE ENCOUNTER
Reason for Call:  Medication or medication refill:    Do you use a [facility]?  Name of the pharmacy and phone number for the current request:  [facility]    Name of the medication requested: Hydroxyzine 25mg    Other request: none    Can we leave a detailed message on this number? YES    Phone number patient can be reached at: Home number on file [phone] (home)    Best Time: any     Call taken on 3/7/2022 at 9:26 AM by Caitie Goel

## 2022-03-10 RX ORDER — HYDROXYZINE PAMOATE 25 MG/1
CAPSULE ORAL
Qty: 20 CAPSULE | Refills: 1 | OUTPATIENT
Start: 2022-03-10

## 2022-03-22 RX ORDER — OXYCODONE HYDROCHLORIDE 5 MG/1
5 TABLET ORAL EVERY 8 HOURS PRN
COMMUNITY
Start: 2021-12-22 | End: 2022-03-23

## 2022-03-22 RX ORDER — DICLOFENAC SODIUM 30 MG/G
3 GEL TOPICAL 4 TIMES DAILY
COMMUNITY
Start: 2021-05-27 | End: 2022-05-12

## 2022-03-23 ENCOUNTER — OFFICE VISIT (OUTPATIENT)
Dept: FAMILY MEDICINE | Facility: CLINIC | Age: 42
End: 2022-03-23
Payer: MEDICARE

## 2022-03-23 VITALS
HEART RATE: 86 BPM | TEMPERATURE: 98.4 F | WEIGHT: 315 LBS | BODY MASS INDEX: 42.66 KG/M2 | HEIGHT: 72 IN | DIASTOLIC BLOOD PRESSURE: 108 MMHG | SYSTOLIC BLOOD PRESSURE: 169 MMHG

## 2022-03-23 DIAGNOSIS — J45.40 MODERATE PERSISTENT ASTHMA, UNSPECIFIED WHETHER COMPLICATED: ICD-10-CM

## 2022-03-23 DIAGNOSIS — M51.369 DEGENERATION OF INTERVERTEBRAL DISC OF LUMBAR REGION: Primary | ICD-10-CM

## 2022-03-23 DIAGNOSIS — F41.9 ANXIETY: ICD-10-CM

## 2022-03-23 DIAGNOSIS — F41.1 GAD (GENERALIZED ANXIETY DISORDER): ICD-10-CM

## 2022-03-23 PROBLEM — Q04.8 CEREBELLAR TONSILLAR ECTOPIA (H): Status: ACTIVE | Noted: 2018-04-10

## 2022-03-23 PROBLEM — F42.9 OCD (OBSESSIVE COMPULSIVE DISORDER): Status: ACTIVE | Noted: 2021-01-21

## 2022-03-23 PROBLEM — M79.18 MYOFASCIAL PAIN SYNDROME: Status: ACTIVE | Noted: 2021-05-19

## 2022-03-23 PROBLEM — E66.01 OBESITY, MORBID (H): Status: ACTIVE | Noted: 2021-05-19

## 2022-03-23 PROCEDURE — 96127 BRIEF EMOTIONAL/BEHAV ASSMT: CPT | Performed by: FAMILY MEDICINE

## 2022-03-23 PROCEDURE — 99214 OFFICE O/P EST MOD 30 MIN: CPT | Performed by: FAMILY MEDICINE

## 2022-03-23 RX ORDER — HYDROXYZINE PAMOATE 25 MG/1
25 CAPSULE ORAL 4 TIMES DAILY PRN
Qty: 60 CAPSULE | Refills: 3 | Status: SHIPPED | OUTPATIENT
Start: 2022-03-23 | End: 2022-04-15

## 2022-03-23 RX ORDER — OXYCODONE HYDROCHLORIDE 5 MG/1
5 TABLET ORAL EVERY 8 HOURS PRN
Qty: 30 TABLET | Refills: 0 | Status: SHIPPED | OUTPATIENT
Start: 2022-03-23 | End: 2022-04-15

## 2022-03-23 ASSESSMENT — ANXIETY QUESTIONNAIRES
4. TROUBLE RELAXING: NEARLY EVERY DAY
1. FEELING NERVOUS, ANXIOUS, OR ON EDGE: NEARLY EVERY DAY
GAD7 TOTAL SCORE: 21
3. WORRYING TOO MUCH ABOUT DIFFERENT THINGS: NEARLY EVERY DAY
7. FEELING AFRAID AS IF SOMETHING AWFUL MIGHT HAPPEN: NEARLY EVERY DAY
GAD7 TOTAL SCORE: 21
2. NOT BEING ABLE TO STOP OR CONTROL WORRYING: NEARLY EVERY DAY
6. BECOMING EASILY ANNOYED OR IRRITABLE: NEARLY EVERY DAY
5. BEING SO RESTLESS THAT IT IS HARD TO SIT STILL: NEARLY EVERY DAY
GAD7 TOTAL SCORE: 21
7. FEELING AFRAID AS IF SOMETHING AWFUL MIGHT HAPPEN: NEARLY EVERY DAY

## 2022-03-23 ASSESSMENT — PATIENT HEALTH QUESTIONNAIRE - PHQ9
10. IF YOU CHECKED OFF ANY PROBLEMS, HOW DIFFICULT HAVE THESE PROBLEMS MADE IT FOR YOU TO DO YOUR WORK, TAKE CARE OF THINGS AT HOME, OR GET ALONG WITH OTHER PEOPLE: EXTREMELY DIFFICULT
SUM OF ALL RESPONSES TO PHQ QUESTIONS 1-9: 17
SUM OF ALL RESPONSES TO PHQ QUESTIONS 1-9: 17

## 2022-03-23 NOTE — PROGRESS NOTES
Assessment & Plan     Degeneration of intervertebral disc of lumbar region  Patient disability persists.  I have asked him to get in contact with his spine surgeon for follow-up.  We will submit a referral if needed.  MRI will be ordered if requested by spine surgery.  - oxyCODONE (ROXICODONE) 5 MG tablet; Take 1 tablet (5 mg) by mouth every 8 hours as needed for pain    TYRELL (generalized anxiety disorder)  Anxiety  Continue with hydroxyzine as needed for his anxiety.  Once again I recommended counseling and consideration of an SSRI or buspirone.  - hydrOXYzine (VISTARIL) 25 MG capsule; Take 1 capsule (25 mg) by mouth 4 times daily as needed for anxiety    Moderate persistent asthma, unspecified whether complicated  Controlled, refill albuterol MDI  - albuterol (PROAIR RESPICLICK) 108 (90 Base) MCG/ACT inhaler; Inhale 2 puffs into the lungs 4 times daily             Tobacco Cessation:   reports that he has been smoking cigarettes. He has been smoking about 0.50 packs per day. He has never used smokeless tobacco.  Tobacco Cessation Action Plan: Information offered: Patient not interested at this time    BMI:   Estimated body mass index is 45.01 kg/m  as calculated from the following:    Height as of this encounter: 1.829 m (6').    Weight as of this encounter: 150.5 kg (331 lb 14.4 oz).   Weight management plan: Discussed healthy diet and exercise guidelines    Depression Screening Follow Up    PHQ 3/23/2022   PHQ-9 Total Score 17   Q9: Thoughts of better off dead/self-harm past 2 weeks Not at all         Follow Up Actions Taken  Crisis resource information provided in After Visit Summary  Patient counseled, no additional follow up at this time.         No follow-ups on file.    Mata Hearn MD  [facility]    Russell Neri is a 42 year old who presents for the following health issues  accompanied by his Self.    History of Present Illness       Back Pain:  He presents for  follow up of back pain. Patient's back pain is a chronic problem.  Location of back pain:  Right lower back, left lower back, right middle of back, left middle of back, right upper back, left upper back, right side of neck, left side of neck, right shoulder, left shoulder, right buttock, right hip and right side of waist  Description of back pain: cramping, dull ache, gnawing, sharp, shooting and stabbing  Back pain spreads: right buttocks, left buttocks, right thigh, left thigh, right knee, left knee, right foot, left foot, right shoulder, left shoulder, right side of neck and left side of neck    Since patient first noticed back pain, pain is: gradually worsening  Does back pain interfere with his job:  Not applicable      Mental Health Follow-up:  Patient presents to follow-up on Depression & Anxiety.Patient's depression since last visit has been:  Medium  The patient is having other symptoms associated with depression.  Patient's anxiety since last visit has been:  Better  The patient is having other symptoms associated with anxiety.  Any significant life events: job concerns, financial concerns, housing concerns, grief or loss and health concerns  Patient is feeling anxious or having panic attacks.  Patient has no concerns about alcohol or drug use.       Today's PHQ-9         PHQ-9 Total Score: 17  PHQ-9 Q9 Thoughts of better off dead/self-harm past 2 weeks :   (P) Not at all    How difficult have these problems made it for you to do your work, take care of things at home, or get along with other people: Extremely difficult    Today's TYRELL-7 Score: 21    He eats 2-3 servings of fruits and vegetables daily.He consumes 0 sweetened beverage(s) daily.He exercises with enough effort to increase his heart rate 9 or less minutes per day.  He exercises with enough effort to increase his heart rate 3 or less days per week.   He is taking medications regularly.       Medication Followup of pain management--back  pain    Taking Medication as prescribed: yes    Side Effects:  None    Medication Helping Symptoms:  sometimes     Patient continues to have trouble with his back.  He has been cautious with use of oxycodone.  He continues have a lot of stress in his life.  He is requesting another MRI.  He had a lumbar MRI about a year ago.  He has not had any recent follow-up with spine surgery.    His anxiety has not changed.  He continues to decline daily medication.  He has been using hydroxyzine intermittently with fair success.      Review of Systems   Constitutional, HEENT, cardiovascular, pulmonary, gi and gu systems are negative, except as otherwise noted.      Objective    BP (!) 169/108 (BP Location: Right arm, Cuff Size: Adult Large)   Pulse 86   Temp 98.4  F (36.9  C) (Tympanic)   Ht 1.829 m (6')   Wt (!) 150.5 kg (331 lb 14.4 oz)   BMI 45.01 kg/m    Body mass index is 45.01 kg/m .  Physical Exam   General:  Alert and oriented, No acute distress.    Eye: Normal conjunctiva.     HENT:  Oral mucosa is moist.     Neck:  Supple.     Respiratory:  Respirations are non-labored.     Cardiovascular:  Normal rate   Musculoskeletal:  Normal gait.     Psychiatric:  Cooperative, anxious mood & affect, Normal judgment.

## 2022-03-24 ASSESSMENT — ANXIETY QUESTIONNAIRES: GAD7 TOTAL SCORE: 21

## 2022-03-24 ASSESSMENT — PATIENT HEALTH QUESTIONNAIRE - PHQ9: SUM OF ALL RESPONSES TO PHQ QUESTIONS 1-9: 17

## 2022-04-15 ENCOUNTER — TELEPHONE (OUTPATIENT)
Dept: FAMILY MEDICINE | Facility: CLINIC | Age: 42
End: 2022-04-15
Payer: MEDICARE

## 2022-04-15 DIAGNOSIS — F41.9 ANXIETY: ICD-10-CM

## 2022-04-15 DIAGNOSIS — M51.369 DEGENERATION OF INTERVERTEBRAL DISC OF LUMBAR REGION: ICD-10-CM

## 2022-04-15 RX ORDER — OXYCODONE HYDROCHLORIDE 5 MG/1
5 TABLET ORAL EVERY 8 HOURS PRN
Qty: 5 TABLET | Refills: 0 | Status: SHIPPED | OUTPATIENT
Start: 2022-04-15 | End: 2022-04-18

## 2022-04-15 RX ORDER — HYDROXYZINE PAMOATE 25 MG/1
25 CAPSULE ORAL 4 TIMES DAILY PRN
Qty: 60 CAPSULE | Refills: 3 | Status: SHIPPED | OUTPATIENT
Start: 2022-04-15 | End: 2022-05-12

## 2022-04-15 NOTE — TELEPHONE ENCOUNTER
Called and spoke with patient. Patient notified about medications and is scheduled for an office visit with Dr. Hearn on 4.18.22

## 2022-04-15 NOTE — TELEPHONE ENCOUNTER
Hydroxyzine has refills on it per our records.Will resend to be sure pharmacy has it.    Refills for pain medication need to be managed by primary MD. I will send in #5 of oxycodone to get him through a few days but he needs to follow up with Dr. Hearn who is not in clinic on Fridays. Given just refilled 3/23/22, this is faster than he usually fills.

## 2022-04-15 NOTE — TELEPHONE ENCOUNTER
Reason for Call:  Medication or medication refill: Will run out of medication by Sunday     Do you use a [facility]?  Name of the pharmacy and phone number for the current request:  [facility]     Name of the medication requested: Hydroxyzine 25mg Oxycodone 5mg     Other request: none     Can we leave a detailed message on this number? YES    Phone number patient can be reached at: Other phone number:  [phone]    Best Time: any    Call taken on 4/15/2022 at 2:26 PM by Caitie Goel

## 2022-04-17 ENCOUNTER — NURSE TRIAGE (OUTPATIENT)
Dept: NURSING | Facility: CLINIC | Age: 42
End: 2022-04-17
Payer: MEDICARE

## 2022-04-17 NOTE — TELEPHONE ENCOUNTER
Patient calls stating he was Late putting in a request for refills.  [facility] is closed today and he's unable to  both:     5 pain killers (oxycodone)  Hydroxazine.      Really need the hydroxyzine as he's very anxious today he states.  -     Recommend:  We are unable to fill the narcotic, but was able to reach out to [facility] in [place], [place] and they will fill 4 capsules of the vistiril for this patient at this time.  He will  the rest of his prescription from [facility] tomorrow.  Pharmacist recommends that in the future, if the patient runs out, he can try benadryl as they work in the same way and are less expensive.  This was shared with the patient.      Colette Bella RN  [facility] Triage Nurse Advisor   4/17/2022 11:45 AM     COVID 19 Nurse Triage Plan/Patient Instructions    Please be aware that novel coronavirus (COVID-19) may be circulating in the community. If you develop symptoms such as fever, cough, or SOB or if you have concerns about the presence of another infection including coronavirus (COVID-19), please contact your health care provider or visit [email or web address].     Disposition/Instructions    Home care recommended. Follow home care protocol based instructions.    Thank you for taking steps to prevent the spread of this virus.  o Limit your contact with others.  o Wear a simple mask to cover your cough.  o Wash your hands well and often.    Resources    [facility]: About COVID-19: [email or web address]    CDC: What to Do If You're Sick: www.cdc.gov/coronavirus/2019-ncov/about/steps-when-sick.html    CDC: Ending Home Isolation: www.cdc.gov/coronavirus/2019-ncov/hcp/disposition-in-home-patients.html     CDC: Caring for Someone: www.cdc.gov/coronavirus/2019-ncov/if-you-are-sick/care-for-someone.html     [facility]: Interim Guidance for Hospital Discharge to Home:  www.health.[facility].mn.us/diseases/coronavirus/hcp/hospdischarge.pdf    [facility] clinical trials (COVID-19 research studies): [email or web address]/umn-clinical-trials     Below are the COVID-19 hotlines at the [facility] ([facility]). Interpreters are available.   o For health questions: Call [phone] or [phone] (7 a.m. to 7 p.m.)  o For questions about schools and childcare: Call 135-871-9272 or 1-542.913.6687 (7 a.m. to 7 p.m.)                     Reason for Disposition    [1] Prescription prescribed recently is not at pharmacy AND [2] triager has access to patient's EMR AND [3] prescription is recorded in the EMR    Additional Information    Negative: Drug overdose and triager unable to answer question    Negative: Caller requesting information unrelated to medicine    Negative: Caller requesting a prescription for Strep throat and has a positive culture result    Negative: Rash while taking a medication or within 3 days of stopping it    Negative: Immunization reaction suspected    Negative: [1] Asthma and [2] having symptoms of asthma (cough, wheezing, etc.)    Negative: [1] Influenza symptoms AND [2] anti-viral med prescription request, such as Tamiflu    Negative: [1] Symptom of illness (e.g., headache, abdominal pain, earache, vomiting) AND [2] more than mild    Negative: MORE THAN A DOUBLE DOSE of a prescription or over-the-counter (OTC) drug    Negative: [1] DOUBLE DOSE (an extra dose or lesser amount) of over-the-counter (OTC) drug AND [2] any symptoms (e.g., dizziness, nausea, pain, sleepiness)    Negative: [1] DOUBLE DOSE (an extra dose or lesser amount) of prescription drug AND [2] any symptoms (e.g., dizziness, nausea, pain, sleepiness)    Negative: Took another person's prescription drug    Negative: [1] DOUBLE DOSE (an extra dose or lesser amount) of prescription drug AND [2] NO symptoms (Exception: a double dose of antibiotics)    Negative: Diabetes drug error  "or overdose (e.g., took wrong type of insulin or took extra dose)    Negative: [1] Request for URGENT new prescription or refill of \"essential\" medication (i.e., likelihood of harm to patient if not taken) AND [2] triager unable to fill per unit policy    Negative: [1] Prescription not at pharmacy AND [2] was prescribed by PCP recently    Negative: [1] Pharmacy calling with prescription questions AND [2] triager unable to answer question    Negative: [1] Caller has URGENT medication question about med that PCP or specialist prescribed AND [2] triager unable to answer question    Negative: [1] Caller has NON-URGENT medication question about med that PCP prescribed AND [2] triager unable to answer question    Negative: [1] Caller requesting a NON-URGENT new prescription or refill AND [2] triager unable to refill per unit policy    Negative: [1] Caller has medication question about med not prescribed by PCP AND [2] triager unable to answer question (e.g., compatibility with other med, storage)    Negative: Caller requesting a CONTROLLED substance prescription refill (e.g., narcotics, ADHD medicines)    Negative: Caller wants to use a complementary or alternative medicine    Protocols used: MEDICATION QUESTION CALL-A-      "

## 2022-04-18 ENCOUNTER — OFFICE VISIT (OUTPATIENT)
Dept: FAMILY MEDICINE | Facility: CLINIC | Age: 42
End: 2022-04-18
Payer: MEDICARE

## 2022-04-18 VITALS
HEART RATE: 82 BPM | WEIGHT: 315 LBS | HEIGHT: 72 IN | SYSTOLIC BLOOD PRESSURE: 180 MMHG | DIASTOLIC BLOOD PRESSURE: 121 MMHG | BODY MASS INDEX: 42.66 KG/M2 | TEMPERATURE: 97.1 F

## 2022-04-18 DIAGNOSIS — M51.369 DEGENERATION OF INTERVERTEBRAL DISC OF LUMBAR REGION: ICD-10-CM

## 2022-04-18 DIAGNOSIS — R53.83 FATIGUE, UNSPECIFIED TYPE: ICD-10-CM

## 2022-04-18 DIAGNOSIS — R45.89 DEPRESSED MOOD: ICD-10-CM

## 2022-04-18 DIAGNOSIS — M79.18 MYOFASCIAL PAIN SYNDROME: Primary | ICD-10-CM

## 2022-04-18 DIAGNOSIS — L72.0 EPIDERMOID CYST OF SKIN OF BACK: ICD-10-CM

## 2022-04-18 DIAGNOSIS — F41.9 ANXIETY: ICD-10-CM

## 2022-04-18 DIAGNOSIS — F41.1 GAD (GENERALIZED ANXIETY DISORDER): ICD-10-CM

## 2022-04-18 PROCEDURE — U0003 INFECTIOUS AGENT DETECTION BY NUCLEIC ACID (DNA OR RNA); SEVERE ACUTE RESPIRATORY SYNDROME CORONAVIRUS 2 (SARS-COV-2) (CORONAVIRUS DISEASE [COVID-19]), AMPLIFIED PROBE TECHNIQUE, MAKING USE OF HIGH THROUGHPUT TECHNOLOGIES AS DESCRIBED BY CMS-2020-01-R: HCPCS | Performed by: FAMILY MEDICINE

## 2022-04-18 PROCEDURE — 99214 OFFICE O/P EST MOD 30 MIN: CPT | Mod: CS | Performed by: FAMILY MEDICINE

## 2022-04-18 PROCEDURE — U0005 INFEC AGEN DETEC AMPLI PROBE: HCPCS | Performed by: FAMILY MEDICINE

## 2022-04-18 PROCEDURE — 96127 BRIEF EMOTIONAL/BEHAV ASSMT: CPT | Performed by: FAMILY MEDICINE

## 2022-04-18 RX ORDER — OXYCODONE HYDROCHLORIDE 5 MG/1
5 TABLET ORAL EVERY 8 HOURS PRN
Qty: 30 TABLET | Refills: 0 | Status: SHIPPED | OUTPATIENT
Start: 2022-04-18 | End: 2022-05-12

## 2022-04-18 ASSESSMENT — ANXIETY QUESTIONNAIRES
1. FEELING NERVOUS, ANXIOUS, OR ON EDGE: MORE THAN HALF THE DAYS
2. NOT BEING ABLE TO STOP OR CONTROL WORRYING: MORE THAN HALF THE DAYS
4. TROUBLE RELAXING: NEARLY EVERY DAY
GAD7 TOTAL SCORE: 12
3. WORRYING TOO MUCH ABOUT DIFFERENT THINGS: NEARLY EVERY DAY
7. FEELING AFRAID AS IF SOMETHING AWFUL MIGHT HAPPEN: SEVERAL DAYS
6. BECOMING EASILY ANNOYED OR IRRITABLE: SEVERAL DAYS
5. BEING SO RESTLESS THAT IT IS HARD TO SIT STILL: NOT AT ALL
GAD7 TOTAL SCORE: 12
GAD7 TOTAL SCORE: 12
7. FEELING AFRAID AS IF SOMETHING AWFUL MIGHT HAPPEN: SEVERAL DAYS

## 2022-04-18 ASSESSMENT — PATIENT HEALTH QUESTIONNAIRE - PHQ9
10. IF YOU CHECKED OFF ANY PROBLEMS, HOW DIFFICULT HAVE THESE PROBLEMS MADE IT FOR YOU TO DO YOUR WORK, TAKE CARE OF THINGS AT HOME, OR GET ALONG WITH OTHER PEOPLE: EXTREMELY DIFFICULT
SUM OF ALL RESPONSES TO PHQ QUESTIONS 1-9: 22
SUM OF ALL RESPONSES TO PHQ QUESTIONS 1-9: 22

## 2022-04-18 NOTE — PROGRESS NOTES
Assessment & Plan     Myofascial pain syndrome    Degeneration of intervertebral disc of lumbar region  - oxyCODONE (ROXICODONE) 5 MG tablet  Dispense: 30 tablet; Refill: 0    Fatigue, unspecified type    - Symptomatic; Unknown COVID-19 Virus (Coronavirus) by PCR Nose    TYRELL (generalized anxiety disorder)    Depressed mood    Anxiety    Epidermoid cyst of skin    All problems progressing as expected.  Hydroxyzine is helping his anxiety.  He will continue with current medication and follow-up in a couple of months.             Depression Screening Follow Up    PHQ 4/18/2022   PHQ-9 Total Score 22   Q9: Thoughts of better off dead/self-harm past 2 weeks Nearly every day   F/U: Thoughts of suicide or self-harm No   F/U: Safety concerns Yes               Follow Up  Follow Up Actions Taken  Discussed the following ways the patient can remain in a safe environment:        Return in about 2 months (around 6/18/2022).    Mata Hearn MD  [facility]    Russell Neri is a 42 year old who presents for the following health issues  accompanied by his Self.    History of Present Illness       Back Pain:  He presents for follow up of back pain. Patient's back pain is a chronic problem.  Location of back pain:  Right lower back, left lower back, right middle of back, left middle of back, right upper back, left upper back, right side of neck, left side of neck, right shoulder, left shoulder, right buttock, left buttock, right hip, left hip, right side of waist and left side of waist  Description of back pain: cramping, dull ache, gnawing, sharp, shooting and stabbing  Back pain spreads: right buttocks, left buttocks, right thigh, left thigh, right knee, left knee, right foot, left foot, right shoulder, left shoulder, right side of neck and left side of neck    Since patient first noticed back pain, pain is: gradually worsening  Does back pain interfere with his job:  Yes      Mental Health  Follow-up:  Patient presents to follow-up on Depression & Anxiety.Patient's depression since last visit has been:  Worse  The patient is having other symptoms associated with depression.  Patient's anxiety since last visit has been:  Better  The patient is having other symptoms associated with anxiety.  Any significant life events: relationship concerns, financial concerns, housing concerns, grief or loss and health concerns  Patient is not feeling anxious or having panic attacks.  Patient has no concerns about alcohol or drug use.       Today's PHQ-9         PHQ-9 Total Score: 22  PHQ-9 Q9 Thoughts of better off dead/self-harm past 2 weeks :   (P) Nearly every day  Thoughts of suicide or self harm: (P) No  Self-harm Plan:     Self-harm Action:       Safety concerns for self or others: (P) Yes    How difficult have these problems made it for you to do your work, take care of things at home, or get along with other people: Extremely difficult    Today's TYRELL-7 Score: 12    Reason for visit:  Back pain, anxiety, depression. Weird lump on shoulder  Symptom onset:  More than a month  Symptoms include:  Crippling back pain  Symptom intensity:  Severe  Symptom progression:  Worsening  Had these symptoms before:  Yes  Has tried/received treatment for these symptoms:  Yes  Previous treatment was successful:  No  What makes it worse:  The progression of time. External stresses.  What makes it better:  Staying in bed.    He eats 2-3 servings of fruits and vegetables daily.He consumes 3 sweetened beverage(s) daily.He exercises with enough effort to increase his heart rate 9 or less minutes per day.  He exercises with enough effort to increase his heart rate 3 or less days per week.   He is taking medications regularly.       Medication Followup of pain management, chronic back pain.   Also have lump checked on back of left shoulder    Taking Medication as prescribed: yes    Side Effects:  None    Medication Helping Symptoms:   At times         Review of Systems         Objective    BP (!) 180/121 (BP Location: Right arm, Cuff Size: Adult Large)   Pulse 82   Temp 97.1  F (36.2  C) (Tympanic)   Ht 1.829 m (6')   Wt (!) 150.3 kg (331 lb 6.4 oz)   BMI 44.95 kg/m    Body mass index is 44.95 kg/m .  Physical Exam   Alert, oriented, no acute distress  Depressed affect  Normal heart rate  Nonlabored breathing  10 mm cystic structure posterior left shoulder

## 2022-04-19 LAB — SARS-COV-2 RNA RESP QL NAA+PROBE: NEGATIVE

## 2022-04-19 ASSESSMENT — ANXIETY QUESTIONNAIRES: GAD7 TOTAL SCORE: 12

## 2022-04-19 ASSESSMENT — PATIENT HEALTH QUESTIONNAIRE - PHQ9: SUM OF ALL RESPONSES TO PHQ QUESTIONS 1-9: 22

## 2022-05-12 ENCOUNTER — TELEPHONE (OUTPATIENT)
Dept: FAMILY MEDICINE | Facility: CLINIC | Age: 42
End: 2022-05-12

## 2022-05-12 ENCOUNTER — OFFICE VISIT (OUTPATIENT)
Dept: FAMILY MEDICINE | Facility: CLINIC | Age: 42
End: 2022-05-12
Payer: MEDICARE

## 2022-05-12 VITALS
HEART RATE: 95 BPM | TEMPERATURE: 98.6 F | WEIGHT: 315 LBS | HEIGHT: 72 IN | BODY MASS INDEX: 42.66 KG/M2 | DIASTOLIC BLOOD PRESSURE: 119 MMHG | SYSTOLIC BLOOD PRESSURE: 192 MMHG

## 2022-05-12 DIAGNOSIS — J45.40 MODERATE PERSISTENT ASTHMA, UNSPECIFIED WHETHER COMPLICATED: ICD-10-CM

## 2022-05-12 DIAGNOSIS — I10 PRIMARY HYPERTENSION: ICD-10-CM

## 2022-05-12 DIAGNOSIS — F41.9 ANXIETY: ICD-10-CM

## 2022-05-12 DIAGNOSIS — M51.369 DEGENERATION OF INTERVERTEBRAL DISC OF LUMBAR REGION: Primary | ICD-10-CM

## 2022-05-12 PROCEDURE — 96372 THER/PROPH/DIAG INJ SC/IM: CPT | Performed by: FAMILY MEDICINE

## 2022-05-12 PROCEDURE — 99213 OFFICE O/P EST LOW 20 MIN: CPT | Mod: 25 | Performed by: FAMILY MEDICINE

## 2022-05-12 RX ORDER — HYDROXYZINE PAMOATE 25 MG/1
25 CAPSULE ORAL 4 TIMES DAILY PRN
Qty: 60 CAPSULE | Refills: 3 | Status: SHIPPED | OUTPATIENT
Start: 2022-05-12 | End: 2022-07-26

## 2022-05-12 RX ORDER — LOSARTAN POTASSIUM 50 MG/1
50 TABLET ORAL DAILY
Qty: 30 TABLET | Refills: 3 | Status: SHIPPED | OUTPATIENT
Start: 2022-05-12 | End: 2022-06-29

## 2022-05-12 RX ORDER — OXYCODONE HYDROCHLORIDE 5 MG/1
5 TABLET ORAL EVERY 8 HOURS PRN
Qty: 30 TABLET | Refills: 0 | Status: SHIPPED | OUTPATIENT
Start: 2022-05-12 | End: 2022-06-02

## 2022-05-12 RX ORDER — DICLOFENAC SODIUM 30 MG/G
3 GEL TOPICAL 4 TIMES DAILY
Qty: 100 G | Refills: 1 | Status: SHIPPED | OUTPATIENT
Start: 2022-05-12 | End: 2022-08-10

## 2022-05-12 RX ORDER — KETOROLAC TROMETHAMINE 30 MG/ML
60 INJECTION, SOLUTION INTRAMUSCULAR; INTRAVENOUS ONCE
Status: COMPLETED | OUTPATIENT
Start: 2022-05-12 | End: 2022-05-12

## 2022-05-12 RX ADMIN — KETOROLAC TROMETHAMINE 60 MG: 30 INJECTION, SOLUTION INTRAMUSCULAR; INTRAVENOUS at 14:05

## 2022-05-12 NOTE — PROGRESS NOTES
Assessment & Plan     Degeneration of intervertebral disc of lumbar region  Patient continues have trouble with back pain.  He reports anxiety is a bit better.  He is scheduled for MRI with sedation of lumbar spine in about a month.  Decisions for surgery will be made at that time.  He has not been overusing his pain medication.                   No follow-ups on file.    Mata Hearn MD  [facility]    Russell Neri is a 42 year old who presents for the following health issues  accompanied by his self.    HPI     Pain History:  When did you first notice your pain? - Chronic Pain   Have you seen this provider for your pain in the past?   Yes   Where in your body do you have pain? back  Are you seeing anyone else for your pain? Yes - Dr. Caitlin Dewitt--[facility]    PHQ-9 SCORE 3/23/2022 4/18/2022   PHQ-9 Total Score MyChart 17 (Moderately severe depression) 22 (Severe depression)   PHQ-9 Total Score 17 22   Some encounter information is confidential and restricted. Go to Review Flowsheets activity to see all data.       TYRELL-7 SCORE 3/23/2022 4/18/2022   Total Score 21 (severe anxiety) 12 (moderate anxiety)   Total Score 21 12   Some encounter information is confidential and restricted. Go to Review Flowsheets activity to see all data.                 Chronic Pain Follow Up:    Location of pain: back  Analgesia/pain control:    - Recent changes:  Pain worse in last few weeks    - Overall control: Inadequate pain control    - Current treatments: oxycodone, NSAIDS   Adherence:     - Do you ever take more pain medicine than prescribed? No    - When did you take your last dose of pain medicine?  yesterday   Adverse effects: No   PDMP Review     None        Last CSA Agreement:   CSA -- Patient Level:    CSA: None found at the patient level.       Last UDS:             Review of Systems   Constitutional, HEENT, cardiovascular, pulmonary, gi and gu systems are negative,  except as otherwise noted.      Objective    BP (!) 187/98 (BP Location: Right arm, Cuff Size: Adult Large)   Pulse 102   Temp 98.6  F (37  C) (Tympanic)   Ht 1.829 m (6')   Wt (!) 153.9 kg (339 lb 3.2 oz)   BMI 46.00 kg/m    Body mass index is 46 kg/m .  Physical Exam   Alert, oriented, no acute distress  Normal heart rate  Nonlabored breathing  Mild lumbar tenderness  Cooperative, normal affect

## 2022-05-12 NOTE — TELEPHONE ENCOUNTER
Pharmacy calling about Diclofenac, not covered by insurance, pharmacist states she will resend to provider for alternative medication or prior authorization to be completed.

## 2022-05-13 DIAGNOSIS — J45.40 MODERATE PERSISTENT ASTHMA, UNSPECIFIED WHETHER COMPLICATED: Primary | ICD-10-CM

## 2022-05-13 NOTE — TELEPHONE ENCOUNTER
Reason for Call:  Other prescription    Detailed comments: Proair reapiclshantanu is no longer covered by insurance, patient may be prescribed Albuterol or ventolin.    Phone Number Patient can be reached at: Other phone number:  [phone]    Best Time: during business hours    Can we leave a detailed message on this number? Not Applicable    Call taken on 5/13/2022 at 9:12 AM by VALERIA GORE

## 2022-05-13 NOTE — TELEPHONE ENCOUNTER
Proair Respiclick is no longer covered by patients insurance.    Requesting alternative medication of     ProAir HFA/Proventil HFA/Ventolin HFA    Please advise.

## 2022-05-16 RX ORDER — ALBUTEROL SULFATE 90 UG/1
2 AEROSOL, METERED RESPIRATORY (INHALATION) EVERY 6 HOURS
Qty: 18 G | Refills: 0 | Status: SHIPPED | OUTPATIENT
Start: 2022-05-16 | End: 2022-07-26

## 2022-06-01 DIAGNOSIS — M51.369 DEGENERATION OF INTERVERTEBRAL DISC OF LUMBAR REGION: ICD-10-CM

## 2022-06-01 NOTE — TELEPHONE ENCOUNTER
"Routing refill request to provider for review/approval because:  Drug not on the [facility] [place] - oxycodone  Previous Diclofenac topical was for 3% topical gel - Pharmacy states they are waiting on PA to be completed - In Cover My Meds.  While waiting for PA to be completed patient requesting 1% Voltaren     Please advise on refill and new Rx.     Last Written Prescription Date:  5/12/2022  Last Fill Quantity: 30,  # refills: 0   Last office visit provider:  5/12/2022     Requested Prescriptions   Pending Prescriptions Disp Refills     oxyCODONE (ROXICODONE) 5 MG tablet [Pharmacy Med Name: OXYCODONE HCL 5MG TABS] 30 tablet 0     Sig: TAKE ONE TABLET BY MOUTH EVERY 8 HOURS AS NEEDED FOR PAIN       There is no refill protocol information for this order       Previous Diclofenac topical was for 3% topical gel - Waiting on PA - In Cover My Meds.    While waiting for PA to be completed patient requesting 1% Voltaren       diclofenac (VOLTAREN) 1 % topical gel [Pharmacy Med Name: DICLOFENAC SODIUM 1% GEL] 240 g 2     Sig: APPLY 2 GM TOPICALLY TO LOWER BACK FOUR TIMES DAILY       Topical Steroids and Nonsteroidals Protocol Failed - 6/1/2022  3:21 PM        Failed - Medication is active on med list        Passed - Patient is age 6 or older        Passed - Authorizing prescriber's most recent note related to this medication read.     If refill request is for ophthalmic use, please forward request to provider for approval.          Passed - High potency steroid not ordered        Passed - Recent (12 mo) or future (30 days) visit within the authorizing provider's specialty     Patient has had an office visit with the authorizing provider or a provider within the authorizing providers department within the previous 12 mos or has a future within next 30 days. See \"Patient Info\" tab in inbasket, or \"Choose Columns\" in Meds & Orders section of the refill encounter.                   Yfn Stanley RN 06/01/22 3:38 PM  "

## 2022-06-02 RX ORDER — OXYCODONE HYDROCHLORIDE 5 MG/1
TABLET ORAL
Qty: 30 TABLET | Refills: 0 | Status: SHIPPED | OUTPATIENT
Start: 2022-06-02 | End: 2022-06-29

## 2022-06-03 ENCOUNTER — TELEPHONE (OUTPATIENT)
Dept: FAMILY MEDICINE | Facility: CLINIC | Age: 42
End: 2022-06-03
Payer: MEDICARE

## 2022-06-03 NOTE — TELEPHONE ENCOUNTER
TC received from [facility], requesting diagnosis code for a PA for Voltaren gel. Given code M51.36 degeneration of intervertebral disc of lumbar region. No further questions asked.

## 2022-06-16 NOTE — PROCEDURES
Accession Number:       [id number]  PATHOLOGIST:     Lamar Fu MD Board Certified in Anatomic Pathology and Clinical Pathology (electronic signature)  A SOURCE:     Skin, left upper back, shave biopsy  A GROSS DESCRIPTION:     See comment       Specimen is received in formalin, labeled with       multiple patient identifier(s) and consists of       one piece from a skin shave biopsy measuring 0.8       x 0.7 x 0.2 cm, irregular in shape and tan-gray       in color, with a pigmented area measuring 0.4 x       0.4 cm and tan-brown in color. The margins are       inked green. The specimen is trisected and       entirely submitted in one cassette(s). Site is       not given on requisition and container.         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  A COMMENT:     See comment         Immunostain Melan-A supports the diagnosis (all       positive and required negative controls stained       appropriately).         Clinical correlation/follow-up is recommended.

## 2022-06-16 NOTE — PROCEDURES
Accession Number:       [id number]  PATHOLOGIST:     See comment       Kain Roman M.D., Board Certified in Anatomic       Pathology, Clinical Pathology and Hematopathology       5    (electronic signature)  A SOURCE:     Skin, right side of face  A GROSS DESCRIPTION:     See comment       Specimen is received in formalin, labeled with       multiple patient identifier(s) and consists of       one piece from a punch skin biopsy measuring 0.5       x 0.5 x 0.5 cm, circular in shape and tan-gray in       color. The margins are inked green. The specimen       is bisected and entirely submitted in one       cassette(s).         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  A COMMENT:     See comment         Multiple deeper sections examined.         PAS stain for fungi is negative (all positive and       required negative controls stained       appropriately).

## 2022-06-16 NOTE — PROCEDURES
Accession Number:       [id number]  PATHOLOGIST:     See comment       Hi Muhammad M.D., Board Certified in Anatomic Pathology and Clinical Pathology [phone]  A SOURCE:     Skin, right side of chin  A GROSS DESCRIPTION:     See comment       Specimen is received in formalin, labeled with       multiple patient identifier(s) and consists of       one piece from a punch skin biopsy measuring 0.5       x 0.5 x 0.5 cm, circular in shape and tan-gray in       color. The margins are inked green. The specimen       is bisected and entirely submitted in one       cassette(s).         Gross exam(s) performed at: [place] [address] [address]         : MARIELOS PAULINO MD  A DIAGNOSIS:     See comment         Consistent with ruptured cutaneous cyst, with       granulation tissue, acute and chronic       inflammation, and foreign body giant cell       reaction (no residual cyst lining identified).

## 2022-06-29 ENCOUNTER — OFFICE VISIT (OUTPATIENT)
Dept: FAMILY MEDICINE | Facility: CLINIC | Age: 42
End: 2022-06-29
Payer: MEDICARE

## 2022-06-29 VITALS
HEART RATE: 101 BPM | TEMPERATURE: 97.8 F | BODY MASS INDEX: 42.66 KG/M2 | WEIGHT: 315 LBS | SYSTOLIC BLOOD PRESSURE: 178 MMHG | DIASTOLIC BLOOD PRESSURE: 126 MMHG | HEIGHT: 72 IN

## 2022-06-29 DIAGNOSIS — M51.369 DEGENERATION OF INTERVERTEBRAL DISC OF LUMBAR REGION: Primary | ICD-10-CM

## 2022-06-29 PROCEDURE — 99213 OFFICE O/P EST LOW 20 MIN: CPT | Performed by: FAMILY MEDICINE

## 2022-06-29 RX ORDER — OXYCODONE HYDROCHLORIDE 5 MG/1
5 TABLET ORAL 3 TIMES DAILY PRN
Qty: 30 TABLET | Refills: 0 | Status: SHIPPED | OUTPATIENT
Start: 2022-06-29 | End: 2022-07-26

## 2022-06-29 RX ORDER — LOSARTAN POTASSIUM 100 MG/1
100 TABLET ORAL DAILY
COMMUNITY
Start: 2022-06-23 | End: 2022-08-30

## 2022-06-29 ASSESSMENT — PATIENT HEALTH QUESTIONNAIRE - PHQ9
SUM OF ALL RESPONSES TO PHQ QUESTIONS 1-9: 9
SUM OF ALL RESPONSES TO PHQ QUESTIONS 1-9: 9
10. IF YOU CHECKED OFF ANY PROBLEMS, HOW DIFFICULT HAVE THESE PROBLEMS MADE IT FOR YOU TO DO YOUR WORK, TAKE CARE OF THINGS AT HOME, OR GET ALONG WITH OTHER PEOPLE: EXTREMELY DIFFICULT

## 2022-06-29 NOTE — PROGRESS NOTES
Assessment & Plan     Degeneration of intervertebral disc of lumbar region  Continue intermittent use of oxycodone for pain.  He has had more pain in the last few days.  PDMP queried with no concerns.  - oxyCODONE (ROXICODONE) 5 MG tablet; Take 1 tablet (5 mg) by mouth 3 times daily as needed for severe pain                 No follow-ups on file.    Mata Hearn MD  [facility]    Russell Neri is a 42 year old accompanied by his self., presenting for the following health issues:  Pain Management (Pain management follow up, refill pain meds) and Hypertension (HTN follow up.   Had stroke 6/16/2022.  Has been seeing Dr. Dewitt--[facility])      History of Present Illness       Back Pain:  He presents for follow up of back pain. Patient's back pain is a chronic problem.  Location of back pain:  Right lower back, left lower back, right middle of back, right upper back, left upper back, right side of neck, left side of neck, right shoulder, left shoulder, right buttock, left buttock, right hip, left hip, right side of waist and left side of waist  Description of back pain: cramping, dull ache, gnawing, sharp, shooting and stabbing  Back pain spreads: right buttocks, left buttocks, right thigh, right knee, right foot, right shoulder, right side of neck and left side of neck    Since patient first noticed back pain, pain is: gradually worsening  Does back pain interfere with his job:  Yes      Hypertension: He presents for follow up of hypertension.  He does not check blood pressure  regularly outside of the clinic. Outside blood pressures have been over 140/90. He does not follow a low salt diet.      Today's PHQ-9         PHQ-9 Total Score: 9    PHQ-9 Q9 Thoughts of better off dead/self-harm past 2 weeks :   Not at all    How difficult have these problems made it for you to do your work, take care of things at home, or get along with other people: Extremely difficult        Patient is here for follow-up on his pain management.  He has been using about 30 oxycodone 5 mg tabs of months.  This has been very consistent over the last year.  He recently had a central retinal vein occlusion and his blood pressure medication was adjusted.  He continues to struggle with weight.  He had a recent MRI of the spine showing no significant new changes.  He has spinal stenosis at L5-S1.  He has an appointment with spine surgery on Friday of this week.      Review of Systems         Objective    BP (!) 178/126 (BP Location: Right arm, Cuff Size: Adult Large)   Pulse 101   Temp 97.8  F (36.6  C) (Tympanic)   Ht 1.829 m (6')   Wt (!) 152.6 kg (336 lb 8 oz)   BMI 45.64 kg/m    Body mass index is 45.64 kg/m .  Physical Exam   General:  Alert and oriented, No acute distress.    Eye: Normal conjunctiva.     HENT:  Oral mucosa is moist.     Neck:  Supple.     Respiratory:  Respirations are non-labored.     Cardiovascular:  Normal rate   Musculoskeletal:  Normal gait.     Psychiatric:  Cooperative, Appropriate mood & affect, Normal judgment.                       .  ..

## 2022-07-24 DIAGNOSIS — M51.369 DEGENERATION OF INTERVERTEBRAL DISC OF LUMBAR REGION: ICD-10-CM

## 2022-07-24 DIAGNOSIS — F41.9 ANXIETY: ICD-10-CM

## 2022-07-24 DIAGNOSIS — J45.40 MODERATE PERSISTENT ASTHMA, UNSPECIFIED WHETHER COMPLICATED: ICD-10-CM

## 2022-07-25 NOTE — TELEPHONE ENCOUNTER
Reason for Call:  Medication or medication refill:    Do you use a [facility]?  Name of the pharmacy and phone number for the current request:  [facility] [place]    Name of the medication requested: Albuterol Inhaler, Hydroxyzine, Oxycodone    Other request: Patient is completely out of the oxycodone    Can we leave a detailed message on this number? YES    Phone number patient can be reached at: Home number on file [phone] (home)    Best Time: anytime    Call taken on 7/25/2022 at 9:02 AM by Zakia Marinelli

## 2022-07-26 ENCOUNTER — TELEPHONE (OUTPATIENT)
Dept: FAMILY MEDICINE | Facility: CLINIC | Age: 42
End: 2022-07-26

## 2022-07-26 DIAGNOSIS — M51.369 DEGENERATION OF INTERVERTEBRAL DISC OF LUMBAR REGION: ICD-10-CM

## 2022-07-26 RX ORDER — OXYCODONE HYDROCHLORIDE 5 MG/1
TABLET ORAL
Qty: 30 TABLET | Refills: 0 | OUTPATIENT
Start: 2022-07-26

## 2022-07-26 RX ORDER — OXYCODONE HYDROCHLORIDE 5 MG/1
5 TABLET ORAL 3 TIMES DAILY PRN
Qty: 30 TABLET | Refills: 0 | Status: SHIPPED | OUTPATIENT
Start: 2022-07-26 | End: 2022-08-10

## 2022-07-26 RX ORDER — ALBUTEROL SULFATE 90 UG/1
AEROSOL, METERED RESPIRATORY (INHALATION)
Qty: 8.5 G | Refills: 0 | Status: SHIPPED | OUTPATIENT
Start: 2022-07-26 | End: 2022-08-31

## 2022-07-26 RX ORDER — HYDROXYZINE PAMOATE 25 MG/1
25 CAPSULE ORAL 4 TIMES DAILY PRN
Qty: 60 CAPSULE | Refills: 3 | Status: SHIPPED | OUTPATIENT
Start: 2022-07-26 | End: 2022-09-12

## 2022-07-26 NOTE — TELEPHONE ENCOUNTER
Routing refill request to provider for review/approval because:  Drug not on the [facility] refill protocol: Oxycodone, Hydroxyzine. Please advise on refill requests.  Last OV 6/29/22.    Oxycodone: Last Written Prescription Date: 6/29/22  Last Fill Quantity: 30,  # refills: 0   Last office visit: 6/29/2022 with prescribing provider   Future Office Visit:   Next 5 appointments (look out 90 days)    Aug 03, 2022 12:00 PM  (Arrive by 11:40 AM)  Provider Visit with Mata Hearn MD  [facility] [address] [facility] [id number] [phone]         Hydroxyzine 5/12/22 #60 3 refills  Albuterol 5/16/22 #18gm no refills

## 2022-07-26 NOTE — TELEPHONE ENCOUNTER
Patient walked into clinic requesting med refills.    1. Anxiety  2. Pain medication  3. Change in albuterol      Rx requests were sent to provider this morning.

## 2022-08-10 ENCOUNTER — OFFICE VISIT (OUTPATIENT)
Dept: FAMILY MEDICINE | Facility: CLINIC | Age: 42
End: 2022-08-10
Payer: MEDICARE

## 2022-08-10 VITALS
BODY MASS INDEX: 46.79 KG/M2 | WEIGHT: 315 LBS | OXYGEN SATURATION: 96 % | HEART RATE: 74 BPM | SYSTOLIC BLOOD PRESSURE: 124 MMHG | DIASTOLIC BLOOD PRESSURE: 90 MMHG

## 2022-08-10 DIAGNOSIS — J45.40 MODERATE PERSISTENT ASTHMA, UNSPECIFIED WHETHER COMPLICATED: ICD-10-CM

## 2022-08-10 DIAGNOSIS — E66.01 OBESITY, MORBID (H): Primary | ICD-10-CM

## 2022-08-10 DIAGNOSIS — M51.369 DEGENERATION OF INTERVERTEBRAL DISC OF LUMBAR REGION: ICD-10-CM

## 2022-08-10 DIAGNOSIS — R73.9 HYPERGLYCEMIA: ICD-10-CM

## 2022-08-10 DIAGNOSIS — R73.03 PREDIABETES: ICD-10-CM

## 2022-08-10 PROCEDURE — 99214 OFFICE O/P EST MOD 30 MIN: CPT | Performed by: FAMILY MEDICINE

## 2022-08-10 RX ORDER — DICLOFENAC SODIUM 30 MG/G
3 GEL TOPICAL 2 TIMES DAILY
Qty: 100 G | Refills: 1 | Status: SHIPPED | OUTPATIENT
Start: 2022-08-10 | End: 2022-09-12

## 2022-08-10 RX ORDER — OXYCODONE HYDROCHLORIDE 5 MG/1
5 TABLET ORAL 3 TIMES DAILY PRN
Qty: 30 TABLET | Refills: 0 | Status: SHIPPED | OUTPATIENT
Start: 2022-08-10 | End: 2022-08-31

## 2022-08-10 RX ORDER — METOPROLOL SUCCINATE 100 MG/1
TABLET, EXTENDED RELEASE ORAL
COMMUNITY
Start: 2022-08-04 | End: 2022-08-30

## 2022-08-10 ASSESSMENT — ASTHMA QUESTIONNAIRES
QUESTION_1 LAST FOUR WEEKS HOW MUCH OF THE TIME DID YOUR ASTHMA KEEP YOU FROM GETTING AS MUCH DONE AT WORK, SCHOOL OR AT HOME: NONE OF THE TIME
QUESTION_4 LAST FOUR WEEKS HOW OFTEN HAVE YOU USED YOUR RESCUE INHALER OR NEBULIZER MEDICATION (SUCH AS ALBUTEROL): ONE OR TWO TIMES PER DAY
QUESTION_2 LAST FOUR WEEKS HOW OFTEN HAVE YOU HAD SHORTNESS OF BREATH: ONCE OR TWICE A WEEK
QUESTION_3 LAST FOUR WEEKS HOW OFTEN DID YOUR ASTHMA SYMPTOMS (WHEEZING, COUGHING, SHORTNESS OF BREATH, CHEST TIGHTNESS OR PAIN) WAKE YOU UP AT NIGHT OR EARLIER THAN USUAL IN THE MORNING: NOT AT ALL
QUESTION_5 LAST FOUR WEEKS HOW WOULD YOU RATE YOUR ASTHMA CONTROL: COMPLETELY CONTROLLED
ACT_TOTALSCORE: 21
ACT_TOTALSCORE: 21

## 2022-08-10 ASSESSMENT — PATIENT HEALTH QUESTIONNAIRE - PHQ9
SUM OF ALL RESPONSES TO PHQ QUESTIONS 1-9: 19
10. IF YOU CHECKED OFF ANY PROBLEMS, HOW DIFFICULT HAVE THESE PROBLEMS MADE IT FOR YOU TO DO YOUR WORK, TAKE CARE OF THINGS AT HOME, OR GET ALONG WITH OTHER PEOPLE: EXTREMELY DIFFICULT
SUM OF ALL RESPONSES TO PHQ QUESTIONS 1-9: 19

## 2022-08-10 NOTE — PROGRESS NOTES
Assessment & Plan     Obesity, morbid (H)  Referral to dietitian for weight loss counseling.  He will be a candidate for GLP-1 medications.  I have counseled him that medically supervised weight loss is most likely prerequisite to bariatric surgery.    Degeneration of intervertebral disc of lumbar region  Chronic back pain is also by his activity and obesity  Continue with mitten use of oxycodone.    Moderate persistent asthma, unspecified whether complicated  Stable, continue with inhaler as needed               Depression Screening Follow Up    PHQ 8/10/2022   PHQ-9 Total Score 19   Q9: Thoughts of better off dead/self-harm past 2 weeks Not at all   F/U: Thoughts of suicide or self-harm -   F/U: Safety concerns -       No follow-ups on file.    Mata Hearn MD  [facility]    Russell Neri is a 42 year old, presenting for the following health issues:  Recheck Medication (Pt requests refill pain med) and Weight Problem (Pt c/o 3 to 5 lb wt gain every week since starting heart medication)    ACT Total Scores 8/10/2022   ACT TOTAL SCORE (Goal Greater than or Equal to 20) 21   In the past 12 months, how many times did you visit the emergency room for your asthma without being admitted to the hospital? 0   In the past 12 months, how many times were you hospitalized overnight because of your asthma? 0     History of Present Illness       Back Pain:  He presents for follow up of back pain. Patient's back pain is a chronic problem.  Location of back pain:  Right lower back, left lower back, right middle of back, left middle of back, right upper back, left upper back, right side of neck, left side of neck, right shoulder, left shoulder, right buttock, left buttock, right hip, left hip, right side of waist, left side of waist and other  Description of back pain: cramping, gnawing, sharp, shooting and stabbing  Back pain spreads: right buttocks, left buttocks, right thigh, left  thigh, right knee, left knee, right foot, left foot, right shoulder, left shoulder, right side of neck and left side of neck    Since patient first noticed back pain, pain is: gradually worsening  Does back pain interfere with his job:  Not applicable      Hypertension: He presents for follow up of hypertension.  He does not check blood pressure  regularly outside of the clinic. Outside blood pressures have been over 140/90. He does not follow a low salt diet.      Today's PHQ-9         PHQ-9 Total Score: 19    PHQ-9 Q9 Thoughts of better off dead/self-harm past 2 weeks :   Not at all    How difficult have these problems made it for you to do your work, take care of things at home, or get along with other people: Extremely difficult     Will has worsening of his chronic back pain, hard to sleep  Increased activity with swimming, seems to help a bit  He has not been successful with weight loss          Review of Systems   Constitutional, HEENT, cardiovascular, pulmonary, gi and gu systems are negative, except as otherwise noted.      Objective    BP (!) 124/90 (BP Location: Right arm, Patient Position: Sitting)   Pulse 74   Wt (!) 156.5 kg (345 lb)   SpO2 96%   BMI 46.79 kg/m    Body mass index is 46.79 kg/m .  Physical Exam   Alert, oriented, no acute distress  Normal heart rate  Lungs clear  Tenderness of lumbar spine  Cooperative, appropriate affect                    .  ..

## 2022-08-16 ENCOUNTER — NURSE TRIAGE (OUTPATIENT)
Dept: NURSING | Facility: CLINIC | Age: 42
End: 2022-08-16

## 2022-08-16 DIAGNOSIS — M79.18 MYOFASCIAL PAIN SYNDROME: Primary | ICD-10-CM

## 2022-08-16 NOTE — TELEPHONE ENCOUNTER
"  Provider Response to 2nd Level Triage Request    I have {triage review response:258397::\"reviewed the RN documentation\"}. My recommendation is:  {Triage visit type:807906}  "

## 2022-08-16 NOTE — TELEPHONE ENCOUNTER
Nurse Triage SBAR    Is this a 2nd Level Triage? YES     Situation/Background: Patient is calling to request a refill of 3% diclofenac Sodium. Noted that an encounter dated 8/11/22 for prior authorization is in the EMR. If not approved by insurance, is there another option for the patient? Patient is completely out of the medication.    Recommendation: Per disposition, routed to PCP Care Team to address. FNA Triage unable to complete request. Advised patient to call back with any new or additional questions. Patient verbalized understanding and agrees with plan.    Patient may be reached at [phone].    Protocol Recommended Disposition: Paged/Called Provider    Gypsy Jaime RN on 8/16/2022 at 4:15 PM    Reason for Disposition    Pharmacy calling with prescription questions and triager unable to answer question    Protocols used: MEDICATION REFILL AND RENEWAL CALL-A-[place]

## 2022-08-17 NOTE — TELEPHONE ENCOUNTER
Left voice message for patient notifying patient that refill of 1% diclofenac cream has been sent to pharmacy.

## 2022-08-30 DIAGNOSIS — M51.369 DEGENERATION OF INTERVERTEBRAL DISC OF LUMBAR REGION: ICD-10-CM

## 2022-08-30 DIAGNOSIS — J45.40 MODERATE PERSISTENT ASTHMA, UNSPECIFIED WHETHER COMPLICATED: ICD-10-CM

## 2022-08-31 ENCOUNTER — TELEPHONE (OUTPATIENT)
Dept: FAMILY MEDICINE | Facility: CLINIC | Age: 42
End: 2022-08-31

## 2022-08-31 DIAGNOSIS — M51.369 DEGENERATION OF INTERVERTEBRAL DISC OF LUMBAR REGION: ICD-10-CM

## 2022-08-31 RX ORDER — OXYCODONE HYDROCHLORIDE 5 MG/1
5 TABLET ORAL 3 TIMES DAILY PRN
Qty: 30 TABLET | Refills: 0 | Status: SHIPPED | OUTPATIENT
Start: 2022-08-31 | End: 2022-09-12

## 2022-08-31 RX ORDER — ALBUTEROL SULFATE 90 UG/1
AEROSOL, METERED RESPIRATORY (INHALATION)
Qty: 8.5 G | Refills: 0 | Status: SHIPPED | OUTPATIENT
Start: 2022-08-31 | End: 2023-02-09

## 2022-08-31 RX ORDER — OXYCODONE HYDROCHLORIDE 5 MG/1
TABLET ORAL
Qty: 30 TABLET | Refills: 0 | OUTPATIENT
Start: 2022-08-31

## 2022-09-12 ENCOUNTER — OFFICE VISIT (OUTPATIENT)
Dept: FAMILY MEDICINE | Facility: CLINIC | Age: 42
End: 2022-09-12
Payer: MEDICARE

## 2022-09-12 VITALS
BODY MASS INDEX: 42.66 KG/M2 | DIASTOLIC BLOOD PRESSURE: 103 MMHG | SYSTOLIC BLOOD PRESSURE: 149 MMHG | WEIGHT: 315 LBS | TEMPERATURE: 97.9 F | HEIGHT: 72 IN | HEART RATE: 81 BPM

## 2022-09-12 DIAGNOSIS — M51.369 DEGENERATION OF INTERVERTEBRAL DISC OF LUMBAR REGION: Primary | ICD-10-CM

## 2022-09-12 DIAGNOSIS — E66.01 OBESITY, MORBID (H): ICD-10-CM

## 2022-09-12 DIAGNOSIS — F41.9 ANXIETY: ICD-10-CM

## 2022-09-12 DIAGNOSIS — G89.4 CHRONIC PAIN SYNDROME: ICD-10-CM

## 2022-09-12 PROCEDURE — 99213 OFFICE O/P EST LOW 20 MIN: CPT | Performed by: FAMILY MEDICINE

## 2022-09-12 RX ORDER — HYDROXYZINE PAMOATE 25 MG/1
25 CAPSULE ORAL 4 TIMES DAILY PRN
Qty: 90 CAPSULE | Refills: 3 | Status: SHIPPED | OUTPATIENT
Start: 2022-09-12 | End: 2023-01-09

## 2022-09-12 RX ORDER — OXYCODONE HYDROCHLORIDE 5 MG/1
5 TABLET ORAL 3 TIMES DAILY PRN
Qty: 45 TABLET | Refills: 0 | Status: SHIPPED | OUTPATIENT
Start: 2022-09-12 | End: 2022-10-25

## 2022-09-12 ASSESSMENT — PATIENT HEALTH QUESTIONNAIRE - PHQ9
SUM OF ALL RESPONSES TO PHQ QUESTIONS 1-9: 18
SUM OF ALL RESPONSES TO PHQ QUESTIONS 1-9: 18
10. IF YOU CHECKED OFF ANY PROBLEMS, HOW DIFFICULT HAVE THESE PROBLEMS MADE IT FOR YOU TO DO YOUR WORK, TAKE CARE OF THINGS AT HOME, OR GET ALONG WITH OTHER PEOPLE: EXTREMELY DIFFICULT

## 2022-09-12 ASSESSMENT — ANXIETY QUESTIONNAIRES
GAD7 TOTAL SCORE: 18
GAD7 TOTAL SCORE: 18
IF YOU CHECKED OFF ANY PROBLEMS ON THIS QUESTIONNAIRE, HOW DIFFICULT HAVE THESE PROBLEMS MADE IT FOR YOU TO DO YOUR WORK, TAKE CARE OF THINGS AT HOME, OR GET ALONG WITH OTHER PEOPLE: EXTREMELY DIFFICULT
GAD7 TOTAL SCORE: 18
8. IF YOU CHECKED OFF ANY PROBLEMS, HOW DIFFICULT HAVE THESE MADE IT FOR YOU TO DO YOUR WORK, TAKE CARE OF THINGS AT HOME, OR GET ALONG WITH OTHER PEOPLE?: EXTREMELY DIFFICULT
4. TROUBLE RELAXING: NEARLY EVERY DAY
1. FEELING NERVOUS, ANXIOUS, OR ON EDGE: NEARLY EVERY DAY
5. BEING SO RESTLESS THAT IT IS HARD TO SIT STILL: NOT AT ALL
6. BECOMING EASILY ANNOYED OR IRRITABLE: NEARLY EVERY DAY
2. NOT BEING ABLE TO STOP OR CONTROL WORRYING: NEARLY EVERY DAY
3. WORRYING TOO MUCH ABOUT DIFFERENT THINGS: NEARLY EVERY DAY
7. FEELING AFRAID AS IF SOMETHING AWFUL MIGHT HAPPEN: NEARLY EVERY DAY
7. FEELING AFRAID AS IF SOMETHING AWFUL MIGHT HAPPEN: NEARLY EVERY DAY

## 2022-09-12 NOTE — PROGRESS NOTES
Assessment & Plan     Degeneration of intervertebral disc of lumbar region  He will continue with oxycodone up to 2 daily  He has had previous surgical consultation within the last week.  He has been referred to weight management clinic as he is to be of a risk at this time given his weight.  - oxyCODONE (ROXICODONE) 5 MG tablet; Take 1 tablet (5 mg) by mouth 3 times daily as needed for severe pain    Obesity, morbid (H)  See above    Chronic pain syndrome  Continue with oxycodone as needed for pain    Anxiety  Hydroxyzine has been helping control his anxiety.  - hydrOXYzine (VISTARIL) 25 MG capsule; Take 1 capsule (25 mg) by mouth 4 times daily as needed for anxiety             BMI:   Estimated body mass index is 46.95 kg/m  as calculated from the following:    Height as of this encounter: 1.829 m (6').    Weight as of this encounter: 157 kg (346 lb 3.2 oz).   Weight management plan: He has a referral for weight management clinic    Depression Screening Follow Up    PHQ 9/12/2022   PHQ-9 Total Score 18   Q9: Thoughts of better off dead/self-harm past 2 weeks Not at all   F/U: Thoughts of suicide or self-harm -   F/U: Safety concerns -         Follow Up Actions Taken           No follow-ups on file.    Mata Hearn MD  [facility]    Russell Neri is a 42 year old accompanied by his self, presenting for the following health issues:  Musculoskeletal Problem (Follow up chronic back pain, discuss wt loss) and  Follow Up (Follow up depression/anxiety)      History of Present Illness       Back Pain:  He presents for follow up of back pain. Patient's back pain is a chronic problem.  Location of back pain:  Right lower back, left lower back, right middle of back, left middle of back, right upper back, left upper back, right side of neck, left side of neck, right shoulder, left shoulder, right buttock, left buttock, right hip, left hip and right side of waist  Description of  back pain: burning, cramping, gnawing, sharp and stabbing  Back pain spreads: right buttocks, left buttocks, right thigh, left thigh, right knee, left knee, right foot, right shoulder, left shoulder, right side of neck and left side of neck    Since patient first noticed back pain, pain is: gradually worsening  Does back pain interfere with his job:  Yes      Mental Health Follow-up:  Patient presents to follow-up on Depression & Anxiety.Patient's depression since last visit has been:  Worse  The patient is having other symptoms associated with depression.  Patient's anxiety since last visit has been:  Medium  The patient is having other symptoms associated with anxiety.  Any significant life events: relationship concerns, financial concerns, housing concerns, grief or loss and health concerns  Patient is feeling anxious or having panic attacks.  Patient has no concerns about alcohol or drug use.    Reason for visit:  Follow up on back pain. Advice on weight loss surgery.    He eats 0-1 servings of fruits and vegetables daily.He consumes 1 sweetened beverage(s) daily.He exercises with enough effort to increase his heart rate 9 or less minutes per day.  He exercises with enough effort to increase his heart rate 3 or less days per week. He is missing 1 dose(s) of medications per week.    Today's PHQ-9         PHQ-9 Total Score: 18    PHQ-9 Q9 Thoughts of better off dead/self-harm past 2 weeks :   Not at all    How difficult have these problems made it for you to do your work, take care of things at home, or get along with other people: Extremely difficult  Today's TYRELL-7 Score: 18             Review of Systems         Objective    BP (!) 149/103 (BP Location: Right arm, Patient Position: Sitting, Cuff Size: Adult Large)   Pulse 81   Temp 97.9  F (36.6  C) (Tympanic)   Ht 1.829 m (6')   Wt (!) 157 kg (346 lb 3.2 oz)   BMI 46.95 kg/m    Body mass index is 46.95 kg/m .  Physical Exam   General:  Alert and oriented,  No acute distress.    Eye: Normal conjunctiva.     HENT:  Oral mucosa is moist.     Neck:  Supple.     Respiratory:  Respirations are non-labored.     Cardiovascular:  Normal rate   Musculoskeletal:  Normal gait.     Psychiatric:  Cooperative, Appropriate mood & affect, Normal judgment.

## 2022-10-07 ENCOUNTER — NURSE TRIAGE (OUTPATIENT)
Dept: NURSING | Facility: CLINIC | Age: 42
End: 2022-10-07

## 2022-10-07 ENCOUNTER — OFFICE VISIT (OUTPATIENT)
Dept: FAMILY MEDICINE | Facility: CLINIC | Age: 42
End: 2022-10-07
Payer: MEDICARE

## 2022-10-07 VITALS
DIASTOLIC BLOOD PRESSURE: 84 MMHG | OXYGEN SATURATION: 99 % | BODY MASS INDEX: 47.33 KG/M2 | HEART RATE: 80 BPM | WEIGHT: 315 LBS | SYSTOLIC BLOOD PRESSURE: 138 MMHG
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
## 2022-10-07 DIAGNOSIS — S51.851A CAT BITE OF FOREARM, RIGHT, INITIAL ENCOUNTER: Primary | ICD-10-CM

## 2022-10-07 PROCEDURE — 99213 OFFICE O/P EST LOW 20 MIN: CPT | Performed by: PHYSICIAN ASSISTANT

## 2022-10-07 RX ORDER — CLONIDINE HYDROCHLORIDE 0.1 MG/1
0.1 TABLET ORAL 2 TIMES DAILY
COMMUNITY
Start: 2022-09-26 | End: 2022-10-25

## 2022-10-07 ASSESSMENT — ENCOUNTER SYMPTOMS
PARESTHESIAS: 0
WOUND: 1
FEVER: 0
COLOR CHANGE: 1
NUMBNESS: 0

## 2022-10-07 NOTE — TELEPHONE ENCOUNTER
RN called patients home number and was able to schedule patient to be seen in clinic this afternoon.    PRUDENCIO Osborn  [facility]

## 2022-10-07 NOTE — TELEPHONE ENCOUNTER
"RN attempted to call patient to offer appointment. Recording stating \"call can not be completed at this time\" received.  [phone]  PRUDENCIO Osborn  [facility]  "

## 2022-10-07 NOTE — TELEPHONE ENCOUNTER
Patient got bit and scratched on right arm by his cat.  This happened yesterday afternoon.  Today swelling and burning sensation and skin is warm.  Cat is an indoor cat.  Cat has not been vaccinated for rabies.  Patient has tried to bring cat to vet and did not work out.  Patient is requesting a message be sent to his pcp/clinic.  Patient feels that cat does not have rabies.      Nurse Triage SBAR    Is this a 2nd Level Triage? YES, LICENSED PRACTITIONER REVIEW IS REQUIRED    Situation:   Cat bite and scratch on right arm.    Background:   Daron was bit and scratched by cat yesterday afternoon on right arm.      Assessment:   Denies fever cough and shortness of breath.  Patient states that arm is burning and red.  Bite is on     Protocol Recommended Disposition:   Go to ED Now    Recommendation:   Patient does not wish to go to ER.  Clinic please phone patient.     Routed to provider    Does the patient meet one of the following criteria for ADS visit consideration? 16+ years old, with an [facility] PCP     TIP  Providers, please consider if this condition is appropriate for management at one of our Acute and Diagnostic Services sites.     If patient is a good candidate, please use dotphrase <dot>triageresponse and select Refer to ADS to document.        Reason for Disposition    Any break in skin from BITE (e.g., cut, puncture, or scratch) and PET animal (e.g., dog, cat, or ferret) at risk for RABIES (e.g., sick, stray, unprovoked bite, developing country)    Additional Information    Negative: Major bleeding (actively dripping or spurting) that can't be stopped    Negative: Sounds like a life-threatening emergency to the triager    Negative: Any break in skin from BITE (e.g., cut, puncture, or scratch) and WILD animal at risk for RABIES (e.g., bat, raccoon, bello, skunk, coyote, other carnivores)    Protocols used: ANIMAL BITE-A-OH

## 2022-10-07 NOTE — PROGRESS NOTES
Assessment & Plan     Cat bite of forearm, right, initial encounter  Early infection noted cover with Augmentin 875 twice daily x10 days warm compresses good hygiene if the erythema spreads if he develops fever or chills he needs to be seen again even if it means going to the emergency room otherwise this should slowly improve I did tell him that urgent care is open both days this weekend from 9 out of 5 his tetanus is up-to-date                   No follow-ups on file.    ALON Corral  [facility]    Russell Neri is a 42 year old, presenting for the following health issues:  Cat Scratches  Was at the eye doctor right before this visit so his eyes are dilated and he is unable to see up close    42-year-old male had his cat to the vet yesterday for immunizations the cat had anxiety and lashed out at him scratching and biting his left forearm volar surface today he has erythema he was able to wash it he states its again his cat it is an inside cat it does not have rabies vaccination they have had it for over 1 year I told him they need to keep it in the house for the next 2 weeks and make sure does not show any adverse reaction  No fever no chills is not allergic to penicillins       Onset: Yesterday  Description: Right forearm bitten and scratched by his cat.   Cat is indoor only.  No rabies concerns.  Skin hot, swollen, red and painful.    Intensity: moderate  Progression of Symptoms:  worsening    Therapies tried and outcome: Soap and water, antibiotic ointment        Review of Systems   Constitutional: Negative for fever.   Skin: Positive for color change and wound.   Neurological: Negative for numbness and paresthesias.            Objective    /84   Pulse 80   Wt (!) 158.3 kg (349 lb)   SpO2 99%   BMI 47.33 kg/m    Body mass index is 47.33 kg/m .  Physical Exam multiple scratches and puncture marks right volar forearm with surrounding erythema there is no  drainage it is slightly warm to touch she has no epitrochlear or axillary adenopathy  Cardiovascular is normal in rate breaths are unlabored

## 2022-10-12 ENCOUNTER — TELEPHONE (OUTPATIENT)
Dept: FAMILY MEDICINE | Facility: CLINIC | Age: 42
End: 2022-10-12

## 2022-10-12 ENCOUNTER — OFFICE VISIT (OUTPATIENT)
Dept: FAMILY MEDICINE | Facility: CLINIC | Age: 42
End: 2022-10-12
Payer: MEDICARE

## 2022-10-12 VITALS
OXYGEN SATURATION: 96 % | SYSTOLIC BLOOD PRESSURE: 132 MMHG | WEIGHT: 315 LBS | HEIGHT: 72 IN | HEART RATE: 76 BPM | DIASTOLIC BLOOD PRESSURE: 78 MMHG | TEMPERATURE: 97.1 F | BODY MASS INDEX: 42.66 KG/M2
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
## 2022-10-12 DIAGNOSIS — G89.29 OTHER CHRONIC PAIN: ICD-10-CM

## 2022-10-12 DIAGNOSIS — S51.852S: Primary | ICD-10-CM

## 2022-10-12 PROCEDURE — 99213 OFFICE O/P EST LOW 20 MIN: CPT | Performed by: PHYSICIAN ASSISTANT

## 2022-10-12 RX ORDER — OXYCODONE HYDROCHLORIDE 5 MG/1
5 TABLET ORAL EVERY 8 HOURS
Qty: 12 TABLET | Refills: 0 | Status: SHIPPED | OUTPATIENT
Start: 2022-10-12 | End: 2022-10-15

## 2022-10-12 NOTE — PROGRESS NOTES
Assessment & Plan     Cat bite of forearm, left, sequela: worsening   Inflammation or infection with localized induration between 2 punctures that are likely cat bite locations. Pt is on augmentin. No signs of cellulitis or abscess.  Recommend warm compresses, complete augmentin but added additional 7 days.  Follow-up prn.      Other chronic pain: stable.    Pt has follow up with his pcp in 12 days.  Given RX of  to get him to that appt.    pdmp reviewed, no erroneous activity.  Pt requests refill of his atarax however has several refills.    - oxyCODONE (ROXICODONE) 5 MG tablet  Dispense: 12 tablet; Refill: 0    MAN Garcia [facility]    Russell Neri is a 42 year old male who presents to clinic today for the following health issues:  Chief Complaint   Patient presents with     RECHECK     Right arm cat bite from 10/6/22, has a lump under skin at bite site      HPI    On Augmentin prophylactically following cat bite/scratch.  UTD on tetanus.  Cat monitored and well.    No fevers.   Noted some swelling in area of his R arm where cat bit was located.  In addition, requests refill of his chronic pain medication for chronic low back pain.  Takes 1 norco every day, sometimes 2.  Has above 5 left, has med check with his pcp in 12 days. Would like RX of atarax as well.     Review of Systems  Constitutional, HEENT, cardiovascular, pulmonary, gi and gu systems are negative, except as otherwise noted.      Objective    /78 (BP Location: Right arm)   Pulse 76   Temp 97.1  F (36.2  C) (Tympanic)   Ht 1.829 m (6')   Wt (!) 158.8 kg (350 lb 1.6 oz)   SpO2 96%   BMI 47.48 kg/m    Physical Exam   Exam of the R forearm reveals 4 puncture wounds, volar surface with about 2 cm x 1 cm area of induration between the puncutres. No discharge or drainage.  Sensation and preipheral pulses intact, cap refill brisk.

## 2022-10-12 NOTE — TELEPHONE ENCOUNTER
Patient feels there is a growth under area where he was bit/scratched by cat. Was previously see for initial wound on 10/07/2022   Amoxicillin-Pot Clavulanate 875-125 MG 1 tablet Oral 2 TIMES DAILY x 10 days  Tetnus up to date.    PRUDENCIO Osborn  [facility]

## 2022-10-25 ENCOUNTER — OFFICE VISIT (OUTPATIENT)
Dept: FAMILY MEDICINE | Facility: CLINIC | Age: 42
End: 2022-10-25
Payer: MEDICARE

## 2022-10-25 VITALS
WEIGHT: 315 LBS | SYSTOLIC BLOOD PRESSURE: 152 MMHG | BODY MASS INDEX: 42.66 KG/M2 | TEMPERATURE: 97.5 F | HEIGHT: 72 IN | DIASTOLIC BLOOD PRESSURE: 99 MMHG | HEART RATE: 79 BPM

## 2022-10-25 DIAGNOSIS — G89.4 CHRONIC PAIN SYNDROME: Primary | ICD-10-CM

## 2022-10-25 DIAGNOSIS — M51.369 DEGENERATION OF INTERVERTEBRAL DISC OF LUMBAR REGION: ICD-10-CM

## 2022-10-25 DIAGNOSIS — F31.9 BIPOLAR I DISORDER (H): ICD-10-CM

## 2022-10-25 DIAGNOSIS — F42.9 OBSESSIVE-COMPULSIVE DISORDER, UNSPECIFIED TYPE: ICD-10-CM

## 2022-10-25 PROCEDURE — 99213 OFFICE O/P EST LOW 20 MIN: CPT | Performed by: FAMILY MEDICINE

## 2022-10-25 RX ORDER — OXYCODONE HYDROCHLORIDE 5 MG/1
5 TABLET ORAL 3 TIMES DAILY PRN
Qty: 45 TABLET | Refills: 0 | Status: SHIPPED | OUTPATIENT
Start: 2022-10-25 | End: 2022-11-28

## 2022-10-25 NOTE — PROGRESS NOTES
Assessment & Plan     Bipolar I disorder (H)  janet    Chronic pain syndrome  Improved this week  continue with current medication    Degeneration of intervertebral disc of lumbar region  Progressing as expected    Obsessive-compulsive disorder, unspecified type  Increased obsessive thoughts due to increased anxiety                   No follow-ups on file.    Mata Hearn MD  [facility]    Russell Neri is a 42 year old accompanied by his self, presenting for the following health issues:  Pain Management (Pain mgt follow up) and Follow Up (Follow up cat bite from 10/7/2022, was on abx.   Now has area on back to have checked.)      HPI     Pain History:  When did you first notice your pain? - Chronic Pain   Have you seen this provider for your pain in the past?   Yes   Where in your body do you have pain? back  Are you seeing anyone else for your pain? No    PHQ-9 SCORE 6/29/2022 8/10/2022 9/12/2022   PHQ-9 Total Score MyChart 9 (Mild depression) 19 (Moderately severe depression) 18 (Moderately severe depression)   PHQ-9 Total Score 9 19 18   Some encounter information is confidential and restricted. Go to Review Flowsheets activity to see all data.       TYRELL-7 SCORE 3/23/2022 4/18/2022 9/12/2022   Total Score 21 (severe anxiety) 12 (moderate anxiety) 18 (severe anxiety)   Total Score 21 12 18   Some encounter information is confidential and restricted. Go to Review Flowsheets activity to see all data.               Chronic Pain Follow Up:    Location of pain: back, right leg  Analgesia/pain control:    - Recent changes:  none    - Overall control: Tolerable with discomfort    - Current treatments: oxycodone     Adverse effects: No   PDMP Review       Value Time User    State PDMP site checked  Yes 10/12/2022  9:14 PM Eileen Cm PA-C        Last CSA Agreement:   CSA -- Patient Level:     [Media Unavailable] Controlled Substance Agreement - Opioid - Scan on  "5/27/2022  7:39 PM       Last UDS:           Follow up from cat bite to right forearm from 10/7/2022.  Was on Augmentin, is healing but has \"mass\" under skin.  Also has area/growth on back to have checked on.    Review of Systems   Constitutional, HEENT, cardiovascular, pulmonary, gi and gu systems are negative, except as otherwise noted.      Objective    BP (!) 161/108 (BP Location: Right arm, Patient Position: Sitting, Cuff Size: Adult Large)   Pulse 89   Temp 97.5  F (36.4  C) (Tympanic)   Ht 1.829 m (6')   Wt (!) 159.9 kg (352 lb 8 oz)   BMI 47.81 kg/m    Body mass index is 47.81 kg/m .  Physical Exam   General:  Alert and oriented, No acute distress.    Eye: Normal conjunctiva.     HENT:  Oral mucosa is moist.     Neck:  Supple.     Respiratory:  Respirations are non-labored.     Cardiovascular:  Normal rate   Musculoskeletal:  Normal gait.     Psychiatric:  Cooperative, Appropriate mood & affect, Normal judgment.   Two epidermoid cysts posterior left shoulder, inferior cyst is slightly inflamed                      "

## 2022-11-28 ENCOUNTER — OFFICE VISIT (OUTPATIENT)
Dept: FAMILY MEDICINE | Facility: CLINIC | Age: 42
End: 2022-11-28
Payer: MEDICARE

## 2022-11-28 VITALS
HEART RATE: 72 BPM | BODY MASS INDEX: 42.66 KG/M2 | OXYGEN SATURATION: 95 % | SYSTOLIC BLOOD PRESSURE: 158 MMHG | DIASTOLIC BLOOD PRESSURE: 108 MMHG | WEIGHT: 315 LBS | HEIGHT: 72 IN | TEMPERATURE: 97.6 F

## 2022-11-28 DIAGNOSIS — G89.4 CHRONIC PAIN SYNDROME: Primary | ICD-10-CM

## 2022-11-28 DIAGNOSIS — M51.369 DEGENERATION OF INTERVERTEBRAL DISC OF LUMBAR REGION: ICD-10-CM

## 2022-11-28 PROCEDURE — 99213 OFFICE O/P EST LOW 20 MIN: CPT | Performed by: FAMILY MEDICINE

## 2022-11-28 RX ORDER — OXYCODONE HYDROCHLORIDE 5 MG/1
5 TABLET ORAL 3 TIMES DAILY PRN
Qty: 45 TABLET | Refills: 0 | Status: SHIPPED | OUTPATIENT
Start: 2022-11-28 | End: 2022-12-21

## 2022-11-28 RX ORDER — CEPHALEXIN 500 MG/1
500 CAPSULE ORAL 3 TIMES DAILY
COMMUNITY
Start: 2022-11-25 | End: 2022-12-02

## 2022-11-28 ASSESSMENT — PATIENT HEALTH QUESTIONNAIRE - PHQ9
SUM OF ALL RESPONSES TO PHQ QUESTIONS 1-9: 21
10. IF YOU CHECKED OFF ANY PROBLEMS, HOW DIFFICULT HAVE THESE PROBLEMS MADE IT FOR YOU TO DO YOUR WORK, TAKE CARE OF THINGS AT HOME, OR GET ALONG WITH OTHER PEOPLE: EXTREMELY DIFFICULT
SUM OF ALL RESPONSES TO PHQ QUESTIONS 1-9: 21

## 2022-11-28 ASSESSMENT — ANXIETY QUESTIONNAIRES
4. TROUBLE RELAXING: NEARLY EVERY DAY
GAD7 TOTAL SCORE: 14
7. FEELING AFRAID AS IF SOMETHING AWFUL MIGHT HAPPEN: SEVERAL DAYS
GAD7 TOTAL SCORE: 14
7. FEELING AFRAID AS IF SOMETHING AWFUL MIGHT HAPPEN: SEVERAL DAYS
6. BECOMING EASILY ANNOYED OR IRRITABLE: SEVERAL DAYS
1. FEELING NERVOUS, ANXIOUS, OR ON EDGE: MORE THAN HALF THE DAYS
3. WORRYING TOO MUCH ABOUT DIFFERENT THINGS: NEARLY EVERY DAY
GAD7 TOTAL SCORE: 14
5. BEING SO RESTLESS THAT IT IS HARD TO SIT STILL: SEVERAL DAYS
2. NOT BEING ABLE TO STOP OR CONTROL WORRYING: NEARLY EVERY DAY
8. IF YOU CHECKED OFF ANY PROBLEMS, HOW DIFFICULT HAVE THESE MADE IT FOR YOU TO DO YOUR WORK, TAKE CARE OF THINGS AT HOME, OR GET ALONG WITH OTHER PEOPLE?: EXTREMELY DIFFICULT
IF YOU CHECKED OFF ANY PROBLEMS ON THIS QUESTIONNAIRE, HOW DIFFICULT HAVE THESE PROBLEMS MADE IT FOR YOU TO DO YOUR WORK, TAKE CARE OF THINGS AT HOME, OR GET ALONG WITH OTHER PEOPLE: EXTREMELY DIFFICULT

## 2022-11-28 NOTE — PROGRESS NOTES
Assessment & Plan     Chronic pain syndrome  Controlled, continue current medication    Degeneration of intervertebral disc of lumbar region  Controlled, continue current medication  PDMP queried with no concerns  - oxyCODONE (ROXICODONE) 5 MG tablet; Take 1 tablet (5 mg) by mouth 3 times daily as needed for severe pain (7-10)             Depression Screening Follow Up    PHQ 11/28/2022   PHQ-9 Total Score 21   Q9: Thoughts of better off dead/self-harm past 2 weeks Not at all   F/U: Thoughts of suicide or self-harm -   F/U: Safety concerns -           No follow-ups on file.    Mata Hearn MD  [facility]    Russell Neri is a 42 year old accompanied by his self, presenting for the following health issues:  MH Follow Up (Follow up depression/anxiety), Hypertension (HTN follow up), and Headache (Discuss daily headaches, no relief with medications)      History of Present Illness       Back Pain:  He presents for follow up of back pain. Patient's back pain is a chronic problem.  Location of back pain:  Right lower back, left lower back, right middle of back, left middle of back, right upper back, left upper back, right side of neck, left side of neck, right shoulder, left shoulder, right buttock, right hip and right side of waist  Description of back pain: cramping, dull ache, gnawing, sharp, shooting and stabbing  Back pain spreads: right buttocks, right thigh, right knee, right foot, right shoulder, left shoulder, right side of neck and left side of neck    Since patient first noticed back pain, pain is: gradually worsening  Does back pain interfere with his job:  Not applicable      Mental Health Follow-up:  Patient presents to follow-up on Depression & Anxiety.Patient's depression since last visit has been:  No change  The patient is not having other symptoms associated with depression.  Patient's anxiety since last visit has been:  Medium  The patient is not having other  "symptoms associated with anxiety.  Any significant life events: financial concerns, housing concerns, grief or loss and health concerns  Patient is feeling anxious or having panic attacks.  Patient has no concerns about alcohol or drug use.    Hypertension: He presents for follow up of hypertension.  He does not check blood pressure  regularly outside of the clinic. Outside blood pressures have been over 140/90. He does not follow a low salt diet.     Headaches:   Since the patient's last clinic visit, headaches are: no change  The patient is getting headaches:  Daily  He is able to do normal daily activities when he has a migraine.  The patient is taking the following rescue/relief medications:  No rescue/relief medications   Patient states \"I get no relief\" from the rescue/relief medications.   The patient is taking the following medications to prevent migraines:  No medications to prevent migraines  In the past 4 weeks, the patient has gone to an Urgent Care or Emergency Room 0 times times due to headaches.     Today's PHQ-9         PHQ-9 Total Score: 21    PHQ-9 Q9 Thoughts of better off dead/self-harm past 2 weeks :   Not at all    How difficult have these problems made it for you to do your work, take care of things at home, or get along with other people: Extremely difficult  Today's TYRELL-7 Score: 14             Review of Systems   Constitutional, HEENT, cardiovascular, pulmonary, gi and gu systems are negative, except as otherwise noted.      Objective    BP (!) 158/108 (BP Location: Right arm, Patient Position: Sitting, Cuff Size: Adult Large)   Pulse 72   Temp 97.6  F (36.4  C) (Oral)   Ht 1.829 m (6')   Wt (!) 159.8 kg (352 lb 4.8 oz)   SpO2 95%   BMI 47.78 kg/m    Body mass index is 47.78 kg/m .  Physical Exam   General:  Alert and oriented, No acute distress.    Eye: Normal conjunctiva.     HENT:  Oral mucosa is moist.     Neck:  Supple.     Respiratory:  Respirations are non-labored.   "   Cardiovascular:  Normal rate   Musculoskeletal:  Normal gait.     Psychiatric:  Cooperative, Appropriate mood & affect, Normal judgment.

## 2022-12-20 DIAGNOSIS — M51.369 DEGENERATION OF INTERVERTEBRAL DISC OF LUMBAR REGION: ICD-10-CM

## 2022-12-21 RX ORDER — OXYCODONE HYDROCHLORIDE 5 MG/1
5 TABLET ORAL 3 TIMES DAILY PRN
Qty: 45 TABLET | Refills: 0 | Status: SHIPPED | OUTPATIENT
Start: 2022-12-21 | End: 2023-01-11

## 2022-12-21 NOTE — TELEPHONE ENCOUNTER
Last office visit: 11/28/2022     Future Appointments 12/21/2022 - 6/19/2023    None          Requested Prescriptions   Pending Prescriptions Disp Refills     oxyCODONE (ROXICODONE) 5 MG tablet 45 tablet 0     Sig: Take 1 tablet (5 mg) by mouth 3 times daily as needed for severe pain (7-10)       There is no refill protocol information for this order

## 2023-01-09 ENCOUNTER — NURSE TRIAGE (OUTPATIENT)
Dept: NURSING | Facility: CLINIC | Age: 43
End: 2023-01-09

## 2023-01-09 DIAGNOSIS — F41.9 ANXIETY: ICD-10-CM

## 2023-01-09 RX ORDER — HYDROXYZINE PAMOATE 25 MG/1
25 CAPSULE ORAL 4 TIMES DAILY PRN
Qty: 90 CAPSULE | Refills: 3 | Status: SHIPPED | OUTPATIENT
Start: 2023-01-09 | End: 2023-05-18

## 2023-01-09 NOTE — TELEPHONE ENCOUNTER
"Nurse Triage SBAR    Is this a 2nd Level Triage? YES, LICENSED PRACTITIONER REVIEW IS REQUIRED    Situation: Anxiety    Background: Patient calling with a laundry list of complaints but states his anxiety is through the roof and he needs a refill on his hydroxizine. He states that he feels his blood pressure is too high however he has not actually measured it. He states that he fainted last week but did not seek medical attention. States that he can \"feel my heart pounding\" in my chest but denies chest pain. He denies difficulty breathing, denies any thoughts of harm to himself or to others. He states that he has a court date coming up that is really stressing him out. He denies difficulty breathing.     Assessment: Anxiety    Protocol Recommended Disposition:   See in Office Today    Recommendation:     Patient would like to be seen today in the clinic, he would also like a refill on his hydroxyzine. Please contact him with any further recommendations.      Routed to provider     MALIKA RICE RN      Does the patient meet one of the following criteria for ADS visit consideration? 16+ years old, with an [facility] PCP     TIP  Providers, please consider if this condition is appropriate for management at one of our Acute and Diagnostic Services sites.     If patient is a good candidate, please use dotphrase <dot>triageresponse and select Refer to ADS to document.    Reason for Disposition    Patient sounds very upset or troubled to the triager    Additional Information    Negative: SEVERE difficulty breathing (e.g., struggling for each breath, speaks in single words)    Negative: Bluish (or gray) lips or face    Negative: Difficult to awaken or acting confused (e.g., disoriented, slurred speech)    Negative: Hysterical or combative behavior    Negative: Sounds like a life-threatening emergency to the triager    Negative: Chest pain    Negative: Palpitations, skipped heart beat, or rapid heart beat    Negative: " Cough is main symptom    Negative: Suicide thoughts, threats, attempts, or questions    Negative: Depression is main problem or symptom (e.g., feelings of sadness or hopelessness)    Negative: Difficulty breathing and persists > 10 minutes and not relieved by reassurance provided by triager    Negative: Lightheadedness or dizziness and persists > 10 minutes and not relieved by reassurance provided by triager    Negative: Substance use (drug use) or unhealthy alcohol use, known or suspected, and feeling very shaky (i.e., visible tremors of hands)    Negative: Patient sounds very sick or weak to the triager    Protocols used: ANXIETY AND PANIC ATTACK-A-OH

## 2023-01-09 NOTE — TELEPHONE ENCOUNTER
Writer called patient to notify that prescription for Hydroxyzine has been refilled.    Assisted patient with making an appointment with his PCP, to discuss his current symptoms of anxiety.    Patient also had questions about weight loss surgery as well, wrote comments in apt notes for his office visit.    PRUDENCIO Pettit

## 2023-01-09 NOTE — TELEPHONE ENCOUNTER
VM left for pt, to assist with scheduling OV with another provider today for symptoms. Requested return call.

## 2023-01-11 ENCOUNTER — OFFICE VISIT (OUTPATIENT)
Dept: FAMILY MEDICINE | Facility: CLINIC | Age: 43
End: 2023-01-11
Payer: MEDICARE

## 2023-01-11 VITALS
HEIGHT: 72 IN | BODY MASS INDEX: 42.66 KG/M2 | OXYGEN SATURATION: 98 % | TEMPERATURE: 97 F | DIASTOLIC BLOOD PRESSURE: 91 MMHG | WEIGHT: 315 LBS | HEART RATE: 65 BPM | SYSTOLIC BLOOD PRESSURE: 150 MMHG

## 2023-01-11 DIAGNOSIS — M51.369 DEGENERATION OF INTERVERTEBRAL DISC OF LUMBAR REGION: ICD-10-CM

## 2023-01-11 DIAGNOSIS — F31.9 BIPOLAR I DISORDER (H): ICD-10-CM

## 2023-01-11 DIAGNOSIS — I12.9 HYPERTENSIVE CHRONIC KIDNEY DISEASE WITH STAGE 1 THROUGH STAGE 4 CHRONIC KIDNEY DISEASE, OR UNSPECIFIED CHRONIC KIDNEY DISEASE: ICD-10-CM

## 2023-01-11 DIAGNOSIS — G89.4 CHRONIC PAIN SYNDROME: Primary | ICD-10-CM

## 2023-01-11 DIAGNOSIS — E66.01 OBESITY, MORBID (H): ICD-10-CM

## 2023-01-11 PROCEDURE — 99213 OFFICE O/P EST LOW 20 MIN: CPT | Performed by: FAMILY MEDICINE

## 2023-01-11 RX ORDER — OXYCODONE HYDROCHLORIDE 5 MG/1
5 TABLET ORAL 3 TIMES DAILY PRN
Qty: 45 TABLET | Refills: 0 | Status: SHIPPED | OUTPATIENT
Start: 2023-01-11 | End: 2023-02-09

## 2023-01-11 ASSESSMENT — PATIENT HEALTH QUESTIONNAIRE - PHQ9: 5. POOR APPETITE OR OVEREATING: NEARLY EVERY DAY

## 2023-01-11 ASSESSMENT — ANXIETY QUESTIONNAIRES
6. BECOMING EASILY ANNOYED OR IRRITABLE: MORE THAN HALF THE DAYS
3. WORRYING TOO MUCH ABOUT DIFFERENT THINGS: NEARLY EVERY DAY
GAD7 TOTAL SCORE: 17
2. NOT BEING ABLE TO STOP OR CONTROL WORRYING: NEARLY EVERY DAY
1. FEELING NERVOUS, ANXIOUS, OR ON EDGE: NEARLY EVERY DAY
5. BEING SO RESTLESS THAT IT IS HARD TO SIT STILL: NOT AT ALL
GAD7 TOTAL SCORE: 17
7. FEELING AFRAID AS IF SOMETHING AWFUL MIGHT HAPPEN: NEARLY EVERY DAY

## 2023-01-11 NOTE — PROGRESS NOTES
Assessment & Plan     Chronic pain syndrome  Controlled, continue current medication    Bipolar I disorder (H)  Mood has been stable, no change in medication    Obesity, morbid (H)  Continues to gain weight, referral to dietary to discuss GLP-1 treatment    Degeneration of intervertebral disc of lumbar region  Unchanged, continue pain medication as needed  PDMP queried with no concerns  - oxyCODONE (ROXICODONE) 5 MG tablet; Take 1 tablet (5 mg) by mouth 3 times daily as needed for severe pain (7-10)    Hypertensive chronic kidney disease with stage 1 through stage 4 chronic kidney disease, or unspecified chronic kidney disease    - Nutrition Referral; Future                 No follow-ups on file.    Mata Hearn MD  [facility]    Russell Neri is a 42 year old accompanied by his self, presenting for the following health issues:  Anxiety (Discuss anxiety) and Musculoskeletal Problem (Follow up back pain, surgery postponed d/t weight)      HPI     Anxiety Follow-Up    How are you doing with your anxiety since your last visit? Worsened     Are you having other symptoms that might be associated with anxiety? Yes:  increased stressors at home, weight gain    Have you had a significant life event? Financial Concerns, Grief or Loss and OTHER: court probate     Are you feeling depressed? Yes:  ongoing issue    Do you have any concerns with your use of alcohol or other drugs? No  Weight has increased since taking hydroxyzine    Social History     Tobacco Use     Smoking status: Every Day     Packs/day: 0.50     Types: Cigarettes     Smokeless tobacco: Never   Vaping Use     Vaping Use: Never used   Substance Use Topics     Alcohol use: Not Currently     Comment: rarely     Drug use: Yes     Types: Oxycodone     Comment: takes oxycodone for pain mgt     TYRELL-7 SCORE 9/12/2022 11/28/2022 1/11/2023   Total Score 18 (severe anxiety) 14 (moderate anxiety) -   Total Score 18 14 17   Some  encounter information is confidential and restricted. Go to Review Flowsheets activity to see all data.     PHQ 8/10/2022 9/12/2022 11/28/2022   PHQ-9 Total Score 19 18 21   Q9: Thoughts of better off dead/self-harm past 2 weeks Not at all Not at all Not at all   F/U: Thoughts of suicide or self-harm - - -   F/U: Safety concerns - - -   Some encounter information is confidential and restricted. Go to Review Flowsheets activity to see all data.     TYRELL-7  1/11/2023   1. Feeling nervous, anxious, or on edge 3   2. Not being able to stop or control worrying 3   3. Worrying too much about different things 3   4. Trouble relaxing 3   5. Being so restless that it is hard to sit still 0   6. Becoming easily annoyed or irritable 2   7. Feeling afraid, as if something awful might happen 3   TYRELL-7 Total Score 17   If you checked any problems, how difficult have they made it for you to do your work, take care of things at home, or get along with other people? -   Some encounter information is confidential and restricted. Go to Review Flowsheets activity to see all data.       Patient also wants to discuss chronic back pain.  He was to be set up for back surgery but that had to be postponed due to excess weight.  He needs to get down to about 300lbs in order to have his back surgery.    Review of Systems   Constitutional, HEENT, cardiovascular, pulmonary, gi and gu systems are negative, except as otherwise noted.      Objective    BP (!) 150/91 (BP Location: Right arm, Patient Position: Sitting, Cuff Size: Adult Large)   Pulse 65   Temp 97  F (36.1  C) (Tympanic)   Ht 1.829 m (6')   Wt (!) 163.5 kg (360 lb 8 oz)   SpO2 98%   BMI 48.89 kg/m    Body mass index is 48.89 kg/m .  Physical Exam   Alert, oriented, no acute distress  RRR  Nonlabored breathing  Cooperative, anxious affect

## 2023-01-17 ENCOUNTER — OFFICE VISIT (OUTPATIENT)
Dept: EDUCATION SERVICES | Facility: CLINIC | Age: 43
End: 2023-01-17
Payer: MEDICARE

## 2023-01-17 VITALS
SYSTOLIC BLOOD PRESSURE: 141 MMHG | WEIGHT: 315 LBS | HEIGHT: 72 IN | BODY MASS INDEX: 42.66 KG/M2 | DIASTOLIC BLOOD PRESSURE: 96 MMHG

## 2023-01-17 DIAGNOSIS — E66.01 OBESITY, MORBID (H): Primary | ICD-10-CM

## 2023-01-17 PROCEDURE — 97802 MEDICAL NUTRITION INDIV IN: CPT | Performed by: DIETITIAN, REGISTERED

## 2023-01-17 NOTE — PROGRESS NOTES
Medical Nutrition Therapy  Visit Type:Initial assessment and intervention    Daron Bond presents today for MNT and education related to weight management.  Class 3 obesity Body mass index is 48.93 kg/m .    He is accompanied by self.     ASSESSMENT:   Patient comments/concerns relating to nutrition: Pt would like assistance with weight loss and to discuss medication options.      NUTRITION HISTORY:  Primarily animal protein   Roast Beef  [place] Strips    EBT money     Red Potatoes  Carrots    Rare fast food    When pt has his 2 children pt states meals are more consistent and better balanced.      Previous diet education:  No     Food allergies/intolerances: NKFA    Diet is high in: calories and protein  Diet is low in: fiber, fruits and vegetables    EXERCISE: not able to because of extreme pain back, chronic pain     MEDICATIONS:  Current Outpatient Medications   Medication     albuterol (PROAIR HFA/PROVENTIL HFA/VENTOLIN HFA) 108 (90 Base) MCG/ACT inhaler     benzonatate (TESSALON) 200 MG capsule     hydrOXYzine (VISTARIL) 25 MG capsule     losartan (COZAAR) 100 MG tablet     metoprolol succinate ER (TOPROL XL) 100 MG 24 hr tablet     oxyCODONE (ROXICODONE) 5 MG tablet     No current facility-administered medications for this visit.       LABS:  Last Basic Metabolic Panel:  No results found for: NA   No results found for: POTASSIUM  No results found for: CHLORIDE  No results found for: KIYA  No results found for: CO2  No results found for: BUN  Lab Results   Component Value Date    CR 0.98 04/27/2018     Lab Results   Component Value Date    GLC 83 04/27/2018     BG 114mg/dL in office     ANTHROPOMETRICS:  Vitals: BP (!) 141/96   Ht 1.829 m (6')   Wt (!) 163.7 kg (360 lb 12.8 oz)   BMI 48.93 kg/m    Body mass index is 48.93 kg/m .      Wt Readings from Last 5 Encounters:   01/17/23 (!) 163.7 kg (360 lb 12.8 oz)   01/11/23 (!) 163.5 kg (360 lb 8 oz)   11/28/22 (!) 159.8 kg (352 lb 4.8 oz)   10/25/22 (!)  159.9 kg (352 lb 8 oz)   10/12/22 (!) 158.8 kg (350 lb 1.6 oz)       ESTIMATED KCAL REQUIREMENTS:  1800 kcal per day      NUTRITION DIAGNOSIS:   Overweight/Obesity related to increased calorie intake as evidenced by Body mass index is 48.93 kg/m .       NUTRITION INTERVENTION:     Discussed what dietary changes has worked well for pt in the past and how to incorporate them again.  Discussion on healthy behavior changes that can promote calorie reduction in diet.    We discussed why it is important to incorporate healthy lifestyle interventions to control overall health to prevent complications of being obese including the potential of developing diabetes and preventing heart disease.      Nutritional Psychiatry Your Brain on Food - discussion on how what you eat affects microbiome/ hormones and how you feel physically and emotionally.    Hunger/ Fullness cues - help identify how pt is feeling before, during, and after eating   Healthy eating - Heart health nutrition therapy, Soluble fiber 5 - 10gm/ day   Smart Snacking and 20 Ways to eat more fruit and vegetables.    Mindful eating - listening to body vs eating out of stress, boredom, emotion, eating to fuel body for strength and energy   Tracking food - balance of meals and snacks   Importance of daily physical activity as able to promote endorphin release       reduce stress, anxiety, and depression, improve sleep, increase energy level, improve muscle tone and strength ...   Recommend: Discussed options for medications and reduced calorie dietary guidelines.    Wegovy if approved.  Education on the following:  Wegovy: proper use, mechanism of action, indications, dosing, injection schedule, safety and side effects.  Pt is shown a demonstration of the use of the pen and was able to return demonstration without difficulty.     WEGOVY weekly injection, 4 pens/ month   0.25 mL  x 4 weeks    0.5 mg  x 4 weeks   1 mg  x 4 weeks   1.7 mg  x 4 weeks  2.4 mg goal dose       PATIENT'S BEHAVIOR CHANGE GOALS:   See Patient Instructions for patient stated behavior change goals. AVS was printed and given to patient at today's appointment.     MONITOR / EVALUATE:  RD will monitor/evaluate:  Weight change     FOLLOW-UP:  Follow up with RD as needed.        Time spent in minutes: 15  Encounter: Individual

## 2023-01-17 NOTE — LETTER
1/17/2023         RE: Daron Bond  [address]  [facility] [id number]        Dear Colleague,    Thank you for referring your patient, Daron Bond, to the [facility]. Please see a copy of my visit note below.    Medical Nutrition Therapy  Visit Type:Initial assessment and intervention    Daron Bond presents today for MNT and education related to weight management.  Class 3 obesity Body mass index is 48.93 kg/m .    He is accompanied by self.     ASSESSMENT:   Patient comments/concerns relating to nutrition: Pt would like assistance with weight loss and to discuss medication options.      NUTRITION HISTORY:  Primarily animal protein   Roast Beef  [place] Strips    EBT money     Red Potatoes  Carrots    Rare fast food    When pt has his 2 children pt states meals are more consistent and better balanced.      Previous diet education:  No     Food allergies/intolerances: NKFA    Diet is high in: calories and protein  Diet is low in: fiber, fruits and vegetables    EXERCISE: not able to because of extreme pain back, disk     MEDICATIONS:  Current Outpatient Medications   Medication     albuterol (PROAIR HFA/PROVENTIL HFA/VENTOLIN HFA) 108 (90 Base) MCG/ACT inhaler     benzonatate (TESSALON) 200 MG capsule     hydrOXYzine (VISTARIL) 25 MG capsule     losartan (COZAAR) 100 MG tablet     metoprolol succinate ER (TOPROL XL) 100 MG 24 hr tablet     oxyCODONE (ROXICODONE) 5 MG tablet     No current facility-administered medications for this visit.       LABS:  Last Basic Metabolic Panel:  No results found for: NA   No results found for: POTASSIUM  No results found for: CHLORIDE  No results found for: KIYA  No results found for: CO2  No results found for: BUN  Lab Results   Component Value Date    CR 0.98 04/27/2018     Lab Results   Component Value Date    GLC 83 04/27/2018     BG 114mg/dL in office     ANTHROPOMETRICS:  Vitals: BP (!) 141/96   Ht 1.829 m (6')    Wt (!) 163.7 kg (360 lb 12.8 oz)   BMI 48.93 kg/m    Body mass index is 48.93 kg/m .      Wt Readings from Last 5 Encounters:   01/17/23 (!) 163.7 kg (360 lb 12.8 oz)   01/11/23 (!) 163.5 kg (360 lb 8 oz)   11/28/22 (!) 159.8 kg (352 lb 4.8 oz)   10/25/22 (!) 159.9 kg (352 lb 8 oz)   10/12/22 (!) 158.8 kg (350 lb 1.6 oz)       ESTIMATED KCAL REQUIREMENTS:  1800 kcal per day      NUTRITION DIAGNOSIS:   Overweight/Obesity related to increased calorie intake as evidenced by Body mass index is 48.93 kg/m .       NUTRITION INTERVENTION:     Discussed what dietary changes has worked well for pt in the past and how to incorporate them again.  Discussion on healthy behavior changes that can promote calorie reduction in diet.    We discussed why it is important to incorporate healthy lifestyle interventions to control overall health to prevent complications of being obese including the potential of developing diabetes and preventing heart disease.      Nutritional Psychiatry Your Brain on Food - discussion on how what you eat affects microbiome/ hormones and how you feel physically and emotionally.    Hunger/ Fullness cues - help identify how pt is feeling before, during, and after eating   Healthy eating - Heart health nutrition therapy, Soluble fiber 5 - 10gm/ day   Smart Snacking and 20 Ways to eat more fruit and vegetables.    Mindful eating - listening to body vs eating out of stress, boredom, emotion, eating to fuel body for strength and energy   Tracking food - balance of meals and snacks   Importance of daily physical activity as able to promote endorphin release       reduce stress, anxiety, and depression, improve sleep, increase energy level, improve muscle tone and strength ...   Recommend: Discussed options for medications and reduced calorie dietary guidelines.    Wegovy if approved.  Education on the following:  Wegovy: proper use, mechanism of action, indications, dosing, injection schedule, safety and  side effects.  Pt is shown a demonstration of the use of the pen and was able to return demonstration without difficulty.     WEGOVY weekly injection, 4 pens/ month   0.25 mL  x 4 weeks    0.5 mg  x 4 weeks   1 mg  x 4 weeks   1.7 mg  x 4 weeks  2.4 mg goal dose      PATIENT'S BEHAVIOR CHANGE GOALS:   See Patient Instructions for patient stated behavior change goals. AVS was printed and given to patient at today's appointment.     MONITOR / EVALUATE:  RD will monitor/evaluate:  Weight change     FOLLOW-UP:  Follow up with RD as needed.        Time spent in minutes: 60  Encounter: Individual

## 2023-01-17 NOTE — PATIENT INSTRUCTIONS
WEGOVY weekly injection, 4 pens/ month   0.25 mL  x 4 weeks    0.5 mg  x 4 weeks   1 mg  x 4 weeks   1.7 mg  x 4 weeks  2.4 mg goal dose     Goals:   1. 8 cups water/ day   2. 1 fruit/ day   3. 2-4 cups of vegetables/ day   4. 1800 calorie meal plan         Goals:  Practice healthy stress management (mindful eating, think are you physically hungry or are you board, stressed, emotional etc.) make of list of things to do besides eat.    Try to get good quality sleep with a goal of 7-8 hours per night.  Stay physically active on a daily basis and throughout the year.  Recommend a fitness tracker; gradually increase the average to a minimum of 6000 steps with the ultimate goal of 10,000 steps per day.      Eat in a healthy way; follow the plate method.  Keep a food record ([employer]; [employer]it).  Nutrition reference:  Eat 3 meals a day; snacks are optional.    A meal is 3 or more food groups; make it colorful for better nutrition.

## 2023-01-19 ENCOUNTER — TELEPHONE (OUTPATIENT)
Dept: FAMILY MEDICINE | Facility: CLINIC | Age: 43
End: 2023-01-19
Payer: MEDICARE

## 2023-01-19 DIAGNOSIS — R05.9 COUGH, UNSPECIFIED TYPE: Primary | ICD-10-CM

## 2023-01-19 RX ORDER — BENZONATATE 200 MG/1
200 CAPSULE ORAL 3 TIMES DAILY PRN
Qty: 30 CAPSULE | Refills: 1 | Status: SHIPPED | OUTPATIENT
Start: 2023-01-19 | End: 2023-02-09

## 2023-01-19 NOTE — TELEPHONE ENCOUNTER
Spoke to pt. He believes cough is from cat dander. His cat sleeps in same bed and feels cough is worse at night. No fevers. Was [facility] in [facility] at [facility] for cough and rx'd Tessalon.         He is hoping for a refill of those to see if he gets any relief and if not, will schedule an appt for next week to be seen.     Using [employer].

## 2023-01-24 ENCOUNTER — TELEPHONE (OUTPATIENT)
Dept: FAMILY MEDICINE | Facility: CLINIC | Age: 43
End: 2023-01-24
Payer: MEDICARE

## 2023-01-24 NOTE — TELEPHONE ENCOUNTER
Received fax from [facility] regarding PA for Ozempic 0.25mg/0.5mL    Key:  [id number]    PA done via [facility], waiting on pending approval.

## 2023-01-25 NOTE — TELEPHONE ENCOUNTER
Denied. This drug is a weight loss medication, which is one of the classes not covered under the Part D coverage. We do not offer supplemental benefit that would cover this drug. Section 1927(d)(2) of the Social Security Act (the Act) permits the exclusion of certain drugs or classes of drugs from coverage under Part D.   none Barbara Ko

## 2023-02-02 ENCOUNTER — NURSE TRIAGE (OUTPATIENT)
Dept: NURSING | Facility: CLINIC | Age: 43
End: 2023-02-02
Payer: MEDICAID
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
## 2023-02-09 ENCOUNTER — OFFICE VISIT (OUTPATIENT)
Dept: FAMILY MEDICINE | Facility: CLINIC | Age: 43
End: 2023-02-09
Payer: MEDICARE

## 2023-02-09 ENCOUNTER — MEDICAL CORRESPONDENCE (OUTPATIENT)
Dept: HEALTH INFORMATION MANAGEMENT | Facility: CLINIC | Age: 43
End: 2023-02-09

## 2023-02-09 VITALS
HEART RATE: 97 BPM | TEMPERATURE: 97.4 F | WEIGHT: 315 LBS | HEIGHT: 72 IN | BODY MASS INDEX: 42.66 KG/M2 | RESPIRATION RATE: 20 BRPM | DIASTOLIC BLOOD PRESSURE: 117 MMHG | OXYGEN SATURATION: 95 % | SYSTOLIC BLOOD PRESSURE: 160 MMHG

## 2023-02-09 DIAGNOSIS — M79.601 PAIN OF RIGHT UPPER EXTREMITY: ICD-10-CM

## 2023-02-09 DIAGNOSIS — L98.9 SKIN LESION: ICD-10-CM

## 2023-02-09 DIAGNOSIS — J45.40 MODERATE PERSISTENT ASTHMA, UNSPECIFIED WHETHER COMPLICATED: ICD-10-CM

## 2023-02-09 DIAGNOSIS — K21.00 GASTROESOPHAGEAL REFLUX DISEASE WITH ESOPHAGITIS, UNSPECIFIED WHETHER HEMORRHAGE: Primary | ICD-10-CM

## 2023-02-09 DIAGNOSIS — I10 PRIMARY HYPERTENSION: ICD-10-CM

## 2023-02-09 DIAGNOSIS — H54.7 DECREASED VISION: ICD-10-CM

## 2023-02-09 DIAGNOSIS — M51.369 DEGENERATION OF INTERVERTEBRAL DISC OF LUMBAR REGION: ICD-10-CM

## 2023-02-09 PROCEDURE — 99214 OFFICE O/P EST MOD 30 MIN: CPT | Performed by: FAMILY MEDICINE

## 2023-02-09 RX ORDER — OXYCODONE HYDROCHLORIDE 5 MG/1
5 TABLET ORAL 3 TIMES DAILY PRN
Qty: 45 TABLET | Refills: 0 | Status: SHIPPED | OUTPATIENT
Start: 2023-02-09 | End: 2023-03-03

## 2023-02-09 RX ORDER — METOPROLOL SUCCINATE 100 MG/1
100 TABLET, EXTENDED RELEASE ORAL DAILY
Qty: 30 TABLET | Refills: 5 | Status: SHIPPED | OUTPATIENT
Start: 2023-02-09 | End: 2023-07-26

## 2023-02-09 RX ORDER — LOSARTAN POTASSIUM 100 MG/1
100 TABLET ORAL DAILY
Qty: 30 TABLET | Refills: 5 | Status: SHIPPED | OUTPATIENT
Start: 2023-02-09 | End: 2023-07-26

## 2023-02-09 RX ORDER — PANTOPRAZOLE SODIUM 40 MG/1
40 TABLET, DELAYED RELEASE ORAL DAILY
Qty: 30 TABLET | Refills: 1 | Status: SHIPPED | OUTPATIENT
Start: 2023-02-09 | End: 2023-03-03

## 2023-02-09 RX ORDER — ALBUTEROL SULFATE 90 UG/1
AEROSOL, METERED RESPIRATORY (INHALATION)
Qty: 8.5 G | Refills: 4 | Status: SHIPPED | OUTPATIENT
Start: 2023-02-09 | End: 2023-07-03

## 2023-02-09 NOTE — PROGRESS NOTES
Assessment & Plan     Moderate persistent asthma, unspecified whether complicated  Controlled with intermittent use of albuterol.  He will continue with as needed use of his inhaler.  I have advised smoking cessation as well.    Primary hypertension  Continue with current medication.  Numbers recently elevated due to increased anxiety    Degeneration of intervertebral disc of lumbar region  Stable, advised weight loss    Skin lesion  Lesions on the scalp and lumbar area appears to be from picking behavior.  Encouraged a biofeedback approach to treat these areas.    Pain of right upper extremity  Right arm pain of unclear etiology.  He may have some cervical radicular issues.  His strength and range of motion are normal    Decreased vision  Reported decreased vision in left eye.  He needs to return to ophthalmology for reevaluation.  Referral has been placed.    GERD  Symptoms have improved with pantoprazole.  He will continue with current dose medication for the next 6 weeks.  Advised weaning at that time.    Semaglutide was not approved by his insurance for weight loss.  Consider filing an appeal or looking into other medications.           MED REC REQUIRED  Post Medication Reconciliation Status: patient was not discharged from an inpatient facility or TCU      No follow-ups on file.    Mata Hearn MD  [facility]    Russell Neri is a 43 year old accompanied by his self, presenting for the following health issues:  ER F/U ([facility] ER follow up--chest pain.  He also has multiple issues to discuss)      HPI     ED/UC Followup:    Facility:  [facility] ER  Date of visit: 02/02/2023  Reason for visit: chest pain/GERD  Current Status: improving    Patient also wants to discuss right arm pain with numbness, still not able to see out of left eye, does like to read.  He also states of having some internal bleeding related to fissures.  He also has some skin lesions on his scalp that are  irritating.  He does have a wound above his coccyx area.    He also wants to discuss weight loss, last medication hasn't been approved.    Left eye is blurry, hard to focus  He was seen by ophthalmology, follow up advised    Anxiety continues to be an issue.  He has been compliant with medications.  Blood pressures have been a bit elevated due to increased anxiety.  He reports compliance with losartan and metoprolol.    Review of Systems         Objective    BP (!) 160/117 (BP Location: Right arm, Patient Position: Sitting, Cuff Size: Adult Large)   Pulse 97   Temp 97.4  F (36.3  C) (Tympanic)   Resp 20   Ht 1.829 m (6')   Wt (!) 164.4 kg (362 lb 6.4 oz)   SpO2 95%   BMI 49.15 kg/m    Body mass index is 49.15 kg/m .  Physical Exam   Alert, oriented, no acute distress  Excoriated lesions on the right occipital scalp  Heart has a regular rate and rhythm  Abdomen soft nontender  Lungs clear  Excoriated lesion in the lower lumbar area

## 2023-03-03 ENCOUNTER — OFFICE VISIT (OUTPATIENT)
Dept: FAMILY MEDICINE | Facility: CLINIC | Age: 43
End: 2023-03-03
Payer: MEDICARE

## 2023-03-03 VITALS
HEART RATE: 80 BPM | SYSTOLIC BLOOD PRESSURE: 158 MMHG | DIASTOLIC BLOOD PRESSURE: 98 MMHG | WEIGHT: 315 LBS | HEIGHT: 72 IN | TEMPERATURE: 98.1 F | BODY MASS INDEX: 42.66 KG/M2 | OXYGEN SATURATION: 97 % | RESPIRATION RATE: 20 BRPM

## 2023-03-03 DIAGNOSIS — M51.369 DEGENERATION OF INTERVERTEBRAL DISC OF LUMBAR REGION: ICD-10-CM

## 2023-03-03 DIAGNOSIS — J45.40 MODERATE PERSISTENT ASTHMA, UNSPECIFIED WHETHER COMPLICATED: ICD-10-CM

## 2023-03-03 DIAGNOSIS — K21.00 GASTROESOPHAGEAL REFLUX DISEASE WITH ESOPHAGITIS, UNSPECIFIED WHETHER HEMORRHAGE: ICD-10-CM

## 2023-03-03 DIAGNOSIS — F31.9 BIPOLAR I DISORDER (H): Primary | ICD-10-CM

## 2023-03-03 DIAGNOSIS — Z79.899 ENCOUNTER FOR LONG-TERM (CURRENT) USE OF MEDICATIONS: ICD-10-CM

## 2023-03-03 DIAGNOSIS — F42.9 OBSESSIVE-COMPULSIVE DISORDER, UNSPECIFIED TYPE: ICD-10-CM

## 2023-03-03 DIAGNOSIS — E66.01 MORBID (SEVERE) OBESITY DUE TO EXCESS CALORIES (H): ICD-10-CM

## 2023-03-03 DIAGNOSIS — L98.9 SKIN LESION: ICD-10-CM

## 2023-03-03 PROCEDURE — 99214 OFFICE O/P EST MOD 30 MIN: CPT

## 2023-03-03 RX ORDER — PANTOPRAZOLE SODIUM 40 MG/1
40 TABLET, DELAYED RELEASE ORAL DAILY
Qty: 30 TABLET | Refills: 2 | Status: SHIPPED | OUTPATIENT
Start: 2023-03-03 | End: 2023-05-18

## 2023-03-03 RX ORDER — OXYCODONE HYDROCHLORIDE 5 MG/1
5 TABLET ORAL 3 TIMES DAILY PRN
Qty: 45 TABLET | Refills: 0 | Status: SHIPPED | OUTPATIENT
Start: 2023-03-03 | End: 2023-03-29

## 2023-03-03 RX ORDER — KETOCONAZOLE 20 MG/ML
SHAMPOO TOPICAL DAILY PRN
Qty: 100 ML | Refills: 0 | Status: SHIPPED | OUTPATIENT
Start: 2023-03-03 | End: 2023-04-18

## 2023-03-03 ASSESSMENT — ENCOUNTER SYMPTOMS
EYE PAIN: 0
ABDOMINAL PAIN: 0
HEARTBURN: 1
MYALGIAS: 1
FREQUENCY: 0
HEMATURIA: 0
HEADACHES: 1
PARESTHESIAS: 1
SORE THROAT: 0
HEMATOCHEZIA: 1
CHILLS: 0
WEAKNESS: 1
FEVER: 0
NAUSEA: 0
SHORTNESS OF BREATH: 1
DYSURIA: 0
COUGH: 0
PALPITATIONS: 1
ARTHRALGIAS: 1
DIARRHEA: 0
CONSTIPATION: 0
JOINT SWELLING: 1
DIZZINESS: 1
NERVOUS/ANXIOUS: 1

## 2023-03-03 ASSESSMENT — ASTHMA QUESTIONNAIRES
QUESTION_1 LAST FOUR WEEKS HOW MUCH OF THE TIME DID YOUR ASTHMA KEEP YOU FROM GETTING AS MUCH DONE AT WORK, SCHOOL OR AT HOME: SOME OF THE TIME
QUESTION_4 LAST FOUR WEEKS HOW OFTEN HAVE YOU USED YOUR RESCUE INHALER OR NEBULIZER MEDICATION (SUCH AS ALBUTEROL): ONE OR TWO TIMES PER DAY
ACT_TOTALSCORE: 16
QUESTION_5 LAST FOUR WEEKS HOW WOULD YOU RATE YOUR ASTHMA CONTROL: WELL CONTROLLED
QUESTION_3 LAST FOUR WEEKS HOW OFTEN DID YOUR ASTHMA SYMPTOMS (WHEEZING, COUGHING, SHORTNESS OF BREATH, CHEST TIGHTNESS OR PAIN) WAKE YOU UP AT NIGHT OR EARLIER THAN USUAL IN THE MORNING: ONCE OR TWICE
ACT_TOTALSCORE: 16
QUESTION_2 LAST FOUR WEEKS HOW OFTEN HAVE YOU HAD SHORTNESS OF BREATH: THREE TO SIX TIMES A WEEK

## 2023-03-03 ASSESSMENT — ACTIVITIES OF DAILY LIVING (ADL)
CURRENT_FUNCTION: BATHING REQUIRES ASSISTANCE
CURRENT_FUNCTION: HOUSEWORK REQUIRES ASSISTANCE

## 2023-03-03 NOTE — PROGRESS NOTES
"Answers for HPI/ROS submitted by the patient on 3/3/2023  If you checked off any problems, how difficult have these problems made it for you to do your work, take care of things at home, or get along with other people?: Extremely difficult  PHQ9 TOTAL SCORE: 18      SUBJECTIVE:   CC: Daron is an 43 year old who presents for preventative health visit.     Patient has been advised of split billing requirements and indicates understanding: Yes     Has several concerns he would like to address. Has had wounds on head and coccyx that don't seem to be healing and keep re-opening.  He reports this is a new problem in the past 1 to 2 months.  He reports that wounds will scab over and then slough off when rubbed but not fully heal.  He denies any fevers, he does report some pus with wounds.    Patient speaks extensively about anger towards his brother related to various historical issues, most recently related to the estate and death of his mother.. Mom  2021. Does not know where brother is at this time.    Patient also speaks extensively about frustration with his neighbor above him.  Patient has videos and recounts instances of neighbor urinating on deck which then trickles down to his patio, as well as throwing diapers and glass onto his patio.  He has called the police and landlord regarding these concerns and ongoing antagonizing by the neighbor with no resolution.    Mental health history and support at this time discussed with the patient.  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  He currently takes hydroxyzine for anxiety, and does not take any other psychiatric medications.  He does have a history of bipolar 1 disorder and obsessive-compulsive disorder.  He denies active suicidal ideation or thoughts of self-harm.    Was " "scheduled to have back surgery but gained weight due to hydroxyzine.  He continues to have chronic back pain, which has not changed from previous evaluations.      Healthy Habits:     In general, how would you rate your overall health?  Poor    Frequency of exercise:  None    Do you usually eat at least 4 servings of fruit and vegetables a day, include whole grains    & fiber and avoid regularly eating high fat or \"junk\" foods?  No    Taking medications regularly:  Yes    Medication side effects:  Lightheadedness    Ability to successfully perform activities of daily living:  Housework requires assistance and bathing requires assistance    Home Safety:  No safety concerns identified    Hearing Impairment:  Feel that people are mumbling or not speaking clearly, need to ask people to speak up or repeat themselves and difficulty understanding speech on the telephone    In the past 6 months, have you been bothered by leaking of urine? Yes    In general, how would you rate your overall mental or emotional health?  Poor      PHQ-2 Total Score: 6    Additional concerns today:  Yes    Onset: 3.5 weeks ago  Description: skin condition not responding to the treatment.       Today's PHQ-2 Score:   PHQ-2 ( 1999 [employer]) 3/3/2023   Q1: Little interest or pleasure in doing things 3   Q2: Feeling down, depressed or hopeless 3   PHQ-2 Score 6   Q1: Little interest or pleasure in doing things Nearly every day   Q2: Feeling down, depressed or hopeless Nearly every day   PHQ-2 Score 6     Have you ever done Advance Care Planning? (For example, a Health Directive, POLST, or a discussion with a medical provider or your loved ones about your wishes): No, advance care planning information given to patient to review.  Patient declined advance care planning discussion at this time.    Social History     Tobacco Use     Smoking status: Every Day     Packs/day: 0.50     Types: Cigarettes     Smokeless tobacco: Never   Substance Use Topics     " Alcohol use: Not Currently     Comment: rarely         Alcohol Use 3/3/2023   Prescreen: >3 drinks/day or >7 drinks/week? Not Applicable     Last PSA: No results found for: PSA    Reviewed orders with patient. Reviewed health maintenance and updated orders accordingly - Yes      Reviewed and updated as needed this visit by clinical staff   Tobacco  Allergies  Meds            Reviewed and updated as needed this visit by Provider    Allergies                  Review of Systems   Constitutional: Negative for chills and fever.   HENT: Negative for congestion, ear pain, hearing loss and sore throat.    Eyes: Negative for pain and visual disturbance.   Respiratory: Positive for shortness of breath. Negative for cough.    Cardiovascular: Positive for palpitations and peripheral edema. Negative for chest pain.   Gastrointestinal: Positive for heartburn and hematochezia. Negative for abdominal pain, constipation, diarrhea and nausea.   Genitourinary: Positive for impotence. Negative for dysuria, frequency, genital sores, hematuria, penile discharge and urgency.   Musculoskeletal: Positive for arthralgias, joint swelling and myalgias.   Skin: Positive for rash.   Neurological: Positive for dizziness, weakness, headaches and paresthesias.   Psychiatric/Behavioral: Positive for mood changes. The patient is nervous/anxious.        OBJECTIVE:   BP (!) 158/98   Pulse 80   Temp 98.1  F (36.7  C)   Resp 20   Ht 1.829 m (6')   Wt (!) 162.8 kg (359 lb)   SpO2 97%   BMI 48.69 kg/m      Physical Exam  Constitutional:       Appearance: Normal appearance.   Eyes:      Extraocular Movements: Extraocular movements intact.      Conjunctiva/sclera: Conjunctivae normal.      Pupils: Pupils are equal, round, and reactive to light.   Cardiovascular:      Rate and Rhythm: Normal rate and regular rhythm.      Heart sounds: Normal heart sounds.   Pulmonary:      Effort: Pulmonary effort is normal.      Breath sounds: Normal breath  sounds. No wheezing.   Skin:     General: Skin is warm and dry.      Capillary Refill: Capillary refill takes less than 2 seconds.      Findings: Lesion present.          Neurological:      Mental Status: He is alert.   Psychiatric:      Comments: Patient agitated in clinic relating history with neighbor and brother.  Difficult to re- focus patient on visit.  Mild mood lability noted in clinic.               ASSESSMENT/PLAN:   (F31.9) Bipolar I disorder (H)  (primary encounter diagnosis)  Comment: Patient referred to mental health for psychiatry.  Does appear irritable and emotionally labile in clinic, and feels that he needs additional mental health support and has recently stopped seeing previous mental health professional  Plan: Adult Mental Health  Referral      Patient instructed that he will be called and given number to follow-up if he does not receive call.    (F42.9) Obsessive-compulsive disorder, unspecified type  Comment: Patient denies specific concerns with OCD at this time, thoughts do seem fixated on anger towards those in his environment, and is here with skin concerns that may be being exacerbated by picking behaviors  Plan: Adult Mental Health  Referral          (L98.9) Skin lesion  Comment: Skin lesion to coccyx appears to be healing.  Patient given shampoo for scalp and instructed not to remove scabs that are there.  Plan: ketoconazole (NIZORAL) 2 % external shampoo           (Z79.899) Encounter for long-term (current) use of medications  Comment: Patient acid reflux stable on pantoprazole, medication refilled today.  Plan: Continue with current course of treatment.  Follow-up in 1 month.    (M51.36) Degeneration of intervertebral disc of lumbar region  Comment: Patient previously set to have surgery, was unable due to increase in weight per patient.  Plan to address weight loss at future visit.  Patient unable to give urine sample today, given bridge refill and instructed to  schedule urine lab on outpatient basis and follow-up with primary  Plan: oxyCODONE (ROXICODONE) 5 MG tablet, [facility]         Drug Analysis Urine, CANCELED: [facility]         Analysis Urine          (K21.00) Gastroesophageal reflux disease with esophagitis, unspecified whether hemorrhage  Comment: Patient reports mild GERD symptoms, medication refilled continue plan of care and follow-up in 1 month.  Plan: pantoprazole (PROTONIX) 40 MG EC tablet             (E66.01) Morbid (severe) obesity due to excess calories (H)  Comment: Weight loss discussed, patient was unable to fill semaglutide prescription due to insurance coverage, plan to address at follow-up in 1 month.  Plan: Follow-up with nutrition( Marleni Russo) for discussion of coverage for for weight loss medications and options.          BMI:   Estimated body mass index is 48.69 kg/m  as calculated from the following:    Height as of this encounter: 1.829 m (6').    Weight as of this encounter: 162.8 kg (359 lb).   Weight management plan: Referred to nutrition/diabetes education for coverage of weight loss medications      He reports that he has been smoking cigarettes. He has been smoking an average of .5 packs per day. He has never used smokeless tobacco.  Nicotine/Tobacco Cessation Plan:   Information offered: Patient not interested at this time            ILIANA Sun CNP [facility]

## 2023-03-06 ENCOUNTER — PATIENT OUTREACH (OUTPATIENT)
Dept: CARE COORDINATION | Facility: CLINIC | Age: 43
End: 2023-03-06

## 2023-03-06 NOTE — LETTER
[facility] CARE COORDINATION  [facility]  [address] [place] [address]      March 8, 2023    Daron Bond  [address]   [facility] [address]      Dear Daron,    I am a clinic care coordinator who works with Mata Hearn MD with the [facility]. I wanted to introduce myself and provide you with my contact information for you to be able to call me with any questions or concerns. Below is a description of clinic care coordination and how I can further assist you.       The clinic care coordination team is made up of a registered nurse, , financial resource worker and community health worker who understand the health care system. The goal of clinic care coordination is to help you manage your health and improve access to the health care system. Our team works alongside your provider to assist you in determining your health and social needs. We can help you obtain health care and community resources, providing you with necessary information and education. We can work with you through any barriers and develop a care plan that helps coordinate and strengthen the communication between you and your care team.    Please feel free to contact me with any questions or concerns regarding care coordination and what we can offer.      We are focused on providing you with the highest-quality healthcare experience possible.    Sincerely,     Lisa Kelley, [facility]   Social Work Primary Care Clinic Care Coordinator   [facility]  Covering for [facility]  [phone]  [email or web address]     Legal and tenant advocacy resources:   - [email or web address]://[email or web address]/resources_[email or web address]    Mental health resources:   -A referral for long term psychiatry was made. To make an appt, call [phone].  -you can also schedule with a therapist at this number

## 2023-03-06 NOTE — PROGRESS NOTES
"Clinic Care Coordination Contact  [facility]/Voicemail    Referral Source: PCP    Clinical Data: Care Coordinator Outreach    Order: Mental Wellness (Health) (Mental Illness/Chemical Dependency); Other - Use Comments; Resources of Behavioral Health Services; Housing, legal concerns.    PCP OV 3/3/23: Patient speaks extensively about anger towards his brother related to various historical issues, most recently related to the estate and death of his mother.. Mom  2021. Does not know where brother is at this time.     Patient also speaks extensively about frustration with his neighbor above him.  Patient has videos and recounts instances of neighbor urinating on deck which then trickles down to his patio, as well as throwing diapers and glass onto his patio.  He has called the police and landlord regarding these concerns and ongoing antagonizing by the neighbor with no resolution.    Mental health history and support at this time discussed with the patient.  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  He currently takes hydroxyzine for anxiety, and does not take any other psychiatric medications.  He does have a history of bipolar 1 disorder and obsessive-compulsive disorder.  He denies active suicidal ideation or thoughts of self-harm.    -------------------------------------------------------------    Outreach attempted x 1. No answer.    Plan: Care Coordinator will try to reach patient again in 1-2 business days.    Lisa Kelley JOSELITO   Social Work Primary Care Clinic Care Coordinator   [facility]  Covering for [facility]  [phone]  [email or web address]   "

## 2023-03-08 NOTE — PROGRESS NOTES
Clinic Care Coordination Contact  [facility]/Voicemail    Referral Source: PCP    Clinical Data: Care Coordinator Outreach    Outreach attempted x 2. No answer.    Plan: Care Coordinator will send care coordination introduction letter with care coordinator contact information and explanation of care coordination services via mail. Care Coordinator will do no further outreaches at this time. Will include resources discussed at PCP OV.     TERENCE Gibson   Social Work Primary Care Clinic Care Coordinator   [facility]  Covering for [facility]  [phone]  [email or web address]

## 2023-03-21 ENCOUNTER — ANCILLARY PROCEDURE (OUTPATIENT)
Dept: GENERAL RADIOLOGY | Facility: CLINIC | Age: 43
End: 2023-03-21
Attending: FAMILY MEDICINE
Payer: MEDICARE

## 2023-03-21 ENCOUNTER — OFFICE VISIT (OUTPATIENT)
Dept: FAMILY MEDICINE | Facility: CLINIC | Age: 43
End: 2023-03-21
Payer: MEDICARE

## 2023-03-21 VITALS
WEIGHT: 315 LBS | RESPIRATION RATE: 20 BRPM | OXYGEN SATURATION: 99 % | BODY MASS INDEX: 42.66 KG/M2 | TEMPERATURE: 96.8 F | SYSTOLIC BLOOD PRESSURE: 173 MMHG | HEART RATE: 70 BPM | HEIGHT: 72 IN | DIASTOLIC BLOOD PRESSURE: 117 MMHG

## 2023-03-21 DIAGNOSIS — S89.92XA KNEE INJURY, LEFT, INITIAL ENCOUNTER: ICD-10-CM

## 2023-03-21 DIAGNOSIS — S89.92XA KNEE INJURY, LEFT, INITIAL ENCOUNTER: Primary | ICD-10-CM

## 2023-03-21 PROCEDURE — 99213 OFFICE O/P EST LOW 20 MIN: CPT | Performed by: FAMILY MEDICINE

## 2023-03-21 PROCEDURE — 73562 X-RAY EXAM OF KNEE 3: CPT | Mod: TC | Performed by: RADIOLOGY

## 2023-03-21 NOTE — PROGRESS NOTES
Assessment & Plan     Knee injury, left, initial encounter  Suspect patellar subluxation.  X-rays are unremarkable.  We have discussed quadricep strengthening exercises.  He will continue with his exercise program.  Discussed dietary changes to help with weight loss as well.  He will follow-up if symptoms or not improving.  Continue to observe the foot contusion  - XR Knee Left 3 Views; Future                 No follow-ups on file.    Mtaa Hearn MD  [facility]    Russell Neri is a 43 year old accompanied by his self, presenting for the following health issues:  Follow Up (Discuss ongoing weight concerns. ) and Musculoskeletal Problem (C/o left knee injury yesterday after having a fall.  )      History of Present Illness       Reason for visit:  Weight    He eats 0-1 servings of fruits and vegetables daily.He consumes 0 sweetened beverage(s) daily.He exercises with enough effort to increase his heart rate 20 to 29 minutes per day.  He exercises with enough effort to increase his heart rate 5 days per week. He is missing 1 dose(s) of medications per week.       Patient is here for follow up on his weight concerns.  He is trying to get into see someone for weight loss but is having no luck.  He was last seen by Dr. Dewitt at [facility] 03/13/2023 and she did place a referral to [facility] through [facility].  His insurance doesn't cover weight loss injections as well as a third visit with Marleni Russo RD.  He has been trying various exercises and trying to make dietary changes.  He recently joined a gym and has been going in for workouts.    Patient also complains of having left knee injury yesterday due to having a fall.  He is having increased pain, some slight swelling.  He has been applying ice.  He would like to have imaging done, either CT or MRI.  He also wants his left foot checked after accidentally kicking the door  frame.    Review of Systems   Constitutional, HEENT, cardiovascular, pulmonary, gi and gu systems are negative, except as otherwise noted.      Objective    BP (!) 173/117 (BP Location: Right arm, Patient Position: Sitting, Cuff Size: Adult Large)   Pulse 70   Temp 96.8  F (36  C) (Tympanic)   Resp 20   Ht 1.829 m (6')   Wt (!) 165.3 kg (364 lb 6.4 oz)   SpO2 99%   BMI 49.42 kg/m    Body mass index is 49.42 kg/m .  Physical Exam   Alert, oriented, no acute distress  Lungs are clear  Heart has a regular rate and rhythm  Abdomen obese, soft, nontender  Exam the left knee reveals medial patellar tenderness, normal knee extension and flexion.  Positive patellar apprehension, no ligamentous instability  Ecchymosis of the third and fourth toes on the left foot

## 2023-03-28 DIAGNOSIS — M51.369 DEGENERATION OF INTERVERTEBRAL DISC OF LUMBAR REGION: ICD-10-CM

## 2023-03-29 RX ORDER — OXYCODONE HYDROCHLORIDE 5 MG/1
5 TABLET ORAL 3 TIMES DAILY PRN
Qty: 45 TABLET | Refills: 0 | Status: SHIPPED | OUTPATIENT
Start: 2023-03-29 | End: 2023-04-18

## 2023-03-29 NOTE — TELEPHONE ENCOUNTER
Routing refill request to provider for review/approval because:  LOV 3/21/2023     PRUDENCIO Osborn  [facility]

## 2023-04-18 ENCOUNTER — OFFICE VISIT (OUTPATIENT)
Dept: FAMILY MEDICINE | Facility: CLINIC | Age: 43
End: 2023-04-18
Payer: MEDICARE

## 2023-04-18 ENCOUNTER — TELEPHONE (OUTPATIENT)
Dept: FAMILY MEDICINE | Facility: CLINIC | Age: 43
End: 2023-04-18

## 2023-04-18 VITALS
RESPIRATION RATE: 20 BRPM | DIASTOLIC BLOOD PRESSURE: 103 MMHG | OXYGEN SATURATION: 98 % | HEART RATE: 86 BPM | HEIGHT: 72 IN | TEMPERATURE: 97.3 F | SYSTOLIC BLOOD PRESSURE: 152 MMHG | BODY MASS INDEX: 42.66 KG/M2 | WEIGHT: 315 LBS

## 2023-04-18 DIAGNOSIS — I10 PRIMARY HYPERTENSION: ICD-10-CM

## 2023-04-18 DIAGNOSIS — G89.4 CHRONIC PAIN SYNDROME: ICD-10-CM

## 2023-04-18 DIAGNOSIS — M75.21 BICEPS TENDONITIS, RIGHT: Primary | ICD-10-CM

## 2023-04-18 DIAGNOSIS — L98.9 SKIN LESION: ICD-10-CM

## 2023-04-18 DIAGNOSIS — M51.369 DEGENERATION OF INTERVERTEBRAL DISC OF LUMBAR REGION: ICD-10-CM

## 2023-04-18 PROCEDURE — 99214 OFFICE O/P EST MOD 30 MIN: CPT | Performed by: FAMILY MEDICINE

## 2023-04-18 RX ORDER — KETOCONAZOLE 20 MG/ML
SHAMPOO TOPICAL DAILY PRN
Qty: 100 ML | Refills: 0 | Status: SHIPPED | OUTPATIENT
Start: 2023-04-18 | End: 2023-06-22

## 2023-04-18 RX ORDER — OXYCODONE HYDROCHLORIDE 5 MG/1
5 TABLET ORAL 3 TIMES DAILY PRN
Qty: 45 TABLET | Refills: 0 | Status: SHIPPED | OUTPATIENT
Start: 2023-04-21 | End: 2023-05-18

## 2023-04-18 RX ORDER — AMLODIPINE BESYLATE 5 MG/1
5 TABLET ORAL DAILY
Qty: 30 TABLET | Refills: 3 | Status: SHIPPED | OUTPATIENT
Start: 2023-04-18 | End: 2023-08-10

## 2023-04-18 ASSESSMENT — ASTHMA QUESTIONNAIRES
QUESTION_2 LAST FOUR WEEKS HOW OFTEN HAVE YOU HAD SHORTNESS OF BREATH: THREE TO SIX TIMES A WEEK
QUESTION_4 LAST FOUR WEEKS HOW OFTEN HAVE YOU USED YOUR RESCUE INHALER OR NEBULIZER MEDICATION (SUCH AS ALBUTEROL): ONE OR TWO TIMES PER DAY
ACT_TOTALSCORE: 17
ACT_TOTALSCORE: 17
QUESTION_3 LAST FOUR WEEKS HOW OFTEN DID YOUR ASTHMA SYMPTOMS (WHEEZING, COUGHING, SHORTNESS OF BREATH, CHEST TIGHTNESS OR PAIN) WAKE YOU UP AT NIGHT OR EARLIER THAN USUAL IN THE MORNING: ONCE OR TWICE
QUESTION_1 LAST FOUR WEEKS HOW MUCH OF THE TIME DID YOUR ASTHMA KEEP YOU FROM GETTING AS MUCH DONE AT WORK, SCHOOL OR AT HOME: A LITTLE OF THE TIME
QUESTION_5 LAST FOUR WEEKS HOW WOULD YOU RATE YOUR ASTHMA CONTROL: WELL CONTROLLED

## 2023-04-18 ASSESSMENT — PATIENT HEALTH QUESTIONNAIRE - PHQ9
SUM OF ALL RESPONSES TO PHQ QUESTIONS 1-9: 15
10. IF YOU CHECKED OFF ANY PROBLEMS, HOW DIFFICULT HAVE THESE PROBLEMS MADE IT FOR YOU TO DO YOUR WORK, TAKE CARE OF THINGS AT HOME, OR GET ALONG WITH OTHER PEOPLE: EXTREMELY DIFFICULT
SUM OF ALL RESPONSES TO PHQ QUESTIONS 1-9: 15

## 2023-04-18 NOTE — TELEPHONE ENCOUNTER
Call with Romina, discussed Ketoconazole prescription. Pt to use prescription for a lesion on the scalp. No other questions at this time.

## 2023-04-18 NOTE — TELEPHONE ENCOUNTER
General Call      Reason for Call:  Molly Vanegas, [employer], [phone]  called requesting a call back to clarify directions for:    ketoconazole (NIZORAL) 2 % external shampoo 100 mL 0 4/18/2023  No

## 2023-04-18 NOTE — PROGRESS NOTES
Assessment & Plan     Biceps tendonitis, right  Right shoulder pain most likely due to biceps tendinitis  Physical therapy referral follow-up for improving  - Physical Therapy Referral; Future    Skin lesion  He has scalp dermatitis and will continue with ketoconazole shampoo.  This is exacerbated by his anxiety with scratching of the scalp  - ketoconazole (NIZORAL) 2 % external shampoo; Apply topically daily as needed for itching or irritation    Chronic pain syndrome  Currently controlled, continue with current medication, PDMP queried with no concerns    Degeneration of intervertebral disc of lumbar region  Continue with pain management  - oxyCODONE (ROXICODONE) 5 MG tablet; Take 1 tablet (5 mg) by mouth 3 times daily as needed for severe pain    Primary hypertension  Blood pressure is not controlled, he is currently on 2 agents.  I have added low-dose amlodipine.  Advise follow-up in 1 month or sooner as needed  - amLODIPine (NORVASC) 5 MG tablet; Take 1 tablet (5 mg) by mouth daily             Depression Screening Follow Up        4/18/2023     2:19 PM   PHQ   PHQ-9 Total Score 15   Q9: Thoughts of better off dead/self-harm past 2 weeks Not at all         Follow Up Actions Taken  Crisis resource information provided in After Visit Summary         Mata Hearn MD  [facility]    Russell Neri is a 43 year old, presenting for the following health issues:  Follow Up (Follow up for back pain and weight mgt.  Also right arm pain--did injure over the wintertime, has been doing some weight lifting)        4/18/2023     3:01 PM   Additional Questions   Roomed by Fariba JOSUE CMA   Accompanied by JAIME         4/18/2023     3:01 PM   Patient Reported Additional Medications   Patient reports taking the following new medications None     History of Present Illness       Reason for visit:  Arm pain. follow up care for back and weight.  Symptom onset:  More than a month  Symptom intensity:   Moderate  Symptom progression:  Worsening  Had these symptoms before:  Yes  Has tried/received treatment for these symptoms:  No  What makes it worse:  Using my arm  What makes it better:  Not moving my arm    He eats 0-1 servings of fruits and vegetables daily.He consumes 1 sweetened beverage(s) daily.He exercises with enough effort to increase his heart rate 20 to 29 minutes per day.  He exercises with enough effort to increase his heart rate 5 days per week. He is missing 1 dose(s) of medications per week.    Today's PHQ-9         PHQ-9 Total Score: 15    PHQ-9 Q9 Thoughts of better off dead/self-harm past 2 weeks :   Not at all    How difficult have these problems made it for you to do your work, take care of things at home, or get along with other people: Extremely difficult     Patient is here for follow-up on his pain as well as worsening right shoulder pain.  He slipped and fell during the winter and feels this caused his shoulder injury.  He has started to workout with resistance training and stationary bicycling.  He is reporting some improvement in his back when he exercises.  His shoulder does not bother him too much with exercise.  Chronic back pain is progressing as expected.  No significant changes.          Review of Systems   Constitutional, HEENT, cardiovascular, pulmonary, gi and gu systems are negative, except as otherwise noted.      Objective    BP (!) 152/103 (BP Location: Right arm, Patient Position: Sitting, Cuff Size: Adult Large)   Pulse 86   Temp 97.3  F (36.3  C) (Tympanic)   Resp 20   Ht 1.829 m (6')   Wt (!) 166.2 kg (366 lb 6.4 oz)   SpO2 98%   BMI 49.69 kg/m    Body mass index is 49.69 kg/m .  Physical Exam   Alert, oriented, no acute distress  Lungs clear  Heart has regular rate rhythm  Exam the right shoulder reveals anterior tenderness over the biceps tendon, good range of motion, no impingement, no weakness of rotator cuff

## 2023-05-02 ENCOUNTER — TRANSFERRED RECORDS (OUTPATIENT)
Dept: HEALTH INFORMATION MANAGEMENT | Facility: CLINIC | Age: 43
End: 2023-05-02
Payer: MEDICAID

## 2023-05-18 ENCOUNTER — OFFICE VISIT (OUTPATIENT)
Dept: FAMILY MEDICINE | Facility: CLINIC | Age: 43
End: 2023-05-18
Payer: MEDICARE

## 2023-05-18 VITALS
RESPIRATION RATE: 20 BRPM | DIASTOLIC BLOOD PRESSURE: 89 MMHG | BODY MASS INDEX: 42.66 KG/M2 | TEMPERATURE: 96.8 F | SYSTOLIC BLOOD PRESSURE: 134 MMHG | HEART RATE: 83 BPM | HEIGHT: 72 IN | WEIGHT: 315 LBS | OXYGEN SATURATION: 97 %

## 2023-05-18 DIAGNOSIS — E66.01 OBESITY, MORBID (H): ICD-10-CM

## 2023-05-18 DIAGNOSIS — F41.9 ANXIETY: ICD-10-CM

## 2023-05-18 DIAGNOSIS — G89.4 CHRONIC PAIN SYNDROME: Primary | ICD-10-CM

## 2023-05-18 DIAGNOSIS — K21.00 GASTROESOPHAGEAL REFLUX DISEASE WITH ESOPHAGITIS, UNSPECIFIED WHETHER HEMORRHAGE: ICD-10-CM

## 2023-05-18 DIAGNOSIS — M51.369 DEGENERATION OF INTERVERTEBRAL DISC OF LUMBAR REGION: ICD-10-CM

## 2023-05-18 PROCEDURE — 99214 OFFICE O/P EST MOD 30 MIN: CPT | Performed by: FAMILY MEDICINE

## 2023-05-18 RX ORDER — HYDROXYZINE PAMOATE 25 MG/1
25 CAPSULE ORAL 4 TIMES DAILY PRN
Qty: 90 CAPSULE | Refills: 3 | Status: SHIPPED | OUTPATIENT
Start: 2023-05-18 | End: 2023-05-22

## 2023-05-18 RX ORDER — OXYCODONE HYDROCHLORIDE 5 MG/1
5 TABLET ORAL 3 TIMES DAILY PRN
Qty: 45 TABLET | Refills: 0 | Status: SHIPPED | OUTPATIENT
Start: 2023-05-18 | End: 2023-06-12

## 2023-05-18 RX ORDER — PANTOPRAZOLE SODIUM 40 MG/1
40 TABLET, DELAYED RELEASE ORAL DAILY
Qty: 30 TABLET | Refills: 3 | Status: SHIPPED | OUTPATIENT
Start: 2023-05-18 | End: 2023-11-09

## 2023-05-18 ASSESSMENT — ASTHMA QUESTIONNAIRES
ACT_TOTALSCORE: 15
QUESTION_4 LAST FOUR WEEKS HOW OFTEN HAVE YOU USED YOUR RESCUE INHALER OR NEBULIZER MEDICATION (SUCH AS ALBUTEROL): ONE OR TWO TIMES PER DAY
QUESTION_3 LAST FOUR WEEKS HOW OFTEN DID YOUR ASTHMA SYMPTOMS (WHEEZING, COUGHING, SHORTNESS OF BREATH, CHEST TIGHTNESS OR PAIN) WAKE YOU UP AT NIGHT OR EARLIER THAN USUAL IN THE MORNING: TWO OR THREE NIGHTS A WEEK
QUESTION_2 LAST FOUR WEEKS HOW OFTEN HAVE YOU HAD SHORTNESS OF BREATH: THREE TO SIX TIMES A WEEK
ACT_TOTALSCORE: 15
QUESTION_5 LAST FOUR WEEKS HOW WOULD YOU RATE YOUR ASTHMA CONTROL: SOMEWHAT CONTROLLED
QUESTION_1 LAST FOUR WEEKS HOW MUCH OF THE TIME DID YOUR ASTHMA KEEP YOU FROM GETTING AS MUCH DONE AT WORK, SCHOOL OR AT HOME: NONE OF THE TIME

## 2023-05-18 ASSESSMENT — ANXIETY QUESTIONNAIRES
3. WORRYING TOO MUCH ABOUT DIFFERENT THINGS: NEARLY EVERY DAY
1. FEELING NERVOUS, ANXIOUS, OR ON EDGE: NEARLY EVERY DAY
2. NOT BEING ABLE TO STOP OR CONTROL WORRYING: NEARLY EVERY DAY
GAD7 TOTAL SCORE: 18
IF YOU CHECKED OFF ANY PROBLEMS ON THIS QUESTIONNAIRE, HOW DIFFICULT HAVE THESE PROBLEMS MADE IT FOR YOU TO DO YOUR WORK, TAKE CARE OF THINGS AT HOME, OR GET ALONG WITH OTHER PEOPLE: EXTREMELY DIFFICULT
4. TROUBLE RELAXING: NEARLY EVERY DAY
8. IF YOU CHECKED OFF ANY PROBLEMS, HOW DIFFICULT HAVE THESE MADE IT FOR YOU TO DO YOUR WORK, TAKE CARE OF THINGS AT HOME, OR GET ALONG WITH OTHER PEOPLE?: EXTREMELY DIFFICULT
GAD7 TOTAL SCORE: 18
7. FEELING AFRAID AS IF SOMETHING AWFUL MIGHT HAPPEN: NEARLY EVERY DAY
6. BECOMING EASILY ANNOYED OR IRRITABLE: NEARLY EVERY DAY
7. FEELING AFRAID AS IF SOMETHING AWFUL MIGHT HAPPEN: NEARLY EVERY DAY
5. BEING SO RESTLESS THAT IT IS HARD TO SIT STILL: NOT AT ALL

## 2023-05-18 NOTE — PROGRESS NOTES
Assessment & Plan     Degeneration of intervertebral disc of lumbar region  Pain medications refilled.  PDMP queried with no concerns.  Follow-up in 4 to 6 weeks as needed  - oxyCODONE (ROXICODONE) 5 MG tablet; Take 1 tablet (5 mg) by mouth 3 times daily as needed for severe pain  - Physical Therapy Referral; Future    Anxiety  Controlled, continue with hydroxyzine as needed  - hydrOXYzine (VISTARIL) 25 MG capsule; Take 1 capsule (25 mg) by mouth 4 times daily as needed for anxiety  - Primary Care - Care Coordination Referral; Future    Gastroesophageal reflux disease with esophagitis, unspecified whether hemorrhage  Controlled, continue with pantoprazole  - pantoprazole (PROTONIX) 40 MG EC tablet; Take 1 tablet (40 mg) by mouth daily    Chronic pain syndrome  See above  - Primary Care - Care Coordination Referral; Future  - Physical Therapy Referral; Future    Obesity, morbid (H)  Referral for bariatric surgery evaluation  - Adult Comprehensive Weight Management  Referral; Future                 Mata Hearn MD  [facility]    Russell Neri is a 43 year old, presenting for the following health issues:  Pain Management (Pain management follow up--back pain.) and Follow Up (Also discuss weight loss issues)        4/18/2023     3:01 PM   Additional Questions   Roomed by Fariba JOSUE CMA   Accompanied by JAIME     History of Present Illness       Back Pain:  He presents for follow up of back pain. Patient's back pain is a chronic problem.  Location of back pain:  Right lower back, left lower back, right middle of back, left middle of back, right upper back, left upper back, right side of neck, left side of neck, right shoulder, left shoulder, right buttock, left buttock, right hip, left hip, right side of waist and left side of waist  Description of back pain: cramping, dull ache, gnawing, sharp, shooting and stabbing  Back pain spreads: right buttocks, left buttocks, right  thigh, left thigh, right knee, right foot, right shoulder, left shoulder, right side of neck and left side of neck    Since patient first noticed back pain, pain is: always present, but gets better and worse  Does back pain interfere with his job:  Not applicable      Mental Health Follow-up:  Patient presents to follow-up on Depression & Anxiety.Patient's depression since last visit has been:  Worse  The patient is having other symptoms associated with depression.  Patient's anxiety since last visit has been:  Medium  The patient is having other symptoms associated with anxiety.  Any significant life events: relationship concerns, financial concerns, housing concerns, grief or loss and health concerns  Patient is feeling anxious or having panic attacks.  Patient has no concerns about alcohol or drug use.    Vascular Disease:  He presents for follow up of vascular disease.  He never takes nitroglycerin. He is not taking daily aspirin.    He eats 0-1 servings of fruits and vegetables daily.He consumes 0 sweetened beverage(s) daily.He exercises with enough effort to increase his heart rate 20 to 29 minutes per day.  He exercises with enough effort to increase his heart rate 6 days per week. He is missing 1 dose(s) of medications per week.  Today's TYRELL-7 Score: 18     He has been exercising up to 5 days a week for about 30 to 60 minutes and feels his back is getting a bit better.  Pain is about the same  He needs financial assistance with his gym membership.  Night time panic attacks have returned, seems to be related to probate issues  Would like a handicap sticker              Review of Systems   Constitutional, HEENT, cardiovascular, pulmonary, gi and gu systems are negative, except as otherwise noted.      Objective    /89 (BP Location: Right arm, Patient Position: Sitting, Cuff Size: Adult Large)   Pulse 83   Temp 96.8  F (36  C) (Tympanic)   Resp 20   Ht 1.829 m (6')   Wt (!) 165 kg (363 lb 12.8 oz)    SpO2 97%   BMI 49.34 kg/m    Body mass index is 49.34 kg/m .  Physical Exam   General:  Alert and oriented, No acute distress.    Eye: Normal conjunctiva.     HENT:  Oral mucosa is moist.     Neck:  Supple.     Respiratory:  Respirations are non-labored.     Cardiovascular:  Normal rate   Musculoskeletal:  Normal gait.     Psychiatric:  Cooperative, Appropriate mood & affect, Normal judgment.

## 2023-05-19 RX ORDER — HYDROXYZINE PAMOATE 25 MG/1
CAPSULE ORAL
Qty: 90 CAPSULE | Refills: 3 | OUTPATIENT
Start: 2023-05-19

## 2023-05-20 ENCOUNTER — HEALTH MAINTENANCE LETTER (OUTPATIENT)
Age: 43
End: 2023-05-20

## 2023-05-22 ENCOUNTER — PATIENT OUTREACH (OUTPATIENT)
Dept: CARE COORDINATION | Facility: CLINIC | Age: 43
End: 2023-05-22
Payer: MEDICAID

## 2023-05-22 RX ORDER — HYDROXYZINE PAMOATE 25 MG/1
25 CAPSULE ORAL 4 TIMES DAILY PRN
Qty: 90 CAPSULE | Refills: 3 | Status: SHIPPED | OUTPATIENT
Start: 2023-05-22 | End: 2023-12-28

## 2023-05-22 RX ORDER — HYDROXYZINE PAMOATE 25 MG/1
25 CAPSULE ORAL 4 TIMES DAILY PRN
Qty: 90 CAPSULE | Refills: 3 | OUTPATIENT
Start: 2023-05-22

## 2023-05-22 ASSESSMENT — ACTIVITIES OF DAILY LIVING (ADL): DEPENDENT_IADLS:: INDEPENDENT

## 2023-05-22 NOTE — LETTER
[facility]  Patient Centered Plan of Care  About Me:        Patient Name:  Daron Bond    YOB: 1980  Age:         43 year old   [address] MRN:    [id number] Telephone Information:  Home Phone [phone]   Mobile [phone]       Address:  [address] [address] Email address:  [email or web address]      Emergency Contact(s)    Name Relationship Lgl Grd Work Phone Home Phone Mobile Phone           Primary language:  English     needed? No   [address] [employer]:  [phone] op. 1  Other communication barriers:None  Preferred Method of Communication:     Current living arrangement: I live alone  Mobility Status/ Medical Equipment: Independent    Health Maintenance  Health Maintenance Reviewed: Due/Overdue   Health Maintenance Due   Topic Date Due     URINE DRUG SCREEN  Never done     ASTHMA ACTION PLAN  Never done     COVID-19 Vaccine (1) Never done     Pneumococcal Vaccine: Pediatrics (0 to 5 Years) and At-Risk Patients (6 to 64 Years) (1 - PCV) Never done     MEDICARE ANNUAL WELLNESS VISIT  Never done     My Access Plan  Medical Emergency 911   Primary Clinic Line [facility] - [phone]   24 Hour Appointment Line [phone] or  [phone] ([phone]) (toll-free)   24 Hour Nurse Line [phone] (toll-free)   Preferred Urgent Care Other       Preferred Hospital Other       Preferred Pharmacy [facility] [place], [place] - [address] [place] [address]     Behavioral Health Crisis Line The National Suicide Prevention Lifeline at 1-450.660.9946 or Text/Call 248             My Care Team Members  Patient Care Team         Relationship Specialty Notifications Start End    Mata Hearn MD PCP - General Family Practice  12/1/20     Phone: [phone] Fax: [phone]         [address] [place] [address]    Mata Hearn MD Assigned PCP   2/27/22     Phone:  [phone] Fax: [phone]         [address] [facility] [address]    aMta Hearn MD Assigned Pain Medication Provider   1/9/23     Phone: [phone] Fax: [phone]         [address] [facility] [address]    Lisa Kelley LSW Lead Care Coordinator Primary Care - CC Admissions 5/22/23 5/22/23    Phone: [phone] [address] [facility] [address]    Idalmis Wells LICSW Lead Care Coordinator  Admissions 5/22/23               My Care Plans  Self Management and Treatment Plan  Care Plan  Care Plan: General       Problem: HP GENERAL PROBLEM       Goal: Housing       Start Date: 5/22/2023 Expected End Date: 8/1/2023    Priority: High    Note:     Barriers: Access  Strengths: Motivated  Patient expressed understanding of goal: Yes    Action steps to achieve this goal:  1. I will look into new housing options.   2. I will work with care coordination team as needed.   3. I will work with care coordination on other resource needs as identified.                                 Action Plans on File:                       Advance Care Plans/Directives Type:   No data recorded    Honoring Choices    Advance Care Planning and Health Care Directives  When it comes to decisions about your health care, it s important that your voice is heard. You may not always be able to speak for yourself.    We encourage you to have discussions with your loved ones, cultural or spiritual leaders and health care providers about your goals, values, beliefs and choices.    We are a part of [employer] [place] , supporting and promoting the benefits of advance care planning conversations.    Our goals are to:    Help you make informed decisions about your healthcare choices and ensure that those choices are honored    Offer advance care planning discussions with trained staff    Ensure your choices are clearly defined, documented and available in your medical record    Translate your choices into medical  orders as needed    Why is Advance Care Planning important?    Know what your health care choices are and decide what is right for you    An unexpected illness or injury could leave you unable to participate in important treatment decisions    When choices are left to others to decide that responsibility can be difficult and stressful    By discussing and outlining your choices, your voice is heard in the care you want to receive    How can I learn more?  We offer free classes at multiple locations, days and times. Our trained facilitators will provide information and guide you through a Health Care Directive document. They can also review, notarize and add your document to your medical record.    Call [facility] at [phone] or toll free at [phone] for assistance.      My Medical and Care Information  Problem List   Patient Active Problem List   Diagnosis     Cerebellar tonsillar ectopia (H)     Asthma, moderate persistent     Degeneration of intervertebral disc of lumbar region     OCD (obsessive compulsive disorder)     Myofascial pain syndrome     Obesity, morbid (H)     Chronic pain syndrome     Bipolar I disorder (H)      Current Medications and Allergies:   Current Outpatient Medications   Medication Instructions     albuterol (PROAIR HFA/PROVENTIL HFA/VENTOLIN HFA) 108 (90 Base) MCG/ACT inhaler INHALE 2 PUFFS BY MOUTH EVERY 6 HOURS Strength: 108 (90 Base) MCG/ACT     amLODIPine (NORVASC) 5 mg, Oral, DAILY     diclofenac (VOLTAREN) 2 g, 4 TIMES DAILY     hydrOXYzine (VISTARIL) 25 mg, Oral, 4 TIMES DAILY PRN     ketoconazole (NIZORAL) 2 % external shampoo Topical, DAILY PRN     losartan (COZAAR) 100 mg, Oral, DAILY     metoprolol succinate ER (TOPROL XL) 100 mg, Oral, DAILY     oxyCODONE (ROXICODONE) 5 mg, Oral, 3 TIMES DAILY PRN     pantoprazole (PROTONIX) 40 mg, Oral, DAILY     Care Coordination Start Date: 5/18/2023   Frequency of Care Coordination: monthly       Form Last Updated:  05/22/2023

## 2023-05-22 NOTE — LETTER
[facility] CARE COORDINATION  [facility]  [address] [place] [facility] [place] [address]    May 22, 2023    Daron Bond  [address]   [facility] [address]      Dear Daron,    I am a  clinic care coordinator who works with Mata Heanr MD with the [facility]. I wanted to thank you for spending the time to talk with me.  Below is a description of clinic care coordination and how I can further assist you.       The clinic care coordination team is made up of a registered nurse, , financial resource worker and community health worker who understand the health care system. The goal of clinic care coordination is to help you manage your health and improve access to the health care system. Our team works alongside your provider to assist you in determining your health and social needs. We can help you obtain health care and community resources, providing you with necessary information and education. We can work with you through any barriers and develop a care plan that helps coordinate and strengthen the communication between you and your care team.  Our services are voluntary and are offered without charge to you personally.    Please feel free to contact me with any questions or concerns regarding care coordination and what we can offer.      We are focused on providing you with the highest-quality healthcare experience possible.    Sincerely,     Care Coordination team     Enclosed: I have enclosed a copy of the Patient Centered Plan of Care. This has helpful information and goals that we have talked about. Please keep this in an easy to access place to use as needed.

## 2023-05-22 NOTE — PROGRESS NOTES
Clinic Care Coordination Contact    Clinic Care Coordination Contact  OUTREACH    Referral Information:  Referral Source: PCP    Primary Diagnosis: Psychosocial    Chief Complaint   Patient presents with     Clinic Care Coordination - Initial        Universal Utilization:   Clinic Utilization  Difficulty keeping appointments: No  Compliance Concerns: No  No-Show Concerns: No  No PCP office visit in Past Year: No    Utilization    Hospital Admissions  0             ED Visits  0             No Show Count (past year)  1                Current as of: 5/22/2023 12:54 PM              Clinical Concerns:  Current Medical Concerns: See recent PCP OV notes       Patient Active Problem List   Diagnosis     Cerebellar tonsillar ectopia (H)     Asthma, moderate persistent     Degeneration of intervertebral disc of lumbar region     OCD (obsessive compulsive disorder)     Myofascial pain syndrome     Obesity, morbid (H)     Chronic pain syndrome     Bipolar I disorder (H)       Current Behavioral Concerns: MH      Education Provided to patient: role of SW CC and clinic care coordination, AVS/referrals made, housing resources      Order: Financial Support; Medication Affordability; Other - Use Comments; Housing.     PCP OV 5/18/23: Any significant life events: relationship concerns, financial concerns, housing concerns, grief or loss and health concerns. Patient is feeling anxious or having panic attacks.    ----------------------------------------    SW CC outreach to pt for initial assessment. Call was brief.     Pt has not heard from PT scheduling. Would like to make appts. Gave # to call.     Pt made weight clinic appts per [employer].     No questions for PCP today.     Pt reports he has lived in horrible apartment complex for 12 years. He has been trying to find a new place to live but has not had any luck. Reports there is mold that they will not address. States he needs to leave. Pt has [address] 8 assistance, will lose it if he does  not find housing when he leaves current apartment. [facility] inquired where pt wants to live. He would prefer to move to [facility]. [facility] to send housing search resources to hopefully assist.     To do:   - other resources mentioned in OV?   - make PCP appt in 4-6 weeks   - make PT appts [phone],    Pain  Pain: Yes  Type: Chronic (>3mo)    Health Maintenance Reviewed: Due/Overdue     Health Maintenance Due   Topic Date Due     URINE DRUG SCREEN  Never done     ASTHMA ACTION PLAN  Never done     COVID-19 Vaccine (1) Never done     Pneumococcal Vaccine: Pediatrics (0 to 5 Years) and At-Risk Patients (6 to 64 Years) (1 - PCV) Never done     MEDICARE ANNUAL WELLNESS VISIT  Never done       Clinical Pathway: None    Medication Management:  Medication review status: Medications reviewed and no changes reported per patient.           Functional Status:  Dependent ADLs: Independent  Dependent IADLs: Independent  Bed or wheelchair confined: No  Mobility Status: Independent  Fallen 2 or more times in the past year?: No  Any fall with injury in the past year?: No    Living Situation:  Current living arrangement: I live alone  Type of residence: Apartment    Lifestyle & Psychosocial Needs:    Social Determinants of Health     Tobacco Use: High Risk (5/18/2023)    Patient History      Smoking Tobacco Use: Every Day      Smokeless Tobacco Use: Never      Passive Exposure: Not on file   Alcohol Use: Not on file   Financial Resource Strain: Not on file   Food Insecurity: Not on file   Transportation Needs: Not on file   Physical Activity: Not on file   Stress: Not on file   Social Connections: Not on file   Intimate Partner Violence: Not on file   Depression: At risk (4/18/2023)    PHQ-2      PHQ-2 Score: 5   Housing Stability: Not on file       Diet: Regular  Inadequate nutrition: No  Tube Feeding: No  Inadequate activity/exercise: No  Significant changes in sleep pattern: No  Transportation means:  Regular car     Holiness or spiritual beliefs that impact treatment: No  Mental health DX: Yes  Mental health DX how managed: Medication  Mental health management concern: No  Chemical Dependency Status: No Current Concerns  Informal Support system: None     Care Coordinator has reviewed patient's Social Determinants of Health (SDoH) on this date. Upon review, changes were not made.      Resources and Interventions:  Current Resources:   Community Resources: County Programs, Financial/Insurance, Other  Supplies Currently Used at Home: None  Equipment Currently Used at Home: none  Employment Status: disabled    Advance Care Plan/Directive  Advanced Care Plans/Directives on file: No  Advanced Care Plan/Directive Status: Not Applicable    Referrals Placed: Other      Care Plan:  Care Plan: General     Problem: HP GENERAL PROBLEM     Goal: Housing     Start Date: 5/22/2023 Expected End Date: 8/1/2023    Priority: High    Note:     Barriers: Access  Strengths: Motivated  Patient expressed understanding of goal: Yes    Action steps to achieve this goal:  1. I will look into new housing options.   2. I will work with care coordination team as needed.   3. I will work with care coordination on other resource needs as identified.                          Patient/Caregiver understanding: Pt reports understanding and denies any additional questions or concerns at this times. [facility] engaged in [employer] communication during encounter.    Outreach Frequency: Monthly    Future Appointments              In 4 months PEACE Jurado MD M [facility] [place] [facility]    In 4 months Clemencia Barrow RD M [facility] [place] [facility]          Plan: Patient was provided with this writer's contact information and encouraged to call with any questions or concerns.     [facility] will send resources, care coordination introduction letter, and care plan to  patient. New lead [facility] will follow up in 1 week to discuss housing needs.     TERENCE Gibson   Social Work Primary Care Clinic Care Coordinator   [facility]  [phone]  [email or web address]

## 2023-05-22 NOTE — TELEPHONE ENCOUNTER
Pt called to ask about Hydroxyzine Rx; as it was not filled by pharmacy.  Pt was placed on hold while writer reached out to pharmacy regarding Rx was sent in on 5/18/2023.     Pharmacy states that the Rx is still pending from refill request to provider; has not been rec'd for them to fill.    Please resend Rx to [facility]    *pt informed Rx will be resent to pharmacy   Detail Level: Zone Render In Strict Bullet Format?: No Discontinue Regimen: Colchicine as rx by PCP\\n\\nprednisone 5 mg tablet QD\\nSig: Take 1/2 pill PO QD. Plan: Condition has remained stable with slow taper off of prednisone, keep follow up with rheumatologist as scheduled. Pt has returned to full time working status with no issues or recurrence.

## 2023-05-22 NOTE — TELEPHONE ENCOUNTER
This refill request is a duplicate request, previously received or sent.  Sent denial notification to pharmacy.  Hodan CAMPBELL RN  [facility]

## 2023-05-22 NOTE — TELEPHONE ENCOUNTER
Pt arrived to clinic, requesting hydroxyzine refill.   Call with pharmacy, who stated oxycodone 5mg was filled and dispensed 5/18/23. Pharmacy states they do not have refills of hydroxyzine on file and requesting to resend.     Prescription approved per [facility] Refill Protocol.    Last Written Prescription Date:  5/18/23  Last Fill Quantity: 90,  # refills: 3   Last office visit: 4/18/2023

## 2023-05-30 ENCOUNTER — PATIENT OUTREACH (OUTPATIENT)
Dept: CARE COORDINATION | Facility: CLINIC | Age: 43
End: 2023-05-30
Payer: MEDICAID
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
## 2023-06-09 ENCOUNTER — TELEPHONE (OUTPATIENT)
Dept: FAMILY MEDICINE | Facility: CLINIC | Age: 43
End: 2023-06-09
Payer: MEDICAID

## 2023-06-09 DIAGNOSIS — M51.369 DEGENERATION OF INTERVERTEBRAL DISC OF LUMBAR REGION: ICD-10-CM

## 2023-06-09 NOTE — TELEPHONE ENCOUNTER
Medication Question or Refill    Contacts       Type Contact Phone/Fax    06/09/2023 02:18 PM CDT Phone (Incoming) Daron Bond (Self) [phone] (M)          What medication are you calling about (include dose and sig)?: oxyCODONE (ROXICODONE) 5 MG tablet    Preferred Pharmacy:   [facility] [place] [address]  Phone: [phone] Fax: [phone]      Controlled Substance Agreement on file:   CSA -- Patient Level:     [Media Unavailable] Controlled Substance Agreement - Opioid - Scan on 5/27/2022  7:39 PM       Who prescribed the medication?: PCP    Do you need a refill? Yes    When did you use the medication last? 06/09/23    Patient offered an appointment? No    Do you have any questions or concerns?  No      Could we send this information to you in [facility] or would you prefer to receive a phone call?:   Patient would prefer a phone call   Okay to leave a detailed message?: Yes at Cell number on file:    Telephone Information:   Mobile [phone]

## 2023-06-12 RX ORDER — OXYCODONE HYDROCHLORIDE 5 MG/1
5 TABLET ORAL 3 TIMES DAILY PRN
Qty: 45 TABLET | Refills: 0 | Status: SHIPPED | OUTPATIENT
Start: 2023-06-12 | End: 2023-06-22

## 2023-06-18 DIAGNOSIS — K21.00 GASTROESOPHAGEAL REFLUX DISEASE WITH ESOPHAGITIS, UNSPECIFIED WHETHER HEMORRHAGE: ICD-10-CM

## 2023-06-19 RX ORDER — PANTOPRAZOLE SODIUM 40 MG/1
TABLET, DELAYED RELEASE ORAL
Qty: 30 TABLET | Refills: 2 | OUTPATIENT
Start: 2023-06-19

## 2023-06-19 NOTE — TELEPHONE ENCOUNTER
Writer called and spoke with [facility] to clarify if refills on file for pantoprazole.  [facility] had an error in their system, but do see refills present.  They will cancel request sent by them for refill, and will continue to work with Rx sent in on 5/18/23, with 3 refills on file.    Writer called patient to update.  Patient verbalized understanding and appreciation.

## 2023-06-22 ENCOUNTER — OFFICE VISIT (OUTPATIENT)
Dept: FAMILY MEDICINE | Facility: CLINIC | Age: 43
End: 2023-06-22
Payer: MEDICARE

## 2023-06-22 VITALS
OXYGEN SATURATION: 96 % | SYSTOLIC BLOOD PRESSURE: 126 MMHG | WEIGHT: 315 LBS | RESPIRATION RATE: 18 BRPM | BODY MASS INDEX: 50.03 KG/M2 | HEART RATE: 76 BPM | DIASTOLIC BLOOD PRESSURE: 74 MMHG

## 2023-06-22 DIAGNOSIS — G89.4 CHRONIC PAIN SYNDROME: ICD-10-CM

## 2023-06-22 DIAGNOSIS — M79.18 MYOFASCIAL PAIN: ICD-10-CM

## 2023-06-22 DIAGNOSIS — F41.9 ANXIETY: Primary | ICD-10-CM

## 2023-06-22 DIAGNOSIS — M51.369 DEGENERATION OF INTERVERTEBRAL DISC OF LUMBAR REGION: ICD-10-CM

## 2023-06-22 PROCEDURE — 99214 OFFICE O/P EST MOD 30 MIN: CPT | Performed by: FAMILY MEDICINE

## 2023-06-22 RX ORDER — OXYCODONE HYDROCHLORIDE 5 MG/1
5 TABLET ORAL 3 TIMES DAILY PRN
Qty: 45 TABLET | Refills: 0 | Status: SHIPPED | OUTPATIENT
Start: 2023-06-30 | End: 2023-07-11

## 2023-06-22 RX ORDER — BUPIVACAINE HYDROCHLORIDE 5 MG/ML
2 INJECTION, SOLUTION EPIDURAL; INTRACAUDAL ONCE
Status: COMPLETED | OUTPATIENT
Start: 2023-06-22 | End: 2023-06-22

## 2023-06-22 RX ORDER — KETOCONAZOLE 20 MG/ML
SHAMPOO TOPICAL DAILY PRN
Qty: 100 ML | Refills: 0 | Status: SHIPPED | OUTPATIENT
Start: 2023-06-22 | End: 2023-07-11

## 2023-06-22 RX ORDER — BUSPIRONE HYDROCHLORIDE 10 MG/1
10 TABLET ORAL 2 TIMES DAILY
Qty: 60 TABLET | Refills: 1 | Status: SHIPPED | OUTPATIENT
Start: 2023-06-22 | End: 2023-08-20

## 2023-06-22 RX ADMIN — BUPIVACAINE HYDROCHLORIDE 10 MG: 5 INJECTION, SOLUTION EPIDURAL; INTRACAUDAL at 14:42

## 2023-06-22 NOTE — PROGRESS NOTES
Assessment & Plan     Anxiety  Anxiety has been worsening.  It is keeping him from usual activities.  Trial of buspirone 10 mg twice daily.  Follow-up if symptoms are not improving.  - busPIRone (BUSPAR) 10 MG tablet; Take 1 tablet (10 mg) by mouth 2 times daily    Myofascial pain  Patient has ongoing myofascial pain.  He has some tender trigger points in the paraspinous musculature of the mid thoracic spine.  After informed consent was obtained a trigger point injection was done using 2 cc of bupivacaine.  He tolerated the procedure well  - bupivacaine (MARCAINE) 0.5% preservative free injection    Degeneration of intervertebral disc of lumbar region  PDMP reviewed, no concerns, continue with current dose of oxycodone  - oxyCODONE (ROXICODONE) 5 MG tablet; Take 1 tablet (5 mg) by mouth 3 times daily as needed for severe pain    Chronic pain syndrome  See above                   Mata Hearn MD  [facility]    Russell Neri is a 43 year old, presenting for the following health issues:  Pain Management  (Started going to the gym - pain seems to be worse.)        6/22/2023     1:53 PM   Additional Questions   Roomed by Morena SHIPMAN CMA   Accompanied by Children       Medication Followup     Taking Medication as prescribed: yes    Side Effects:  None    Medication Helping Symptoms:  yes    Patient is here for follow-up on his chronic pain syndrome and anxiety disorder.  He is having trouble with his housing situation.  He is seeking to move to [place].  He has severe anxiety with regards to driving and leaving his house makes it very difficult for him to accomplish any of these tasks.  He continues to try to increase his activity level.  He has been successful losing some weight.  Recently gained weight again.  Chronic back pain persists with tender trigger points developing along the paraspinous musculature.        Review of Systems   Constitutional, HEENT, cardiovascular,  pulmonary, gi and gu systems are negative, except as otherwise noted.      Objective    /74   Pulse 76   Resp 18   Wt (!) 167.3 kg (368 lb 14.4 oz)   SpO2 96%   BMI 50.03 kg/m    Body mass index is 50.03 kg/m .  Physical Exam   Alert, oriented, no acute distress  Normal heart rate  Nonlabored breathing  Tender trigger point in the left paraspinous area mid thoracic spine

## 2023-06-28 ENCOUNTER — PATIENT OUTREACH (OUTPATIENT)
Dept: CARE COORDINATION | Facility: CLINIC | Age: 43
End: 2023-06-28
Payer: MEDICAID

## 2023-06-28 NOTE — PROGRESS NOTES
Clinic Care Coordination Contact    Community Health Worker Follow Up    Care Gaps:     Health Maintenance Due   Topic Date Due     URINE DRUG SCREEN  Never done     ASTHMA ACTION PLAN  Never done     COVID-19 Vaccine (1) Never done     Pneumococcal Vaccine: Pediatrics (0 to 5 Years) and At-Risk Patients (6 to 64 Years) (1 - PCV) Never done     MEDICARE ANNUAL WELLNESS VISIT  Never done     LIPID  07/02/2023       Postponed to next CHW follow up     Care Plan:   Care Plan: General     Problem: HP GENERAL PROBLEM     Goal: Housing     Start Date: 5/22/2023 Expected End Date: 8/1/2023    Recent Progress: 20%    Priority: High    Note:     Barriers: Access  Strengths: Motivated  Patient expressed understanding of goal: Yes    Action steps to achieve this goal:  1. I will look into new housing options. I have called a couple locations. I am continuing to look into Section 8 housing. Continuous (MB)  2. I will work with care coordination team as needed. Continuous (MB)   3. I will work with care coordination on other resource needs as identified. Continuous (MB)                    Care Plan: General     Problem: HP GENERAL PROBLEM     Goal: I want to find resources for assistance     Start Date: 6/28/2023    Note:     Barriers:   Strengths: Motivated  Patient expressed understanding of goal: Yes  Action steps to achieve this goal:  1. I will look into ARHS services @ [email or web address]   2. I will look into legal resources for problems with my current housing.   Legal: [facility] - free legal zoom meeting the third Wednesday of every month, 6-7:30pm   [email or web address]   Meeting ID: [phone]   Password: [phone]     [email or web address]://www.[email or web address].[email or web address]/ - free      Tenant Resource Center - https://www.tenantresourcecenter.org/ (help with repairs, problems during tenancy, safety)     3. I will look into care repair  resources:  https://CamioCam.org/osgcdoxa-bn-wngujit/   https://SE Holding.org/services/jumpstart/     4. I will let my CHW Sujatha @ [phone] know if I have any questions about these resources                        Intervention and Education during outreach: CHW sent patient resources by moris per request from patient.     ARMHS: [facility] - locations in [place] and [place] both providing ARMHS; request services here: [email or web address]     Legal: [facility] Legal Clinic - free legal zoom meeting the third Wednesday of every month, 6-7:30pm   [email or web address]   Meeting ID: [phone]   Password: [phone]     [email or web address]://www.Datadecision.Studio Moderna/ - free      Tenant Resource Center - https://www.tenantresourcecenter.org/ (help with repairs, problems during tenancy, safety)     Car repairs - https://CamioCam.org/zsmxejrs-rw-rnwytsa/   https://SE Holding.org/services/jumpstart/     **Manolo looks to be just for those living in facility settings       CHW Plan: CHW will follow up with patient in 1 month on 7/31/23.    Sujatha Urena  Community Health Worker  [facility] Care Coordination   [facility], [place], [place], [facility]  Office: [phone]

## 2023-07-03 DIAGNOSIS — J45.40 MODERATE PERSISTENT ASTHMA, UNSPECIFIED WHETHER COMPLICATED: ICD-10-CM

## 2023-07-03 PROBLEM — M43.16 SPONDYLOLISTHESIS OF LUMBAR REGION: Status: ACTIVE | Noted: 2020-01-22

## 2023-07-03 PROBLEM — M48.061 SPINAL STENOSIS OF LUMBAR REGION: Status: ACTIVE | Noted: 2020-01-22

## 2023-07-03 RX ORDER — ALBUTEROL SULFATE 90 UG/1
AEROSOL, METERED RESPIRATORY (INHALATION)
Qty: 8.5 G | Refills: 4 | Status: SHIPPED | OUTPATIENT
Start: 2023-07-03 | End: 2024-01-30

## 2023-07-03 NOTE — TELEPHONE ENCOUNTER
"      Last office visit: 6/22/2023     Future Appointments 7/3/2023 - 12/30/2023      Date Visit Type Length Department Provider     7/11/2023 11:00 AM OFFICE VISIT 20 min RVFL FP/IM/Mata Briseno MD    Location Instructions:     [facility] [place] is located at [facility], one block north of [facility] and the [facility]. The clinic shares a building with the [facility] [employer]; use the clinic s parking lot and entrance on the building s south side.                      3/3/2023    12:59 PM 4/18/2023     2:23 PM 5/18/2023     1:31 PM   ACT Total Scores   ACT TOTAL SCORE (Goal Greater than or Equal to 20) 16 17 15   In the past 12 months, how many times did you visit the emergency room for your asthma without being admitted to the hospital? 0 0 0   In the past 12 months, how many times were you hospitalized overnight because of your asthma? 0 0 0         Requested Prescriptions   Pending Prescriptions Disp Refills     albuterol (PROAIR HFA/PROVENTIL HFA/VENTOLIN HFA) 108 (90 Base) MCG/ACT inhaler [Pharmacy Med Name: ALBUTEROL SULFATE  AERS] 8.5 g 4     Sig: INHALE TWO PUFFS BY MOUTH EVERY 6 HOURS       Asthma Maintenance Inhalers - Anticholinergics Failed - 7/3/2023 12:13 PM        Failed - Asthma control assessment score within normal limits in last 6 months     Please review ACT score.           Passed - Patient is age 12 years or older        Passed - Medication is active on med list        Passed - Recent (6 mo) or future (30 days) visit within the authorizing provider's specialty     Patient had office visit in the last 6 months or has a visit in the next 30 days with authorizing provider or within the authorizing provider's specialty.  See \"Patient Info\" tab in inbasket, or \"Choose Columns\" in Meds & Orders section of the refill encounter.           Short-Acting Beta Agonist Inhalers Protocol  Failed - 7/3/2023 12:13 PM    " "    Failed - Asthma control assessment score within normal limits in last 6 months     Please review ACT score.           Passed - Patient is age 12 or older        Passed - Medication is active on med list        Passed - Recent (6 mo) or future (30 days) visit within the authorizing provider's specialty     Patient had office visit in the last 6 months or has a visit in the next 30 days with authorizing provider or within the authorizing provider's specialty.  See \"Patient Info\" tab in inbasket, or \"Choose Columns\" in Meds & Orders section of the refill encounter.                       "

## 2023-07-05 ENCOUNTER — PATIENT OUTREACH (OUTPATIENT)
Dept: CARE COORDINATION | Facility: CLINIC | Age: 43
End: 2023-07-05
Payer: MEDICAID

## 2023-07-05 NOTE — PROGRESS NOTES
Clinic Care Coordination Contact  Care Coordination Clinician Chart Review    Situation: Patient chart reviewed by Care Coordinator.       Background: Care Coordination Program started: 5/18/2023. Initial assessment completed and patient-centered care plan(s) were developed with participation from patient. Lead CC handed patient off to CHW for continued outreaches.       Assessment: Per chart review, patient outreach completed by CC CHW on 6/28/23.  Patient is actively working to accomplish goal(s). Patient's goal(s) appropriate and relevant at this time. Patient is not due for updated Plan of Care.  Assessments will be completed annually or as needed/with change of patient status.      Care Plan: General     Problem: HP GENERAL PROBLEM     Goal: Housing     Start Date: 5/22/2023 Expected End Date: 8/1/2023    Recent Progress: 20%    Priority: High    Note:     Barriers: Access  Strengths: Motivated  Patient expressed understanding of goal: Yes    Action steps to achieve this goal:  1. I will look into new housing options. I have called a couple locations. I am continuing to look into Section 8 housing. Continuous (MB)  2. I will work with care coordination team as needed. Continuous (MB)   3. I will work with care coordination on other resource needs as identified. Continuous (MB)                    Care Plan: General     Problem: HP GENERAL PROBLEM     Goal: I want to find resources for assistance     Start Date: 6/28/2023    Note:     Barriers:   Strengths: Motivated  Patient expressed understanding of goal: Yes  Action steps to achieve this goal:  1. I will look into ARHS services @ [email or web address]   2. I will look into legal resources for problems with my current housing.   Legal: [facility] Legal Clinic - free legal zoom meeting the third Wednesday of every month, 6-7:30pm   [email or web address]   Meeting ID: [phone]   Password:  [phone]     [email or web address]://www.judicare.org/ - free      Tenant Resource Center - https://www.tenantresourcecenter.org/ (help with repairs, problems during tenancy, safety)     3. I will look into care repair resources:  https://operationhelpstcroix.org/apkwsrka-lc-xlfdsux/   https://CloudAcademy.org/services/jumpstart/     4. I will let my CHW Sujatha @ [phone] know if I have any questions about these resources                           Plan/Recommendations: The patient will continue working with Care Coordination to achieve goal(s) as above. CHW will continue outreaches at minimum every 30 days and will involve Lead CC as needed or if patient is ready to move to Maintenance. Lead CC will continue to monitor CHW outreaches and patient's progress to goal(s) every 6 weeks.     Plan of Care updated and sent to patient: No

## 2023-07-11 ENCOUNTER — ANCILLARY PROCEDURE (OUTPATIENT)
Dept: GENERAL RADIOLOGY | Facility: CLINIC | Age: 43
End: 2023-07-11
Attending: FAMILY MEDICINE
Payer: MEDICARE

## 2023-07-11 ENCOUNTER — OFFICE VISIT (OUTPATIENT)
Dept: FAMILY MEDICINE | Facility: CLINIC | Age: 43
End: 2023-07-11
Payer: MEDICARE

## 2023-07-11 VITALS
SYSTOLIC BLOOD PRESSURE: 128 MMHG | DIASTOLIC BLOOD PRESSURE: 77 MMHG | OXYGEN SATURATION: 93 % | WEIGHT: 315 LBS | HEIGHT: 72 IN | BODY MASS INDEX: 42.66 KG/M2 | TEMPERATURE: 97.7 F | HEART RATE: 80 BPM | RESPIRATION RATE: 28 BRPM

## 2023-07-11 DIAGNOSIS — S62.336D CLOSED DISPLACED FRACTURE OF NECK OF FIFTH METACARPAL BONE OF RIGHT HAND WITH ROUTINE HEALING, SUBSEQUENT ENCOUNTER: Primary | ICD-10-CM

## 2023-07-11 DIAGNOSIS — M51.369 DEGENERATION OF INTERVERTEBRAL DISC OF LUMBAR REGION: ICD-10-CM

## 2023-07-11 DIAGNOSIS — L21.9 SEBORRHEIC DERMATITIS OF SCALP: ICD-10-CM

## 2023-07-11 DIAGNOSIS — S62.336D CLOSED DISPLACED FRACTURE OF NECK OF FIFTH METACARPAL BONE OF RIGHT HAND WITH ROUTINE HEALING, SUBSEQUENT ENCOUNTER: ICD-10-CM

## 2023-07-11 PROCEDURE — 99213 OFFICE O/P EST LOW 20 MIN: CPT | Performed by: FAMILY MEDICINE

## 2023-07-11 PROCEDURE — 73130 X-RAY EXAM OF HAND: CPT | Mod: TC | Performed by: RADIOLOGY

## 2023-07-11 RX ORDER — KETOCONAZOLE 20 MG/ML
SHAMPOO TOPICAL DAILY PRN
Qty: 100 ML | Refills: 1 | Status: SHIPPED | OUTPATIENT
Start: 2023-07-11 | End: 2023-11-09

## 2023-07-11 RX ORDER — OXYCODONE HYDROCHLORIDE 5 MG/1
5 TABLET ORAL 3 TIMES DAILY PRN
Qty: 45 TABLET | Refills: 0 | Status: SHIPPED | OUTPATIENT
Start: 2023-07-11 | End: 2023-07-26

## 2023-07-11 ASSESSMENT — ASTHMA QUESTIONNAIRES
QUESTION_4 LAST FOUR WEEKS HOW OFTEN HAVE YOU USED YOUR RESCUE INHALER OR NEBULIZER MEDICATION (SUCH AS ALBUTEROL): ONE OR TWO TIMES PER DAY
ACT_TOTALSCORE: 19
ACT_TOTALSCORE: 19
QUESTION_3 LAST FOUR WEEKS HOW OFTEN DID YOUR ASTHMA SYMPTOMS (WHEEZING, COUGHING, SHORTNESS OF BREATH, CHEST TIGHTNESS OR PAIN) WAKE YOU UP AT NIGHT OR EARLIER THAN USUAL IN THE MORNING: ONCE OR TWICE
QUESTION_1 LAST FOUR WEEKS HOW MUCH OF THE TIME DID YOUR ASTHMA KEEP YOU FROM GETTING AS MUCH DONE AT WORK, SCHOOL OR AT HOME: NONE OF THE TIME
QUESTION_2 LAST FOUR WEEKS HOW OFTEN HAVE YOU HAD SHORTNESS OF BREATH: ONCE OR TWICE A WEEK
QUESTION_5 LAST FOUR WEEKS HOW WOULD YOU RATE YOUR ASTHMA CONTROL: WELL CONTROLLED

## 2023-07-11 ASSESSMENT — PATIENT HEALTH QUESTIONNAIRE - PHQ9: SUM OF ALL RESPONSES TO PHQ QUESTIONS 1-9: 9

## 2023-07-11 NOTE — PROGRESS NOTES
Assessment & Plan     Closed displaced fracture of neck of fifth metacarpal bone of right hand with routine healing, subsequent encounter  No significant change in position of the fracture compared to his previous x-ray  - XR Hand Right G/E 3 Views; Future    Seborrheic dermatitis of scalp  Refill ketoconazole  - ketoconazole (NIZORAL) 2 % external shampoo; Apply topically daily as needed for itching or irritation    Degeneration of intervertebral disc of lumbar region  PDMP queried, no concerns, continue with current medication  - oxyCODONE (ROXICODONE) 5 MG tablet; Take 1 tablet (5 mg) by mouth 3 times daily as needed for severe pain          Mata Hearn MD  [facility]    Russell Neri is a 43 year old, presenting for the following health issues:  ER F/U ([facility]  ER follow up, right hand boxer's fracture) and Pain Management (Pain management follow up--chronic back pain)        7/11/2023    10:46 AM   Additional Questions   Roomed by Fariba JOSUE CMA   Accompanied by Self     HPI     ED/UC Followup:    [facility]ility[facility]  Date of visit: 06/23/2023 and 06/25/2023  Reason for visit: boxer's fracture of right hand  Current Status: still has hand pain    Patient is also here for his chronic back pain.  He is currently on oxycodone 5mg tid as needed.  He has been compliant in its use.  He has been more pain with his hand fracture.    Anxiety is a bit better controlled with buspirone.      Review of Systems   Constitutional, HEENT, cardiovascular, pulmonary, gi and gu systems are negative, except as otherwise noted.      Objective    /77 (BP Location: Right arm, Patient Position: Sitting, Cuff Size: Adult Large)   Pulse 80   Temp 97.7  F (36.5  C) (Tympanic)   Resp 28   Ht 1.829 m (6')   Wt (!) 167.5 kg (369 lb 3.2 oz)   SpO2 93%   BMI 50.07 kg/m    Body mass index is 50.07 kg/m .  Physical Exam   Alert, oriented, no acute distress  Normal heart  Menorrhagia  Exam  of the right hand reveals slight deformity of the right fifth MCP joint, mild tenderness    Right hand x-ray shows minimal healing and slight volar displacement of the head of the fifth metacarpal

## 2023-07-14 ENCOUNTER — OFFICE VISIT (OUTPATIENT)
Dept: FAMILY MEDICINE | Facility: CLINIC | Age: 43
End: 2023-07-14
Payer: MEDICARE

## 2023-07-14 VITALS
TEMPERATURE: 98.9 F | SYSTOLIC BLOOD PRESSURE: 130 MMHG | WEIGHT: 315 LBS | DIASTOLIC BLOOD PRESSURE: 80 MMHG | RESPIRATION RATE: 24 BRPM | OXYGEN SATURATION: 93 % | HEART RATE: 84 BPM | BODY MASS INDEX: 49.77 KG/M2

## 2023-07-14 DIAGNOSIS — B35.6 TINEA CRURIS: ICD-10-CM

## 2023-07-14 DIAGNOSIS — R05.9 COUGH, UNSPECIFIED TYPE: Primary | ICD-10-CM

## 2023-07-14 PROCEDURE — 99214 OFFICE O/P EST MOD 30 MIN: CPT | Performed by: FAMILY MEDICINE

## 2023-07-14 RX ORDER — BENZONATATE 100 MG/1
CAPSULE ORAL
Qty: 45 CAPSULE | Refills: 0 | Status: SHIPPED | OUTPATIENT
Start: 2023-07-14 | End: 2023-08-30

## 2023-07-14 NOTE — PROGRESS NOTES
Assessment & Plan     Cough, unspecified type  Patient with persistent cough.  Will give cough suppressant with Tessalon.  Follow-up in a few weeks if not improving sooner if worse  - benzonatate (TESSALON) 100 MG capsule; Take 1-2 capsules by mouth up to 3 x a day as needed with food    Tinea cruris  Ozzie over-the-counter products and skin care follow-up in a few weeks if not improved sooner if worse                   Richard Caldwell MD  [facility]    Russell Neri is a 43 year old, presenting for the following health issues:  Derm Problem (Rash in groin area that is painful. Unsure when started.) and Cough (When laying down, dry cough. Sx started 1-2 weeks ago. )        7/11/2023    10:46 AM   Additional Questions   Roomed by Fariba JOSUE CMA   Accompanied by Self     HPI     As per CC.           Review of Systems   Constitutional, HEENT, cardiovascular, pulmonary, gi and gu systems are negative, except as otherwise noted.      Objective    /80 (BP Location: Right arm, Patient Position: Sitting, Cuff Size: Adult Large)   Pulse 84   Temp 98.9  F (37.2  C) (Temporal)   Resp 24   Wt (!) 166.5 kg (367 lb)   SpO2 93%   BMI 49.77 kg/m    Body mass index is 49.77 kg/m .  Physical Exam   Patient alert no apparent distress neck supple lungs clear heart regular rate and rhythm.  Skin reveals what appears to be tinea cruris involving his panniculus and groin.

## 2023-07-21 ENCOUNTER — TELEPHONE (OUTPATIENT)
Dept: FAMILY MEDICINE | Facility: CLINIC | Age: 43
End: 2023-07-21
Payer: MEDICAID

## 2023-07-21 DIAGNOSIS — M51.369 DEGENERATION OF INTERVERTEBRAL DISC OF LUMBAR REGION: ICD-10-CM

## 2023-07-21 NOTE — TELEPHONE ENCOUNTER
"7-21-23     FYI - Status Update     Who is Calling: patient      Update: pt called w/status on letter request see mrn# [id number] spencer account.  I read dad the below msg:  \"Unfortunately this isn't a type of letter I would feel comfortable writing\"  Dad is confused why provider isn't comfortable witting letter ?  Pt states they currently live at:  \"[address]\" in [place] & there is black mold & neighbors use his patio as a toilet.  Pt looking to move to [facility] for better living, the new apartment complete has 1 , 3 bedroom apartment left & in order for dad to have a 3 bedroom apartment under section 8 the complex is requesting a letter, dad feels his 2 children 12 & 10 year old female should not share bedrooms.   ATTN: the [facility] apartments  Upload letter & pt will .  Dad is trying to get out of the current living situation as its unsanitary for Clari & can only afford availability under section 8     Does caller want a call/response back: Yes      Okay to leave a detailed message?: Yes at Cell number on file:        Telephone Information:   Mobile [phone]       "

## 2023-07-21 NOTE — LETTER
July 24, 2023      Daron Bond  [address]   [facility] [address]    Attention: [place] [address]    To Whom It May Concern,     Daron Bond and his family, adolescent son and daughter, should have a 3 bedroom apartment.  This arrangement would provide a much healthier environment for Mr. Christianson and his 2 children.          Sincerely,        Mata Hearn MD

## 2023-07-21 NOTE — LETTER
July 26, 2023      Daron Bond  [address] [place] [id number]        To Whom It May Concern,       Daron Bond and his family adolescent son and daughter, should have a three bedroom apartment.  This arrangement would provide a much healthier environment for Mr. Bond and his two children.  This is in the best interest of the children's emotional and physical well being.          Sincerely,        Mata Hearn MD

## 2023-07-24 NOTE — TELEPHONE ENCOUNTER
Dr. Hearn dictated a letter for pt regarding the need to have better housing arrangements for him and his children.  CSS tried calling pt to let him know, pt wasn't available, not able to LM.   Kasi message sent to pt letting him know that a letter is at the  for .

## 2023-07-26 ENCOUNTER — OFFICE VISIT (OUTPATIENT)
Dept: FAMILY MEDICINE | Facility: CLINIC | Age: 43
End: 2023-07-26
Payer: MEDICARE

## 2023-07-26 VITALS
BODY MASS INDEX: 42.66 KG/M2 | RESPIRATION RATE: 20 BRPM | OXYGEN SATURATION: 96 % | SYSTOLIC BLOOD PRESSURE: 135 MMHG | WEIGHT: 315 LBS | DIASTOLIC BLOOD PRESSURE: 88 MMHG | HEIGHT: 72 IN | HEART RATE: 89 BPM | TEMPERATURE: 97.6 F

## 2023-07-26 DIAGNOSIS — I10 PRIMARY HYPERTENSION: ICD-10-CM

## 2023-07-26 DIAGNOSIS — F41.1 GAD (GENERALIZED ANXIETY DISORDER): ICD-10-CM

## 2023-07-26 DIAGNOSIS — G89.4 CHRONIC PAIN SYNDROME: Primary | ICD-10-CM

## 2023-07-26 PROCEDURE — 99213 OFFICE O/P EST LOW 20 MIN: CPT | Performed by: FAMILY MEDICINE

## 2023-07-26 RX ORDER — LOSARTAN POTASSIUM 100 MG/1
100 TABLET ORAL DAILY
Qty: 30 TABLET | Refills: 5 | Status: SHIPPED | OUTPATIENT
Start: 2023-07-26 | End: 2023-10-19

## 2023-07-26 RX ORDER — METOPROLOL SUCCINATE 100 MG/1
100 TABLET, EXTENDED RELEASE ORAL DAILY
Qty: 30 TABLET | Refills: 5 | Status: SHIPPED | OUTPATIENT
Start: 2023-07-26 | End: 2023-11-29

## 2023-07-26 RX ORDER — OXYCODONE HYDROCHLORIDE 5 MG/1
5 TABLET ORAL 3 TIMES DAILY PRN
Qty: 45 TABLET | Refills: 0 | Status: SHIPPED | OUTPATIENT
Start: 2023-07-31 | End: 2023-08-16

## 2023-07-26 ASSESSMENT — ENCOUNTER SYMPTOMS: COUGH: 1

## 2023-07-26 NOTE — PROGRESS NOTES
Assessment & Plan     Primary hypertension  Controlled, continue current medication  - losartan (COZAAR) 100 MG tablet; Take 1 tablet (100 mg) by mouth daily  - metoprolol succinate ER (TOPROL XL) 100 MG 24 hr tablet; Take 1 tablet (100 mg) by mouth daily    Chronic pain syndrome  Continue with narcotic therapy.  PDMP queried with no concerns.    TYRELL (generalized anxiety disorder)  He reports increased anxiety due to housing issues.  He is planning a move in the near future.    Letter has been written and given to patient.  Cough is improving.  Recent evaluation reviewed with negative chest x-ray.  I have asked him to provide me with forms from his landlord regarding emotional support animals.                 Mata Hearn MD  [facility]    Russell Neri is a 43 year old, presenting for the following health issues:  Cough (Follow up cough, finished zpak.  Worse at night.  Also discuss needs for section 8.   Also needs updated letters for BI)        7/26/2023     1:25 PM   Additional Questions   Roomed by Fariba JOSUE CMA   Accompanied by Self       Cough    History of Present Illness       Reason for visit:   proofs of needs for section 8. folliw up care for existing cough.    He eats 0-1 servings of fruits and vegetables daily.He consumes 0 sweetened beverage(s) daily.He exercises with enough effort to increase his heart rate 9 or less minutes per day.  He exercises with enough effort to increase his heart rate 3 or less days per week.   He is taking medications regularly.  Patiently recently seen for cough and congestion.  He was treated with azithromycin.  Chest x-ray was negative.            Review of Systems   Respiratory:  Positive for cough.             Objective    /88 (BP Location: Right arm, Patient Position: Sitting, Cuff Size: Adult Large)   Pulse 89   Temp 97.6  F (36.4  C) (Tympanic)   Resp 20   Ht 1.829 m (6')   Wt (!) 166.3 kg (366 lb 9.6 oz)   SpO2  96%   BMI 49.72 kg/m    Body mass index is 49.72 kg/m .  Physical Exam   Alert, oriented, no acute distress  Normal heart rate  Nonlabored breathing, lungs clear  Cooperative, anxious affect

## 2023-08-01 ENCOUNTER — PATIENT OUTREACH (OUTPATIENT)
Dept: CARE COORDINATION | Facility: CLINIC | Age: 43
End: 2023-08-01
Payer: MEDICAID

## 2023-08-01 NOTE — PROGRESS NOTES
Clinic Care Coordination Contact  [facility]/Voicemail    Clinical Data: Care Coordinator Outreach  Outreach attempted x 1.  Left message on patient's voicemail with call back information and requested return call.    Plan: Care Coordinator will try to reach patient again in 10 business days.    Next CHW outreach: 8/17/23    Sujatha Urena  [facility] Health Worker  [facility] Care Coordination   [place], [facility], [facility], [facility]  Office: [phone]

## 2023-08-02 ENCOUNTER — OFFICE VISIT (OUTPATIENT)
Dept: FAMILY MEDICINE | Facility: CLINIC | Age: 43
End: 2023-08-02
Payer: MEDICARE

## 2023-08-02 VITALS
SYSTOLIC BLOOD PRESSURE: 146 MMHG | WEIGHT: 315 LBS | RESPIRATION RATE: 20 BRPM | HEART RATE: 74 BPM | DIASTOLIC BLOOD PRESSURE: 93 MMHG | HEIGHT: 72 IN | OXYGEN SATURATION: 95 % | BODY MASS INDEX: 42.66 KG/M2 | TEMPERATURE: 97.1 F

## 2023-08-02 DIAGNOSIS — F41.1 GAD (GENERALIZED ANXIETY DISORDER): ICD-10-CM

## 2023-08-02 DIAGNOSIS — Q04.8 CEREBELLAR TONSILLAR ECTOPIA (H): Primary | ICD-10-CM

## 2023-08-02 DIAGNOSIS — F42.9 OBSESSIVE-COMPULSIVE DISORDER, UNSPECIFIED TYPE: ICD-10-CM

## 2023-08-02 PROCEDURE — 99213 OFFICE O/P EST LOW 20 MIN: CPT | Performed by: FAMILY MEDICINE

## 2023-08-02 NOTE — PROGRESS NOTES
Assessment & Plan     Cerebellar tonsillar ectopia (H)    Obsessive-compulsive disorder, unspecified type    TYRELL (generalized anxiety disorder)  Forms been filled out for his housing application.  An additional letter was written as well.  He will follow-up as needed.                 Mata Hearn MD  [facility]    Russell Neri is a 43 year old, presenting for the following health issues:  Follow Up (Discuss further proof for section 8 to get better housing.   Needs letter that is more specific to need to have 3 bedroom apartment.) and Cough (Also ongoing cough at night, not improving)        8/2/2023     9:04 AM   Additional Questions   Roomed by Fariba JOSUE CMA   Accompanied by Self       History of Present Illness       Reason for visit:   proofs of needs for section 8. folliw up care for existing cough.    He eats 0-1 servings of fruits and vegetables daily.He consumes 0 sweetened beverage(s) daily.He exercises with enough effort to increase his heart rate 9 or less minutes per day.  He exercises with enough effort to increase his heart rate 3 or less days per week.   He is taking medications regularly.     Patient is here for additional documentation regarding his housing issues.  He is still planning to move into a 3 bedroom apartment in [place].          Review of Systems         Objective    BP (!) 146/93 (BP Location: Right arm, Patient Position: Sitting, Cuff Size: Adult Large)   Pulse 74   Temp 97.1  F (36.2  C) (Tympanic)   Resp 20   Ht 1.829 m (6')   Wt (!) 166.4 kg (366 lb 14.4 oz)   SpO2 95%   BMI 49.76 kg/m    Body mass index is 49.76 kg/m .  Physical Exam   Alert, oriented, no acute distress  Normal heart rate  Nonlabored breathing  Cooperative, appropriate affect

## 2023-08-02 NOTE — LETTER
August 2, 2023      Daron Bond  [address]   [facility] [address]        To Whom It May Concern,     Daron Bond is in the process of applying for a three bedroom apartment.  This is needed for his two children and his ongoing disability due to severe anxiety.  An additional bedroom will be needed when he has back surgery for a live in aide to assist with his cares during convalescence.    Sincerely,        Mata Hearn MD

## 2023-08-07 ENCOUNTER — MEDICAL CORRESPONDENCE (OUTPATIENT)
Dept: HEALTH INFORMATION MANAGEMENT | Facility: CLINIC | Age: 43
End: 2023-08-07
Payer: MEDICAID

## 2023-08-08 ENCOUNTER — PATIENT OUTREACH (OUTPATIENT)
Dept: CARE COORDINATION | Facility: CLINIC | Age: 43
End: 2023-08-08

## 2023-08-08 ENCOUNTER — ANCILLARY PROCEDURE (OUTPATIENT)
Dept: GENERAL RADIOLOGY | Facility: CLINIC | Age: 43
End: 2023-08-08
Attending: STUDENT IN AN ORGANIZED HEALTH CARE EDUCATION/TRAINING PROGRAM
Payer: MEDICAID

## 2023-08-08 DIAGNOSIS — S62.91XA FRACTURE OF RIGHT HAND: ICD-10-CM

## 2023-08-08 NOTE — LETTER
[facility] CARE COORDINATION  [address] [address]   August 8, 2023        Daron Bond  [address]  [place] [address]          Dear Daron,     Attached is an updated Patient Centered Plan of Care for your continued enrollment in Care Coordination. Please let us know if you have additional questions, concerns, or goals that we can assist with.    Sincerely,    Idalmis Wells, [facility] Clinical Care Coordinator  [facility], [place], [facility], [facility], and [facility]  Patient Centered Plan of Care  About Me:        Patient Name:  Daron Bond    YOB: 1980  Age:         43 year old   [place] MRN:    [id number] Telephone Information:  Home Phone [phone]   Mobile [phone]   Home Phone Not on file.       Address:  [address]  [place] [address] Email address:  [email or web address]      Emergency Contact(s)    Name Relationship Lgl Grd Work Phone Home Phone Mobile Phone           Primary language:  English     needed? No   [place] [employer]:  [phone] op. 1  Other communication barriers:None    Preferred Method of Communication:     Current living arrangement: I live alone    Mobility Status/ Medical Equipment: Independent        Health Maintenance  Health Maintenance Reviewed: Due/Overdue   Health Maintenance Due   Topic Date Due    URINE DRUG SCREEN  Never done    ASTHMA ACTION PLAN  Never done    COVID-19 Vaccine (1) Never done    Pneumococcal Vaccine: Pediatrics (0 to 5 Years) and At-Risk Patients (6 to 64 Years) (1 - PCV) Never done    MEDICARE ANNUAL WELLNESS VISIT  Never done    LIPID  07/02/2023          My Access Plan  Medical Emergency 911   Primary Clinic Line [facility] - [phone]   24 Hour Appointment Line [phone] or  [phone]) (toll-free)   24 Hour Nurse Line  [phone] (toll-free)   Preferred Urgent Care Other     Preferred Hospital Other     [place] [facility] [place] [address]     Behavioral Health Crisis Line The National Suicide Prevention Lifeline at 1-201.926.5330 or Text/Call 348             My Care Team Members  Patient Care Team         Relationship Specialty Notifications Start End    Mata Hearn MD PCP - General Family Practice  12/1/20     Phone: [phone] Fax: [phone]         [address] [place] [address]    Mata Hearn MD Assigned PCP   2/27/22     Phone: [phone] Fax: [phone]         [address] [place] [address]    Mata Hearn MD Assigned Pain Medication Provider   1/9/23     Phone: [phone] Fax: [phone]         [address] [place] [address]    Idalmis Wells LICSW Lead Care Coordinator  Admissions 5/22/23     Sujatha Urena Community Health Worker  Admissions 5/23/23     [phone]              My Care Plans  Self Management and Treatment Plan  Care Plan  Care Plan: General       Problem: HP GENERAL PROBLEM       Goal: Housing       Start Date: 5/22/2023 Expected End Date: 8/1/2023    Recent Progress: 20%    Priority: High    Note:     Barriers: Access  Strengths: Motivated  Patient expressed understanding of goal: Yes    Action steps to achieve this goal:  1. I will look into new housing options. I have called a couple locations. I am continuing to look into Section 8 housing. Continuous (MB)  2. I will work with care coordination team as needed. Continuous (MB)   3. I will work with care coordination on other resource needs as identified. Continuous (MB)                            Care Plan: General       Problem: HP GENERAL PROBLEM       Goal: I want to find resources for assistance       Start Date: 6/28/2023    Note:     Barriers:   Strengths: Motivated  Patient expressed understanding of goal: Yes  Action steps to  achieve this goal:  1. I will look into ARHS services @ https://[employer].Loveland [employer]/new/L4n5Cj   2. I will look into legal resources for problems with my current housing.   Legal: [facility] - free legal zoom meeting the third Wednesday of every month, 6-7:30pm   [email or web address]   Meeting ID: [phone]   Password: [id number]     [email or web address] [email or web address] [email or web address]/ - free      Tenant Resource Center - https://www.tenantresourcecenter.org/ (help with repairs, problems during tenancy, safety)     3. I will look into care repair resources:  https://operationhelpstcroix.org/druyfkrm-zi-qcekcha/   https://westEspressi.org/services/jumpstart/     4. I will let my CHW Sujatha @ [phone] know if I have any questions about these resources                                   My Medical and Care Information  Problem List   Patient Active Problem List   Diagnosis    Cerebellar tonsillar ectopia (H)    Asthma, moderate persistent    Degeneration of intervertebral disc of lumbar region    OCD (obsessive compulsive disorder)    Myofascial pain syndrome    Obesity, morbid (H)    Chronic pain syndrome    Bipolar I disorder (H)    Spondylolisthesis of lumbar region    Spinal stenosis of lumbar region    TYRELL (generalized anxiety disorder)      Current Medications and Allergies:    Current Outpatient Medications   Medication    albuterol (PROAIR HFA/PROVENTIL HFA/VENTOLIN HFA) 108 (90 Base) MCG/ACT inhaler    amLODIPine (NORVASC) 5 MG tablet    benzonatate (TESSALON) 100 MG capsule    busPIRone (BUSPAR) 10 MG tablet    hydrOXYzine (VISTARIL) 25 MG capsule    ketoconazole (NIZORAL) 2 % external shampoo    losartan (COZAAR) 100 MG tablet    metoprolol succinate ER (TOPROL XL) 100 MG 24 hr tablet    oxyCODONE (ROXICODONE) 5 MG tablet    pantoprazole (PROTONIX) 40 MG EC tablet     No current facility-administered medications for this visit.       Allergies   Allergen  Reactions    Banana     Bee Venom     Latex         Care Coordination Start Date: 5/18/2023   Frequency of Care Coordination: monthly     Form Last Updated: 08/08/2023

## 2023-08-08 NOTE — PROGRESS NOTES
Clinic Care Coordination Contact  Care Coordination Clinician Chart Review    Situation: Patient chart reviewed by Care Coordinator.       Background: Care Coordination Program started: 5/18/2023. Initial assessment completed and patient-centered care plan(s) were developed with participation from patient. Lead CC handed patient off to CHW for continued outreaches.       Assessment: Per chart review, patient outreach completed by CC CHW on 8/1/23.  Patient is actively working to accomplish goal(s). Patient's goal(s) appropriate and relevant at this time. Patient is due for updated Plan of Care.  Assessments will be completed annually or as needed/with change of patient status.      Care Plan: General       Problem: HP GENERAL PROBLEM       Goal: Housing       Start Date: 5/22/2023 Expected End Date: 8/1/2023    Recent Progress: 20%    Priority: High    Note:     Barriers: Access  Strengths: Motivated  Patient expressed understanding of goal: Yes    Action steps to achieve this goal:  1. I will look into new housing options. I have called a couple locations. I am continuing to look into Section 8 housing. Continuous (MB)  2. I will work with care coordination team as needed. Continuous (MB)   3. I will work with care coordination on other resource needs as identified. Continuous (MB)                            Care Plan: General       Problem: HP GENERAL PROBLEM       Goal: I want to find resources for assistance       Start Date: 6/28/2023    Note:     Barriers:   Strengths: Motivated  Patient expressed understanding of goal: Yes  Action steps to achieve this goal:  1. I will look into ARHS services @ https://[employer].com/new/L4n5Cj   2. I will look into legal resources for problems with my current housing.   Legal: [facility] Legal Clinic - free legal zoom meeting the third Wednesday of every month, 6-7:30pm   [email or web address]   Meeting ID: [phone]  [phone]   Password: [id number]     [email or web address]/ - free      Tenant Resource Center - https://www.tenantresourcecenter.org/ (help with repairs, problems during tenancy, safety)     3. I will look into care repair resources:  https://operationhelpstcroix.org/zyyqppqv-dp-jncsoxf/   https://Webspy.org/services/jumpstart/     4. I will let my CHW Sujatha @ [phone] know if I have any questions about these resources                                 Plan/Recommendations: The patient will continue working with Care Coordination to achieve goal(s) as above. CHW will continue outreaches at minimum every 30 days and will involve Lead CC as needed or if patient is ready to move to Maintenance. Lead CC will continue to monitor CHW outreaches and patient's progress to goal(s) every 6 weeks.     Plan of Care updated and sent to patient: Yes, via [employer]

## 2023-08-10 DIAGNOSIS — I10 PRIMARY HYPERTENSION: ICD-10-CM

## 2023-08-10 RX ORDER — AMLODIPINE BESYLATE 5 MG/1
5 TABLET ORAL DAILY
Qty: 30 TABLET | Refills: 3 | Status: SHIPPED | OUTPATIENT
Start: 2023-08-10 | End: 2023-09-12

## 2023-08-10 NOTE — TELEPHONE ENCOUNTER
"Routing refill request to provider for review/approval because:  Labs out of range:  BP  Labs not current:  CR    Last Written Prescription Date:  4/18/23  Last Fill Quantity: 30,  # refills: 3   Last office visit provider:   8/2/23    Requested Prescriptions   Pending Prescriptions Disp Refills    amLODIPine (NORVASC) 5 MG tablet [Pharmacy Med Name: AMLODIPINE BESYLATE 5MG TABS] 30 tablet 3     Sig: TAKE ONE TABLET BY MOUTH EVERY DAY       Calcium Channel Blockers Protocol  Failed - 8/10/2023  9:08 AM        Failed - Blood pressure under 140/90 in past 12 months     BP Readings from Last 3 Encounters:   08/02/23 (!) 146/93   07/26/23 135/88   07/14/23 130/80                 Failed - Normal serum creatinine on file in past 12 months     Recent Labs   Lab Test 04/27/18  1453   CR 0.98       Ok to refill medication if creatinine is low          Passed - Recent (12 mo) or future (30 days) visit within the authorizing provider's specialty     Patient has had an office visit with the authorizing provider or a provider within the authorizing providers department within the previous 12 mos or has a future within next 30 days. See \"Patient Info\" tab in inbasket, or \"Choose Columns\" in Meds & Orders section of the refill encounter.              Passed - Medication is active on med list        Passed - Patient is age 18 or older             Ruthie Virgen RN 08/10/23 1:51 PM  "

## 2023-08-16 ENCOUNTER — OFFICE VISIT (OUTPATIENT)
Dept: FAMILY MEDICINE | Facility: CLINIC | Age: 43
End: 2023-08-16
Payer: MEDICARE

## 2023-08-16 VITALS
RESPIRATION RATE: 20 BRPM | HEIGHT: 72 IN | BODY MASS INDEX: 42.66 KG/M2 | HEART RATE: 74 BPM | SYSTOLIC BLOOD PRESSURE: 118 MMHG | OXYGEN SATURATION: 96 % | DIASTOLIC BLOOD PRESSURE: 85 MMHG | TEMPERATURE: 97.8 F | WEIGHT: 315 LBS

## 2023-08-16 DIAGNOSIS — M51.369 DEGENERATION OF INTERVERTEBRAL DISC OF LUMBAR REGION: ICD-10-CM

## 2023-08-16 DIAGNOSIS — J45.40 MODERATE PERSISTENT ASTHMA, UNSPECIFIED WHETHER COMPLICATED: Primary | ICD-10-CM

## 2023-08-16 PROCEDURE — 99213 OFFICE O/P EST LOW 20 MIN: CPT | Performed by: FAMILY MEDICINE

## 2023-08-16 RX ORDER — OXYCODONE HYDROCHLORIDE 5 MG/1
5 TABLET ORAL 3 TIMES DAILY PRN
Qty: 45 TABLET | Refills: 0 | Status: SHIPPED | OUTPATIENT
Start: 2023-08-16 | End: 2023-08-30

## 2023-08-16 ASSESSMENT — ENCOUNTER SYMPTOMS: COUGH: 1

## 2023-08-16 ASSESSMENT — ASTHMA QUESTIONNAIRES: ACT_TOTALSCORE: 10

## 2023-08-16 NOTE — PROGRESS NOTES
Assessment & Plan     Moderate persistent asthma, unspecified whether complicated  His persistent cough may be due to poorly controlled asthma.  I have added fluticasone inhaler and he will continue with albuterol as needed.  - fluticasone (ARNUITY ELLIPTA) 200 MCG/ACT inhaler; Inhale 1 puff into the lungs daily    Degeneration of intervertebral disc of lumbar region  PDMP queried no concerns  Continue with oxycodone  - oxyCODONE (ROXICODONE) 5 MG tablet; Take 1 tablet (5 mg) by mouth 3 times daily as needed for severe pain             Depression Screening Follow Up        8/16/2023    10:24 AM   PHQ   PHQ-9 Total Score 18   Q9: Thoughts of better off dead/self-harm past 2 weeks Not at all         Follow Up Actions Taken  Crisis resource information provided in After Visit Summary  Patient has experienced a recent significant life event.  Patient counseled, no additional follow up at this time.         Mata Hearn MD  [facility]    Russell Neri is a 43 year old, presenting for the following health issues:  Cough (Follow up cough, ongoing.  Does have mild SOB during the day, starts coughing at night, throat hurts from cough) and Musculoskeletal Problem (Discuss right knee pain, has been doing stretches, hx knee surgery)      8/16/2023    10:34 AM   Additional Questions   Roomed by Fariba JOSUE CMA   Accompanied by Self       Cough    Musculoskeletal Problem  Associated symptoms include coughing.   History of Present Illness     Asthma:  He presents for follow up of asthma.  He has some cough, some wheezing, and some shortness of breath.  He is using a relief medication 2-3 times per day. He does not miss any doses of his controller medication throughout the week. Patient is aware of the following triggers: dust mites, mold, smoke and strong odors and fumes. The patient has not had a visit to the Emergency Room, Urgent Care or Hospital due to asthma since the last clinic visit.      Back Pain:  He presents for follow up of back pain. Patient's back pain is a chronic problem.  Location of back pain:  Right lower back, left lower back, right middle of back, left middle of back, right upper back, left upper back, right side of neck, left side of neck, right shoulder, left shoulder, right buttock, left buttock, right hip, right side of waist and left side of waist  Description of back pain: cramping, dull ache, sharp and stabbing  Back pain spreads: right buttocks, right thigh, right knee, right shoulder, left shoulder, right side of neck and left side of neck    Since patient first noticed back pain, pain is: gradually worsening  Does back pain interfere with his job:  Not applicable       Reason for visit:   nasty cough at night. pain and ridiculous amounts of stress.    He eats 2-3 servings of fruits and vegetables daily.He consumes 0 sweetened beverage(s) daily.He exercises with enough effort to increase his heart rate 10 to 19 minutes per day.  He exercises with enough effort to increase his heart rate 3 or less days per week. He is missing 1 dose(s) of medications per week.     He reports a persistent cough with FB sensation in the chest.  No f/c/s            Review of Systems   Respiratory:  Positive for cough.       Constitutional, HEENT, cardiovascular, pulmonary, gi and gu systems are negative, except as otherwise noted.      Objective    /85 (BP Location: Right arm, Patient Position: Sitting, Cuff Size: Adult Large)   Pulse 74   Temp 97.8  F (36.6  C) (Tympanic)   Resp 20   Ht 1.829 m (6')   Wt (!) 166.9 kg (368 lb)   SpO2 96%   BMI 49.91 kg/m    Body mass index is 49.91 kg/m .  Physical Exam   Alert, oriented, no acute distress  Neck is supple without adenopathy  Lungs are clear  Heart has a regular rate and rhythm

## 2023-08-20 DIAGNOSIS — F41.9 ANXIETY: ICD-10-CM

## 2023-08-20 RX ORDER — BUSPIRONE HYDROCHLORIDE 10 MG/1
10 TABLET ORAL 2 TIMES DAILY
Qty: 180 TABLET | Refills: 3 | Status: SHIPPED | OUTPATIENT
Start: 2023-08-20 | End: 2023-11-09

## 2023-08-20 NOTE — TELEPHONE ENCOUNTER
"  Last Written Prescription Date:  6/22/23  Last Fill Quantity: 60,  # refills: 1   Last office visit provider:   8/16/23    Requested Prescriptions   Pending Prescriptions Disp Refills    busPIRone (BUSPAR) 10 MG tablet [Pharmacy Med Name: BUSPIRONE HCL 10MG TABS] 60 tablet 1     Sig: TAKE ONE TABLET BY MOUTH TWICE A DAY       Atypical Antidepressants Protocol Passed - 8/20/2023  9:17 AM        Passed - Recent (12 mo) or future (30 days) visit within the authorizing provider's specialty     Patient has had an office visit with the authorizing provider or a provider within the authorizing providers department within the previous 12 mos or has a future within next 30 days. See \"Patient Info\" tab in inbasket, or \"Choose Columns\" in Meds & Orders section of the refill encounter.              Passed - Medication active on med list        Passed - Patient is age 18 or older             Ruthie Virgen RN 08/20/23 3:04 PM  "

## 2023-08-21 ENCOUNTER — PATIENT OUTREACH (OUTPATIENT)
Dept: CARE COORDINATION | Facility: CLINIC | Age: 43
End: 2023-08-21
Payer: MEDICAID

## 2023-08-21 NOTE — PROGRESS NOTES
Clinic Care Coordination Contact  Community Health Worker Follow Up    Care Gaps:     Health Maintenance Due   Topic Date Due    URINE DRUG SCREEN  Never done    ASTHMA ACTION PLAN  Never done    COVID-19 Vaccine (1) Never done    Pneumococcal Vaccine: Pediatrics (0 to 5 Years) and At-Risk Patients (6 to 64 Years) (1 - PCV) Never done    MEDICARE ANNUAL WELLNESS VISIT  Never done    BMP  08/15/2023       Postponed to next CHW follow up     Care Plan:   Care Plan: General       Problem: HP GENERAL PROBLEM       Goal: Housing       Start Date: 5/22/2023 Expected End Date: 8/1/2023    This Visit's Progress: 50% Recent Progress: 20%    Priority: High    Note:     Barriers: Access  Strengths: Motivated  Patient expressed understanding of goal: Yes    Action steps to achieve this goal:  1. I will look into new housing options. I have called a couple locations. I have an apartment that that I am waiting to get information from that it is ready for inspection. I hope this confirmation will come this week and we can get the inspection completed soon.  2. I will work with care coordination team as needed. Continuous (MB)   3. I will work with care coordination on other resource needs as identified. Continuous (MB)                            Care Plan: General       Problem: HP GENERAL PROBLEM       Goal: I want to find resources for assistance       Start Date: 6/28/2023    This Visit's Progress: 10%    Note:     Barriers:   Strengths: Motivated  Patient expressed understanding of goal: Yes  Action steps to achieve this goal:  1. I will look into ARHS services @ https://Baby World Language.Cerahelix/new/L4n5Cj . I have not looked into this yet. [facility] is resending me the link.  2. I will look into legal resources for problems with my current housing. I have not looked into this yet. [facility] is resending me the links.  Legal: [facility] Legal Clinic - free legal zoom meeting the third Wednesday of every month, 6-7:30pm    [email or web address]   Meeting ID: [phone]   Password: [id number]     [email or web address].judBoxaroo for [email or web address]/ - free      Tenant Resource Center - https://www.tenantresNorth Oaks Medical Centercecenter.org/ (help with repairs, problems during tenancy, safety)     3. I will look into care repair resources:  https://operationhelpstcroix.org/gbvrgauk-ub-hpmtmfk/   https://westK-12 Techno Services.org/services/jumpstart/     4. I will let my CHW Sujatha @ [phone] know if I have any questions about these resources. Continuous (MB)                              Intervention and Education during outreach: Patient let CHW know that he has a section 8 home that he is waiting to get inspected and is hoping it will be ready before the upcoming school year. Patient has not looked into legal resources or ARHMS services yet. CHW resent resources to patient through [employer].    CHW Plan: CHW will follow up with patient in about 1 month.    Sujatha Urena  Community Health Worker  [facility] Care Coordination   [place] [address], [place], [place], [facility]  Office: [phone]

## 2023-08-27 ENCOUNTER — TELEPHONE (OUTPATIENT)
Dept: FAMILY MEDICINE | Facility: CLINIC | Age: 43
End: 2023-08-27
Payer: MEDICAID

## 2023-08-27 NOTE — TELEPHONE ENCOUNTER
Medication Question or Refill    Contacts         Type Contact Phone/Fax    08/27/2023 10:09 AM CDT Phone (Incoming) Daron Bond (Self) [phone] (M)            What medication are you calling about (include dose and sig)?: pain medication and the the cough medication per pt    Preferred Pharmacy:   [facility] - [address] [address]  Phone: [phone] Fax: [phone]      Controlled Substance Agreement on file:   CSA -- Patient Level:     [Media Unavailable] Controlled Substance Agreement - Opioid - Scan on 6/23/2023  8:42 AM   [Media Unavailable] Controlled Substance Agreement - Opioid - Scan on 5/27/2022  7:39 PM       Who prescribed the medication?: Mata Hearn      Do you need a refill? Yes    When did you use the medication last? Today     Patient offered an appointment? Yes: but  next opening is in September   Do you have any questions or concerns?  Yes: backpain have been getting worst lately due to unable to go to physical therapy since they are close for the season      Could we send this information to you in [facility] or would you prefer to receive a phone call?:   Patient would prefer a phone call   Okay to leave a detailed message?: Yes at Cell number on file:    Telephone Information:   Mobile [phone]

## 2023-08-28 NOTE — TELEPHONE ENCOUNTER
Call with pt, given recommendations per covering provider. Pt agreed to OV; OV scheduled for back pain.

## 2023-08-28 NOTE — TELEPHONE ENCOUNTER
Patient is requesting an opioid prescription.  It appears Dr. Hearn sent this in on August 16 for 45 tablets.  He did not indicate additional refills.  Patient will need an appointment.

## 2023-08-30 ENCOUNTER — OFFICE VISIT (OUTPATIENT)
Dept: FAMILY MEDICINE | Facility: CLINIC | Age: 43
End: 2023-08-30
Payer: MEDICARE

## 2023-08-30 VITALS
DIASTOLIC BLOOD PRESSURE: 84 MMHG | WEIGHT: 315 LBS | TEMPERATURE: 98.1 F | HEART RATE: 82 BPM | HEIGHT: 72 IN | SYSTOLIC BLOOD PRESSURE: 116 MMHG | RESPIRATION RATE: 18 BRPM | OXYGEN SATURATION: 96 % | BODY MASS INDEX: 42.66 KG/M2

## 2023-08-30 DIAGNOSIS — J45.40 MODERATE PERSISTENT ASTHMA, UNSPECIFIED WHETHER COMPLICATED: ICD-10-CM

## 2023-08-30 DIAGNOSIS — R05.9 COUGH, UNSPECIFIED TYPE: ICD-10-CM

## 2023-08-30 DIAGNOSIS — M51.369 DEGENERATION OF INTERVERTEBRAL DISC OF LUMBAR REGION: ICD-10-CM

## 2023-08-30 DIAGNOSIS — I10 PRIMARY HYPERTENSION: Primary | ICD-10-CM

## 2023-08-30 DIAGNOSIS — F31.9 BIPOLAR I DISORDER (H): ICD-10-CM

## 2023-08-30 DIAGNOSIS — F41.1 GAD (GENERALIZED ANXIETY DISORDER): ICD-10-CM

## 2023-08-30 DIAGNOSIS — F42.9 OBSESSIVE-COMPULSIVE DISORDER, UNSPECIFIED TYPE: ICD-10-CM

## 2023-08-30 PROCEDURE — 99214 OFFICE O/P EST MOD 30 MIN: CPT | Performed by: PHYSICIAN ASSISTANT

## 2023-08-30 RX ORDER — ALBUTEROL SULFATE 0.83 MG/ML
2.5 SOLUTION RESPIRATORY (INHALATION) EVERY 6 HOURS PRN
Qty: 90 ML | Refills: 0 | Status: SHIPPED | OUTPATIENT
Start: 2023-08-30 | End: 2024-02-27

## 2023-08-30 RX ORDER — BENZONATATE 100 MG/1
CAPSULE ORAL
Qty: 45 CAPSULE | Refills: 0 | Status: SHIPPED | OUTPATIENT
Start: 2023-08-30 | End: 2023-09-12

## 2023-08-30 RX ORDER — OXYCODONE HYDROCHLORIDE 5 MG/1
5 TABLET ORAL 3 TIMES DAILY PRN
Qty: 45 TABLET | Refills: 0 | Status: SHIPPED | OUTPATIENT
Start: 2023-08-30 | End: 2023-09-19

## 2023-08-30 ASSESSMENT — ANXIETY QUESTIONNAIRES
IF YOU CHECKED OFF ANY PROBLEMS ON THIS QUESTIONNAIRE, HOW DIFFICULT HAVE THESE PROBLEMS MADE IT FOR YOU TO DO YOUR WORK, TAKE CARE OF THINGS AT HOME, OR GET ALONG WITH OTHER PEOPLE: EXTREMELY DIFFICULT
1. FEELING NERVOUS, ANXIOUS, OR ON EDGE: NEARLY EVERY DAY
4. TROUBLE RELAXING: NEARLY EVERY DAY
GAD7 TOTAL SCORE: 16
2. NOT BEING ABLE TO STOP OR CONTROL WORRYING: NEARLY EVERY DAY
6. BECOMING EASILY ANNOYED OR IRRITABLE: MORE THAN HALF THE DAYS
7. FEELING AFRAID AS IF SOMETHING AWFUL MIGHT HAPPEN: MORE THAN HALF THE DAYS
GAD7 TOTAL SCORE: 16
3. WORRYING TOO MUCH ABOUT DIFFERENT THINGS: NEARLY EVERY DAY
5. BEING SO RESTLESS THAT IT IS HARD TO SIT STILL: NOT AT ALL

## 2023-08-30 ASSESSMENT — ENCOUNTER SYMPTOMS
WHEEZING: 1
BACK PAIN: 1
COUGH: 1
AGITATION: 1
NERVOUS/ANXIOUS: 1
SHORTNESS OF BREATH: 1

## 2023-08-30 NOTE — PROGRESS NOTES
"  Assessment & Plan     Primary hypertension  Continue current management.  - BASIC METABOLIC PANEL    TYRELL (generalized anxiety disorder)  Continue current management.    Bipolar I disorder (H)  Continue current management.    Moderate persistent asthma, unspecified whether complicated  Switch to neb solution for albuterol. Continue fluticasone as previous.  - albuterol (PROVENTIL) (2.5 MG/3ML) 0.083% neb solution  Dispense: 90 mL; Refill: 0  - fluticasone (ARNUITY ELLIPTA) 200 MCG/ACT inhaler  Dispense: 1 each; Refill: 1    Obsessive-compulsive disorder, unspecified type  Continue current management.    Cough, unspecified type  - benzonatate (TESSALON) 100 MG capsule  Dispense: 45 capsule; Refill: 0  - Nebulizer and Supplies Order for DME - ONLY FOR DME    Degeneration of intervertebral disc of lumbar region  - oxyCODONE (ROXICODONE) 5 MG tablet  Dispense: 45 tablet; Refill: 0    Patient will be transferring care to [place], [place] when move is completed.  Due to homicidal threats patient made while in the office and talking specifically about a gun showing being able to obtain firearm, call was placed to  police department.  They will  check with the patient.  BMI:   Estimated body mass index is 49.91 kg/m  as calculated from the following:    Height as of this encounter: 1.829 m (6').    Weight as of this encounter: 166.9 kg (368 lb).       ALON Corral PA-S  [facility]    Subjective   Daron is a 43 year old, presenting for the following health issues:  Back Pain, Asthma, Anxiety, and Depression      Daron is a 43 year old male presenting to the clinic for medication refills. He has had issues with his albuterol inhaler, stating that it \"clogs\" after a few days. He has spoken to a pharmacist about how to clean it, but it has not helped much. Discussed switching to nebulizer for albuterol to avoid clogging. He has used his fluticasone inhaler " "sporadically, discussed using that daily to improve symptoms and swishing after use. Discussed proper inhaler technique. He feels like his asthma has been worse, particularly at night. When he lays down for bed, he wheezes and coughs.     Daron suffers from chronic back pain. He is out of his oxycodone and is requesting a refill today. States taking the oxycodone everyday is the only way he is able to deal with the pain and function in his day. Has seen multiple specialties in past without relief.    Patient is anxious today, despite therapy with Buspar and Vistaril. States multiple life stressors at present - has restarted smoking cigarettes (1 PPD). Does smoke indoors and in the car. States he has multiple expensive air purifiers to lessen burden on kids (13M, 11F). Patient cannot drive on highways due to anxiety. States they are moving to [place] on Friday, due to issues with neighbors. Has service dog, Lady, with today.    Most concerning are his issues with his brother. States his brother \"stole over 1 million dollars\" from their mother by \"tricking\" her into signing her bank accounts to the brother before she passed. States one of the accounts was supposed to be for his children. He has attempted legal action, no follow up was taken by fiduciary. He is incredibly angry at his brother for the financial issues, so much so, that he states \"there is a gun show in October\" that he is planning on attending, with homicidal ideation against his brother.    History of Present Illness     Asthma:  He presents for follow up of asthma.  He has some cough, some wheezing, and some shortness of breath.  He is using a relief medication 2-3 times per day. He typically misses taking his controller medication 2 time(s) per week. Patient is aware of the following triggers: cold air, dust mites and strong odors and fumes. The patient has not had a visit to the Emergency Room, Urgent Care or Hospital due to asthma since the " last clinic visit.     Back Pain:  He presents for follow up of back pain. Patient's back pain is a chronic problem.  Location of back pain:  Right lower back, left lower back, right middle of back, left middle of back, right upper back, left upper back, right side of neck, left side of neck, right shoulder, left shoulder, right buttock, right hip, right side of waist and left side of waist  Description of back pain: gnawing, sharp and shooting  Back pain spreads: right buttocks, left buttocks, right thigh, right knee, right foot, right shoulder, left shoulder, right side of neck and left side of neck    Since patient first noticed back pain, pain is: gradually worsening  Does back pain interfere with his job:  Not applicable       Mental Health Follow-up:  Patient presents to follow-up on Anxiety.    Patient's anxiety since last visit has been:  Bad  The patient is having other symptoms associated with anxiety.  Any significant life events: financial concerns, housing concerns, grief or loss and health concerns  Patient is feeling anxious or having panic attacks.  Patient has no concerns about alcohol or drug use.    Reason for visit:  Oyt of pain meds and cough suppresants. need new inhalers    He eats 0-1 servings of fruits and vegetables daily.He consumes 1 sweetened beverage(s) daily.He exercises with enough effort to increase his heart rate 10 to 19 minutes per day.  He exercises with enough effort to increase his heart rate 3 or less days per week. He is missing 1 dose(s) of medications per week.         Review of Systems   Respiratory:  Positive for cough, shortness of breath and wheezing.    Musculoskeletal:  Positive for back pain.   Allergic/Immunologic: Positive for environmental allergies.   Psychiatric/Behavioral:  Positive for agitation, mood changes and suicidal ideas. The patient is nervous/anxious.             Objective    /84   Pulse 82   Temp 98.1  F (36.7  C)   Resp 18   Ht 1.829 m  (6')   Wt (!) 166.9 kg (368 lb)   SpO2 96%   BMI 49.91 kg/m    Body mass index is 49.91 kg/m .  Physical Exam  Constitutional:       Appearance: Normal appearance. He is obese.   HENT:      Head: Normocephalic and atraumatic.   Cardiovascular:      Rate and Rhythm: Normal rate and regular rhythm.      Heart sounds: Normal heart sounds.   Pulmonary:      Effort: Pulmonary effort is normal.      Breath sounds: Wheezing present.   Musculoskeletal:      Right lower leg: Edema present.      Left lower leg: Edema present.   Neurological:      General: No focal deficit present.      Mental Status: He is alert and oriented to person, place, and time. Mental status is at baseline.   Psychiatric:         Mood and Affect: Mood is anxious. Affect is angry.         Thought Content: Thought content includes homicidal and suicidal ideation. Thought content includes homicidal plan.       Note composed by SALTY Fields. History and exam performed and confirmed by me. Agree with assessment and plan.

## 2023-09-12 ENCOUNTER — VIRTUAL VISIT (OUTPATIENT)
Dept: FAMILY MEDICINE | Facility: CLINIC | Age: 43
End: 2023-09-12
Payer: MEDICARE

## 2023-09-12 DIAGNOSIS — M43.16 SPONDYLOLISTHESIS OF LUMBAR REGION: ICD-10-CM

## 2023-09-12 DIAGNOSIS — J45.40 MODERATE PERSISTENT ASTHMA, UNSPECIFIED WHETHER COMPLICATED: ICD-10-CM

## 2023-09-12 DIAGNOSIS — R10.11 RUQ ABDOMINAL PAIN: ICD-10-CM

## 2023-09-12 DIAGNOSIS — F31.9 BIPOLAR I DISORDER (H): ICD-10-CM

## 2023-09-12 DIAGNOSIS — M79.18 MYOFASCIAL PAIN SYNDROME: ICD-10-CM

## 2023-09-12 DIAGNOSIS — M48.062 SPINAL STENOSIS OF LUMBAR REGION WITH NEUROGENIC CLAUDICATION: ICD-10-CM

## 2023-09-12 DIAGNOSIS — E66.01 OBESITY, MORBID (H): ICD-10-CM

## 2023-09-12 DIAGNOSIS — F41.1 GAD (GENERALIZED ANXIETY DISORDER): ICD-10-CM

## 2023-09-12 DIAGNOSIS — G89.4 CHRONIC PAIN SYNDROME: ICD-10-CM

## 2023-09-12 DIAGNOSIS — I10 PRIMARY HYPERTENSION: Primary | ICD-10-CM

## 2023-09-12 DIAGNOSIS — M51.369 DEGENERATION OF INTERVERTEBRAL DISC OF LUMBAR REGION: ICD-10-CM

## 2023-09-12 PROCEDURE — 99214 OFFICE O/P EST MOD 30 MIN: CPT | Mod: 95 | Performed by: FAMILY MEDICINE

## 2023-09-12 PROCEDURE — 96127 BRIEF EMOTIONAL/BEHAV ASSMT: CPT | Mod: 95 | Performed by: FAMILY MEDICINE

## 2023-09-12 RX ORDER — OXYCODONE HYDROCHLORIDE 5 MG/1
5 TABLET ORAL 3 TIMES DAILY PRN
Qty: 45 TABLET | Refills: 0 | Status: CANCELLED | OUTPATIENT
Start: 2023-09-12

## 2023-09-12 RX ORDER — AMLODIPINE BESYLATE 5 MG/1
5 TABLET ORAL DAILY
Qty: 90 TABLET | Refills: 1 | Status: SHIPPED | OUTPATIENT
Start: 2023-09-12 | End: 2023-10-19

## 2023-09-12 NOTE — PATIENT INSTRUCTIONS
-Thank you for choosing the [facility].  -It was a pleasure to see you today.  -Please take a look at the information below for more specific details regarding the treatment plan and recommendations.  -In this after visit summary is a list of your medications and specific instructions.  Please review this carefully as there may be changes made to your medication list.  -If there are any particular questions or concerns, please feel free to reach out to Dr. Cavazos.  -If any labs have been completed, we will reach out to you about results.  If the results are normal or not concerning, a letter or MyChart message will be sent to you.  If any follow-up is needed, either Dr. Cavazos or the nurse will give you a call.  If you have not heard regarding results after 2 weeks, please reach out to the clinic.    Patient Instructions:    -Please make a lab only appointment to be seen at any [facility] facility to have a lab.  Further recommendations will be made once labs are completed.    -You were referred to the spine specialist and the pain clinic.  -If you do not hear from the specialist to schedule an appointment within a week's time from today, please call the [facility] and speak with the specialty  to help you schedule the appointment to see the specialist.  Depending on the specialist availability, it may be a number of weeks prior to your scheduled appointment.    Referral to spine surgeon:  Hector Maurice MD  [facility] [place] [address]  [phone] (Work)  [phone] (Fax)    -Take the medications as prescribed.  -Connect with your previous PCP to help manage the chronic narcotic medications. Dr. Cavazos does not manage chronic pain. A referral has been placed to the pain clinic for you.     -You were referred to psychiatry.  -If you do not hear from the specialist to schedule an appointment within 3 week's time from today, please call the [facility] and  speak with the specialty  to help you schedule the appointment to see the specialist.  Depending on the specialist availability, it may be a number of weeks prior to your scheduled appointment.      Please seek immediate medical attention (go to the emergency room or urgent care) for the following reasons: worsening symptoms, or any concerning changes.      --------------------------------------------------------------------------------------------------------------------    -We are always looking for ways to improve.  You may be selected to receive a survey regarding your visit today.  We encourage you to complete the survey and provide specific, constructive feedback to help us improve our processes.  Thank you for your time!  -Please review the contact information listed on the after visit summary and in the electronic chart.  Below is the phone number that we have on file.  If there are any changes that are needed to be made, please reach out to the clinic.  [phone] (xbzo)

## 2023-09-12 NOTE — PROGRESS NOTES
Telehealth Visit      Provider discussed the limitations of the telehealth visit and patient would like to proceed with the encounter.  Both patient and provider agree that a telehealth visit would be appropriate to address patient's concerns today.    [place] of [place]  Location of provider: [place], [place]    Time at start of telehealth visit: 5:09 PM  Time at start of telehealth visit: 5:32 PM  Time spent in direct cares for telehealth visit: 23 minutes      Assessment/Plan:     Patient Instructions:    -Please make a lab only appointment to be seen at any [facility] facility to have a lab.  Further recommendations will be made once labs are completed.    -You were referred to the spine specialist and the pain clinic.  -If you do not hear from the specialist to schedule an appointment within a week's time from today, please call the [facility] and speak with the specialty  to help you schedule the appointment to see the specialist.  Depending on the specialist availability, it may be a number of weeks prior to your scheduled appointment.    Referral to spine surgeon:  Hector Maurice MD  [address] [place] [address]  [phone] (Work)  [phone] (Fax)    -Take the medications as prescribed.  -Connect with your previous PCP to help manage the chronic narcotic medications. Dr. Cavazos does not manage chronic pain. A referral has been placed to the pain clinic for you.     -You were referred to psychiatry.  -If you do not hear from the specialist to schedule an appointment within 3 week's time from today, please call the [facility] and speak with the specialty  to help you schedule the appointment to see the specialist.  Depending on the specialist availability, it may be a number of weeks prior to your scheduled appointment.      Please seek immediate medical attention (go to the emergency room or urgent care) for the following reasons: worsening  symptoms, or any concerning changes.      Daron was seen today for referrals/continued care/healthcare and pain management.  Diagnoses and all orders for this visit:    Primary hypertension: stable. Refill and continue amlodipine as below.  Check labs.  -     amLODIPine (NORVASC) 5 MG tablet; Take 1 tablet (5 mg) by mouth daily  -     Basic metabolic panel; Future  -     CBC with Platelets & Differential; Future  -     CRP inflammation; Future  -     Erythrocyte sedimentation rate auto; Future  -     Hepatic function panel; Future  -     Lipase; Future  -     TSH with free T4 reflex; Future  -     Helicobacter pylori Antigen Stool; Future    Degeneration of intervertebral disc of lumbar region  Chronic pain syndrome  Myofascial pain syndrome  Spinal stenosis of lumbar region with neurogenic claudication  Spondylolisthesis of lumbar region  Obesity, morbid (H): Plan as above.  Patient will reach out to PCP to manage chronic narcotic medications. Referrals placed as below.   -     Pain Management  Referral; Future  -     Spine  Referral; Future  -     Adult Mental Health  Referral; Future    TYRELL (generalized anxiety disorder)  Bipolar I disorder (H): Not worsening, though continues to be significant.  Referral placed to psychiatry as below.  Check labs to rule out other potential etiologies.  -     Basic metabolic panel; Future  -     CBC with Platelets & Differential; Future  -     CRP inflammation; Future  -     Erythrocyte sedimentation rate auto; Future  -     Hepatic function panel; Future  -     Lipase; Future  -     TSH with free T4 reflex; Future  -     Helicobacter pylori Antigen Stool; Future  -     Adult Mental Health  Referral; Future    Moderate persistent asthma, unspecified whether complicated: Stable.  Refill as below.  -     fluticasone (ARNUITY ELLIPTA) 200 MCG/ACT inhaler; Inhale 1 puff into the lungs daily  -     Nebulizer and Supplies Order for DME - ONLY FOR  "DME    RUQ abdominal pain: Unclear etiology.  Intermittent.  Check labs as below.  Further management pending results.  -     Basic metabolic panel; Future  -     CBC with Platelets & Differential; Future  -     CRP inflammation; Future  -     Erythrocyte sedimentation rate auto; Future  -     Hepatic function panel; Future  -     Lipase; Future  -     TSH with free T4 reflex; Future  -     Helicobacter pylori Antigen Stool; Future        No follow-ups on file.    The diagnoses, treatment options, risk, benefits, and recommendations were reviewed with patient/guardian.  Questions were answered to patient's/guardian satisfaction.  Red flag signs were reviewed.  Patient/guardian is in agreement with above plan.      Subjective: 43 year old male with history of degeneration of intervertebral disc of lumbar region, chronic pain syndrome, myofascial pain syndrome, spinal stenosis of lumbar spine, spondylolisthesis of lumbar region, morbid obesity, moderate persistent asthma, bipolar 1 disorder, generalized anxiety disorder, obsessive-compulsive disorder who presents to clinic for the following complaints:   Patient presents with:  Referrals/continued Care/healthcare  Pain Management    He has a hard time travelling due to anxiety.  Patient reports that he is terrified of highways.  Now that [place] in the city, he can take small roads.  He seems to be okay.    Last year, he had two strokes one day (sedated MRI two years ago and has a permanent sounds fuzzy spot on the left eye; notes weakness in both legs, especially when the back is in really bad shape) and had a \"heart attack\" going from [place] to [place].     Patient has looking to see the back specialist. He used to have good flexibility before, though now has difficulty with this. He feels stuff ripping in his back when he bends down.  He was seen by a back surgeon before.  He would like to reconnect with that surgeon.  Referral was placed to the spine " surgeon.  Referral to spine surgeon:  Hector Maurice MD  [address] [place] [address]  [phone] (Work)  [phone] (Fax)    Patient reports that Money is obscenely tight now. Kids lost both their new and old shoes. This has been stressing him out.  Patient has been looking to get food stamps or something to help him get through. He has $100 to last him through the month and is not sure what he is going to do. His kids' mom is not in the picture.  The mom had let slip that mom had signed the kids up for a cash assistance program and she is getting $1000 a month. She has seen the kids for 4 hours over the last six months.  Patient reports that mother does not typically take care of the kids.    Breathing seems to be good now. The previous place had black mold and he was coughing himself to sleep.     He gets really bad shooting pain down right leg in particular. His back hurts all the time. He took a pain killer and anxiety medication and he feels a bit cloudy in the head, though the back is twisted really badly. They have a pool where he is now, though the pool is now closed. Ice water is helpful for his back.  We will reach out to his PCP to get refills of the oxycodone medication.  He would like a referral to the pain clinic.    He has been getting a sharp stabbing pain in the right chest. Happens once a day and goes away within less than a minute. It is jarring to say the least.     Around 4-5 PM, he suddenly gets really tired in the chair that he sits in all day. He just starts shaking and then goes to the bed to lay down and he passes out almost immediately. This is odd as with the anxiety disorder, he had to go through all these steps to get to sleep. The stabbing pain started once a month, though now seems like almost a daily thing. He isn't exerting himself frequently as he can't hardly move. He has the kids grab stuff for him if he needs it. He goes out of his way for bending as there  is a lot noises coming from his back that caused him to have pain. The pain he gets is in the lower chest/upper belly on the right side.    He is sure he can drive and is comfortable crossing over a bridge over a highway.  Driving on the highway is a different story.    Patient reports that he has a history of internal fissures.       Answers submitted by the patient for this visit:  TYRELL-7 (Submitted on 8/30/2023)  TYRELL 7 TOTAL SCORE: 16  Depression / Anxiety Questionnaire (Submitted on 8/30/2023)  Chief Complaint: Chronic problems general questions HPI Form  Depression/Anxiety: Anxiety  Asthma Visit Questionnaire (Submitted on 8/30/2023)  Chief Complaint: Chronic problems general questions HPI Form  Do you have a cough?: Yes  Are you experiencing any wheezing in your chest?: Yes  Do you have any shortness of breath?: Yes  Use of rescue inhaler:: 2-3 times per day  Taking Asthma medication as prescribed:: 2  Asthma triggers:: cold air, dust mites, strong odors and fumes  Have you had any Emergency Room visits, Urgent Care visits, or Hospital Admissions since your last office visit?: No  Anxiety only (Submitted on 8/30/2023)  Chief Complaint: Chronic problems general questions HPI Form  Anxiety since last: : bad  Other associated symotome: : Yes  Significant life event: : financial concerns, housing concerns, grief or loss, health concerns  Anxious:: Yes  Current substance use:: No      Anxiety: Patient has a history of anxiety.  He typically takes BuSpar and Vistaril.  Anxiety has been bad where he has started smoking again.  He states that he cannot drive on highways due to the anxiety.  The patient ended up moving away from [place] to the [facility] due to issues with his neighbors.    On review of the chart, it does not appear the patient has been prescribed SSRIs (at least through the healthcare system) in the past to try and treat for the anxiety.  It appears that patient had previously been seen by  mental health professionals.  On review of note from 03/03/2023, it appears that patient had stopped seeing his previous mental health professional and was having irritability and was emotionally labile in clinic, so patient was referred to psychiatry again.      Back Pain Visit Questionnaire (Submitted on 8/30/2023)  Your back pain is: chronic  Chronic or Recurring Back Pain Visit Questionnaire (Submitted on 8/30/2023)  Where is your back pain located? : right lower back, left lower back, right middle of back, left middle of back, right upper back, left upper back, right side of neck, left side of neck, right shoulder, left shoulder, right buttock, right hip, right side of waist, left side of waist  How would you describe your back pain? : gnawing, sharp, shooting  Where does your back pain spread? : right buttocks, left buttocks, right thigh, right knee, right foot, right shoulder, left shoulder, right side of neck, left side of neck  Since you noticed your back pain, how has it changed? : gradually worsening  Does your back pain interfere with your job?: Not applicable    Pain management: Last visit with primary clinic was on 8/30/2023.  At that appointment, it appears that patient suffers from chronic back pain.  It is reported that patient takes oxycodone every day it is the only way for him to [id number] with the pain and function in his stay.  He has seen multiple specialties in the past without relief. Patient has been prescribed oxycodone by his primary care clinic/team. Patient had been prescribed gabapentin in the past, though did not take it after a single prescription because he did not think the medication is doing anything. It does not appear the patient has tried nabumetone in the past.  He has received ketorolac injections before.    Moderate persistent asthma, inhaler: Patient has a history of moderate persistent asthma.  Had been prescribed fluticasone inhaler previously, though based on review  of the notes from 8/30/2023, patient would have intermittent use of it.      Medication refill:   -Hypertension: Takes amlodipine 5 mg once daily, losartan 100 mg once daily, metoprolol  mg once daily for blood pressure.  Most recent blood pressures 116/84 from 8/30/2023.      The 10 point review of system is negative except as stated in the HPI.    Allergies were reviewed and updated.    General Questionnaire (Submitted on 8/30/2023)  Chief Complaint: Chronic problems general questions HPI Form  What is the reason for your visit today? : oyt of pain meds and cough suppresants. need new inhalers  How many servings of fruits and vegetables do you eat daily?: 0-1  On average, how many sweetened beverages do you drink each day (Examples: soda, juice, sweet tea, etc.  Do NOT count diet or artificially sweetened beverages)?: 1  How many minutes a day do you exercise enough to make your heart beat faster?: 10 to 19  How many days a week do you exercise enough to make your heart beat faster?: 3 or less  How many days per week do you miss taking your medication?: 1    [facility] was reviewed.  No concerning patterns of controlled substance prescriptions were noted.  Prescriptions  Total: 21  Private Pay: 0  Showing 1-15 of 21 Items View 15 Items    1   of 2   Filled  Written  Sold  ID  Drug  QTY  Days  Prescriber  RX #  Dispenser  Refill  Daily Dose*  Pymt Type     08/30/2023 08/30/2023  1   Oxycodone Hcl (Ir) 5 Mg Tablet  29.00 9 Ku Hel [id number] Fam (1919) 0/0 24.17 [facility] [place]  08/30/2023 08/30/2023  1   Oxycodone Hcl (Ir) 5 Mg Tablet  16.00 6 Ku Hel [id number] Fam (1919) 0/0 20.00 [place]  08/16/2023 08/16/2023  1   Oxycodone Hcl (Ir) 5 Mg Tablet  6.00 2 Gr Gob [id number] Fam (1919) 0/0 22.50 [place]  08/16/2023 08/16/2023  1   Oxycodone Hcl (Ir) 5 Mg Tablet  39.00 13 Gr Gob [id number] Fam (1919) 0/0 22.50 [place]  07/31/2023 07/26/2023  1   Oxycodone Hcl (Ir) 5 Mg Tablet  45.00 15 Gr  Gob [id number] Fam (1919) 0/0 22.50 [place]  07/14/2023 07/11/2023  1   Oxycodone Hcl (Ir) 5 Mg Tablet  45.00 15 Gr Gob [id number] Fam (1919) 0/0 22.50 [place]  06/30/2023 06/22/2023  1   Oxycodone Hcl (Ir) 5 Mg Tablet  45.00 15 Gr Gob [id number] Fam (1919) 0/0 22.50 [place]  06/12/2023 06/12/2023  1   Oxycodone Hcl (Ir) 5 Mg Tablet  45.00 15 Gr Gob [id number] Fam (1919) 0/0 22.50 [place]  05/18/2023 05/18/2023  1   Oxycodone Hcl (Ir) 5 Mg Tablet  17.00 6 Gr Gob [id number] Fam (1919) 0/0 21.25 [place]  05/18/2023 05/18/2023  1   Oxycodone Hcl (Ir) 5 Mg Tablet  28.00 9 Gr Gob [id number] Fam (1919) 0/0 23.33 [place]  04/21/2023 04/18/2023  1   Oxycodone Hcl (Ir) 5 Mg Tablet  45.00 15 Gr Gob [id number] Fam (1919) 0/0 22.50 [place]  03/30/2023 03/29/2023  1   Oxycodone Hcl (Ir) 5 Mg Tablet  45.00 15 Gr Gob [id number] Fam (1919) 0/0 22.50 [place]  03/03/2023 03/03/2023  1   Oxycodone Hcl (Ir) 5 Mg Tablet  45.00 15 Ra Six [id number] Fam (1919) 0/0 22.50 [place]  02/09/2023 02/09/2023  1   Oxycodone Hcl (Ir) 5 Mg Tablet  45.00 15 Gr Gob [id number] Fam (1919) 0/0 22.50 [place]  01/11/2023 01/11/2023  1   Oxycodone Hcl (Ir) 5 Mg Tablet  45.00 15 Gr Gob [id number] Fam (1919) 0/0 22.50 [place]    Objective:   There were no vitals taken for this visit.  General: Active, alert, nontoxic-appearing.  No acute distress. Laying in bed with a cloth over his chest. He is not wearing a shirt.   HEENT: Normocephalic, atraumatic.  Pupils are equal and round.  Sclera is clear.  Normal external ears. Nares patent.  Moist mucous membranes.    Cardiac: normal skin tone.  Respiratory/chest: Speaks in full sentences.  Breathing is not labored.  No accessory muscle usage.  Integumentary: No concerning rash or skin changes appreciated.        Gold Cavazos MD  [facility] [place] [address]  Phone: [phone]  Fax: [phone]    9/15/2023  12:36  PM            Current Outpatient Medications   Medication    albuterol (PROAIR HFA/PROVENTIL HFA/VENTOLIN HFA) 108 (90 Base) MCG/ACT inhaler    albuterol (PROVENTIL) (2.5 MG/3ML) 0.083% neb solution    amLODIPine (NORVASC) 5 MG tablet    busPIRone (BUSPAR) 10 MG tablet    fluticasone (ARNUITY ELLIPTA) 200 MCG/ACT inhaler    hydrOXYzine (VISTARIL) 25 MG capsule    ketoconazole (NIZORAL) 2 % external shampoo    losartan (COZAAR) 100 MG tablet    metoprolol succinate ER (TOPROL XL) 100 MG 24 hr tablet    oxyCODONE (ROXICODONE) 5 MG tablet    pantoprazole (PROTONIX) 40 MG EC tablet     No current facility-administered medications for this visit.       Allergies   Allergen Reactions    Banana     Bee Venom     Latex        Patient Active Problem List    Diagnosis Date Noted    TYRELL (generalized anxiety disorder) 08/02/2023     Priority: Medium    Chronic pain syndrome 09/12/2022     Priority: Medium    Degeneration of intervertebral disc of lumbar region 03/23/2022     Priority: Medium    Myofascial pain syndrome 05/19/2021     Priority: Medium    Obesity, morbid (H) 05/19/2021     Priority: Medium    OCD (obsessive compulsive disorder) 01/21/2021     Priority: Medium    Spondylolisthesis of lumbar region 01/22/2020     Priority: Medium    Spinal stenosis of lumbar region 01/22/2020     Priority: Medium    Asthma, moderate persistent 05/02/2018     Priority: Medium    Cerebellar tonsillar ectopia (H) 04/10/2018     Priority: Medium    Bipolar I disorder (H) 03/03/2005     Priority: Medium       Family History   Problem Relation Age of Onset    Diabetes Type 2  Mother     Heart Disease Father        Past Surgical History:   Procedure Laterality Date    ARTHROSCOPY KNEE Right     no date given    C CORTISONE INJECTION  06/08/2016    COLONOSCOPY  08/04/2015    Internal/external hemorrhoids.  Repeat at age 50    ESOPHAGOGASTRODUODENOSCOPY W/ BANDING  08/04/2015    EYE SURGERY      x 3 no dates given    HEMORRHOIDECTOMY   09/08/2015    PSYCH SVC, INTENSIVE OUTPT  2002        Social History     Socioeconomic History    Marital status: Single     Spouse name: Not on file    Number of children: Not on file    Years of education: Not on file    Highest education level: Not on file   Occupational History    Not on file   Tobacco Use    Smoking status: Every Day     Packs/day: 0.50     Types: Cigarettes     Passive exposure: Current    Smokeless tobacco: Never   Vaping Use    Vaping Use: Never used   Substance and Sexual Activity    Alcohol use: Not Currently     Comment: rarely    Drug use: Yes     Types: Oxycodone     Comment: takes oxycodone for pain mgt    Sexual activity: Not on file   Other Topics Concern    Not on file   Social History Narrative    Not on file     Social Determinants of Health     Financial Resource Strain: Not on file   Food Insecurity: Not on file   Transportation Needs: Not on file   Physical Activity: Not on file   Stress: Not on file   Social Connections: Not on file   Intimate Partner Violence: Not on file   Housing Stability: Not on file

## 2023-09-15 PROBLEM — I10 PRIMARY HYPERTENSION: Status: ACTIVE | Noted: 2023-09-15

## 2023-09-18 ENCOUNTER — PATIENT OUTREACH (OUTPATIENT)
Dept: CARE COORDINATION | Facility: CLINIC | Age: 43
End: 2023-09-18
Payer: MEDICAID

## 2023-09-18 ENCOUNTER — TELEPHONE (OUTPATIENT)
Dept: FAMILY MEDICINE | Facility: CLINIC | Age: 43
End: 2023-09-18
Payer: MEDICAID

## 2023-09-18 DIAGNOSIS — M51.369 DEGENERATION OF INTERVERTEBRAL DISC OF LUMBAR REGION: ICD-10-CM

## 2023-09-18 NOTE — LETTER
[facility] CARE COORDINATION  [address] [facility] [address]   September 18, 2023        Daron Bond  [address]  [facility] [address]          Dear Daron,     Attached is an updated Patient Centered Plan of Care for your continued enrollment in Care Coordination. Please let us know if you have additional questions, concerns, or goals that we can assist with.    Sincerely,    Idalmis Wells, [facility] Clinical Care Coordinator  [facility], [place], [place], [place], [facility], and [facility]  Patient Centered Plan of Care  About Me:        Patient Name:  Daron Bond    YOB: 1980  Age:         43 year old   [place] MRN:    [id number] Telephone Information:  Home Phone [phone]   Mobile [phone]   Home Phone Not on file.       Address:  [address]  [facility] [address] Email address:  [email or web address]      Emergency Contact(s)    Name Relationship Lgl Grd Work Phone Home Phone Mobile Phone           Primary language:  English     needed? No   [employer]:  [phone] op. 1  Other communication barriers:None    Preferred Method of Communication:     Current living arrangement: I live alone    Mobility Status/ Medical Equipment: Independent        Health Maintenance  Health Maintenance Reviewed: Due/Overdue   Health Maintenance Due   Topic Date Due    URINE DRUG SCREEN  Never done    ASTHMA ACTION PLAN  Never done    COVID-19 Vaccine (1) Never done    Pneumococcal Vaccine: Pediatrics (0 to 5 Years) and At-Risk Patients (6 to 64 Years) (1 - PCV) Never done    MEDICARE ANNUAL WELLNESS VISIT  Never done    BMP  08/15/2023    INFLUENZA VACCINE (1) 09/01/2023          My Access Plan  Medical Emergency 911   Primary Clinic Line [facility] - [phone]   24 Hour Appointment Line [phone] or  [phone])  (toll-free)   24 Hour Nurse Line [phone] (toll-free)   Preferred Urgent Care Other     Preferred Hospital Other     Preferred Pharmacy [id number] [place] [address] [address]  AT [facility] [address]     Behavioral Health Crisis Line The National Suicide Prevention Lifeline at 1-950.494.3369 or Text/Call 988             My Care Team Members  Patient Care Team         Relationship Specialty Notifications Start End    Mata Hearn MD PCP - General Family Practice  12/1/20     Phone: [phone] Fax: [phone]         [address] [facility] [address]    Mata Hearn MD Assigned PCP   2/27/22     Phone: [phone] Fax: [phone]         [address] [facility] [address]    Mata Hearn MD Assigned Pain Medication Provider   1/9/23     Phone: [phone] Fax: [phone]         [address] [facility] [address]    Idalmis Wells LICSW Lead Care Coordinator  Admissions 5/22/23     Sujatha Urena Community Health Worker  Admissions 5/23/23     [phone]              My Care Plans  Self Management and Treatment Plan  Care Plan  Care Plan: General       Problem: HP GENERAL PROBLEM       Goal: Housing       Start Date: 5/22/2023 Expected End Date: 8/1/2023    This Visit's Progress: 50% Recent Progress: 20%    Priority: High    Note:     Barriers: Access  Strengths: Motivated  Patient expressed understanding of goal: Yes    Action steps to achieve this goal:  1. I will look into new housing options. I have called a couple locations. I have an apartment that that I am waiting to get information from that it is ready for inspection. I hope this confirmation will come this week and we can get the inspection completed soon.  2. I will work with care coordination team as needed. Continuous (MB)   3. I will work with care coordination on other resource needs as identified. Continuous (MB)                            Care Plan: General       Problem: HP GENERAL PROBLEM        Goal: I want to find resources for assistance       Start Date: 6/28/2023    This Visit's Progress: 10%    Note:     Barriers:   Strengths: Motivated  Patient expressed understanding of goal: Yes  Action steps to achieve this goal:  1. I will look into [facility] services @ https://GreenNote.wavecatch/new/L4n5Cj . I have not looked into this yet. [facility] is resending me the link.  2. I will look into legal resources for problems with my current housing. I have not looked into this yet. [facility] is resending me the links.  Legal: [facility] - free legal zoom meeting the third Wednesday of every month, 6-7:30pm   [email or web address]   Meeting ID: [phone]   Password: [id number]     [email or web address]://www.[email or web address] Online.[email or web address]/ - free      Tenant Resource Center - https://www.tenantresourcecenter.org/ (help with repairs, problems during tenancy, safety)     3. I will look into care repair resources:  https://operationhelpstcroix.org/ttrpdfvx-gw-zeciluk/   https://west[facility].org/services/jumpstart/     4. I will let my CHW Sujatha @ [phone] know if I have any questions about these resources. Continuous (MB)                                   My Medical and Care Information  Problem List   Patient Active Problem List   Diagnosis    Cerebellar tonsillar ectopia (H)    Asthma, moderate persistent    Degeneration of intervertebral disc of lumbar region    OCD (obsessive compulsive disorder)    Myofascial pain syndrome    Obesity, morbid (H)    Chronic pain syndrome    Bipolar I disorder (H)    Spondylolisthesis of lumbar region    Spinal stenosis of lumbar region    TYRELL (generalized anxiety disorder)    Primary hypertension      Current Medications and Allergies:    Current Outpatient Medications   Medication    albuterol (PROAIR HFA/PROVENTIL HFA/VENTOLIN HFA) 108 (90 Base) MCG/ACT inhaler    albuterol (PROVENTIL) (2.5 MG/3ML) 0.083% neb solution    amLODIPine (NORVASC) 5  MG tablet    busPIRone (BUSPAR) 10 MG tablet    fluticasone (ARNUITY ELLIPTA) 200 MCG/ACT inhaler    hydrOXYzine (VISTARIL) 25 MG capsule    ketoconazole (NIZORAL) 2 % external shampoo    losartan (COZAAR) 100 MG tablet    metoprolol succinate ER (TOPROL XL) 100 MG 24 hr tablet    oxyCODONE (ROXICODONE) 5 MG tablet    pantoprazole (PROTONIX) 40 MG EC tablet     No current facility-administered medications for this visit.       Allergies   Allergen Reactions    Banana     Bee Venom     Latex         Care Coordination Start Date: 5/18/2023   Frequency of Care Coordination: monthly     Form Last Updated: 09/18/2023

## 2023-09-18 NOTE — TELEPHONE ENCOUNTER
Medication Question or Refill    Contacts         Type Contact Phone/Fax    09/18/2023 06:41 PM CDT Phone (Incoming) Daron Bond (Self) [phone] (M)            What medication are you calling about (include dose and sig)?:     oxyCODONE (ROXICODONE) 5 MG tablet       Preferred Pharmacy:   [facility] #[id number] [address] & [address] [address]  Phone: [phone] Fax: [phone]      Controlled Substance Agreement on file:   CSA -- Patient Level:     [Media Unavailable] Controlled Substance Agreement - Opioid - Scan on 6/23/2023  8:42 AM   [Media Unavailable] Controlled Substance Agreement - Opioid - Scan on 5/27/2022  7:39 PM       Who prescribed the medication?: PCP     Do you need a refill? Yes    When did you use the medication last? 9/18/2023      Patient offered an appointment? Yes: Virtual Visit for 9/26     Do you have any questions or concerns?  No      Could we send this information to you in [facility] or would you prefer to receive a phone call?:   Patient would prefer a phone call   Okay to leave a detailed message?: Yes at Cell number on file:    Telephone Information:   Mobile [phone]

## 2023-09-18 NOTE — PROGRESS NOTES
Clinic Care Coordination Contact  Care Coordination Clinician Chart Review    Situation: Patient chart reviewed by Care Coordinator.       Background: Care Coordination Program started: 5/18/2023. Initial assessment completed and patient-centered care plan(s) were developed with participation from patient. Lead CC handed patient off to CHW for continued outreaches.       Assessment: Per chart review, patient outreach completed by CC CHW on 8/21/23.  Patient is actively working to accomplish goal(s). Patient's goal(s) appropriate and relevant at this time. Patient is due for updated Plan of Care.  Assessments will be completed annually or as needed/with change of patient status.      Care Plan: General       Problem: HP GENERAL PROBLEM       Goal: Housing       Start Date: 5/22/2023 Expected End Date: 8/1/2023    This Visit's Progress: 50% Recent Progress: 20%    Priority: High    Note:     Barriers: Access  Strengths: Motivated  Patient expressed understanding of goal: Yes    Action steps to achieve this goal:  1. I will look into new housing options. I have called a couple locations. I have an apartment that that I am waiting to get information from that it is ready for inspection. I hope this confirmation will come this week and we can get the inspection completed soon.  2. I will work with care coordination team as needed. Continuous (MB)   3. I will work with care coordination on other resource needs as identified. Continuous (MB)                            Care Plan: General       Problem: HP GENERAL PROBLEM       Goal: I want to find resources for assistance       Start Date: 6/28/2023    This Visit's Progress: 10%    Note:     Barriers:   Strengths: Motivated  Patient expressed understanding of goal: Yes  Action steps to achieve this goal:  1. I will look into [employer] services @ https://Arctrieval.com/new/L4n5Cj . I have not looked into this yet. [facility] is resending me the link.  2. I will look into legal resources  for problems with my current housing. I have not looked into this yet. [facility] is resending me the links.  Legal: [facility] - free legal zoom meeting the third Wednesday of every month, 6-7:30pm   [email or web address]   Meeting ID: [phone]   Password: [phone]     [email or web address]://www.judMission [email or web address] [email or web address]/ - free      Tenant Resource Center - https://www.tenantresourcecenter.org/ (help with repairs, problems during tenancy, safety)     3. I will look into care repair resources:  https://operationhelpstcroix.org/oyqzxwhp-ps-ffcpmzz/   https://Meme Apps.org/services/jumpstart/     4. I will let my CHW Sujatha @ [phone] know if I have any questions about these resources. Continuous (MB)                                 Plan/Recommendations: The patient will continue working with Care Coordination to achieve goal(s) as above. CHW will continue outreaches at minimum every 30 days and will involve Lead CC as needed or if patient is ready to move to Maintenance. Lead CC will continue to monitor CHW outreaches and patient's progress to goal(s) every 6 weeks.     Plan of Care updated and sent to patient: Yes, via MobileRQ

## 2023-09-19 RX ORDER — OXYCODONE HYDROCHLORIDE 5 MG/1
5 TABLET ORAL 3 TIMES DAILY PRN
Qty: 45 TABLET | Refills: 0 | Status: SHIPPED | OUTPATIENT
Start: 2023-09-19 | End: 2023-10-19

## 2023-09-22 ENCOUNTER — PATIENT OUTREACH (OUTPATIENT)
Dept: CARE COORDINATION | Facility: CLINIC | Age: 43
End: 2023-09-22
Payer: MEDICAID

## 2023-09-22 NOTE — PROGRESS NOTES
Clinic Care Coordination Contact  Program: Health insurance and county benefit  County: [facility] Case #:  [facility] Worker:   Sai #:   Subscriber #:   Renewal:  Date Applied:     KVNG Outreach:   23 - FRW and patient called [facility]. They do not see an application that was submitted. FRW assisted with SNAP application. Patient will need to call [facility] and cancel MA before we can apply for MA with [place]. Patient seems hesitant at this time to cancel health insurance plan with [place] at this time. FRW will call patient and explain process again next week.  Milagro Heck  Financial Resource Worker  [facility]  Clinic Care Coordination  [phone]      Health Insurance:      Referral/Screening:  SNAP/CASH Application Screenin. Have you had [facility] benefits before? No   2. How many people in the household, do you eat/buy food together?  3  3. What is your monthly income (include all tax members)? Social security disability - $1256. $539  4. Do you have a bank account? Yes  5. Do you have any utility bills (electricity, rent, mortgage, phone, insurance, medical bills, etc.)? Rent - $580  6. Do you have social security cards and/or green cards?     FRW Screening      Row Name [phone]       [facility] Benefits   Is patient requesting help applying for [facility] benefits? Yes       Have you recently applied for any [facility] benefits? Yes       What was applied for? SNAP       Application date: The week of        How many people in your household? 3       Do you buy/eat food together? Yes       What is the monthly gross income for the household (wages, social security, workers comp, and pension)? 2200       Insurance:   [place]? No       Is this an insurance renewal? No       Is this a new insurance application request? Yes       Have you recently applied for insurance? Yes       What date did you apply for insurance? The week of 23       How many  people in your household? 3       Do you file taxes? Yes       How many dependents do you claim? 2       What is the monthly gross income for the household (wages, social security, workers comp, and pension)? 2200       OTHER   Is this a judy care application? No       Any other information for the FRW? Patient recently moved to [place] from [place]. Patient tried going on [facility] to apply for MA and SNAP. Was not able to back to check on application. Would like assistance with MA and SNAP application.

## 2023-09-22 NOTE — PROGRESS NOTES
Clinic Care Coordination Contact  Community Health Worker Follow Up    Care Gaps:     Health Maintenance Due   Topic Date Due    URINE DRUG SCREEN  Never done    ASTHMA ACTION PLAN  Never done    COVID-19 Vaccine (1) Never done    Pneumococcal Vaccine: Pediatrics (0 to 5 Years) and At-Risk Patients (6 to 64 Years) (1 - PCV) Never done    MEDICARE ANNUAL WELLNESS VISIT  Never done    BMP  08/15/2023    INFLUENZA VACCINE (1) 09/01/2023       Postponed to next CHW follow up     Care Plan:   Care Plan: General       Problem: HP GENERAL PROBLEM       Goal: I have accomplished finding new housing in [place].  Completed 9/22/2023      Start Date: 5/22/2023 Expected End Date: 8/1/2023    This Visit's Progress: 100% Recent Progress: 50%    Priority: High    Note:     Personal Plan  If I am needing to look into new housing options I can look into [employer]Ecoc[employer].                            Care Plan: General       Problem: HP GENERAL PROBLEM       Goal: I want to find resources for assistance       Start Date: 6/28/2023    This Visit's Progress: 20% Recent Progress: 10%    Note:     Barriers:   Strengths: Motivated  Patient expressed understanding of goal: Yes  Action steps to achieve this goal:  1. I will look into ARHS services @ https://LifeLock.Weeks Communications/new/L4n5Cj . I have not looked into this yet. [facility] is resending me the link.  2. I will look into legal resources for problems with my current housing. I have not looked into this yet. [facility] is resending me the links.  Legal: [facility] - free legal zoom meeting the third Wednesday of every month, 6-7:30pm   [email or web address]   Meeting ID: [phone]   Password: [id number]     [email or web address]/ - free      Tenant Resource Center - https://www.tenantresourcecenter.org/ (help with repairs, problems during tenancy, safety)     3. I will look into care repair  resources:  https://operationhelpstcroix.org/niknajmv-er-wctnlol/   https://west[facility].org/services/jumpstart/     4. I will look into Food shelf resources my CHW sends me through WUT.    5. I will talk with [facility] FLOWER Raygoza on 10/4/23 to look into resources in [place] for clothing, furniture, pet resources and any other resources to assist.     4. I will let my CHW Sujatha @ [phone] know if I have any questions about these resources. Continuous (MB)                            Care Plan: Financial Wellbeing       Problem: Patient expresses financial resource strain       Goal: Medical Assistance and SNAP       Start Date: 9/22/2023    Priority: High    Note:     Goal Statement: I will apply for health insurance within the next 1-2 weeks if I am eligible.     Strengths:  Motivated  Barriers: Overwhelmed  Patient expressed understanding of goal: Yes  Action steps to achieve this goal:  I understand a referral was placed to the Financial Resource Worker to look into Medical Assistance and SNAP, I will receive a call within the next 3 business days.    I understand the financial worker will make two attempts to call me. If I still need help with this goal, I will connect with my Community Health Worker Sujatha Urena at [phone].                                     Intervention and Education during outreach: CHW spoke with patient today. Patient recently moved from [place] to [place]. Patient is in need of resources for:    Food: (CHW sent in FRW referral to apply for SNAP), sent food shelf resources through [employer]    Community [employer] to for help with clothing, furniture, pet food supplies and food shelf resources- CHW scheduled appointment for patient to speak with [facility] Raygoza on 11/4/23.    Patient is also needing to switch Health insurance to [place] from [place]. ( CHW completed FRW referral to assist with applying for MA and SNAP.)    Patient also plans on switching PCP's from [place] to [place] once he is approved for MA in  [place].    CHW Plan: CHW will follow up with patient in about 1 month.    Financial Resource Worker Screening    County Benefits  Is patient requesting help applying for county benefits?: Yes  Have you recently applied for any county benefits?: Yes  What was applied for? : SNAP  Application date:: The week of September 11th  How many people in your household?: 3  Do you buy/eat food together?: Yes  What is the monthly gross income for the household (wages, social security, workers comp, and pension)? : 2200    Insurance:  Was [place] insurance?: No  Is this an insurance renewal?: No  Is this a new insurance application request?: Yes  Have you recently applied for insurance?: Yes  What date did you apply for insurance?: The week of 9/11/23  How many people in your household? : 3  Do you file taxes?: Yes  How many dependents do you claim?: 2  What is the monthly gross income for the household (wages, social security, workers comp, and pension)? : 2200    Any other information for the FRW?: Patient recently moved to [place] from [place]. [facility] to [facility] for [facility] and SNAP. Was not able to back to check on application. Would like assistance with MA and SNAP application.    Care Coordination team will tell patient:   Thank you for answering all the questions, based on screening questions, our Financial Resource Worker will reach out to you with additional questions and next steps.    Sujatha Urena  Community Health Worker  [facility] Care Coordination   [place], [facility], [place], [place], [facility]  Office: [phone]

## 2023-09-26 ENCOUNTER — VIRTUAL VISIT (OUTPATIENT)
Dept: FAMILY MEDICINE | Facility: CLINIC | Age: 43
End: 2023-09-26
Payer: MEDICARE

## 2023-09-26 DIAGNOSIS — G89.4 CHRONIC PAIN SYNDROME: Primary | ICD-10-CM

## 2023-09-26 DIAGNOSIS — Q04.8 CEREBELLAR TONSILLAR ECTOPIA (H): ICD-10-CM

## 2023-09-26 DIAGNOSIS — E66.01 OBESITY, MORBID (H): ICD-10-CM

## 2023-09-26 PROCEDURE — 99213 OFFICE O/P EST LOW 20 MIN: CPT | Mod: VID | Performed by: FAMILY MEDICINE

## 2023-09-26 NOTE — PROGRESS NOTES
Daron is a 43 year old who is being evaluated via a billable video visit.      How would you like to obtain your AVS? MyChart  If the video visit is dropped, the invitation should be resent by: Text to cell phone: [phone]  Will anyone else be joining your video visit? No          Assessment & Plan     Chronic pain syndrome  Currently controlled.  Given his recent move I have advised establishing care near to his home is much easier for him.    Obesity, morbid (H)  We will make referral to nutrition for weight loss counseling    Cerebellar tonsillar ectopia (H)  Neurology consultation ordered as well as MRI of the brain to be done when he has his MRI of his back.  - Adult Neurology  Referral; Future  - MR Brain w/o Contrast; Future             BMI:   Estimated body mass index is 49.91 kg/m  as calculated from the following:    Height as of 8/30/23: 1.829 m (6').    Weight as of 8/30/23: 166.9 kg (368 lb).           Mata Hearn MD  [facility]   Daron is a 43 year old, presenting for the following health issues:  Pain Management (Verbal consent given for video visit.  Follow up pain mgt for chronic back pain.  Would like referral for brain MRI before seeing neurology.  Also referral for wt loss)            Roomed by  Fariba JOSUE CMA 09/26/2023 2160    Accompanied by (enter name and relationship to patient)  Self    Forms to be completed (e.g forms for , school, sports, etc)  No     services provided?  No    Patient reports taking the following new medications  --          HPI     Medication Followup of pain management  Taking Medication as prescribed: yes  Side Effects:  None  Medication Helping Symptoms:  yes    Patient recently moved into new housing in [place], [place].  He is also wanting an order for brain MRI so that he can discuss further with a neurologist, no appointment has been set up yet.    Patient is also asking for another  referral for weight loss to see someone in [place] as he has moved.      Review of Systems         Objective           Vitals:  No vitals were obtained today due to virtual visit.    Physical Exam   GENERAL: Healthy, alert and no distress  EYES: Eyes grossly normal to inspection.  No discharge or erythema, or obvious scleral/conjunctival abnormalities.  RESP: No audible wheeze, cough, or visible cyanosis.  No visible retractions or increased work of breathing.    SKIN: Visible skin clear. No significant rash, abnormal pigmentation or lesions.  NEURO: Cranial nerves grossly intact.  Mentation and speech appropriate for age.  PSYCH: Mentation appears normal, affect normal/bright, judgement and insight intact, normal speech and appearance well-groomed.                Video-Visit Details    Type of service:  Video Visit   Video Start Time: 5:06 PM  Video End Time:5:22 PM    Originating Location (pt. Location): Home    Distant Location (provider location):  On-site  Platform used for Video Visit: Mery

## 2023-09-27 ENCOUNTER — PATIENT OUTREACH (OUTPATIENT)
Dept: CARE COORDINATION | Facility: CLINIC | Age: 43
End: 2023-09-27
Payer: MEDICAID

## 2023-09-27 NOTE — PROGRESS NOTES
Clinic Care Coordination Contact  Program: Health insurance and county benefit  County: [facility] Case #:  [facility] Worker:   Sai #:   Subscriber #:   Renewal:  Date Applied:     KVNG Outreach:   23 - FRW spoke with patient. Patient does not want a lapse in his health insurance coverage. Patient wants to continue to stay on [place]. FRW will follow up within 30 days.  Milagro Heck  Financial Resource Worker  [facility]  Clinic Care Coordination  [phone]    23 - FRW and patient called [facility]. They do not see an application that was submitted. FRW assisted with SNAP application. Patient will need to call [facility] and cancel MA before we can apply for [place] with [place]. Patient seems hesitant at this time to cancel health insurance plan with [place] at this time. FRW will call patient and explain process again next week.  Milagro Heck  Financial Resource Worker  [facility]  Clinic Care Coordination  [phone]      Health Insurance:      Referral/Screening:  SNAP/CASH Application Screenin. Have you had [facility] benefits before? No   2. How many people in the household, do you eat/buy food together?  3  3. What is your monthly income (include all tax members)? Social security disability - $1256. $539  4. Do you have a bank account? Yes  5. Do you have any utility bills (electricity, rent, mortgage, phone, insurance, medical bills, etc.)? Rent - $580  6. Do you have social security cards and/or green cards?     FRW Screening      Row Name 23 1238       County Benefits   Is patient requesting help applying for [facility] benefits? Yes       Have you recently applied for any [facility] benefits? Yes       What was applied for? SNAP       Application date: The week of        How many people in your household? 3       Do you buy/eat food together? Yes       What is the monthly gross income for the household (wages, social security, workers comp, and pension)? 2200        Insurance:   [place] insurance? No       Is this an insurance renewal? No       Is this a new insurance application request? Yes       Have you recently applied for insurance? Yes       What date did you apply for insurance? The week of 9/11/23       How many people in your household? 3       Do you file taxes? Yes       How many dependents do you claim? 2       What is the monthly gross income for the household (wages, social security, workers comp, and pension)? 2200       OTHER   Is this a judy care application? No       Any other information for the FRW? Patient recently moved to [place] from [place]. [facility] to [facility] for [facility] and SNAP. Was not able to back to check on application. Would like assistance with MA and SNAP application.

## 2023-10-03 ENCOUNTER — TELEPHONE (OUTPATIENT)
Dept: FAMILY MEDICINE | Facility: CLINIC | Age: 43
End: 2023-10-03
Payer: MEDICAID

## 2023-10-03 NOTE — TELEPHONE ENCOUNTER
Patient Call  Reason for call:  Patient is calling because order that was sent over for a brain MRI didn't include any verbiage with regard to anesthesia; patient would like to have this MRI done at the same time as his other MRI which is being done under anesthesia, however neither one can be done unless they both indicate that they are to be done under general anesthesia.    Patient has an appointment for aforementioned MRI on 11/16/2023 @ [facility]    Additionally, he was also wondering if an abdominal MRI could be added on to this, as he may need to have weight loss surgery prior to having back surgery and he'd like to get all of this imaging done at the same time. He mentioned that he had recently experienced some internal bleeding.    Information relayed to patient:  That this information would be passed along to his care team.    Could we send this information to you in [facility] or would you prefer to receive a phone call?:   Patient would prefer a phone call     Okay to leave a detailed message?: Yes at Cell number on file:    Telephone Information:   Mobile [phone]      Reviewed self and infant care w / mom, she verbalizes understanding of instructions reviewed. Encourage to follow up w/ MDs as directed and w/ questions/concerns.  C/o abd cramping, took 1200 mg of motrin to get pain down to a 2, already has a call into OB risk factors

## 2023-10-04 ENCOUNTER — PATIENT OUTREACH (OUTPATIENT)
Dept: CARE COORDINATION | Facility: CLINIC | Age: 43
End: 2023-10-04

## 2023-10-04 NOTE — PROGRESS NOTES
Clinic Care Coordination Contact    Situation: Patient chart reviewed by care coordinator.    Background: Appointment set for 10/4 at 11:00am. SWCC reached out to Pt and they were not able to speak; asked for a call back another day.     Assessment: SWCC reached out to Pt for appointment. Pt noted they forgot and were not able to speak now or set up a new appointment.     Plan/Recommendations: SWCC reached out to [facility] and asked for a new appointment to be set if need be at next [facility] outreach. [facility] sent list of food resources to Pt on 9/22/23.

## 2023-10-18 ENCOUNTER — PATIENT OUTREACH (OUTPATIENT)
Dept: CARE COORDINATION | Facility: CLINIC | Age: 43
End: 2023-10-18
Payer: MEDICAID

## 2023-10-18 NOTE — Clinical Note
Completed outreach to Pt and provided additional resources and support. Moved your next outreach to reflect my call today.

## 2023-10-18 NOTE — PROGRESS NOTES
Clinic Care Coordination Contact  Follow Up Progress Note      Assessment: CC reached out to Pt based on message from FRW.     Care Gaps:    Health Maintenance Due   Topic Date Due    URINE DRUG SCREEN  Never done    ASTHMA ACTION PLAN  Never done    COVID-19 Vaccine (1) Never done    Pneumococcal Vaccine: Pediatrics (0 to 5 Years) and At-Risk Patients (6 to 64 Years) (1 - PCV) Never done    MEDICARE ANNUAL WELLNESS VISIT  Never done    BMP  08/15/2023    INFLUENZA VACCINE (1) 09/01/2023       Postponed to next outreach     Care Plans  Care Plan: General       Problem: HP GENERAL PROBLEM                   Care Plan: General       Problem: HP GENERAL PROBLEM       Goal: I want to find resources for assistance       Start Date: 6/28/2023    This Visit's Progress: 20% Recent Progress: 10%    Note:     Barriers:   Strengths: Motivated  Patient expressed understanding of goal: Yes  Action steps to achieve this goal:  1. I will look into [employer] services @ https://Amadix/new/L4n5Cj . I have not looked into this yet. [facility] is resending me the link.  2. I will look into legal resources for problems with my current housing. I have not looked into this yet. [facility] is resending me the links.  Legal: [facility] Legal Clinic - free legal zoom meeting the third Wednesday of every month, 6-7:30pm   [email or web address]   Meeting ID: [phone]   Password: [id number]     https://www.[employer] Centerre.org/ - free      Tenant Resource Center - https://www.tenantresourcecenter.org/ (help with repairs, problems during tenancy, safety)     3. I will look into care repair resources:  https://operationhelpstcroix.org/ubkuxnzf-zc-jhlduww/   https://westcap.org/services/jumpstart/     4. I will look into Food shelf resources my CHW sends me through [employer].    5. I will talk with [facility] FLOWER Raygoza on 10/4/23 to look into resources in [place] for clothing, furniture, pet resources and any  other resources to assist.     4. I will let my CHW Sujatha @ [phone] know if I have any questions about these resources. Continuous (MB)                            Care Plan: Financial Wellbeing       Problem: Patient expresses financial resource strain       Goal: Medical Assistance and SNAP       Start Date: 9/22/2023    Priority: High    Note:     Goal Statement: I will apply for health insurance within the next 1-2 weeks if I am eligible.     Strengths:  Motivated  Barriers: Overwhelmed  Patient expressed understanding of goal: Yes  Action steps to achieve this goal:  I understand a referral was placed to the Financial Resource Worker to look into Medical Assistance and SNAP, I will receive a call within the next 3 business days.    I understand the financial worker will make two attempts to call me. If I still need help with this goal, I will connect with my Community Health Worker Sujatha Urena at [phone].                                     Intervention/Education provided during outreach:     [facility]:   Here is information on ARM - [place]://[place].gov/dhs/people-we-serve/people-with-disabilities/health-care/adult-mental-health/programs-services/armhs.jsp  A referral was made to [facility] [facility] - https://www.mcTEL.YogaTrail/  [facility] can help in person with   [address], [address]  [place] [address] [phone]    Food Support:   Help at your door - https://helpatyourdoor.org/services/#grocery  You can get grocery delivery with Help at your Door using your SNAP benefit.     Good in the Maria - https://www.goodinthehood.org/our-programs/food-programs/foodshelf-in-a-box/  Food Shelf In-A-Box at [employer]  [place]  [address] [place] [address]  Delivery offered. Contact Madeline Deleon: [phone]  3rd Monday of the month from 3:00 p.m. - 5:00 p.m.  Rent Help/Energy Help:     The 30 Day Foundation - https://www.fxu22-vbdarzasaaqjvr.org/  The 30 Day  Foundation can help with rent 1x a year as well as energy bills. I would suggest applying with them ASAP.   To request assistance, email is at emelyn@sva83-hnlktfqvngadmk.org  In this email, explain your request and how we can assist you. This first email will start the communication needed to help you. Please note: We are only able to assist you if funds are available at the time of your request. We will keep you urgently updated about the status of your request.     [facility] -  https://capr.org/services/energy-food-housing/  This program offers energy assistance and food support. I would suggest connecting with them. For more information, call today at [phone].    Clothing/Furniture:   [employer] - https://www.iWelcome.org/  [address] [facility] [place] [address]   - $2.00 clothing only   - 1/2 off all linens   - $2.00 clothing, shoes, purses   - Senior Day - 10% off purchases for 55+   - 1/2 off children's clothes size  to 6x and toys  50% off everything in the store the last consecutive Thursday, Friday, Saturday of the month.              Plan:   [facility] provided pt with resources and supports via email. Writer communicated the risks of unencrypted electronic communication and the patient and/or patient representative has agreed to accept the risks and receive unencrypted communication related to the information or resources we have discussed. We reviewed that no PHI will be included.  Email address verified with the patient. [facility] completed [facility] referral for Pt.     CHW Care Coordinator will follow up in about 2 weeks. [facility] will complete chart review in about 2 weeks.

## 2023-10-18 NOTE — PROGRESS NOTES
Clinic Care Coordination Contact  Program: [facility] benefit - SNAP and Energy   County: [facility] Case #:  [facility] Worker:   Mnsrafi #:   Subscriber #:   Renewal:  Date Applied:     FRW Outreach:   10/18/23 - Approved for SNAP. FRW assisted with Energy Assistance application.   Milagro Maria R  Financial Resource Worker  [facility] Care Coordination  [phone]    10/18/23 - Outreach attempted x 1. Left message on Diass[employer] with call back information and requested return call.  Plan: FRW will call again within 30 days.   Milagro Maria R  Financial Resource Worker  [facility] Care Coordination  [phone]    23 - FRW spoke with patient. Patient does not want a lapse in his health insurance coverage. Patient wants to continue to stay on [facility]. FRW will follow up within 30 days.  Milagro Melgare  Financial Resource Worker  [facility] Care Coordination  [phone]    23 - FRW and patient called [facility]. They do not see an application that was submitted. FRW assisted with SNAP application. Patient will need to call [facility] and cancel MA before we can apply for [place] with [place]. Patient seems hesitant at this time to cancel health insurance plan with [place] at this time. FRW will call patient and explain process again next week.  Milagro Maria R  Financial Resource Worker  [facility] Care Coordination  [phone]      Health Insurance:      Referral/Screening:  SNAP/CASH Application Screenin. Have you had [facility] benefits before? No   2. How many people in the household, do you eat/buy food together?  3  3. What is your monthly income (include all tax members)? Social security disability - $1256. $539  4. Do you have a bank account? Yes  5. Do you have any utility bills (electricity, rent, mortgage, phone, insurance, medical bills, etc.)? Rent - $580  6. Do you have social security cards and/or green cards?     FRW  Screening      Row Name 09/22/23 1238       [facility] Benefits   Is patient requesting help applying for county benefits? Yes       Have you recently applied for any county benefits? Yes       What was applied for? SNAP       Application date: The week of September 11th       How many people in your household? 3       Do you buy/eat food together? Yes       What is the monthly gross income for the household (wages, social security, workers comp, and pension)? 2200       Insurance:   [place]? No       Is this an insurance renewal? No       Is this a new insurance application request? Yes       Have you recently applied for insurance? Yes       What date did you apply for insurance? The week of 9/11/23       How many people in your household? 3       Do you file taxes? Yes       How many dependents do you claim? 2       What is the monthly gross income for the household (wages, social security, workers comp, and pension)? 2200       OTHER   Is this a judy care application? No       Any other information for the FRW? Patient recently moved to [place] from [place]. Patient tried going on [facility] to apply for MA and SNAP. Was not able to back to check on application. Would like assistance with MA and SNAP application.

## 2023-10-19 ENCOUNTER — VIRTUAL VISIT (OUTPATIENT)
Dept: FAMILY MEDICINE | Facility: CLINIC | Age: 43
End: 2023-10-19
Payer: MEDICARE

## 2023-10-19 DIAGNOSIS — G89.4 CHRONIC PAIN SYNDROME: Primary | ICD-10-CM

## 2023-10-19 DIAGNOSIS — I10 PRIMARY HYPERTENSION: ICD-10-CM

## 2023-10-19 DIAGNOSIS — M51.369 DEGENERATION OF INTERVERTEBRAL DISC OF LUMBAR REGION: ICD-10-CM

## 2023-10-19 DIAGNOSIS — F41.9 ANXIETY: ICD-10-CM

## 2023-10-19 PROCEDURE — 99213 OFFICE O/P EST LOW 20 MIN: CPT | Mod: VID | Performed by: FAMILY MEDICINE

## 2023-10-19 RX ORDER — AMLODIPINE BESYLATE 5 MG/1
5 TABLET ORAL DAILY
Qty: 90 TABLET | Refills: 1 | Status: SHIPPED | OUTPATIENT
Start: 2023-10-19 | End: 2023-12-11

## 2023-10-19 RX ORDER — LOSARTAN POTASSIUM 100 MG/1
100 TABLET ORAL DAILY
Qty: 30 TABLET | Refills: 5 | Status: SHIPPED | OUTPATIENT
Start: 2023-10-19 | End: 2023-10-31

## 2023-10-19 RX ORDER — OXYCODONE HYDROCHLORIDE 5 MG/1
5 TABLET ORAL 3 TIMES DAILY PRN
Qty: 45 TABLET | Refills: 0 | Status: SHIPPED | OUTPATIENT
Start: 2023-10-19 | End: 2023-11-09

## 2023-10-19 NOTE — LETTER
October 19, 2023      Daron Bond  [address]   [place] [address]        To Whom It May Concern,     Daron Bond has chronic back problems that make it difficult for him to constantly have his dog constantly on leash.  His dog is trained to respond to verbal and nonverbal signals.  As a service dog she can be off leash.      Sincerely,        Mata Hearn MD

## 2023-10-19 NOTE — LETTER
October 19, 2023      Daron Bond  [address]   [place] [address]        To Whom It May Concern,     Mr. Bond is not using his [employer] due to a hand injury in June 2023.  He is requesting a refund of the fees due to the injury and his inability to continue to use the facilities.  His current medical condition makes it prohibitive to travel.      Sincerely,        Mata Hearn MD

## 2023-10-19 NOTE — PROGRESS NOTES
Daron is a 43 year old who is being evaluated via a billable video visit.      How would you like to obtain your AVS? MyChart  If the video visit is dropped, the invitation should be resent by: Text to cell phone: [phone]  Will anyone else be joining your video visit? No          Assessment & Plan     Chronic pain syndrome  Degeneration of intervertebral disc of lumbar region  He will continue with current dose of oxycodone.  PDMP queried with no concerns.  - oxyCODONE (ROXICODONE) 5 MG tablet; Take 1 tablet (5 mg) by mouth 3 times daily as needed for severe pain    Primary hypertension  Controlled, continue with current medication  - amLODIPine (NORVASC) 5 MG tablet; Take 1 tablet (5 mg) by mouth daily  - losartan (COZAAR) 100 MG tablet; Take 1 tablet (100 mg) by mouth daily    Anxiety  Anxiety has worsened since his move.  I have encouraged counseling and continue with his current medications.  Letter has been written for therapy animal.             BMI:   Estimated body mass index is 49.91 kg/m  as calculated from the following:    Height as of 8/30/23: 1.829 m (6').    Weight as of 8/30/23: 166.9 kg (368 lb).           Mata Hearn MD  [facility]    Subjective   Daron is a 43 year old, presenting for the following health issues:  Recheck Medication (Would like to talk about upcoming MRI, refill of pain med and other issues)        9/26/2023     4:36 PM   Additional Questions   Roomed by Fariba JOSUE CMA   Accompanied by Self       HPI         Patient's anxiety has worsened since his recent move.  He is spending more time in his apartment.  He has difficulty getting out and shopping for groceries.  He has not done any recent therapy.  His current medications have not changed.  He has an MRI set up in the near future through his spine surgeon.  This to be done sedated.    Review of Systems         Objective           Vitals:  No vitals were obtained today due to virtual  visit.    Physical Exam   GENERAL: Healthy, alert and no distress  EYES: Eyes grossly normal to inspection.  No discharge or erythema, or obvious scleral/conjunctival abnormalities.  RESP: No audible wheeze, cough, or visible cyanosis.  No visible retractions or increased work of breathing.    SKIN: Visible skin clear. No significant rash, abnormal pigmentation or lesions.  NEURO: Cranial nerves grossly intact.  Mentation and speech appropriate for age.  PSYCH: Mentation appears normal, affect normal/bright, judgement and insight intact, normal speech and appearance well-groomed.                Video-Visit Details    Type of service:  Video Visit   Video Start Time:  1433  Video End Time: 1457    Originating Location (pt. Location): Home    Distant Location (provider location):  On-site  Platform used for Video Visit: Karyn

## 2023-10-19 NOTE — LETTER
October 19, 2023      Daron Bond  [address]   [place] [address]        To Whom It May Concern,     Mr. Bond is not using his [employer] due to a hand injury in June 2023.  He is requesting a refund of the fees due to the injury and his inability to continue the facilities.      Sincerely,        Mata Hearn MD

## 2023-10-23 ENCOUNTER — PATIENT OUTREACH (OUTPATIENT)
Dept: CARE COORDINATION | Facility: CLINIC | Age: 43
End: 2023-10-23
Payer: MEDICAID

## 2023-10-23 NOTE — PROGRESS NOTES
Clinic Care Coordination Contact  Care Coordination Clinician Chart Review    Situation: Patient chart reviewed by Care Coordinator.       Background: Care Coordination Program started: 5/18/2023. Initial assessment completed and patient-centered care plan(s) were developed with participation from patient. Lead CC handed patient off to CHW for continued outreaches.       Assessment: Per chart review, patient outreach completed by CC CHW on 9/22/23, 10/18/23 outreach by [facility].  Patient is actively working to accomplish goal(s). Patient's goal(s) appropriate and relevant at this time. Patient is not due for updated Plan of Care.  Assessments will be completed annually or as needed/with change of patient status.      Care Plan: General       Problem: HP GENERAL PROBLEM                   Care Plan: General       Problem: HP GENERAL PROBLEM       Goal: I want to find resources for assistance       Start Date: 6/28/2023    This Visit's Progress: 20% Recent Progress: 10%    Note:     Barriers:   Strengths: Motivated  Patient expressed understanding of goal: Yes  Action steps to achieve this goal:  1. [facility] @ https://Inbiomotion/new/L4n5Cj . I have not looked into this yet. [facility] is resending me the link.  2. I will look into legal resources for problems with my current housing. I have not looked into this yet. [facility] is resending me the links.  Legal: [facility] Legal Clinic - free legal zoom meeting the third Wednesday of every month, 6-7:30pm   [email or web address]   Meeting ID: [phone]   Password: [id number]     [email or web address].[email or web address]/ - free      Tenant Resource Center - https://www.tenantresourcecenter.org/ (help with repairs, problems during tenancy, safety)     3. I will look into care repair resources:  https://operationhelpstcroix.org/tpwmkibi-pu-dvnoryk/   https://westInTouch Technologies.org/services/jumpstart/     4. I will look into  Food shelf resources my CHW sends me through [employer].    5. I will talk with [facility] FLOWER Raygoza on 10/4/23 to look into resources in [place] for clothing, furniture, pet resources and any other resources to assist.     4. I will let my CHW Sujatha @ [phone] know if I have any questions about these resources. Continuous (MB)                            Care Plan: Financial Wellbeing       Problem: Patient expresses financial resource strain       Goal: Medical Assistance and SNAP       Start Date: 9/22/2023    Priority: High    Note:     Goal Statement: I will apply for health insurance within the next 1-2 weeks if I am eligible.     Strengths:  Motivated  Barriers: Overwhelmed  Patient expressed understanding of goal: Yes  Action steps to achieve this goal:  I understand a referral was placed to the Financial Resource Worker to look into Medical Assistance and SNAP, I will receive a call within the next 3 business days.    I understand the financial worker will make two attempts to call me. If I still need help with this goal, I will connect with my Community Health Worker Sujatha Urena at [phone].                                        Plan/Recommendations: The patient will continue working with Care Coordination to achieve goal(s) as above. CHW will continue outreaches at minimum every 30 days and will involve Lead CC as needed or if patient is ready to move to Maintenance. Lead CC will continue to monitor CHW outreaches and patient's progress to goal(s) every 6 weeks.     Plan of Care updated and sent to patient: No

## 2023-10-31 DIAGNOSIS — I10 PRIMARY HYPERTENSION: ICD-10-CM

## 2023-10-31 RX ORDER — LOSARTAN POTASSIUM 100 MG/1
100 TABLET ORAL DAILY
Qty: 90 TABLET | Refills: 1 | Status: SHIPPED | OUTPATIENT
Start: 2023-10-31 | End: 2024-04-17

## 2023-11-01 ENCOUNTER — PATIENT OUTREACH (OUTPATIENT)
Dept: CARE COORDINATION | Facility: CLINIC | Age: 43
End: 2023-11-01
Payer: MEDICAID

## 2023-11-01 NOTE — PROGRESS NOTES
Clinic Care Coordination Contact  Program: [facility] benefit - SNAP and Energy   County: [facility] Case #:  [facility] Worker:   Sai #:   Subscriber #:   Renewal:  Date Applied:     FRW Outreach:   23 - Patient did receive application. He is meeting with [facility] worker to sort out applications today. FRW will follow up within 30 days.  Milagro Melgare  Financial Resource Worker  [facility] Care Coordination  [phone]    10/18/23 - Approved for SNAP. FRW assisted with Energy Assistance application.   Milagronabeel Heck  Financial Resource Worker  [facility] Care Coordination  [phone]    10/18/23 - Outreach attempted x 1. Left message on [employer] with call back information and requested return call.  Plan: FRW will call again within 30 days.   Milagro Maria R  Financial Resource Worker  [facility] Care Coordination  [phone]    23 - FRW spoke with patient. Patient does not want a lapse in his health insurance coverage. Patient wants to continue to stay on [place]. FRW will follow up within 30 days.  Mliagro Heck  Financial Resource Worker  [facility] Care Coordination  [phone]    23 - FRW and patient called Replaced by [facility]. They do not see an application that was submitted. FRW assisted with SNAP application. Patient will need to call [facility] and cancel MA before we can apply for [place] with [place]. Patient seems hesitant at this time to cancel health insurance plan with [place] at this time. FRW will call patient and explain process again next week.  Milagro Heck  Financial Resource Worker  [facility] Care Coordination  [phone]      Health Insurance:      Referral/Screening:  SNAP/CASH Application Screenin. Have you had Replaced [facility] benefits before? No   2. How many people in the household, do you eat/buy food together?  3  3. What is your monthly income (include all tax members)? Social security  disability - $1256. $539  4. Do you have a bank account? Yes  5. Do you have any utility bills (electricity, rent, mortgage, phone, insurance, medical bills, etc.)? Rent - $580  6. Do you have social security cards and/or green cards?     FRW Screening      Row Name 09/22/23 1233       County Benefits   Is patient requesting help applying for [facility] benefits? Yes       Have you recently applied for any county benefits? Yes       What was applied for? SNAP       Application date: The week of September 11th       How many people in your household? 3       Do you buy/eat food together? Yes       What is the monthly gross income for the household (wages, social security, workers comp, and pension)? 2200       Insurance:   Was [place] verified for [place] insurance? No       Is this an insurance renewal? No       Is this a new insurance application request? Yes       Have you recently applied for insurance? Yes       What date did you apply for insurance? The week of 9/11/23       How many people in your household? 3       Do you file taxes? Yes       How many dependents do you claim? 2       What is the monthly gross income for the household (wages, social security, workers comp, and pension)? 2200       OTHER   Is this a judy care application? No       Any other information for the FRW? Patient recently moved to [place] from [place]. Patient tried going on [facility] to apply for MA and SNAP. Was not able to back to check on application. Would like assistance with MA and SNAP application.

## 2023-11-07 ENCOUNTER — PATIENT OUTREACH (OUTPATIENT)
Dept: CARE COORDINATION | Facility: CLINIC | Age: 43
End: 2023-11-07
Payer: MEDICAID
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
## 2023-11-09 ENCOUNTER — VIRTUAL VISIT (OUTPATIENT)
Dept: FAMILY MEDICINE | Facility: CLINIC | Age: 43
End: 2023-11-09
Payer: MEDICARE

## 2023-11-09 ENCOUNTER — MYC MEDICAL ADVICE (OUTPATIENT)
Dept: FAMILY MEDICINE | Facility: CLINIC | Age: 43
End: 2023-11-09

## 2023-11-09 DIAGNOSIS — F41.9 ANXIETY: ICD-10-CM

## 2023-11-09 DIAGNOSIS — L21.9 SEBORRHEIC DERMATITIS OF SCALP: ICD-10-CM

## 2023-11-09 DIAGNOSIS — K21.00 GASTROESOPHAGEAL REFLUX DISEASE WITH ESOPHAGITIS, UNSPECIFIED WHETHER HEMORRHAGE: ICD-10-CM

## 2023-11-09 DIAGNOSIS — M51.369 DEGENERATION OF INTERVERTEBRAL DISC OF LUMBAR REGION: ICD-10-CM

## 2023-11-09 PROCEDURE — 99213 OFFICE O/P EST LOW 20 MIN: CPT | Mod: VID | Performed by: FAMILY MEDICINE

## 2023-11-09 RX ORDER — PANTOPRAZOLE SODIUM 40 MG/1
40 TABLET, DELAYED RELEASE ORAL DAILY
Qty: 30 TABLET | Refills: 3 | Status: SHIPPED | OUTPATIENT
Start: 2023-11-09 | End: 2024-03-04

## 2023-11-09 RX ORDER — KETOCONAZOLE 20 MG/ML
SHAMPOO TOPICAL DAILY PRN
Qty: 100 ML | Refills: 1 | Status: SHIPPED | OUTPATIENT
Start: 2023-11-09 | End: 2024-01-16

## 2023-11-09 RX ORDER — BUSPIRONE HYDROCHLORIDE 10 MG/1
10 TABLET ORAL 3 TIMES DAILY
Start: 2023-11-09 | End: 2023-11-09

## 2023-11-09 RX ORDER — OXYCODONE HYDROCHLORIDE 5 MG/1
5 TABLET ORAL 3 TIMES DAILY PRN
Qty: 45 TABLET | Refills: 0 | Status: SHIPPED | OUTPATIENT
Start: 2023-11-09 | End: 2023-11-29

## 2023-11-09 ASSESSMENT — ASTHMA QUESTIONNAIRES
QUESTION_4 LAST FOUR WEEKS HOW OFTEN HAVE YOU USED YOUR RESCUE INHALER OR NEBULIZER MEDICATION (SUCH AS ALBUTEROL): ONCE A WEEK OR LESS
QUESTION_2 LAST FOUR WEEKS HOW OFTEN HAVE YOU HAD SHORTNESS OF BREATH: ONCE OR TWICE A WEEK
ACT_TOTALSCORE: 22
ACT_TOTALSCORE: 22
QUESTION_5 LAST FOUR WEEKS HOW WOULD YOU RATE YOUR ASTHMA CONTROL: WELL CONTROLLED
QUESTION_3 LAST FOUR WEEKS HOW OFTEN DID YOUR ASTHMA SYMPTOMS (WHEEZING, COUGHING, SHORTNESS OF BREATH, CHEST TIGHTNESS OR PAIN) WAKE YOU UP AT NIGHT OR EARLIER THAN USUAL IN THE MORNING: NOT AT ALL
QUESTION_1 LAST FOUR WEEKS HOW MUCH OF THE TIME DID YOUR ASTHMA KEEP YOU FROM GETTING AS MUCH DONE AT WORK, SCHOOL OR AT HOME: NONE OF THE TIME

## 2023-11-09 ASSESSMENT — PATIENT HEALTH QUESTIONNAIRE - PHQ9: SUM OF ALL RESPONSES TO PHQ QUESTIONS 1-9: 25

## 2023-11-09 NOTE — PROGRESS NOTES
Daron is a 43 year old who is being evaluated via a billable video visit.      How would you like to obtain your AVS? MyChart  If the video visit is dropped, the invitation should be resent by: Text to cell phone: [phone]  Will anyone else be joining your video visit? No          Assessment & Plan     Seborrheic dermatitis of scalp  Continue with current medication  - ketoconazole (NIZORAL) 2 % external shampoo; Apply topically daily as needed for itching or irritation    Degeneration of intervertebral disc of lumbar region  PDMP reviewed, no concerns  Continue with current dose  - oxyCODONE (ROXICODONE) 5 MG tablet; Take 1 tablet (5 mg) by mouth 3 times daily as needed for severe pain    Anxiety  Not controlled  Increase dose to tid  Follow up in about a month  - busPIRone (BUSPAR) 10 MG tablet; Take 1 tablet (10 mg) by mouth 3 times daily             BMI:   Estimated body mass index is 49.91 kg/m  as calculated from the following:    Height as of 8/30/23: 1.829 m (6').    Weight as of 8/30/23: 166.9 kg (368 lb).       Depression Screening Follow Up        11/9/2023    11:37 AM   PHQ   PHQ-9 Total Score 25   Q9: Thoughts of better off dead/self-harm past 2 weeks Several days                 Follow Up    Follow Up Actions Taken  Crisis resource information provided in the After Visit Summary    Discussed the following ways the patient can remain in a safe environment:        Mata Hearn MD  [facility]   Daron is a 43 year old, presenting for the following health issues:  Pain Management (Verbal consent given for video visit.  Pain mgt follow up.  Is also needing letters for his landlord and probate officer for upcoming court case.)      11/9/2023    11:44 AM   Additional Questions   Roomed by Fariba JOSUE CMA   Accompanied by Self       HPI         Medication Followup of pain management  Taking Medication as prescribed: yes  Side Effects:  None  Medication Helping  Symptoms:  yes  Patient is also needing letters for his landlord and for probate court case that is coming up.  Patient also wants to discuss alternative to hydroxyzine, would like to try another medication for his anxiety      He has had issues with medical transport.  Anxiety is still an issue when leaving the house.  He is working on getting out more.  Medication is somewhat effective.  Review of Systems         Objective           Vitals:  No vitals were obtained today due to virtual visit.    Physical Exam   GENERAL: Healthy, alert and no distress  EYES: Eyes grossly normal to inspection.  No discharge or erythema, or obvious scleral/conjunctival abnormalities.  RESP: No audible wheeze, cough, or visible cyanosis.  No visible retractions or increased work of breathing.    SKIN: Visible skin clear. No significant rash, abnormal pigmentation or lesions.  NEURO: Cranial nerves grossly intact.  Mentation and speech appropriate for age.  PSYCH: Mentation appears normal, affect normal/bright, judgement and insight intact, normal speech and appearance well-groomed.                Video-Visit Details    Type of service:  Video Visit       Originating Location (pt. Location): Home    Distant Location (provider location):  On-site  Platform used for Video Visit: Mery

## 2023-11-10 RX ORDER — BUSPIRONE HYDROCHLORIDE 10 MG/1
10 TABLET ORAL 3 TIMES DAILY
Qty: 90 TABLET | Refills: 3 | Status: SHIPPED | OUTPATIENT
Start: 2023-11-10 | End: 2023-12-29

## 2023-11-13 ENCOUNTER — OFFICE VISIT (OUTPATIENT)
Dept: PALLIATIVE MEDICINE | Facility: OTHER | Age: 43
End: 2023-11-13
Attending: FAMILY MEDICINE
Payer: MEDICARE

## 2023-11-13 VITALS
OXYGEN SATURATION: 97 % | SYSTOLIC BLOOD PRESSURE: 136 MMHG | BODY MASS INDEX: 49.77 KG/M2 | HEART RATE: 75 BPM | WEIGHT: 315 LBS | DIASTOLIC BLOOD PRESSURE: 83 MMHG

## 2023-11-13 DIAGNOSIS — F41.9 ANXIETY AND DEPRESSION: ICD-10-CM

## 2023-11-13 DIAGNOSIS — G89.29 CHRONIC INTRACTABLE PAIN: ICD-10-CM

## 2023-11-13 DIAGNOSIS — M48.062 SPINAL STENOSIS OF LUMBAR REGION WITH NEUROGENIC CLAUDICATION: Primary | ICD-10-CM

## 2023-11-13 DIAGNOSIS — M79.18 MYOFASCIAL PAIN SYNDROME: ICD-10-CM

## 2023-11-13 DIAGNOSIS — M43.16 SPONDYLOLISTHESIS OF LUMBAR REGION: ICD-10-CM

## 2023-11-13 DIAGNOSIS — E66.01 OBESITY, MORBID (H): ICD-10-CM

## 2023-11-13 DIAGNOSIS — F32.A ANXIETY AND DEPRESSION: ICD-10-CM

## 2023-11-13 PROCEDURE — 99205 OFFICE O/P NEW HI 60 MIN: CPT | Performed by: NURSE PRACTITIONER

## 2023-11-13 PROCEDURE — G0463 HOSPITAL OUTPT CLINIC VISIT: HCPCS | Performed by: NURSE PRACTITIONER

## 2023-11-13 ASSESSMENT — PAIN SCALES - PAIN ENJOYMENT GENERAL ACTIVITY SCALE (PEG)
INTERFERED_GENERAL_ACTIVITY: 10 - COMPLETELY INTERFERES
INTERFERED_ENJOYMENT_LIFE: 10 - COMPLETELY INTERFERES
PEG_TOTALSCORE: 8.67
AVG_PAIN_PASTWEEK: 6

## 2023-11-13 ASSESSMENT — ANXIETY QUESTIONNAIRES
4. TROUBLE RELAXING: NEARLY EVERY DAY
GAD7 TOTAL SCORE: 18
IF YOU CHECKED OFF ANY PROBLEMS ON THIS QUESTIONNAIRE, HOW DIFFICULT HAVE THESE PROBLEMS MADE IT FOR YOU TO DO YOUR WORK, TAKE CARE OF THINGS AT HOME, OR GET ALONG WITH OTHER PEOPLE: EXTREMELY DIFFICULT
3. WORRYING TOO MUCH ABOUT DIFFERENT THINGS: NEARLY EVERY DAY
7. FEELING AFRAID AS IF SOMETHING AWFUL MIGHT HAPPEN: NEARLY EVERY DAY
1. FEELING NERVOUS, ANXIOUS, OR ON EDGE: NEARLY EVERY DAY
2. NOT BEING ABLE TO STOP OR CONTROL WORRYING: NEARLY EVERY DAY
GAD7 TOTAL SCORE: 18
6. BECOMING EASILY ANNOYED OR IRRITABLE: NEARLY EVERY DAY
5. BEING SO RESTLESS THAT IT IS HARD TO SIT STILL: NOT AT ALL

## 2023-11-13 ASSESSMENT — PAIN SCALES - GENERAL: PAINLEVEL: MODERATE PAIN (5)

## 2023-11-13 NOTE — PROGRESS NOTES
Patient presents to the clinic today for a visit  with ILIANA Wyatt CNP            5/12/2022     1:21 PM 10/25/2022     1:35 PM   PEG Score   PEG Total Score 9 8.67       UDS/CSA-    Medications-    QUESTIONS:    Gini Briceño MA  [facility] [place]

## 2023-11-13 NOTE — PROGRESS NOTES
Date:11/13/2023      COMPREHENSIVE PAIN CLINIC INITIAL EVALUATION    I had the pleasure of meeting Mr. Daron Bond on 11/13/2023 in the [facility] in consult for Dr. Cavazos with regards to his pain.  The patient is a 43 year old male with past medical history of HTN,  anxiety/depression, Bipolar I, OCD, TBI, morbid obesity, asthma, chronic pain, who presents for evaluation of chronic pain.        Subjective:  Patient endorses chronic pain in low back R>L that started which started with a fall on black ice 2016.  Pain has progressively worsened over time.  Patient has numbness and tingling in legs R>L.  Patient reports 1 spine lumbar discectomy surgery 12/2019.  He was evaluated by Dr. Hector [facility] clinic with Rahul Sood PA-C and MRI is scheduled this Thursday.  He is anticipating another lumber surgery.  The patient describes the pain as constant, spasms, dull, ache, shooting, electricity.  He reports that the pain is made worse by stress, activity, sitting or standing too long.  His pain is improved with floating in a pool in the summer, heat and ice.  He rates his currenty pain score at 4/10, but it can be as low as /10 or as severe as 10/10.     Patient endorses significant anxiety and depression.  Patient follows with License Social Work.  Patient does not participate in regular exercise regimen.  He uses metro mobility frequently due to anxiety with driving.    Progress Notes Reviewed:  11/09/2023 Dr. Hearn, Family Medicine - seborrheic dermatitis  10/23/2023 Idalmis Wells [facility] - list of community resources  10/19/2023 Dr. Hearn, Family Medicine   10/18/2023 FLETCHER Christian  09/26/2023 Dr. Hearn, Family Medicine - referred for nutrition for weight loss, Neurology, MRI brain  09/15/2023 ALON Rae, Spine Surgery - new lumbar MRI ordered    He denies any new problems with falls or balance, any new numbness or weakness of the arms or legs, any new bowel or bladder  incontinence, any night sweats or unexplained fevers, or any sudden or unexpected weight loss.      Daron Bond has not been seen at a pain clinic in the past.        Current Treatments:  Oxycodone 5mg TID  Buspar 10mg TID  Hydroxyzine 25mg once a day    Previous Medication Treatments Included:  Anti-convulsants: no  Muscle relaxors: no  Anti-depressants: no  Benzodiazapine's: no  Acetaminophen/NSAIDs: ibuprofen causes GI upset  Topicals: no  Opioids: just oxycodone      Other Treatments Have Included:  Physical therapy: previously over 1 year ago  Pain Psychology: no  Chiropractic: years ago  Acupuncture: no  TENs Unit: yes years ago  Injections: Had TPI at [facility] in the past  Surgeries: 1 lumbar surgery  Dry Needling: no  Massage: no      Past Medical History:  Medical history reviewed.  No past medical history on file.   Patient Active Problem List   Diagnosis    Cerebellar tonsillar ectopia (H)    Asthma, moderate persistent    Degeneration of intervertebral disc of lumbar region    OCD (obsessive compulsive disorder)    Myofascial pain syndrome    Obesity, morbid (H)    Chronic pain syndrome    Bipolar I disorder (H)    Spondylolisthesis of lumbar region    Spinal stenosis of lumbar region    TYRELL (generalized anxiety disorder)    Primary hypertension         Past Surgical History:  Pertinent surgical history reviewed.  Past Surgical History:   Procedure Laterality Date    ARTHROSCOPY KNEE Right     no date given    C CORTISONE INJECTION  06/08/2016    COLONOSCOPY  08/04/2015    Internal/external hemorrhoids.  Repeat at age 50    ESOPHAGOGASTRODUODENOSCOPY W/ BANDING  08/04/2015    EYE SURGERY      x 3 no dates given    HEMORRHOIDECTOMY  09/08/2015    PSYCH SVC, INTENSIVE OUTPT  2002          Medications: Pertinent medications reviewed.  Current Outpatient Medications   Medication Sig Dispense Refill    albuterol (PROAIR HFA/PROVENTIL HFA/VENTOLIN HFA) 108 (90 Base) MCG/ACT inhaler INHALE TWO  PUFFS BY MOUTH EVERY 6 HOURS 8.5 g 4    albuterol (PROVENTIL) (2.5 MG/3ML) 0.083% neb solution Take 1 vial (2.5 mg) by nebulization every 6 hours as needed for shortness of breath, wheezing or cough 90 mL 0    amLODIPine (NORVASC) 5 MG tablet Take 1 tablet (5 mg) by mouth daily 90 tablet 1    busPIRone (BUSPAR) 10 MG tablet Take 1 tablet (10 mg) by mouth 3 times daily 90 tablet 3    fluticasone (ARNUITY ELLIPTA) 200 MCG/ACT inhaler Inhale 1 puff into the lungs daily 1 each 3    hydrOXYzine (VISTARIL) 25 MG capsule Take 1 capsule (25 mg) by mouth 4 times daily as needed for anxiety 90 capsule 3    ketoconazole (NIZORAL) 2 % external shampoo Apply topically daily as needed for itching or irritation 100 mL 1    losartan (COZAAR) 100 MG tablet Take 1 tablet (100 mg) by mouth daily 90 tablet 1    metoprolol succinate ER (TOPROL XL) 100 MG 24 hr tablet Take 1 tablet (100 mg) by mouth daily 30 tablet 5    oxyCODONE (ROXICODONE) 5 MG tablet Take 1 tablet (5 mg) by mouth 3 times daily as needed for severe pain 45 tablet 0    pantoprazole (PROTONIX) 40 MG EC tablet TAKE 1 TABLET BY MOUTH DAILY 30 tablet 3       [place] Prescription Monitoring Program reviewed 11/13/2023.  No concern for abuse or misuse of controlled medications based on this report.  11/09/2023 Oxycodone 5mg 45 tabs for 15 days  10/19/2023 Oxycodone 5mg 45 tabs for 15 days  09/20/2023 Oxycodone 5mg 45 tabs for 15 days        Allergies: Pertinent allergies reviewed.     Allergies   Allergen Reactions    Banana     Bee Venom     Latex        Family History:   family history includes Diabetes Type 2  in his mother; Heart Disease in his father.    Social History:   He is  and lives in an apartment in [place], [place].  He has two children.  He is independent in ADL's.  He has a therapy dog.  He continues to smoke 1/2 ppd.  He  reports that he has been smoking cigarettes. He has been smoking an average of .5 packs per day. He has been exposed to tobacco smoke.  He has never used smokeless tobacco. He reports that he does not currently use alcohol. He reports current drug use. Drug: Oxycodone.  Social History     Social History Narrative    Not on file         Review of Systems:      (Positive responses bolded)  GENERAL: fever/chills, fatigue, general unwell feeling, weight gain/loss  HEAD/EYES:  headache, dizziness, or vision changes  EARS/NOSE/THROAT: nosebleeds, hearing loss, sinus infection, earache, tinnitus  IMMUNE:  allergies, cancer, immune deficiency, or infections  SKIN:  itching, rash, hives  HEME/Lymphatic: anemia, easy bruising, easy bleeding  RESPIRATORY: cough, wheezing, or shortness of breath  CARDIOVASCULAR/Circulation: extremity edema, syncope, hypertension, tachycardia, or angina  GASTROINTESTINAL: abdominal pain, nausea/emesis, diarrhea, constipation, hematochezia, or melena  ENDOCRINE:  diabetes, steroid use, thyroid disease or osteoporosis  MUSCULOSKELETAL: myalgias, joint pain, stiffness, neck pain, back pain, arthritis, or gout  GENITOURINARY: frequency, urgency, dysuria, difficulty voiding, hematuria or incontinence  NEUROLOGIC: weakness, numbness, paresthesias, seizure, tremor, stroke or memory loss  PSYCHIATRIC: depression, anxiety, stress, suicidal thoughts/attempts or mood swings      Physical Exam:  /83   Pulse 75   Wt (!) 166.5 kg (367 lb)   SpO2 97%   BMI 49.77 kg/m      Constitutional: He is oriented to person, place, and time.  He appears well-developed and well-nourished. He is not in acute distress. Obese.  HENT:     Head: Normocephalic and atraumatic.     Eyes: Pupils are equal, round, and reactive to light. EOM are normal. No scleral icterus.   Pulmonary/Chest:  NWOB. No respiratory distress.   Neurological: He is alert and oriented to person, place, and time. Coordination grossly normal.  Romberg test negative. Cutler's test is negative  Skin: Skin is warm and dry. He is not diaphoretic.   Psychiatric: He has a normal mood  and affect. His behavior is normal. Judgment and thought content normal.  Patient answers questions appropriately.  MSK: Gait is normal.  Patient can not walk on toes, heals, heal toe walk and perform heal to shin testing without difficulty.      Lumbar/Thoracic spine:   ROM: flexion 45 degrees, extension 10 degrees, left lateral 20 degrees, and right lateral 20 degrees  Rotation/ext to right: painful   Rotation/ext to left: painful   Myofascial tenderness:left para lumbar muscles, right para lumbar muscles, left SI joint, right SI joint  Focal tenderness: No SI joint, gluteal, piriformis, or GT tenderness  Normal 5/5 LE strength bilaterally   Normal sensation to light touch in the lower extremities bilaterally   Reflexes: Lower extremity reflexes within normal limits bilaterally  Straight leg raise: Negative on the left  Negative on the right          Imaging:  EXAM: MR SPINE LUMBAR WO   LOCATION: [facility]   DATE/TIME: 6/11/2020 2:28 PM     INDICATION: Chronic back pain.   COMPARISON: MR 05/08/2020 XR 01/22/2020, MRI 08/03/2018.   TECHNIQUE: Without IV contrast.     FINDINGS:   Study partially limited by motion artifact. Postop changes of right   hemilaminectomy at L4-L5 are present. Heterogeneous soft tissue changes   posteriorly. Nomenclature is based on 5 lumbar type vertebral bodies. Normal   vertebral body heights, alignment and marrow signal. Normal distal spinal cord   and cauda equina with conus medullaris at T12-L1. No extraspinal abnormality.   Unremarkable visualized bony pelvis.     T12-L1: Normal disc height and signal. No herniation. Normal facets. No spinal   canal or neural foraminal stenosis.     L1-L2: Slight disc bulging and shallow right paracentral protrusion. Mild   degenerative facet changes. Minimal spinal canal and neural foraminal narrowing.     L2-L3: No significant protrusion or extrusion. Slight degenerative facet changes   bilaterally. No significant spinal canal or neural  foraminal stenosis.     L3-L4: Normal disc height and signal. No herniation. Normal facets. No spinal   canal or neural foraminal stenosis.     L4-L5: Broad-based right paracentral and foraminal disc extrusion approximately   9 x 19 mm in axial cross-section and 12 mm CC indenting the right ventral thecal   sac and encroaching on the right neural foramen and lateral recess. Prior postop   right hemilaminectomy changes. The size is similar to or slightly progressed in   the interval although I cannot exclude this is a component of postop scar tissue   signal change. Mild to moderate degenerative facet changes bilaterally. There is   resulting moderate to moderately severe spinal canal stenosis, moderately severe   to severe right lateral recess stenosis, moderate right and mild left neural   foraminal stenosis.     L5-S1: Mild degenerative spondylosis with degenerative ridging and disc bulging.   Mild degenerative facet changes. Mild spinal canal narrowing, mild bilateral   neural foraminal narrowing left more than right.     IMPRESSION:   1. Right paracentral disc extrusion at L4-L5 and associated postop signal   changes of right hemilaminectomy with resultant right lateral recess and neural   foraminal stenosis, probable right L5 and possible right L4 nerve root   contact/compression.   2. Shallow right paracentral protrusion at L1-L2.   3.  See body of report for level by level details. EXAM: MR SPINE LUMBAR WO   LOCATION: [email or web address] MEDICAL [facility]   DATE/TIME: 6/11/2020 2:28 PM     INDICATION: Chronic back pain.   COMPARISON: MR 05/08/2020 XR 01/22/2020, MRI 08/03/2018.   TECHNIQUE: Without IV contrast.     FINDINGS:   Study partially limited by motion artifact. Postop changes of right   hemilaminectomy at L4-L5 are present. Heterogeneous soft tissue changes   posteriorly. Nomenclature is based on 5 lumbar type vertebral bodies. Normal   vertebral body heights, alignment and marrow signal. Normal distal spinal cord    and cauda equina with conus medullaris at T12-L1. No extraspinal abnormality.   Unremarkable visualized bony pelvis.     T12-L1: Normal disc height and signal. No herniation. Normal facets. No spinal   canal or neural foraminal stenosis.     L1-L2: Slight disc bulging and shallow right paracentral protrusion. Mild   degenerative facet changes. Minimal spinal canal and neural foraminal narrowing.     L2-L3: No significant protrusion or extrusion. Slight degenerative facet changes   bilaterally. No significant spinal canal or neural foraminal stenosis.     L3-L4: Normal disc height and signal. No herniation. Normal facets. No spinal   canal or neural foraminal stenosis.     L4-L5: Broad-based right paracentral and foraminal disc extrusion approximately   9 x 19 mm in axial cross-section and 12 mm CC indenting the right ventral thecal   sac and encroaching on the right neural foramen and lateral recess. Prior postop   right hemilaminectomy changes. The size is similar to or slightly progressed in   the interval although I cannot exclude this is a component of postop scar tissue   signal change. Mild to moderate degenerative facet changes bilaterally. There is   resulting moderate to moderately severe spinal canal stenosis, moderately severe   to severe right lateral recess stenosis, moderate right and mild left neural   foraminal stenosis.     L5-S1: Mild degenerative spondylosis with degenerative ridging and disc bulging.   Mild degenerative facet changes. Mild spinal canal narrowing, mild bilateral   neural foraminal narrowing left more than right.     IMPRESSION:   1. Right paracentral disc extrusion at L4-L5 and associated postop signal   changes of right hemilaminectomy with resultant right lateral recess and neural   foraminal stenosis, probable right L5 and possible right L4 nerve root   contact/compression.   2. Shallow right paracentral protrusion at L1-L2.   3. See body of report for level by level details.      EMG:  na      Diagnostics:  Will review Lumbar MRI in 2 weeks.  Lumbar MRI 6/11/2020 was reviewed todayl    Diagnosis:  (M48.062) Spinal stenosis of lumbar region with neurogenic claudication  (primary encounter diagnosis)  Comment:   Plan:     (M43.16) Spondylolisthesis of lumbar region  Comment:   Plan:     (M79.18) Myofascial pain syndrome  Comment:   Plan:     (E66.01) Obesity, morbid (H)  Comment:   Plan:     (G89.29) Chronic intractable pain  Comment:   Plan:         Plan:  A multimodal plan was developed today to treat your pain.  Multimodal analgesia is a strategy that reduces reliance on opioids through the use of non-opioid analgesics and therapies that have different mechanisms of action.      Diagnostics: MRI scheduled this Thurs.        Medications:  Will certify for medical cannabis.    The following OTC pain medications may be helpful, use as directed: Voltaren Gel 1%, CBD products, Arnica products, Capsaicin products, Australian Dream Cream, Epson It, Arnica products, Lidocaine Patch, Solanpas, Biofreeze, Aspercream, Tiger Balm and Shiva Emu cream.  Apply heat or cold PRN.      Therapies:  Discussed Pain Psychology - referral placed today  Psychological treatments are also important part of pain management.  Understanding and managing the thoughts, emotions and behaviors that accompany the discomfort can help you cope more effectively with your pain and can actually reduce the intensity of your pain.    PHYSICAL THERAPY - referral place today      Discussed the importance of core strengthening, ROM, stretching exercises with the patient and how each of these entities is important in decreasing pain.  Explained to the patient that the purpose of physical therapy is to teach the patient a home exercise program.  These exercises need to be performed every day in order to decrease pain and prevent future occurrences of pain.        Fax for [facility] TENs unit.    Interventions:      none    Follow up:      Follow-up in clinic after lumbar MRI and after he follows with with [facility].        Yael Garibay APRN, RN, CNP, FNP  [facility]        BILLING TIME DOCUMENTATION:   The total TIME spent on this patient on the date of the encounter/appointment was 60 minutes.          Answers submitted by the patient for this visit:  TYRELL-7 (Submitted on 11/13/2023)  TYRELL 7 TOTAL SCORE: 18

## 2023-11-13 NOTE — PATIENT INSTRUCTIONS
[facility] Number:  [phone]  Call with any questions about your care and for scheduling assistance.   Calls are returned Monday through Friday between 8 AM and 4:30 PM. We usually get back to you within 2 business days depending on the issue/request.    If we are prescribing your medications:  For opioid medication refills, call the clinic or send a [employer] [facility] [employer] message 7 days in advance.  Please include:  Name of requested medication  Name of the pharmacy.  For non-opioid medications, call your pharmacy directly to request a refill. Please allow 3-4 days to be processed.   Per [place] State Law:  All controlled substance prescriptions must be filled within 30 days of being written.    For those controlled substances allowing refills, pickup must occur within 30 days of last fill.      We believe regular attendance is key to your success in our program!    Any time you are unable to keep your appointment we ask that you call us at least 24 hours in advance to cancel.This will allow us to offer the appointment time to another patient.   Multiple missed appointments may lead to dismissal from the clinic.

## 2023-11-15 ENCOUNTER — PATIENT OUTREACH (OUTPATIENT)
Dept: CARE COORDINATION | Facility: CLINIC | Age: 43
End: 2023-11-15
Payer: MEDICAID

## 2023-11-15 NOTE — PROGRESS NOTES
Clinic Care Coordination Contact  Program: [facility] benefit - SNAP and Energy   County: [facility] Case #:  [facility] Worker:   Sai #:   Subscriber #:   Renewal:  Date Applied:     FRW Outreach:   11/15/23 - Patient believes he turned in application. FRW will mail application to patient again.  Milagro Heck  Financial Resource Worker  [facility] Care Coordination  [phone]    11/1/23 - Patient did receive application. He is meeting with [facility] worker to sort out applications today. FRW will follow up within 30 days.  Milagro Heck  Financial Resource Worker  [facility] Care Coordination  [phone]    10/18/23 - Approved for SNAP. FRW assisted with Energy Assistance application.   Milagronabeel Heck  Financial Resource Worker  [facility] Care Coordination  [phone]    10/18/23 - Outreach attempted x 1. Left message on [employer] with call back information and requested return call.  Plan: FRW will call again within 30 days.   Milagro Melgare  Financial Resource Worker  [facility] Care Coordination  [phone]    9/27/23 - FRW spoke with patient. Patient does not want a lapse in his health insurance coverage. Patient wants to continue to stay on [place]. FRW will follow up within 30 days.  Milagronabeel Heck  Financial Resource Worker  [facility] Care Coordination  [phone]    9/22/23 - FRW and patient called [facility]. They do not see an application that was submitted. FRW assisted with SNAP application. Patient will need to call [facility] and cancel MA before we can apply for MA with [place]. Patient seems hesitant at this time to cancel health insurance plan with [place] at this time. FRW will call patient and explain process again next week.  Milagro Heck  Financial Resource Worker  [facility] Care Coordination  [phone]      Health Insurance:      Referral/Screening:  SNAP/CASH Application  Screenin. Have you had [facility] benefits before? No   2. How many people in the household, do you eat/buy food together?  3  3. What is your monthly income (include all tax members)? Social security disability - $1256. $539  4. Do you have a bank account? Yes  5. Do you have any utility bills (electricity, rent, mortgage, phone, insurance, medical bills, etc.)? Rent - $580  6. Do you have social security cards and/or green cards?     FRW Screening      Row Name [phone]       [facility] Benefits   Is patient requesting help applying for [facility] benefits? Yes       Have you recently applied for any county benefits? Yes       What was applied for? SNAP       Application date: The week of        How many people in your household? 3       Do you buy/eat food together? Yes       What is the monthly gross income for the household (wages, social security, workers comp, and pension)?        Insurance:   Was [place] verified for [place] insurance? No       Is this an insurance renewal? No       Is this a new insurance application request? Yes       Have you recently applied for insurance? Yes       What date did you apply for insurance? The week of 23       How many people in your household? 3       Do you file taxes? Yes       How many dependents do you claim? 2       What is the monthly gross income for the household (wages, social security, workers comp, and pension)?        OTHER   Is this a judy care application? No       Any other information for the [facility]? Patient recently moved to [place] from [place]. Patient tried going on [facility] to apply for MA and SNAP. Was not able to back to check on application. Would like assistance with MA and SNAP application.          Dupixent Counseling: I discussed with the patient the risks of dupilumab including but not limited to eye infection and irritation, cold sores, injection site reactions, worsening of asthma, allergic reactions and increased risk of parasitic infection.  Live vaccines should be avoided while taking dupilumab. Dupilumab will also interact with certain medications such as warfarin and cyclosporine. The patient understands that monitoring is required and they must alert us or the primary physician if symptoms of infection or other concerning signs are noted.

## 2023-11-16 ENCOUNTER — PATIENT OUTREACH (OUTPATIENT)
Dept: CARE COORDINATION | Facility: CLINIC | Age: 43
End: 2023-11-16
Payer: MEDICAID

## 2023-11-16 NOTE — PROGRESS NOTES
Clinic Care Coordination Contact  Follow Up Progress Note      Assessment: CC reached out to Pt based on update from FRW that Pt is in need of resources.     Care Gaps:    Health Maintenance Due   Topic Date Due    URINE DRUG SCREEN  Never done    ASTHMA ACTION PLAN  Never done    COVID-19 Vaccine (1) Never done    Pneumococcal Vaccine: Pediatrics (0 to 5 Years) and At-Risk Patients (6 to 64 Years) (1 - PCV) Never done    MEDICARE ANNUAL WELLNESS VISIT  Never done    BMP  08/15/2023    INFLUENZA VACCINE (1) 09/01/2023       Declined due to not able to schedule now; Pt waiting for an appointment - limited time to talk     Care Plans  Care Plan: General       Problem: HP GENERAL PROBLEM                   Care Plan: General       Problem: HP GENERAL PROBLEM       Goal: I want to find resources for assistance       Start Date: 6/28/2023    Recent Progress: 30%    Note:     Barriers:   Strengths: Motivated  Patient expressed understanding of goal: Yes  Action steps to achieve this goal:  1. [facility] @ [email or web address] . I have not looked into this yet. [facility] is resending me the link. I have met with my [facility] worker twice.  2. I will look into legal resources for problems with my current housing. I have not looked into this yet. Rik is resending me the links.  Legal: [facility] - free legal zoom meeting the third Wednesday of every month, 6-7:30pm   [email or web address]   Meeting ID: [phone]   Password: [id number]     [email or web address] - free      Tenant Resource Center - https://www.tenantresourcecenter.org/ (help with repairs, problems during tenancy, safety)     3. I will look into care repair resources:  https://operationhelpstcroix.org/jlohcrer-zq-otjlqos/   https://[employer].org/services/jumpstart/     4. I will look into Food shelf resources my CHW sends me through Helpjuice.com. I have used  food resources    5. I will talk with [facility] FLOWER Raygoza on 10/4/23 to look into resources in [place] for clothing, furniture, pet resources and any other resources to assist. COMPLETE    4. I will let my CHW Sujatha @ [phone] know if I have any questions about these resources. Continuous (MB)                            Care Plan: Financial Wellbeing       Problem: Patient expresses financial resource strain       Goal: Medical Assistance and SNAP       Start Date: 9/22/2023    Priority: High    Note:     Goal Statement: I will apply for health insurance within the next 1-2 weeks if I am eligible.     Strengths:  Motivated  Barriers: Overwhelmed  Patient expressed understanding of goal: Yes  Action steps to achieve this goal:  I understand a referral was placed to the Financial Resource Worker to look into Medical Assistance and SNAP, I will receive a call within the next 3 business days.    I understand the financial worker will make two attempts to call me. If I still need help with this goal, I will connect with my Community Health Worker Sujatha Urena at [phone].                                     Intervention/Education provided during outreach:     Food Support:     Market Rx - I have signed you up for the Market Rx program. You will be able to start using this benefit in about 2 weeks. $80 monthly for groceries to be spent at [employer] or THE MO[employer]. No ID or payment is required to purchase food; patients simply mention the MarketRx program and their name at check-out to utilize their funds.  Fare For All - Fare For All - The Food Group (thefoodgroupmn.org)  Mobile Sproutling - [facility] [employer] - The Food Group (theShibumimn.org)    [employer] Pantry - Belen's Pantry (rubyspantry.org)  We gather various food overages, surpluses, and bumper crops into generous food bundles for a low $25 contribution.  Pick a Convenient Location  Sign up For a [employer] Guest Account  Schedule Your Express Track  Registration and Handle Your Donation Online to Save Time  OR Just Come to the Pop-Up Pantry Location on the Upcoming Monthly Date      Help at your door - https://helpatyourdoor.org/services/#grocery  You can get grocery delivery with Help at your Door using your SNAP benefit.      Good in the [place] - https://www.goodinthehood.org/our-programs/food-programs/foodshelf-in-a-box/  Food Shelf In-A-Box at [facility]  [facility]  [address] [place] [address]  Delivery offered. Contact Madeline Deleon: [phone]  3rd Monday of the month from 3:00 p.m. - 5:00 p.m.    Transportation - if you would like to sign up for [employer]; here is the application. You will complete part one and a medical provider will complete part two; then you will mail it in for processing. Complete your portion and then bring to your next appointment.   Metro Mobility Application Forms ([employer]ius)    Rent Help:     Here are some offerings for rental assistance with [facility]  - https://www.[facility].org/services/housing-assistance/assistance-and-eviction-protection/rental-assistance    [employer] - I would suggest reaching out again for help with rent.   https://www.ili85-bcthdoiyhgkbxt.org/     Energy Assistance: https://caprw.org/services/energy-food-housing/energy-assistance.html                Plan:   [facility] reviewed needs and current resources with Pt. Pt confirmed he has tried food resources but would like more options. Pt noted he would be open to applying for metro mobility - application sent via email. Pt confirmed he is needing help with rent; has not reached out to [employer]. Resources for rent, food, and energy assistance sent via email. Writer communicated the risks of unencrypted electronic communication and the patient and/or patient representative has agreed to accept the risks and receive unencrypted communication related to the information or resources we have  discussed. We reviewed that no PHI will be included.  Email address verified with the patient. Pt confirmed he has started working with [facility] worker. Pt noted he would review [facility] and try to work on resources tomorrow.   Care Coordinator will follow up in about 4 weeks with chart review and [facility] will complete regular outreach in about three weeks.

## 2023-11-29 ENCOUNTER — TELEPHONE (OUTPATIENT)
Dept: PALLIATIVE MEDICINE | Facility: OTHER | Age: 43
End: 2023-11-29
Payer: MEDICAID

## 2023-11-29 ENCOUNTER — MYC MEDICAL ADVICE (OUTPATIENT)
Dept: PALLIATIVE MEDICINE | Facility: OTHER | Age: 43
End: 2023-11-29
Payer: MEDICAID

## 2023-11-29 DIAGNOSIS — I10 PRIMARY HYPERTENSION: ICD-10-CM

## 2023-11-29 DIAGNOSIS — M51.369 DEGENERATION OF INTERVERTEBRAL DISC OF LUMBAR REGION: ICD-10-CM

## 2023-11-29 RX ORDER — METOPROLOL SUCCINATE 100 MG/1
100 TABLET, EXTENDED RELEASE ORAL DAILY
Qty: 30 TABLET | Refills: 5 | Status: SHIPPED | OUTPATIENT
Start: 2023-11-29 | End: 2024-04-17

## 2023-11-29 RX ORDER — OXYCODONE HYDROCHLORIDE 5 MG/1
5 TABLET ORAL 3 TIMES DAILY PRN
Qty: 45 TABLET | Refills: 0 | Status: SHIPPED | OUTPATIENT
Start: 2023-11-29 | End: 2023-12-28

## 2023-11-29 NOTE — CONFIDENTIAL NOTE
Chart review:  Last apt with provider in clinic on 11/13/23  Typically it is acceptable to have a video visit in this situation  Will route to provider to approve/deny and include scheduling staff

## 2023-11-29 NOTE — TELEPHONE ENCOUNTER
[facility] Call Center    Phone Message    May a detailed message be left on voicemail: yes     Reason for Call: Pt is not able to get medical transportation for his appointment on 12/4/23.  He wants to know if this can be done via video.      Pt also said that he never got an email about medical cannabis.    Please call him back to let him know.  Thanks.

## 2023-12-04 ENCOUNTER — VIRTUAL VISIT (OUTPATIENT)
Dept: PALLIATIVE MEDICINE | Facility: OTHER | Age: 43
End: 2023-12-04
Attending: FAMILY MEDICINE
Payer: COMMERCIAL

## 2023-12-04 DIAGNOSIS — M79.18 MYOFASCIAL PAIN SYNDROME: ICD-10-CM

## 2023-12-04 DIAGNOSIS — M43.16 SPONDYLOLISTHESIS OF LUMBAR REGION: ICD-10-CM

## 2023-12-04 DIAGNOSIS — M48.062 SPINAL STENOSIS OF LUMBAR REGION WITH NEUROGENIC CLAUDICATION: ICD-10-CM

## 2023-12-04 PROCEDURE — 99214 OFFICE O/P EST MOD 30 MIN: CPT | Mod: 95 | Performed by: NURSE PRACTITIONER

## 2023-12-04 ASSESSMENT — PAIN SCALES - GENERAL: PAINLEVEL: MODERATE PAIN (5)

## 2023-12-04 NOTE — PROGRESS NOTES
Patient presents to the clinic today for a follow up with ILIANA Wyatt CNP regarding Pain Management.    Daron is a 43 year old who is being evaluated via a billable video visit.      How would you like to obtain your AVS? MyChart  If the video visit is dropped, the invitation should be resent by: Text to cell phone: [phone]  Will anyone else be joining your video visit? No        Video-Visit Details    Type of service:  Video Visit   Video Start Time: 1302  Video End Time: 1315    Originating Location (pt. Location): Home    Distant Location (provider location):  On-site  Platform used for Video Visit: Doximity        Is Pt currently in [place]? Yes  NOTE: If Pt is not in [place], Appointment needs to be canceled and rescheduled    UDS/CSA-    QUESTIONS:heating pad gave up the ghost, he needs a new heating pad. Has helped a lot with pain and mobility.     Cleopatra MERLOS [facility] Patient Facilitator   Render Risk Assessment In Note?: yes Additional Notes: Patient consent was obtained to proceed with the visit and recommended plan of care after discussion of all risks and benefits, including the risks of COVID-19 exposure. Detail Level: Simple

## 2023-12-04 NOTE — PROGRESS NOTES
Date:12/04/2023      COMPREHENSIVE PAIN CLINIC FOLLOW UP EVALUATION    I had the pleasure of meeting Mr. Daron Bond on 11/13/2023 in the [facility] in consult for Dr. Cavazos with regards to his pain.  The patient is a 43 year old male with past medical history of HTN,  anxiety/depression, Bipolar I, OCD, TBI, morbid obesity, asthma, chronic pain, who presents for evaluation of chronic pain.      Updates since initial consult appointment on 11/13/2023.  11/16/2023 Lumbar MRI was reviewed today.  He has an appointment with Dr. Maurice Friday to review lumbar MRI and make recommendations.  He is taking oxycodone 5mg once or twice daily.    He did not certify for medical cannabis and is hoping his [employer] worker can help hip with the paperwork.    Interventions/Injections:   none    Progress Notes Reviewed:  11/16/2023 FLETCHER Christian      Any hospitalizations/ER/UC visits since last appointment:  no  Any falls/accidents since last appointment:  no      Primary Pain :  Patient endorses chronic pain in low back R>L that started which started with a fall on black ice 2016.  Pain has progressively worsened over time.  Patient has numbness and tingling in legs R>L.  Patient reports 1 spine lumbar discectomy surgery 12/2019.  He was evaluated by Dr. Hector Maurice's clinic with Rahul Sood PA-C and MRI is scheduled this Thursday.  He is anticipating another lumber surgery.      Characteristics:  The patient describes the pain as constant, spasms, dull, ache, shooting, electricity.    No changes in pain characteristics since last appointment.    What makes the pain better:   His pain is improved with floating in a pool in the summer, heat and ice.  What makes the pain worse:  He reports that the pain is made worse by stress, activity, sitting or standing too long.   12/04/2023 current pain on 0/10 VAS:  6     Worst pain:  10      Best pain:    4  11/13/2023 current pain score at 4/10, but it can be as low  as 4/10 or as severe as 10/10.       Current Pain Related Medications:  Any medications changes since last appointment:    Oxycodone 5mg TID  Buspar 10mg TID  Hydroxyzine 25mg once a day      Therapies discuss on initial consult:   Physical therapy, Pain Psychology, TENs unit, Grounding Mat, Frequency Specific Micro Current, Anti-inflammatory Lifestyle    Social:  He is  and lives in an apartment in [place], [place].  He has custody of his two children.  He is independent in ADL's.  He has a therapy dog.  He continues to smoke 1/2 ppd.  He is on SSD.    Exercise:  Unable to exercise due to pain.    Hobbies: He enjoys, TV, movies and video games when his pain is under control.    Mental Health:  He has anxiety and depression.  He does not follow with a mental health provider.                  Plan 11/13/2023:  A multimodal plan was developed today to treat your pain.  Multimodal analgesia is a strategy that reduces reliance on opioids through the use of non-opioid analgesics and therapies that have different mechanisms of action.      Diagnostics: MRI scheduled this Thurs.        Medications:  Will certify for medical cannabis.    The following OTC pain medications may be helpful, use as directed: Voltaren Gel 1%, CBD products, Arnica products, Capsaicin products, Australian Dream Cream, Epson It, Arnica products, Lidocaine Patch, Solanpas, Biofreeze, Aspercream, Tiger Balm and Shiva Emu cream.  Apply heat or cold PRN.      Therapies:  Discussed Pain Psychology - referral placed today  Psychological treatments are also important part of pain management.  Understanding and managing the thoughts, emotions and behaviors that accompany the discomfort can help you cope more effectively with your pain and can actually reduce the intensity of your pain.    PHYSICAL THERAPY - referral place today      Discussed the importance of core strengthening, ROM, stretching exercises with the patient and how each of these  entities is important in decreasing pain.  Explained to the patient that the purpose of physical therapy is to teach the patient a home exercise program.  These exercises need to be performed every day in order to decrease pain and prevent future occurrences of pain.        Fax for [facility] TENs unit.    Interventions:      none    Follow up:     Follow-up in clinic after lumbar MRI and after he follows with with [facility].        --------------------------------------------------------------------------------------------------------------------------------------------------    History of pain on initial consult 11/13/2023  Patient endorses chronic pain in low back R>L that started which started with a fall on black ice 2016.  Pain has progressively worsened over time.  Patient has numbness and tingling in legs R>L.  Patient reports 1 spine lumbar discectomy surgery 12/2019.  He was evaluated by Dr. Hector Maurice's clinic with Rahul Sood PA-C and MRI is scheduled this Thursday.  He is anticipating another lumber surgery.  The patient describes the pain as constant, spasms, dull, ache, shooting, electricity.  He reports that the pain is made worse by stress, activity, sitting or standing too long.  His pain is improved with floating in a pool in the summer, heat and ice.  He rates his currenty pain score at 4/10, but it can be as low as /10 or as severe as 10/10.     Patient endorses significant anxiety and depression.  Patient follows with License Social Work.  Patient does not participate in regular exercise regimen.  He uses metro mobility frequently due to anxiety with driving.    Progress Notes Reviewed:  11/09/2023 Dr. Hearn, Family Medicine - seborrheic dermatitis  10/23/2023 Idalmis Wells, [facility] - list of community resources  10/19/2023 Dr. Hearn, Family Medicine   10/18/2023 Idalmis Wells, [facility]  09/26/2023 Dr. Hearn, Family Medicine - referred for nutrition for weight loss,  Neurology, MRI brain  09/15/2023 ALON Rae, Spine Surgery - new lumbar MRI ordered    He denies any new problems with falls or balance, any new numbness or weakness of the arms or legs, any new bowel or bladder incontinence, any night sweats or unexplained fevers, or any sudden or unexpected weight loss.      Daron Bond has not been seen at a pain clinic in the past.        Current Treatments:  Oxycodone 5mg TID  Buspar 10mg TID  Hydroxyzine 25mg once a day    Previous Medication Treatments Included:  Anti-convulsants: no  Muscle relaxors: no  Anti-depressants: no  Benzodiazapine's: no  Acetaminophen/NSAIDs: ibuprofen causes GI upset  Topicals: no  Opioids: just oxycodone      Other Treatments Have Included:  Physical therapy: previously over 1 year ago  Pain Psychology: no  Chiropractic: years ago  Acupuncture: no  TENs Unit: yes years ago  Injections: Had TPI at [facility] in the past  Surgeries: 1 lumbar surgery  Dry Needling: no  Massage: no      Past Medical History:  Medical history reviewed.  No past medical history on file.   Patient Active Problem List   Diagnosis    Cerebellar tonsillar ectopia (H)    Asthma, moderate persistent    Degeneration of intervertebral disc of lumbar region    OCD (obsessive compulsive disorder)    Myofascial pain syndrome    Obesity, morbid (H)    Chronic pain syndrome    Bipolar I disorder (H)    Spondylolisthesis of lumbar region    Spinal stenosis of lumbar region    TYRELL (generalized anxiety disorder)    Primary hypertension         Past Surgical History:  Pertinent surgical history reviewed.  Past Surgical History:   Procedure Laterality Date    ARTHROSCOPY KNEE Right     no date given    C CORTISONE INJECTION  06/08/2016    COLONOSCOPY  08/04/2015    Internal/external hemorrhoids.  Repeat at age 50    ESOPHAGOGASTRODUODENOSCOPY W/ BANDING  08/04/2015    EYE SURGERY      x 3 no dates given    HEMORRHOIDECTOMY  09/08/2015    PSYCH SVC, INTENSIVE OUTPT   2002          Medications: Pertinent medications reviewed.  Current Outpatient Medications   Medication Sig Dispense Refill    albuterol (PROAIR HFA/PROVENTIL HFA/VENTOLIN HFA) 108 (90 Base) MCG/ACT inhaler INHALE TWO PUFFS BY MOUTH EVERY 6 HOURS 8.5 g 4    albuterol (PROVENTIL) (2.5 MG/3ML) 0.083% neb solution Take 1 vial (2.5 mg) by nebulization every 6 hours as needed for shortness of breath, wheezing or cough 90 mL 0    amLODIPine (NORVASC) 5 MG tablet Take 1 tablet (5 mg) by mouth daily 90 tablet 1    busPIRone (BUSPAR) 10 MG tablet Take 1 tablet (10 mg) by mouth 3 times daily 90 tablet 3    fluticasone (ARNUITY ELLIPTA) 200 MCG/ACT inhaler Inhale 1 puff into the lungs daily 1 each 3    hydrOXYzine (VISTARIL) 25 MG capsule Take 1 capsule (25 mg) by mouth 4 times daily as needed for anxiety 90 capsule 3    ketoconazole (NIZORAL) 2 % external shampoo Apply topically daily as needed for itching or irritation 100 mL 1    losartan (COZAAR) 100 MG tablet Take 1 tablet (100 mg) by mouth daily 90 tablet 1    metoprolol succinate ER (TOPROL XL) 100 MG 24 hr tablet Take 1 tablet (100 mg) by mouth daily 30 tablet 5    oxyCODONE (ROXICODONE) 5 MG tablet Take 1 tablet (5 mg) by mouth 3 times daily as needed for severe pain 45 tablet 0    pantoprazole (PROTONIX) 40 MG EC tablet TAKE 1 TABLET BY MOUTH DAILY 30 tablet 3       [place] Prescription Monitoring Program reviewed 11/13/2023.  No concern for abuse or misuse of controlled medications based on this report.  11/09/2023 Oxycodone 5mg 45 tabs for 15 days  10/19/2023 Oxycodone 5mg 45 tabs for 15 days  09/20/2023 Oxycodone 5mg 45 tabs for 15 days        Allergies: Pertinent allergies reviewed.     Allergies   Allergen Reactions    Banana     Bee Venom     Latex        Family History:   family history includes Diabetes Type 2  in his mother; Heart Disease in his father.    Social History:   He is  and lives in an apartment in [place], [place].  He has two children.  He  is independent in ADL's.  He has a therapy dog.  He continues to smoke 1/2 ppd.  He  reports that he has been smoking cigarettes. He has been smoking an average of .5 packs per day. He has been exposed to tobacco smoke. He has never used smokeless tobacco. He reports that he does not currently use alcohol. He reports current drug use. Drug: Oxycodone.  Social History     Social History Narrative    Not on file         Review of Systems:      (Positive responses bolded)  GENERAL: fever/chills, fatigue, general unwell feeling, weight gain/loss  HEAD/EYES:  headache, dizziness, or vision changes  EARS/NOSE/THROAT: nosebleeds, hearing loss, sinus infection, earache, tinnitus  IMMUNE:  allergies, cancer, immune deficiency, or infections  SKIN:  itching, rash, hives  HEME/Lymphatic: anemia, easy bruising, easy bleeding  RESPIRATORY: cough, wheezing, or shortness of breath  CARDIOVASCULAR/Circulation: extremity edema, syncope, hypertension, tachycardia, or angina  GASTROINTESTINAL: abdominal pain, nausea/emesis, diarrhea, constipation, hematochezia, or melena  ENDOCRINE:  diabetes, steroid use, thyroid disease or osteoporosis  MUSCULOSKELETAL: myalgias, joint pain, stiffness, neck pain, back pain, arthritis, or gout  GENITOURINARY: frequency, urgency, dysuria, difficulty voiding, hematuria or incontinence  NEUROLOGIC: weakness, numbness, paresthesias, seizure, tremor, stroke or memory loss  PSYCHIATRIC: depression, anxiety, stress, suicidal thoughts/attempts or mood swings      Physical Exam:  There were no vitals taken for this visit.    Constitutional: He is oriented to person, place, and time.  He appears well-developed and well-nourished. He is not in acute distress. Obese.  HENT:     Head: Normocephalic and atraumatic.     Eyes: Pupils are equal, round, and reactive to light. EOM are normal. No scleral icterus.   Pulmonary/Chest:  NWOB. No respiratory distress.   Neurological: He is alert and oriented to person,  place, and time. Coordination grossly normal.  Romberg test negative. Cutler's test is negative  Skin: Skin is warm and dry. He is not diaphoretic.   Psychiatric: He has a normal mood and affect. His behavior is normal. Judgment and thought content normal.  Patient answers questions appropriately.  MSK: Gait is normal.  Patient can not walk on toes, heals, heal toe walk and perform heal to shin testing without difficulty.      Lumbar/Thoracic spine:   ROM: flexion 45 degrees, extension 10 degrees, left lateral 20 degrees, and right lateral 20 degrees  Rotation/ext to right: painful   Rotation/ext to left: painful   Myofascial tenderness:left para lumbar muscles, right para lumbar muscles, left SI joint, right SI joint  Focal tenderness: No SI joint, gluteal, piriformis, or GT tenderness  Normal 5/5 LE strength bilaterally   Normal sensation to light touch in the lower extremities bilaterally   Reflexes: Lower extremity reflexes within normal limits bilaterally  Straight leg raise: Negative on the left  Negative on the right          Imaging:  EXAM: MR SPINE LUMBAR WO   LOC[facility] MEDICAL [facility]   DATE/TIME: 6/11/2020 2:28 PM     INDICATION: Chronic back pain.   COMPARISON: MR 05/08/2020 XR 01/22/2020, MRI 08/03/2018.   TECHNIQUE: Without IV contrast.     FINDINGS:   Study partially limited by motion artifact. Postop changes of right   hemilaminectomy at L4-L5 are present. Heterogeneous soft tissue changes   posteriorly. Nomenclature is based on 5 lumbar type vertebral bodies. Normal   vertebral body heights, alignment and marrow signal. Normal distal spinal cord   and cauda equina with conus medullaris at T12-L1. No extraspinal abnormality.   Unremarkable visualized bony pelvis.     T12-L1: Normal disc height and signal. No herniation. Normal facets. No spinal   canal or neural foraminal stenosis.     L1-L2: Slight disc bulging and shallow right paracentral protrusion. Mild   degenerative facet changes. Minimal  spinal canal and neural foraminal narrowing.     L2-L3: No significant protrusion or extrusion. Slight degenerative facet changes   bilaterally. No significant spinal canal or neural foraminal stenosis.     L3-L4: Normal disc height and signal. No herniation. Normal facets. No spinal   canal or neural foraminal stenosis.     L4-L5: Broad-based right paracentral and foraminal disc extrusion approximately   9 x 19 mm in axial cross-section and 12 mm CC indenting the right ventral thecal   sac and encroaching on the right neural foramen and lateral recess. Prior postop   right hemilaminectomy changes. The size is similar to or slightly progressed in   the interval although I cannot exclude this is a component of postop scar tissue   signal change. Mild to moderate degenerative facet changes bilaterally. There is   resulting moderate to moderately severe spinal canal stenosis, moderately severe   to severe right lateral recess stenosis, moderate right and mild left neural   foraminal stenosis.     L5-S1: Mild degenerative spondylosis with degenerative ridging and disc bulging.   Mild degenerative facet changes. Mild spinal canal narrowing, mild bilateral   neural foraminal narrowing left more than right.     IMPRESSION:   1. Right paracentral disc extrusion at L4-L5 and associated postop signal   changes of right hemilaminectomy with resultant right lateral recess and neural   foraminal stenosis, probable right L5 and possible right L4 nerve root   contact/compression.   2. Shallow right paracentral protrusion at L1-L2.   3.  See body of report for level by level details. EXAM: MR SPINE LUMBAR WO   LOCATION: [facility] MEDICAL IMAGING   DATE/TIME: 6/11/2020 2:28 PM     INDICATION: Chronic back pain.   COMPARISON: MR 05/08/2020 XR 01/22/2020, MRI 08/03/2018.   TECHNIQUE: Without IV contrast.     FINDINGS:   Study partially limited by motion artifact. Postop changes of right   hemilaminectomy at L4-L5 are present. Heterogeneous  soft tissue changes   posteriorly. Nomenclature is based on 5 lumbar type vertebral bodies. Normal   vertebral body heights, alignment and marrow signal. Normal distal spinal cord   and cauda equina with conus medullaris at T12-L1. No extraspinal abnormality.   Unremarkable visualized bony pelvis.     T12-L1: Normal disc height and signal. No herniation. Normal facets. No spinal   canal or neural foraminal stenosis.     L1-L2: Slight disc bulging and shallow right paracentral protrusion. Mild   degenerative facet changes. Minimal spinal canal and neural foraminal narrowing.     L2-L3: No significant protrusion or extrusion. Slight degenerative facet changes   bilaterally. No significant spinal canal or neural foraminal stenosis.     L3-L4: Normal disc height and signal. No herniation. Normal facets. No spinal   canal or neural foraminal stenosis.     L4-L5: Broad-based right paracentral and foraminal disc extrusion approximately   9 x 19 mm in axial cross-section and 12 mm CC indenting the right ventral thecal   sac and encroaching on the right neural foramen and lateral recess. Prior postop   right hemilaminectomy changes. The size is similar to or slightly progressed in   the interval although I cannot exclude this is a component of postop scar tissue   signal change. Mild to moderate degenerative facet changes bilaterally. There is   resulting moderate to moderately severe spinal canal stenosis, moderately severe   to severe right lateral recess stenosis, moderate right and mild left neural   foraminal stenosis.     L5-S1: Mild degenerative spondylosis with degenerative ridging and disc bulging.   Mild degenerative facet changes. Mild spinal canal narrowing, mild bilateral   neural foraminal narrowing left more than right.     IMPRESSION:   1. Right paracentral disc extrusion at L4-L5 and associated postop signal   changes of right hemilaminectomy with resultant right lateral recess and neural   foraminal  stenosis, probable right L5 and possible right L4 nerve root   contact/compression.   2. Shallow right paracentral protrusion at L1-L2.   3. See body of report for level by level details.     EMG:  na      Diagnostics:  Will review Lumbar MRI in 2 weeks.  Lumbar MRI 6/11/2020 was reviewed todayl    Diagnosis:  (M48.062) Spinal stenosis of lumbar region with neurogenic claudication  (primary encounter diagnosis)  Comment:   Plan:     (M43.16) Spondylolisthesis of lumbar region  Comment:   Plan:     (M79.18) Myofascial pain syndrome  Comment:   Plan:     (E66.01) Obesity, morbid (H)  Comment:   Plan:     (G89.29) Chronic intractable pain  Comment:   Plan:         Plan 11/13/2023:  A multimodal plan was developed today to treat your pain.  Multimodal analgesia is a strategy that reduces reliance on opioids through the use of non-opioid analgesics and therapies that have different mechanisms of action.      Diagnostics: MRI scheduled this Thurs.        Medications:  Will certify for medical cannabis.    The following OTC pain medications may be helpful, use as directed: Voltaren Gel 1%, CBD products, Arnica products, Capsaicin products, Australian Dream Cream, Epson It, Arnica products, Lidocaine Patch, Solanpas, Biofreeze, Aspercream, Tiger Balm and Shiva Emu cream.  Apply heat or cold PRN.      Therapies:  Discussed Pain Psychology - referral placed today  Psychological treatments are also important part of pain management.  Understanding and managing the thoughts, emotions and behaviors that accompany the discomfort can help you cope more effectively with your pain and can actually reduce the intensity of your pain.      PHYSICAL THERAPY - referral place today      Discussed the importance of core strengthening, ROM, stretching exercises with the patient and how each of these entities is important in decreasing pain.  Explained to the patient that the purpose of physical therapy is to teach the patient a home exercise  program.  These exercises need to be performed every day in order to decrease pain and prevent future occurrences of pain.        Fax for [facility] TENs unit.      Interventions:      none      Follow up:     He will let me know through my chart if he is going to schedule a lumbar surgery.        ILIANA Briggs, RN, CNP, FNP  [facility]        BILLING TIME DOCUMENTATION:   The total TIME spent on this patient on the date of the encounter/appointment was 15 minutes.        TYRELL 7 TOTAL SCORE: 18

## 2023-12-04 NOTE — PATIENT INSTRUCTIONS
Clinic Number:  [phone]   Call with any questions about your care and for scheduling assistance.   Calls are returned Monday through Friday between 8 AM and 4:30 PM. We usually get back to you within 2 business days depending on the issue/request.    If we are prescribing your medications:  For opioid medication refills, call the clinic or send a [employer] message 7 days in advance.  Please include:  Name of requested medication  Name of the pharmacy.  For non-opioid medications, call your pharmacy directly to request a refill. Please allow 3-4 days to be processed.   Per [place] State Law:  All controlled substance prescriptions must be filled within 30 days of being written.    For those controlled substances allowing refills, pickup must occur within 30 days of last fill.      We believe regular attendance is key to your success in our program!    Any time you are unable to keep your appointment we ask that you call us at least 24 hours in advance to cancel.This will allow us to offer the appointment time to another patient.   Multiple missed appointments may lead to dismissal from the clinic.

## 2023-12-07 ENCOUNTER — PATIENT OUTREACH (OUTPATIENT)
Dept: CARE COORDINATION | Facility: CLINIC | Age: 43
End: 2023-12-07
Payer: COMMERCIAL

## 2023-12-07 NOTE — PROGRESS NOTES
Clinic Care Coordination Contact  [facility]/Voicemail    Clinical Data: Care Coordinator Outreach    Outreach Documentation Number of Outreach Attempt   12/7/2023   3:43 PM 1     Left message on patient's voicemail with call back information and requested return call.    Plan: Care Coordinator will try to reach patient again in 10 business days.    Sujatha Urena  [facility] Health Worker  [facility] Care Coordination   [place], [facility], [facility]  Office: [phone]

## 2023-12-08 ENCOUNTER — PATIENT OUTREACH (OUTPATIENT)
Dept: CARE COORDINATION | Facility: CLINIC | Age: 43
End: 2023-12-08
Payer: COMMERCIAL

## 2023-12-08 ENCOUNTER — MYC MEDICAL ADVICE (OUTPATIENT)
Dept: FAMILY MEDICINE | Facility: CLINIC | Age: 43
End: 2023-12-08
Payer: COMMERCIAL

## 2023-12-08 NOTE — PROGRESS NOTES
Clinic Care Coordination Contact  Care Coordination Clinician Chart Review    Situation: Patient chart reviewed by Care Coordinator.       Background: Care Coordination Program started: 5/18/2023. Initial assessment completed and patient-centered care plan(s) were developed with participation from patient. Lead CC handed patient off to CHW for continued outreaches.       Assessment: Per chart review, patient outreach completed by CC CHW on 12/7/23 - [facility], [facility] will outreach again in about 10 business days.  Patient is not actively working to accomplish goal(s). Patient's goal(s) appropriate and relevant at this time. Patient is due for updated Plan of Care.  Assessments will be completed annually or as needed/with change of patient status.      Care Plan: General       Problem: HP GENERAL PROBLEM                   Care Plan: General       Problem: HP GENERAL PROBLEM       Goal: I want to find resources for assistance       Start Date: 6/28/2023    Recent Progress: 30%    Note:     Barriers:   Strengths: Motivated  Patient expressed understanding of goal: Yes  Action steps to achieve this goal:  1. I will look into [facility] services @ https://[email or web address] . I have not looked into this yet. [facility] is resending me the link. I have met with my [facility] worker twice.  2. I will look into legal resources for problems with my current housing. I have not looked into this yet. [facility] is resending me the links.  Legal: [facility] Legal Clinic - free legal zoom meeting the third Wednesday of every month, 6-7:30pm   [email or web address]   Meeting ID: [phone]   Password: [id number]     [email or web address].[email or web address]/ - free      Tenant Resource Center - https://www.tenantresourcecenter.org/ (help with repairs, problems during tenancy, safety)     3. I will look into care repair resources:  https://operationhelpstcroix.org/xyzkfhgp-rr-ulykevk/    https://[employer].org/services/jumpstart/     4. I will look into Food shelf resources my CHW sends me through [employer]. I have used food resources    5. I will talk with [facility] FLOWER Raygoza on 10/4/23 to look into resources in [place] for clothing, furniture, pet resources and any other resources to assist. COMPLETE    4. I will let my CHW Sujatha @ [phone] know if I have any questions about these resources. Continuous (MB)                            Care Plan: Financial Wellbeing       Problem: Patient expresses financial resource strain       Goal: Medical Assistance and SNAP       Start Date: 9/22/2023    Priority: High    Note:     Goal Statement: I will apply for health insurance within the next 1-2 weeks if I am eligible.     Strengths:  Motivated  Barriers: Overwhelmed  Patient expressed understanding of goal: Yes  Action steps to achieve this goal:  I understand a referral was placed to the Financial Resource Worker to look into Medical Assistance and SNAP, I will receive a call within the next 3 business days.    I understand the financial worker will make two attempts to call me. If I still need help with this goal, I will connect with my Community Health Worker Sujatha Romel at [phone].                                        Plan/Recommendations: The patient will continue working with Care Coordination to achieve goal(s) as above. CHW will continue outreaches at minimum every 30 days and will involve Lead CC as needed or if patient is ready to move to Maintenance. Lead CC will continue to monitor CHW outreaches and patient's progress to goal(s) every 6 weeks.     Plan of Care updated and sent to patient: Yes, via [employer]ED INFORMATION [employer]URITY

## 2023-12-08 NOTE — LETTER
[facility] CARE COORDINATION  [address] [address]   December 8, 2023        Daron Bond  [address]  [place] [address]          Dear Daron,     Attached is an updated Patient Centered Plan of Care for your continued enrollment in Care Coordination. Please let us know if you have additional questions, concerns, or goals that we can assist with.    Sincerely,    Idalmis Wells, [facility] Clinical Care Coordinator  [facility], [place], [place], [place], [facility], and [facility]  Patient Centered Plan of Care  About Me:        Patient Name:  Daron Bond    YOB: 1980  Age:         43 year old   [place] MRN:    [id number] Telephone Information:  Home Phone [phone]   Mobile [phone]   Home Phone Not on file.       Address:  [address]  [place] [address] Email address:  [email or web address]      Emergency Contact(s)    Name Relationship Lgl Grd Work Phone Home Phone Mobile Phone           Primary language:  English     needed? No   [place] [employer]:  [phone] op. 1  Other communication barriers:None    Preferred Method of Communication:     Current living arrangement: I live alone    Mobility Status/ Medical Equipment: Independent        Health Maintenance  Health Maintenance Reviewed: Due/Overdue   Health Maintenance Due   Topic Date Due    URINE DRUG SCREEN  Never done    ASTHMA ACTION PLAN  Never done    COVID-19 Vaccine (1) Never done    Pneumococcal Vaccine: Pediatrics (0 to 5 Years) and At-Risk Patients (6 to 64 Years) (1 - PCV) Never done    MEDICARE ANNUAL WELLNESS VISIT  Never done    BMP  08/15/2023    INFLUENZA VACCINE (1) 09/01/2023          My Access Plan  Medical Emergency 911   Primary Clinic Line [facility] - [phone]   24 Hour Appointment Line [phone] or  [phone])  (toll-free)   24 Hour Nurse Line [phone] (toll-free)   Preferred Urgent Care Other     Preferred Hospital Other     Preferred Pharmacy [id number] [place] [id number] [address]  AT [facility] [address]     Behavioral Health Crisis Line The National Suicide Prevention Lifeline at 1-978.689.2662 or Text/Call 988           My Care Team Members  Patient Care Team         Relationship Specialty Notifications Start End    Mata Hearn MD PCP - General Family Practice  12/1/20     Phone: [phone] Fax: [phone]         [address] [facility] [address]    Mata Hearn MD Assigned PCP   2/27/22     Phone: [phone] Fax: [phone]         [address] [facility] [address]    Mata Hearn MD Assigned Pain Medication Provider   1/9/23     Phone: [phone] Fax: [phone]         [address] [facility] [address]    Idalmis Wells LICSW Lead Care Coordinator  Admissions 5/22/23     Sujatha Urena CHJOSELITO Community Health Worker  Admissions 5/23/23     [phone]    Lonnie Heck, [facility] Financial Resource Worker   9/22/23     Phone: [phone]                     My Care Plans  Self Management and Treatment Plan    Care Plan  Care Plan: General       Problem: HP GENERAL PROBLEM                   Care Plan: General       Problem: HP GENERAL PROBLEM       Goal: I want to find resources for assistance       Start Date: 6/28/2023    Recent Progress: 30%    Note:     Barriers:   Strengths: Motivated  Patient expressed understanding of goal: Yes  Action steps to achieve this goal:  1. I will [facility] into [facility] services @ [email or web address] . I have not looked into this yet. [facility] is resending me the link. I have met with my [employer] worker twice.  2. I will look into legal resources for problems with my current housing. I have not looked into this yet. [facility] is resending me the links.  Legal: [facility] Legal Clinic - free legal zoom meeting the third  Wednesday of every month, 6-7:30pm   [email or web address]   Meeting ID: [phone]   Password: [id number]     [email or web address].[email or web address]/ - free      Tenant Resource Center - https://www.tenantAshland Health Centerer.org/ (help with repairs, problems during tenancy, safety)     3. I will look into care repair resources:  https://operationhelpstcroix.org/yskotuze-wg-zbcjipw/   https://westDelight.org/services/jumpstart/     4. I will look into Food shelf resources my CHW sends me through [employer]. I have used food resources    5. I will talk with [facility] FLOWER Raygoza on 10/4/23 to look into resources in [place] for clothing, furniture, pet resources and any other resources to assist. COMPLETE    4. I will let my CHW Sujatha @ [phone] know if I have any questions about these resources. Continuous (MB)                            Care Plan: Financial Wellbeing       Problem: Patient expresses financial resource strain       Goal: Medical Assistance and SNAP       Start Date: 9/22/2023    Priority: High    Note:     Goal Statement: I will apply for health insurance within the next 1-2 weeks if I am eligible.     Strengths:  Motivated  Barriers: Overwhelmed  Patient expressed understanding of goal: Yes  Action steps to achieve this goal:  I understand a referral was placed to the Financial Resource Worker to look into Medical Assistance and SNAP, I will receive a call within the next 3 business days.    I understand the financial worker will make two attempts to call me. If I still need help with this goal, I will connect with my Community Health Worker Sujatha Urena at [phone].                                                My Medical and Care Information  Problem List   Patient Active Problem List   Diagnosis    Cerebellar tonsillar ectopia (H)    Asthma, moderate persistent    Degeneration of intervertebral disc of lumbar region    OCD (obsessive compulsive disorder)     Myofascial pain syndrome    Obesity, morbid (H)    Chronic pain syndrome    Bipolar I disorder (H)    Spondylolisthesis of lumbar region    Spinal stenosis of lumbar region    TYRELL (generalized anxiety disorder)    Primary hypertension      Current Medications and Allergies:    Current Outpatient Medications   Medication    albuterol (PROAIR HFA/PROVENTIL HFA/VENTOLIN HFA) 108 (90 Base) MCG/ACT inhaler    albuterol (PROVENTIL) (2.5 MG/3ML) 0.083% neb solution    amLODIPine (NORVASC) 5 MG tablet    busPIRone (BUSPAR) 10 MG tablet    fluticasone (ARNUITY ELLIPTA) 200 MCG/ACT inhaler    hydrOXYzine (VISTARIL) 25 MG capsule    ketoconazole (NIZORAL) 2 % external shampoo    losartan (COZAAR) 100 MG tablet    metoprolol succinate ER (TOPROL XL) 100 MG 24 hr tablet    oxyCODONE (ROXICODONE) 5 MG tablet    pantoprazole (PROTONIX) 40 MG EC tablet     No current facility-administered medications for this visit.       Allergies   Allergen Reactions    Banana     Bee Venom     Latex         Care Coordination Start Date: 5/18/2023   Frequency of Care Coordination: monthly, more frequently as needed     Form Last Updated: 12/08/2023

## 2023-12-09 DIAGNOSIS — I10 PRIMARY HYPERTENSION: ICD-10-CM

## 2023-12-11 RX ORDER — AMLODIPINE BESYLATE 5 MG/1
5 TABLET ORAL DAILY
Qty: 30 TABLET | Refills: 5 | Status: SHIPPED | OUTPATIENT
Start: 2023-12-11 | End: 2024-06-05

## 2023-12-12 ENCOUNTER — PATIENT OUTREACH (OUTPATIENT)
Dept: CARE COORDINATION | Facility: CLINIC | Age: 43
End: 2023-12-12
Payer: COMMERCIAL

## 2023-12-12 NOTE — PROGRESS NOTES
Clinic Care Coordination Contact  Program: [facility] benefit - SNAP and Energy   County: [facility] Case #:  [facility] Worker:   Sai #:   Subscriber #:   Renewal:  Date Applied:     FRW Outreach:   12/12/23 - Patient is unsure if he received or completed application. Working with [facility] working Ajay [phone]. FRW not able to contact Ajay as phone line is disconnected. FRW emailed application to patient and patient will ask [facility] worker. FRW will follow up within 30 days.  Milagronabeel Heck  Financial Resource Worker  [facility] Care Coordination  [phone]    11/15/23 - Patient believes he turned in application. FRW will mail application to patient again.  Milagro Vue  Financial Resource Worker  [facility] Care Coordination  [phone]    11/1/23 - Patient did receive application. He is meeting with [facility] worker to sort out applications today. FRW will follow up within 30 days.  Milagro Heck  Financial Resource Worker  [facility] Care Coordination  [phone]    10/18/23 - Approved for SNAP. FRW assisted with Energy Assistance application.   Milagro Heck  Financial Resource Worker  [facility] Care Coordination  [phone]    10/18/23 - Outreach attempted x 1. Left message on [employer] with call back information and requested return call.  Plan: FRW will call again within 30 days.   Milagro Heck  Financial Resource Worker  [facility] Care Coordination  [phone]    9/27/23 - FRW spoke with patient. Patient does not want a lapse in his health insurance coverage. Patient wants to continue to stay on [place]. FRW will follow up within 30 days.  Milagro Heck  Financial Resource Worker  [facility] Care Coordination  [phone]    9/22/23 - FRW and patient called [facility]. They do not see an application that was submitted. FRW assisted with SNAP application. Patient will need to call  [facility] and cancel MA before we can apply for [place] with [place]. Patient seems hesitant at this time to cancel health insurance plan with [place] at this time. FRW will call patient and explain process again next week.  Milagro Heck  Financial Resource Worker  [facility]  Clinic Care Coordination  [phone]      Health Insurance:      Referral/Screening:  SNAP/CASH Application Screenin. Have you had [facility] benefits before? No   2. How many people in the household, do you eat/buy food together?  3  3. What is your monthly income (include all tax members)? Social security disability - $1256. $539  4. Do you have a bank account? Yes  5. Do you have any utility bills (electricity, rent, mortgage, phone, insurance, medical bills, etc.)? Rent - $580  6. Do you have social security cards and/or green cards?     FRW Screening      Row Name 23 1238       County Benefits   Is patient requesting help applying for [facility] benefits? Yes       Have you recently applied for any county benefits? Yes       What was applied for? SNAP       Application date: The week of        How many people in your household? 3       Do you buy/eat food together? Yes       What is the monthly gross income for the household (wages, social security, workers comp, and pension)? 2200       Insurance:   Was [place] for [place] insurance? No       Is this an insurance renewal? No       Is this a new insurance application request? Yes       Have you recently applied for insurance? Yes       What date did you apply for insurance? The week of 23       How many people in your household? 3       Do you file taxes? Yes       How many dependents do you claim? 2       What is the monthly gross income for the household (wages, social security, workers comp, and pension)? 2200       OTHER   Is this a judy care application? No       Any other information for the FRW? Patient recently moved to [place] from [place]. Patient tried  going on [employer] to apply for MA and SNAP. Was not able to back to check on application. Would like assistance with MA and SNAP application.

## 2023-12-18 NOTE — TELEPHONE ENCOUNTER
Pt called he would like the letter for the  animal to be mailed to him at [address]    [place] [address].    Please call Pt back to discuss any further questions/concerns.    Brenna Morales

## 2023-12-18 NOTE — TELEPHONE ENCOUNTER
Lmx1 for patient to return call. Dr. Hearn signed patients Need for an assistance or  animal. There is no phone or fax number on the form.    Called to inform patient that the form is signed and asked what he would like done with form. It is sitting in Pod 2 with the TC in [facility].

## 2023-12-20 ENCOUNTER — PATIENT OUTREACH (OUTPATIENT)
Dept: CARE COORDINATION | Facility: CLINIC | Age: 43
End: 2023-12-20
Payer: COMMERCIAL

## 2023-12-20 NOTE — PROGRESS NOTES
Clinic Care Coordination Contact  Program: [facility] benefit - SNAP and Energy   County: [facility] Case #:  [facility] Worker:   Sai #:   Subscriber #:   Renewal:  Date Applied:     FRW Outreach:   12/20/23 - FRW called patient. Patient is not sure if he applied, did not check with [facility] worker. FRW called CAP and left message for CAP office to give FRW a call to check if application was submitted. FRW will follow up within 30 days.  Milagro Melgare  Financial Resource Worker  [facility] Care Coordination  [phone]    12/12/23 - Patient is unsure if he received or completed application. Working with [facility] working Ajay [phone]. FRW not able to contact Ajay as phone line is disconnected. FRW emailed application to patient and patient will ask [facility] worker. FRW will follow up within 30 days.  Milagro Heck  Financial Resource Worker  [facility] Care Coordination  [phone]    11/15/23 - Patient believes he turned in application. FRW will mail application to patient again.  Milagro Melgare  Financial Resource Worker  [facility] Care Coordination  [phone]    11/1/23 - Patient did receive application. He is meeting with [facility] worker to sort out applications today. FRW will follow up within 30 days.  Milagro Melgare  Financial Resource Worker  [facility] Care Coordination  [phone]    10/18/23 - Approved for SNAP. FRW assisted with Energy Assistance application.   Milagro Melgare  Financial Resource Worker  [facility] Care Coordination  [phone]    10/18/23 - Outreach attempted x 1. Left message on voicemail with call back information and requested return call.  Plan: FRW will call again within 30 days.   Milagro Heck  Financial Resource Worker  [facility] Care Coordination  [phone]    9/27/23 - FRW spoke with patient. Patient does not want a lapse in his health insurance  coverage. Patient wants [place] continue [place] stay [place]. FRW will follow up within 30 days.  Milagronabeel Heck  Financial Resource Worker  [facility] Care Coordination  [phone]    23 - FRW and patient called [facility]. They do not see an application that was submitted. FRW assisted with SNAP application. Patient will need to call [facility] and cancel MA before we can apply for [place] with [place]. Patient seems hesitant at this time to cancel health insurance plan with [place] at this time. FRW will call patient and explain process again next week.  Milagro Heck  Financial Resource Worker  [facility]  Clinic Care Coordination  [phone]      Health Insurance:      Referral/Screening:  SNAP/CASH Application Screenin. Have you had [facility] benefits before? No   2. How many people in the household, do you eat/buy food together?  3  3. What is your monthly income (include all tax members)? Social security disability - $1256. $539  4. Do you have a bank account? Yes  5. Do you have any utility bills (electricity, rent, mortgage, phone, insurance, medical bills, etc.)? Rent - $580  6. Do you have social security cards and/or green cards?     FRW Screening      Row Name 23 1238       [facility] Benefits   Is patient requesting help applying for [facility] benefits? Yes       Have you recently applied for any [facility] benefits? Yes       What was applied for? SNAP       Application date: The week of        How many people in your household? 3       Do you buy/eat food together? Yes       What is the monthly gross income for the household (wages, social security, workers comp, and pension)? 2200       Insurance:   [place] insurance? No       Is this an insurance renewal? No       Is this a new insurance application request? Yes       Have you recently applied for insurance? Yes       What date did you apply for insurance? The week of 23       How many people  in your household? 3       Do you file taxes? Yes       How many dependents do you claim? 2       What is the monthly gross income for the household (wages, social security, workers comp, and pension)? 2200       OTHER   Is this a judy care application? No       Any other information for the FRW? Patient recently moved to [place] from [place]. [facility] to apply for MA and SNAP. Was not able to back to check on application. Would like assistance with MA and SNAP application.

## 2023-12-28 ENCOUNTER — VIRTUAL VISIT (OUTPATIENT)
Dept: FAMILY MEDICINE | Facility: CLINIC | Age: 43
End: 2023-12-28
Payer: COMMERCIAL

## 2023-12-28 DIAGNOSIS — Q04.8 CEREBELLAR TONSILLAR ECTOPIA (H): ICD-10-CM

## 2023-12-28 DIAGNOSIS — M51.369 DEGENERATION OF INTERVERTEBRAL DISC OF LUMBAR REGION: ICD-10-CM

## 2023-12-28 DIAGNOSIS — F41.9 ANXIETY: ICD-10-CM

## 2023-12-28 DIAGNOSIS — F41.1 GAD (GENERALIZED ANXIETY DISORDER): Primary | ICD-10-CM

## 2023-12-28 PROCEDURE — 99214 OFFICE O/P EST MOD 30 MIN: CPT | Mod: VID | Performed by: FAMILY MEDICINE

## 2023-12-28 RX ORDER — OXYCODONE HYDROCHLORIDE 5 MG/1
5 TABLET ORAL 3 TIMES DAILY PRN
Qty: 45 TABLET | Refills: 0 | Status: SHIPPED | OUTPATIENT
Start: 2023-12-28 | End: 2024-01-16

## 2023-12-28 ASSESSMENT — PATIENT HEALTH QUESTIONNAIRE - PHQ9: SUM OF ALL RESPONSES TO PHQ QUESTIONS 1-9: 27

## 2023-12-28 NOTE — PROGRESS NOTES
Daron is a 43 year old who is being evaluated via a billable video visit.      How would you like to obtain your AVS? MyChart  If the video visit is dropped, the invitation should be resent by: Text to cell phone: [phone]  Will anyone else be joining your video visit? No          Assessment & Plan     TYRELL (generalized anxiety disorder)  Improved, increase buspirone to 20 mg twice daily    Cerebellar tonsillar ectopia (H)  Stable    Degeneration of intervertebral disc of lumbar region  Controlled, continue current medication, PDMP reviewed with no concerns  - oxyCODONE (ROXICODONE) 5 MG tablet; Take 1 tablet (5 mg) by mouth 3 times daily as needed for severe pain             BMI:   Estimated body mass index is 49.77 kg/m  as calculated from the following:    Height as of 8/30/23: 1.829 m (6').    Weight as of 11/13/23: 166.5 kg (367 lb).       Depression Screening Follow Up        12/28/2023     5:39 PM   PHQ   PHQ-9 Total Score 27   Q9: Thoughts of better off dead/self-harm past 2 weeks Nearly every day                 Follow Up      Mata Hearn MD  [facility]   Daron is a 43 year old, presenting for the following health issues:  Results (MRI ), Pain, and Hypertension        12/28/2023     5:45 PM   Additional Questions   Roomed by Jade LAST       History of Present Illness       Hypertension: He presents for follow up of hypertension.  He does not check blood pressure  regularly outside of the clinic. Outside blood pressures have been over 140/90. He does not follow a low salt diet.     Reason for visit:  Chronic pain med refill and HTN    He eats 0-1 servings of fruits and vegetables daily.He consumes 0 sweetened beverage(s) daily.He exercises with enough effort to increase his heart rate 9 or less minutes per day.  He exercises with enough effort to increase his heart rate 3 or less days per week.   He is taking medications regularly.        Hypertension Follow-up    Do you check your blood pressure regularly outside of the clinic? No   Are you following a low salt diet? No  Are your blood pressures ever more than 140 on the top number (systolic) OR more   than 90 on the bottom number (diastolic), for example 140/90? No  Pain History:  Have you seen anyone else for your pain? Yes   How has your pain affected your ability to work? Not currently working - unrelated to pain          Review of Systems         Objective           Vitals:  No vitals were obtained today due to virtual visit.    Physical Exam   GENERAL: Healthy, alert and no distress  EYES: Eyes grossly normal to inspection.  No discharge or erythema, or obvious scleral/conjunctival abnormalities.  RESP: No audible wheeze, cough, or visible cyanosis.  No visible retractions or increased work of breathing.    SKIN: Visible skin clear. No significant rash, abnormal pigmentation or lesions.  NEURO: Cranial nerves grossly intact.  Mentation and speech appropriate for age.  PSYCH: Mentation appears normal, affect normal/bright, judgement and insight intact, normal speech and appearance well-groomed.                Video-Visit Details    Type of service:  Video Visit   Video Start Time:  1752  Video End Time: 1815    Originating Location (pt. Location): Home    Distant Location (provider location):  On-site  Platform used for Video Visit: Mery

## 2023-12-29 RX ORDER — BUSPIRONE HYDROCHLORIDE 10 MG/1
20 TABLET ORAL 2 TIMES DAILY
Start: 2023-12-29 | End: 2024-01-10

## 2023-12-29 NOTE — TELEPHONE ENCOUNTER
Medication Question or Refill    What medication are you calling about (include dose and sig)?: Oxycodone 5 MG tablet-     Preferred Pharmacy:  [id number] - [place] [[place]], [place] - [address]  [address] [address]  Phone: [phone] Fax: [phone]      Controlled Substance Agreement on file:   CSA -- Patient Level:     [Media Unavailable] Controlled Substance Agreement - Opioid - Scan on 6/23/2023  8:42 AM   [Media Unavailable] Controlled Substance Agreement - Opioid - Scan on 5/27/2022  7:39 PM       Who prescribed the medication?: Dr. Hearn    Do you need a refill? Ye    Do you have any questions or concerns?  Yes Pharmacy needs clarification to why the Patient is getting a 15 day supply and if its due to Chronic pain in order for them to fill. Please call back Pharmacy

## 2023-12-29 NOTE — TELEPHONE ENCOUNTER
Nurse called and spoke to pharmacistRosita in regards to Oxycodone prescription. She was confirming that prescription was for chronic pain and needed the 15 day supply instead of 7 day supply. Confirmed that it was for chronic pain and she was able to document and fill the 15 day supply. No further questions at this time.

## 2024-01-02 ENCOUNTER — PATIENT OUTREACH (OUTPATIENT)
Dept: CARE COORDINATION | Facility: CLINIC | Age: 44
End: 2024-01-02
Payer: COMMERCIAL

## 2024-01-02 NOTE — PROGRESS NOTES
Clinic Care Coordination Contact  [facility]/Voicemail    Clinical Data: Care Coordinator Outreach    Outreach Documentation Number of Outreach Attempt   12/7/2023   3:43 PM 1   1/2/2024   9:51 AM 2     Left message on patient's voicemail with call back information and requested return call.    ** Patient is actively working with FRW. CHW will make one more attempt to patient before sending chart to [facility].     Plan: Care Coordinator will try to reach patient again in 10 business days.    Sujatha Urena  Community Health Worker  [facility] Care Coordination   [place], [place], [place], [place] and [facility]  Office: [phone]

## 2024-01-10 ENCOUNTER — TELEPHONE (OUTPATIENT)
Dept: FAMILY MEDICINE | Facility: CLINIC | Age: 44
End: 2024-01-10
Payer: COMMERCIAL

## 2024-01-10 DIAGNOSIS — F41.9 ANXIETY: ICD-10-CM

## 2024-01-10 RX ORDER — BUSPIRONE HYDROCHLORIDE 10 MG/1
20 TABLET ORAL 2 TIMES DAILY
Qty: 120 TABLET | Refills: 5 | Status: SHIPPED | OUTPATIENT
Start: 2024-01-10 | End: 2024-04-17

## 2024-01-16 DIAGNOSIS — M51.369 DEGENERATION OF INTERVERTEBRAL DISC OF LUMBAR REGION: ICD-10-CM

## 2024-01-16 DIAGNOSIS — L21.9 SEBORRHEIC DERMATITIS OF SCALP: ICD-10-CM

## 2024-01-16 NOTE — TELEPHONE ENCOUNTER
Patient calling for refill of medication.      Patient is currently taking Protonix 40 mg daily.  Patients reports that he has had an increase in stress and this is increasing stomach.  Having burning feeling in chest (solar plexis) and stomach acid.  Patient would like to know if this dose can be increased.      Patient states still losing hair and would like refill on medicated shampoo.

## 2024-01-17 ENCOUNTER — PATIENT OUTREACH (OUTPATIENT)
Dept: CARE COORDINATION | Facility: CLINIC | Age: 44
End: 2024-01-17
Payer: COMMERCIAL

## 2024-01-17 RX ORDER — KETOCONAZOLE 20 MG/ML
SHAMPOO TOPICAL DAILY PRN
Qty: 100 ML | Refills: 1 | Status: SHIPPED | OUTPATIENT
Start: 2024-01-17 | End: 2024-02-27

## 2024-01-17 RX ORDER — OXYCODONE HYDROCHLORIDE 5 MG/1
5 TABLET ORAL 3 TIMES DAILY PRN
Qty: 45 TABLET | Refills: 0 | Status: SHIPPED | OUTPATIENT
Start: 2024-01-17 | End: 2024-02-01

## 2024-01-17 NOTE — PROGRESS NOTES
Clinic Care Coordination Contact  Community Health Worker Follow Up    Care Gaps:     Health Maintenance Due   Topic Date Due    URINE DRUG SCREEN  Never done    ASTHMA ACTION PLAN  Never done    COVID-19 Vaccine (1) Never done    Pneumococcal Vaccine: Pediatrics (0 to 5 Years) and At-Risk Patients (6 to 64 Years) (1 of 2 - PCV) Never done    MEDICARE ANNUAL WELLNESS VISIT  Never done    BMP  08/15/2023    INFLUENZA VACCINE (1) 09/01/2023       Patient accepted scheduling phone number for  [facility]  to schedule independently     Care Plan:   Care Plan: General       Problem: HP GENERAL PROBLEM       Goal: I have accomplished finding new housing in [place].  Completed 9/22/2023      Start Date: 5/22/2023 Expected End Date: 8/1/2023    This Visit's Progress: 100% Recent Progress: 50%    Priority: High    Note:     Personal Plan  If I am needing to look into new housing options I can look into [employer].                            Care Plan: General       Problem: HP GENERAL PROBLEM       Goal: I want to find resources for assistance       Start Date: 6/28/2023    This Visit's Progress: 50% Recent Progress: 40%    Note:     Barriers:   Strengths: Motivated  Patient expressed understanding of goal: Yes  Action steps to achieve this goal:  1. I will look into [employer] services @ https://Tumotorizado.com/new/L4n5Cj . (Patient has obtained an ARHMS worker.) Completed  2. I will look into legal resources for problems with my current housing. I have not looked into this yet. [employer]ik[employer] is resending me the links. Continuous (MB)  Legal: [facility] Legal [facility] - free legal zoom meeting the third Wednesday of every month, 6-7:30pm   [email or web address]   Meeting ID: [phone]   Password: [id number]     [email or web address]://www.[email or web address]/ - free      Tenant Resource Center - https://www.tenantresourcecenter.org/ (help with repairs, problems during tenancy, safety)     3. I  will look into car repair resources: Has not looked into resource yet.   https://operationhelpstcroix.org/dkgisrtt-fo-pkfpwgb/   https://[facility].org/services/jumpstart/     4. I will look into Food shelf resources my CHW sends me through [employer]. I have used food resources. Completed    5. I will talk with [facility] [place] FLOWER Raygoza on 10/4/23 to look into resources in [place] for clothing, furniture, pet resources and any other resources to assist. COMPLETE    4. I will let my CHW Sujatha @ [phone] know if I have any questions about these resources. Continuous (MB)                            Care Plan: Financial Wellbeing       Problem: Patient expresses financial resource strain       Goal: Energy Assistance and SNAP       Start Date: 9/22/2023    This Visit's Progress: 50%    Priority: High    Note:     Goal Statement: I will apply for health insurance within the next 1-2 weeks if I am eligible.     Strengths:  Motivated  Barriers: Overwhelmed  Patient expressed understanding of goal: Yes  Action steps to achieve this goal:  I understand a referral was placed to the Financial Resource Worker to look into Medical Assistance and SNAP, I will receive a call within the next 3 business days. I was approved for SNAP for $220. I will call to check on my Energy Assistance application.   I understand the financial worker will make two attempts to call me. If I still need help with this goal, I will connect with my Community Health Worker Sujatha Urena at [phone].                                     Intervention and Education during outreach: Patient was approved for SNAp to received $220 a month. Patient will call [employer] to check on Energy Assistance application. Forwarding note to [facility] [place] FRW.     CHW Plan: CHW will follow up with patient in about 1 month.     Sujatha Urena  Community Health Worker  [facility] Care Coordination   [facility], [place], [place], [place] and [facility]  Office:  [phone]

## 2024-01-17 NOTE — Clinical Note
FYI-Patient was approved for SNAp to received $220 a month. Patient will call [employer] to check on Energy Assistance application.

## 2024-01-18 ENCOUNTER — PATIENT OUTREACH (OUTPATIENT)
Dept: CARE COORDINATION | Facility: CLINIC | Age: 44
End: 2024-01-18
Payer: COMMERCIAL

## 2024-01-18 NOTE — PROGRESS NOTES
Clinic Care Coordination Contact  Care Coordination Clinician Chart Review    Situation: Patient chart reviewed by Care Coordinator.       Background: Care Coordination Program started: 5/18/2023. Initial assessment completed and patient-centered care plan(s) were developed with participation from patient. Lead CC handed patient off to CHW for continued outreaches.       Assessment: Per chart review, patient outreach completed by CC CHW on 1/17/24.  Patient is actively working to accomplish goal(s). Patient's goal(s) appropriate and relevant at this time. Patient is not due for updated Plan of Care.  Assessments will be completed annually or as needed/with change of patient status.      Care Plan: General       Problem: HP GENERAL PROBLEM                   Care Plan: General       Problem: HP GENERAL PROBLEM       Goal: I want to find resources for assistance       Start Date: 6/28/2023    This Visit's Progress: 50% Recent Progress: 40%    Note:     Barriers:   Strengths: Motivated  Patient expressed understanding of goal: Yes  Action steps to achieve this goal:  1. I will look into [employer] services @ https://JETME/new/L4n5Cj . (Patient has obtained an ARHMS worker.) Completed  2. I will look into legal resources for problems with my current housing. I have not looked into this yet. [facility] is resending me the links. Continuous (MB)  Legal: [facility] Legal Clinic - free legal zoom meeting the third Wednesday of every month, 6-7:30pm   [email or web address]   Meeting ID: [phone]   Password: [phone]     [email or web address]://www.[email or web address].[email or web address]/ - free      Tenant Resource Center - https://www.tenantresourcecenter.org/ (help with repairs, problems during tenancy, safety)     3. I will look into car repair resources: Has not looked into resource yet.   https://operationhelpstcroix.org/rwzeysxb-mh-wkashcl/   https://west[employer]equ[employer].org/services/jumpstart/     4. I will  look into Food shelf resources my CHW sends me through [employer]. I have used food resources. Completed    5. I will talk with [facility] FLOWER Raygoza on 10/4/23 to look into resources in [place] for clothing, furniture, pet resources and any other resources to assist. COMPLETE    4. I will let my CHW Sujatha @ [phone] know if I have any questions about these resources. Continuous (MB)                            Care Plan: Financial Wellbeing       Problem: Patient expresses financial resource strain       Goal: Energy Assistance and SNAP       Start Date: 9/22/2023    This Visit's Progress: 50%    Priority: High    Note:     Goal Statement: I will apply for health insurance within the next 1-2 weeks if I am eligible.     Strengths:  Motivated  Barriers: Overwhelmed  Patient expressed understanding of goal: Yes  Action steps to achieve this goal:  I understand a referral was placed to the Financial Resource Worker to look into Medical Assistance and SNAP, I will receive a call within the next 3 business days. I was approved for SNAP for $220. I will call to check on my Energy Assistance application.   I understand the financial worker will make two attempts to call me. If I still need help with this goal, I will connect with my Community Health Worker Sujatha Urena at [phone].                                        Plan/Recommendations: The patient will continue working with Care Coordination to achieve goal(s) as above. CHW will continue outreaches at minimum every 30 days and will involve Lead CC as needed or if patient is ready to move to Maintenance. Lead CC will continue to monitor CHW outreaches and patient's progress to goal(s) every 6 weeks.     Plan of Care updated and sent to patient: No

## 2024-01-19 ENCOUNTER — PATIENT OUTREACH (OUTPATIENT)
Dept: CARE COORDINATION | Facility: CLINIC | Age: 44
End: 2024-01-19
Payer: COMMERCIAL

## 2024-01-26 ENCOUNTER — NURSE TRIAGE (OUTPATIENT)
Dept: NURSING | Facility: CLINIC | Age: 44
End: 2024-01-26
Payer: COMMERCIAL

## 2024-01-26 ENCOUNTER — TELEPHONE (OUTPATIENT)
Dept: FAMILY MEDICINE | Facility: CLINIC | Age: 44
End: 2024-01-26
Payer: COMMERCIAL

## 2024-01-26 NOTE — TELEPHONE ENCOUNTER
TC- transferred to [facility] for symptom of (Chest Pain).    TC- sent message to RN onsite for Medication Request:    Per Patient, [facility] only dispensed #28 tabs  LOV: Virtual: 12/28/23    Pending Response from [facility] Triage.

## 2024-01-26 NOTE — TELEPHONE ENCOUNTER
"Nurse Triage SBAR    Is this a 2nd Level Triage? YES, LICENSED PRACTITIONER REVIEW IS REQUIRED    Situation: chest pains, rectal bleeding.     Background: Yesterday had some stabbing chest pain at 4 pm. It was very brief. It happened two more times in the next two hours. Patient has had these before but only a couple times a year.   Patient states it felt like a needle going into the right side of his chest. Patient has \"an unholy amount of stress\". Patient very upset about a court case.     Patient has history of a headache. Yesterday he had a bad pressure headache. Patient has a history of a brain injury and always has a low level headache.     Patient also states that when he has high stress and anxiety he has fissures in his stomach that open up and bleed and will have blood in his stool. Patient had this happen last week and the week before. For the last week stools have been regular and normal but patient asking if medication should be increased. Patient is on pantoprazole 40 mg EC daily.    Patient scheduling for visit with PCP on 2/1.     Patient asking for virtual visit today with PCP and referral to cardiologist close to his home. Patient unable to drive more than 2 miles due to anxiety disorder.       Assessment: work in appointment with PCP?    Protocol Recommended Disposition:   Go To ED/UCC Now (Or To Office With PCP Approval)    Recommendation: next steps     Routed to provider    Does the patient meet one of the following criteria for ADS visit consideration? 16+ years old, with an [facility] PCP     TIP  Providers, please consider if this condition is appropriate for management at one of our Acute and Diagnostic Services sites.     If patient is a good candidate, please use dotphrase <dot>triageresponse and select Refer to ADS to document.                Reason for Disposition   Chest pain or 'angina' comes and goes and is happening more often (increasing in frequency) or getting worse (increasing in " severity) (Exception: Chest pains that last only a few seconds.)    Additional Information   Negative: SEVERE difficulty breathing (e.g., struggling for each breath, speaks in single words)   Negative: Difficult to awaken or acting confused (e.g., disoriented, slurred speech)   Negative: Shock suspected (e.g., cold/pale/clammy skin, too weak to stand, low BP, rapid pulse)   Negative: Passed out (i.e., lost consciousness, collapsed and was not responding)   Negative: Chest pain lasting longer than 5 minutes and ANY of the following:         Pain is crushing, pressure-like, or heavy         Over 44 years old          Over 30 years old and one cardiac risk factor (e.g diabetes, high blood pressure, high cholesterol, smoker, or family history of heart disease)         History of heart disease (e.g. angina, heart attack, heart failure, bypass surgery, takes nitroglycerin)   Negative: Heart beating < 50 beats per minute OR > 140 beats per minute   Negative: Visible sweat on face or sweat dripping down face   Negative: Sounds like a life-threatening emergency to the triager   Negative: Followed an injury to chest   Negative: SEVERE chest pain   Negative: Pain also in shoulder(s) or arm(s) or jaw   Negative: Difficulty breathing   Negative: Cocaine use within last 3 days   Negative: Major surgery in the past month   Negative: Hip or leg fracture (broken bone) in past month (or had cast on leg or ankle in past month)   Negative: Illness requiring prolonged bedrest in past month (e.g., immobilization, long hospital stay)   Negative: Long-distance travel in past month (e.g., car, bus, train, plane; with trip lasting 6 or more hours)   Negative: History of prior 'blood clot' in leg or lungs (i.e., deep vein thrombosis, pulmonary embolism)   Negative: History of inherited increased risk of blood clots (e.g., Factor 5 Leiden, Anti-thrombin 3, Protein C or Protein S deficiency, Prothrombin mutation)   Negative: Cancer treatment in  the past two months (or has cancer now)   Negative: Heart beating irregularly or very rapidly   Negative: Chest pain lasting longer than 5 minutes and occurred in last 3 days (72 hours) (Exception: Feels exactly the same as previously diagnosed heartburn and has accompanying sour taste in mouth.)    Protocols used: Chest Pain-A-[place]

## 2024-01-26 NOTE — TELEPHONE ENCOUNTER
RN called and spoke with patient.  Advised PCP is not in clinic today.    Patient will not go to ED (as nearest is ED is 5 miles away) and his limit for driving is 2 miles.    RN advised that patient can call 911 for transportation.    Patient refused as has (legal) things that must be addressed today.    Patient states that he has NO CHEST PAIN at this time.      Patient instructed to call back (or seek emergency care) if symptoms change or if new symptoms present.   Patient verbalizes agreement with plan.    PRUDENCIO Dukes  [place], [place]  [phone]

## 2024-01-26 NOTE — TELEPHONE ENCOUNTER
Provider Response to 2nd Level Triage Request    I have reviewed the RN documentation. My recommendation is:  Refer to ED    Agree with recommendation for ED evaluation for chest pain with rectal bleeding.

## 2024-01-30 DIAGNOSIS — J45.40 MODERATE PERSISTENT ASTHMA, UNSPECIFIED WHETHER COMPLICATED: ICD-10-CM

## 2024-01-30 RX ORDER — ALBUTEROL SULFATE 90 UG/1
AEROSOL, METERED RESPIRATORY (INHALATION)
Qty: 8.5 G | Refills: 4 | Status: SHIPPED | OUTPATIENT
Start: 2024-01-30 | End: 2024-05-29

## 2024-01-31 ENCOUNTER — PATIENT OUTREACH (OUTPATIENT)
Dept: CARE COORDINATION | Facility: CLINIC | Age: 44
End: 2024-01-31
Payer: COMMERCIAL

## 2024-02-01 ENCOUNTER — VIRTUAL VISIT (OUTPATIENT)
Dept: FAMILY MEDICINE | Facility: CLINIC | Age: 44
End: 2024-02-01
Payer: COMMERCIAL

## 2024-02-01 DIAGNOSIS — G89.4 CHRONIC PAIN SYNDROME: Primary | ICD-10-CM

## 2024-02-01 DIAGNOSIS — M51.369 DEGENERATION OF INTERVERTEBRAL DISC OF LUMBAR REGION: ICD-10-CM

## 2024-02-01 PROCEDURE — 99213 OFFICE O/P EST LOW 20 MIN: CPT | Mod: 95 | Performed by: FAMILY MEDICINE

## 2024-02-01 RX ORDER — OXYCODONE HYDROCHLORIDE 5 MG/1
5 TABLET ORAL 3 TIMES DAILY PRN
Qty: 45 TABLET | Refills: 0 | Status: SHIPPED | OUTPATIENT
Start: 2024-02-15 | End: 2024-02-23

## 2024-02-01 NOTE — PROGRESS NOTES
Daron is a 43 year old who is being evaluated via a billable video visit.      How would you like to obtain your AVS? MyChart  If the video visit is dropped, the invitation should be resent by: Text to cell phone: [phone]  Will anyone else be joining your video visit? No          Assessment & Plan     Chronic pain syndrome  Degeneration of intervertebral disc of lumbar region  Progressing as expected  Prescription sent in to fill in about two weeks.    PDMP queried with no concerns  - oxyCODONE (ROXICODONE) 5 MG tablet; Take 1 tablet (5 mg) by mouth 3 times daily as needed for severe pain            Nicotine/Tobacco Cessation  He reports that he has been smoking cigarettes. He has been smoking an average of 0.5 packs per day. He has been exposed to tobacco smoke. He has never used smokeless tobacco.  Nicotine/Tobacco Cessation Plan  Information offered: Patient not interested at this time      BMI  Estimated body mass index is 49.77 kg/m  as calculated from the following:    Height as of 8/30/23: 1.829 m (6').    Weight as of 11/13/23: 166.5 kg (367 lb).             Subjective   Daron is a 43 year old, presenting for the following health issues:  Pain Management (Verbal consent given for video visit.   Would like refills on pain medications.   Please send text message to phone.)      2/1/2024    11:47 AM   Additional Questions   Roomed by Fariba JOSUE CMA   Accompanied by Self     HPI     Back Pain:  He presents for follow up of back pain. Patient's back pain is a chronic problem.  Location of back pain:  Right lower back, left lower back, right middle of back, left middle of back, right upper back, left upper back, right side of neck, left side of neck, right shoulder, left shoulder, right buttock, left buttock, right hip, right side of waist and left side of waist  Description of back pain: cramping, dull ache, sharp and stabbing  Back pain spreads: right buttocks, right thigh, right knee, right shoulder, left  shoulder, right side of neck and left side of neck    He is needing refills of his pain medications.  He has been taking his medications as prescribed.  He is still dealing with getting money from his mother's estate and has been going through the legal system which has been frustrating for him.    Back has improved with exercises and stretches.  He has been seeing a chiropractor.              Objective           Vitals:  No vitals were obtained today due to virtual visit.    Physical Exam   GENERAL: alert and no distress  EYES: Eyes grossly normal to inspection.  No discharge or erythema, or obvious scleral/conjunctival abnormalities.  RESP: No audible wheeze, cough, or visible cyanosis.    SKIN: Visible skin clear. No significant rash, abnormal pigmentation or lesions.  NEURO: Cranial nerves grossly intact.  Mentation and speech appropriate for age.  PSYCH: Appropriate affect, tone, and pace of words          Video-Visit Details    Type of service:  Video Visit       Originating Location (pt. Location): Home    Distant Location (provider location):  On-site  Platform used for Video Visit: Mery  Signed Electronically by: Mata Hearn MD

## 2024-02-14 ENCOUNTER — PATIENT OUTREACH (OUTPATIENT)
Dept: CARE COORDINATION | Facility: CLINIC | Age: 44
End: 2024-02-14
Payer: COMMERCIAL

## 2024-02-19 ENCOUNTER — PATIENT OUTREACH (OUTPATIENT)
Dept: CARE COORDINATION | Facility: CLINIC | Age: 44
End: 2024-02-19
Payer: COMMERCIAL

## 2024-02-19 NOTE — PROGRESS NOTES
Clinic Care Coordination Contact  Community Health Worker Follow Up    Care Gaps:     Health Maintenance Due   Topic Date Due    URINE DRUG SCREEN  Never done    ASTHMA ACTION PLAN  Never done    COVID-19 Vaccine (1) Never done    Pneumococcal Vaccine: Pediatrics (0 to 5 Years) and At-Risk Patients (6 to 64 Years) (1 of 2 - PCV) Never done    MEDICARE ANNUAL WELLNESS VISIT  Never done    GLUCOSE  04/27/2021    BMP  08/15/2023    INFLUENZA VACCINE (1) 09/01/2023    PHQ-2 (once per calendar year)  01/01/2024       Patient accepted scheduling phone number for  [facility]  to schedule independently     Care Plan:   Care Plan: General       Problem: HP GENERAL PROBLEM       Goal: I have accomplished finding new housing in [place].  Completed 9/22/2023      Start Date: 5/22/2023 Expected End Date: 8/1/2023    This Visit's Progress: 100% Recent Progress: 50%    Priority: High    Note:     Personal Plan  If I am needing to look into new housing options I can look into [employer].                            Care Plan: General       Problem: HP GENERAL PROBLEM       Goal: I have accomplished obtaining resources for assistance  Completed 2/19/2024      Start Date: 6/28/2023    This Visit's Progress: 100% Recent Progress: 50%    Note:     Personal Plan  If I am needing to look into resources in the future I can call the Disability Linkage Line @ [phone].                            Care Plan: Financial Wellbeing       Problem: Patient expresses financial resource strain       Goal: Energy Assistance and SNAP       Start Date: 9/22/2023    This Visit's Progress: 70% Recent Progress: 50%    Priority: High    Note:     Goal Statement: I will apply for health insurance within the next 1-2 weeks if I am eligible.     Strengths:  Motivated  Barriers: Overwhelmed  Patient expressed understanding of goal: Yes  Action steps to achieve this goal:  I understand a referral was placed to the Financial Resource Worker to look  into Medical Assistance and SNAP, I will receive a call within the next 3 business days. I was approved for Medical Assistance and SNAP for $220. I will call to check on my Energy Assistance application and will call [employer] back to discuss applying for Medicare Savings Program.   I understand the financial worker will make two attempts to call me. If I still need help with this goal, I will connect with my Community Health Worker Sujatha Urena at [phone].                                     Intervention and Education during outreach: Patient has obtained needed resources-This goal is completed. Patient would like to also apply for Medicare Saving Program and will call [employer] Humphrey back. No other needs at this time.    CHW Plan: CHW will follow up with patient in about 1 month.     Sujatha Urena  Community Health Worker  [facility] Care Coordination   [place], [facility], [facility]  Office: [phone]

## 2024-02-23 ENCOUNTER — PATIENT OUTREACH (OUTPATIENT)
Dept: CARE COORDINATION | Facility: CLINIC | Age: 44
End: 2024-02-23
Payer: COMMERCIAL

## 2024-02-23 DIAGNOSIS — M51.369 DEGENERATION OF INTERVERTEBRAL DISC OF LUMBAR REGION: ICD-10-CM

## 2024-02-23 NOTE — TELEPHONE ENCOUNTER
Medication Question or Refill    Contacts         Type Contact Phone/Fax    02/23/2024 03:34 PM CST Phone (Incoming) Daron Bond (Self) [phone] (M)            What medication are you calling about (include dose and sig)?: oxyCODONE (ROXICODONE) 5 MG tablet     Preferred Pharmacy:   [id number] [address] & [address] [address]  Phone: [phone] Fax: [phone]      Controlled Substance Agreement on file:   CSA -- Patient Level:     [Media Unavailable] Controlled Substance Agreement - Opioid - Scan on 6/23/2023  8:42 AM   [Media Unavailable] Controlled Substance Agreement - Opioid - Scan on 5/27/2022  7:39 PM       Who prescribed the medication?: Dr. Hearn    Do you need a refill? Yes    Do you have any questions or concerns?  Yes: pt states that he meant to call on Monday; but with a lot of stress that he is under and TBI -was forgotten.      Pt has a virtual appt on 2/27/2024      Could we send this information to you in [employer] or would you prefer to receive a phone call?:   Patient would prefer a phone call   Okay to leave a detailed message?: Yes at Cell number on file:    Telephone Information:   Mobile [phone]

## 2024-02-26 RX ORDER — OXYCODONE HYDROCHLORIDE 5 MG/1
5 TABLET ORAL 3 TIMES DAILY PRN
Qty: 45 TABLET | Refills: 0 | Status: SHIPPED | OUTPATIENT
Start: 2024-02-26 | End: 2024-03-21

## 2024-02-27 ENCOUNTER — VIRTUAL VISIT (OUTPATIENT)
Dept: FAMILY MEDICINE | Facility: CLINIC | Age: 44
End: 2024-02-27
Payer: COMMERCIAL

## 2024-02-27 DIAGNOSIS — L21.9 SEBORRHEIC DERMATITIS OF SCALP: ICD-10-CM

## 2024-02-27 DIAGNOSIS — G89.4 CHRONIC PAIN SYNDROME: ICD-10-CM

## 2024-02-27 DIAGNOSIS — F41.1 GAD (GENERALIZED ANXIETY DISORDER): Primary | ICD-10-CM

## 2024-02-27 PROCEDURE — 99214 OFFICE O/P EST MOD 30 MIN: CPT | Mod: 95 | Performed by: FAMILY MEDICINE

## 2024-02-27 RX ORDER — KETOCONAZOLE 20 MG/ML
SHAMPOO TOPICAL DAILY PRN
Qty: 100 ML | Refills: 1 | Status: SHIPPED | OUTPATIENT
Start: 2024-02-27 | End: 2024-04-17

## 2024-02-27 ASSESSMENT — PATIENT HEALTH QUESTIONNAIRE - PHQ9
SUM OF ALL RESPONSES TO PHQ QUESTIONS 1-9: 24
SUM OF ALL RESPONSES TO PHQ QUESTIONS 1-9: 24

## 2024-02-27 NOTE — PROGRESS NOTES
Daron is a 44 year old who is being evaluated via a billable video visit.      How would you like to obtain your AVS? MyChart  If the video visit is dropped, the invitation should be resent by: Text to cell phone: [phone]  Will anyone else be joining your video visit? No          Assessment & Plan     TYRELL (generalized anxiety disorder)  Somewhat improved.  He will continue his current medications.  [employer] mobility form has been filled out.  He will be mailed the form for him to complete.  I have completed the physician portion.    Seborrheic dermatitis of scalp  This has been successfully treated with ketoconazole shampoo.  This has been refilled today.  - ketoconazole (NIZORAL) 2 % external shampoo; Apply topically daily as needed for itching or irritation    Chronic pain syndrome  Improving, PDMP queried, medication has been refilled previously this week.            BMI  Estimated body mass index is 49.77 kg/m  as calculated from the following:    Height as of 8/30/23: 1.829 m (6').    Weight as of 11/13/23: 166.5 kg (367 lb).       Depression Screening Follow Up        2/27/2024    10:43 AM   PHQ   PHQ-9 Total Score 24   Q9: Thoughts of better off dead/self-harm past 2 weeks Not at all         Follow Up Actions Taken  Crisis resource information provided in After Visit Summary           Subjective   Daron is a 44 year old, presenting for the following health issues:   Follow Up (Verbal consent given for video visit.  Medication review, discuss increased anxiety, stress)      2/27/2024    10:37 AM   Additional Questions   Roomed by Fariba JOSUE CMA   Accompanied by Self     History of Present Illness       Reason for visit:  Increased stress, anxiety    He eats 0-1 servings of fruits and vegetables daily.He consumes 0 sweetened beverage(s) daily.He exercises with enough effort to increase his heart rate 10 to 19 minutes per day.  He exercises with enough effort to increase his heart rate 3 or less days per  week.   He is taking medications regularly.       Patient would like to discuss his medications as well as increased stress and anxiety that he has been dealing with lately.  He does have financial issues as well as chronic back pain that he has been having issues with and has been causing him to be more stressed out.              Objective           Vitals:  No vitals were obtained today due to virtual visit.    Physical Exam   GENERAL: alert and no distress  EYES: Eyes grossly normal to inspection.  No discharge or erythema, or obvious scleral/conjunctival abnormalities.  RESP: No audible wheeze, cough, or visible cyanosis.    SKIN: Visible skin clear. No significant rash, abnormal pigmentation or lesions.  NEURO: Cranial nerves grossly intact.  Mentation and speech appropriate for age.  PSYCH: Appropriate affect, tone, and pace of words          Video-Visit Details    Type of service:  Video Visit   Video Sta rt Time:  1125  Video End Time: 1158    Originating Location (pt. Location): Home    Distant Location (provider location):  On-site  Platform used for Video Visit: Mery  Signed Electronically by: Mata Hearn MD

## 2024-02-28 ENCOUNTER — PATIENT OUTREACH (OUTPATIENT)
Dept: CARE COORDINATION | Facility: CLINIC | Age: 44
End: 2024-02-28
Payer: COMMERCIAL

## 2024-02-28 NOTE — LETTER
[facility] COORDIN[facility]  [address] [facility] [address]   February 28, 2024        Daron Bond  [address]  [place] [facility] [address]          Dear Daron,     Attached is an updated Patient Centered Plan of Care for your continued enrollment in Care Coordination. Please let us know if you have additional questions, concerns, or goals that we can assist with.    Sincerely,    Idalmis Wells, [facility] Clinical Care Coordinator  [facility], [place], [place], [place], [facility], and [facility]  Patient Centered Plan of Care  About Me:        Patient Name:  Daron Bond    YOB: 1980  Age:         44 year old   [place] MRN:    [id number] Telephone Information:  Home Phone [phone]   Mobile [phone]   Home Phone Not on file.       Address:  [address]  [place] [facility] [address] Email address:  [email or web address]      Emergency Contact(s)    Name Relationship Lgl Grd Work Phone Home Phone Mobile Phone           Primary language:  English     needed? No   [place] [facility]:  [phone] op. 1  Other communication barriers:None    Preferred Method of Communication:     Current living arrangement: I live alone    Mobility Status/ Medical Equipment: Independent        Health Maintenance  Health Maintenance Reviewed: Due/Overdue   Health Maintenance Due   Topic Date Due    URINE DRUG SCREEN  Never done    ASTHMA ACTION PLAN  Never done    Pneumococcal Vaccine: Pediatrics (0 to 5 Years) and At-Risk Patients (6 to 64 Years) (1 of 2 - PCV) Never done    MEDICARE ANNUAL WELLNESS VISIT  Never done    GLUCOSE  04/27/2021    BMP  08/15/2023    INFLUENZA VACCINE (1) 09/01/2023    COVID-19 Vaccine (1 - 2023-24 season) Never done          My Access Plan  Medical Emergency 911   Primary Clinic Line [facility] - [phone]   24 Hour Appointment  Line [phone] or  [phone]) (toll-free)   24 Hour Nurse Line [phone] (toll-free)   Preferred Urgent Care Other     Preferred Hospital Other     Preferred Pharmacy [id number] [place] [id number] AT [facility] [address]     Behavioral Health Crisis Line The National Suicide Prevention Lifeline at 1-873.408.1013 or Text/Call 988           My Care Team Members  Patient Care Team         Relationship Specialty Notifications Start End    Mata Hearn MD PCP - General Family Practice  12/1/20     Phone: [phone] Fax: [phone]         [address] [place] [address]    Mata Hearn MD Assigned PCP   2/27/22     Phone: [phone] Fax: [phone]         [address] [place] [address]    Mata Hearn MD Assigned Pain Medication Provider   1/9/23     Phone: [phone] Fax: [phone]         [address] [place] [address]    Idalmis Wells LICSW Lead Care Coordinator  Admissions 5/22/23     Phone: [phone]         Sujatha Urena CHW Community Health Worker  Admissions 5/23/23     [phone]    Phone: [phone]         Lonnie Heck CMA Financial Resource Worker   9/22/23     Phone: [phone]                     My Care Plans  Self Management and Treatment Plan    Care Plan  Care Plan: General       Problem: HP GENERAL PROBLEM                   Care Plan: General       Problem: HP GENERAL PROBLEM                   Care Plan: Financial Wellbeing       Problem: Patient expresses financial resource strain       Goal: Energy Assistance and SNAP       Start Date: 9/22/2023    This Visit's Progress: 70% Recent Progress: 50%    Priority: High    Note:     Goal Statement: I will apply for health insurance within the next 1-2 weeks if I am eligible.     Strengths:  Motivated  Barriers: Overwhelmed  Patient expressed understanding of goal: Yes  Action steps to achieve this goal:  I understand a referral was placed to  the Financial Resource Worker to look into Medical Assistance and SNAP, I will receive a call within the next 3 business days. I was approved for Medical Assistance and SNAP for $220. I will call to check on my Energy Assistance application and will call [employer] back to discuss applying for Medicare Savings Program.   I understand the financial worker will make two attempts to call me. If I still need help with this goal, I will connect with my Community Health Worker Sujatha Urena at [phone].                                   Care Plan: Health Maintenance       Problem: Health Maintenance Due or Overdue       Goal: Become up-to-date with health maintenance visit(s)       Start Date: 2/28/2024    Note:     Goal Statement: I will complete my annual wellness visit.    Barriers: life stress  Strengths: accepting of help    Patient expressed understanding of goal: yes  Action steps to achieve this goal:  1. I will schedule my annual wellness visit; [phone] ([phone])  2. I will attend my annual wellness visit.  3. I will contact my Care Management or clinic team if I have barriers to attending my annual wellness visit.                                             My Medical and Care Information  Problem List   Patient Active Problem List   Diagnosis    Cerebellar tonsillar ectopia (H)    Asthma, moderate persistent    Degeneration of intervertebral disc of lumbar region    OCD (obsessive compulsive disorder)    Myofascial pain syndrome    Obesity, morbid (H)    Chronic pain syndrome    Bipolar I disorder (H)    Spondylolisthesis of lumbar region    Spinal stenosis of lumbar region    TYRELL (generalized anxiety disorder)    Primary hypertension      Current Medications and Allergies:    Current Outpatient Medications   Medication    albuterol (PROAIR HFA/PROVENTIL HFA/VENTOLIN HFA) 108 (90 Base) MCG/ACT inhaler    amLODIPine (NORVASC) 5 MG tablet    busPIRone (BUSPAR) 10 MG tablet    ketoconazole (NIZORAL)  2 % external shampoo    losartan (COZAAR) 100 MG tablet    metoprolol succinate ER (TOPROL XL) 100 MG 24 hr tablet    oxyCODONE (ROXICODONE) 5 MG tablet    pantoprazole (PROTONIX) 40 MG EC tablet     No current facility-administered medications for this visit.       Allergies   Allergen Reactions    Banana     Bee Venom     Latex         Care Coordination Start Date: 5/18/2023   Frequency of Care Coordination: monthly, more frequently as needed     Form Last Updated: 02/28/2024

## 2024-02-28 NOTE — PROGRESS NOTES
Clinic Care Coordination Contact  Care Coordination Clinician Chart Review    Situation: Patient chart reviewed by Care Coordinator.       Background: Care Coordination Program started: 5/18/2023. Initial assessment completed and patient-centered care plan(s) were developed with participation from patient. Lead CC handed patient off to CHW for continued outreaches.       Assessment: Per chart review, patient outreach completed by CC CHW on 2/19/24.  Patient is actively working to accomplish goal(s). Patient's goal(s) appropriate and relevant at this time. Patient is due for updated Plan of Care.  Assessments will be completed annually or as needed/with change of patient status.      Care Plan: General       Problem: HP GENERAL PROBLEM                   Care Plan: General       Problem: HP GENERAL PROBLEM                   Care Plan: Financial Wellbeing       Problem: Patient expresses financial resource strain       Goal: Energy Assistance and SNAP       Start Date: 9/22/2023    This Visit's Progress: 70% Recent Progress: 50%    Priority: High    Note:     Goal Statement: I will apply for health insurance within the next 1-2 weeks if I am eligible.     Strengths:  Motivated  Barriers: Overwhelmed  Patient expressed understanding of goal: Yes  Action steps to achieve this goal:  I understand a referral was placed to the Financial Resource Worker to look into Medical Assistance and SNAP, I will receive a call within the next 3 business days. I was approved for Medical Assistance and SNAP for $220. I will call to check on my Energy Assistance application and will call [employer] back to discuss applying for Medicare Savings Program.   I understand the financial worker will make two attempts to call me. If I still need help with this goal, I will connect with my Community Health Worker Sujatha Urena at [phone].                                   Care Plan: Health Maintenance       Problem: Health Maintenance Due  or Overdue       Goal: Become up-to-date with health maintenance visit(s)       Start Date: 2/28/2024    Note:     Goal Statement: I will complete my annual wellness visit.    Barriers: life stress  Strengths: accepting of help    Patient expressed understanding of goal: yes  Action steps to achieve this goal:  1. I will schedule my annual wellness visit; [phone] ([phone])  2. I will attend my annual wellness visit.  3. I will contact my Care Management or clinic team if I have barriers to attending my annual wellness visit.                                   Plan/Recommendations: The patient will continue working with Care Coordination to achieve goal(s) as above. CHW will continue outreaches at minimum every 30 days and will involve Lead CC as needed or if patient is ready to move to Maintenance. Lead CC will continue to monitor CHW outreaches and patient's progress to goal(s) every 6 weeks.     Plan of Care updated and sent to patient: Yes, via Press Play

## 2024-02-29 ENCOUNTER — MYC MEDICAL ADVICE (OUTPATIENT)
Dept: FAMILY MEDICINE | Facility: CLINIC | Age: 44
End: 2024-02-29
Payer: COMMERCIAL

## 2024-02-29 DIAGNOSIS — G89.4 CHRONIC PAIN SYNDROME: Primary | ICD-10-CM

## 2024-02-29 DIAGNOSIS — M43.16 SPONDYLOLISTHESIS OF LUMBAR REGION: ICD-10-CM

## 2024-03-01 ENCOUNTER — MYC MEDICAL ADVICE (OUTPATIENT)
Dept: FAMILY MEDICINE | Facility: CLINIC | Age: 44
End: 2024-03-01
Payer: COMMERCIAL

## 2024-03-02 DIAGNOSIS — K21.00 GASTROESOPHAGEAL REFLUX DISEASE WITH ESOPHAGITIS, UNSPECIFIED WHETHER HEMORRHAGE: ICD-10-CM

## 2024-03-04 RX ORDER — PANTOPRAZOLE SODIUM 40 MG/1
40 TABLET, DELAYED RELEASE ORAL DAILY
Qty: 90 TABLET | Refills: 2 | Status: SHIPPED | OUTPATIENT
Start: 2024-03-04

## 2024-03-06 DIAGNOSIS — K92.1 HEMATOCHEZIA: Primary | ICD-10-CM

## 2024-03-07 NOTE — TELEPHONE ENCOUNTER
He needs a diagnostic colonoscopy given history of rectal bleeding.  A referral was placed to the [place] [facility] at [facility].  I would like him to pursue this referral.

## 2024-03-07 NOTE — TELEPHONE ENCOUNTER
Referral faxed. Patient notified via [employer].  Cailin St CMA ............... 4:18 PM, 03/07/24

## 2024-03-08 ENCOUNTER — OFFICE VISIT (OUTPATIENT)
Dept: CARDIOLOGY | Facility: CLINIC | Age: 44
End: 2024-03-08
Payer: COMMERCIAL

## 2024-03-08 VITALS
HEART RATE: 74 BPM | DIASTOLIC BLOOD PRESSURE: 97 MMHG | HEIGHT: 72 IN | RESPIRATION RATE: 16 BRPM | SYSTOLIC BLOOD PRESSURE: 148 MMHG | WEIGHT: 315 LBS | BODY MASS INDEX: 42.66 KG/M2

## 2024-03-08 DIAGNOSIS — R07.89 ATYPICAL CHEST PAIN: ICD-10-CM

## 2024-03-08 DIAGNOSIS — E66.01 OBESITY, MORBID (H): Primary | ICD-10-CM

## 2024-03-08 DIAGNOSIS — G89.4 CHRONIC PAIN SYNDROME: ICD-10-CM

## 2024-03-08 DIAGNOSIS — I10 PRIMARY HYPERTENSION: ICD-10-CM

## 2024-03-08 PROCEDURE — 93000 ELECTROCARDIOGRAM COMPLETE: CPT | Performed by: INTERNAL MEDICINE

## 2024-03-08 PROCEDURE — 99204 OFFICE O/P NEW MOD 45 MIN: CPT | Performed by: INTERNAL MEDICINE

## 2024-03-08 RX ORDER — HYDROCHLOROTHIAZIDE 12.5 MG/1
12.5 CAPSULE ORAL EVERY MORNING
Qty: 90 CAPSULE | Refills: 3 | Status: SHIPPED | OUTPATIENT
Start: 2024-03-08

## 2024-03-08 NOTE — LETTER
3/8/2024    Mata Hearn MD  [address] [facility] [address]    RE: Daron Bond       Dear Colleague,     I had the pleasure of seeing Daron Bond in the [facility].    CARDIOLOGY CLINIC CONSULT NOTE     Assessment/Plan:   1.  Chest pain with atypical features in a 44-year-old gentleman with multiple poorly controlled risk factors for coronary disease and persistent exertional dyspnea and fatigue.  We will schedule an exercise nuclear stress test.  If he is unable to exercise, would favor cardioverting to a pharmacologic myocardial perfusion imaging study.  As he plans for bariatric surgery, this would also clear him for this procedure.  2.  Tobacco abuse.  Smoking cessation encouraged, assistance offered.  He wishes to defer efforts at quitting smoking at this time.  3.  Hypertension with inadequate control.  Could be contributing to his fatigue symptoms.  Will add HCTZ 12.5 mg daily to his present medical regimen.  He is quite concerned about adding additional medicines.  4.  Morbid obesity, sleep difficulties, fatigue, anxiety, night sweats.  All suggestive of obstructive sleep apnea as a potential cause of many of his symptoms.  He was strongly recommended to seek formal evaluation.    Follow-up for abnormalities on stress testing.     History of Present Illness:     It is my pleasure to see Daron Bond at the [facility] clinic for evaluation of chest pain.He offers a number of complaints today as well, concerns about varicose veins, his anxiety and panic attacks, his financial difficulties and weight gain, and ongoing tobacco abuse.  We attempted to focus on potentially cardiac related issues.    Daron Bond is a 44 year old male with a past medical history of morbid obesity, bipolar affective disorder, chronic pain syndrome.  He believes he experienced a myocardial infarction a few years ago, I can find no record of this after extensive chart  "review.  He did have an echocardiogram done in 2019 that was reportedly normal.  I see no record of prior stress testing.  He reports considerable weight gain in the last few years and has had chronic trigger points for chest pains.  These chest pains have increased in frequency and appear to be sharp well localized.  In addition he has more longstanding discomfort that is not particularly worsened with short walks on the treadmill over the last month or so.  He is able to last only about 5 minutes before stopping because of fatigue, or a \"hollow sensation\" in his mid chest.  The chest pains do not awaken him from his sleep.    Does feel his sleep is poorly restorative for a number of years.  His fatigue is progressed to the point where he is no longer able to clean his apartment without assistance because he gets short of breath and fatigue.  He tries to avoid taking naps during the daytime.  He has had a number of adjustments in his antianxiety medications which he reports have given variable results.  He is not on any sodium limitation and does not monitor his blood pressure at home.  He quit smoking 3 years ago when his mother  cold turkey for 4 months before resuming it.  Currently smokes about a pack of cigarettes daily.    He is confident that he could walk on a treadmill to complete a stress test.    Past Medical History:     Patient Active Problem List   Diagnosis    Cerebellar tonsillar ectopia (H)    Asthma, moderate persistent    Degeneration of intervertebral disc of lumbar region    OCD (obsessive compulsive disorder)    Myofascial pain syndrome    Obesity, morbid (H)    Chronic pain syndrome    Bipolar I disorder (H)    Spondylolisthesis of lumbar region    Spinal stenosis of lumbar region    TYRELL (generalized anxiety disorder)    Primary hypertension       Past Surgical History:     Past Surgical History:   Procedure Laterality Date    ARTHROSCOPY KNEE Right     no date given    C CORTISONE " INJECTION  06/08/2016    COLONOSCOPY  08/04/2015    Internal/external hemorrhoids.  Repeat at age 50    ESOPHAGOGASTRODUODENOSCOPY W/ BANDING  08/04/2015    EYE SURGERY      x 3 no dates given    HEMORRHOIDECTOMY  09/08/2015    PSYCH SVC, INTENSIVE OUTPT  2002       Family History:     Family History   Problem Relation Age of Onset    Diabetes Type 2  Mother     Heart Disease Father      Family history reviewed and is not pertinent to the chief complaint or presenting problem    Social History:    reports that he has been smoking cigarettes. He has been smoking an average of 0.5 packs per day. He has been exposed to tobacco smoke. He has never used smokeless tobacco. He reports that he does not currently use alcohol. He reports current drug use. Drug: Oxycodone.    Exercise: Walks 5 minutes on a treadmill, limited by fatigue    Sleep: Poorly restorative with daytime sleepiness.  Reports he was diagnosed with sleep apnea a number of years ago but not recently    Meds:     Current Outpatient Medications   Medication Sig Dispense Refill    albuterol (PROAIR HFA/PROVENTIL HFA/VENTOLIN HFA) 108 (90 Base) MCG/ACT inhaler INHALE 2 PUFFS EVERY 6 HOURS 8.5 g 4    amLODIPine (NORVASC) 5 MG tablet TAKE 1 TABLET BY MOUTH EVERY DAY 30 tablet 5    busPIRone (BUSPAR) 10 MG tablet Take 2 tablets (20 mg) by mouth 2 times daily (Patient taking differently: Take 20 mg by mouth 3 times daily) 120 tablet 5    ketoconazole (NIZORAL) 2 % external shampoo Apply topically daily as needed for itching or irritation 100 mL 1    losartan (COZAAR) 100 MG tablet Take 1 tablet (100 mg) by mouth daily 90 tablet 1    metoprolol succinate ER (TOPROL XL) 100 MG 24 hr tablet Take 1 tablet (100 mg) by mouth daily 30 tablet 5    oxyCODONE (ROXICODONE) 5 MG tablet Take 1 tablet (5 mg) by mouth 3 times daily as needed for severe pain 45 tablet 0    pantoprazole (PROTONIX) 40 MG EC tablet TAKE 1 TABLET BY MOUTH DAILY 90 tablet 2       Allergies:   Banana,  Bee venom, and Latex    Review of Systems:     Diffusely positive for night sweats, weight gain, weight loss, chest pain, arm pain, irregular heartbeat, leg swelling, diarrhea, heartburn, joint pain, muscle weakness, muscle pain, daytime sleepiness, confusion, depression, anxiety.     Please refer above for cardiac ROS details.       Objective:      Physical Exam  (!) 164.2 kg (362 lb)  1.829 m (6')  Body mass index is 49.1 kg/m .  BP (!) 148/97 (BP Location: Right arm, Patient Position: Sitting, Cuff Size: Adult Large)   Pulse 74   Resp 16   Ht 1.829 m (6')   Wt (!) 164.2 kg (362 lb)   BMI 49.10 kg/m          General Appearance : Awake, Alert, No acute distress talkative, difficult to maintain focus  HEENT: No Scleral icterus; the mucous membranes were pink and moist.  Conjunctivae slightly injected  Neck: Stout.  No cervical bruits, jugular venous distention, or thyromegaly appreciated  Chest: The spine was straight. Chest wall symmetric.  Point tenderness on costochondral margin does not reproduce his previous chest discomfort  Lungs: Respirations unlabored; the lungs are clear to auscultation.  No wheezing   Cardiovascular: Nonpalpable point of maximal impulse.  Auscultation reveals regular first and second heart sounds with no murmurs, rubs, or gallops.  Carotid, radial, and dorsalis pedal pulses are intact and symmetric.    Abdomen: No organomegaly, masses, bruits, or tenderness. Bowels sounds are present  Extremities: Mild superficial varicosities.  Trace tibial and ankle edema  Skin: No xanthelasma. Warm, Dry.  Musculoskeletal: No tenderness.  Neurologic: Alert and oriented ×3. Speech is fluent.      EKG (personally reviewed):  Sinus rhythm 70 bpm.  Borderline voltage criteria for left ventricular hypertrophy    Cardiac Imaging Studies:  Echocardiogram 2021 at [facility].  Reported to show LVEF 60 to 65%, normal RV function, no significant valvular abnormalities.    Lab Review   No results found for:  "\"NA\", \"CO2\", \"BUN\", \"CREATININE\", \"GLUCOSE\"  Lab Results   Component Value Date    WBC 11.0 08/15/2019    HGB 16.4 08/15/2019    HCT 47.0 08/15/2019    MCV 83 08/15/2019     08/15/2019     Lab Results   Component Value Date    CHOL 182 07/02/2018    TRIG 146 07/02/2018    HDL 48 07/02/2018     No results found for: \"TROPONINI\"  No results found for: \"BNP\"  No results found for: \"TSH\"    Gold Little MD, MD formerly [facility]      This note created using Dragon voice recognition software. Sound alike errors may have escaped editing.        Thank you for allowing me to participate in the care of your patient.      Sincerely,     Gold Little MD     [facility]  cc:   No referring provider defined for this encounter.      "

## 2024-03-08 NOTE — PROGRESS NOTES
CARDIOLOGY CLINIC CONSULT NOTE     Assessment/Plan:   1.  Chest pain with atypical features in a 44-year-old gentleman with multiple poorly controlled risk factors for coronary disease and persistent exertional dyspnea and fatigue.  We will schedule an exercise nuclear stress test.  If he is unable to exercise, would favor cardioverting to a pharmacologic myocardial perfusion imaging study.  As he plans for bariatric surgery, this would also clear him for this procedure.  2.  Tobacco abuse.  Smoking cessation encouraged, assistance offered.  He wishes to defer efforts at quitting smoking at this time.  3.  Hypertension with inadequate control.  Could be contributing to his fatigue symptoms.  Will add HCTZ 12.5 mg daily to his present medical regimen.  He is quite concerned about adding additional medicines.  4.  Morbid obesity, sleep difficulties, fatigue, anxiety, night sweats.  All suggestive of obstructive sleep apnea as a potential cause of many of his symptoms.  He was strongly recommended to seek formal evaluation.    Follow-up for abnormalities on stress testing.     History of Present Illness:     It is my pleasure to see Daron Bond at the [facility] clinic for evaluation of chest pain.He offers a number of complaints today as well, concerns about varicose veins, his anxiety and panic attacks, his financial difficulties and weight gain, and ongoing tobacco abuse.  We attempted to focus on potentially cardiac related issues.    Daron Bond is a 44 year old male with a past medical history of morbid obesity, bipolar affective disorder, chronic pain syndrome.  He believes he experienced a myocardial infarction a few years ago, I can find no record of this after extensive chart review.  He did have an echocardiogram done in 2019 that was reportedly normal.  I see no record of prior stress testing.  He reports considerable weight gain in the last few years and has had chronic  "trigger points for chest pains.  These chest pains have increased in frequency and appear to be sharp well localized.  In addition he has more longstanding discomfort that is not particularly worsened with short walks on the treadmill over the last month or so.  He is able to last only about 5 minutes before stopping because of fatigue, or a \"hollow sensation\" in his mid chest.  The chest pains do not awaken him from his sleep.    Does feel his sleep is poorly restorative for a number of years.  His fatigue is progressed to the point where he is no longer able to clean his apartment without assistance because he gets short of breath and fatigue.  He tries to avoid taking naps during the daytime.  He has had a number of adjustments in his antianxiety medications which he reports have given variable results.  He is not on any sodium limitation and does not monitor his blood pressure at home.  He quit smoking 3 years ago when his mother  cold turkey for 4 months before resuming it.  Currently smokes about a pack of cigarettes daily.    He is confident that he could walk on a treadmill to complete a stress test.    Past Medical History:     Patient Active Problem List   Diagnosis    Cerebellar tonsillar ectopia (H)    Asthma, moderate persistent    Degeneration of intervertebral disc of lumbar region    OCD (obsessive compulsive disorder)    Myofascial pain syndrome    Obesity, morbid (H)    Chronic pain syndrome    Bipolar I disorder (H)    Spondylolisthesis of lumbar region    Spinal stenosis of lumbar region    TYRELL (generalized anxiety disorder)    Primary hypertension       Past Surgical History:     Past Surgical History:   Procedure Laterality Date    ARTHROSCOPY KNEE Right     no date given    C CORTISONE INJECTION  2016    COLONOSCOPY  2015    Internal/external hemorrhoids.  Repeat at age 50    ESOPHAGOGASTRODUODENOSCOPY W/ BANDING  2015    EYE SURGERY      x 3 no dates given    " HEMORRHOIDECTOMY  09/08/2015    PSYCH SVC, INTENSIVE OUTPT  2002       Family History:     Family History   Problem Relation Age of Onset    Diabetes Type 2  Mother     Heart Disease Father      Family history reviewed and is not pertinent to the chief complaint or presenting problem    Social History:    reports that he has been smoking cigarettes. He has been smoking an average of 0.5 packs per day. He has been exposed to tobacco smoke. He has never used smokeless tobacco. He reports that he does not currently use alcohol. He reports current drug use. Drug: Oxycodone.    Exercise: Walks 5 minutes on a treadmill, limited by fatigue    Sleep: Poorly restorative with daytime sleepiness.  Reports he was diagnosed with sleep apnea a number of years ago but not recently    Meds:     Current Outpatient Medications   Medication Sig Dispense Refill    albuterol (PROAIR HFA/PROVENTIL HFA/VENTOLIN HFA) 108 (90 Base) MCG/ACT inhaler INHALE 2 PUFFS EVERY 6 HOURS 8.5 g 4    amLODIPine (NORVASC) 5 MG tablet TAKE 1 TABLET BY MOUTH EVERY DAY 30 tablet 5    busPIRone (BUSPAR) 10 MG tablet Take 2 tablets (20 mg) by mouth 2 times daily (Patient taking differently: Take 20 mg by mouth 3 times daily) 120 tablet 5    ketoconazole (NIZORAL) 2 % external shampoo Apply topically daily as needed for itching or irritation 100 mL 1    losartan (COZAAR) 100 MG tablet Take 1 tablet (100 mg) by mouth daily 90 tablet 1    metoprolol succinate ER (TOPROL XL) 100 MG 24 hr tablet Take 1 tablet (100 mg) by mouth daily 30 tablet 5    oxyCODONE (ROXICODONE) 5 MG tablet Take 1 tablet (5 mg) by mouth 3 times daily as needed for severe pain 45 tablet 0    pantoprazole (PROTONIX) 40 MG EC tablet TAKE 1 TABLET BY MOUTH DAILY 90 tablet 2       Allergies:   Banana, Bee venom, and Latex    Review of Systems:     Diffusely positive for night sweats, weight gain, weight loss, chest pain, arm pain, irregular heartbeat, leg swelling, diarrhea, heartburn, joint  "pain, muscle weakness, muscle pain, daytime sleepiness, confusion, depression, anxiety.     Please refer above for cardiac ROS details.       Objective:      Physical Exam  (!) 164.2 kg (362 lb)  1.829 m (6')  Body mass index is 49.1 kg/m .  BP (!) 148/97 (BP Location: Right arm, Patient Position: Sitting, Cuff Size: Adult Large)   Pulse 74   Resp 16   Ht 1.829 m (6')   Wt (!) 164.2 kg (362 lb)   BMI 49.10 kg/m          General Appearance : Awake, Alert, No acute distress talkative, difficult to maintain focus  HEENT: No Scleral icterus; the mucous membranes were pink and moist.  Conjunctivae slightly injected  Neck: Stout.  No cervical bruits, jugular venous distention, or thyromegaly appreciated  Chest: The spine was straight. Chest wall symmetric.  Point tenderness on costochondral margin does not reproduce his previous chest discomfort  Lungs: Respirations unlabored; the lungs are clear to auscultation.  No wheezing   Cardiovascular: Nonpalpable point of maximal impulse.  Auscultation reveals regular first and second heart sounds with no murmurs, rubs, or gallops.  Carotid, radial, and dorsalis pedal pulses are intact and symmetric.    Abdomen: No organomegaly, masses, bruits, or tenderness. Bowels sounds are present  Extremities: Mild superficial varicosities.  Trace tibial and ankle edema  Skin: No xanthelasma. Warm, Dry.  Musculoskeletal: No tenderness.  Neurologic: Alert and oriented ×3. Speech is fluent.      EKG (personally reviewed):  Sinus rhythm 70 bpm.  Borderline voltage criteria for left ventricular hypertrophy    Cardiac Imaging Studies:  Echocardiogram 2021 at [place].  Reported to show LVEF 60 to 65%, normal RV function, no significant valvular abnormalities.    Lab Review   No results found for: \"NA\", \"CO2\", \"BUN\", \"CREATININE\", \"GLUCOSE\"  Lab Results   Component Value Date    WBC 11.0 08/15/2019    HGB 16.4 08/15/2019    HCT 47.0 08/15/2019    MCV 83 08/15/2019     08/15/2019 " "    Lab Results   Component Value Date    CHOL 182 07/02/2018    TRIG 146 07/02/2018    HDL 48 07/02/2018     No results found for: \"TROPONINI\"  No results found for: \"BNP\"  No results found for: \"TSH\"    Gold Little MD, MD FACC      This note created using Dragon voice recognition software. Sound alike errors may have escaped editing.    "

## 2024-03-08 NOTE — PATIENT INSTRUCTIONS
Start HCTZ 12.5 mg daily in addition to your current medications to help with blood pressure control.  Limiting the sodium content in your diet may also help with blood pressure control.  We will schedule an exercise nuclear stress test to look for evidence of heart artery blockages causing your shortness of breath and chest pains.  Many of your symptoms may be caused by sleep apnea.  Please call  [facility] at  to schedule prompt consultation.  Another option is [facility] at [phone]. Another option is [phone].   Quitting smoking is the best thing you can do for your long-term health.

## 2024-03-09 LAB
ATRIAL RATE - MUSE: 70 BPM
DIASTOLIC BLOOD PRESSURE - MUSE: NORMAL MMHG
INTERPRETATION ECG - MUSE: NORMAL
P AXIS - MUSE: -7 DEGREES
PR INTERVAL - MUSE: 150 MS
QRS DURATION - MUSE: 96 MS
QT - MUSE: 390 MS
QTC - MUSE: 421 MS
R AXIS - MUSE: -19 DEGREES
SYSTOLIC BLOOD PRESSURE - MUSE: NORMAL MMHG
T AXIS - MUSE: 8 DEGREES
VENTRICULAR RATE- MUSE: 70 BPM

## 2024-03-14 ENCOUNTER — PATIENT OUTREACH (OUTPATIENT)
Dept: CARE COORDINATION | Facility: CLINIC | Age: 44
End: 2024-03-14
Payer: COMMERCIAL

## 2024-03-14 NOTE — PROGRESS NOTES
Clinic Care Coordination Contact  Follow Up Progress Note      Assessment: [facility] reached out to pt to check in for regular outreach.     Care Gaps:    Health Maintenance Due   Topic Date Due    URINE DRUG SCREEN  Never done    ASTHMA ACTION PLAN  Never done    Pneumococcal Vaccine: Pediatrics (0 to 5 Years) and At-Risk Patients (6 to 64 Years) (1 of 2 - PCV) Never done    MEDICARE ANNUAL WELLNESS VISIT  Never done    GLUCOSE  04/27/2021    BMP  08/15/2023    INFLUENZA VACCINE (1) 09/01/2023    COVID-19 Vaccine (1 - 2023-24 season) Never done       Postponed to next outreach     Care Plans  Care Plan: General       Problem: HP GENERAL PROBLEM                   Care Plan: General       Problem: HP GENERAL PROBLEM                   Care Plan: Financial Wellbeing       Problem: Patient expresses financial resource strain       Goal: Energy Assistance and SNAP       Start Date: 9/22/2023    This Visit's Progress: 70% Recent Progress: 50%    Priority: High    Note:     Goal Statement: I will apply for health insurance within the next 1-2 weeks if I am eligible.     Strengths:  Motivated  Barriers: Overwhelmed  Patient expressed understanding of goal: Yes  Action steps to achieve this goal:  I understand a referral was placed to the Financial Resource Worker to look into Medical Assistance and SNAP, I will receive a call within the next 3 business days. I was approved for Medical Assistance and SNAP for $220. I will call to check on my Energy Assistance application and will call [employer] back to discuss applying for Medicare Savings Program.   I understand the financial worker will make two attempts to call me. If I still need help with this goal, I will connect with my Community Health Worker Sujatha Urena at [phone].                                   Care Plan: Health Maintenance       Problem: Health Maintenance Due or Overdue       Goal: Become up-to-date with health maintenance visit(s)       Start Date:  2/28/2024    Note:     Goal Statement: I will complete my annual wellness visit.    Barriers: life stress  Strengths: accepting of help    Patient expressed understanding of goal: yes  Action steps to achieve this goal:  1. I will schedule my annual wellness visit; [phone] ([phone])  2. I will attend my annual wellness visit.  3. I will contact my Care Management or clinic team if I have barriers to attending my annual wellness visit.                                Intervention/Education provided during outreach:     Market Rx - I have signed you up for the [employer] program. You will be able to start using this benefit in about 2 weeks. $80 monthly for groceries to be spent at [employer] or THE MOBILE MARKET. No ID or payment is required to purchase food; patients simply mention the MarketRx program and their name at check-out to utilize their funds.  Fare For All - Fare For All - [employer] ([employer])  [employer] - [facility] [employer] - The Food Group ([employer])      Help at your door - https://helpatyourdoor.org/services/#grocery  You can get grocery delivery with Help at your Door using your SNAP benefit.     Good in the [place] - https://www.goodinthehood.org/our-programs/food-programs/foodshelf-in-a-box/  Food Shelf In-A-Box at [facility]  [address] [place] [address]  Delivery offered. Contact Madeline Deleon: [phone]  3rd Monday of the month from 3:00 p.m. - 5:00 p.m.          Plan:   [facility] and pt reviewed ongoing needs - pt again noted they are unsure of financial situation: energy assistance and medicare savings. They believe they have complete and turned in the paperwork for both programs but can't be sure. Pt and SWCC reviewed food needs - pt noted SNAP is being used and one food shelf delivery program but no others as pt is not able to go out and about - pt is trying to get metro mobility but this is still in progress.  [facility] resent information on market RX via email: Writer communicated the risks of unencrypted electronic communication and the patient and/or patient representative has agreed to accept the risks and receive unencrypted communication related to the information or resources we have discussed. We reviewed that no PHI will be included.  Email address verified with the patient. Pt noted they have dream of moving out of the state and hope to get a fresh start. Pt confirmed they still have ongoing legal issue and need to work on paperwork - denied any resources for legal help stating they have looked into all options; confirmed still working with [facility].   Care Coordinator will follow up in about 30 days for regular outreach.   Clinic Care Coordination Contact  [facility]/Voicemail    Clinical Data: Care Coordinator Outreach    Outreach Documentation Number of Outreach Attempt   3/14/2024  10:34 AM 1       Left message on patient's voicemail with call back information and requested return call.    Plan: Care Coordinator will try to reach patient again in 3-5 business days.

## 2024-03-21 ENCOUNTER — VIRTUAL VISIT (OUTPATIENT)
Dept: FAMILY MEDICINE | Facility: CLINIC | Age: 44
End: 2024-03-21
Payer: COMMERCIAL

## 2024-03-21 DIAGNOSIS — M79.671 PAIN IN BOTH FEET: ICD-10-CM

## 2024-03-21 DIAGNOSIS — M79.672 PAIN IN BOTH FEET: ICD-10-CM

## 2024-03-21 DIAGNOSIS — R05.9 COUGH, UNSPECIFIED TYPE: ICD-10-CM

## 2024-03-21 DIAGNOSIS — M51.369 DEGENERATION OF INTERVERTEBRAL DISC OF LUMBAR REGION: ICD-10-CM

## 2024-03-21 DIAGNOSIS — M43.16 SPONDYLOLISTHESIS OF LUMBAR REGION: Primary | ICD-10-CM

## 2024-03-21 PROCEDURE — 99213 OFFICE O/P EST LOW 20 MIN: CPT | Mod: 95 | Performed by: FAMILY MEDICINE

## 2024-03-21 RX ORDER — AZITHROMYCIN 250 MG/1
TABLET, FILM COATED ORAL
Qty: 6 TABLET | Refills: 0 | Status: SHIPPED | OUTPATIENT
Start: 2024-03-21 | End: 2024-03-26

## 2024-03-21 RX ORDER — OXYCODONE HYDROCHLORIDE 5 MG/1
5 TABLET ORAL 3 TIMES DAILY PRN
Qty: 45 TABLET | Refills: 0 | Status: SHIPPED | OUTPATIENT
Start: 2024-03-21 | End: 2024-04-17

## 2024-03-21 NOTE — PROGRESS NOTES
Daron is a 44 year old who is being evaluated via a billable video visit.    How would you like to obtain your AVS? MyChart  If the video visit is dropped, the invitation should be resent by: Text to cell phone: [phone]  Will anyone else be joining your video visit? No      Assessment & Plan     Spondylolisthesis of lumbar region  Pain in both feet  Order for orthotics placed  - Miscellaneous Order for DME - ONLY FOR DME    Cough, unspecified type  Concern for pneumonia.  Treat with azithromycin  - azithromycin (ZITHROMAX) 250 MG tablet; Take 2 tablets (500 mg) by mouth daily for 1 day, THEN 1 tablet (250 mg) daily for 4 days.    Degeneration of intervertebral disc of lumbar region  Medication refilled.  PDMP queried no concerns  - oxyCODONE (ROXICODONE) 5 MG tablet; Take 1 tablet (5 mg) by mouth 3 times daily as needed for severe pain      The longitudinal plan of care for the diagnosis(es)/condition(s) as documented were addressed during this visit. Due to the added complexity in care, I will continue to support Daron in the subsequent management and with ongoing continuity of care.        BMI  Estimated body mass index is 49.1 kg/m  as calculated from the following:    Height as of 3/8/24: 1.829 m (6').    Weight as of 3/8/24: 164.2 kg (362 lb).             Subjective   Daron is a 44 year old, presenting for the following health issues:  Follow Up (Verbal consent given for video visit.  Needs rx faxed to [facility] podiatry.  Fax to [phone].) and URI (C/o cold sx last week.   Had SOB, labored breathing Saturday, coughing up blood s/p swallowing cough drop.   Has been taking left over medication which helps.   Concerned about possible pneumonia.)        3/21/2024     9:37 AM   Additional Questions   Roomed by Fariba JOSUE CMA   Accompanied by Self     Video Start Time:  0958    URI       Patient is wanting to discuss possible pneumonia.  He reports having cold symptoms last week, took a cough drop which  he swallowed wrong.   He started having labored breathing/SOB, coughing up some blood Saturday.  He still has a productive cough, chills.    Using Arnuity Ellipta and albuterol MDI with improvement.    Recent saw cardiology and hydrochlorothiazide added, stress test ordered    He is also wanting an order/prescription to be faxed to [facility] for orthotics.              Objective           Vitals:  No vitals were obtained today due to virtual visit.    Physical Exam   GENERAL: alert and no distress  EYES: Eyes grossly normal to inspection.  No discharge or erythema, or obvious scleral/conjunctival abnormalities.  RESP: No audible wheeze, cough, or visible cyanosis.    SKIN: Visible skin clear. No significant rash, abnormal pigmentation or lesions.  NEURO: Cranial nerves grossly intact.  Mentation and speech appropriate for age.  PSYCH: Appropriate affect, tone, and pace of words          Video-Visit Details    Type of service:  Video Visit   Video End Time: 1009  Originating Location (pt. Location): Home    Distant Location (provider location):  On-site  Platform used for Video Visit: Mery  Signed Electronically by: Mata Hearn MD

## 2024-03-22 ENCOUNTER — PATIENT OUTREACH (OUTPATIENT)
Dept: CARE COORDINATION | Facility: CLINIC | Age: 44
End: 2024-03-22
Payer: COMMERCIAL

## 2024-03-29 ENCOUNTER — PATIENT OUTREACH (OUTPATIENT)
Dept: CARE COORDINATION | Facility: CLINIC | Age: 44
End: 2024-03-29
Payer: COMMERCIAL

## 2024-04-15 ENCOUNTER — NURSE TRIAGE (OUTPATIENT)
Dept: NURSING | Facility: CLINIC | Age: 44
End: 2024-04-15
Payer: COMMERCIAL

## 2024-04-15 NOTE — TELEPHONE ENCOUNTER
"Nurse Triage SBAR    Is this a 2nd Level Triage? NO    Situation:     Spoke with 44 yr old Daron who c/o:     Moderate, bilateral foot pain this past week.  Rates foot pain a \"4\" on a 0-10 pain scale.  Patient states he is alarmed because he takes quite a bit of pain and anxiety medication.  Denies foot or ankle injury.  Patient denies swelling.  Denies redness or fever.  Usually wears orthopedic shoes, but has only been able to wear sandals due to recent toe infection.  Patient states he was seen in urgent care and prescribed antibiotics for the toe.  Patient states he has finished the AB for his infection on toe. Infection looks better.    Requesting a refill of Oxycodone 5 mg tablets.  Patient has 2 tablets left.  Requesting refill of Buspirone 10 mg tablets.  3 days left.  Requesting a virtual visit only with PCP.      Consent: not needed    Background:   5-6 discs herniated.  Hx of cerebral events with seizure like symptoms.  Hx of anxiety.      Assessment:   Evaluation needed.    Protocol Recommended Disposition:   See in office within 3 days.    Recommendation:   Advised OV within 3 days per protocol. Pt requests VV with PCP.  Transferred to PSR to schedule visit.  Advised wearing sandals versus orthopedic shoes may be contributing to foot discomfort.  Will forward Rx refill request to team for Oxycodone and Buspirone.  Advised patient to call back if symptoms are worsening.  Patient voiced understanding.    Routed to provider    Does the patient meet one of the following criteria for ADS visit consideration? 16+ years old, with an [facility] PCP     TIP  Providers, please consider if this condition is appropriate for management at one of our Acute and Diagnostic Services sites.     If patient is a good candidate, please use dotphrase <dot>triageresponse and select Refer to ADS to document.    Neida Baker RN  [facility]    Neida Baker RN 11:02 AM 4/15/2024  Reason for Disposition   MODERATE pain " (e.g., interferes with normal activities, limping) and present > 3 days    Additional Information   Negative: Followed an ankle or foot injury   Negative: Toe pain is main symptom   Negative: Ankle pain is main symptom   Negative: Entire foot is cool or blue in comparison to other foot   Negative: Purple or black skin on foot or toe   Negative: Red area or streak and fever   Negative: Swollen foot and fever   Negative: Patient sounds very sick or weak to the triager   Negative: SEVERE pain (e.g., excruciating, unable to do any normal activities)   Negative: Looks like a boil, infected sore, deep ulcer, or other infected rash (spreading redness, pus)   Negative: Swollen foot  (Exceptions: Localized bump from bunions, calluses, insect bite, sting.)     Some swelling around previous toe infection; treated with AB   Negative: Weakness (i.e., loss of strength) of new-onset in foot or toes (Exceptions: Not truly weak, foot feels weak because of pain; weakness present > 2 weeks.)   Negative: Numbness (i.e., loss of sensation) in foot or toes (Exception: Just tingling; numbness present > 2 weeks.)    Protocols used: Foot Pain-A-[place]

## 2024-04-17 ENCOUNTER — VIRTUAL VISIT (OUTPATIENT)
Dept: FAMILY MEDICINE | Facility: CLINIC | Age: 44
End: 2024-04-17
Payer: COMMERCIAL

## 2024-04-17 ENCOUNTER — PATIENT OUTREACH (OUTPATIENT)
Dept: CARE COORDINATION | Facility: CLINIC | Age: 44
End: 2024-04-17

## 2024-04-17 DIAGNOSIS — L21.9 SEBORRHEIC DERMATITIS OF SCALP: ICD-10-CM

## 2024-04-17 DIAGNOSIS — I10 PRIMARY HYPERTENSION: ICD-10-CM

## 2024-04-17 DIAGNOSIS — F41.9 ANXIETY: ICD-10-CM

## 2024-04-17 DIAGNOSIS — M51.369 DEGENERATION OF INTERVERTEBRAL DISC OF LUMBAR REGION: ICD-10-CM

## 2024-04-17 PROCEDURE — 99214 OFFICE O/P EST MOD 30 MIN: CPT | Mod: 95 | Performed by: FAMILY MEDICINE

## 2024-04-17 RX ORDER — BUSPIRONE HYDROCHLORIDE 10 MG/1
10 TABLET ORAL 3 TIMES DAILY
Qty: 120 TABLET | Refills: 5 | Status: SHIPPED | OUTPATIENT
Start: 2024-04-17 | End: 2024-09-25

## 2024-04-17 RX ORDER — OXYCODONE HYDROCHLORIDE 5 MG/1
5 TABLET ORAL 3 TIMES DAILY PRN
Qty: 45 TABLET | Refills: 0 | Status: SHIPPED | OUTPATIENT
Start: 2024-04-17 | End: 2024-05-28

## 2024-04-17 RX ORDER — METOPROLOL SUCCINATE 100 MG/1
100 TABLET, EXTENDED RELEASE ORAL DAILY
Qty: 30 TABLET | Refills: 5 | Status: SHIPPED | OUTPATIENT
Start: 2024-04-17

## 2024-04-17 RX ORDER — KETOCONAZOLE 20 MG/ML
SHAMPOO TOPICAL DAILY PRN
Qty: 100 ML | Refills: 1 | Status: SHIPPED | OUTPATIENT
Start: 2024-04-17 | End: 2024-04-30

## 2024-04-17 RX ORDER — LOSARTAN POTASSIUM 100 MG/1
100 TABLET ORAL DAILY
Qty: 90 TABLET | Refills: 1 | Status: SHIPPED | OUTPATIENT
Start: 2024-04-17

## 2024-04-17 NOTE — PROGRESS NOTES
Daron is a 44 year old who is being evaluated via a billable video visit.    How would you like to obtain your AVS? MyChart  If the video visit is dropped, the invitation should be resent by: Text to cell phone: [phone]  Will anyone else be joining your video visit? No      Assessment & Plan     Degeneration of intervertebral disc of lumbar region  Progressing as expected  PDMP queried with no concerns  Continue with current medication  - oxyCODONE (ROXICODONE) 5 MG tablet; Take 1 tablet (5 mg) by mouth 3 times daily as needed for severe pain    Primary hypertension  Blood pressure has been elevated.  I have advised purchasing a home blood pressure cuff and continue to monitor his blood pressure.  He will continue with his current medication.  - metoprolol succinate ER (TOPROL XL) 100 MG 24 hr tablet; Take 1 tablet (100 mg) by mouth daily  - losartan (COZAAR) 100 MG tablet; Take 1 tablet (100 mg) by mouth daily  - Miscellaneous Order for DME - (Use only if a more specific DME order does not already exist    Anxiety  Stable, continue current medication  - busPIRone (BUSPAR) 10 MG tablet; Take 1 tablet (10 mg) by mouth 3 times daily    Seborrheic dermatitis of scalp  Stable, continue current  - ketoconazole (NIZORAL) 2 % external shampoo; Apply topically daily as needed for itching or irritation            BMI  Estimated body mass index is 49.1 kg/m  as calculated from the following:    Height as of 3/8/24: 1.829 m (6').    Weight as of 3/8/24: 164.2 kg (362 lb).             Subjective   Daron is a 44 year old, presenting for the following health issues:  Musculoskeletal Problem (Verbal consent given for video visit.   C/o bilateral foot pain, left worse than right.) and Pain Management (Follow up chronic back pain, needs med refills)      4/17/2024    12:10 PM   Additional Questions   Roomed by Fariba JOSUE CMA   Visit today for follow-up on his pain management, hypertension, anxiety.  He reports he is doing  "fairly well.  He is a  who is helping him out.  He recently was in emergency department for a subungual hematoma of the left great toe      HPI     Patient is wanting to discuss having bilateral foot pain, with the left foot worse than right.  He was seen in the ER/ 04/06/2024 for left great toe pain after dropping a can on his foot.     Moderate, bilateral foot pain this past week.  Rates foot pain a \"4\" on a 0-10 pain scale.  Patient states he is alarmed because he takes quite a bit of pain and anxiety medication.  Denies foot or ankle injury.  Patient denies swelling.  Denies redness or fever.  Usually wears orthopedic shoes, but has only been able to wear sandals due to recent toe infection.  Patient states he was seen in urgent care and prescribed antibiotics for the toe.  Patient states he has finished the AB for his infection on toe. Infection looks better.       Back Pain:  He presents for follow up of back pain. Patient's back pain is a chronic problem.  Location of back pain:  Right lower back, left lower back, right middle of back, left middle of back, right upper back, left upper back, right side of neck, left side of neck, right shoulder, left shoulder, right buttock, left buttock, right hip, right side of waist and left side of waist  Description of back pain: cramping, dull ache, sharp and stabbing  Back pain spreads: right buttocks, right thigh, right knee, right shoulder, left shoulder, right side of neck and left side of neck              Objective           Vitals:  No vitals were obtained today due to virtual visit.    Physical Exam   GENERAL: alert and no distress  EYES: Eyes grossly normal to inspection.  No discharge or erythema, or obvious scleral/conjunctival abnormalities.  RESP: No audible wheeze, cough, or visible cyanosis.    SKIN: Visible skin clear. No significant rash, abnormal pigmentation or lesions.  NEURO: Cranial nerves grossly intact.  Mentation and speech " appropriate for age.  PSYCH: Appropriate affect, tone, and pace of words          Video-Visit Details    Type of service:  Video Visit   Originating Location (pt. Location): Home    Distant Location (provider location):  On-site  Platform used for Video Visit: [email or web address]  Signed Electronically by: Mata Hearn MD

## 2024-04-17 NOTE — PROGRESS NOTES
Clinic Care Coordination Contact  Follow Up Progress Note      Assessment: CC spoke with pt about needs and concerns.     Care Gaps:    Health Maintenance Due   Topic Date Due    URINE DRUG SCREEN  Never done    ASTHMA ACTION PLAN  Never done    Pneumococcal Vaccine: Pediatrics (0 to 5 Years) and At-Risk Patients (6 to 64 Years) (1 of 2 - PCV) Never done    MEDICARE ANNUAL WELLNESS VISIT  Never done    GLUCOSE  04/27/2021    BMP  08/15/2023    INFLUENZA VACCINE (1) 09/01/2023    COVID-19 Vaccine (1 - 2023-24 season) Never done    ASTHMA CONTROL TEST  05/09/2024       Declined due to life stress     Care Plans  Care Plan: General       Problem: HP GENERAL PROBLEM                   Care Plan: General       Problem: HP GENERAL PROBLEM                   Care Plan: Financial Wellbeing       Problem: Patient expresses financial resource strain       Goal: Energy Assistance and SNAP       Start Date: 9/22/2023    Recent Progress: 70%    Priority: High    Note:     Goal Statement: I will apply for health insurance within the next 1-2 weeks if I am eligible.     Strengths:  Motivated  Barriers: Overwhelmed  Patient expressed understanding of goal: Yes  Action steps to achieve this goal:  I understand a referral was placed to the Financial Resource Worker to look into Medical Assistance and SNAP, I will receive a call within the next 3 business days. I was approved for Medical Assistance and SNAP for $220. I will check on my Energy Assistance application and will call [employer] back to discuss applying for Medicare Savings Program.   I understand the financial worker will make two attempts to call me. If I still need help with this goal, I will connect with my Care Coordinator Idalmis Wells at [phone]                                 Care Plan: Health Maintenance       Problem: Health Maintenance Due or Overdue       Goal: Become up-to-date with health maintenance visit(s)       Start Date: 2/28/2024    Note:     Goal  Statement: I will complete my annual wellness visit.    Barriers: life stress  Strengths: accepting of help    Patient expressed understanding of goal: yes  Action steps to achieve this goal:  1. I will schedule my annual wellness visit; [id number] ([phone])  2. I will attend my annual wellness visit.  3. I will contact my Care Management or clinic team if I have barriers to attending my annual wellness visit.                                Intervention/Education provided during outreach:     These are the providers that are accepting new patients at the [facility]. Please email me back so we can get you set up with an establish care visit. This is the first step to getting things on track with your health.     Eleanor Sage DO   [email or web address]?filter=locations.associated_marketable_location_ids%[id number]&unified=Family%20Medicine&_ga=[email or web address]&from=search-list    Zeynep Bradley DO  [email or web address]?filter=locations.associated_marketable_location_ids%[id number]&unified=Family%20Medicine&_ga=[id number]&from=search-list    Brayan Vega CNP, APRN  [email or web address]?filter=locations.associated_marketable_location_ids%[id number]&unified=Family%20Medicine&_ga=[id number]&from=search-list      Here are some legal resources for you to try:   https://www.mnlawyerreferral.org/  https://www.smrls.org/#  https://msbawebtest.mnbar.org/       Outreach Frequency: monthly, more frequently as needed        Plan:   SWCC and pt talked over needs - pt is still very focused on legal issues. Pt noted they have tried 100's of legal resources and would be open to legal assistance if there are options: does not feel there are at  this point, did agree to resources that this [facility] has to share. Is attempting to make headway on legal issues on their own. Pt noted they they have been approved for [employer] mobility but they have not tried it yet. Pt will consider using it in the future. Pt has been trying to be more active in order to bring their pain level down. Pt confirmed they do want to look into getting their physical health back in order - but is not ready to get appointments set up. Might consider doing this at next outreach - [facility] shared providers who are accepting pt's in [place] as pt noted they would like to move to this area in the future and establish care there for a smooth transition. Information sent via email. Writer communicated the risks of unencrypted electronic communication and the patient and/or patient representative has agreed to accept the risks and receive unencrypted communication related to the information or resources we have discussed. We reviewed that no PHI will be included.  Email address verified with the patient. Pt is still working with [facility] on energy assistance and medicare extra help.   Care Coordinator will follow up in about 4 weeks.

## 2024-04-25 ENCOUNTER — MYC MEDICAL ADVICE (OUTPATIENT)
Dept: FAMILY MEDICINE | Facility: CLINIC | Age: 44
End: 2024-04-25
Payer: COMMERCIAL

## 2024-04-26 ENCOUNTER — PATIENT OUTREACH (OUTPATIENT)
Dept: CARE COORDINATION | Facility: CLINIC | Age: 44
End: 2024-04-26
Payer: COMMERCIAL

## 2024-04-26 NOTE — TELEPHONE ENCOUNTER
Patient declines coming into clinic for toe pain. RN made telephone visit for next week 4/30/24, advised UCC over weekend if worsening.

## 2024-04-26 NOTE — TELEPHONE ENCOUNTER
Appointment advised. Was seen in Urgent care on 4/6/24 and was given antibiotics. They advised follow up with PCP if toe has not improved. He did have virtual visit with PCP on 4/17/24.

## 2024-04-30 ENCOUNTER — VIRTUAL VISIT (OUTPATIENT)
Dept: FAMILY MEDICINE | Facility: CLINIC | Age: 44
End: 2024-04-30
Payer: COMMERCIAL

## 2024-04-30 DIAGNOSIS — M79.675 PAIN OF LEFT GREAT TOE: Primary | ICD-10-CM

## 2024-04-30 DIAGNOSIS — L21.9 SEBORRHEIC DERMATITIS OF SCALP: ICD-10-CM

## 2024-04-30 DIAGNOSIS — F41.1 GAD (GENERALIZED ANXIETY DISORDER): ICD-10-CM

## 2024-04-30 DIAGNOSIS — F42.9 OBSESSIVE-COMPULSIVE DISORDER, UNSPECIFIED TYPE: ICD-10-CM

## 2024-04-30 PROCEDURE — 99213 OFFICE O/P EST LOW 20 MIN: CPT | Mod: 95 | Performed by: FAMILY MEDICINE

## 2024-04-30 RX ORDER — KETOCONAZOLE 20 MG/ML
SHAMPOO TOPICAL DAILY PRN
Qty: 100 ML | Refills: 1 | Status: SHIPPED | OUTPATIENT
Start: 2024-04-30 | End: 2024-09-25

## 2024-04-30 ASSESSMENT — ASTHMA QUESTIONNAIRES
ACT_TOTALSCORE: 20
ACT_TOTALSCORE: 20
QUESTION_4 LAST FOUR WEEKS HOW OFTEN HAVE YOU USED YOUR RESCUE INHALER OR NEBULIZER MEDICATION (SUCH AS ALBUTEROL): ONE OR TWO TIMES PER DAY
QUESTION_5 LAST FOUR WEEKS HOW WOULD YOU RATE YOUR ASTHMA CONTROL: WELL CONTROLLED
QUESTION_3 LAST FOUR WEEKS HOW OFTEN DID YOUR ASTHMA SYMPTOMS (WHEEZING, COUGHING, SHORTNESS OF BREATH, CHEST TIGHTNESS OR PAIN) WAKE YOU UP AT NIGHT OR EARLIER THAN USUAL IN THE MORNING: NOT AT ALL
QUESTION_1 LAST FOUR WEEKS HOW MUCH OF THE TIME DID YOUR ASTHMA KEEP YOU FROM GETTING AS MUCH DONE AT WORK, SCHOOL OR AT HOME: NONE OF THE TIME
QUESTION_3 LAST FOUR WEEKS HOW OFTEN DID YOUR ASTHMA SYMPTOMS (WHEEZING, COUGHING, SHORTNESS OF BREATH, CHEST TIGHTNESS OR PAIN) WAKE YOU UP AT NIGHT OR EARLIER THAN USUAL IN THE MORNING: NOT AT ALL
QUESTION_1 LAST FOUR WEEKS HOW MUCH OF THE TIME DID YOUR ASTHMA KEEP YOU FROM GETTING AS MUCH DONE AT WORK, SCHOOL OR AT HOME: NONE OF THE TIME
ACT_TOTALSCORE: 20
QUESTION_5 LAST FOUR WEEKS HOW WOULD YOU RATE YOUR ASTHMA CONTROL: WELL CONTROLLED
QUESTION_4 LAST FOUR WEEKS HOW OFTEN HAVE YOU USED YOUR RESCUE INHALER OR NEBULIZER MEDICATION (SUCH AS ALBUTEROL): ONE OR TWO TIMES PER DAY
QUESTION_2 LAST FOUR WEEKS HOW OFTEN HAVE YOU HAD SHORTNESS OF BREATH: ONCE OR TWICE A WEEK
QUESTION_2 LAST FOUR WEEKS HOW OFTEN HAVE YOU HAD SHORTNESS OF BREATH: ONCE OR TWICE A WEEK

## 2024-04-30 ASSESSMENT — ENCOUNTER SYMPTOMS: NERVOUS/ANXIOUS: 1

## 2024-04-30 NOTE — PROGRESS NOTES
Daron is a 44 year old who is being evaluated via a billable video visit.    How would you like to obtain your AVS? MyChart  If the video visit is dropped, the invitation should be resent by: Text to cell phone: [phone]  Will anyone else be joining your video visit? No      Assessment & Plan     Pain of left great toe  Healing avulsion of the nail    Seborrheic dermatitis of scalp  Stable, continue with current medication    Obsessive-compulsive disorder, unspecified type  TYRELL (generalized anxiety disorder)  Care coordination referral  Advise behavioral health referral  Continue with current medication            BMI  Estimated body mass index is 49.1 kg/m  as calculated from the following:    Height as of 3/8/24: 1.829 m (6').    Weight as of 3/8/24: 164.2 kg (362 lb).             Subjective   Daron is a 44 year old, presenting for the following health issues:  Musculoskeletal Problem (C/o ongoing left great toe pain s/p having a can of vegetables fall on foot.   Was seen in , was on abx for infection.  Verbal consent given for video visit.) and Anxiety (Discuss issues with travel anxiety, is having a hard time getting to medical appointments.)      4/30/2024     9:23 AM   Additional Questions   Roomed by Fariba JOSUE CMA   Accompanied by Self     Video Start Time: 9:40 AM    Musculoskeletal Problem    Anxiety    History of Present Illness       Reason for visit:  Ongoing left great toe pain    He eats 2-3 servings of fruits and vegetables daily.He consumes 1 sweetened beverage(s) daily.He exercises with enough effort to increase his heart rate 9 or less minutes per day.  He exercises with enough effort to increase his heart rate 3 or less days per week.   He is taking medications regularly.     Patient has issues with left great toe.  He suffered a subungual hematoma and lost the nail.    He continues to have significant anxiety regarding getting out of the house and driving.  He inquires about a care  coordinator.  Probate issues have been settled.  He is working on setting up behavioral health.              Objective           Vitals:  No vitals were obtained today due to virtual visit.    Physical Exam   GENERAL: alert and no distress  EYES: Eyes grossly normal to inspection.  No discharge or erythema, or obvious scleral/conjunctival abnormalities.  RESP: No audible wheeze, cough, or visible cyanosis.    SKIN: Visible skin clear. No significant rash, abnormal pigmentation or lesions.  NEURO: Cranial nerves grossly intact.  Mentation and speech appropriate for age.  PSYCH: Appropriate affect, tone, and pace of words  Absent nail on left great toe, no evidence of infection          Video-Visit Details    Type of service:  Video Visit   Video End Time:10:03 AM  Originating Location (pt. Location): Home    Distant Location (provider location):  On-site  Platform used for Video Visit: Mery  Signed Electronically by: Mata Hearn MD

## 2024-05-13 ENCOUNTER — PATIENT OUTREACH (OUTPATIENT)
Dept: CARE COORDINATION | Facility: CLINIC | Age: 44
End: 2024-05-13

## 2024-05-13 ENCOUNTER — OFFICE VISIT (OUTPATIENT)
Dept: FAMILY MEDICINE | Facility: CLINIC | Age: 44
End: 2024-05-13
Payer: COMMERCIAL

## 2024-05-13 VITALS
OXYGEN SATURATION: 97 % | WEIGHT: 315 LBS | HEART RATE: 80 BPM | SYSTOLIC BLOOD PRESSURE: 141 MMHG | BODY MASS INDEX: 42.66 KG/M2 | DIASTOLIC BLOOD PRESSURE: 83 MMHG | RESPIRATION RATE: 16 BRPM | HEIGHT: 72 IN

## 2024-05-13 DIAGNOSIS — E11.9 TYPE 2 DIABETES MELLITUS WITHOUT COMPLICATION, WITHOUT LONG-TERM CURRENT USE OF INSULIN (H): Primary | ICD-10-CM

## 2024-05-13 DIAGNOSIS — E66.01 MORBID OBESITY (H): ICD-10-CM

## 2024-05-13 DIAGNOSIS — F31.9 BIPOLAR I DISORDER (H): ICD-10-CM

## 2024-05-13 LAB
HBA1C MFR BLD: 7.4 % (ref 0–5.6)
HOLD SPECIMEN: NORMAL

## 2024-05-13 PROCEDURE — 83036 HEMOGLOBIN GLYCOSYLATED A1C: CPT | Performed by: FAMILY MEDICINE

## 2024-05-13 PROCEDURE — 99213 OFFICE O/P EST LOW 20 MIN: CPT | Performed by: FAMILY MEDICINE

## 2024-05-13 PROCEDURE — 36415 COLL VENOUS BLD VENIPUNCTURE: CPT | Performed by: FAMILY MEDICINE

## 2024-05-13 RX ORDER — GABAPENTIN 300 MG/1
1 CAPSULE ORAL AT BEDTIME
COMMUNITY
Start: 2024-05-09 | End: 2024-08-08

## 2024-05-13 NOTE — PROGRESS NOTES
Clinic Care Coordination Contact  [facility]/Voicemail    Clinical Data: Care Coordinator Outreach    Outreach Documentation Number of Outreach Attempt   5/13/2024   2:56 PM 1       Left message on patient's voicemail with call back information and requested return call.    Plan: Care Coordinator will try to reach patient again in 1-2 business days.

## 2024-05-13 NOTE — PROGRESS NOTES
Assessment & Plan     ICD-10-CM    1. Type 2 diabetes mellitus without complication, without long-term current use of insulin (H)  E11.9 Extra Green Top Tube (LAB USE ONLY)     Adult Diabetes Education  Referral     Adult Comprehensive Weight Management  Referral      2. Bipolar I disorder (H)  F31.9       3. Morbid obesity (H)  E66.01 Adult Comprehensive Weight Management  Referral        With bipolar 1, hypertension, chronic pain syndrome and morbid obesity presents today for new diagnosis of type 2 diabetes and he wanted a confirmation.  Repeat A1c is done and it is at 7.4.  Referral to diabetes educator is done.  He also has a referral from [facility] and we will see where he can go.  He is anxious about driving.  Regarding his bipolar, he follows with a PCP.  I strongly recommend that he follow-up with a psychiatrist that he should be on mood stabilizer.  Overall, he informs me that his social situation is not good and he has multiple other issues.  He would really want to lose weight and would like to get on weight loss medication.  Refer to obesity program.  Reviewed his testing done at [facility].  PDMP reviewed.    BMI  Estimated body mass index is 48.09 kg/m  as calculated from the following:    Height as of this encounter: 1.829 m (6').    Weight as of this encounter: 160.8 kg (354 lb 9.6 oz).   Weight management plan: Patient referred to endocrine and/or weight management specialty      MEDICATIONS:  Continue current medications without change  Work on weight loss  Regular exercise  See Patient Instructions    Russell Neri is a 44 year old, presenting for the following health issues:  Diabetes (Pt is requesting a 2nd opinion on a diabetes. Pt got the diagnoses on 05/09. Pt would like to do another A1C today. Pt is NOT fasting today. )        5/13/2024     9:34 AM   Additional Questions   Roomed by Abbi Stapleton CMA     History of Present Illness       Reason for visit:   Ongoing left great toe pain    He eats 2-3 servings of fruits and vegetables daily.He consumes 1 sweetened beverage(s) daily.He exercises with enough effort to increase his heart rate 9 or less minutes per day.  He exercises with enough effort to increase his heart rate 3 or less days per week.   He is taking medications regularly.     Patient Active Problem List   Diagnosis    Cerebellar tonsillar ectopia (H)    Asthma, moderate persistent    Degeneration of intervertebral disc of lumbar region    OCD (obsessive compulsive disorder)    Myofascial pain syndrome    Obesity, morbid (H)    Chronic pain syndrome    Bipolar I disorder (H)    Spondylolisthesis of lumbar region    Spinal stenosis of lumbar region    TYRELL (generalized anxiety disorder)    Primary hypertension    Atypical chest pain    Diabetes mellitus, type 2 (H)     Current Outpatient Medications   Medication Sig Dispense Refill    albuterol (PROAIR HFA/PROVENTIL HFA/VENTOLIN HFA) 108 (90 Base) MCG/ACT inhaler INHALE 2 PUFFS EVERY 6 HOURS 8.5 g 4    amLODIPine (NORVASC) 5 MG tablet TAKE 1 TABLET BY MOUTH EVERY DAY 30 tablet 5    busPIRone (BUSPAR) 10 MG tablet Take 1 tablet (10 mg) by mouth 3 times daily 120 tablet 5    gabapentin (NEURONTIN) 300 MG capsule Take 1 capsule by mouth at bedtime      hydrochlorothiazide (MICROZIDE) 12.5 MG capsule Take 1 capsule (12.5 mg) by mouth every morning 90 capsule 3    ketoconazole (NIZORAL) 2 % external shampoo Apply topically daily as needed for itching or irritation 100 mL 1    losartan (COZAAR) 100 MG tablet Take 1 tablet (100 mg) by mouth daily 90 tablet 1    metoprolol succinate ER (TOPROL XL) 100 MG 24 hr tablet Take 1 tablet (100 mg) by mouth daily 30 tablet 5    oxyCODONE (ROXICODONE) 5 MG tablet Take 1 tablet (5 mg) by mouth 3 times daily as needed for severe pain 45 tablet 0    pantoprazole (PROTONIX) 40 MG EC tablet TAKE 1 TABLET BY MOUTH DAILY 90 tablet 2     No current facility-administered medications  for this visit.         Review of Systems  Constitutional, neuro, ENT, endocrine, pulmonary, cardiac, gastrointestinal, genitourinary, musculoskeletal, integument and psychiatric systems are negative, except as otherwise noted.      Objective    BP (!) 141/83 (BP Location: Left arm, Patient Position: Sitting, Cuff Size: Adult Large)   Pulse 80   Resp 16   Ht 1.829 m (6')   Wt (!) 160.8 kg (354 lb 9.6 oz)   SpO2 97%   BMI 48.09 kg/m    Body mass index is 48.09 kg/m .  Physical Exam   GENERAL: alert and no distress  PSYCH: mentation appears normal and speech pressured    Results for orders placed or performed in visit on 05/13/24 (from the past 24 hour(s))   Hemoglobin A1c   Result Value Ref Range    Hemoglobin A1C 7.4 (H) 0.0 - 5.6 %           Signed Electronically by: Juan A Barnes MD

## 2024-05-15 ENCOUNTER — PATIENT OUTREACH (OUTPATIENT)
Dept: CARE COORDINATION | Facility: CLINIC | Age: 44
End: 2024-05-15
Payer: COMMERCIAL

## 2024-05-15 ASSESSMENT — ACTIVITIES OF DAILY LIVING (ADL): DEPENDENT_IADLS:: INDEPENDENT

## 2024-05-15 NOTE — LETTER
[facility] CARE COORDINATION  [address] [facility] [address]   May 15, 2024        Daron Bond  [address]  [place] [address]          Dear Daron,     Attached is an updated Patient Centered Plan of Care for your continued enrollment in Care Coordination. Please let us know if you have additional questions, concerns, or goals that we can assist with.    Sincerely,    Idalmis Wells, [facility] Clinical Care Coordinator  [facility], [place], [place], [place], [facility], and [facility]  Patient Centered Plan of Care  About Me:        Patient Name:  Daron Bond    YOB: 1980  Age:         44 year old   [place] MRN:    [id number] Telephone Information:  Home Phone [phone]   Home Phone Not on file.   Mobile [phone]       Address:  [address]  [place] [address] Email address:  [email or web address]      Emergency Contact(s)    Name Relationship Lgl Grd Work Phone Home Phone Mobile Phone           Primary language:  English     needed? No   [place] [employer]:  [phone] op. 1  Other communication barriers:Lack of coping    Preferred Method of Communication:     Current living arrangement: I live in a private home with family    Mobility Status/ Medical Equipment: Independent        Health Maintenance  Health Maintenance Reviewed: Due/Overdue   Health Maintenance Due   Topic Date Due    MICROALBUMIN  Never done    DIABETIC FOOT EXAM  Never done    URINE DRUG SCREEN  Never done    ASTHMA ACTION PLAN  Never done    Pneumococcal Vaccine: Pediatrics (0 to 5 Years) and At-Risk Patients (6 to 64 Years) (1 of 2 - PCV) Never done    EYE EXAM  06/21/2023    BMP  08/15/2023    COVID-19 Vaccine (1 - 2023-24 season) Never done          My Access Plan  Medical Emergency 911   Primary Clinic Line [facility] - [phone]   24 Hour  Appointment Line [phone] or  [phone]) (toll-free)   24 Hour Nurse Line [phone] (toll-free)   Preferred Urgent Care [facility], [phone]     [facility]  [phone]     Preferred Pharmacy [facility] DRUG [facility] #[id number] [facility] [address]  [address] [facility] [address]     Behavioral Health Crisis Line The National Suicide Prevention Lifeline at 1-743.368.7918 or Text/Call 748           My Care Team Members  Patient Care Team         Relationship Specialty Notifications Start End    Mata Hearn MD PCP - General Family Practice  12/1/20     Phone: [phone] Fax: [phone]         [address] [facility] [address]    Mata Hearn MD Assigned PCP   2/27/22     Phone: [phone] Fax: [phone]         [address] [facility] [address]    Mata Hearn MD Assigned Pain Medication Provider   1/9/23     Phone: [phone] Fax: [phone]         [address] [facility] [address]    Idalmis Wells LICSW Lead Care Coordinator  Admissions 5/22/23     Phone: [phone]         Lonnie Heck [facility] Financial Resource Worker   9/22/23     Phone: [phone]         Gold Little MD MD Cardiovascular Disease  3/1/24     Phone: [phone] Fax: [phone]         [address] [address]    Gold Little MD Assigned Heart and Vascular Provider   3/15/24     Phone: [phone] Fax: [phone]         [address] [address]    Kait Singletary RD Diabetes Educator Dietitian, Registered  5/13/24     Phone: [phone] Fax: [phone]         [address] [address]                My Care Plans  Self Management and Treatment Plan    Care Plan  Care Plan: General       Problem: HP GENERAL PROBLEM                   Care Plan: General       Problem: HP GENERAL PROBLEM                   Care Plan:  Financial Wellbeing       Problem: Patient expresses financial resource strain       Goal: Energy Assistance and SNAP       Start Date: 9/22/2023    Recent Progress: 70%    Priority: High    Note:     Goal Statement: I will apply for health insurance within the next 1-2 weeks if I am eligible.     Strengths:  Motivated  Barriers: Overwhelmed  Patient expressed understanding of goal: Yes  Action steps to achieve this goal:  I understand a referral was placed to the Financial Resource Worker to look into Medical Assistance and SNAP, I will receive a call within the next 3 business days. I was approved for Medical Assistance and SNAP for $220. Energy assistance application turned in 4/26/24; waiting to hear about medicare savings program  I understand the financial worker will make two attempts to call me. If I still need help with this goal, I will connect with my Care Coordinator Idalmis Wells at [phone]                                 Care Plan: Health Maintenance       Problem: Health Maintenance Due or Overdue                                My Medical and Care Information  Problem List   Patient Active Problem List   Diagnosis    Cerebellar tonsillar ectopia (H)    Asthma, moderate persistent    Degeneration of intervertebral disc of lumbar region    OCD (obsessive compulsive disorder)    Myofascial pain syndrome    Obesity, morbid (H)    Chronic pain syndrome    Bipolar I disorder (H)    Spondylolisthesis of lumbar region    Spinal stenosis of lumbar region    TYRELL (generalized anxiety disorder)    Primary hypertension    Atypical chest pain    Diabetes mellitus, type 2 (H)      Current Medications and Allergies:    Current Outpatient Medications   Medication Sig Dispense Refill    albuterol (PROAIR HFA/PROVENTIL HFA/VENTOLIN HFA) 108 (90 Base) MCG/ACT inhaler INHALE 2 PUFFS EVERY 6 HOURS 8.5 g 4    amLODIPine (NORVASC) 5 MG tablet TAKE 1 TABLET BY MOUTH EVERY DAY 30 tablet 5    busPIRone (BUSPAR) 10 MG  tablet Take 1 tablet (10 mg) by mouth 3 times daily 120 tablet 5    gabapentin (NEURONTIN) 300 MG capsule Take 1 capsule by mouth at bedtime      hydrochlorothiazide (MICROZIDE) 12.5 MG capsule Take 1 capsule (12.5 mg) by mouth every morning 90 capsule 3    ketoconazole (NIZORAL) 2 % external shampoo Apply topically daily as needed for itching or irritation 100 mL 1    losartan (COZAAR) 100 MG tablet Take 1 tablet (100 mg) by mouth daily 90 tablet 1    metoprolol succinate ER (TOPROL XL) 100 MG 24 hr tablet Take 1 tablet (100 mg) by mouth daily 30 tablet 5    oxyCODONE (ROXICODONE) 5 MG tablet Take 1 tablet (5 mg) by mouth 3 times daily as needed for severe pain 45 tablet 0    pantoprazole (PROTONIX) 40 MG EC tablet TAKE 1 TABLET BY MOUTH DAILY 90 tablet 2     No current facility-administered medications for this visit.       Allergies   Allergen Reactions    Banana     Bee Venom     Latex         Care Coordination Start Date: 5/18/2023   Frequency of Care Coordination: monthly, more frequently as needed     Form Last Updated: 05/15/2024

## 2024-05-15 NOTE — PROGRESS NOTES
Clinic Care Coordination Contact  Clinic Care Coordination Contact  OUTREACH    Referral Information:  Referral Source: PCP    Primary Diagnosis: Psychosocial    Chief Complaint   Patient presents with    Clinic Care Coordination - Follow-up        [place] Utilization:   Clinic Utilization  Difficulty keeping appointments:: No  Compliance Concerns: No  No-Show Concerns: No  No PCP office visit in Past Year: No  Utilization      No Show Count (past year)  1             ED Visits  0             Hospital Admissions  0                    Current as of: 5/14/2024  3:26 PM                Clinical Concerns:  Current Medical Concerns:    Patient Active Problem List   Diagnosis    Cerebellar tonsillar ectopia (H)    Asthma, moderate persistent    Degeneration of intervertebral disc of lumbar region    OCD (obsessive compulsive disorder)    Myofascial pain syndrome    Obesity, morbid (H)    Chronic pain syndrome    Bipolar I disorder (H)    Spondylolisthesis of lumbar region    Spinal stenosis of lumbar region    TYRELL (generalized anxiety disorder)    Primary hypertension    Atypical chest pain    Diabetes mellitus, type 2 (H)        Current Behavioral Concerns: pt continues to have circular thinking - identifies many issues making it hard to direct towards any productive progress      Education Provided to patient:     HomeLine - https://homeTransMed Systems.org/  HomeLine offers free legal help for renters. You can call or email to speak with a  about your situation. More information is available at the link above.     Veggie Rx - You are all signed up for the Veggie Rx program. This will be delivered to your home on the following days: June 13, 20, 27, July 11, 18, 25, Aug 15, 22, 29, Sep 5, 12, 19, 26, Oct 3, 17, 24, 31, Nov 7, 14, 21, Dec 5.     Establish Care - If you decide you would like to establish care at any of the clinics you mentioned ([facility]), please let me know. I know you haven't moved yet;  but choosing a new provider now could help you keep moving forward towards your health goals and get your more face to face time with your provider - limiting the number of virtual visits you need in [place] where you know you are no longer going to be seeking care. Here are the providers that are accepting new patients in both [place] and [place].    Dr. Nola Hermosillo MD - [place], [place] - Internal Medicine - Schedule a Visit ([employer] [email or web address].[email or web address])  Dr. Caty Sage, [place], [place] - Family Medicine - Schedule a Visit ([facility].[email or web address])  Dr. Zeynep Bradley, [place], [place] - Family Medicine - Schedule a Visit ([facility]org)  Dr. Skyla Harding, [place], [place] - Family Medicine, Family Medicine Obstetrics - Schedule a Visit ([facility]view.org)  Nola Zavaleta PA-C - [place], [place] - [facility] - Schedule a Visit ([facility]view.org)  Dr. Dali Bates MD - [place], [place] - [facility] - Schedule a Visit ([facility]view.org)  Dr. Leatha Francis, [place], [place] - [facility], Family Medicine Obstetrics - Schedule a Visit ([employer] View Enterprisesview.[email or web address])       Pain  Pain (GOAL):: Yes  Type: Chronic (>3mo)  Health Maintenance Reviewed: Due/Overdue   Health Maintenance Due   Topic Date Due    MICROALBUMIN  Never done    DIABETIC FOOT EXAM  Never done    URINE DRUG SCREEN  Never done    ASTHMA ACTION PLAN  Never done    Pneumococcal Vaccine: Pediatrics (0 to 5 Years) and At-Risk Patients (6 to 64 Years) (1 of 2 - PCV) Never done    EYE EXAM  06/21/2023    BMP  08/15/2023    COVID-19 Vaccine (1 - 2023-24 season) Never done      Clinical Pathway: None    Medication Management:  Medication review status: Medications reviewed and no changes reported per patient.             Functional Status:  Dependent ADLs:: Independent  Dependent IADLs:: Independent  Bed or wheelchair confined:: No  Mobility Status: Independent  Fallen 2 or more times in the past year?:  No  Any fall with injury in the past year?: No    Living Situation:  Current living arrangement:: I live in a private home with family  Type of residence:: Apartment    Lifestyle & Psychosocial Needs:    Social Determinants of Health     Food Insecurity: High Risk (5/15/2024)    Food Insecurity     Within the past 12 months, did you worry that your food would run out before you got money to buy more?: Yes     Within the past 12 months, did the food you bought just not last and you didn t have money to get more?: Yes   Depression: At risk (5/9/2024)    [facility] from [facility]    PHQ-2     PHQ-2 TOTAL SCORE: 6   Housing Stability: Low Risk  (5/15/2024)    Housing Stability     Do you have housing? : Yes     Are you worried about losing your housing?: No   Tobacco Use: High Risk (5/13/2024)    Patient History     Smoking Tobacco Use: Every Day     Smokeless Tobacco Use: Never     Passive Exposure: Current   Financial Resource Strain: Low Risk  (5/15/2024)    Financial Resource Strain     Within the past 12 months, have you or your family members you live with been unable to get utilities (heat, electricity) when it was really needed?: No   Alcohol Use: Not on file   Transportation Needs: Low Risk  (5/15/2024)    Transportation Needs     Within the past 12 months, has lack of transportation kept you from medical appointments, getting your medicines, non-medical meetings or appointments, work, or from getting things that you need?: No   Physical Activity: Not on file   Interpersonal Safety: Low Risk  (5/13/2024)    Interpersonal Safety     Do you feel physically and emotionally safe where you currently live?: Yes     Within the past 12 months, have you been hit, slapped, kicked or otherwise physically hurt by someone?: No     Within the past 12 months, have you been humiliated or emotionally abused in other ways by your partner or ex-partner?: No   Stress: Not on file   Social  Connections: Unknown (4/13/2023)    [facility]    Social Connections     Frequency of Communication with Friends and Family: Not on file   Health Literacy: Not on file     Diet:: Regular, Diabetic diet  Inadequate nutrition (GOAL):: No  Tube Feeding: No  Inadequate activity/exercise (GOAL):: No  Significant changes in sleep pattern (GOAL): No  Transportation means:: Regular car     Christian or spiritual beliefs that impact treatment:: No  Mental health DX:: Yes  Mental health DX how managed:: Medication  Mental health management concern (GOAL):: No  Chemical Dependency Status: No Current Concerns  Informal Support system:: None             Resources and Interventions:  Current Resources:      Community Resources: County Programs, Financial/Insurance, Other (see comment), [facility], [facility] Worker,   Supplies Currently Used at Home: None  Equipment Currently Used at Home: none  Employment Status: disabled         Advance Care Plan/Directive  Advanced Care Plans/Directives on file:: No    Referrals Placed: Other (food resources), Community Resources, Financial Services, County Resources         Care Plan:  Care Plan: General       Problem: HP GENERAL PROBLEM                   Care Plan: General       Problem: HP GENERAL PROBLEM                   Care Plan: Financial Wellbeing       Problem: Patient expresses financial resource strain       Goal: Energy Assistance and SNAP       Start Date: 9/22/2023    Recent Progress: 70%    Priority: High    Note:     Goal Statement: I will apply for health insurance within the next 1-2 weeks if I am eligible.     Strengths:  Motivated  Barriers: Overwhelmed  Patient expressed understanding of goal: Yes  Action steps to achieve this goal:  I understand a referral was placed to the Financial Resource Worker to look into Medical Assistance and SNAP, I will receive a call within the  next 3 business days. I was approved for Medical Assistance and SNAP for $220. Energy assistance application turned in 4/26/24; waiting to hear about medicare savings program  I understand the financial worker will make two attempts to call me. If I still need help with this goal, I will connect with my Care Coordinator Idalmis Hennessyhowardkyle at [phone]                                 Care Plan: Health Maintenance       Problem: Health Maintenance Due or Overdue                     Patient/Caregiver understanding: Pt reports understanding and denies any additional questions or concerns at this times.  CC engaged in AIDET communication during encounter.     Outreach Frequency: monthly, more frequently as needed  Future Appointments                In 1 month Kait Singletary RD [facility]            Plan: [facility] completed yearly assessment with pt. CC provided resources via phone and email. Writer communicated the risks of unencrypted electronic communication and the patient and/or patient representative has agreed to accept the risks and receive unencrypted communication related to the information or resources we have discussed. We reviewed that no PHI will be included. [facility] will complete outreach in about 4 week. [facility] reviewed care coordination role and availability again with pt. CC discussed resources and supports available. Resources discussed: food, homeline, health maintenance. Pt noted they completed AWV 5/9/25 - goal completed. Pt noted they would like to establish care in either [facility] or [place] - [facility] provided information on providers accepting new pts - they feel this would be better for them given driving and access to speciality care. Pt accepted resources for food (veggie rx) and noted they are not using market rx due to issues with driving. Pt stated they have meeting with [facility] team today to help with apartment and unstable health/mental health. They are  hoping to move to [facility]. Pt will keep this SWCC posted.

## 2024-05-15 NOTE — COMMUNITY RESOURCES LIST (ENGLISH)
May 15, 2024           YOUR PERSONALIZED LIST OF SERVICES & PROGRAMS           NAVIGATION    Eligibility Screening      [facility] [place] - SNAP (formerly food stamps) Screening and Application help  Phone: (783) 778-8403  Website: https://www.v2tel.org/programs/[place]-food-helpline/  Language: English  Hours: Mon 10:00 AM - 5:00 PM Tue 10:00 AM - 5:00 PM Wed 10:00 AM - 5:00 PM Thu 10:00 AM - 5:00 PM Fri 10:00 AM - 5:00 PM  Fee: Free  Accessibility: Ada accessible, Blind accommodation, Deaf or hard of hearing, Translation services      [facility] NSC HelpLine  Phone: (896) 100-3837  Website: https://www.v2tel.org/find-help/  Language: English, Botswanan  Hours: Mon 10:00 AM - 5:00 PM Tue 10:00 AM - 5:00 PM Wed 10:00 AM - 5:00 PM Thu 10:00 AM - 5:00 PM Fri 10:00 AM - 5:00 PM  Fee: Free  Accessibility: Ada accessible, Blind accommodation, Deaf or hard of hearing, Translation services      [employer] - Certified Application Counselor (CAC)  Phone: (174) 122-9067  Website: https://www.[facility].org/about-us/assister-program/cacs/index.jsp  Language: English  Hours: Mon 8:00 AM - 4:00 PM Tue 8:00 AM - 4:00 PM Wed 8:00 AM - 4:00 PM Thu 8:00 AM - 4:00 PM        ASSISTANCE    Nutrition Benefits      [employer] [place] - SNAP (formerly food stamps) Screening and Application help  Phone: (221) 699-5970  Website: https://www.v2tel.org/programs/[place]-food-helpline/  Language: English  Hours: Mon 10:00 AM - 5:00 PM Tue 10:00 AM - 5:00 PM Wed 10:00 AM - 5:00 PM Thu 10:00 AM - 5:00 PM Fri 10:00 AM - 5:00 PM  Fee: Free  Accessibility: Ada accessible, Blind accommodation, Deaf or hard of hearing, Translation services      [facility] [facility]  Phone: (442) 547-1720  Website: https://www.v2tel.org/programs/market-bucks/  Language: English  Hours: Mon 10:00 AM - 5:00 PM Tue 10:00 AM - 5:00 PM Wed 10:00 AM - 5:00 PM Thu 10:00 AM - 5:00 PM Fri 10:00 AM  - 5:00 PM  Fee: Self pay      [facility] Food HelpLine  Phone: (421) 734-4179  Website: https://www.Boni.org/find-help/  Language: English, Norwegian  Hours: Mon 10:00 AM - 5:00 PM Tue 10:00 AM - 5:00 PM Wed 10:00 AM - 5:00 PM Thu 10:00 AM - 5:00 PM Fri 10:00 AM - 5:00 PM  Fee: Free  Accessibility: Ada accessible, Blind accommodation, Deaf or hard of hearing, Translation services    [facility] Food Shelf - Food pantry  [address] [place] [address] [address] [place] [address] (Distance: 1.1 miles)  Phone: (395) 708-8782  Website: http://www.Fotoshkola/  Language: English  Fee: Free      [employer] [place] - [employer] Shelf   Phone: (411) 924-4356  Website: https://www.OfficialVirtualDJ/find-help/  Language: English  Hours: Mon 10:00 AM - 5:00 PM Tue 10:00 AM - 5:00 PM Wed 10:00 AM - 5:00 PM Thu 10:00 AM - 5:00 PM Fri 10:00 AM - 5:00 PM  Fee: Free  Accessibility: Ada accessible, Blind accommodation, Deaf or hard of hearing, Translation services      [employer] - [place] [employer] Shelf  Phone: (164) 390-6556  Website: www.Little Red Wagon TechnologiesetAGILE customer insight.[employer]atch Group  Language: English, Norwegian  Hours: Mon 9:00 AM - 3:30 PM Tue 9:00 AM - 6:30 PM Wed 9:00 AM - 3:30 PM Thu 9:00 AM - 12:30 PM Fri 9:00 AM - 12:30 PM Sat 9:00 AM - 12:00 PM  Fee: Free               IMPORTANT NUMBERS & WEBSITES        Emergency Services  911  .   United Way  211 http://211unitedway.org  .   Poison Control  (888) 237-1555 http://mnpoison.org http://wisconsinpoison.org  .     Suicide and Crisis Lifeline  988 http://988lifeline.org  .   Childhelp National Child Abuse Hotline  192.675.8283 http://Childhelphotline.org   .   National Sexual Assault Hotline  (521) 575-5675 (HOPE) http://Cambridge Positioning Systemsn.org   .     National Runaway Safeline  (187) 460-8305 (RUNAWAY) http://Garden PriceruBlackBridge.org  .   Pregnancy & Postpartum Support  Call/text 691-455-9166  [place]: http://ppsupportmn.org  [place]:  http://psichapters.com/wi  .   Substance Abuse National Helpline (SAMA)  800-622-HELP (4936) http://Findtreatment.gov   .                DISCLAIMER: These resources have been generated via the Proformative Platform. Proformative does not endorse any service providers mentioned in this resource list. Proformative does not guarantee that the services mentioned in this resource list will be available to you or will improve your health or wellness.    [facility]

## 2024-05-24 ENCOUNTER — PATIENT OUTREACH (OUTPATIENT)
Dept: CARE COORDINATION | Facility: CLINIC | Age: 44
End: 2024-05-24
Payer: COMMERCIAL

## 2024-05-28 DIAGNOSIS — M51.369 DEGENERATION OF INTERVERTEBRAL DISC OF LUMBAR REGION: ICD-10-CM

## 2024-05-28 RX ORDER — OXYCODONE HYDROCHLORIDE 5 MG/1
5 TABLET ORAL 3 TIMES DAILY PRN
Qty: 45 TABLET | Refills: 0 | Status: SHIPPED | OUTPATIENT
Start: 2024-05-28 | End: 2024-06-11

## 2024-05-28 NOTE — TELEPHONE ENCOUNTER
Okay to use a held spot? Refill also pended.    Cailin St CMA ............... 12:51 PM, 05/28/24

## 2024-05-28 NOTE — TELEPHONE ENCOUNTER
Reason for Call:  Appointment Request    Patient requesting this type of appt:    2 weeks ago diagnosed with Type 2 diabetes    Requested provider: Mata Hearn    Reason patient unable to be scheduled: Not within requested timeframe    When does patient want to be seen/preferred time:  tbd    Comments:       Could we send this information to you in [facility] or would you prefer to receive a phone call?:   Patient would prefer a phone call   Okay to leave a detailed message?: Yes at Cell number on file:    Telephone Information:   Mobile [phone]       Call taken on 5/28/2024 at 11:19 AM by Fariba Malin      Medication Question or Refill    Contacts         Type Contact Phone/Fax    05/28/2024 11:19 AM CDT Phone (Incoming) Daron Bond (Self) [phone] (M)            What medication are you calling about (include dose and sig)?:   oxyCODONE (ROXICODONE) 5 MG tablet    Preferred Pharmacy:    Note: PER PATIENT send scripts to the last pharmacy sent to, He could not tell me which one of these was that particular pharmacy, thanks!    [facility]ORE #[id number] [place] [id number] [facility] [address]  [address] [facility] [address]  Phone: [phone] Fax: [phone]    [facility][id number] [[place]][address]  [address] [facility] [address]  Phone: [phone] Fax: [phone]      Controlled Substance Agreement on file:   CSA -- Patient Level:     [Media Unavailable] Controlled Substance Agreement - Opioid - Scan on 6/23/2023  8:42 AM   [Media Unavailable] Controlled Substance Agreement - Opioid - Scan on 5/27/2022  7:39 PM       Who prescribed the medication?: PCP    Do you need a refill? Yes    When did you use the medication last?   ODONE (ROXICODONE) 5 MG tablet    Patient offered an appointment? No    Do you have any questions or concerns?  Yes:   Do any of the medications have any bearing on diabetes?      Could we send this  information to you in MyCadboxhart or would you prefer to receive a phone call?:   Patient would prefer a phone call   Okay to leave a detailed message?: Yes at Cell number on file:    Telephone Information:   Mobile [phone]

## 2024-05-29 ENCOUNTER — TRANSFERRED RECORDS (OUTPATIENT)
Dept: HEALTH INFORMATION MANAGEMENT | Facility: CLINIC | Age: 44
End: 2024-05-29
Payer: COMMERCIAL

## 2024-05-29 DIAGNOSIS — J45.40 MODERATE PERSISTENT ASTHMA, UNSPECIFIED WHETHER COMPLICATED: ICD-10-CM

## 2024-05-29 RX ORDER — ALBUTEROL SULFATE 90 UG/1
AEROSOL, METERED RESPIRATORY (INHALATION)
Qty: 8.5 G | Refills: 4 | Status: SHIPPED | OUTPATIENT
Start: 2024-05-29 | End: 2024-09-19

## 2024-06-04 ENCOUNTER — OFFICE VISIT (OUTPATIENT)
Dept: FAMILY MEDICINE | Facility: CLINIC | Age: 44
End: 2024-06-04
Payer: COMMERCIAL

## 2024-06-04 VITALS
HEIGHT: 72 IN | WEIGHT: 315 LBS | SYSTOLIC BLOOD PRESSURE: 111 MMHG | RESPIRATION RATE: 16 BRPM | DIASTOLIC BLOOD PRESSURE: 78 MMHG | HEART RATE: 95 BPM | OXYGEN SATURATION: 97 % | BODY MASS INDEX: 42.66 KG/M2

## 2024-06-04 DIAGNOSIS — M79.672 LEFT FOOT PAIN: ICD-10-CM

## 2024-06-04 DIAGNOSIS — E11.9 TYPE 2 DIABETES MELLITUS WITHOUT COMPLICATION, WITHOUT LONG-TERM CURRENT USE OF INSULIN (H): Primary | ICD-10-CM

## 2024-06-04 PROCEDURE — 99213 OFFICE O/P EST LOW 20 MIN: CPT | Performed by: FAMILY MEDICINE

## 2024-06-04 NOTE — PROGRESS NOTES
Assessment & Plan     ICD-10-CM    1. Type 2 diabetes mellitus without complication, without long-term current use of insulin (H)  E11.9 Albumin Random Urine Quantitative with Creat Ratio     Adult Eye  Referral     Orthopedic  Referral      2. Left foot pain  M79.672 Orthopedic  Referral        Patient presents today for concerns of foot pain and diabetes shoes.  He has a new diagnosis of diabetes.  Monofilament exam is equivocal today.  Good pulsations.  Refer to orthopedics noting heel pain and possible spur.  Patient follows with [facility] at this clinic, I have asked him to establish care if you plans to follow at this clinic as I have already seen him twice and if this is a convenient location.  MEDICATIONS:  Continue current medications without change  See Patient Instructions    Russell Neri is a 44 year old, presenting for the following health issues:  RECHECK (Follow up- requesting a foot exam, also wants a follow up on blood work. Pt lost a toenail and also heel is really bothersome. )        6/4/2024    12:46 PM   Additional Questions   Roomed by Abbi Stapleton CMA     History of Present Illness       Reason for visit:  Foot pain and diabetes  Symptom onset:  3-4 weeks ago  Symptoms include:  Pain in left foots heel  Symptom intensity:  Severe  Symptom progression:  Staying the same  Had these symptoms before:  No  What makes it worse:  Putting weight on it or even touchung it  What makes it better:  My pain medicine    He eats 0-1 servings of fruits and vegetables daily.He consumes 0 sweetened beverage(s) daily.He exercises with enough effort to increase his heart rate 10 to 19 minutes per day.  He exercises with enough effort to increase his heart rate 3 or less days per week. He is missing 1 dose(s) of medications per week.       Patient Active Problem List   Diagnosis    Cerebellar tonsillar ectopia (H)    Asthma, moderate persistent    Degeneration of  intervertebral disc of lumbar region    OCD (obsessive compulsive disorder)    Myofascial pain syndrome    Obesity, morbid (H)    Chronic pain syndrome    Bipolar I disorder (H)    Spondylolisthesis of lumbar region    Spinal stenosis of lumbar region    TYRELL (generalized anxiety disorder)    Primary hypertension    Atypical chest pain    Diabetes mellitus, type 2 (H)     Current Outpatient Medications   Medication Sig Dispense Refill    albuterol (PROAIR HFA/PROVENTIL HFA/VENTOLIN HFA) 108 (90 Base) MCG/ACT inhaler INHALE 2 PUFFS BY MOUTH EVERY 6 HOURS 8.5 g 4    amLODIPine (NORVASC) 5 MG tablet TAKE 1 TABLET BY MOUTH EVERY DAY 30 tablet 5    busPIRone (BUSPAR) 10 MG tablet Take 1 tablet (10 mg) by mouth 3 times daily 120 tablet 5    gabapentin (NEURONTIN) 300 MG capsule Take 1 capsule by mouth at bedtime      hydrochlorothiazide (MICROZIDE) 12.5 MG capsule Take 1 capsule (12.5 mg) by mouth every morning 90 capsule 3    ketoconazole (NIZORAL) 2 % external shampoo Apply topically daily as needed for itching or irritation 100 mL 1    losartan (COZAAR) 100 MG tablet Take 1 tablet (100 mg) by mouth daily 90 tablet 1    metoprolol succinate ER (TOPROL XL) 100 MG 24 hr tablet Take 1 tablet (100 mg) by mouth daily 30 tablet 5    oxyCODONE (ROXICODONE) 5 MG tablet Take 1 tablet (5 mg) by mouth 3 times daily as needed for severe pain 45 tablet 0    pantoprazole (PROTONIX) 40 MG EC tablet TAKE 1 TABLET BY MOUTH DAILY 90 tablet 2     No current facility-administered medications for this visit.         Review of Systems  Constitutional, HEENT, cardiovascular, pulmonary, gi and gu systems are negative, except as otherwise noted.      Objective    /78 (BP Location: Left arm, Patient Position: Sitting, Cuff Size: Adult Large)   Pulse 95   Resp 16   Ht 1.829 m (6')   Wt (!) 158.2 kg (348 lb 12.8 oz)   SpO2 97%   BMI 47.31 kg/m    Body mass index is 47.31 kg/m .  Physical Exam   GENERAL: alert and no distress  Diabetic  foot exam: normal DP and PT pulses, no trophic changes or ulcerative lesions, normal sensory exam, and normal monofilament exam.  However, this is equivocal as on dissociated areas, he feels reduced sensation.    Office Visit on 05/13/2024   Component Date Value Ref Range Status    Hemoglobin A1C 05/13/2024 7.4 (H)  0.0 - 5.6 % Final    Normal <5.7%   Prediabetes 5.7-6.4%    Diabetes 6.5% or higher     Note: Adopted from ADA consensus guidelines.    Hold Specimen 05/13/2024 [facility]   Final           Signed Electronically by: Juan A Barnes MD

## 2024-06-05 DIAGNOSIS — I10 PRIMARY HYPERTENSION: ICD-10-CM

## 2024-06-05 RX ORDER — AMLODIPINE BESYLATE 5 MG/1
5 TABLET ORAL DAILY
Qty: 90 TABLET | Refills: 5 | Status: SHIPPED | OUTPATIENT
Start: 2024-06-05

## 2024-06-06 DIAGNOSIS — I10 PRIMARY HYPERTENSION: ICD-10-CM

## 2024-06-06 RX ORDER — METOPROLOL SUCCINATE 100 MG/1
100 TABLET, EXTENDED RELEASE ORAL DAILY
Qty: 30 TABLET | Refills: 5 | OUTPATIENT
Start: 2024-06-06

## 2024-06-11 ENCOUNTER — VIRTUAL VISIT (OUTPATIENT)
Dept: FAMILY MEDICINE | Facility: CLINIC | Age: 44
End: 2024-06-11
Payer: COMMERCIAL

## 2024-06-11 DIAGNOSIS — M51.369 DEGENERATION OF INTERVERTEBRAL DISC OF LUMBAR REGION: ICD-10-CM

## 2024-06-11 DIAGNOSIS — E11.9 TYPE 2 DIABETES MELLITUS WITHOUT COMPLICATION, WITHOUT LONG-TERM CURRENT USE OF INSULIN (H): Primary | ICD-10-CM

## 2024-06-11 PROCEDURE — 99442 PR PHYSICIAN TELEPHONE EVALUATION 11-20 MIN: CPT | Mod: 95 | Performed by: FAMILY MEDICINE

## 2024-06-11 RX ORDER — METFORMIN HCL 500 MG
TABLET, EXTENDED RELEASE 24 HR ORAL
Qty: 70 TABLET | Refills: 0 | Status: SHIPPED | OUTPATIENT
Start: 2024-06-11 | End: 2024-07-07

## 2024-06-11 RX ORDER — OXYCODONE HYDROCHLORIDE 5 MG/1
5 TABLET ORAL 3 TIMES DAILY PRN
Qty: 90 TABLET | Refills: 0 | Status: SHIPPED | OUTPATIENT
Start: 2024-06-11 | End: 2024-07-23

## 2024-06-11 NOTE — PROGRESS NOTES
Daron is a 44 year old who is being evaluated via a billable video visit.    What phone number would you like to be contacted at? [phone]  How would you like to obtain your AVS? MyChart      Assessment & Plan     Type 2 diabetes mellitus without complication, without long-term current use of insulin (H)  Patient has an appointment with diabetic educator at the end of this week.  I have encouraged him to keep this appointment.  We have discussed oral medications.  He will be started on metformin.  Side effects medication discussed. - metFORMIN (GLUCOPHAGE XR) 500 MG 24 hr tablet; Take 1 tablet (500 mg) by mouth daily (with dinner) for 7 days, THEN 2 tablets (1,000 mg) daily (with dinner) for 7 days, THEN 3 tablets (1,500 mg) daily (with dinner) for 7 days, THEN 4 tablets (2,000 mg) daily (with dinner) for 7 days.    Degeneration of intervertebral disc of lumbar region   PDMP queried with no concerns.  He will continue with his current dose of oxycodone  - oxyCODONE (ROXICODONE) 5 MG tablet; Take 1 tablet (5 mg) by mouth 3 times daily as needed for severe pain                Subjective   Daron is a 44 year old, presenting for the following health issues:  RECHECK (Medication, diabetes diagnosis )        6/11/2024    11:16 AM   Additional Questions   Roomed by SOBIA Florentino             History of Present Illness       Reason for visit:  Foot pain and diabetes  Symptom onset:  3-4 weeks ago  Symptoms include:  Pain in left foots heel  Symptom intensity:  Severe  Symptom progression:  Staying the same  Had these symptoms before:  No  What makes it worse:  Putting weight on it or even touchung it  What makes it better:  My pain medicine    He eats 0-1 servings of fruits and vegetables daily.He consumes 0 sweetened beverage(s) daily.He exercises with enough effort to increase his heart rate 10 to 19 minutes per day.  He exercises with enough effort to increase his heart rate 3 or less days per week. He is missing 1  dose(s) of medications per week.  Patient was recently diagnosed with diabetes with a hemoglobin A1c of 7.2.  He has not started any medication at this time.  He has a diabetic education appointment at the end of this week.  He is try to change his diet.  He has a diet high in carbohydrates.  He also has difficulty with portion size.    His chronic pain syndrome is stable.                Objective           Vitals:  No vitals were obtained today due to virtual visit.    Physical Exam   GENERAL: alert and no distress  PSYCH: Appropriate affect, tone, and pace of words          Visit Details    Type of service: Telephone visit, visit time is 15 minutes  Originating Location (pt. Location): Home    Distant Location (provider location):  On-site  Platform used for Video Visit: Telephone  Signed Electronically by: Mata Hearn MD

## 2024-06-12 ENCOUNTER — PATIENT OUTREACH (OUTPATIENT)
Dept: CARE COORDINATION | Facility: CLINIC | Age: 44
End: 2024-06-12
Payer: COMMERCIAL

## 2024-06-12 NOTE — PROGRESS NOTES
Clinic Care Coordination Contact  Follow Up Progress Note      Assessment: [facility] reached out to pt for regular follow-up.     Care Gaps:    Health Maintenance Due   Topic Date Due    MICROALBUMIN  Never done    URINE DRUG SCREEN  Never done    Pneumococcal Vaccine: Pediatrics (0 to 5 Years) and At-Risk Patients (6 to 64 Years) (1 of 2 - PCV) Never done    EYE EXAM  06/21/2023    BMP  08/15/2023    COVID-19 Vaccine (1 - 2023-24 season) Never done       Declined due to stress - did not want to discuss     Care Plans  Care Plan: General       Problem: HP GENERAL PROBLEM                   Care Plan: General       Problem: HP GENERAL PROBLEM                   Care Plan: Financial Wellbeing       Problem: Patient expresses financial resource strain       Goal: Energy Assistance and SNAP       Start Date: 9/22/2023    Recent Progress: 70%    Priority: High    Note:     Goal Statement: I will apply for health insurance within the next 1-2 weeks if I am eligible.     Strengths:  Motivated  Barriers: Overwhelmed  Patient expressed understanding of goal: Yes  Action steps to achieve this goal:  I understand a referral was placed to the Financial Resource Worker to look into Medical Assistance and SNAP, I will receive a call within the next 3 business days. I was approved for Medical Assistance and SNAP for $220. Energy assistance application turned in 4/26/24; waiting to hear about medicare savings program - still working on this 6/12/24  I understand the financial worker will make two attempts to call me. If I still need help with this goal, I will connect with my Care Coordinator Idalmis Wells at [phone]                                 Care Plan: Health Maintenance       Problem: Health Maintenance Due or Overdue                     Intervention/Education provided during outreach: NA - not at this time    Reviewed perry Rx  Reviewed [facility]  Reviewed financial goals  Reviewed establish care     Outreach Frequency:  monthly, more frequently as needed        Plan:   SWCC and pt talked about needs and concerns. Pt stressed that right now financial concerns are the biggest issues : food costs due to diabetic diet. CC reminded pt that home delivery of veggie Rx will start tomorrow. Pt noted they met with 'SW team' although he is not sure where they were from (they were not from [facility]) and they provided a $200 gift card to [employer] and a $400 gift card to [employer] (these were used on clothes for his kids and an AC unit although [facility]). Pt does not feel ready to establish care at [facility] or [place]. Pt's biggest concerns are the size/resources of the clinics and his ability to make it to the clinics safely on his own. He feels able to make it to [facility] but isn't sure about their resources/size. He is too stressed to commit to establishing care. He will let me know when he is ready to do this. At this time he will continue to Dr. Hearn. Pt is still working with [facility] but does not feel like they help much - feel like the just talk with him/chat/'shoot the shit'. He feels like he needs them to take charge more and help with his schedule and calendar. Pt would like to focus on his health but he doesn't feel he is able. Pt accepted resource refresh from this CM - reminders about veggie Rx, and working with [facility]. Pt will continue to work with his other resources and reach out if anything is needed   Care Coordinator will follow up in about 30 days for regular outreach.

## 2024-06-14 ENCOUNTER — LAB (OUTPATIENT)
Dept: LAB | Facility: CLINIC | Age: 44
End: 2024-06-14
Payer: COMMERCIAL

## 2024-06-14 ENCOUNTER — OFFICE VISIT (OUTPATIENT)
Dept: SURGERY | Facility: CLINIC | Age: 44
End: 2024-06-14
Payer: COMMERCIAL

## 2024-06-14 VITALS
BODY MASS INDEX: 42.66 KG/M2 | WEIGHT: 315 LBS | SYSTOLIC BLOOD PRESSURE: 120 MMHG | DIASTOLIC BLOOD PRESSURE: 70 MMHG | HEIGHT: 72 IN

## 2024-06-14 DIAGNOSIS — E11.9 TYPE 2 DIABETES, HBA1C GOAL < 7% (H): ICD-10-CM

## 2024-06-14 DIAGNOSIS — G89.4 CHRONIC PAIN SYNDROME: ICD-10-CM

## 2024-06-14 DIAGNOSIS — R07.9 EXERTIONAL CHEST PAIN: ICD-10-CM

## 2024-06-14 DIAGNOSIS — E66.01 OBESITY, MORBID (H): ICD-10-CM

## 2024-06-14 DIAGNOSIS — I10 PRIMARY HYPERTENSION: ICD-10-CM

## 2024-06-14 DIAGNOSIS — T50.905A DRUG-INDUCED WEIGHT GAIN: ICD-10-CM

## 2024-06-14 DIAGNOSIS — E11.9 TYPE 2 DIABETES MELLITUS WITHOUT COMPLICATION, WITHOUT LONG-TERM CURRENT USE OF INSULIN (H): ICD-10-CM

## 2024-06-14 DIAGNOSIS — F41.1 GAD (GENERALIZED ANXIETY DISORDER): ICD-10-CM

## 2024-06-14 DIAGNOSIS — E66.01 CLASS 3 SEVERE OBESITY DUE TO EXCESS CALORIES WITH SERIOUS COMORBIDITY AND BODY MASS INDEX (BMI) OF 45.0 TO 49.9 IN ADULT (H): ICD-10-CM

## 2024-06-14 DIAGNOSIS — E66.813 CLASS 3 SEVERE OBESITY DUE TO EXCESS CALORIES WITH SERIOUS COMORBIDITY AND BODY MASS INDEX (BMI) OF 45.0 TO 49.9 IN ADULT (H): ICD-10-CM

## 2024-06-14 DIAGNOSIS — R10.11 RUQ ABDOMINAL PAIN: ICD-10-CM

## 2024-06-14 DIAGNOSIS — E66.813 CLASS 3 SEVERE OBESITY DUE TO EXCESS CALORIES WITH SERIOUS COMORBIDITY AND BODY MASS INDEX (BMI) OF 45.0 TO 49.9 IN ADULT (H): Primary | ICD-10-CM

## 2024-06-14 DIAGNOSIS — E66.01 CLASS 3 SEVERE OBESITY DUE TO EXCESS CALORIES WITH SERIOUS COMORBIDITY AND BODY MASS INDEX (BMI) OF 45.0 TO 49.9 IN ADULT (H): Primary | ICD-10-CM

## 2024-06-14 DIAGNOSIS — F31.9 BIPOLAR I DISORDER (H): ICD-10-CM

## 2024-06-14 DIAGNOSIS — R63.5 DRUG-INDUCED WEIGHT GAIN: ICD-10-CM

## 2024-06-14 LAB
ALBUMIN SERPL BCG-MCNC: 4.3 G/DL (ref 3.5–5.2)
ALP SERPL-CCNC: 91 U/L (ref 40–150)
ALT SERPL W P-5'-P-CCNC: 36 U/L (ref 0–70)
ANION GAP SERPL CALCULATED.3IONS-SCNC: 12 MMOL/L (ref 7–15)
AST SERPL W P-5'-P-CCNC: 33 U/L (ref 0–45)
BASOPHILS # BLD AUTO: 0 10E3/UL (ref 0–0.2)
BASOPHILS NFR BLD AUTO: 0 %
BILIRUB DIRECT SERPL-MCNC: <0.2 MG/DL (ref 0–0.3)
BILIRUB SERPL-MCNC: 0.5 MG/DL
BUN SERPL-MCNC: 19.4 MG/DL (ref 6–20)
C PEPTIDE SERPL-MCNC: 7.8 NG/ML (ref 0.9–6.9)
CALCIUM SERPL-MCNC: 9.1 MG/DL (ref 8.6–10)
CHLORIDE SERPL-SCNC: 106 MMOL/L (ref 98–107)
CREAT SERPL-MCNC: 1.04 MG/DL (ref 0.67–1.17)
CREAT UR-MCNC: 172 MG/DL
CRP SERPL-MCNC: 22.1 MG/L
DEPRECATED HCO3 PLAS-SCNC: 24 MMOL/L (ref 22–29)
EGFRCR SERPLBLD CKD-EPI 2021: >90 ML/MIN/1.73M2
EOSINOPHIL # BLD AUTO: 0.2 10E3/UL (ref 0–0.7)
EOSINOPHIL NFR BLD AUTO: 2 %
ERYTHROCYTE [DISTWIDTH] IN BLOOD BY AUTOMATED COUNT: 14.3 % (ref 10–15)
ERYTHROCYTE [SEDIMENTATION RATE] IN BLOOD BY WESTERGREN METHOD: 9 MM/HR (ref 0–15)
GLUCOSE SERPL-MCNC: 126 MG/DL (ref 70–99)
HCT VFR BLD AUTO: 44.5 % (ref 40–53)
HGB BLD-MCNC: 15.3 G/DL (ref 13.3–17.7)
IMM GRANULOCYTES # BLD: 0 10E3/UL
IMM GRANULOCYTES NFR BLD: 0 %
LIPASE SERPL-CCNC: 26 U/L (ref 13–60)
LYMPHOCYTES # BLD AUTO: 2.8 10E3/UL (ref 0.8–5.3)
LYMPHOCYTES NFR BLD AUTO: 26 %
MAGNESIUM SERPL-MCNC: 2.1 MG/DL (ref 1.7–2.3)
MCH RBC QN AUTO: 29.1 PG (ref 26.5–33)
MCHC RBC AUTO-ENTMCNC: 34.4 G/DL (ref 31.5–36.5)
MCV RBC AUTO: 85 FL (ref 78–100)
MICROALBUMIN UR-MCNC: <12 MG/L
MICROALBUMIN/CREAT UR: NORMAL MG/G{CREAT}
MONOCYTES # BLD AUTO: 0.6 10E3/UL (ref 0–1.3)
MONOCYTES NFR BLD AUTO: 6 %
NEUTROPHILS # BLD AUTO: 7.2 10E3/UL (ref 1.6–8.3)
NEUTROPHILS NFR BLD AUTO: 66 %
PLATELET # BLD AUTO: 173 10E3/UL (ref 150–450)
POTASSIUM SERPL-SCNC: 4 MMOL/L (ref 3.4–5.3)
PROT SERPL-MCNC: 7.2 G/DL (ref 6.4–8.3)
PTH-INTACT SERPL-MCNC: 54 PG/ML (ref 15–65)
RBC # BLD AUTO: 5.26 10E6/UL (ref 4.4–5.9)
SODIUM SERPL-SCNC: 142 MMOL/L (ref 135–145)
TSH SERPL DL<=0.005 MIU/L-ACNC: 1.03 UIU/ML (ref 0.3–4.2)
VIT B12 SERPL-MCNC: 652 PG/ML (ref 232–1245)
VIT D+METAB SERPL-MCNC: 27 NG/ML (ref 20–50)
WBC # BLD AUTO: 10.9 10E3/UL (ref 4–11)

## 2024-06-14 PROCEDURE — 82570 ASSAY OF URINE CREATININE: CPT

## 2024-06-14 PROCEDURE — 36415 COLL VENOUS BLD VENIPUNCTURE: CPT

## 2024-06-14 PROCEDURE — 82043 UR ALBUMIN QUANTITATIVE: CPT

## 2024-06-14 PROCEDURE — 82248 BILIRUBIN DIRECT: CPT

## 2024-06-14 PROCEDURE — 85652 RBC SED RATE AUTOMATED: CPT

## 2024-06-14 PROCEDURE — 99205 OFFICE O/P NEW HI 60 MIN: CPT | Performed by: EMERGENCY MEDICINE

## 2024-06-14 PROCEDURE — 83735 ASSAY OF MAGNESIUM: CPT

## 2024-06-14 PROCEDURE — 85025 COMPLETE CBC W/AUTO DIFF WBC: CPT

## 2024-06-14 PROCEDURE — 84443 ASSAY THYROID STIM HORMONE: CPT

## 2024-06-14 PROCEDURE — 83690 ASSAY OF LIPASE: CPT

## 2024-06-14 PROCEDURE — 84681 ASSAY OF C-PEPTIDE: CPT

## 2024-06-14 PROCEDURE — 86140 C-REACTIVE PROTEIN: CPT

## 2024-06-14 PROCEDURE — 82306 VITAMIN D 25 HYDROXY: CPT

## 2024-06-14 PROCEDURE — 83970 ASSAY OF PARATHORMONE: CPT

## 2024-06-14 PROCEDURE — 82607 VITAMIN B-12: CPT

## 2024-06-14 PROCEDURE — 80053 COMPREHEN METABOLIC PANEL: CPT

## 2024-06-14 RX ORDER — SEMAGLUTIDE 0.68 MG/ML
INJECTION, SOLUTION SUBCUTANEOUS
Qty: 3 ML | Refills: 2 | Status: SHIPPED | OUTPATIENT
Start: 2024-06-20 | End: 2024-09-05

## 2024-06-14 ASSESSMENT — SLEEP AND FATIGUE QUESTIONNAIRES
HOW LIKELY ARE YOU TO NOD OFF OR FALL ASLEEP WHILE SITTING INACTIVE IN A PUBLIC PLACE: WOULD NEVER DOZE
HOW LIKELY ARE YOU TO NOD OFF OR FALL ASLEEP WHILE SITTING AND READING: WOULD NEVER DOZE
HOW LIKELY ARE YOU TO NOD OFF OR FALL ASLEEP WHILE SITTING AND TALKING TO SOMEONE: WOULD NEVER DOZE
HOW LIKELY ARE YOU TO NOD OFF OR FALL ASLEEP WHEN YOU ARE A PASSENGER IN A CAR FOR AN HOUR WITHOUT A BREAK: WOULD NEVER DOZE
HOW LIKELY ARE YOU TO NOD OFF OR FALL ASLEEP WHILE SITTING QUIETLY AFTER LUNCH WITHOUT ALCOHOL: WOULD NEVER DOZE
HOW LIKELY ARE YOU TO NOD OFF OR FALL ASLEEP WHILE LYING DOWN TO REST IN THE AFTERNOON WHEN CIRCUMSTANCES PERMIT: MODERATE CHANCE OF DOZING
HOW LIKELY ARE YOU TO NOD OFF OR FALL ASLEEP WHILE WATCHING TV: WOULD NEVER DOZE
HOW LIKELY ARE YOU TO NOD OFF OR FALL ASLEEP IN A CAR, WHILE STOPPED FOR A FEW MINUTES IN TRAFFIC: WOULD NEVER DOZE

## 2024-06-14 NOTE — PROGRESS NOTES
"New Medical Weight Management Consult    PATIENT:  Daron Bond  MRN:         [id number]  :         1980  KENNY:         2024    Dear ***,    I had the pleasure of seeing your patient, Daron Bond. Full intake/assessment was done to determine barriers to weight loss success and develop a treatment plan. Daron Bond is a 44 year old male interested in treatment of medical problems associated with excess weight. He has a height of Data Unavailable, a weight of 0 lbs 0 oz, and the calculated There is no height or weight on file to calculate BMI.    Daron is a patient with {Weight problem:897292608} {WITH/WITHOUT:786958::\"with\"} significant element of familial/genetic influence and {WITH/WITHOUT:363748::\"with\"} current health consequences. He {DOES_WM:715633} need aggressive weight loss plan due to ***.  Daron Bond {Eating habits:074042}.      His problem is complicated by {Wt Mgmt complications:166124}      Review of the patient's history and habits today suggest that weight gain has been ***.    Previous Interventions found to be helpful in the past for weight loss include ***.    We discussed a toolbox approach to weight management today and he is open to combining mindful, reduced calorie dietary therapy with increased mindfulness techniques, activity/exercise improvements to optimize their current and future health.  Medication assistance for appetite control was discussed today and on review of the risks/benefits for this patients health history, we've decided to start {START/AVOID:574201} appetite suppressant therapy.    ASSESSMENT AND PLAN  Problem List Items Addressed This Visit    None           { E&M time:741065}        He has the following co-morbidities:        6/10/2024    12:01 PM   --   I have the following health issues associated with obesity Type II Diabetes    Heart Disease    High Blood Pressure    High Cholesterol    Asthma     Lab Results   Component Value Date "    A1C 7.4 05/13/2024    A1C 5.0 07/02/2018     Onset of type II diabetes was ***.         No data to display                     No data to display                     No data to display                     No data to display                     No data to display                     No data to display                     No data to display                PAST MEDICAL HISTORY:  No past medical history on file.         No data to display                     No data to display                     No data to display                Past Surgical History:   Procedure Laterality Date    ARTHROSCOPY KNEE Right     no date given    C CORTISONE INJECTION  06/08/2016    COLONOSCOPY  08/04/2015    Internal/external hemorrhoids.  Repeat at age 50    ESOPHAGOGASTRODUODENOSCOPY W/ BANDING  08/04/2015    EYE SURGERY      x 3 no dates given    HEMORRHOIDECTOMY  09/08/2015    PSYCH SVC, INTENSIVE OUTPT  2002       Social History     Socioeconomic History    Marital status: Single     Spouse name: Not on file    Number of children: Not on file    Years of education: Not on file    Highest education level: Not on file   Occupational History    Not on file   Tobacco Use    Smoking status: Every Day     Current packs/day: 0.50     Types: Cigarettes     Passive exposure: Current    Smokeless tobacco: Never   Vaping Use    Vaping status: Never Used   Substance and Sexual Activity    Alcohol use: Not Currently     Comment: rarely    Drug use: Yes     Types: Oxycodone     Comment: takes oxycodone for pain mgt    Sexual activity: Not on file   Other Topics Concern    Not on file   Social History Narrative    Not on file     Social Determinants of Health     Financial Resource Strain: Low Risk  (5/15/2024)    Financial Resource Strain     Within the past 12 months, have you or your family members you live with been unable to get utilities (heat, electricity) when it was really needed?: No   Food Insecurity: High Risk (5/15/2024)    Food  Insecurity     Within the past 12 months, did you worry that your food would run out before you got money to buy more?: Yes     Within the past 12 months, did the food you bought just not last and you didn t have money to get more?: Yes   Transportation Needs: Low Risk  (5/15/2024)    Transportation Needs     Within the past 12 months, has lack of transportation kept you from medical appointments, getting your medicines, non-medical meetings or appointments, work, or from getting things that you need?: No   Physical Activity: Not on file   Stress: Not on file   Social Connections: Unknown (4/13/2023)    Received from [facility], [facility] [employer] [facility]    Social Connections     Frequency of Communication with Friends and Family: Not on file   Interpersonal Safety: Low Risk  (5/13/2024)    Interpersonal Safety     Do you feel physically and emotionally safe where you currently live?: Yes     Within the past 12 months, have you been hit, slapped, kicked or otherwise physically hurt by someone?: No     Within the past 12 months, have you been humiliated or emotionally abused in other ways by your partner or ex-partner?: No   Housing Stability: Low Risk  (5/15/2024)    Housing Stability     Do you have housing? : Yes     Are you worried about losing your housing?: No       MEDICATIONS:   Current Outpatient Medications   Medication Sig Dispense Refill    albuterol (PROAIR HFA/PROVENTIL HFA/VENTOLIN HFA) 108 (90 Base) MCG/ACT inhaler INHALE 2 PUFFS BY MOUTH EVERY 6 HOURS 8.5 g 4    amLODIPine (NORVASC) 5 MG tablet TAKE 1 TABLET(5 MG) BY MOUTH DAILY 90 tablet 5    busPIRone (BUSPAR) 10 MG tablet Take 1 tablet (10 mg) by mouth 3 times daily 120 tablet 5    gabapentin (NEURONTIN) 300 MG capsule Take 1 capsule by mouth at bedtime      hydrochlorothiazide (MICROZIDE) 12.5 MG capsule Take 1 capsule (12.5 mg) by mouth every morning 90 capsule 3    ketoconazole (NIZORAL) 2 %  external shampoo Apply topically daily as needed for itching or irritation 100 mL 1    losartan (COZAAR) 100 MG tablet Take 1 tablet (100 mg) by mouth daily 90 tablet 1    metFORMIN (GLUCOPHAGE XR) 500 MG 24 hr tablet Take 1 tablet (500 mg) by mouth daily (with dinner) for 7 days, THEN 2 tablets (1,000 mg) daily (with dinner) for 7 days, THEN 3 tablets (1,500 mg) daily (with dinner) for 7 days, THEN 4 tablets (2,000 mg) daily (with dinner) for 7 days. 70 tablet 0    metoprolol succinate ER (TOPROL XL) 100 MG 24 hr tablet Take 1 tablet (100 mg) by mouth daily 30 tablet 5    oxyCODONE (ROXICODONE) 5 MG tablet Take 1 tablet (5 mg) by mouth 3 times daily as needed for severe pain 90 tablet 0    pantoprazole (PROTONIX) 40 MG EC tablet TAKE 1 TABLET BY MOUTH DAILY 90 tablet 2   Metoprolol is for ***. Can increase metabolic syndrome/insulin resistance risks. Gabapentin and Buspar can increase appetite and reduce satiety signaling in many. Amlodipine can increase fluid retention/dependent edema at higher doses.    ALLERGIES:   Allergies   Allergen Reactions    Banana     Bee Venom     Latex        {Diagnostic Test Results (Optional):267803}    PHYSICAL EXAM:  Objective    There were no vitals taken for this visit.  There were no vitals taken for this visit.  There is no height or weight on file to calculate BMI.  Physical Exam   {Exam List (Optional):097404}        Sincerely,    Ricardo Steiner MD

## 2024-06-14 NOTE — PATIENT INSTRUCTIONS
"Plan:  Welcome to your weight loss season. To start, we'll aim for mindful diet where you get 3 meals through the day, about every 5-6 hours to stay just ahead of your appetite and to stay well fueled and avoid low blood sugars. Start meals with protein first, vegetables second and complex carbohydrate at the end of the meal.  Hydrate well between meals,  beverages from meals by 20 minutes to make sure you get enough food at each meal while on medication like Ozempic (semaglutide).     2. Aim for 1950-2100kcal/day with 100-110 grams of lean protein and 120 oz of water daily (can include your sparkling water but keep your \"diet\" or artificial sweetener to one daily).     3. Ramp up Ozempic: start at 0.25 mg/week for 4 weeks then increase the dial to 0.5mg/week for 2 months and then we'll ramp up to the 1mg/week if tolerated in September.     4. Check labs today.    5. Start tracking intake with [employer] mal.     6. Referral for sleep study evaluation.    7. Referral for cardiac clearance given some chest pains when doing leg workouts and higher risk for heart disease.    What makes a person succeed with dramatic and sustained weight loss?    It's being at the right point in your life where you feel the need to lose the weight, not because anyone told you so but because of a voice inside of you that says, \"I am ready for this\".  You're now at a point where you may be feeling anxious, irritable and when you look in the mirror you do not recognize the person looking back.  Your healthy self is buried somewhere in that reflexion and you want to free it again.  This is the sort of motivation that leads to success, and it comes from you.    Because the only person that can lose that extra weight is you, I like my patients to focus on the mindset of being in Weight Loss Season.  This gives you permission to make the changes necessary to be consistent with the diet/activity and behavior changes that lead to " "successful, healthy weight loss.  Nearly any diet plan can work for weight loss, but keeping it healthy and nutrient based to prevent deficiencies/hair loss/fatigue or irritability is vital.  If you have a plan you want to try, we'll work with you to make sure no adjustments are needed to keep you healthy through your weight loss season and working with our Bariatric Dieticians you'll be given expert guidance to customize your diet plan to suit your particular needs. If you don't have your own ideas in mind, we are always happy to suggest well researched and validated plans that provide enough food to prevent hunger but still tap into your excess fat reserves and lose weight in a sustainable fashion.  There is great evidence that lean protein/healthy fat intake with good fiber intake while minimizing simple starches/carbs produces reliable/sustainable weight loss in most people. But some feel more connected to an intermittent fasting/fast mimicking or ketogenic diet.  These protocols can be hard for many to stick with and that's why we prefer the protein/healthy fat focused diets but if these alternative strategies appeal to you, we can work with you to optimize your knowledge and results with these tools.    Losing weight is a temporary commitment, but you need to be \"All In\" to have a good weight loss season.  To avoid frustration, you have to be willing to be on track 19 out of 20 days or even better than that. But, weight loss season is generally only 4-8 months in length. After that length of time, it can be hard to maintain a negative calorie balance and our brain, motivations and metabolism will usually bring you to a plateau that cannot be broken in this modern world where other commitments start to take priority. That's when we look to stabilize the weight loss you've achieved.  If you've reached your goal by that time, fantastic, and job well done.  If there is more to go, then after a few months of " "stabilization, we can usually attack that previous plateau and break into new territory.    Because of this time limitation, we want to really get to work right away and get into a sustainable routine ASAP.  One of the best predictors of how much weight you're going to lose throughout the season is how much you lose in the first 6 weeks, so prepare well and jump in with both feet.      Occasionally, people may feel like they cannot commit fully to the changes necessary and may want to change one thing at a time and \"get used to\" the idea of losing weight.  That is OK because that is where they are in their life, and they cannot fully commit for any number of reasons.  It's part of that internal motivation and they just haven't reached PRIORTY NUMBER ONE status yet. It's possible that what they need is more time to reach that point and I am always willing to work with people that want to \"dabble\", but understand, the amount of success obtained with half measures, is much less than half results. Behavior Change cannot occur until we prepare our minds, bodies and environment for what is to come, action!    As you go through your plan, look for things to keep your motivation rolling.  The most successful people have a goal or target/reward that they are working towards.  Having a reward that celebrates your new fitness, mobility and energy is the best sort because it will encourage you to do well with the weight maintenance phase and long term lifestyle changes that promotes keeping the weight off for the long term.  Usually, \"getting healthier\" or improving blood tests or losing weight so your clothes fit better is not as internally motivating as having a tangible reward.  A good weight loss season reward is one that isn't food based, is affordable, but something special:  Something you won't be getting/doing unless you achieve your goal.   It s important to keep to the rule of success:  in order to get the reward, " your goal MUST be achieved. Write this reward down, where you can look at it daily and keep it in the front of your mind as you go through your weight loss season and it will help keep you on track.    Tools that help change behavior are vital for success. The most studied and most supported tool for weight loss is nothing more than writing down your food and weight every day.  Every Day.  Accurately and completely.  When you commit to weight loss season, this information tells you whether you're getting ENOUGH food to fuel your weight loss properly as well as teaches you the interaction between different foods you eat and how your body responds with weight loss.  You'll see that sometimes after a heavy workout you don't see the scale move until 2-3 days later.  How saltier meals (chili for example) may make you retain water for 4-5 days before you see the weight come off, you'll get used to the mini-plateaus that develop after a good 3-8 lb drop in weight as well as how you break through if you keep working the diet as you should.    Weight loss is not a linear process, there are mini ups and downs.  Learning how your body loses weight and getting comfortable with that is very rewarding. The act of writing words on paper also solidifies your will power and commitment to the season of weight loss and that by itself changes your brain chemistry/appetite, motivation and prepares you for maximal success.     Behavior change is all about getting into a new routine.  The old habits and routine have to change because without changing the circumstances of how you gained your weight, it's unlikely you'll enjoy satisfying results. If you have snacking habits, like every time you walk through the kitchen you grab a little something, well, that habit has to change and be replaced by a new habit.  It can be something as simple as keeping a doodle pad on the counter that you make a few scribbles and then walk through the kitchen  "having not opened the cupboard, or starting with a glass of water and leaving the kitchen without anything else, or checking your food journal to see how many calories you have left for the day.  Boredom is the enemy as are the old habits. Break new ground and try to push those old habits into a deep hole.  There are apps/counseling options available that can help with some of the day to day urges/behaviors if you're struggling. One commercial product that does a good job is [employer].  Unfortunately, there is a subscription fee.    Finally, exercise always helps.  While not mandatory to lose weight, every little bit helps and exercise has so many other benefits that to not work it into your plan is to miss out on all the mood, sleep, stress and general health benefits that come from making yourself a little short of breath and sweaty at least 3-4 days out of the week.  The metabolism and calorie burn benefits aside, almost every chronic ailment in medicine gets better with proper, aerobic exercise.  Allow yourself to start slow and let your body prepare itself to accept harder training 4-6 weeks down the road, but start now and commit to a plan.  Whether you have the means to hire a , join a gym or just walk out your front door or go down to your basement for a video workout, get into a exercise routine and  after 3 weeks of at least 3 times a week exercise you should be at a point where you can slowly start ramping up 10% each week to our goal of at least 150-300minutes weekly of aerobic exercise and at least twice weekly resistance training/strenghtening with weights/bands or body weight exercises.     I am a big fan of modifying the free training plan, \"Couch to 5k\", for almost all of my patients. Just type it into [employer] or look it up on your smart phone mal store.  To modify the plan,  you can use the training plan for whatever aerobic activity you do (bike/treadmill/elliptical/rower/pool/etc). During the " "\"jog\" intervals, you just move a little faster or harder or increase the tension or incline.  You use those little intervals to switch up the workout and recruit more muscles and pump the blood a little more and then recover again in the \"walk\" intervals by slowing down, decreasing the incline or turning down the tension.  3-4 days a week is not that much to ask and the benefits are enormous.  Start slow and develop the base from which you can then build on and reduce the risk of injury.  It's much more important 2-4 months from now to be enjoying your exercise then it is to over exert yourself at the start and hurt yourself.  Starting slowly allows your body to accept the training better down the road when the exercise becomes crucial for weight maintenance.  Without exercise down the road during your maintenance phase, all this hard work you are about to put in can be undone. It usually takes about 100-300 calories a day of exercise to maintain a weight loss and our focus during weight loss season is to generate the routine/activities and hobbies that make that enjoyable/sustainable.    Thanks for taking this first and most important step in your weight loss season.  Commit to it and we will cheerlead you all the way to success.  When things get tough or off track we'll offer guidance and analysis and when you reach your goal we'll celebrate your success.  In the end, it is all about your success, your health and what you do with it.      Ricardo Steiner MD  [facility]  [phone]      Example Meal Plan for a 2147-7720 Calorie Diet:    In order to fuel your weight loss properly and avoid hunger-induced overeating later in the day, for your height and weight, you will enjoy the most success by following the diet below or similar with adjustments based on your particular tastes and preferences.  Exercise may influence speed, amount of weight loss further.     I recommend getting " into a meal routine and keeping it similar day to day in the beginning so you don t have to think too hard about what you re going to make/eat.  Keep snacks healthy, ideally containing protein and some vegetables.  Non-processed food is preferable to packaged items.  Eat at least a few crunchy green vegetables if having a snack, which should be 2-3 hours after your mealtimes(prepare these ahead of time for ease of use).  Drink 64 oz -80 oz of water daily for most, some of you will need more and we'll discuss it at your visit if that is the case.      When changing our diet,  we can often mistake thirst for hunger or just have some distracted eating habits that we need to break free from ('bored/mindless eating', screen time,work, driving,etc).  A glass of water and reconsideration of our hunger is often all that is needed.  Having the urge is not the problem, but watching it pass by without acting on it is the goal.    If you re having hunger problems, add a protein drink/snack to your morning hours or afternoon snack with at least 20grams of protein and not too much sugar (under 10g).  A carton of higher protein/low sugar yogurt can work as well.  If the urge to snack is overwhelming and not satiated, try going for a 10 minute walk/exercise, come home and drink a glass of water and if still hungry, have a  calorie snack (handful of raw/sprouted nuts, veggies and string cheese, protein bar, etc).  Savor it.    It is better to have a large breakfast, a moderate lunch and a smaller dinner to fuel your day.  People lose 10-15% more weight during their weight loss season with this strategy. Optimizing your protein intake at each meal will further keep you more satisfied while eating less food overall.  Getting exercise in early has also been shown to offer the best results (before breakfast ideally but anytime is the right time to exercise if that is not an option for you).    To make sure you re getting adequate  vitamins and minerals during weight loss, I recommend one complete multivitamin a day of your choice.  Consider a probiotic and taking some vitamin D 2000 IU daily.    Let supper be your last meal of the day and ideally try to have at least 12 hours between supper and breakfast the next day to tap into some beneficial overnight fasting dynamics.  Midnight snacks need to go away. Water in the evening is fine, unsweetened, non caffeinated herbal tea is helpful as well.  Consolidating your meals within a 8-12 hour period of your day will help tap into these additional metabolic benefits and tends to keep your appetite up for breakfast, further helping to stay on track.  For most of my patients, I don't recommend an intermittent fasting style diet (many find it hard to fit in their lifestyle) but an overnight fast is very doable for most patients and helps regulate our hunger drives a little better.  This makes it very important to nail good intake at all three meals to feel satisfied/energized and still lose weight.      If evening snacking desires are high, consider a glass of fiber supplement for some additional fullness (metamucil or similar). Most of us don't get the 25-30 grams per day of fiber that promotes good gut health/satiety.  Benefiber, metamucil, citrucel are reasonable/affordable options for most people.  Inulin, chicory, psyllium husk are reasonable options but start slow and low in the dose to avoid gas/bloating until your gut gets acclimated (ramping up to 5-10 grams per day of supplemental fiber after 3-4 weeks if needed).      Example Meal Plan:  Breakfast: 450-475 Calories  1 egg cooked on low in olive oil:   calories.  5oz Greek Yogurt (Fage plain classic: ~150 nohemi)  Handful of Berries of your choice (about a calorie per berry or 20-40cal per handful)    cup(cooked) of  old fashioned oatmeal or 1/2 cup(cooked) steel cut oats. (150 nohemi)  Sprinkle amount of brown sugar and a pat of butter. (40  nohemi)  Glass of  Water  Black coffee or unsweetened Tea (0calories).      2-3 hours Later Snack: (195 calories).  Glass of water  One string Cheese (80 calories) or 4 oz creamed cottage cheese (115 calories) with  Crunchy Celery sticks (less than 10 calories per large stalk) 2 stalks. (20 calories)    of a  Large Banana or   of a Large Apple (60 calories):  eat second half at lunch or afternoon snack.     Lunch:300 -350 calories   Chicken Breast  (baked/broiled/roasted/grilled)  4-6 oz.  (125-180 nohemi), BBQ sauce/hot sauce/mustard/seasoning is free. Just use a reasonable amount. Or a can of tuna with 1 tablespoon mayonnaise.  Salad: lettuce, any other veggies (cucumbers, green peppers/celery you like and a small drizzle of dressing to just flavor.  Go as big on the veggies as you like,  as they are practically calorie free.   A whole, 8 inch cucumber is 45 calories, a whole green pepper is 23 calories, a stalk of celery is 9 calories.  [place] Dressing is 60 calories per tablespoon..so moderate your desired dressing or do a drizzle of olive oil and splash of balsamic vinegar on top,  Total calories unlikely to be over 150 even with dressing.  Glass of Water.    Option for lunch is meal replacement protein drink/smoothie.  Need at least 20 grams of protein and eat the rest of your apple/banana from the morning snack.      Afternoon Snack: 150-200 calories   Cheese Stick or cottage cheese again  and a fresh fruit OR  Granola Bar (protein Bar acceptable if under 200 calories OR  Homemade smoothies:  8oz skim milk,  a handful of berries (fresh or frozen and a serving of protein powder such as BiPro or Maggy sWhey for example.  If you don't like dairy, make with 8oz water, one small banana, handful of berries and the protein powder, add any veggies you want as well:  roughly 200 calories.   Glass of Water    Dinner: 325 calories  4oz of fresh, Atlantic salmon.  Broiled (salt/pepper/dill) for about 8-8.5 minutes  (200calories) or  4oz filet mignon steak or sirloin steak  Salad or vegetable sautéed lightly in olive oil or   Broccoli 1.5  cups chopped and steamed  or micro-waved in a little water (75 calories)  Glass of Water,    Cup of herbal tea (unsweetened, caffeine free)      Herbs and seasonings are encouraged to flavor your foods/vegetables.  Make your food delicious.      Tips for Success:  1.  Prepare proteins ahead of time (broil chicken breasts in bulk so you can grab and go), steel cut oats/lentils can be stored in casserole dish/bowl in the fridge for quick scoop in the morning and rewarm in microwave, make use of crock pot recipes (watch salt content).  Making meals that cover 3-4 future meals is an easy way to stay on track.  2.  Drink a 8-12 oz glass of water every 2-3 hours when awake.  We often mistake hunger for thirst, especially when losing weight.  3. Remember your Reward and Motivation when things get hard.  4.  Weigh yourself every morning and record, you'll stay on track better and learn how our biorhythms, diet and elimination patterns show up on the scale. Don't worry about 1 or 2 day patterns, but when on track you'll notice good trend downward of weight over 3-4 day segments.  Plateaus tend to resolve after 4-8 days in most cases if you stay consistent with your plan.  These are natural and part of weight loss, even if you're perfect with your plan execution.  5. Call if problems/concerns.  [employer] is a great tool to stay in touch and provide weekly outside accountability. Check in with questions or if you want to brag.  6.  Find a handful of meals/foods that keep you on track and feeling good and get into a routine that is sustainable for you.  It's OK to have a routine that works for you.  7.  Consider taking a complete multivitamin just to make sure all micronutrients are adequate during weight loss.  8. If losing hair/brittle nails it usually means you are not taking enough protein.  Minimum  "goal is 60 grams daily of protein for smaller women, 80 grams a day for men. Consider taking Biotin as supplement or a \"Hair and Nail\" multivitamin.    Increase portion above by 30% to hit your intake goal.     On-the-Go Breakfast Ideas  As of 2015, the latest research shows what a huge impact eating breakfast has on losing weight and feeling your best. People lose more weight when they make breakfast their biggest meal of the day compared to Dinner, but even if you cannot go to that degree, getting a breakfast that has at least 20 grams of protein and even a moderate amount of fat is ideal for maintaining good energy through the day and limits overeating in the evening hours.  The following are some quick and easy suggestions for at least getting something of substance into your body in the morning.  Enjoy!    Eating breakfast within 90 minutes of waking up is an important part of taking care of your body on a restricted calorie diet plan.  After sleeping for hours, your body is in need of fuel.  An ideal breakfast is a combination of protein, whole-grain carbohydrates, or fruit.  Here s why:    -Protein digests very slowly in the body, helping you feel more satisfied.  -Whole grains provide dietary fiber, which also digests slowly and helps keep your gut clean.  -Fruit is a great source of vitamins, minerals, and fiber.     Each one of these breakfast combinations has between 200-300 calories and 15-20 grams of protein.  Feel free to mix and match!    Bone Broth (chicken bone broth or beef bone broth) is a great way to boost protein content. 8oz of bone broth will typically have 9-12grams of protein for 40kcal of energy.    Protein: Choose  -1/2 cup low-fat cottage cheese  -2 hard boiled eggs , or one cooked in olive oil (low/slow heat).  -1 low fat string cheese stick  -1 Tablespoon natural peanut butter  -[employer] vegetarian sausage brit (found in freezer section)  -1 slice lowfat cheese  -6 oz 2% or " lowfat Greek yogurt, such as Fage or Oikos.    PLUS    Whole Grains:  Choose   -1 whole wheat English muffin  -1 whole wheat bernardo, half  -1/2 Fiber One frozen muffin, thawed  -1/2 Fiber One toaster pastry  -1 whole wheat bagel thin  -1/2 cup Kashi cereal  -1 Kashi waffle (or other whole grain high-fiber waffle)  Aim for whole grain/sprouted breads with at least 3g of fiber/slice if having bread. [place] is one such brand.    OR    Fruit: Choose  -1/3 cup blueberries  -1/2 banana (or a plantain- similar to a banana, yet smaller)  -1/2 cup cantaloupe cubes  -1 small apple  -1 small orange  -1/2 cup strawberries  -handful raspberries/blackberries (each berry is about 1 calorie).    *Adapted from Diabetes Living, Fall 20    Ten Breakfasts Under 250 calories    Ideally, getting between 350-600 calories  (depending on starting height and weight)for breakfast is ideal for avoiding hunger later in the day, adjust/add to the following accordingly:    One- 250 calories, 8.5 g protein  1 slice whole-grain toast   1 Tbsp peanut butter    banana    Two- 250 calories, 8 g protein    cup nonfat/lowfat yogurt  1/3rd cup diced no-sugar peaches  1/3rd cup cereal (like Special K, Cheerios, or bran flakes)    Three- 250 calories, 25 g protein  1 egg scrambled with 1 oz skim milk    cup shredded cheddar    whole grain English muffin  1 oz Togolese santacruz  1 tsp margarine spread    Four- 225 calories, 25 g protein  1/2 cup Kashi Go-Lean cereal    cup skim milk mixed with 1 scoop Bariatric Advantage protein powder    cup no-sugar diced pears    Five- 250 calories, 20 g protein    cup oatmeal prepared with skim milk, 1 scoop protein powder, and sugar-free maple syrup    Six- 200 calories, 5 g protein  1 whole grain waffle, toasted  1 tablespoon creamy peanut or almond butter    Seven-  250 calories, 19 g protein  Breakfast sandwich: 1 slice whole grain toast, cut in half.  Add 1 scrambled egg and one slice cheddar  cheese.    Eight-   250 calories, 15 g protein  2 eggs scrambled with 1/3 cup frozen spinach (heat before adding to eggs) and 2 tablespoons low fat cream cheese.    Nine-  150 calories, 15 g protein  2/3rd cup cottage cheese    cup cantaloupe    Ten- 200 calories, 20 g protein  Fruit smoothie made with 4 oz. nonfat Greek yogurt,   cup berries, 1 scoop protein powder, and 4 oz skim milk.    Ten Lunches Under 250 Calories    Aim for lunch to be around 300-400 calories a day when trying to lose weight and get that protein in!    One- 200 calories, 11 g protein  1/3 cup tuna salad made with light arthur on 1 slice whole grain bread  1 small peeled apple    Two- 250 calories, 16 g protein  1/3 cup lowfat cottage cheese    cup cooked green beans    small fruit cocktail (in natural juice)    Three- 200 calories, 11 g protein    grilled cheese sandwich on whole grain bread with lowfat cheese  2/3rd cup of tomato soup    Four- 250 calories, 22 g protein  Deli wrap: 1 oz sliced turkey, 1 oz sliced ham, 1 oz sliced chicken rolled up with 1 slice low-fat cheese  1 small orange    Five- 250 calories, 28 g protein  2/3rd cup chili with 1 oz shredded cheese  4 saltine crackers    Six- 250 calories, 22 g protein  1 cup fresh spinach with 2 oz chicken, 1/3rd cup mandarin oranges, and 2 tablespoons sliced almonds with 1 tablespoon  vinaigrette dressing    Seven- 200 calories, 11 g protein  1 Tbsp sugar-free preserves and 1 Tbsp peanut butter on 1 slice whole grain toast    cup nonfat/lowfat Greek yogurt    Eight- 250 calories, 18 g protein  1 small soft-shell chicken taco with 1 oz shredded cheese, lettuce, tomato, salsa, and 1 Tbsp light sour cream    cup black beans    Nine- 225 calories, 13 g protein  2 ounces baked chicken  1/4 cup mashed potatoes    cup green beans    Ten- 200 calories, 21 g protein  Deli bernardo: 2 oz roast beef or other deli meat with 1 tsp Vikram mayonnaise and sliced tomato, onion, and lettuce  1/3rd cup cottage cheese      Ten  Dinners Under 300 calories    If you're eating a large breakfast and medium lunch, keep dinner small.  300-400 calories is ideal for most people depending on their caloric needs.    One- 300 calories, 12 g protein  1-inch thick slice of turkey meatloaf    cup baked butternut squash    Two- 200 calories, 9 g protein  Bread-less BLT: 3 slices turkey santacruz, sliced tomato, wrapped in a large lettuce leaf    cup peeled fruit    Three- 275 calories, 36 g protein  3 oz roasted chicken    cup cooked broccoli    cup shredded cheddar cheese    cup unsweetened applesauce    Four- 200 calories, 25 g protein  3 oz baked tilapia  1/3rd cup cooked carrots    cup yogurt    Five- 250 calories, 20 g protein  Grilled ham  n  Swiss: spread 2 tsp ghee or butter on 1 slice of whole grain bread.  Cut bread in half, layer 2 oz deli ham with 1 piece of Swiss cheese and grill until cheese is melted.    cup cooked vegetables    Six- 250 calories, 18 g protein  Vegetarian cheeseburger: 1 [place] cheeseburger topped with lettuce, onion, tomato, and ketchup/mustard    cup sweet potato fries    Seven- 250 calories, 18 g protein  Pork pot roast: 2 oz roasted pork loin, 1/3rd cup roasted carrots,   medium potato, cooked with   cup gravy    Eight- 330 calories, 25 g protein  2 oz meatballs (about 2 small meatballs)    cup spaghetti sauce  1/2 piece toast topped with 1 tsp ghee or butterand topped with garlic powder, toasted in oven    Nine- 250 calories, 16 g protein  Mexican pizza: one 8  corn tortilla topped with 2 oz chicken,   cup salsa, 2 tablespoons black beans, 2 tablespoons shredded cheese.  Bake until cheese is melted.    Ten- 250 calories, 22 g protein  Shrimp stir-quintana: 3 oz cooked shrimp, 1/6th onion,   pepper,   cup chopped carrots sautéed in 1 tablespoon olive oil, topped with 2 tablespoons stir quintana sauce and a pinch of sesame seeds        150 Calories or Less Snack Ideas   1 hardboiled egg with   cup berries  1 small apple with 1  hardboiled egg  10 almonds with   cup berries  2 clementines with 1 light string cheese  1 light string cheese with   sliced apple  1 light string cheese wrapped in 2 slices of turkey  4 100% whole wheat crackers (e.g. Triscuit) with 1 light string cheese    c. cottage cheese with   cup fruit and 1 Tbsp sunflower seeds     cup cottage cheese with   of an avocado     can tuna fish with 1 cup sliced cucumbers     cup roasted garbanzo beans with paprika and cayenne pepper    baked sweet potato with   cup chili beans or   cup cottage cheese  2 oz. nitrate free turkey slices with 1 cup carrots  1 container (6 oz) of low sugar (less than 10 grams of sugar) greek yogurt   3 Tablespoons of hummus with 1 cup sliced bell peppers   2 Tablespoons of hummus with 15 baby carrots  4 Tablespoons ranch dip made with plain Greek Yogurt and 3 mini cucumbers  1/4 cup nuts (any kind)  1 Tablespoon peanut butter with 1 stalk celery   1 dill pickle wrapped in 1-2 slices of deli ham with 1 tsp of mayonnaise/mustard.        LEAN PROTEIN SOURCES  Getting 20-30 grams of protein, 3 meals daily, is appropriate for most people, some need more but more than about 40 grams per meal is not useful.  General rule is drinking one ounce of water per gram of protein eaten over the course of the day:  70 grams of protein each day, drink 70 oz of water.  Protein Source Portion Calories Grams of Protein                           Nonfat, plain Greek yogurt    (10 grams sugar or less) 3/4 cup (6 oz)  12-17   Light Yogurt (10 grams sugar or less) 3/4 cup (6 oz)  6-8   Protein Shake 1 shake 110-180 15-30   Skim/1% Milk or lactose-free milk 1 cup ( 8 oz)  8   Plain or light, flavored soymilk 1 cup  7-8   Plain or light, hemp milk 1 cup 110 6   Fat Free or 1% Cottage Cheese 1/2 cup 90 15   Part skim ricotta cheese 1/2 cup 100 14   Part skim or reduced fat cheese slices 1 ounce 65-80 8     Mozzarella String Cheese 1 80 8   Canned tuna,  chicken, crab or salmon  (canned in water)  1/2 cup 100 15-20   White fish (broiled, grilled, baked) 3 ounces 100 21   [place]/Tuna (broiled, grilled, baked) 3 ounces 150-180 21   Shrimp, Scallops, Lobster, Crab 3 ounces 100 21   Pork loin, Pork Tenderloin 3 ounces 150 21   Boneless, skinless chicken /turkey breast                          (broiled, grilled, baked) 3 ounces 120 21   [place], [place], [place], and Venison 3 ounces 120 21   Lean cuts of red meat and pork (sirloin,   round, tenderloin, flank, ground 93%-96%) 3 ounces 170 21   Lean or Extra Lean Ground Turkey 1/2 cup 150 20   90-95% Lean [place] Burger 1 brit 140-180 21   Low-fat casserole with lean meat 3/4 cup 200 17   Luncheon Meats                                                        (turkey, lean ham, roast beef, chicken) 3 ounces 100 21   Egg (boiled, poached, scrambled) 1 Egg 60 7   Egg Substitute 1/2 cup 70 10   Nuts (limit to 1 serving per day)  3 Tbsp. 150 7   Nut [place] (peanut, almond)  Limit to 1 serving or less daily 1 Tbsp. 90 4   Soy Burger (varies) 1  15   Garbanzo, Black, [place] Beans 1/2 cup 110 7   Refried Beans 1/2 cup 100 7   Kidney and Lima beans 1/2 cup 110 7   Tempeh 3 oz 175 18   Vegan crumbles 1/2 cup 100 14   Tofu 1/2 cup 110 14   Chili (beans and extra lean beef or turkey) 1 cup 200 23   Lentil Stew/Soup 1 cup 150 12   Black Bean Soup 1 cup 175 12       MEDICATIONS FOR WEIGHT LOSS  There are several medications available to assist us in weight loss.  By themselves, without a mindful change in diet and increase in movement/activity these medications are disappointing in their results. However, combined with a closely monitored program of diet change and exercise they can be very effective in controlling appetite and boosting initial weight loss.  All weight loss medications need continual re-evaluation for efficacy as their side effects and health benefits fail to be worthwhile if a person is not continuing to lose weight or  in maintaining their healthy weight.  Some weight loss medications are scheduled drugs, meaning there is at least a theoretical possibility for developing addiction to them, but in practice this is rare.  We do anticipate coming off meds in the future- after stabilization of weight loss is assurred.  Finally, a tolerance can develop and people s perceived efficacy of medication can diminish.  In communication with your physician, it may be appropriate to intermittently take a break from these medications and then restart again (few weeks off then restart again) if a plateau is reached that cannot be broken through.  Each person can respond to a medication differently and to be a good option for you, it will need to be affordable, effective and well tolerated with minimal side effects.    In most cases, weight loss progress after one month and three months will be obtained and if a patient is not reaching the satisfactory progress towards weight loss, the medications may be discontinued.  The thought is that if a person is taking a weight loss medication and not receiving the potential health benefit of that drug, the side effects are not worthwhile and use should be discontinued.  On the flip side, there are many people on some weight loss medications for years because it continues to be an effective tool in their weight management and they are tolerating the medication without any long-term side effects.  Each person's response and purpose will be evaluated.    PHENTERMINE (Adipex): approved in 1959 for appetite suppression.  It has stimulant effects and cannot be used with Ritalin, Concerta, or other stimulants.  Although it is not highly addictive, it's chemically related to amphetamines which are addictive and is classified as a Controlled Substance by the [place].  Occasional dependence can develop, but rarely. The most common side effects are dry mouth, increased energy and concentration, increased pulse, and  constipation.  You should not take phentermine if you have glaucoma, hyperthyroidism, or uncontrolled/untreated hypertension or overly anxious. You should stop if dramatic mood swings, severe insomnia, palpations, chest pains, visual changes or if your Blood Pressure is consistently elevated or any time it's over 160/90.   It's ok to go off the med for a few weeks and restart if efficacy is wearing off.  $24-$30 for 90 tablets at [facility]. Females are required to have reliable birth control to reduce the risk birth defects/miscarriage.    TOPIRAMATE (Topamax): Anti-seizure medication, also used to prevent migraines and sometimes for mood stabilization.  Side effects include paresthesia, glaucoma, altered concentration, attention difficulties, memory and speech problems, metabolic acidosis, depression, increase in body temperature and decrease sweating, risk of kidney stones.  Do not take Topamax while taking Depakote as this can cause high ammonia levels.  You must have reliable birth control as Topamax can cause birth defects.  If prolonged use has occurred it should be tapered off slowly to avoid withdrawal issues.  Insurance usually covers Topiramate.  At higher doses, there may be some confusion/forgetfulness associated with this so we try to limit dose to under 75mg twice daily to reduce this risk. Often covered by insurance as it's used for many reasons.  Topamax will cause carbonated beverages to taste bad. A recheck of your kidney/electrolytes may occur within a few months of starting.    QSYMIA (Phentermine + Topamax extended release):  See above information about phentermine and Topamax.  Most common side effects are paresthesia, dizziness, distortion of taste, insomnia, constipation, and dry mouth.  See above descriptions for the two individual agents.Females are required to have reliable birth control to reduce the risk birth defects/miscarriage.  $100-200 per month but coupons/programs exist at  Qsymia.com that may reduce costs depending on a patient's coverage. Has  a low, medium and higher dosing option and usually titrating upwards is expected for continued good benefit and consideration for tapering downward or using lower dose in stabilization phases.    GLP1 Agonists:  Liraglutide (Victoza/Saxenda), Semaglutide (Ozempic/Wegovy):   Part of the family of Glucagon Like Peptide Agonists, these medications directly suppresses appetite and are often used by diabetic patients due to improvements in glucose/insulin balance.  In 2024, Wegovy also has been FDA approved for reducing risks of heart attacks in those with established coronary heart disease in those that are overweight or obese. These medications also slow how quickly the stomach empties, increasing fullness. They may be hard to get covered for non diabetics and some plans have exclusions for weight loss purposes.  Currently, these are  injectable medications delivered via autoinjector pen. It can be very costly without insurance coverage (over $500/month).  Due to high demand, there have been cycles of unavailability that can affect the supply or ramping up of these medications.  Small risks for pancreatitis exists and dose should be held if increased mid abdominal pain/burning. It is not to be used if previous Multiple Endocrine Neoplasia. In rodents, may increase risk of thyroid tumors and not indicated for anyone with a history of medullary thyroid cancer as a result.  If changes in voice/swallowing should be discontinued. Reliable birth control required in women. [email or web address], Wegovy.com has more information on these medications.  It can take up to 6 weeks for some of these medications to get out of the body after the last injection.  Should be stopped a few weeks prior to elective surgical procedures and may require re-ramping afterwards.    Zepbound (Tirzepatide):  Similar in many ways to Wegovy/Saxenda type products, works by boosting satiety  "signals that improve sense of fullness/satiety, boosting both GIP and GLP1 hormones. Not to be used by those with Multiple endocrine neoplasia, medullary thyroid cancer and not likely tolerated well for those with recurrent/idiopathic pancreatitis issues. Costly without insurance coverage but very effective in curbing appetite. Currently has highest average weight reduction in clinical drug trials.  Once weekly injectable therapy. Nausea/upset stomach/constipation or diarrhea are common side effects. Heart burn can increase in some, as it slows stomach emptying speeds. Should be stopped a few weeks prior to elective surgical procedures and may require re-ramping afterwards. [employer] has good patient resources/information.    Contrave (Bupropion/Naltrexone).    Synergistic combination of a mild appetite suppressing anti-depressant (Bupropion) whose effects are increased due to interaction with Naltrexone.  Naltrexone may have some effects on craving and is often used in addiction medicine to help previous opiate addicts be less prone to relapse as it blocks the action of opiates. Should be stopped if any need for opiate pain medication, surgery or planned procedures where you'll be given sedation/anesthesia. If prolonged use, recommend stepping down bupropion over 2-3 weeks to limit any risk of withdrawal issues. Side effects may include dry mouth, increased heart rate, mild elevation in Blood pressure;  dizziness, ringing in the ears, anxiety (typically due to bupropion), nausea, constipation, and some get fatigued with naltrexone.  About $210 on [facility] for 120 tabs of \"Contrave\", the brand name without insurance coverage. Generic Bupropion 75mg: $25 for 120 tabs, Naltrexone: $55 for 90 tabs without insurance coverage on [employer]. Cannot be used if pregnant/trying to conceive or breast feeding.    Plenity:   NO LONGER AVAILABLE due to company going bankrupt. [email or web address] has more information.     Wegovy or " Ozempic (semaglutide) is a very effective satiety boosting appetite suppressant. It needs to be ramped up slowly to be tolerated adequately.  About 1/10 people will not tolerate this medication. Each month, you move up to a higher dose until eventually reaching the 2.4mg/week dose if tolerated. When in plentiful supply, one try at re-ramping is recommended if the initial ramping isn't tolerated well and if that re-ramping isn't tolerated well, it's best to avoid this medication.       Wegovy starts at 0.25mg/week for 4 weeks then increases to 0.5mg/week for 4 weeks then 1mg/week for 4 weeks then 1.7mg/week for 4 weeksthen 2.4mg/week usually for 6-9 months if tolerated well.  Injections can be given after cleansing the skin with alcohol prep pad or swab (available OTC).     Stop Wegovy if severe abdominal pain/vomiting/rash/throat swelling or constant nausea that prevents adequate food/water intake. Stop 2-3 weeks prior to any planned general anesthesia surgeries to reduce risk for something called a post operative ileus.     Gallstones can occur in about 1% of patients on this medication so update me if increase right upper abdominal pain after eating.     Start meals with protein first, separate beverages from meals by 20 minutes and work hard in between meals to get your 64-75 oz of water daily to reduce risks for severe constipation. Consider a fiber supplement like powdered psyllium husk in 12 oz water each night, stool softerners as needed and Miralax or milk of Magnesia if more than 3 days have passed without a Bowel Movement.     Check out [employer] for patient resources.  If you have weekends off, I recommend dosing Friday evenings.     Some people starve on this medication if not mindful about food intake. I recommend starting meals with the protein part of your meal first, chew thoroughly and separate beverages from meals by about 20 minutes to make sure you get your nourishment in first. Include  vegetables/complex carbohydrates and unsaturated fat as part of your balanced diet but group these at the end of the meal, after protein is mostly gone. Satiety will kick in too early if drinking too much with meals and under nourishment can result.     It's not a bad idea to take a complete multivitamin most days of the week if using this medication.     Pancreatitis is a very rare but potentially serious side effect. Stop Wegovy if severe mid abdominal pain/burning in nature or if unable to eat/drink due to severe nausea/discomfort.   People with strong history of pancreatitis without clear cause should stay clear of this medication as should those with strong personal or family history for medullary thyroid cancer or Multiple Endocrine Neoplasia (rare).     Stop Wegovy at least 2 weeks prior to any planned surgery.    Kind Regards,  Ricardo Steiner MD  [facility]

## 2024-06-14 NOTE — LETTER
"2024      Daron Bond  [address]  [facility] [address]      Dear Colleague,    Thank you for referring your patient, Daron Bond, to the [facility] [place]. Please see a copy of my visit note below.    65 minutes spent by me on the date of the encounter doing chart review, history and exam, documentation and further activities per the note    New Bariatric Surgery Consultation Note    2024    RE: Daron Bond  MR#: [id number]  : 1980      Referring provider:        No data to display                Chief Complaint/Reason for visit: evaluation for possible weight loss surgery    Dear Mata Hearn MD (General),    I had the pleasure of seeing your patient, Daron Bond, to evaluate his obesity and consider him for possible weight loss surgery. As you know, Daron Bond is 44 year old.  He has a height of 5' 11.5\", a weight of 346 lbs 14.4 oz, and calculated Body mass index is 47.71 kg/m ..  Today we discussed our Bariatric Surgery program to address their weight related health. He is not appropriate for the surgical program today and we focused on non surgical options per his request. Should he struggle with non surgical weight loss, quit smoking and have motivation to live the bariatric lifestyle, we can revisit that option next year.    He has new onset type II diabetes, on metformin and we'll look to ramp up Ozempic therapy for triple benefit on his diabetes, weight management and cardiovascular risk reduction.  He notes a previous \"heart attack\" which I do not see documentation of in the chart today but upon review he'll experience chest pains if exerting himself with leg workouts at the gym. As such, I'll refer him to Cardiology for evaluation/clearance for exercise capacity. His chronic back pain and his brain issues can further complicate interpretation of his history/symptoms.     He has high " STOPBANG of 5, thick neck and previous apneic spells but has difficult tolerance of his distant sleep study which he though was non diagnostic. Referral for sleep evaluation placed given his evolving metabolic syndrome/risks.             Assessment & Plan  Problem List Items Addressed This Visit          Nervous and Auditory    Chronic pain syndrome       Circulatory    Primary hypertension       Behavioral    TYRELL (generalized anxiety disorder)     Other Visit Diagnoses       Class 3 severe obesity due to excess calories with serious comorbidity and body mass index (BMI) of 45.0 to 49.9 in adult (H)    -  Primary    Relevant Medications    semaglutide (OZEMPIC, 0.25 OR 0.5 MG/DOSE,) 2 MG/3ML pen (Start on 6/20/2024)    Semaglutide, 1 MG/DOSE, (OZEMPIC) 4 MG/3ML pen (Start on 9/5/2024)    Other Relevant Orders    CBC with platelets    Comprehensive metabolic panel    C-peptide    Parathyroid Hormone Intact    TSH    Vitamin B12    25- OH-Vitamin D    Magnesium    Adult Sleep Eval & Management  Referral    Adult Cardiology Eval  Referral    Type 2 diabetes, HbA1c goal < 7% (H)        Relevant Medications    semaglutide (OZEMPIC, 0.25 OR 0.5 MG/DOSE,) 2 MG/3ML pen (Start on 6/20/2024)    Semaglutide, 1 MG/DOSE, (OZEMPIC) 4 MG/3ML pen (Start on 9/5/2024)    Other Relevant Orders    CBC with platelets    Comprehensive metabolic panel    C-peptide    Parathyroid Hormone Intact    TSH    Vitamin B12    25- OH-Vitamin D    Magnesium    Adult Sleep Eval & Management  Referral    Adult Cardiology Eval  Referral    Exertional chest pain        Relevant Orders    Adult Cardiology Eval  Referral    Drug-induced weight gain        multiple meds than can affect appetite/satiety signals.           Plan:  Welcome to your weight loss season. To start, we'll aim for mindful diet where you get 3 meals through the day, about every 5-6 hours to stay just ahead of your appetite and to stay well  "fueled and avoid low blood sugars. Start meals with protein first, vegetables second and complex carbohydrate at the end of the meal.  Hydrate well between meals,  beverages from meals by 20 minutes to make sure you get enough food at each meal while on medication like Ozempic (semaglutide).     2. Aim for 1950-2100kcal/day with 100-110 grams of lean protein and 120 oz of water daily (can include your sparkling water but keep your \"diet\" or artificial sweetener to one daily).     3. Ramp up Ozempic: start at 0.25 mg/week for 4 weeks then increase the dial to 0.5mg/week for 2 months and then we'll ramp up to the 1mg/week if tolerated in September.     4. Check labs today.    5. Start tracking intake with [employer] mal.     6. Referral for sleep study evaluation.    7. Referral for cardiac clearance given some chest pains when doing leg workouts and higher risk for heart disease. Will look for clearance before ramping up fitness focus.      HISTORY OF PRESENT ILLNESS:      6/10/2024    12:01 PM   Weight Loss History Reviewed with Patient   How long have you been overweight? From Middle age and beyond   What is the most that you have ever weighed 400   What is the most weight you have lost? 50   I have tried the following methods to lose weight Watching portions or calories    Exercise   I have tried the following weight loss medications? (Check all that apply) None   Interest in medication support for weight management? Yes. Not a a candidate for surgery.  Reviewed pros/cons of surgical tools in the management of his co-morbid diabetes, hypertension issues, he's planning today to go with non surgical weight loss..     Obesogenic drugs include metoprolol (and increase insuline resistance), amlodipine (fluid retention), gabapentin (satiety impairment), and Buspar (increased hunger). Anxiety conditions can further lead to more comfort eating under stress response.        CO-MORBIDITIES OF OBESITY INCLUDE:      " 6/10/2024    12:01 PM   --   I have the following health issues associated with obesity Type II Diabetes    Heart Disease    High Blood Pressure    High Cholesterol    Asthma       PAST MEDICAL HISTORY:  No past medical history on file.  Chart review shows blood glucose of 208mg/dl on 10/1/11, then A1c of 7.2% May 9th of 2024 and again in confirmation:  Lab Results   Component Value Date    A1C 7.4 2024    A1C 5.0 2018   Low HDL and elevated triglycerides on 24 labs complete the metabolic syndrome progression to recent diabetes.  New onset type II diabetes should respond well to dietary changes/weight reduction and I'd anticipate remission with 50-70 lb reduction in weight.  Use of GLP1 or GIP/GLP1 agonist would be ideal for both weight and glycemic control to complement his metformin use.  PAST SURGICAL HISTORY:  Past Surgical History:   Procedure Laterality Date     ARTHROSCOPY KNEE Right     no date given     C CORTISONE INJECTION  2016     COLONOSCOPY  2015    Internal/external hemorrhoids.  Repeat at age 50     ESOPHAGOGASTRODUODENOSCOPY W/ BANDING  2015     EYE SURGERY      x 3 no dates given     HEMORRHOIDECTOMY  2015     PSYCH SVC, INTENSIVE OUTPT  2002   [facility] [address], [place] [address]    Main: [phone] [email or web address]                                                  Transthoracic Echo Report    JOEL SUTTON    Kayleigh ID: [id number] Age: 41 : 1980 Ordering Provider: ROBERTO RHODES    Exam Date: 2021 11:59 Gender: M Sonographer: BEBETO    Accession #: [id number] Height: 72 in BSA: 2.62 m  BP: 134 / 88    Weight: 324 lbs BMI: 43.9 kg/m  HR: 78      Location: [facility] Outpatient Rhythm: Normal Sinus Rhythm    Procedure Components: 2D imaging, Color Doppler, Spectral Doppler    Indications: Neutrophilia    Technical Quality: Fair Contrast: None      Final Conclusion     "1.  Normal left ventricular systolic function.  No regional wall motion abnormalities. Calculated left ventricular ejection fraction    (modified Kirk technique) is 66 %.    2.  Normal right ventricular systolic function.    3.  No significant valvular heart disease.       FAMILY HISTORY:   Family History   Problem Relation Age of Onset     Diabetes Type 2  Mother      Heart Disease Father        SOCIAL HISTORY:       6/10/2024    12:01 PM   Social History Questions Reviewed With Patient   Which best describes your employment status (select all that apply) I am disabled   Which best describes your marital status single   Who do you have in your support network that can be available to help you for the first 2 weeks after surgery? My kids. Social workers   Who can you count on for support throughout your weight loss surgery journey? My kids.   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  Stressors: \"brother stole my million dollar inheritance\". Probate pending.    HABITS:      6/10/2024    12:01 PM   --   How often do you drink alcohol? Never   Do you currently use any of the following Nicotine products? cigarettes   Have you ever used any of the following nicotine products? Cigarettes   Have you or are you currently using street drugs or prescription strength medication for which you do not have a prescription for? No   Do you have a history of chemical dependency (alcohol or drug abuse)? No   Reviewed that for our surgical program, a minimum of 3 months abstinence, confirmed with nicotine screening and again the week or two prior to surgery will be necessary as well as a lifelong abstinence plan due to high risk of ulcer/complications in the bariatric anatomy with smoked products/nicotine/marijuana.      PSYCHOLOGICAL HISTORY:       6/10/2024    12:01 PM   Psychological History Reviewed With Patient   Have you ever attempted suicide? Never.   Have you had thoughts of suicide in the past year? No   Have you ever been " "hospitalized for mental illness or a suicide attempt? Never.   Do you have a history of chronic pain? Yes   Have you ever been diagnosed with fibromyalgia? No   Are you currently seeing a therapist or counselor? No   Are you currently seeing a psychiatrist? No   Chronic pain patients tend to have 5-10% less average weight reduction after bariatric surgery than those not afflicted by chronic pain conditions/disabilities.    ROS:      6/10/2024    12:01 PM   --   Skin Leg swelling    Varicose veins   HEENT Headaches    Dizziness/lightheadedness   Musculoskeletal Joint Pain    Back pain    Limited mobility    Swelling of legs    Arthritis   Cardiovascular Chest pain    Heart murmurs   Pulmonary Shortness of breath at rest    Shortness of breath with activity   Gastrointestinal Heartburn   Genitourinary Erectile dysfunction   Hematological None of the above   Neurological Migraine headaches     Heart murmur is from self perceived. ECHO ever? Yes. 2021, no murmur heard today and normal valves in 2021. ?palpitation sensation as he \"feels it\" sometimes.    Fatty liver seen on 11/12/21 ultrasound:  US ABDOMEN LIMITED RUQ    Anatomical Region Laterality Modality   Abdomen -- Ultrasound     Narrative    For Patients: As a result of the 21st Century Cures Act, medical imaging exams and procedure reports are released immediately into your electronic medical record. You may view this report before your referring provider. If you have questions, please contact your health care provider.    EXAM: US ABDOMEN LIMITED RUQ  LOCATION: [facility]  DATE/TIME: 11/12/2021 9:35 AM    INDICATION: Right Upper Quadrant Pain  COMPARISON: None.  TECHNIQUE: Limited abdominal ultrasound.    FINDINGS:    GALLBLADDER: Normal. No gallstones, wall thickening, or pericholecystic fluid. Negative sonographic Warner's sign.    BILE DUCTS: No biliary dilatation. The common duct measures 5 mm.    LIVER: Mild to moderate diffuse fatty infiltration " the liver which attenuates the ultrasound and slightly obscures the liver posteriorly. No suspicious hepatic lesion is seen. Normal hepatopedal flow in the main portal vein.    RIGHT KIDNEY: Normal appearance of the right kidney measuring 11.5 cm.    PANCREAS: Visualized portions appear normal.    No ascites.    IMPRESSION:  1.  Normal gallbladder. No biliary dilatation.    2.  Mild to moderate diffuse fatty infiltration of the liver.    3.  Otherwise normal hepatobiliary ultrasound.  EATING BEHAVIORS:      6/10/2024    12:01 PM   --   Have you or anyone else thought that you had an eating disorder? No   Do you currently binge eat (eat a large amount of food in a short time)? No   Are you an emotional eater? Yes   Do you get up to eat after falling asleep? Yes   Can you afford 3 meals a day? No   Can you afford 50-60 dollars a month for vitamins? No       EXERCISE:      6/10/2024    12:01 PM   --   How often do you exercise? 3 to 4 times per week   What is the duration of your exercise (in minutes)? 20 Minutes   What types of exercise do you do? gym membership    swimming    weightlifting    other   What keeps you from being more active? Pain    My ability to walk or move around is limited    Too tired       MEDICATIONS:  Current Outpatient Medications   Medication Sig Dispense Refill     albuterol (PROAIR HFA/PROVENTIL HFA/VENTOLIN HFA) 108 (90 Base) MCG/ACT inhaler INHALE 2 PUFFS BY MOUTH EVERY 6 HOURS 8.5 g 4     amLODIPine (NORVASC) 5 MG tablet TAKE 1 TABLET(5 MG) BY MOUTH DAILY 90 tablet 5     busPIRone (BUSPAR) 10 MG tablet Take 1 tablet (10 mg) by mouth 3 times daily 120 tablet 5     gabapentin (NEURONTIN) 300 MG capsule Take 1 capsule by mouth at bedtime       hydrochlorothiazide (MICROZIDE) 12.5 MG capsule Take 1 capsule (12.5 mg) by mouth every morning 90 capsule 3     ketoconazole (NIZORAL) 2 % external shampoo Apply topically daily as needed for itching or irritation 100 mL 1     losartan (COZAAR) 100 MG  "tablet Take 1 tablet (100 mg) by mouth daily 90 tablet 1     metFORMIN (GLUCOPHAGE XR) 500 MG 24 hr tablet Take 1 tablet (500 mg) by mouth daily (with dinner) for 7 days, THEN 2 tablets (1,000 mg) daily (with dinner) for 7 days, THEN 3 tablets (1,500 mg) daily (with dinner) for 7 days, THEN 4 tablets (2,000 mg) daily (with dinner) for 7 days. 70 tablet 0     metoprolol succinate ER (TOPROL XL) 100 MG 24 hr tablet Take 1 tablet (100 mg) by mouth daily 30 tablet 5     oxyCODONE (ROXICODONE) 5 MG tablet Take 1 tablet (5 mg) by mouth 3 times daily as needed for severe pain 90 tablet 0     pantoprazole (PROTONIX) 40 MG EC tablet TAKE 1 TABLET BY MOUTH DAILY 90 tablet 2     [START ON 6/20/2024] semaglutide (OZEMPIC, 0.25 OR 0.5 MG/DOSE,) 2 MG/3ML pen Start 0.25mg/week for 4 weeks then increase, if tolerated, to 0.5mg/week. 3 mL 2     [START ON 9/5/2024] Semaglutide, 1 MG/DOSE, (OZEMPIC) 4 MG/3ML pen Inject 1 mg Subcutaneous every 7 days for 180 days 3 mL 5       ALLERGIES:  Allergies   Allergen Reactions     Banana      Bee Venom      Latex            PHYSICAL EXAM:  Objective   /70   Ht 1.816 m (5' 11.5\")   Wt (!) 157.4 kg (346 lb 14.4 oz)   BMI 47.71 kg/m    /70   Ht 1.816 m (5' 11.5\")   Wt (!) 157.4 kg (346 lb 14.4 oz)   BMI 47.71 kg/m    Body mass index is 47.71 kg/m .  Physical Exam   GENERAL: alert and no distress. Accompanied by 2 tween age kids and service [place] cintron, \"Lady\".   NECK: thick neck, crowded airway. Neck of 18.5 inches.  RESP: lungs clear to auscultation - no rales, rhonchi or wheezes  CV: regular rate and rhythm, normal S1 S2, no S3 or S4, no murmur, click or rub, no peripheral edema. Weak peripheral pulses radially and PT bilaterally.  ABDOMEN: soft,  central adiposity c/w his metabolic syndrome. nontender, no hepatosplenomegaly, no masses and bowel sounds normal. Waist of 60 inches.   MS: no gross musculoskeletal defects noted, no edema.  Normal speech. Affect mildly blunted " and get get easily distracted by his dog/kids.         Sincerely,     Ricardo Steiner MD  6/13/2024  9:24 PM              Again, thank you for allowing me to participate in the care of your patient.        Sincerely,        Ricardo Steiner MD
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
## 2024-06-15 ENCOUNTER — MYC MEDICAL ADVICE (OUTPATIENT)
Dept: FAMILY MEDICINE | Facility: CLINIC | Age: 44
End: 2024-06-15
Payer: COMMERCIAL

## 2024-06-17 ENCOUNTER — VIRTUAL VISIT (OUTPATIENT)
Dept: EDUCATION SERVICES | Facility: CLINIC | Age: 44
End: 2024-06-17
Attending: FAMILY MEDICINE
Payer: COMMERCIAL

## 2024-06-17 DIAGNOSIS — E11.9 TYPE 2 DIABETES MELLITUS WITHOUT COMPLICATION, WITHOUT LONG-TERM CURRENT USE OF INSULIN (H): Primary | ICD-10-CM

## 2024-06-17 PROCEDURE — 98968 PH1 ASSMT&MGMT NQHP 21-30: CPT | Mod: 93 | Performed by: DIETITIAN, REGISTERED

## 2024-06-17 RX ORDER — LANCETS
EACH MISCELLANEOUS
Qty: 100 EACH | Refills: 6 | Status: SHIPPED | OUTPATIENT
Start: 2024-06-17

## 2024-06-17 NOTE — PATIENT INSTRUCTIONS
You have a 1:1 appointment with Romina on July 2 at 1pm    Blood sugars goals:  Check blood sugars at varying times at least every other day: before meals, after meals and bedtime  -Fasting and before meal target is 80 - 130  -2 hours after a meal target is < 180  -Time in range for continuous glucose monitor (Ciara OR Dexcom): at least 70% BG numbers between .  Always remember to bring meter and/or log book to all appointments.      Thank you!  Kait Singletary RD, LD, [facility]  Certified Diabetes Care &   Outpatient [facility] Diabetes Education and Nutrition Services for the [facility]:  For Your Diabetes Education or Nutrition Appointments Call:  [phone]   For Diabetes Education and Nutrition Related Questions:   Phone: [phone]  Send Imonomi Message   If you need a medication refill please contact your pharmacy. Please allow 3 business days for your refills to be completed.
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
## 2024-06-19 ENCOUNTER — TELEPHONE (OUTPATIENT)
Dept: CARDIOLOGY | Facility: CLINIC | Age: 44
End: 2024-06-19
Payer: COMMERCIAL

## 2024-06-19 NOTE — TELEPHONE ENCOUNTER
[facility] Call [facility]    Phone Message    May a detailed message be left on voicemail: yes     Reason for Call: Other: Patient will like to schedule [facility] MPI treadmill in [place], Please call patient back to further coordinate.     Action Taken: Other: Cardiology    Travel Screening: Not Applicable     Thank you!  [facility]

## 2024-06-21 ENCOUNTER — TELEPHONE (OUTPATIENT)
Dept: CARDIOLOGY | Facility: CLINIC | Age: 44
End: 2024-06-21
Payer: COMMERCIAL

## 2024-06-21 NOTE — TELEPHONE ENCOUNTER
[facility] Call Center    Phone Message    May a detailed message be left on voicemail: yes     Reason for Call: Other: Pt has an appt for a stress test in [place] and needs a call back to reschedule      Action Taken: Other: Cardio    Travel Screening: Not Applicable     Date of Service:

## 2024-06-24 ENCOUNTER — HOSPITAL ENCOUNTER (OUTPATIENT)
Dept: NUCLEAR MEDICINE | Facility: HOSPITAL | Age: 44
Discharge: HOME | End: 2024-06-24
Attending: INTERNAL MEDICINE
Payer: COMMERCIAL

## 2024-06-24 ENCOUNTER — HOSPITAL ENCOUNTER (OUTPATIENT)
Dept: CARDIOLOGY | Facility: HOSPITAL | Age: 44
Discharge: HOME | End: 2024-06-24
Attending: INTERNAL MEDICINE
Payer: COMMERCIAL

## 2024-06-24 ENCOUNTER — PATIENT OUTREACH (OUTPATIENT)
Dept: CARE COORDINATION | Facility: CLINIC | Age: 44
End: 2024-06-24

## 2024-06-24 DIAGNOSIS — R07.89 ATYPICAL CHEST PAIN: ICD-10-CM

## 2024-06-24 DIAGNOSIS — E66.01 OBESITY, MORBID (H): ICD-10-CM

## 2024-06-24 DIAGNOSIS — I10 PRIMARY HYPERTENSION: ICD-10-CM

## 2024-06-24 DIAGNOSIS — G89.4 CHRONIC PAIN SYNDROME: ICD-10-CM

## 2024-06-24 LAB
CV STRESS CURRENT BP HE: NORMAL
CV STRESS CURRENT HR HE: 103
CV STRESS CURRENT HR HE: 104
CV STRESS CURRENT HR HE: 105
CV STRESS CURRENT HR HE: 72
CV STRESS CURRENT HR HE: 80
CV STRESS CURRENT HR HE: 82
CV STRESS CURRENT HR HE: 83
CV STRESS CURRENT HR HE: 84
CV STRESS CURRENT HR HE: 85
CV STRESS CURRENT HR HE: 86
CV STRESS CURRENT HR HE: 87
CV STRESS CURRENT HR HE: 88
CV STRESS CURRENT HR HE: 89
CV STRESS CURRENT HR HE: 91
CV STRESS CURRENT HR HE: 93
CV STRESS DEVIATION TIME HE: NORMAL
CV STRESS ECHO PERCENT HR HE: NORMAL
CV STRESS EXERCISE STAGE HE: NORMAL
CV STRESS EXERCISE STAGE REACHED HE: NORMAL
CV STRESS FINAL RESTING BP HE: NORMAL
CV STRESS FINAL RESTING HR HE: 84
CV STRESS MAX HR HE: 104
CV STRESS MAX TREADMILL GRADE HE: 0
CV STRESS MAX TREADMILL SPEED HE: 0
CV STRESS PEAK DIA BP HE: NORMAL
CV STRESS PEAK SYS BP HE: NORMAL
CV STRESS PHASE HE: NORMAL
CV STRESS PROTOCOL HE: NORMAL
CV STRESS RESTING PT POSITION HE: NORMAL
CV STRESS ST DEVIATION AMOUNT HE: NORMAL
CV STRESS ST DEVIATION ELEVATION HE: NORMAL
CV STRESS ST EVELATION AMOUNT HE: NORMAL
CV STRESS TEST TYPE HE: NORMAL
CV STRESS TOTAL STAGE TIME MIN 1 HE: NORMAL
NUC STRESS EJECTION FRACTION: 70 %
RATE PRESSURE PRODUCT: NORMAL
STRESS ECHO BASELINE DIASTOLIC HE: 80
STRESS ECHO BASELINE SYSTOLIC BP: 132
STRESS ECHO CALCULATED PERCENT HR: 59 %
STRESS ECHO LAST STRESS DIASTOLIC BP: 88
STRESS ECHO LAST STRESS HR: 89
STRESS ECHO LAST STRESS SYSTOLIC BP: 182
STRESS ECHO POST ESTIMATED WORKLOAD: 6.5
STRESS ECHO POST EXERCISE DUR MIN: 4
STRESS ECHO POST EXERCISE DUR SEC: 12
STRESS ECHO TARGET HR: 176

## 2024-06-24 PROCEDURE — A9500 TC99M SESTAMIBI: HCPCS | Performed by: INTERNAL MEDICINE

## 2024-06-24 PROCEDURE — 250N000011 HC RX IP 250 OP 636: Mod: JZ | Performed by: INTERNAL MEDICINE

## 2024-06-24 PROCEDURE — 78451 HT MUSCLE IMAGE SPECT SING: CPT | Mod: 26 | Performed by: INTERNAL MEDICINE

## 2024-06-24 PROCEDURE — 93018 CV STRESS TEST I&R ONLY: CPT | Performed by: INTERNAL MEDICINE

## 2024-06-24 PROCEDURE — 78451 HT MUSCLE IMAGE SPECT SING: CPT

## 2024-06-24 PROCEDURE — 343N000001 HC RX 343: Performed by: INTERNAL MEDICINE

## 2024-06-24 RX ORDER — AMINOPHYLLINE 25 MG/ML
50-100 INJECTION, SOLUTION INTRAVENOUS
Status: COMPLETED | OUTPATIENT
Start: 2024-06-24 | End: 2024-06-24

## 2024-06-24 RX ORDER — REGADENOSON 0.08 MG/ML
0.4 INJECTION, SOLUTION INTRAVENOUS ONCE
Status: COMPLETED | OUTPATIENT
Start: 2024-06-24 | End: 2024-06-24

## 2024-06-24 RX ADMIN — REGADENOSON 0.4 MG: 0.08 INJECTION, SOLUTION INTRAVENOUS at 09:31

## 2024-06-24 RX ADMIN — Medication 47.5 MILLICURIE: at 09:54

## 2024-06-24 NOTE — PROGRESS NOTES
Pt has elevated B/P had chest pain in March.  Stabbing chest pain.  States does have shortness of breath with activity.  Here for cardiac evaluation and possible pre op clearance.  Lissette Vasquez RN

## 2024-06-27 ENCOUNTER — OFFICE VISIT (OUTPATIENT)
Dept: PODIATRY | Facility: CLINIC | Age: 44
End: 2024-06-27
Attending: FAMILY MEDICINE
Payer: COMMERCIAL

## 2024-06-27 VITALS — OXYGEN SATURATION: 98 % | BODY MASS INDEX: 42.66 KG/M2 | HEART RATE: 75 BPM | WEIGHT: 315 LBS | HEIGHT: 72 IN

## 2024-06-27 DIAGNOSIS — M72.2 PLANTAR FASCIITIS: Primary | ICD-10-CM

## 2024-06-27 PROCEDURE — 20550 NJX 1 TENDON SHEATH/LIGAMENT: CPT | Mod: LT | Performed by: PODIATRIST

## 2024-06-27 PROCEDURE — 99203 OFFICE O/P NEW LOW 30 MIN: CPT | Mod: 25 | Performed by: PODIATRIST

## 2024-06-27 RX ORDER — LIDOCAINE HYDROCHLORIDE 20 MG/ML
1 INJECTION, SOLUTION INFILTRATION; PERINEURAL ONCE
Status: COMPLETED | OUTPATIENT
Start: 2024-06-27 | End: 2024-06-27

## 2024-06-27 RX ORDER — TRIAMCINOLONE ACETONIDE 40 MG/ML
40 INJECTION, SUSPENSION INTRA-ARTICULAR; INTRAMUSCULAR ONCE
Status: COMPLETED | OUTPATIENT
Start: 2024-06-27 | End: 2024-06-27

## 2024-06-27 RX ADMIN — TRIAMCINOLONE ACETONIDE 40 MG: 40 INJECTION, SUSPENSION INTRA-ARTICULAR; INTRAMUSCULAR at 11:19

## 2024-06-27 RX ADMIN — LIDOCAINE HYDROCHLORIDE 1 ML: 20 INJECTION, SOLUTION INFILTRATION; PERINEURAL at 11:18

## 2024-06-27 NOTE — PROGRESS NOTES
FOOT AND ANKLE SURGERY/PODIATRY CONSULT NOTE         ASSESSMENT:   Plantar fasciitis left foot      TREATMENT:  The patient was given a cortisone injection in the left heel today consisting of 1 cc of Kenalog and 1 cc of 2% lidocaine plain.  I have also recommended orthotics.  I have asked the patient to return to the clinic if his pain persist at which time I would recommend a second cortisone injection.       HPI: I was asked to see Daron Bond today complaining of severe sharp pain on the bottom of the left heel.  The patient indicated that he has had this heel pain for approximately 6 weeks.  The pain is aggravated with weightbearing and ambulation.  He denies any trauma to the heel.  He has not noticed any associated redness or swelling.  The pain is partially relieved with nonweightbearing.  The pain is a bit more pronounced when he first gets out of bed in the mornings.  He denies any other previous treatment.  The patient was seen in consultation at the request of Juan A Barnes MD for for evaluation and treatment of left heel pain.     History reviewed. No pertinent past medical history.    Social History     Socioeconomic History    Marital status: Single     Spouse name: Not on file    Number of children: Not on file    Years of education: Not on file    Highest education level: Not on file   Occupational History    Not on file   Tobacco Use    Smoking status: Every Day     Current packs/day: 0.50     Types: Cigarettes     Passive exposure: Current    Smokeless tobacco: Never   Vaping Use    Vaping status: Never Used   Substance and Sexual Activity    Alcohol use: Not Currently     Comment: rarely    Drug use: Yes     Types: Oxycodone     Comment: takes oxycodone for pain mgt    Sexual activity: Not on file   Other Topics Concern    Not on file   Social History Narrative    Not on file     Social Determinants of Health     Financial Resource Strain: Low Risk  (5/15/2024)    Financial Resource  Strain     Within the past 12 months, have you or your family members you live with been unable to get utilities (heat, electricity) when it was really needed?: No   Food Insecurity: High Risk (5/15/2024)    Food Insecurity     Within the past 12 months, did you worry that your food would run out before you got money to buy more?: Yes     Within the past 12 months, did the food you bought just not last and you didn t have money to get more?: Yes   Transportation Needs: Low Risk  (5/15/2024)    Transportation Needs     Within the past 12 months, has lack of transportation kept you from medical appointments, getting your medicines, non-medical meetings or appointments, work, or from getting things that you need?: No   Physical Activity: Not on file   Stress: Not on file   Social Connections: Unknown (4/13/2023)    Received [facility], [facility]    Social Connections     Frequency of Communication with Friends and Family: Not on file   Interpersonal Safety: Low Risk  (5/13/2024)    Interpersonal Safety     Do you feel physically and emotionally safe where you currently live?: Yes     Within the past 12 months, have you been hit, slapped, kicked or otherwise physically hurt by someone?: No     Within the past 12 months, have you been humiliated or emotionally abused in other ways by your partner or ex-partner?: No   Housing Stability: Low Risk  (5/15/2024)    Housing Stability     Do you have housing? : Yes     Are you worried about losing your housing?: No          Allergies   Allergen Reactions    Banana     Bee Venom     Latex           Current Outpatient Medications:     albuterol (PROAIR HFA/PROVENTIL HFA/VENTOLIN HFA) 108 (90 Base) MCG/ACT inhaler, INHALE 2 PUFFS BY MOUTH EVERY 6 HOURS, Disp: 8.5 g, Rfl: 4    amLODIPine (NORVASC) 5 MG tablet, TAKE 1 TABLET(5 MG) BY MOUTH DAILY, Disp: 90 tablet, Rfl: 5    blood glucose (NO BRAND SPECIFIED)  test strip, Use to test blood sugar 1 times daily or as directed. To accompany: Blood Glucose Monitor Brands: per insurance., Disp: 100 strip, Rfl: 6    blood glucose monitoring (NO BRAND SPECIFIED) meter device kit, Use to test blood sugar 1 times daily or as directed. Preferred blood glucose meter OR supplies to accompany: Blood Glucose Monitor Brands: per insurance., Disp: 1 kit, Rfl: 0    busPIRone (BUSPAR) 10 MG tablet, Take 1 tablet (10 mg) by mouth 3 times daily, Disp: 120 tablet, Rfl: 5    gabapentin (NEURONTIN) 300 MG capsule, Take 1 capsule by mouth at bedtime, Disp: , Rfl:     hydrochlorothiazide (MICROZIDE) 12.5 MG capsule, Take 1 capsule (12.5 mg) by mouth every morning, Disp: 90 capsule, Rfl: 3    ketoconazole (NIZORAL) 2 % external shampoo, Apply topically daily as needed for itching or irritation, Disp: 100 mL, Rfl: 1    losartan (COZAAR) 100 MG tablet, Take 1 tablet (100 mg) by mouth daily, Disp: 90 tablet, Rfl: 1    metFORMIN (GLUCOPHAGE XR) 500 MG 24 hr tablet, Take 1 tablet (500 mg) by mouth daily (with dinner) for 7 days, THEN 2 tablets (1,000 mg) daily (with dinner) for 7 days, THEN 3 tablets (1,500 mg) daily (with dinner) for 7 days, THEN 4 tablets (2,000 mg) daily (with dinner) for 7 days., Disp: 70 tablet, Rfl: 0    metoprolol succinate ER (TOPROL XL) 100 MG 24 hr tablet, Take 1 tablet (100 mg) by mouth daily, Disp: 30 tablet, Rfl: 5    oxyCODONE (ROXICODONE) 5 MG tablet, Take 1 tablet (5 mg) by mouth 3 times daily as needed for severe pain, Disp: 90 tablet, Rfl: 0    pantoprazole (PROTONIX) 40 MG EC tablet, TAKE 1 TABLET BY MOUTH DAILY, Disp: 90 tablet, Rfl: 2    semaglutide (OZEMPIC, 0.25 OR 0.5 MG/DOSE,) 2 MG/3ML pen, Start 0.25mg/week for 4 weeks then increase, if tolerated, to 0.5mg/week., Disp: 3 mL, Rfl: 2    [START ON 9/5/2024] Semaglutide, 1 MG/DOSE, (OZEMPIC) 4 MG/3ML pen, Inject 1 mg Subcutaneous every 7 days for 180 days, Disp: 3 mL, Rfl: 5    thin (NO BRAND SPECIFIED) lancets,  Use with lanceting device. To accompany: Blood Glucose Monitor Brands: per insurance., Disp: 100 each, Rfl: 6     Family History   Problem Relation Age of Onset    Diabetes Type 2  Mother     Heart Disease Father         Social History     Socioeconomic History    Marital status: Single     Spouse name: Not on file    Number of children: Not on file    Years of education: Not on file    Highest education level: Not on file   Occupational History    Not on file   Tobacco Use    Smoking status: Every Day     Current packs/day: 0.50     Types: Cigarettes     Passive exposure: Current    Smokeless tobacco: Never   Vaping Use    Vaping status: Never Used   Substance and Sexual Activity    Alcohol use: Not Currently     Comment: rarely    Drug use: Yes     Types: Oxycodone     Comment: takes oxycodone for pain mgt    Sexual activity: Not on file   Other Topics Concern    Not on file   Social History Narrative    Not on file     Social Determinants of Health     Financial Resource Strain: Low Risk  (5/15/2024)    Financial Resource Strain     Within the past 12 months, have you or your family members you live with been unable to get utilities (heat, electricity) when it was really needed?: No   Food Insecurity: High Risk (5/15/2024)    Food Insecurity     Within the past 12 months, did you worry that your food would run out before you got money to buy more?: Yes     Within the past 12 months, did the food you bought just not last and you didn t have money to get more?: Yes   Transportation Needs: Low Risk  (5/15/2024)    Transportation Needs     Within the past 12 months, has lack of transportation kept you from medical appointments, getting your medicines, non-medical meetings or appointments, work, or from getting things that you need?: No   Physical Activity: Not on file   Stress: Not on file   Social Connections: Unknown (4/13/2023)    Received from [facility], [facility]    Social Connections     Frequency of Communication with Friends and Family: Not on file   Interpersonal Safety: Low Risk  (5/13/2024)    Interpersonal Safety     Do you feel physically and emotionally safe where you currently live?: Yes     Within the past 12 months, have you been hit, slapped, kicked or otherwise physically hurt by someone?: No     Within the past 12 months, have you been humiliated or emotionally abused in other ways by your partner or ex-partner?: No   Housing Stability: Low Risk  (5/15/2024)    Housing Stability     Do you have housing? : Yes     Are you worried about losing your housing?: No        Review of Systems - Patient denies fever, chills, rash, wound, stiffness,  numbness, weakness, heart burn, blood in stool, chest pain with activity, calf pain when walking, shortness of breath with activity, chronic cough, easy bleeding/bruising, swelling of ankles, excessive thirst, fatigue, depression, anxiety.  Patient admits to right heel pain.      OBJECTIVE:  Appearance: alert, well appearing, and in no distress.    Pulse 75   Ht 1.816 m (5' 11.5\")   Wt (!) 156.9 kg (346 lb)   SpO2 98%   BMI 47.58 kg/m       Body mass index is 47.58 kg/m .     General appearance: Patient is alert and fully cooperative with history & exam.  No sign of distress is noted during the visit.  Psychiatric: Affect is pleasant & appropriate.  Patient appears motivated to improve health.  Respiratory: Breathing is regular & unlabored while sitting.  HEENT: Hearing is intact to spoken word.  Speech is clear.  No gross evidence of visual impairment that would impact ambulation.    Vascular: Dorsalis pedis and posterior tibial pulses are palpable. There is pedal hair growth bilaterally.  CFT < 3 sec from anterior tibial surface to distal digits bilaterally. There is no appreciable edema noted.  Dermatologic: Turgor and texture are within normal limits. No coloration or temperature changes. No " primary or secondary lesions noted.  Neurologic: All epicritic and proprioceptive sensations are grossly intact bilaterally.  Musculoskeletal: All active and passive ankle, subtalar, midtarsal, and 1st MPJ range of motion are grossly intact without pain or crepitus. Manual muscle strength is within normal limits bilaterally. All dorsiflexors, plantarflexors, invertors, evertors are intact bilaterally. Tenderness present to the plantar medial aspect of the left heel on palpation.  No tenderness to the left  foot with range of motion. Calf is soft/non-tender without warmth/induration    Imaging:       No images are attached to the encounter or orders placed in the encounter.     [facility] Single Stress Only    Result Date: 6/24/2024    There is no prior study for comparison.   Pharmacological regadenoson stress ECG is negative for inducible myocardial ischemia.   Pharmacological regadenoson nuclear study is negative for inducible myocardial ischemia.  There is no previous myocardial infarction.   Normal left ventricular size, wall motion and systolic function.  The calculated left ventricle ejection fraction 70%.   The patient is at a low risk of future cardiac ischemic events. Comments: This is a single day stress test, no rest images is indicated.      [facility] MPI Single Stress Only    Result Date: 6/24/2024    There is no prior study for comparison.   Pharmacological regadenoson stress ECG is negative for inducible myocardial ischemia.   Pharmacological regadenoson nuclear study is negative for inducible myocardial ischemia.  There is no previous myocardial infarction.   Normal left ventricular size, wall motion and systolic function.  The calculated left ventricle ejection fraction 70%.   The patient is at a low risk of future cardiac ischemic events. Comments: This is a single day stress test, no rest images is indicated.              Elijah Menard DPM  [facility]

## 2024-06-27 NOTE — Clinical Note
6/27/2024      Daron Bond  [address]  [facility] [address]      Dear Colleague,    Thank you for referring your patient, Daron Bond, to the [facility]. Please see a copy of my visit note below.    No notes on file    Again, thank you for allowing me to participate in the care of your patient.        Sincerely,        Elijah Mcdermott DPM

## 2024-06-27 NOTE — PATIENT INSTRUCTIONS
What are Prescription Custom Orthotics?  Custom orthotics are specially-made devices designed to support and comfort your feet. Prescription orthotics are crafted for you and no one else. They match the contours of your feet precisely and are designed for the way you move. Orthotics are only manufactured after a podiatrist has conducted a complete evaluation of your feet, ankles, and legs, so the orthotic can accommodate your unique foot structure and pathology.  Prescription orthotics are divided into two categories:  Functional orthotics are designed to control abnormal motion. They may be used to treat foot pain caused by abnormal motion; they can also be used to treat injuries such as shin splints or tendinitis. Functional orthotics are usually crafted of a semi-rigid material such as plastic or graphite.  Accommodative orthotics are softer and meant to provide additional cushioning and support. They can be used to treat diabetic foot ulcers, painful calluses on the bottom of the foot, and other uncomfortable conditions.  Podiatrists use orthotics to treat foot problems such as plantar fasciitis, bursitis, tendinitis, diabetic foot ulcers, and foot, ankle, and heel pain. Clinical research studies have shown that podiatrist-prescribed foot orthotics decrease foot pain and improve function.  Orthotics typically cost more than shoe inserts purchased in a retail store, but the additional cost is usually well worth it. Unlike shoe inserts, orthotics are molded to fit each individual foot, so you can be sure that your orthotics fit and do what they're supposed to do. Prescription orthotics are also made of top-notch materials and last many years when cared for properly. Insurance often helps pay for prescription orthotics.  What are Shoe Inserts?   You've seen them at the grocery store and at the mall. You've probably even seen them on TV and online. Shoe inserts are any kind of non-prescription foot support designed  to be worn inside a shoe. Pre-packaged, mass produced, arch supports are shoe inserts. So are the  custom-made  insoles and foot supports that you can order online or at retail stores. Unless the device has been prescribed by a doctor and crafted for your specific foot, it's a shoe insert, not a custom orthotic device--despite what the ads might say.  Shoe inserts can be very helpful for a variety of foot ailments, including flat arches and foot and leg pain. They can cushion your feet, provide comfort, and support your arches, but they can't correct biomechanical foot problems or cure long-standing foot issues.  The most common types of shoe inserts are:  Arch supports: Some people have high arches. Others have low arches or flat feet. Arch supports generally have a  bumped-up  appearance and are designed to support the foot's natural arch.   Insoles: Insoles slip into your shoe to provide extra cushioning and support. Insoles are often made of gel, foam, or plastic.   Heel liners: Heel liners, sometimes called heel pads or heel cups, provide extra cushioning in the heel region. They may be especially useful for patients who have foot pain caused by age-related thinning of the heels' natural fat pads.   Foot cushions: Do your shoes rub against your heel or your toes? Foot cushions come in many different shapes and sizes and can be used as a barrier between you and your shoe.  Choosing an Over-the-Counter Shoe Insert  Selecting a shoe insert from the wide variety of devices on the market can be overwhelming. Here are some podiatrist-tested tips to help you find the insert that best meets your needs:  Consider your health. Do you have diabetes? Problems with circulation? An over-the-counter insert may not be your best bet. Diabetes and poor circulation increase your risk of foot ulcers and infections, so schedule an appointment with a podiatrist. He or she can help you select a solution that won't cause additional  health problems.   Think about the purpose. Are you planning to run a marathon, or do you just need a little arch support in your work shoes? Look for a product that fits your planned level of activity.   Bring your shoes. For the insert to be effective, it has to fit into your shoes. So bring your sneakers, dress shoes, or work boots--whatever you plan to wear with your insert. Look for an insert that will fit the contours of your shoe.   Try them on. If all possible, slip the insert into your shoe and try it out. Walk around a little. How does it feel? Don't assume that feelings of pressure will go away with continued wear. (If you can't try the inserts at the store, ask about the store's return policy and hold on to your receipt.)    Please call one of the [place] locations below to schedule an appointment. If you received a prescription please bring it with you to your appointment. Some locations are limited to what they carry.    Office Locations    [facility]  [address] [place] [address]  Home Medical Equipment, [address]   Phone: [phone]   Orthotics and Prosthetics, [address]   Phone: [phone]    [facility]   Home Medical Equipment   [address] [address]  Phone :[phone]  Orthotics and Prosthetics   [address], [address], [facility] [address]  Phone:[phone]          [facility]  [address]  [address]   [place] [address]   Phone: [phone]    [facility]  [address]  [place] [address]  Phone: [phone]    [facility]  [address]  [place] [address]  Phone: [phone]  Fax: [phone]    [facility]   [address]  [place] [address]  Phone:  [phone]    [facility]  [address]  [place] [address]  Phone: [phone]    [facility]  [address]  [place] [address]  Phone: [phone]    [place]   [address] [place] [address]   Phone: [phone]    WEARING YOUR CUSTOM FOOT ORTHOTICS   Most insurance plans cover one pair of orthotics per year. You must check with your   insurance plan to see what your payment responsibility will be. Please call your   insurance company by calling the number on the back of your insurance card.   Orthotic's are non-refundable and non-returnable.   Orthotics are made of various designs. Some orthotics are covered with material that extends beyond your toes. If your orthotic is of this design, you will likely need to trim the toe end to get a proper fit. The insole from your shoe can be used as a template. Simply overlay the shoe insert on top of the custom orthotic. Align the heel end while tracing the length of the insert onto the custom orthotic. Use a large scissor to trim the toe end until you get a proper fit in the shoe.   The orthotic needs to be pushed as far back in the shoe as possible. The heel portion should not ride forward so as not to irritate your heel.   Orthotics are designed to work with socks. Excessive perspiration will shorten the life span of the orthotics. Remove the orthotic from the shoe frequently for proper drying.   The break-in period lasts for weeks. People new to orthotics will likely experience new aches and pains. The orthotic is forcing your foot into a new position. Arch, foot and leg muscle aches and fatigue are common during these weeks. Minor discomfort can be considered normal break in phenomenon. Start wearing your orthotic around your home your first day. Limited activity for one to two hours is recommended. You can increase one or two additional hours each  day provided the aches and pains are subsiding. The degree of discomfort, fatigue and problems will dictate the speed of break in. You may require multiple weeks to work up to full time use.   Do not continue wearing your orthotics if they are creating problems such as blisters or sores. Do not hesitate to call the clinic to speak with a nurse regarding orthotic   break in, fit, trimming, etc. You may also need to see the doctor if the orthotics are   simply not working out. Adjustments are sometimes made to improve orthotic   function.     Orthotics will only work in certain styles and types of shoes. Orthotics rarely work in dress shoes. Slip-ons, clogs, sandals and heels are particularly troublesome. Specially designed orthotics may be necessary for these types of shoes. Your custom orthotic was designed for activities that require appropriate walking or running shoes. Lace up athletic shoes, walking shoes or work boots should work appropriately. You may need a wider or longer shoe. Shoes with a removable  or insert work best. In general, you want to remove an insert from the shoe before placing the orthotic into the shoe. Shoes without a removable liner may not work as well.     When purchasing new shoes, bring your orthotics along to get a proper fit. Shop at stores that are familiar with orthotics.   Frequent washing of the orthotic may shorten the life span of the top cover. The top cover can be replaced but will generally last one to five years depending on use and foot perspiration.

## 2024-07-02 ENCOUNTER — VIRTUAL VISIT (OUTPATIENT)
Dept: EDUCATION SERVICES | Facility: CLINIC | Age: 44
End: 2024-07-02
Payer: COMMERCIAL

## 2024-07-02 DIAGNOSIS — E11.9 TYPE 2 DIABETES MELLITUS WITHOUT COMPLICATION, WITHOUT LONG-TERM CURRENT USE OF INSULIN (H): Primary | ICD-10-CM

## 2024-07-02 PROCEDURE — 98968 PH1 ASSMT&MGMT NQHP 21-30: CPT | Mod: 93 | Performed by: DIETITIAN, REGISTERED

## 2024-07-02 NOTE — PROGRESS NOTES
Diabetes Self-Management Education & Support/ 36 minutes    Presents for:      Type of Service: Telephone Visit    Originating Location (Patient Location): Home  Distant Location (Provider Location): [facility]  Mode of Communication:  Telephone    Telephone Visit Start Time:  12:53  Telephone Visit End Time (telephone visit stop time): 1: 28    How would patient like to obtain AVS? MyChart      ASSESSMENT:  Daron has been injecting Ozempic 0.25 x 3 weeks, although one injection did not goes as planned, and he was not sure what dose he injected. Daron does have difficulty seeing the dose window on the pen. We reviewed the dosing titration and I suggested that he have a family member look at the dose window before his injection to verify. He has started on metformin 500 mg, one tablet/day and will increase as prescribed.  Daron has been testing his BG at home 5 or more times/day. We reviewed preferable testing times and goals and I encouraged Daron not to test more than twice daily. He is noticing some decrease in appetite and weight. He has cut down on portions of carbohydrate containing foods and he is not drinking any sugary beverages. Daron goes to the gym 3-4x/week for strengthening exercises. He is not UTD on eye or dental exams.     Patient's most recent   Lab Results   Component Value Date    A1C 7.4 05/13/2024     is meeting goal of <8.0    Diabetes knowledge and skills assessment:   Patient is knowledgeable in diabetes management concepts related to: topics reviewed    Continue education with the following diabetes management concepts: Healthy Eating, Monitoring, Taking Medication, and Reducing Risks    Based on learning assessment above, most appropriate setting for further diabetes education would be: Individual setting.      PLAN    Test blood sugar in the morning before eating, with goal to be under 130 and two hours after one meal per day,  with goal to be under  "180.  Inject Ozempic 0.25 mg on 7/5 and then increase to 0.5 mg on 7/15.  Ask someone in your household to check the dose on the pen to make sure it is correct.  Aim to have 1/2 of your plate filled with vegetables( all vegetables except: corn, peas, potatoes), 1/4 lean protein and 1/4 carbohydrate containing foods: rice, pasta, bread, potatoes.   Add in a serving of fruit between meals: a piece of fruit the size of a tennis ball or 1 cup cut up melon or berries or 1/2 arslan or 1/2 banana.     Topics to cover at upcoming visits: Healthy Eating, Monitoring, Taking Medication, and Reducing Risks    Follow-up: 7/30/24    See Care Plan for co-developed, patient-state behavior change goals.  AVS provided for patient today.    Education Materials Provided:  [employer] Healthy Living with Diabetes Book and My Plate Planner      SUBJECTIVE/OBJECTIVE:     Cultural Influences/Ethnic Background:  Not  or       Diabetes Symptoms & Complications:          Patient Problem List and Family Medical History reviewed for relevant medical history, current medical status, and diabetes risk factors.    Vitals:  There were no vitals taken for this visit.  Estimated body mass index is 47.58 kg/m  as calculated from the following:    Height as of 6/27/24: 1.816 m (5' 11.5\").    Weight as of 6/27/24: 156.9 kg (346 lb).   Last 3 BP:   BP Readings from Last 3 Encounters:   06/14/24 120/70   06/04/24 111/78   05/13/24 (!) 141/83       History   Smoking Status    Every Day    Types: Cigarettes   Smokeless Tobacco    Never       Labs:  Lab Results   Component Value Date    A1C 7.4 05/13/2024     Lab Results   Component Value Date     06/14/2024    GLC 83 04/27/2018     Lab Results   Component Value Date     07/02/2018     Direct Measure HDL   Date Value Ref Range Status   07/02/2018 48 >40 mg/dL Final     Comment:     Lab test performed by:  [email or web address]  [address] [place], [place] [address]nguyen Watkins " "[place] [address]  Saul De Jesus M.D.  [facility] Specimen received date and time: 02-JUL-2018 16:26:00.00     ]  GFR Estimate   Date Value Ref Range Status   06/14/2024 >90 >60 mL/min/1.73m2 Final     Comment:     eGFR calculated using 2021 CKD-EPI equation.     No results found for: \"GFRESTBLACK\"  Lab Results   Component Value Date    CR 1.04 06/14/2024     No results found for: \"MICROALBUMIN\"    Healthy Eating:  Healthy Eating Assessed Today: Yes  Meal planning/habits: Low carb  Who cooks/prepares meals for you?: Self  Who purchases food in  your home?: Self  Meals include: Breakfast, Dinner  Other: used to eat way too many cho, now eating a fraction of what he was.  Beverages: Water (zero calorie monster drinks, sugar free green tea)  Has patient met with a dietitian in the past?: Yes    Being Active:  Being Active Assessed Today: Yes  Exercise:: Yes (light weights, limited for cardio due to back)  Days per week of moderate to strenuous exercise (like a brisk walk): 3    Monitoring:  Monitoring Assessed Today: Yes  Did patient bring glucose meter to appointment? : No  Blood Glucose Meter: Unknown  Times checking blood sugar at home (number): 5+    FBS: [phone]  PP: 178,164,136,121,130    Taking Medications:  Diabetes Medication(s)       Biguanides       metFORMIN (GLUCOPHAGE XR) 500 MG 24 hr tablet Take 1 tablet (500 mg) by mouth daily (with dinner) for 7 days, THEN 2 tablets (1,000 mg) daily (with dinner) for 7 days, THEN 3 tablets (1,500 mg) daily (with dinner) for 7 days, THEN 4 tablets (2,000 mg) daily (with dinner) for 7 days.       Incretin Mimetic Agents       semaglutide (OZEMPIC, 0.25 OR 0.5 MG/DOSE,) 2 MG/3ML pen Start 0.25mg/week for 4 weeks then increase, if tolerated, to 0.5mg/week.     Semaglutide, 1 MG/DOSE, (OZEMPIC) 4 MG/3ML pen Inject 1 mg Subcutaneous every 7 days for 180 days            Taking Medication Assessed Today: Yes  Current Treatments: Non-insulin Injectables, Oral Medication " (taken by mouth)  Problems taking diabetes medications regularly?: Yes  Diabetes medication side effects?: No    Problem Solving:  Problem Solving Assessed Today: Yes (some problems seeing the dial up dose for ozempic)              Reducing Risks:  Reducing Risks Assessed Today: No  CAD Risks: Diabetes Mellitus, Male sex  Has dilated eye exam at least once a year?: No  Sees dentist every 6 months?: No    Healthy Coping:  Healthy Coping Assessed Today: Yes  Emotional response to diabetes: Helplessness, Uncertain, Concern for health and well-being  Informal Support system:: None  Stage of change: PREPARATION (Decided to change - considering how)  Patient Activation Measure Survey Score:      6/10/2024    11:49 AM   DILIA Score (Last Two)   DILIA Raw Score 24   Activation Score 40.9   DILIA Level 1         Care Plan and Education Provided:  Healthy Eating: Plate planning method, Being Active: Finding a physical activity routine that works for you, Monitoring: Frequency of monitoring and Individual glucose targets, Taking Medication: dosing for ozempic, and Reducing Risks: Dental care, Eye care, and Goal for A1c, how it relates to glucose and how often to check        Time Spent: 36 minutes  Encounter Type: Individual    Any diabetes medication dose changes were made via the CDE Protocol per the patient's referring provider. A copy of this encounter was shared with the provider.

## 2024-07-02 NOTE — PATIENT INSTRUCTIONS
Test blood sugar in the morning before eating, with goal to be under 130 and two hours after one meal per day,  with goal to be under 180.  Inject Ozempic 0.25 mg on 7/5 and then increase to 0.5 mg on 7/15.  Ask someone in your household to check the dose on the pen to make sure it is correct.  Aim to have 1/2 of your plate filled with vegetables( all vegetables except: corn, peas, potatoes), 1/4 lean protein and 1/4 carbohydrate containing foods: rice, pasta, bread, potatoes.   Add in a serving of fruit between meals: a piece of fruit the size of a tennis ball or 1 cup cut up melon or berries or 1/2 arslan or 1/2 banana.

## 2024-07-02 NOTE — LETTER
7/2/2024         RE: Daron Bond  [address]  [facility] [address]        Dear Colleague,    Thank you for referring your patient, Daron Bond, to the [facility]. Please see a copy of my visit note below.    Diabetes Self-Management Education & Support/ 36 minutes    Presents for:      Type of Service: Telephone Visit    Originating Location (Patient Location): Home  Distant Location (Provider Location): [facility]  Mode of Communication:  Telephone    Telephone Visit Start Time:  12:53  Telephone Visit End Time (telephone visit stop time): 1: 28    How would patient like to obtain AVS? MyChart      ASSESSMENT:  Daron has been injecting Ozempic 0.25 x 3 weeks, although one injection did not goes as planned, and he was not sure what dose he injected. Daron does have difficulty seeing the dose window on the pen. We reviewed the dosing titration and I suggested that he have a family member look at the dose window before his injection to verify. He has started on metformin 500 mg, one tablet/day and will increase as prescribed.  Daron has been testing his BG at home 5 or more times/day. We reviewed preferable testing times and goals and I encouraged Daron not to test more than twice daily. He is noticing some decrease in appetite and weight. He has cut down on portions of carbohydrate containing foods and he is not drinking any sugary beverages. Daron goes to the gym 3-4x/week for strengthening exercises. He is not UTD on eye or dental exams.     Patient's most recent   Lab Results   Component Value Date    A1C 7.4 05/13/2024     is meeting goal of <8.0    Diabetes knowledge and skills assessment:   Patient is knowledgeable in diabetes management concepts related to: topics reviewed    Continue education with the following diabetes management concepts: Healthy Eating, Monitoring, Taking Medication, and Reducing  "Risks    Based on learning assessment above, most appropriate setting for further diabetes education would be: Individual setting.      PLAN    Test blood sugar in the morning before eating, with goal to be under 130 and two hours after one meal per day,  with goal to be under 180.  Inject Ozempic 0.25 mg on 7/5 and then increase to 0.5 mg on 7/15.  Ask someone in your household to check the dose on the pen to make sure it is correct.  Aim to have 1/2 of your plate filled with vegetables( all vegetables except: corn, peas, potatoes), 1/4 lean protein and 1/4 carbohydrate containing foods: rice, pasta, bread, potatoes.   Add in a serving of fruit between meals: a piece of fruit the size of a tennis ball or 1 cup cut up melon or berries or 1/2 arslan or 1/2 banana.     Topics to cover at upcoming visits: Healthy Eating, Monitoring, Taking Medication, and Reducing Risks    Follow-up: 7/30/24    See Care Plan for co-developed, patient-state behavior change goals.  AVS provided for patient today.    Education Materials Provided:  [employer] Healthy Living with Diabetes Book and My Plate Planner      SUBJECTIVE/OBJECTIVE:     Cultural Influences/Ethnic Background:  Not  or       Diabetes Symptoms & Complications:          Patient Problem List and Family Medical History reviewed for relevant medical history, current medical status, and diabetes risk factors.    Vitals:  There were no vitals taken for this visit.  Estimated body mass index is 47.58 kg/m  as calculated from the following:    Height as of 6/27/24: 1.816 m (5' 11.5\").    Weight as of 6/27/24: 156.9 kg (346 lb).   Last 3 BP:   BP Readings from Last 3 Encounters:   06/14/24 120/70   06/04/24 111/78   05/13/24 (!) 141/83       History   Smoking Status     Every Day     Types: Cigarettes   Smokeless Tobacco     Never       Labs:  Lab Results   Component Value Date    A1C 7.4 05/13/2024     Lab Results   Component Value Date     " "06/14/2024    GLC 83 04/27/2018     Lab Results   Component Value Date     07/02/2018     Direct Measure HDL   Date Value Ref Range Status   07/02/2018 48 >40 mg/dL Final     Comment:     Lab test performed by:  Lab Mnemonic:KRYSTEN  [facility]  [address] [place] [address]  Saul De Jesus M.D.  [facility] Specimen received date and time: 02-JUL-2018 16:26:00.00     ]  GFR Estimate   Date Value Ref Range Status   06/14/2024 >90 >60 mL/min/1.73m2 Final     Comment:     eGFR calculated using 2021 CKD-EPI equation.     No results found for: \"GFRESTBLACK\"  Lab Results   Component Value Date    CR 1.04 06/14/2024     No results found for: \"MICROALBUMIN\"    Healthy Eating:  Healthy Eating Assessed Today: Yes  Meal planning/habits: Low carb  Who cooks/prepares meals for you?: Self  Who purchases food in  your home?: Self  Meals include: Breakfast, Dinner  Other: used to eat way too many cho, now eating a fraction of what he was.  Beverages: Water (zero calorie monster drinks, sugar free green tea)  Has patient met with a dietitian in the past?: Yes    Being Active:  Being Active Assessed Today: Yes  Exercise:: Yes (light weights, limited for cardio due to back)  Days per week of moderate to strenuous exercise (like a brisk walk): 3    Monitoring:  Monitoring Assessed Today: Yes  Did patient bring glucose meter to appointment? : No  Blood Glucose Meter: Unknown  Times checking blood sugar at home (number): 5+    FBS: [phone]  PP: 178,164,136,121,130    Taking Medications:  Diabetes Medication(s)       Biguanides       metFORMIN (GLUCOPHAGE XR) 500 MG 24 hr tablet Take 1 tablet (500 mg) by mouth daily (with dinner) for 7 days, THEN 2 tablets (1,000 mg) daily (with dinner) for 7 days, THEN 3 tablets (1,500 mg) daily (with dinner) for 7 days, THEN 4 tablets (2,000 mg) daily (with dinner) for 7 days.       Incretin Mimetic Agents       semaglutide (OZEMPIC, 0.25 OR 0.5 MG/DOSE,) 2 MG/3ML pen " Start 0.25mg/week for 4 weeks then increase, if tolerated, to 0.5mg/week.     Semaglutide, 1 MG/DOSE, (OZEMPIC) 4 MG/3ML pen Inject 1 mg Subcutaneous every 7 days for 180 days            Taking Medication Assessed Today: Yes  Current Treatments: Non-insulin Injectables, Oral Medication (taken by mouth)  Problems taking diabetes medications regularly?: Yes  Diabetes medication side effects?: No    Problem Solving:  Problem Solving Assessed Today: Yes (some problems seeing the dial up dose for ozempic)              Reducing Risks:  Reducing Risks Assessed Today: No  CAD Risks: Diabetes Mellitus, Male sex  Has dilated eye exam at least once a year?: No  Sees dentist every 6 months?: No    Healthy Coping:  Healthy Coping Assessed Today: Yes  Emotional response to diabetes: Helplessness, Uncertain, Concern for health and well-being  Informal Support system:: None  Stage of change: PREPARATION (Decided to change - considering how)  Patient Activation Measure Survey Score:      6/10/2024    11:49 AM   DILIA Score (Last Two)   DILIA Raw Score 24   Activation Score 40.9   DILIA Level 1         Care Plan and Education Provided:  Healthy Eating: Plate planning method, Being Active: Finding a physical activity routine that works for you, Monitoring: Frequency of monitoring and Individual glucose targets, Taking Medication: dosing for ozempic, and Reducing Risks: Dental care, Eye care, and Goal for A1c, how it relates to glucose and how often to check        Time Spent: 36 minutes  Encounter Type: Individual    Any diabetes medication dose changes were made via the CDE Protocol per the patient's referring provider. A copy of this encounter was shared with the provider.

## 2024-07-07 DIAGNOSIS — E11.9 TYPE 2 DIABETES MELLITUS WITHOUT COMPLICATION, WITHOUT LONG-TERM CURRENT USE OF INSULIN (H): ICD-10-CM

## 2024-07-07 RX ORDER — METFORMIN HCL 500 MG
TABLET, EXTENDED RELEASE 24 HR ORAL
Qty: 70 TABLET | Refills: 0 | Status: SHIPPED | OUTPATIENT
Start: 2024-07-07 | End: 2024-09-09

## 2024-07-08 ENCOUNTER — HOSPITAL ENCOUNTER (EMERGENCY)
Facility: HOSPITAL | Age: 44
Discharge: HOME | End: 2024-07-08
Attending: EMERGENCY MEDICINE | Admitting: EMERGENCY MEDICINE
Payer: COMMERCIAL

## 2024-07-08 ENCOUNTER — PATIENT OUTREACH (OUTPATIENT)
Dept: CARE COORDINATION | Facility: CLINIC | Age: 44
End: 2024-07-08
Payer: COMMERCIAL

## 2024-07-08 VITALS
DIASTOLIC BLOOD PRESSURE: 65 MMHG | RESPIRATION RATE: 16 BRPM | SYSTOLIC BLOOD PRESSURE: 109 MMHG | BODY MASS INDEX: 45.1 KG/M2 | TEMPERATURE: 98.5 F | WEIGHT: 315 LBS | OXYGEN SATURATION: 97 % | HEIGHT: 70 IN | HEART RATE: 90 BPM

## 2024-07-08 DIAGNOSIS — R53.83 OTHER FATIGUE: ICD-10-CM

## 2024-07-08 DIAGNOSIS — R11.10 VOMITING AND DIARRHEA: ICD-10-CM

## 2024-07-08 DIAGNOSIS — R19.7 VOMITING AND DIARRHEA: ICD-10-CM

## 2024-07-08 LAB
ALBUMIN SERPL BCG-MCNC: 4.2 G/DL (ref 3.5–5.2)
ALP SERPL-CCNC: 97 U/L (ref 40–150)
ALT SERPL W P-5'-P-CCNC: 31 U/L (ref 0–70)
ANION GAP SERPL CALCULATED.3IONS-SCNC: 15 MMOL/L (ref 7–15)
AST SERPL W P-5'-P-CCNC: 29 U/L (ref 0–45)
BASOPHILS # BLD AUTO: 0.1 10E3/UL (ref 0–0.2)
BASOPHILS NFR BLD AUTO: 0 %
BILIRUB SERPL-MCNC: 0.6 MG/DL
BUN SERPL-MCNC: 14.9 MG/DL (ref 6–20)
CALCIUM SERPL-MCNC: 9.8 MG/DL (ref 8.6–10)
CHLORIDE SERPL-SCNC: 102 MMOL/L (ref 98–107)
CREAT SERPL-MCNC: 1.16 MG/DL (ref 0.67–1.17)
DEPRECATED HCO3 PLAS-SCNC: 24 MMOL/L (ref 22–29)
EGFRCR SERPLBLD CKD-EPI 2021: 80 ML/MIN/1.73M2
EOSINOPHIL # BLD AUTO: 0.2 10E3/UL (ref 0–0.7)
EOSINOPHIL NFR BLD AUTO: 1 %
ERYTHROCYTE [DISTWIDTH] IN BLOOD BY AUTOMATED COUNT: 13.7 % (ref 10–15)
FLUAV RNA SPEC QL NAA+PROBE: NEGATIVE
FLUBV RNA RESP QL NAA+PROBE: NEGATIVE
GLUCOSE SERPL-MCNC: 133 MG/DL (ref 70–99)
HCT VFR BLD AUTO: 47.2 % (ref 40–53)
HGB BLD-MCNC: 16.1 G/DL (ref 13.3–17.7)
IMM GRANULOCYTES # BLD: 0 10E3/UL
IMM GRANULOCYTES NFR BLD: 0 %
LIPASE SERPL-CCNC: 35 U/L (ref 13–60)
LYMPHOCYTES # BLD AUTO: 3 10E3/UL (ref 0.8–5.3)
LYMPHOCYTES NFR BLD AUTO: 21 %
MAGNESIUM SERPL-MCNC: 2.1 MG/DL (ref 1.7–2.3)
MCH RBC QN AUTO: 28.8 PG (ref 26.5–33)
MCHC RBC AUTO-ENTMCNC: 34.1 G/DL (ref 31.5–36.5)
MCV RBC AUTO: 84 FL (ref 78–100)
MONOCYTES # BLD AUTO: 0.8 10E3/UL (ref 0–1.3)
MONOCYTES NFR BLD AUTO: 6 %
NEUTROPHILS # BLD AUTO: 10 10E3/UL (ref 1.6–8.3)
NEUTROPHILS NFR BLD AUTO: 71 %
NRBC # BLD AUTO: 0 10E3/UL
NRBC BLD AUTO-RTO: 0 /100
PLATELET # BLD AUTO: 201 10E3/UL (ref 150–450)
POTASSIUM SERPL-SCNC: 3.7 MMOL/L (ref 3.4–5.3)
PROT SERPL-MCNC: 7.8 G/DL (ref 6.4–8.3)
RBC # BLD AUTO: 5.6 10E6/UL (ref 4.4–5.9)
RSV RNA SPEC NAA+PROBE: NEGATIVE
SARS-COV-2 RNA RESP QL NAA+PROBE: NEGATIVE
SODIUM SERPL-SCNC: 141 MMOL/L (ref 135–145)
WBC # BLD AUTO: 14 10E3/UL (ref 4–11)

## 2024-07-08 PROCEDURE — 83690 ASSAY OF LIPASE: CPT | Performed by: EMERGENCY MEDICINE

## 2024-07-08 PROCEDURE — 250N000011 HC RX IP 250 OP 636: Performed by: EMERGENCY MEDICINE

## 2024-07-08 PROCEDURE — 99284 EMERGENCY DEPT VISIT MOD MDM: CPT | Mod: 25

## 2024-07-08 PROCEDURE — 85025 COMPLETE CBC W/AUTO DIFF WBC: CPT | Performed by: STUDENT IN AN ORGANIZED HEALTH CARE EDUCATION/TRAINING PROGRAM

## 2024-07-08 PROCEDURE — 83735 ASSAY OF MAGNESIUM: CPT | Performed by: EMERGENCY MEDICINE

## 2024-07-08 PROCEDURE — 87637 SARSCOV2&INF A&B&RSV AMP PRB: CPT | Performed by: EMERGENCY MEDICINE

## 2024-07-08 PROCEDURE — 96374 THER/PROPH/DIAG INJ IV PUSH: CPT

## 2024-07-08 PROCEDURE — 36415 COLL VENOUS BLD VENIPUNCTURE: CPT | Performed by: STUDENT IN AN ORGANIZED HEALTH CARE EDUCATION/TRAINING PROGRAM

## 2024-07-08 PROCEDURE — 82040 ASSAY OF SERUM ALBUMIN: CPT | Performed by: STUDENT IN AN ORGANIZED HEALTH CARE EDUCATION/TRAINING PROGRAM

## 2024-07-08 RX ORDER — ONDANSETRON 2 MG/ML
4 INJECTION INTRAMUSCULAR; INTRAVENOUS ONCE
Status: COMPLETED | OUTPATIENT
Start: 2024-07-08 | End: 2024-07-08

## 2024-07-08 RX ORDER — ONDANSETRON 4 MG/1
4 TABLET, ORALLY DISINTEGRATING ORAL EVERY 8 HOURS PRN
Qty: 10 TABLET | Refills: 0 | Status: SHIPPED | OUTPATIENT
Start: 2024-07-08 | End: 2024-09-25

## 2024-07-08 RX ADMIN — ONDANSETRON 4 MG: 2 INJECTION INTRAMUSCULAR; INTRAVENOUS at 20:08

## 2024-07-08 ASSESSMENT — ACTIVITIES OF DAILY LIVING (ADL)
ADLS_ACUITY_SCORE: 35
ADLS_ACUITY_SCORE: 35

## 2024-07-08 ASSESSMENT — COLUMBIA-SUICIDE SEVERITY RATING SCALE - C-SSRS
2. HAVE YOU ACTUALLY HAD ANY THOUGHTS OF KILLING YOURSELF IN THE PAST MONTH?: NO
1. IN THE PAST MONTH, HAVE YOU WISHED YOU WERE DEAD OR WISHED YOU COULD GO TO SLEEP AND NOT WAKE UP?: NO
6. HAVE YOU EVER DONE ANYTHING, STARTED TO DO ANYTHING, OR PREPARED TO DO ANYTHING TO END YOUR LIFE?: NO

## 2024-07-08 NOTE — PROGRESS NOTES
Clinic Care Coordination Contact  Follow Up Progress Note      Assessment: [facility] reached out to pt for regular check-in    Care Gaps:    Health Maintenance Due   Topic Date Due    URINE DRUG SCREEN  Never done    Pneumococcal Vaccine: Pediatrics (0 to 5 Years) and At-Risk Patients (6 to 64 Years) (1 of 2 - PCV) Never done    EYE EXAM  06/21/2023    COVID-19 Vaccine (1 - 2023-24 season) Never done       Postponed to next outreach - pt had limited time to speak     Care Plans  Care Plan: General       Problem: HP GENERAL PROBLEM                   Care Plan: General       Problem: HP GENERAL PROBLEM                   Care Plan: Financial Wellbeing       Problem: Patient expresses financial resource strain                   Care Plan: Health Maintenance       Problem: Health Maintenance Due or Overdue                     Intervention/Education provided during outreach: NA     Outreach Frequency: monthly, more frequently as needed        Plan:   [facility] and Pt reviewed resources - pt noted they have only gotten 1 veggies RX box; [facility] reached out to  to find out about box/issues and see if there is an issue with this and how to correct. Pt raised issue with apartment; [facility] offered homeline however pt declined noting this was a point of stress/things could get worse. Pt noted they are not ready to establish care at a different clinic. Pt noted completed finical goal and has completed work with [facility]. No goals at this point in time.   Care Coordinator will follow up once their is news about veggie Rx. If pt does not want to establish new goal at that time/establish care at a different clinic, pt will graduate from [facility] program.

## 2024-07-09 NOTE — ED PROVIDER NOTES
EMERGENCY DEPARTMENT ENCOUNTER      NAME: Daron Bond  AGE: 44 year old male  YOB: 1980  MRN: [id number]  EVALUATION DATE & TIME: 2024  7:33 PM    PCP: Mata Hearn    ED PROVIDER: Luke Wilkinson DO      Chief Complaint   Patient presents with    Headache    Vomiting         FINAL IMPRESSION:  1. Other fatigue    2. Vomiting and diarrhea          ED COURSE & MEDICAL DECISION MAKIN-year-old male presented to the ED for evaluation of nausea, vomiting, and diarrhea that have been ongoing for the last 2 days.  The patient denied any associated abdominal pain.  Upon arrival to the ED the patient was slightly tachycardic.  He was afebrile and otherwise hemodynamic stable.  The patient did not appear to be in any obvious distress or discomfort he was not acutely ill-appearing.  The patient's physical exam was unremarkable.  He did not have any reproducible abdominal tenderness to palpation noted on exam.      Following his initial evaluation the patient was given a dose of IV Zofran to treat his nausea.    The patient's white blood cell count was slightly elevated at 14.  Remainder the CBC labs were unremarkable.  CMP, lipase, and magnesium were all reassuring.     The patient was reevaluated and informed of the laboratory results.  The patient states that his nausea had essentially resolved with the IV Zofran.  He again denied any abdominal pain.  Because of the elevated white blood cell count, additional workup with an abdominal CT scan was discussed with the patient.  Ultimately the patient declined this stating that he felt comfortable returning home.  The patient was informed that his vomiting and diarrhea may be related to a viral gastroenteritis.  The patient requested testing for COVID and influenza.  However, he stated that he wanted to return home rather than waiting for the results.  The patient was then discharged home with Zofran to use as needed.  The patient was instructed  to follow-up with his primary care provider for reevaluation or to return to ED sooner for any worsening vomiting, diarrhea, fevers, abdominal pain, or any other new or concerning symptoms.    Testing for COVID/influenza were negative.    Pertinent Labs & Imaging studies reviewed. (See chart for details)  7:36 PM I met with the patient to gather history and to perform my initial exam. We discussed plans for the ED course, including diagnostic testing and treatment.         At the conclusion of the encounter I discussed the results of all of the tests and the disposition. The questions were answered. The patient or family acknowledged understanding and was agreeable with the care plan.     Medical Decision Making    History:  Supplemental history from: Documented in chart and N/A  External Record(s) reviewed: Documented in chart    Work Up:  Chart documentation includes differential considered and any EKGs or imaging independently interpreted by provider, where specified.  In additional to work up documented, I considered the following work up: Documented in chart, if applicable.    External consultation:  Discussion of management with another provider: Documented in chart, if applicable    Complicating factors:  Care impacted by chronic illness: Chronic Lung Disease, Chronic Pain, Diabetes, Hypertension, and Mental Health  Care affected by social determinants of health: N/A    Disposition considerations: Discharge. I prescribed additional prescription strength medication(s) as charted. See documentation for any additional details.      PPE worn: n95 mask, goggles    MEDICATIONS GIVEN IN THE EMERGENCY:  Medications   ondansetron (ZOFRAN) injection 4 mg (4 mg Intravenous $Given 7/8/24 2008)       NEW PRESCRIPTIONS STARTED AT TODAY'S ER VISIT  Discharge Medication List as of 7/8/2024  9:39 PM        START taking these medications    Details   ondansetron (ZOFRAN ODT) 4 MG ODT tab Take 1 tablet (4 mg) by mouth every 8  "hours as needed for nausea, Disp-10 tablet, R-0, E-Prescribe                =================================================================    HPI    Patient information was obtained from: patient    Use of : N/A         Daron Bond is a 44 year old male with a pertinent history of asthma, degeneration of intervertebral disc, OCD, chronic pain, obesity, bipolar I, spondylolisthesis of lumbar region, spinal stenosis of lumbar region, TYRELL, hypertension, and T2DM who presents to this ED by walk-in for evaluation of headache and vomiting.    Per chart review: patient was seen 7/2/2024 at [facility] for discussion of diabetes self-management education and support. During this encounter, he discussed his current Ozempic regimen; he is taking 0.25 x 3 weeks via injection, stating that one dose did not go as planned and that he has difficulty seeing the dose window on the pen. He was started on 500 mg metformin once per day and was to increase his dosage by one pill every week until he was taking 4 pills per day. Patient noted that he has reduced his consumption of carbohydrate-containing foods.     Patient reports feeling like \"dog crap\" for the past 3 days now. During this time, he endorses nausea with an episode of projectile vomiting yesterday which lasted for ~5 minutes. He states that during this episode of emesis, he vomited 3-4 of his pills and being that he is currently on an extensive medication regimen, was worried about missing his dosage of heart medicine. He mentions that he is currently taking a weight loss medication and that he is concerned about his recent emesis as he believes that his body should be retaining any nutrients provided to it; he endorses having lost 17 labs in the past 2-3 weeks. He endorses feeling generally sick and feverish.     Now in the emergency department, his stomach feels settled, although he does report having had an upset stomach " earlier today after a meal at [employer]; he has greatly reduced his carbohydrate intake following his diabetes diagnosis and thought that his vomiting yesterday may have been brought on by this and that maybe his body was needing carbohydrates. Patient further endorses diarrhea, intermittent chills, and baseline pain throughout his body.    Patient denies abdominal pain, hematemesis, shortness of breath (besides baseline due to asthma), vomiting since or prior to his episode yesterday, or any other symptoms at this time. Patient endorses having a recent change to his diabetes medication (metformin) for which he is tapering his dosage upwards and is currently taking 2 pills per day.       REVIEW OF SYSTEMS   Review of Systems     PAST MEDICAL HISTORY:  No past medical history on file.    PAST SURGICAL HISTORY:  Past Surgical History:   Procedure Laterality Date    ARTHROSCOPY KNEE Right     no date given    C CORTISONE INJECTION  06/08/2016    COLONOSCOPY  08/04/2015    Internal/external hemorrhoids.  Repeat at age 50    ESOPHAGOGASTRODUODENOSCOPY W/ BANDING  08/04/2015    EYE SURGERY      x 3 no dates given    HEMORRHOIDECTOMY  09/08/2015    PSYCH SVC, INTENSIVE OUTPT  2002           CURRENT MEDICATIONS:    ondansetron (ZOFRAN ODT) 4 MG ODT tab  albuterol (PROAIR HFA/PROVENTIL HFA/VENTOLIN HFA) 108 (90 Base) MCG/ACT inhaler  amLODIPine (NORVASC) 5 MG tablet  blood glucose (NO BRAND SPECIFIED) test strip  blood glucose monitoring (NO BRAND SPECIFIED) meter device kit  busPIRone (BUSPAR) 10 MG tablet  gabapentin (NEURONTIN) 300 MG capsule  hydrochlorothiazide (MICROZIDE) 12.5 MG capsule  ketoconazole (NIZORAL) 2 % external shampoo  losartan (COZAAR) 100 MG tablet  metFORMIN (GLUCOPHAGE XR) 500 MG 24 hr tablet  metoprolol succinate ER (TOPROL XL) 100 MG 24 hr tablet  oxyCODONE (ROXICODONE) 5 MG tablet  pantoprazole (PROTONIX) 40 MG EC tablet  semaglutide (OZEMPIC, 0.25 OR 0.5 MG/DOSE,) 2 MG/3ML pen  [START ON 9/5/2024]  Semaglutide, 1 MG/DOSE, (OZEMPIC) 4 MG/3ML pen  thin (NO BRAND SPECIFIED) lancets        ALLERGIES:  Allergies   Allergen Reactions    Banana     Bee Venom     Latex        FAMILY HISTORY:  Family History   Problem Relation Age of Onset    Diabetes Type 2  Mother     Heart Disease Father        SOCIAL HISTORY:   Social History     Socioeconomic History    Marital status: Single   Tobacco Use    Smoking status: Every Day     Current packs/day: 0.50     Types: Cigarettes     Passive exposure: Current    Smokeless tobacco: Never   Vaping Use    Vaping status: Never Used   Substance and Sexual Activity    Alcohol use: Not Currently     Comment: rarely    Drug use: Yes     Types: Oxycodone     Comment: takes oxycodone for pain mgt     Social Determinants of Health     Financial Resource Strain: Low Risk  (5/15/2024)    Financial Resource Strain     Within the past 12 months, have you or your family members you live with been unable to get utilities (heat, electricity) when it was really needed?: No   Food Insecurity: High Risk (5/15/2024)    Food Insecurity     Within the past 12 months, did you worry that your food would run out before you got money to buy more?: Yes     Within the past 12 months, did the food you bought just not last and you didn t have money to get more?: Yes   Transportation Needs: Low Risk  (5/15/2024)    Transportation Needs     Within the past 12 months, has lack of transportation kept you from medical appointments, getting your medicines, non-medical meetings or appointments, work, or from getting things that you need?: No    Received from [facility], [facility]    Social Connections   Interpersonal Safety: Low Risk  (5/13/2024)    Interpersonal Safety     Do you feel physically and emotionally safe where you currently live?: Yes     Within the past 12 months, have you been hit, slapped, kicked or otherwise physically hurt by  "someone?: No     Within the past 12 months, have you been humiliated or emotionally abused in other ways by your partner or ex-partner?: No   Housing Stability: Low Risk  (5/15/2024)    Housing Stability     Do you have housing? : Yes     Are you worried about losing your housing?: No       VITALS:  /65   Pulse 90   Temp 98.5  F (36.9  C)   Resp 16   Ht 1.778 m (5' 10\")   Wt (!) 151.5 kg (334 lb)   SpO2 97%   BMI 47.92 kg/m      PHYSICAL EXAM    General presentation: Alert, Vital signs reviewed. NAD  HENT: ENT inspection is normal. Oropharynx is moist and clear.   Eye: Pupils are equal and reactive to light. EOMI  Neck: The neck is supple, with full ROM, with no evidence of meningismus.  Pulmonary: Currently in no acute respiratory distress. Normal, non labored respirations, the lung sounds are normal with good equal air movement. Clear to auscultation bilaterally.   Circulatory: Regular rate and rhythm. Peripheral pulses are strong and equal. No murmurs, rubs, or gallops.   Abdominal: The abdomen is soft. Nontender. No rigidity, guarding, or rebound. Bowel sounds normal.   Neurologic: Alert, oriented to person, place, and time. No motor deficit. No sensory deficit. Cranial nerves II through XII are intact.  Musculoskeletal: No extremity tenderness. Full range of motion in all extremities. No extremity edema.   Skin: Skin color is normal. No rash. Warm. Dry to touch.      LAB:  All pertinent labs reviewed and interpreted.  Results for orders placed or performed during the hospital encounter of 07/08/24   Comprehensive metabolic panel   Result Value Ref Range    Sodium 141 135 - 145 mmol/L    Potassium 3.7 3.4 - 5.3 mmol/L    Carbon Dioxide (CO2) 24 22 - 29 mmol/L    Anion Gap 15 7 - 15 mmol/L    Urea Nitrogen 14.9 6.0 - 20.0 mg/dL    Creatinine 1.16 0.67 - 1.17 mg/dL    GFR Estimate 80 >60 mL/min/1.73m2    Calcium 9.8 8.6 - 10.0 mg/dL    Chloride 102 98 - 107 mmol/L    Glucose 133 (H) 70 - 99 mg/dL    " Alkaline Phosphatase 97 40 - 150 U/L    AST 29 0 - 45 U/L    ALT 31 0 - 70 U/L    Protein Total 7.8 6.4 - 8.3 g/dL    Albumin 4.2 3.5 - 5.2 g/dL    Bilirubin Total 0.6 <=1.2 mg/dL   CBC with platelets and differential   Result Value Ref Range    WBC Count 14.0 (H) 4.0 - 11.0 10e3/uL    RBC Count 5.60 4.40 - 5.90 10e6/uL    Hemoglobin 16.1 13.3 - 17.7 g/dL    Hematocrit 47.2 40.0 - 53.0 %    MCV 84 78 - 100 fL    MCH 28.8 26.5 - 33.0 pg    MCHC 34.1 31.5 - 36.5 g/dL    RDW 13.7 10.0 - 15.0 %    Platelet Count 201 150 - 450 10e3/uL    % Neutrophils 71 %    % Lymphocytes 21 %    % Monocytes 6 %    % Eosinophils 1 %    % Basophils 0 %    % Immature Granulocytes 0 %    NRBCs per 100 WBC 0 <1 /100    Absolute Neutrophils 10.0 (H) 1.6 - 8.3 10e3/uL    Absolute Lymphocytes 3.0 0.8 - 5.3 10e3/uL    Absolute Monocytes 0.8 0.0 - 1.3 10e3/uL    Absolute Eosinophils 0.2 0.0 - 0.7 10e3/uL    Absolute Basophils 0.1 0.0 - 0.2 10e3/uL    Absolute Immature Granulocytes 0.0 <=0.4 10e3/uL    Absolute NRBCs 0.0 10e3/uL   Result Value Ref Range    Lipase 35 13 - 60 U/L   Result Value Ref Range    Magnesium 2.1 1.7 - 2.3 mg/dL   Symptomatic Influenza A/B, RSV, & SARS-CoV2 PCR (COVID-19) Nasopharyngeal    Specimen: Nasopharyngeal; Swab   Result Value Ref Range    Influenza A PCR Negative Negative    Influenza B PCR Negative Negative    RSV PCR Negative Negative    SARS CoV2 PCR Negative Negative             I, Pawan Toro , am serving as a scribe to document services personally performed by Luke Wilkinson DO based on my observation and the provider's statements to me. I, Luke Wilkinson, attest that Pawan Toro is acting in a scribe capacity, has observed my performance of the services and has documented them in accordance with my direction.    Luke Wilkinson DO  Emergency Medicine  [facility] EMERGENCY DEPARTMENT  [facility] [facility] [id number] [phone]      Luke Wilkinson DO  07/08/24 0941

## 2024-07-09 NOTE — ED NOTES
The patient reports that his nausea is doing better but has not completely resolved. He states that he no longer feels like he will actually vomit.

## 2024-07-09 NOTE — ED TRIAGE NOTES
Patient has a broken back and a multitude of health problems.     For the past couple days he has been feeling unwell with a subjective fever, some abdominal pain and vomiting. Patient take a lot of medications.       Triage Assessment (Adult)       Row Name 07/08/24 1931          Respiratory WDL    Respiratory WDL WDL        Skin Circulation/Temperature WDL    Skin Circulation/Temperature WDL WDL        Cardiac WDL    Cardiac WDL WDL        Peripheral/Neurovascular WDL    Peripheral Neurovascular WDL WDL        Cognitive/Neuro/Behavioral WDL    Cognitive/Neuro/Behavioral WDL WDL

## 2024-07-09 NOTE — DISCHARGE INSTRUCTIONS
Other than a slightly elevated white blood cell count your laboratory tests were all reassuring here today in the emergency department.  Although the exact cause of your symptoms remains unclear, a viral gastroenteritis seems likely at this time.  You will be contacted for any positive COVID or influenza results within the next 1 to 2 days.    Continue to use the prescribed Zofran as needed for any further episodes of nausea or vomiting.    Follow-up with your primary care provider for reevaluation.  Return back to ED sooner for any worsening nausea and vomiting, diarrhea, abdominal pain, fevers, or any other new or concerning symptoms.

## 2024-07-10 NOTE — PROGRESS NOTES
Daron Bond is a 44 year old who is being evaluated via a billable video visit.      How would you like to obtain your AVS? MyChart  If the video visit is dropped, the invitation should be resent by: Text to cell phone: [phone]  Will anyone else be joining your video visit? No          Medical Weight Loss Initial Diet Evaluation  Assessment:  This patient was referred by Dr. Steiner for MNT as treatment for Morbid obesity which is impacting T2DM, heart disease, HTN, high cholesterol, asthma    Daron is presenting today for a new weight management nutrition consultation. Pt has had an initial appointment with Dr. Steiner.    Weight loss medication:  Ozempic .     Personal Goals: would like to lose weight for back surgery     Anthropometrics:    Pt's weight is 331 lbs  Initial weight: 346.9 lbs  Weight change: 15.9 lbs, 4.6% weight loss  BMI: 47.49  Ideal body weight: 73 kg (160 lb 15 oz)  Adjusted ideal body weight: 104.4 kg (230 lb 2.6 oz)  Estimated RMR ([place]-St Jeor equation):  2,473 kcals x 1.2 (sedentary) = 2,968 kcals (for weight maintenance)    Recommended Protein Intake: 100 - 110 grams of protein/day    Medical History:  Patient Active Problem List   Diagnosis    Cerebellar tonsillar ectopia (H)    Asthma, moderate persistent    Degeneration of intervertebral disc of lumbar region    OCD (obsessive compulsive disorder)    Myofascial pain syndrome    Obesity, morbid (H)    Chronic pain syndrome    Bipolar I disorder (H)    Spondylolisthesis of lumbar region    Spinal stenosis of lumbar region    TYRELL (generalized anxiety disorder)    Primary hypertension    Atypical chest pain    Diabetes mellitus, type 2 (H)   Diabetes: T2DM, A1C of 7.4% on 5/13/2024, Metformin, Ozempic  Blood sugar has been between 100-113 - previously was 180s or higher    Allergies   Allergen Reactions    Banana     Bee Venom     Latex       Nutrition History:   Food allergies/intolerances/cultural or religous food  "customs: Yes - bananas    Weight loss history: Watching portions or calories, exercise    Per MD note 6/14/2024: \"Today we discussed our Bariatric Surgery program to address their weight related health. He is not appropriate for the surgical program today and we focused on non surgical options per his request. Should he struggle with non surgical weight loss, quit smoking and have motivation to live the bariatric lifestyle, we can revisit that option next year.\"    -patient was smoking during visit  -patient has been having cognitive issues and issues with sleeping  -patient is trying to work on his mental health and starting with a new therapist within the next couple of weeks  -patient expressed that he feels overwhelmed with the various appointments and medications that he has to take    -patient has been doing intermittent fasting and trying to cut back on oral intake - has been limiting dinner    -patient has been having issues with diarrhea for the past few weeks and feels weak and tired    -patient is hoping to lose enough weight to have back surgery      Dietary Recall:  Breakfast: steak with fried onions and mushrooms  Lunch: chicken  Dinner: commonly skipping  Typical Snacks: cheese sticks throughout the day    Beverages:   Sparkling carbonate water (ICE brand) - 2-3 bottles/day  Diet green tea    Cut out pop, chocolate milk    Exercise:   When he is able to get to the gym, he will do light weights, walking around and weight training exercise      Nutrition Diagnosis (PES statement):   Morbid obesity related to excessive energy intake as evidence by BMI of 47.49     Disordered Eating Pattern (NB 1.5) related to obsessive desire, intake of food exceeding RMR as evidenced by frequent grazing, skipping meals     Nutrition Intervention  Food and/or Nutrient Delivery   Placed emphasis on importance of developing a healthy meal routine, aiming for 3 meals a day and limited snacks.  Nutrition Education "   Discussed with patient how to build a meal: the importance of including a lean/low fat protein at each meal, include a source of vegetables at a minimum of lunch and dinner and limiting carbohydrate intake to 30-45 per meal.  Educated on sources of lean protein, portion sizes, the amount of grams found in each source. Recommend patient to aim for 20-30g protein at each meal.  Discussed the importance of adequate hydration, with emphasis on drinking 64oz of water or zero calorie beverages per day.  Nutrition Counseling   Encouraged importance of developing routine exercise for health benefits and weight loss.    Goals established by patient:   Aim to have a protein with a CHO to help with blood sugar control  Aim to fuel the weight loss process to help  Handouts provided:  Intro to [facility]  CHOs resources  Protein sources for weight loss    Assessment/Plan:    Pt will follow up in 3 month(s) with bariatrician and <1 month(s) with dietitian.       Video-Visit Details    Type of service:  Video Visit    Video Start Time (time video started): 10:31 AM    Video End Time (time video stopped): 10:57 AM    Originating Location (pt. Location): Home      Distant Location (provider location):  Off-site    Mode of Communication:  Video Conference via [facility]    Physician has received verbal consent for a Video Visit from the patient? Yes      Ramona Roper RD

## 2024-07-12 ENCOUNTER — VIRTUAL VISIT (OUTPATIENT)
Dept: SURGERY | Facility: CLINIC | Age: 44
End: 2024-07-12
Payer: COMMERCIAL

## 2024-07-12 DIAGNOSIS — E66.01 CLASS 3 SEVERE OBESITY DUE TO EXCESS CALORIES WITH SERIOUS COMORBIDITY AND BODY MASS INDEX (BMI) OF 45.0 TO 49.9 IN ADULT (H): ICD-10-CM

## 2024-07-12 DIAGNOSIS — E66.813 CLASS 3 SEVERE OBESITY DUE TO EXCESS CALORIES WITH SERIOUS COMORBIDITY AND BODY MASS INDEX (BMI) OF 45.0 TO 49.9 IN ADULT (H): ICD-10-CM

## 2024-07-12 DIAGNOSIS — E66.01 MORBID OBESITY WITH BMI OF 45.0-49.9, ADULT (H): Primary | ICD-10-CM

## 2024-07-12 DIAGNOSIS — E11.9 TYPE 2 DIABETES, HBA1C GOAL < 7% (H): ICD-10-CM

## 2024-07-12 PROCEDURE — 97802 MEDICAL NUTRITION INDIV IN: CPT | Mod: 95 | Performed by: DIETITIAN, REGISTERED

## 2024-07-12 NOTE — LETTER
7/12/2024      Daron Bond  [address] [place] [address]  [place] [address]      Dear Colleague,    Thank you for referring your patient, Daron Bond, to the [facility] [place]. Please see a copy of my visit note below.    Daron Bond is a 44 year old who is being evaluated via a billable video visit.      How would you like to obtain your AVS? MyChart  If the video visit is dropped, the invitation should be resent by: Text to cell phone: [phone]  Will anyone else be joining your video visit? No          Medical Weight Loss Initial Diet Evaluation  Assessment:  This patient was referred by Dr. Steiner for MNT as treatment for Morbid obesity which is impacting T2DM, heart disease, HTN, high cholesterol, asthma    Daron is presenting today for a new weight management nutrition consultation. Pt has had an initial appointment with Dr. Steiner.    Weight loss medication:  Ozempic .     Personal Goals: would like to lose weight for back surgery     Anthropometrics:    Pt's weight is 331 lbs  Initial weight: 346.9 lbs  Weight change: 15.9 lbs, 4.6% weight loss  BMI: 47.49  Ideal body weight: 73 kg (160 lb 15 oz)  Adjusted ideal body weight: 104.4 kg (230 lb 2.6 oz)  Estimated RMR ([place]-St Jeor equation):  2,473 kcals x 1.2 (sedentary) = 2,968 kcals (for weight maintenance)    Recommended Protein Intake: 100 - 110 grams of protein/day    Medical History:  Patient Active Problem List   Diagnosis     Cerebellar tonsillar ectopia (H)     Asthma, moderate persistent     Degeneration of intervertebral disc of lumbar region     OCD (obsessive compulsive disorder)     Myofascial pain syndrome     Obesity, morbid (H)     Chronic pain syndrome     Bipolar I disorder (H)     Spondylolisthesis of lumbar region     Spinal stenosis of lumbar region     TYRELL (generalized anxiety disorder)     Primary hypertension     Atypical chest pain     Diabetes mellitus,  "type 2 (H)   Diabetes: T2DM, A1C of 7.4% on 5/13/2024, Metformin, Ozempic  Blood sugar has been between 100-113 - previously was 180s or higher    Allergies   Allergen Reactions     Banana      Bee Venom      Latex       Nutrition History:   Food allergies/intolerances/cultural or religous food customs: Yes - bananas    Weight loss history: Watching portions or calories, exercise    Per MD note 6/14/2024: \"Today we discussed our Bariatric Surgery program to address their weight related health. He is not appropriate for the surgical program today and we focused on non surgical options per his request. Should he struggle with non surgical weight loss, quit smoking and have motivation to live the bariatric lifestyle, we can revisit that option next year.\"    -patient was smoking during visit  -patient has been having cognitive issues and issues with sleeping  -patient is trying to work on his mental health and starting with a new therapist within the next couple of weeks  -patient expressed that he feels overwhelmed with the various appointments and medications that he has to take    -patient has been doing intermittent fasting and trying to cut back on oral intake - has been limiting dinner    -patient has been having issues with diarrhea for the past few weeks and feels weak and tired    -patient is hoping to lose enough weight to have back surgery      Dietary Recall:  Breakfast: steak with fried onions and mushrooms  Lunch: chicken  Dinner: commonly skipping  Typical Snacks: cheese sticks throughout the day    Beverages:   Sparkling carbonate water (ICE brand) - 2-3 bottles/day  Diet green tea    Cut out pop, chocolate milk    Exercise:   When he is able to get to the gym, he will do light weights, walking around and weight training exercise      Nutrition Diagnosis (PES statement):   Morbid obesity related to excessive energy intake as evidence by BMI of 47.49     Disordered Eating Pattern (NB 1.5) related to " obsessive desire, intake of food exceeding RMR as evidenced by frequent grazing, skipping meals     Nutrition Intervention  Food and/or Nutrient Delivery   Placed emphasis on importance of developing a healthy meal routine, aiming for 3 meals a day and limited snacks.  Nutrition Education   Discussed with patient how to build a meal: the importance of including a lean/low fat protein at each meal, include a source of vegetables at a minimum of lunch and dinner and limiting carbohydrate intake to 30-45 per meal.  Educated on sources of lean protein, portion sizes, the amount of grams found in each source. Recommend patient to aim for 20-30g protein at each meal.  Discussed the importance of adequate hydration, with emphasis on drinking 64oz of water or zero calorie beverages per day.  Nutrition Counseling   Encouraged importance of developing routine exercise for health benefits and weight loss.    Goals established by patient:   Aim to have a protein with a CHO to help with blood sugar control  Aim to fuel the weight loss process to help  Handouts provided:  Intro to [facility]  CHOs resources  Protein sources for weight loss    Assessment/Plan:    Pt will follow up in 3 month(s) with bariatrician and <1 month(s) with dietitian.       Video-Visit Details    Type of service:  Video Visit    Video Start Time (time video started): 10:31 AM    Video End Time (time video stopped): 10:57 AM    Originating Location (pt. Location): Home      Distant Location (provider location):  Off-site    Mode of Communication:  Video Conference via [facility]    Physician has received verbal consent for a Video Visit from the patient? Yes      Ramona Roper RD        Again, thank you for allowing me to participate in the care of your patient.        Sincerely,        Ramona Roper RD

## 2024-07-12 NOTE — PATIENT INSTRUCTIONS
Protein Sources for Weight Loss     CARBOHYDRATES    Carbohydrates    Learning About Low CHO Foods (Healthwise)     Added Sugars (Healthwise)     Learning about Eating more Fruits and Vegetables (Healthwise)            Eat Better ? Move More ? Live Well    Eat 3 nutrient-rich meals each day     Don't skip meals--it will cause you to overeat later in the day!     Eating fiber (vegetables/fruits/whole grains) and protein with meals helps you stay full longer     Choose foods with less than 10 grams of sugar and 5 grams of fat per serving to prevent excess calories and weight re-gain   Eat around the same times each day to develop a routine eating schedule    Avoid snacking unless physically hungry.   Planned snacks: 1-2 times per day and no more than 150 calories    Eat protein first    Protein helps with healing, maintaining adequate muscle mass, reducing hunger and optimizing nutritional status    Aim for 100 - 110 grams of protein per day   Fill up on Fiber    Fiber comes from plants--fruits, veggies, whole grains, nuts/seeds and beans    Fiber is low in calories, high in phytonutrients and helps you stay full longer    Aim for 25-35 grams per day by eating fiber with meals and snacks  Eat [id number]    Take 20-30 minutes to eat each meal by taking small bites, chewing foods to applesauce consistency or 20-30 times before you swallow    Eating foods too fast can delay satiety/fullness signals and increase overeating   Slow down your eating by using toddler utensils, putting your fork/spoon down between bites and not watching TV or emailing during meals!   Keep a Journal          Writing down what you eat, how you feel and when you are active helps you identify new changes to work on from week to week          Look for ways to cut 100 calories from your current diet 2-3 times per day  Drink 64 ounces of 0-Calorie drinks between meals    Water    Zero calorie Propel  or Vitamin Water      SoBe [place]  Zero  Calories    Crystal Light , Sugar-Free Ari-Aid , and other sugar-free lemonade or flavored walters    Keep Caffeine to less than 300mg per day ie: 3-6oz cups coffee     Work up to 45-60 minutes of physical activity most days of the week    Helps with losing weight and prevent regaining those extra pounds!     Do a combo of cardio (walking/water exercises) and strength training (lifting weights/Vinyasa yoga)    Avoid Mindless Eating    Be present when you eat--take note of the smell, taste and quality of your food    Make a list of alternative activities you could do to prevent eating out of boredom/stress  Go for a walk, call a friend, chew gum, paint your nails, re-organize the garage, etc      LEAN PROTEIN SOURCES    Protein Source Portion Calories Grams of Protein                           Nonfat, plain Greek yogurt    (10 grams sugar or less) 3/4 cup (6 oz)  12-17   Light Yogurt (10 grams sugar or less) 3/4 cup (6 oz)  6-8   Protein Shake 1 shake 110-180 15-30   Skim/1% Milk or lactose-free milk 1 cup ( 8 oz)  8   Plain or light, flavored soymilk 1 cup  7-8   Plain or light, hemp milk 1 cup 110 6   Fat Free or 1% Cottage Cheese 1/2 cup 90 15   Part skim ricotta cheese 1/2 cup 100 14   Part skim or reduced fat cheese slices 1/4cup, 3 dice 65-80 8     Mozzarella String Cheese 1 80 8   Canned tuna, chicken, crab or salmon  (canned in water)  1/2 cup 100 15-20   White fish (broiled, grilled, baked) 3 ounces 100 21   [place]/Tuna (broiled, grilled, baked) 3 ounces 150-180 21   Shrimp, Scallops, Lobster, Crab 3 ounces 100 21   Pork loin, Pork Tenderloin 3 ounces 150 21   Boneless, skinless chicken /turkey breast                          (broiled, grilled, baked) 3 ounces 120 21   [place], [place], [place], and Venison 3 ounces 120 21   Lean cuts of red meat and pork (sirloin,   round, tenderloin, flank, ground 93%-96%) 3 ounces 170 21   Lean or Extra Lean Ground Turkey 1/2 cup 150 20   90-95% Lean  [place] Burger 1 brit 140-180 21   Low-fat casserole with lean meat 3/4 cup 200 17   Luncheon Meats                                                        (turkey, lean ham, roast beef, chicken) 3 ounces 100 21   Egg (boiled, poached, scrambled) 1 Egg 60 7   Egg Substitute 1/2 cup 70 10   Nuts (limit to 1 serving per day)  3 Tbsp. 150 7   Nut [place] (peanut, almond)  Limit to 1 serving or less daily 1 Tbsp. 90 4   Soy Burger (varies) 1  10-15   Edamame  1/2 cup ~95 9   Garbanzo, Black, [place] Beans 1/2 cup 110 7   Refried Beans 1/2 cup 100 7   Kidney and Lima beans 1/2 cup 110 7   Tempeh 3 oz 175 18   Vegan crumbles 1/2 cup 100 14   Tofu 1/2 cup 110 14   Chili (beans and extra lean beef or turkey) 1 cup 200 23   Lentil Stew/Soup 1 cup 150 12   Black Bean Soup 1 cup 175 12     Carbohydrates  Carbohydrates fuel your body with glucose (sugar)--the energy your body needs so you can do your daily activities.  Carbohydrates offer an immediate source of energy for your body. They provide the fuel for your muscles and organs, such as your brain.     Types of Carbohydrates     Complex Carbohydrates are higher in fiber and keep you feeling full longer--helping you eat less.   These are found in nearly all plant-based foods and usually take longer for the body to digest.  They are most commonly found in whole-wheat bread, whole-grain pasta, brown rice, starchy vegetables,   and fruits  Refined Carbohydrates require almost NO WORK for digestion and break down into glucose more quickly   than complex carbohydrates. Refined carbohydrates are usually high in calories and low in nutrients and fiber--  eating more of these can lead to weight gain.  Thinking about eliminating carbohydrates???  If you do not eat enough carbohydrates, the following can occur:  Fatigue  Muscle cramps  Poor mental function  Fatigue easily results from deprivation of carbohydrates, which is seen in people who fast, possibly interfering with  activities of daily living.      Thinking about eliminating carbohydrates???  If you do not eat enough carbohydrates, the following can occur:  Fatigue  Muscle cramps  Poor mental function  Fatigue easily results from deprivation of carbohydrates, which is seen in people who fast, possibly interfering with activities of daily living    Carbohydrates are your body's first choice for fuel. If given a choice of several types of foods simultaneously, your body will use the energy from carbohydrates first.    What foods contain carbohydrates?  Choose the following foods containing carbohydrates (the BEST ones to eat):   Fruit-fresh, frozen, canned in their own juices  Whole grains:  Whole-wheat breads  Brown rice  Oatmeal  Whole-grain cereals  Other starchy foods containing a minimum of 3 grams (g) fiber/100 calories  The ingredient label should list whole wheat or whole grain as one of the first ingredients (bulgur, quinoa, buckwheat, millet, spelt, faro, kasha)  Milk or yogurt (a natural source of carbohydrates):  Low-fat milk  Fat-free milk  Yogurt   Beans or legumes     Starchy vegetables, raw or frozen:  Potatoes  Peas  Corn    AVOID or limit the following foods containing carbohydrates:  Refined sugars, such as in:  Candy  Desserts-ice cream, cakes, pies, brownies, frozen yogurt, sherbet/sorbet  Cookies  White flour: bread/pasta/crackers/rice/tortillas  Sugary snacks: sweetened cereal, granola bars, cereal bars, donuts, muffins, bagels  Sugary Drinks:  Fruit Juice, Smoothies  Sports Drinks  Regular Soda    What are typical serving sizes or portions?  The following are some serving and portion sizes for foods containing carbohydrates:  One medium piece of fruit, about 4?5 ounces (oz) (-tennis ball)  1 cup (C) berries or melon    C canned fruit    C juice (100% vegetable)    C starchy vegetables, cooked or chopped  One slice whole-grain bread  ? C brown rice, quinoa, buckwheat, millet, spelt, faro, kasha    C oatmeal  (dry)    C bulgur  One small tortilla (less than 6inch diameter)    C wheat germ  1 oz pretzels     C flaked cereal        Calorie-Controlled Sample Meal Plans    Examples of small healthy meals    Breakfast   Omelet made with   cup to   cup egg substitute or 2 eggs    cup chopped vegetables  1-2 tbsp. of light cheese     cup salsa  Medium banana    1 cup non-fat plain, Greek yogurt mixed with 1 cup berries and 1-2 Tbsp nuts or cereal   -3/4 cup skim or 1% cottage cheese    cup unsweetened whole-grain cereal  1/2 cup of fresh strawberries  Whole-wheat English muffin or mini bagel, 1 scrambled egg and 1 slice Swiss cheese   Small orange  Protein Bar or Shake (15-30 grams protein and 15-25 grams Carbohydrates)    cup cottage cheese, low-fat    cup fresh fruit    11 ounces of Slim Fast Low Carb (only), Kota's Advantage, EAS Carb Control    Lunch/Dinner  2-3 slices roasted turkey breast  1 tbsp. of fat free mayonnaise  2 slices of  whole-wheat bread, Medium apple  10 baby carrots with 1 tbsp. of low-fat dip     cup water packed tuna or chicken  1 tablespoons of low-fat mayonnaise  1-2 tbsp. dill relish  1 serving of whole-grain crackers  1 cup of strawberries   6 inch turkey sub sandwich with light mayonnaise,   cup cottage cheese                                                                                                                                                      Black bean and low-fat cheese on a whole wheat tortilla with salsa and light sour cream  Grilled chicken sandwich  Tossed salad with light dressing    Baked potato with 3/4 cup of extra lean ground beef, light shredded cheese and salsa  Fresh fruit                                                 Chicken chunks with lettuce and vegetables stuffed in bernardo  Steamed broccoli                                                 3 oz boneless/skinless chicken breast  1/2 cup brown rice with stir-fried vegetables    grapefruit  3 ounces of salmon, trout, or  tuna  1 cup of steamed asparagus  1 small slice whole grain Italian bread  Broiled white or pink fish  3/4 cup whole wheat pasta with tomatoes  3/4 cup of roasted red peppers  3 oz. of extra lean (93/7) hamburger on a Arnold's [place] Thins  Tossed salad with light dressing       Black bean or Tuscan bean soup with grated mozzarella cheese    of a flour tortilla    3 ounces of grilled pork loin with 1 tbsp. of low-sugar barbeque sauce, 1 cup of green beans seasoned with pepper  Small dinner roll or   cup of grapefruit sections    1-2 cups of torn troy    cup of garbanzo beans or diced skinless chicken breast  5-6 cherry tomatoes  1  tbsp. of crumbled feta cheese  1 tbsp. of roasted soy nuts  1 tsp. of olive oil and 2-3 Tbsp. of balsamic or red wine vinegar  Small whole-wheat dinner roll or   cup of cut up pineapple

## 2024-07-13 DIAGNOSIS — E11.9 TYPE 2 DIABETES MELLITUS WITHOUT COMPLICATION, WITHOUT LONG-TERM CURRENT USE OF INSULIN (H): ICD-10-CM

## 2024-07-14 DIAGNOSIS — I10 PRIMARY HYPERTENSION: ICD-10-CM

## 2024-07-15 ENCOUNTER — TRANSFERRED RECORDS (OUTPATIENT)
Dept: MULTI SPECIALTY CLINIC | Facility: CLINIC | Age: 44
End: 2024-07-15

## 2024-07-15 LAB — RETINOPATHY: NORMAL

## 2024-07-15 RX ORDER — LOSARTAN POTASSIUM 100 MG/1
100 TABLET ORAL DAILY
Qty: 90 TABLET | Refills: 1 | OUTPATIENT
Start: 2024-07-15

## 2024-07-15 RX ORDER — METFORMIN HCL 500 MG
TABLET, EXTENDED RELEASE 24 HR ORAL
Qty: 70 TABLET | Refills: 0 | OUTPATIENT
Start: 2024-07-15

## 2024-07-17 ENCOUNTER — PATIENT OUTREACH (OUTPATIENT)
Dept: CARE COORDINATION | Facility: CLINIC | Age: 44
End: 2024-07-17
Payer: COMMERCIAL

## 2024-07-17 NOTE — PROGRESS NOTES
Clinic Care Coordination Contact  Follow Up Progress Note      Assessment: [facility] reached out to pt for regular check-in     Care Gaps:    Health Maintenance Due   Topic Date Due    URINE DRUG SCREEN  Never done    Pneumococcal Vaccine: Pediatrics (0 to 5 Years) and At-Risk Patients (6 to 64 Years) (1 of 2 - PCV) Never done    EYE EXAM  06/21/2023    COVID-19 Vaccine (1 - 2023-24 season) Never done       Declined due to pt feeling overwhelmed at this time     Care Plans  Care Plan: General       Problem: HP GENERAL PROBLEM                   Care Plan: General       Problem: HP GENERAL PROBLEM                   Care Plan: Financial Wellbeing       Problem: Patient expresses financial resource strain                   Care Plan: Health Maintenance       Problem: Health Maintenance Due or Overdue                     Intervention/Education provided during outreach:     [facility] -   [email or web address]  This provider accepts your insurance however it is the furthest from your home.   [address] [place] [address]    [facility] - [email or web address]  This provider might accept your insurance based on if they have/have not reached their capacity with MA as they only take so many patients. You would need to call and confirm.   [address] [place] [address]    [facility] - [email or web address]  This provider might accept your insurance based on if they have/have not reached their capacity with MA as they only take so many patients. You would need to call and confirm.   [address] [place] [address]    If you want to look for other options you can use this tool - [email or web address]  It allows you to search for any provider type by location. Everyone listed does work with your insurance.        Outreach Frequency:  monthly, more frequently as needed        Plan:   [facility] gave update to pt about veggie rx. Pt noted they did not get delivery last week (7/11). CC gave information about box delivery, time, and location - update about photo confirmation. Pt will try to look for delivery tomorrow. Pt noted they are in need of an eye dr; they feel they are having troubles with their eyes. Resources provided via email. Pt will review and schedule. Pt did not want to establish care at a clinic closer to their home at this time. Pt will reach out to this CC if they have any needs either by phone or email.     At next outreach [facility] will again speak with pt about setting up new goal, establishing care, or graduating from [facility].    Care Coordinator will follow up in about 30 days or sooner if there are additional needs.

## 2024-07-19 ENCOUNTER — MYC MEDICAL ADVICE (OUTPATIENT)
Dept: FAMILY MEDICINE | Facility: CLINIC | Age: 44
End: 2024-07-19
Payer: COMMERCIAL

## 2024-07-19 DIAGNOSIS — M51.369 DEGENERATION OF INTERVERTEBRAL DISC OF LUMBAR REGION: ICD-10-CM

## 2024-07-23 RX ORDER — OXYCODONE HYDROCHLORIDE 5 MG/1
5 TABLET ORAL 3 TIMES DAILY PRN
Qty: 45 TABLET | Refills: 0 | Status: SHIPPED | OUTPATIENT
Start: 2024-07-23 | End: 2024-08-01

## 2024-07-23 NOTE — PROGRESS NOTES
"Daron Bond is a 44 year old who is being evaluated via a billable video visit.      How would you like to obtain your AVS? MyChart  If the video visit is dropped, the invitation should be resent by: Text to cell phone: [phone]  Will anyone else be joining your video visit? No        Medical  Weight Loss Follow-Up Diet Evaluation  Assessment:  Daron is presenting today for a follow up weight management nutrition consultation.  This patient has had an initial appointment and was referred by Dr. Steiner for MNT as treatment for Morbid obesity      Weight loss medication:  Ozempic .   Pt's weight is 334 lbs (7/8/2024)  Initial weight: 346.9 lbs  Weight change: 12.9 lbs, 3.7% weight loss         No data to display              BMI: 47.92  Ideal body weight: 73 kg (160 lb 15 oz)  Adjusted ideal body weight: 104.4 kg (230 lb 2.6 oz)    Estimated RMR ([place]-St Jeor equation):  2,473 kcals x 1.2 (sedentary) = 2,968 kcals (for weight maintenance)  Recommended Protein Intake: 100 - 110 grams of protein/day  Patient Active Problem List:  Patient Active Problem List   Diagnosis    Cerebellar tonsillar ectopia (H)    Asthma, moderate persistent    Degeneration of intervertebral disc of lumbar region    OCD (obsessive compulsive disorder)    Myofascial pain syndrome    Obesity, morbid (H)    Chronic pain syndrome    Bipolar I disorder (H)    Spondylolisthesis of lumbar region    Spinal stenosis of lumbar region    TYRELL (generalized anxiety disorder)    Primary hypertension    Atypical chest pain    Diabetes mellitus, type 2 (H)   Diabetes: T2DM, A1C of 7.4% on 5/13/2024    Nutrition History:   Food allergies/intolerances/cultural or religous food customs: Yes - dee  Per MD note 6/14/2024: \"Today we discussed our Bariatric Surgery program to address their weight related health. He is not appropriate for the surgical program today and we focused on non surgical options per his request. Should he struggle with non " "surgical weight loss, quit smoking and have motivation to live the bariatric lifestyle, we can revisit that option next year.\"    -patient has been having cognitive issues and issues with sleeping  -patient is trying to work on his mental health and starting with a new therapist within the next couple of weeks  -patient expressed that he feels overwhelmed with the various appointments and medications that he has to take  -patient has been doing intermittent fasting and trying to cut back on oral intake - has been limiting dinner  -patient is hoping to lose enough weight to have back surgery    Progress on goals from last visit: Patient stated that he has been seeing a therapist and is stressed with other life issues - frustrated with past actions with family related to his inheritances. Patient is noticing that his appetite is suppressed with medication but having light headedness with limited intake. Patient is commonly having two meals - B 9-10:30 AM and then D at 5 PM. Patient will have lunch meat or a beef stick between meals. Patient asked provider to message Dr. Steiner regarding a prescription for vitamins - limited vegetables/fruit intake and low sun intake - \"whatever the doctor would recommend.\" Patient had questions on getting lancets and test strips for his diabetes - read [employer] message from RN regarding this prescription being filled at the pharmacy for that.     Beverages:   Sparkling carbonate water (ICE brand) - 2-3 bottles/day  Diet green tea  Water - adding frozen strawberries  Cut out pop, chocolate milk  Exercise:   Limited amount d/t anxiety and back pain - patient will commonly do walking and weight training when he is there  Nutrition Diagnosis:    Morbid obesity related to excessive energy intake as evidence by inconsistent meals, inconsistent protein intake and BMI of 47.92     Intervention:  Food and/or nutrient delivery: be mindful of timing of meals to help with blood sugar control and " portion control. Aim to have protein with every meal and snack.  Nutrition education: no resources provided or requested  Coordination of nutrition care: patient is working with a diabetes educator as well.     Monitoring/Evaluation:    Goals:  Aim to have a protein intake with every meal/snack  Try to fuel every 4-5 hours to help with blood sugar control and for portion control from meal to meal    Patient to follow up in 3 month(s) with bariatrician and 4 month(s) with RD      Video-Visit Details    Type of service:  Video Visit    Video Start Time (time video started): 2:07 PM    Video End Time (time video stopped): 2:25 PM    Originating Location (pt. Location): Home      Distant Location (provider location):  Off-site    Mode of Communication:  Video Conference via [facility]    Physician has received verbal consent for a Video Visit from the patient? Yes      Ramona Roper RD

## 2024-07-23 NOTE — TELEPHONE ENCOUNTER
Routing refill request to provider for approval.    Last Written Prescription Date:  6/11/24  Last Fill Quantity: 90,  # refills: 0   Last office visit: 8/30/2023 Future Office Visit:   Next 5 appointments (look out 90 days)      Aug 01, 2024 12:30 PM  (Arrive by 12:25 PM)  Provider Visit with Mata Hearn MD  [facility] ([facility] ) [address] [place] [id number] [phone]

## 2024-07-24 ENCOUNTER — VIRTUAL VISIT (OUTPATIENT)
Dept: SURGERY | Facility: CLINIC | Age: 44
End: 2024-07-24
Payer: COMMERCIAL

## 2024-07-24 DIAGNOSIS — E66.813 CLASS 3 SEVERE OBESITY DUE TO EXCESS CALORIES WITH SERIOUS COMORBIDITY AND BODY MASS INDEX (BMI) OF 45.0 TO 49.9 IN ADULT (H): ICD-10-CM

## 2024-07-24 DIAGNOSIS — E11.9 TYPE 2 DIABETES, HBA1C GOAL < 7% (H): ICD-10-CM

## 2024-07-24 DIAGNOSIS — E66.01 MORBID OBESITY WITH BMI OF 45.0-49.9, ADULT (H): Primary | ICD-10-CM

## 2024-07-24 DIAGNOSIS — E66.01 CLASS 3 SEVERE OBESITY DUE TO EXCESS CALORIES WITH SERIOUS COMORBIDITY AND BODY MASS INDEX (BMI) OF 45.0 TO 49.9 IN ADULT (H): ICD-10-CM

## 2024-07-24 PROCEDURE — 97803 MED NUTRITION INDIV SUBSEQ: CPT | Mod: 95 | Performed by: DIETITIAN, REGISTERED

## 2024-07-24 NOTE — LETTER
7/24/2024      Daron Bond  [address] [address]  [place] [address]      Dear Colleague,    Thank you for referring your patient, Daron Bond, to the [facility] [place]. Please see a copy of my visit note below.    Daron Bond is a 44 year old who is being evaluated via a billable video visit.      How would you like to obtain your AVS? MyChart  If the video visit is dropped, the invitation should be resent by: Text to cell phone: [phone]  Will anyone else be joining your video visit? No        Medical  Weight Loss Follow-Up Diet Evaluation  Assessment:  Daron is presenting today for a follow up weight management nutrition consultation.  This patient has had an initial appointment and was referred by Dr. Steiner for MNT as treatment for Morbid obesity      Weight loss medication:  Ozempic .   Pt's weight is 334 lbs (7/8/2024)  Initial weight: 346.9 lbs  Weight change: 12.9 lbs, 3.7% weight loss         No data to display              BMI: 47.92  Ideal body weight: 73 kg (160 lb 15 oz)  Adjusted ideal body weight: 104.4 kg (230 lb 2.6 oz)    Estimated RMR ([place]-St Jeor equation):  2,473 kcals x 1.2 (sedentary) = 2,968 kcals (for weight maintenance)  Recommended Protein Intake: 100 - 110 grams of protein/day  Patient Active Problem List:  Patient Active Problem List   Diagnosis     Cerebellar tonsillar ectopia (H)     Asthma, moderate persistent     Degeneration of intervertebral disc of lumbar region     OCD (obsessive compulsive disorder)     Myofascial pain syndrome     Obesity, morbid (H)     Chronic pain syndrome     Bipolar I disorder (H)     Spondylolisthesis of lumbar region     Spinal stenosis of lumbar region     TYRELL (generalized anxiety disorder)     Primary hypertension     Atypical chest pain     Diabetes mellitus, type 2 (H)   Diabetes: T2DM, A1C of 7.4% on 5/13/2024    Nutrition History:   Food  "allergies/intolerances/cultural or religous food customs: Yes - dee  Per MD note 6/14/2024: \"Today we discussed our Bariatric Surgery program to address their weight related health. He is not appropriate for the surgical program today and we focused on non surgical options per his request. Should he struggle with non surgical weight loss, quit smoking and have motivation to live the bariatric lifestyle, we can revisit that option next year.\"    -patient has been having cognitive issues and issues with sleeping  -patient is trying to work on his mental health and starting with a new therapist within the next couple of weeks  -patient expressed that he feels overwhelmed with the various appointments and medications that he has to take  -patient has been doing intermittent fasting and trying to cut back on oral intake - has been limiting dinner  -patient is hoping to lose enough weight to have back surgery    Progress on goals from last visit: Patient stated that he has been seeing a therapist and is stressed with other life issues - frustrated with past actions with family related to his inheritances. Patient is noticing that his appetite is suppressed with medication but having light headedness with limited intake. Patient is commonly having two meals - B 9-10:30 AM and then D at 5 PM. Patient will have lunch meat or a beef stick between meals. Patient asked provider to message Dr. Steiner regarding a prescription for vitamins - limited vegetables/fruit intake and low sun intake - \"whatever the doctor would recommend.\" Patient had questions on getting lancets and test strips for his diabetes - read [employer] message from RN regarding this prescription being filled at the pharmacy for that.     Beverages:   Sparkling carbonate water (ICE brand) - 2-3 bottles/day  Diet green tea  Water - adding frozen strawberries  Cut out pop, chocolate milk  Exercise:   Limited amount d/t anxiety and back pain - patient will " commonly do walking and weight training when he is there  Nutrition Diagnosis:    Morbid obesity related to excessive energy intake as evidence by inconsistent meals, inconsistent protein intake and BMI of 47.92     Intervention:  Food and/or nutrient delivery: be mindful of timing of meals to help with blood sugar control and portion control. Aim to have protein with every meal and snack.  Nutrition education: no resources provided or requested  Coordination of nutrition care: patient is working with a diabetes educator as well.     Monitoring/Evaluation:    Goals:  Aim to have a protein intake with every meal/snack  Try to fuel every 4-5 hours to help with blood sugar control and for portion control from meal to meal    Patient to follow up in 3 month(s) with bariatrician and 4 month(s) with CINTIA      Video-Visit Details    Type of service:  Video Visit    Video Start Time (time video started): 2:07 PM    Video End Time (time video stopped): 2:25 PM    Originating Location (pt. Location): Home      Distant Location (provider location):  Off-site    Mode of Communication:  Video Conference via [facility]    Physician has received verbal consent for a Video Visit from the patient? Yes      Ramona Roper RD           Again, thank you for allowing me to participate in the care of your patient.        Sincerely,        Ramona Roper RD

## 2024-07-24 NOTE — TELEPHONE ENCOUNTER
I refilled oxycodone.  Can we confirm which medication he is taking 4 times daily?  I do not see medication on his list that is prescribed that way.  I want to be sure that he is taking his medication correctly.  The injectable for weight loss, semaglutide, can cause upset stomach, but that should be used once weekly.  Thank you.

## 2024-07-25 NOTE — TELEPHONE ENCOUNTER
I recommend he take metformin with meal to help decrease stomach discomfort.  He can take 2 tablets in the morning breakfast and 2 tablets in the evening with supper.  If he is still having trouble with queasiness, recommend decreasing to 1 tablet twice daily with meal.

## 2024-07-29 DIAGNOSIS — E66.813 CLASS 3 SEVERE OBESITY DUE TO EXCESS CALORIES WITH SERIOUS COMORBIDITY AND BODY MASS INDEX (BMI) OF 45.0 TO 49.9 IN ADULT (H): ICD-10-CM

## 2024-07-29 DIAGNOSIS — E11.9 TYPE 2 DIABETES, HBA1C GOAL < 7% (H): Primary | ICD-10-CM

## 2024-07-29 DIAGNOSIS — E66.01 CLASS 3 SEVERE OBESITY DUE TO EXCESS CALORIES WITH SERIOUS COMORBIDITY AND BODY MASS INDEX (BMI) OF 45.0 TO 49.9 IN ADULT (H): ICD-10-CM

## 2024-07-29 RX ORDER — MAGNESIUM 200 MG
TABLET ORAL
Qty: 50 TABLET | Refills: 1 | Status: SHIPPED | OUTPATIENT
Start: 2024-07-29

## 2024-07-29 RX ORDER — VITAMIN B COMPLEX
1 TABLET ORAL DAILY
Qty: 90 TABLET | Refills: 2 | Status: SHIPPED | OUTPATIENT
Start: 2024-07-29 | End: 2025-04-25

## 2024-07-29 NOTE — PROGRESS NOTES
Can supplement with B12 once weekly while on metformin to prevent deficiency. 1000 international unit(s) of D3 daily should optimize vitamin D levels.  Ricardo Steiner MD

## 2024-07-31 ENCOUNTER — VIRTUAL VISIT (OUTPATIENT)
Dept: EDUCATION SERVICES | Facility: CLINIC | Age: 44
End: 2024-07-31
Payer: COMMERCIAL

## 2024-07-31 DIAGNOSIS — E11.9 TYPE 2 DIABETES MELLITUS WITHOUT COMPLICATION, WITHOUT LONG-TERM CURRENT USE OF INSULIN (H): ICD-10-CM

## 2024-07-31 PROCEDURE — 98968 PH1 ASSMT&MGMT NQHP 21-30: CPT | Mod: 93 | Performed by: DIETITIAN, REGISTERED

## 2024-07-31 NOTE — PROGRESS NOTES
Diabetes Self-Management Education & Support/ 29 minutes    Presents for: Follow-up    Type of Service: Telephone Visit    Originating Location (Patient Location): Home  Distant Location (Provider Location): [facility]  Mode of Communication:  Telephone    Telephone Visit Start Time:  11:10  Telephone Visit End Time (telephone visit stop time): 11:39    How would patient like to obtain AVS? MyChart      ASSESSMENT:  Daron is injecting Ozempic 0.5 mg weekly and he is taking Metformin  mg two tablets/daily. He reported not feeling good when he took 2000 mg, so he decreased his dose and has noticed that he is feeling better. Daron is checking his BG 2-4x/day: FBS and PP are at goal. He is exercising 3-4x/week, he is limited to about 20 minutes due to fatigue. He had a recent eye exam, and glasses have been ordered. He continues to work with the weight mgmt program on nutritional changes. We reviewed recommended CHO portions at meals using the plate method.     Patient's most recent   Lab Results   Component Value Date    A1C 7.4 05/13/2024     is meeting goal of <8.0    Diabetes knowledge and skills assessment:   Patient is knowledgeable in diabetes management concepts related to: topics discussed    Continue education with the following diabetes management concepts: Monitoring and Taking Medication    Based on learning assessment above, most appropriate setting for further diabetes education would be: Individual setting.      PLAN  Test blood sugar in the morning before eating, with goal to be under 130 and two hours after one meal per day,  with goal to be under 180.  Inject Ozempic 0.5 mg weekly through 8/9 and then increase to 1.0 mg on 8/16.  Ask someone in your household to check the dose on the pen to make sure it is correct.  Aim to have 1/2 of your plate filled with vegetables( all vegetables except: corn, peas, potatoes), 1/4 lean protein and 1/4 carbohydrate containing  "foods: rice, pasta, bread, potatoes.   Add in a serving of fruit between meals: a piece of fruit the size of a tennis ball or 1 cup cut up melon or berries or 1/2 arslan or 1/2 banana.   Schedule a lab appointment to check your Hemoglobin A1C after 8/13/24.        Topics to cover at upcoming visits: Monitoring, Taking Medication, and A1C results    Follow-up: 9/18/24    See Care Plan for co-developed, patient-state behavior change goals.  AVS provided for patient today.    Education Materials Provided:  No new materials provided today      SUBJECTIVE/OBJECTIVE:  Presents for: Follow-up  Accompanied by: Self  Diabetes education in the past 24mo: Yes  Focus of Visit: Monitoring, Reducing Risks, Taking Medication, Being Active, Healthy Eating  Diabetes type: Type 2  Disease course: Improving  Transportation concerns: No  Other concerns:: None  Cultural Influences/Ethnic Background:  Not  or       Diabetes Symptoms & Complications:  Weight trend: Decreasing  Disease course: Improving       Patient Problem List and Family Medical History reviewed for relevant medical history, current medical status, and diabetes risk factors.    Vitals:  There were no vitals taken for this visit.  Estimated body mass index is 47.92 kg/m  as calculated from the following:    Height as of 7/8/24: 1.778 m (5' 10\").    Weight as of 7/8/24: 151.5 kg (334 lb).   Last 3 BP:   BP Readings from Last 3 Encounters:   07/08/24 109/65   06/14/24 120/70   06/04/24 111/78       History   Smoking Status    Every Day    Types: Cigarettes   Smokeless Tobacco    Never       Labs:  Lab Results   Component Value Date    A1C 7.4 05/13/2024     Lab Results   Component Value Date     07/08/2024    GLC 83 04/27/2018     Lab Results   Component Value Date     07/02/2018     Direct Measure HDL   Date Value Ref Range Status   07/02/2018 48 >40 mg/dL Final     Comment:     Lab test performed by:  Lab Mnemonic:[facility] " "Ranjith  [address] [place] [address]  Saul De Jesus M.D.  [facility] Specimen received date and time: 02-JUL-2018 16:26:00.00     ]  GFR Estimate   Date Value Ref Range Status   07/08/2024 80 >60 mL/min/1.73m2 Final     Comment:     eGFR calculated using 2021 CKD-EPI equation.     No results found for: \"GFRESTBLACK\"  Lab Results   Component Value Date    CR 1.16 07/08/2024     No results found for: \"MICROALBUMIN\"    Healthy Eating:  Healthy Eating Assessed Today: Yes  Meal planning/habits: Low carb  Who cooks/prepares meals for you?: Self  Who purchases food in  your home?: Self  Meals include: Breakfast, Dinner  Other: used to eat way too many cho, now eating a fraction of what he was.  Beverages: Water (zero calorie monster drinks, sugar free green tea)  Has patient met with a dietitian in the past?: Yes    Being Active:  Being Active Assessed Today: Yes  Exercise:: Yes (light weights, limited for cardio due to back)  Days per week of moderate to strenuous exercise (like a brisk walk): 3  On average, minutes per day of exercise at this level: 20  Exercise Minutes per Week: 60    Monitoring:  Monitoring Assessed Today: Yes  Did patient bring glucose meter to appointment? : No  Blood Glucose Meter: Unknown  Times checking blood sugar at home (number): 3  Times checking blood sugar at home (per): Day    FBS: 121,115,114,113  PP: 145,176,146,159    Taking Medications:  Diabetes Medication(s)       Biguanides       metFORMIN (GLUCOPHAGE XR) 500 MG 24 hr tablet TAKE 1 TABLET EVERY DAY FOR 7 DAYS, 2 TABLETS EVERY DAY FOR 7 DAYS, 3 TABLETS EVERY DAY FOR 7 DAYS, THEN 4 TABLETS EVERY DAY. TAKE WITH DINNER.       Incretin Mimetic Agents       semaglutide (OZEMPIC, 0.25 OR 0.5 MG/DOSE,) 2 MG/3ML pen Start 0.25mg/week for 4 weeks then increase, if tolerated, to 0.5mg/week.     Semaglutide, 1 MG/DOSE, (OZEMPIC) 4 MG/3ML pen Inject 1 mg Subcutaneous every 7 days for 180 days     Patient not taking: Reported on " 7/31/2024            Taking Medication Assessed Today: Yes  Current Treatments: Non-insulin Injectables, Oral Medication (taken by mouth)  Problems taking diabetes medications regularly?: Yes  Diabetes medication side effects?: Yes (metformin: GI affects)    Problem Solving:  Problem Solving Assessed Today: Yes (some problems seeing the dial up dose for ozempic)              Reducing Risks:  Reducing Risks Assessed Today: Yes  CAD Risks: Diabetes Mellitus, Male sex  Has dilated eye exam at least once a year?: Yes  Sees dentist every 6 months?: No    Healthy Coping:  Healthy Coping Assessed Today: Yes  Emotional response to diabetes: Helplessness, Uncertain, Concern for health and well-being  Informal Support system:: None  Stage of change: PREPARATION (Decided to change - considering how)  Patient Activation Measure Survey Score:      6/10/2024    11:49 AM   DILIA Score (Last Two)   DILIA Raw Score 24   Activation Score 40.9   DILIA Level 1         Care Plan and Education Provided:  Healthy Eating: Plate planning method, Being Active: Finding a physical activity routine that works for you, Monitoring: Frequency of monitoring and Individual glucose targets, Taking Medication: Action of prescribed medication(s) and Side effects of prescribed medication(s), and Reducing Risks: Eye care and Goal for A1c, how it relates to glucose and how often to check        Time Spent: 29 minutes  Encounter Type: Individual    Any diabetes medication dose changes were made via the CDE Protocol per the patient's primary care provider. A copy of this encounter was shared with the provider.

## 2024-07-31 NOTE — PATIENT INSTRUCTIONS
Test blood sugar in the morning before eating, with goal to be under 130 and two hours after one meal per day,  with goal to be under 180.  Inject Ozempic 0.5 mg weekly through 8/9 and then increase to 1.0 mg on 8/16.  Ask someone in your household to check the dose on the pen to make sure it is correct.  Aim to have 1/2 of your plate filled with vegetables( all vegetables except: corn, peas, potatoes), 1/4 lean protein and 1/4 carbohydrate containing foods: rice, pasta, bread, potatoes.   Add in a serving of fruit between meals: a piece of fruit the size of a tennis ball or 1 cup cut up melon or berries or 1/2 arslan or 1/2 banana.   Schedule a lab appointment to check your Hemoglobin A1C after 8/13/24.

## 2024-07-31 NOTE — LETTER
7/31/2024         RE: Daron Bond  [address]  [facility] [address]        Dear Colleague,    Thank you for referring your patient, Daron Bond, to the [facility]. Please see a copy of my visit note below.    Diabetes Self-Management Education & Support/ 29 minutes    Presents for: Follow-up    Type of Service: Telephone Visit    Originating Location (Patient Location): Home  Distant Location (Provider Location): [facility]  Mode of Communication:  Telephone    Telephone Visit Start Time:  11:10  Telephone Visit End Time (telephone visit stop time): 11:39    How would patient like to obtain AVS? MyChart      ASSESSMENT:  Daron is injecting Ozempic 0.5 mg weekly and he is taking Metformin  mg two tablets/daily. He reported not feeling good when he took 2000 mg, so he decreased his dose and has noticed that he is feeling better. Daron is checking his BG 2-4x/day: FBS and PP are at goal. He is exercising 3-4x/week, he is limited to about 20 minutes due to fatigue. He had a recent eye exam, and glasses have been ordered. He continues to work with the weight mgmt program on nutritional changes. We reviewed recommended CHO portions at meals using the plate method.     Patient's most recent   Lab Results   Component Value Date    A1C 7.4 05/13/2024     is meeting goal of <8.0    Diabetes knowledge and skills assessment:   Patient is knowledgeable in diabetes management concepts related to: topics discussed    Continue education with the following diabetes management concepts: Monitoring and Taking Medication    Based on learning assessment above, most appropriate setting for further diabetes education would be: Individual setting.      PLAN  Test blood sugar in the morning before eating, with goal to be under 130 and two hours after one meal per day,  with goal to be under 180.  Inject Ozempic 0.5 mg weekly through 8/9 and  "then increase to 1.0 mg on 8/16.  Ask someone in your household to check the dose on the pen to make sure it is correct.  Aim to have 1/2 of your plate filled with vegetables( all vegetables except: corn, peas, potatoes), 1/4 lean protein and 1/4 carbohydrate containing foods: rice, pasta, bread, potatoes.   Add in a serving of fruit between meals: a piece of fruit the size of a tennis ball or 1 cup cut up melon or berries or 1/2 arslan or 1/2 banana.   Schedule a lab appointment to check your Hemoglobin A1C after 8/13/24.        Topics to cover at upcoming visits: Monitoring, Taking Medication, and A1C results    Follow-up: 9/18/24    See Care Plan for co-developed, patient-state behavior change goals.  AVS provided for patient today.    Education Materials Provided:  No new materials provided today      SUBJECTIVE/OBJECTIVE:  Presents for: Follow-up  Accompanied by: Self  Diabetes education in the past 24mo: Yes  Focus of Visit: Monitoring, Reducing Risks, Taking Medication, Being Active, Healthy Eating  Diabetes type: Type 2  Disease course: Improving  Transportation concerns: No  Other concerns:: None  Cultural Influences/Ethnic Background:  Not  or       Diabetes Symptoms & Complications:  Weight trend: Decreasing  Disease course: Improving       Patient Problem List and Family Medical History reviewed for relevant medical history, current medical status, and diabetes risk factors.    Vitals:  There were no vitals taken for this visit.  Estimated body mass index is 47.92 kg/m  as calculated from the following:    Height as of 7/8/24: 1.778 m (5' 10\").    Weight as of 7/8/24: 151.5 kg (334 lb).   Last 3 BP:   BP Readings from Last 3 Encounters:   07/08/24 109/65   06/14/24 120/70   06/04/24 111/78       History   Smoking Status     Every Day     Types: Cigarettes   Smokeless Tobacco     Never       Labs:  Lab Results   Component Value Date    A1C 7.4 05/13/2024     Lab Results   Component Value Date " "    07/08/2024    GLC 83 04/27/2018     Lab Results   Component Value Date     07/02/2018     Direct Measure HDL   Date Value Ref Range Status   07/02/2018 48 >40 mg/dL Final     Comment:     Lab test performed by:  Lab Mnemonic:KRYSTEN  [facility]  [address] [place] [address]  Saul De Jesus M.D.  QUEST Specimen received date and time: 02-JUL-2018 16:26:00.00     ]  GFR Estimate   Date Value Ref Range Status   07/08/2024 80 >60 mL/min/1.73m2 Final     Comment:     eGFR calculated using 2021 CKD-EPI equation.     No results found for: \"GFRESTBLACK\"  Lab Results   Component Value Date    CR 1.16 07/08/2024     No results found for: \"MICROALBUMIN\"    Healthy Eating:  Healthy Eating Assessed Today: Yes  Meal planning/habits: Low carb  Who cooks/prepares meals for you?: Self  Who purchases food in  your home?: Self  Meals include: Breakfast, Dinner  Other: used to eat way too many cho, now eating a fraction of what he was.  Beverages: Water (zero calorie monster drinks, sugar free green tea)  Has patient met with a dietitian in the past?: Yes    Being Active:  Being Active Assessed Today: Yes  Exercise:: Yes (light weights, limited for cardio due to back)  Days per week of moderate to strenuous exercise (like a brisk walk): 3  On average, minutes per day of exercise at this level: 20  Exercise Minutes per Week: 60    Monitoring:  Monitoring Assessed Today: Yes  Did patient bring glucose meter to appointment? : No  Blood Glucose Meter: Unknown  Times checking blood sugar at home (number): 3  Times checking blood sugar at home (per): Day    FBS: 121,115,114,113  PP: 145,176,146,159    Taking Medications:  Diabetes Medication(s)       Biguanides       metFORMIN (GLUCOPHAGE XR) 500 MG 24 hr tablet TAKE 1 TABLET EVERY DAY FOR 7 DAYS, 2 TABLETS EVERY DAY FOR 7 DAYS, 3 TABLETS EVERY DAY FOR 7 DAYS, THEN 4 TABLETS EVERY DAY. TAKE WITH DINNER.       Incretin Mimetic Agents       " semaglutide (OZEMPIC, 0.25 OR 0.5 MG/DOSE,) 2 MG/3ML pen Start 0.25mg/week for 4 weeks then increase, if tolerated, to 0.5mg/week.     Semaglutide, 1 MG/DOSE, (OZEMPIC) 4 MG/3ML pen Inject 1 mg Subcutaneous every 7 days for 180 days     Patient not taking: Reported on 7/31/2024            Taking Medication Assessed Today: Yes  Current Treatments: Non-insulin Injectables, Oral Medication (taken by mouth)  Problems taking diabetes medications regularly?: Yes  Diabetes medication side effects?: Yes (metformin: GI affects)    Problem Solving:  Problem Solving Assessed Today: Yes (some problems seeing the dial up dose for ozempic)              Reducing Risks:  Reducing Risks Assessed Today: Yes  CAD Risks: Diabetes Mellitus, Male sex  Has dilated eye exam at least once a year?: Yes  Sees dentist every 6 months?: No    Healthy Coping:  Healthy Coping Assessed Today: Yes  Emotional response to diabetes: Helplessness, Uncertain, Concern for health and well-being  Informal Support system:: None  Stage of change: PREPARATION (Decided to change - considering how)  Patient Activation Measure Survey Score:      6/10/2024    11:49 AM   DILIA Score (Last Two)   DILIA Raw Score 24   Activation Score 40.9   DILIA Level 1         Care Plan and Education Provided:  Healthy Eating: Plate planning method, Being Active: Finding a physical activity routine that works for you, Monitoring: Frequency of monitoring and Individual glucose targets, Taking Medication: Action of prescribed medication(s) and Side effects of prescribed medication(s), and Reducing Risks: Eye care and Goal for A1c, how it relates to glucose and how often to check        Time Spent: 29 minutes  Encounter Type: Individual    Any diabetes medication dose changes were made via the CDE Protocol per the patient's primary care provider. A copy of this encounter was shared with the provider.

## 2024-08-01 ENCOUNTER — VIRTUAL VISIT (OUTPATIENT)
Dept: FAMILY MEDICINE | Facility: CLINIC | Age: 44
End: 2024-08-01
Payer: COMMERCIAL

## 2024-08-01 DIAGNOSIS — Z12.11 COLON CANCER SCREENING: ICD-10-CM

## 2024-08-01 DIAGNOSIS — G89.4 CHRONIC PAIN SYNDROME: Primary | ICD-10-CM

## 2024-08-01 DIAGNOSIS — M51.369 DEGENERATION OF INTERVERTEBRAL DISC OF LUMBAR REGION: ICD-10-CM

## 2024-08-01 PROCEDURE — 99214 OFFICE O/P EST MOD 30 MIN: CPT | Mod: 95 | Performed by: FAMILY MEDICINE

## 2024-08-01 RX ORDER — OXYCODONE HYDROCHLORIDE 5 MG/1
5 TABLET ORAL 3 TIMES DAILY PRN
Qty: 90 TABLET | Refills: 0 | Status: SHIPPED | OUTPATIENT
Start: 2024-08-01 | End: 2024-09-10

## 2024-08-01 NOTE — PROGRESS NOTES
Daron is a 44 year old who is being evaluated via a billable video visit.    How would you like to obtain your AVS? MyChart  If the video visit is dropped, the invitation should be resent by: Text to cell phone: [phone]  Will anyone else be joining your video visit? No      Assessment & Plan     Chronic pain syndrome  Will continue his current pain management plan.  PDMP queried with no concerns.  Referral for chiropractic care and THOMAS have been made.  - Chiropractic Referral; Future  - Pain Management  Referral; Future    Degeneration of intervertebral disc of lumbar region  See above  - oxyCODONE (ROXICODONE) 5 MG tablet; Take 1 tablet (5 mg) by mouth 3 times daily as needed for severe pain  - Chiropractic Referral; Future  - Pain Management  Referral; Future    Colon cancer screening  Colonoscopy has been ordered.  - Colonoscopy Screening  Referral; Future                Subjective   Daron is a 44 year old, presenting for the following health issues:  Chronic Disease Management (CDM follow up--back pain, DM, discuss medications.   Verbal consent given for video visit)      8/1/2024    12:15 PM   Additional Questions   Roomed by Fariba JOSUE CMA   Accompanied by Self         HPI     Patient is wanting to discuss his chronic pain.   He is needing his oxycodone refilled, will run out within the week.  He also wants to discuss his medications, possibly weaning off some medications.   He is now taking metformin 500 mg bid for his diabetes.   He will be having weight loss bariatric surgery sometime in the near future but needs to quit smoking before that is done.    Patient is requesting referrals for chiropractor and a colonoscopy.              Objective           Vitals:  No vitals were obtained today due to virtual visit.    Physical Exam   GENERAL: alert and no distress  EYES: Eyes grossly normal to inspection.  No discharge or erythema, or obvious scleral/conjunctival  abnormalities.  RESP: No audible wheeze, cough, or visible cyanosis.    SKIN: Visible skin clear. No significant rash, abnormal pigmentation or lesions.  NEURO: Cranial nerves grossly intact.  Mentation and speech appropriate for age.  PSYCH: Appropriate affect, tone, and pace of words          Video-Visit Details    Type of service:  Video Visit   Originating Location (pt. Location): Home    Distant Location (provider location):  On-site  Platform used for Video Visit: Mery  Signed Electronically by: Mata Hearn MD

## 2024-08-06 ENCOUNTER — HOSPITAL ENCOUNTER (EMERGENCY)
Facility: HOSPITAL | Age: 44
Discharge: HOME | End: 2024-08-06
Attending: EMERGENCY MEDICINE | Admitting: EMERGENCY MEDICINE
Payer: COMMERCIAL

## 2024-08-06 ENCOUNTER — APPOINTMENT (OUTPATIENT)
Dept: CT IMAGING | Facility: HOSPITAL | Age: 44
End: 2024-08-06
Attending: EMERGENCY MEDICINE
Payer: COMMERCIAL

## 2024-08-06 VITALS
OXYGEN SATURATION: 95 % | DIASTOLIC BLOOD PRESSURE: 69 MMHG | SYSTOLIC BLOOD PRESSURE: 122 MMHG | RESPIRATION RATE: 20 BRPM | TEMPERATURE: 98 F | HEART RATE: 87 BPM

## 2024-08-06 DIAGNOSIS — K62.5 BRBPR (BRIGHT RED BLOOD PER RECTUM): ICD-10-CM

## 2024-08-06 LAB
ABO/RH(D): NORMAL
ALBUMIN SERPL BCG-MCNC: 4.3 G/DL (ref 3.5–5.2)
ALP SERPL-CCNC: 91 U/L (ref 40–150)
ALT SERPL W P-5'-P-CCNC: 35 U/L (ref 0–70)
ANION GAP SERPL CALCULATED.3IONS-SCNC: 13 MMOL/L (ref 7–15)
ANTIBODY SCREEN: NEGATIVE
AST SERPL W P-5'-P-CCNC: 40 U/L (ref 0–45)
BASOPHILS # BLD AUTO: 0 10E3/UL (ref 0–0.2)
BASOPHILS NFR BLD AUTO: 0 %
BILIRUB SERPL-MCNC: 0.5 MG/DL
BUN SERPL-MCNC: 20.2 MG/DL (ref 6–20)
CALCIUM SERPL-MCNC: 9.3 MG/DL (ref 8.8–10.4)
CHLORIDE SERPL-SCNC: 101 MMOL/L (ref 98–107)
CREAT SERPL-MCNC: 1.28 MG/DL (ref 0.67–1.17)
CRP SERPL-MCNC: 17.9 MG/L
EGFRCR SERPLBLD CKD-EPI 2021: 71 ML/MIN/1.73M2
EOSINOPHIL # BLD AUTO: 0.1 10E3/UL (ref 0–0.7)
EOSINOPHIL NFR BLD AUTO: 1 %
ERYTHROCYTE [DISTWIDTH] IN BLOOD BY AUTOMATED COUNT: 14.2 % (ref 10–15)
GLUCOSE BLDC GLUCOMTR-MCNC: 193 MG/DL (ref 70–99)
GLUCOSE SERPL-MCNC: 172 MG/DL (ref 70–99)
HCO3 SERPL-SCNC: 25 MMOL/L (ref 22–29)
HCT VFR BLD AUTO: 44.7 % (ref 40–53)
HGB BLD-MCNC: 15 G/DL (ref 13.3–17.7)
HOLD SPECIMEN: NORMAL
HOLD SPECIMEN: NORMAL
IMM GRANULOCYTES # BLD: 0.1 10E3/UL
IMM GRANULOCYTES NFR BLD: 0 %
INR PPP: 0.95 (ref 0.85–1.15)
LIPASE SERPL-CCNC: 51 U/L (ref 13–60)
LYMPHOCYTES # BLD AUTO: 2.3 10E3/UL (ref 0.8–5.3)
LYMPHOCYTES NFR BLD AUTO: 20 %
MAGNESIUM SERPL-MCNC: 1.9 MG/DL (ref 1.7–2.3)
MCH RBC QN AUTO: 28.8 PG (ref 26.5–33)
MCHC RBC AUTO-ENTMCNC: 33.6 G/DL (ref 31.5–36.5)
MCV RBC AUTO: 86 FL (ref 78–100)
MONOCYTES # BLD AUTO: 0.6 10E3/UL (ref 0–1.3)
MONOCYTES NFR BLD AUTO: 5 %
NEUTROPHILS # BLD AUTO: 8.7 10E3/UL (ref 1.6–8.3)
NEUTROPHILS NFR BLD AUTO: 74 %
NRBC # BLD AUTO: 0 10E3/UL
NRBC BLD AUTO-RTO: 0 /100
PLATELET # BLD AUTO: 175 10E3/UL (ref 150–450)
POTASSIUM SERPL-SCNC: 4 MMOL/L (ref 3.4–5.3)
PROT SERPL-MCNC: 7.3 G/DL (ref 6.4–8.3)
RBC # BLD AUTO: 5.2 10E6/UL (ref 4.4–5.9)
SODIUM SERPL-SCNC: 139 MMOL/L (ref 135–145)
SPECIMEN EXPIRATION DATE: NORMAL
TSH SERPL DL<=0.005 MIU/L-ACNC: 1.41 UIU/ML (ref 0.3–4.2)
WBC # BLD AUTO: 11.8 10E3/UL (ref 4–11)

## 2024-08-06 PROCEDURE — 84443 ASSAY THYROID STIM HORMONE: CPT | Performed by: EMERGENCY MEDICINE

## 2024-08-06 PROCEDURE — 258N000003 HC RX IP 258 OP 636: Performed by: EMERGENCY MEDICINE

## 2024-08-06 PROCEDURE — 36415 COLL VENOUS BLD VENIPUNCTURE: CPT | Performed by: EMERGENCY MEDICINE

## 2024-08-06 PROCEDURE — 99285 EMERGENCY DEPT VISIT HI MDM: CPT | Mod: 25

## 2024-08-06 PROCEDURE — 82962 GLUCOSE BLOOD TEST: CPT

## 2024-08-06 PROCEDURE — 83690 ASSAY OF LIPASE: CPT | Performed by: EMERGENCY MEDICINE

## 2024-08-06 PROCEDURE — 36415 COLL VENOUS BLD VENIPUNCTURE: CPT | Performed by: STUDENT IN AN ORGANIZED HEALTH CARE EDUCATION/TRAINING PROGRAM

## 2024-08-06 PROCEDURE — 96360 HYDRATION IV INFUSION INIT: CPT | Mod: 59

## 2024-08-06 PROCEDURE — 83735 ASSAY OF MAGNESIUM: CPT | Performed by: EMERGENCY MEDICINE

## 2024-08-06 PROCEDURE — 85025 COMPLETE CBC W/AUTO DIFF WBC: CPT | Performed by: EMERGENCY MEDICINE

## 2024-08-06 PROCEDURE — 96361 HYDRATE IV INFUSION ADD-ON: CPT

## 2024-08-06 PROCEDURE — 74174 CTA ABD&PLVS W/CONTRAST: CPT

## 2024-08-06 PROCEDURE — 80053 COMPREHEN METABOLIC PANEL: CPT | Performed by: EMERGENCY MEDICINE

## 2024-08-06 PROCEDURE — 86140 C-REACTIVE PROTEIN: CPT | Performed by: EMERGENCY MEDICINE

## 2024-08-06 PROCEDURE — 250N000011 HC RX IP 250 OP 636: Performed by: EMERGENCY MEDICINE

## 2024-08-06 PROCEDURE — 86900 BLOOD TYPING SEROLOGIC ABO: CPT | Performed by: EMERGENCY MEDICINE

## 2024-08-06 PROCEDURE — 85610 PROTHROMBIN TIME: CPT | Performed by: EMERGENCY MEDICINE

## 2024-08-06 RX ORDER — IOPAMIDOL 755 MG/ML
100 INJECTION, SOLUTION INTRAVASCULAR ONCE
Status: COMPLETED | OUTPATIENT
Start: 2024-08-06 | End: 2024-08-06

## 2024-08-06 RX ORDER — HYDROCORTISONE 25 MG/G
CREAM TOPICAL 2 TIMES DAILY PRN
Qty: 30 G | Refills: 2 | Status: SHIPPED | OUTPATIENT
Start: 2024-08-06 | End: 2024-09-30

## 2024-08-06 RX ADMIN — SODIUM CHLORIDE, POTASSIUM CHLORIDE, SODIUM LACTATE AND CALCIUM CHLORIDE 1000 ML: 600; 310; 30; 20 INJECTION, SOLUTION INTRAVENOUS at 18:41

## 2024-08-06 RX ADMIN — IOPAMIDOL 100 ML: 755 INJECTION, SOLUTION INTRAVENOUS at 19:40

## 2024-08-06 ASSESSMENT — COLUMBIA-SUICIDE SEVERITY RATING SCALE - C-SSRS
2. HAVE YOU ACTUALLY HAD ANY THOUGHTS OF KILLING YOURSELF IN THE PAST MONTH?: NO
6. HAVE YOU EVER DONE ANYTHING, STARTED TO DO ANYTHING, OR PREPARED TO DO ANYTHING TO END YOUR LIFE?: NO
1. IN THE PAST MONTH, HAVE YOU WISHED YOU WERE DEAD OR WISHED YOU COULD GO TO SLEEP AND NOT WAKE UP?: NO

## 2024-08-06 ASSESSMENT — ACTIVITIES OF DAILY LIVING (ADL)
ADLS_ACUITY_SCORE: 35
ADLS_ACUITY_SCORE: 33

## 2024-08-06 NOTE — ED TRIAGE NOTES
Pt reports severe back pain since yesterday, has been taking his prescription oxycodone without much relief.  Pt reports today has been increasingly fatigued.  Pt today had large bloody stool.  Pt is unsure if he is on blood thinners, pt reports he takes 15-17 medications.  Pt also reports he is diabetic and recently changed medications.  Pt having a lot of anxiety.       Triage Assessment (Adult)       Row Name 08/06/24 8688          Triage Assessment    Airway WDL WDL        Respiratory WDL    Respiratory WDL WDL        Skin Circulation/Temperature WDL    Skin Circulation/Temperature WDL WDL        Cardiac WDL    Cardiac WDL WDL        Peripheral/Neurovascular WDL    Peripheral Neurovascular WDL WDL        Cognitive/Neuro/Behavioral WDL    Cognitive/Neuro/Behavioral WDL WDL        [place] Coma Scale    Best Eye Response 4-->(E4) spontaneous     Best Motor Response 6-->(M6) obeys commands     Best Verbal Response 5-->(V5) oriented     [place] Coma Scale Score 15

## 2024-08-06 NOTE — ED PROVIDER NOTES
"EMERGENCY DEPARTMENT ENCOUNTER      NAME: Daron Bond  AGE: 44 year old male  YOB: 1980  MRN: [id number]  EVALUATION DATE & TIME: 8/6/2024  6:25 PM    PCP: Mata Hearn    ED PROVIDER: Domo Culver M.D.      Chief Complaint   Patient presents with    Back Pain    Generalized Weakness    Rectal Bleeding         IMPRESSION  1. BRBPR (bright red blood per rectum)        PLAN  - hemorrhoid cream BID  - close PCP & GI follow up  - discharge to home    ED COURSE & MEDICAL DECISION MAKING    ED Course as of 08/06/24 2114   Tue Aug 06, 2024   1920 44yoM with history of obesity, HTN, T2DM, asthma, anxiety, no blood thinner use, prior hemorrhoids & rectal fissure presenting for evaluation of bright red blood per rectum. Reports he was walking around outside and thought his buttocks was just sweaty. Then went inside to use the bathroom and found his rectal area slick with bright red blood. No rectal pain or abdominal pain. No bleeding from anywhere else. Had a bowel movement with \"lots\" of bright red blood. Mild oozing since then. States he has been under a lot of stress recently but otherwise feeling fine. Notes his chronic low back pain is ongoing at this time; has been taking oxycodone for this; denies any numbness, tingling, focal weakness, urinary retention, bowel or bladder incontinence.    Normal vitals on presentation. Anxious on exam with clear lungs, normal work of breathing, no abdominal tenderness, no CVA tenderness, rectal exam with no gross blood, moderate external hemorrhoid with no bleeding or tenderness, no fissure. Rectal exam not consistent with report of large bright red blood per rectum.    Will obtain CT, blood to evaluate while giving IVF. Patient comfortable with this plan; no further questions at this time.   2009 CTA abdomen/pelvis independently reviewed & interpreted by me: no acute bleeding, no SBO, no pneumoperitoneum.     CTA with no active bleeding or other acute " abnormality or explanatory pathology. Blood tests unremarkable with no acute anemia, no LESLIE, no glaring electrolyte abnormality, normal bilirubin/LFTs/lipase. No ongoing bleeding while here in the ED. No concern for ongoing emergent life-threatening etiology; ok for outpatient management. May have been hemorrhoid; does have hemorrhoids on exam but not bleeding now. Will use hydrocortisone cream as outpatient. Patient comfortable with discharge at this time. Return precautions and need for PCP & GI follow up discussed and understood. No further questions at the time of discharge.      --------------------------------------------------------------------------------   --------------------------------------------------------------------------------     6:35 PM I met with the patient for the initial interview and physical examination. Discussed plan for treatment and workup in the ED.        This patient involved a high degree of complexity in medical decision making, as significant risks were present and assessed. Recent encounters & results in medical record reviewed by me.    All workup (i.e. any EKG/labs/imaging as per charting below) reviewed and independently interpreted by me. See respective sections for details.    Broad differential considered for this patient, including but not limited to:  hemorrhoid, fissure, AVM, diverticulosis, anemia, bleeding diathesis      See additional MDM below if interested.      MEDICATIONS GIVEN IN THE EMERGENCY DEPARTMENT  Medications   lactated ringers BOLUS 1,000 mL (0 mLs Intravenous Stopped 8/6/24 2031)   iopamidol (ISOVUE-370) solution 100 mL (100 mLs Intravenous $Given 8/6/24 1940)       NEW PRESCRIPTIONS STARTED AT TODAY'S ER VISIT  Discharge Medication List as of 8/6/2024  8:34 PM        START taking these medications    Details   hydrocortisone, Perianal, (HYDROCORTISONE) 2.5 % cream Place rectally 2 times daily as needed for hemorrhoidsDisp-30 g, [id number]            CONTINUE these medications which have NOT CHANGED    Details   albuterol (PROAIR HFA/PROVENTIL HFA/VENTOLIN HFA) 108 (90 Base) MCG/ACT inhaler INHALE 2 PUFFS BY MOUTH EVERY 6 HOURS, Disp-8.5 g, R-4, E-PrescribePharmacy may dispense brand covered by insurance (Proair, or proventil or ventolin or generic albuterol inhaler)      amLODIPine (NORVASC) 5 MG tablet TAKE 1 TABLET(5 MG) BY MOUTH DAILY, Disp-90 tablet, R-5, E-Prescribe      blood glucose (NO BRAND SPECIFIED) test strip Use to test blood sugar 3 times daily or as directed. To accompany: Blood Glucose Monitor Brands: per insurance., Disp-100 strip, R-6, E-Prescribe      blood glucose monitoring (NO BRAND SPECIFIED) meter device kit Use to test blood sugar 1 times daily or as directed. Preferred blood glucose meter OR supplies to accompany: Blood Glucose Monitor Brands: per insurance.Disp-1 kit, [id number]      busPIRone (BUSPAR) 10 MG tablet Take 1 tablet (10 mg) by mouth 3 times daily, Disp-120 tablet, R-5, E-Prescribe      Cyanocobalamin (B-12) 1000 MCG SUBL Take once weekly., Disp-50 tablet, R-1, E-PrescribeMetformin user, for prevention of low B12.      gabapentin (NEURONTIN) 300 MG capsule Take 1 capsule by mouth at bedtime, Historical      hydrochlorothiazide (MICROZIDE) 12.5 MG capsule Take 1 capsule (12.5 mg) by mouth every morning, Disp-90 capsule, R-3, E-Prescribe      ketoconazole (NIZORAL) 2 % external shampoo Apply topically daily as needed for itching or irritationDisp-100 mL, [id number]      losartan (COZAAR) 100 MG tablet Take 1 tablet (100 mg) by mouth daily, Disp-90 tablet, R-1, E-Prescribe      metFORMIN (GLUCOPHAGE XR) 500 MG 24 hr tablet TAKE 1 TABLET EVERY DAY FOR 7 DAYS, 2 TABLETS EVERY DAY FOR 7 DAYS, 3 TABLETS EVERY DAY FOR 7 DAYS, THEN 4 TABLETS EVERY DAY. TAKE WITH DINNER., Disp-70 tablet, R-0, E-Prescribe      metoprolol succinate ER (TOPROL XL) 100 MG 24 hr tablet Take 1 tablet (100 mg) by mouth daily, Disp-30 tablet,  R-5, E-Prescribe      ondansetron (ZOFRAN ODT) 4 MG ODT tab Take 1 tablet (4 mg) by mouth every 8 hours as needed for nausea, Disp-10 tablet, R-0, E-Prescribe      oxyCODONE (ROXICODONE) 5 MG tablet Take 1 tablet (5 mg) by mouth 3 times daily as needed for severe pain, Disp-90 tablet, R-0, E-Prescribe      pantoprazole (PROTONIX) 40 MG EC tablet TAKE 1 TABLET BY MOUTH DAILY, Disp-90 tablet, R-2, E-Prescribe      semaglutide (OZEMPIC, 0.25 OR 0.5 MG/DOSE,) 2 MG/3ML pen Start 0.25mg/week for 4 weeks then increase, if tolerated, to 0.5mg/week., Disp-3 mL, R-2, E-Prescribe      Semaglutide, 1 MG/DOSE, (OZEMPIC) 4 MG/3ML pen Inject 1 mg Subcutaneous every 7 days for 180 days, Disp-3 mL, R-5, E-Prescribe      thin (NO BRAND SPECIFIED) lancets Use with lanceting device. To accompany: Blood Glucose Monitor Brands: per insurance., Disp-100 each, R-6, [id number] daily      Vitamin D3 (CHOLECALCIFEROL) 25 mcg (1000 units) tablet Take 1 tablet (25 mcg) by mouth daily for 270 days, Disp-90 tablet, R-2, E-Prescribe                 =================================================================      HPI  Use of : N/A        Daron Bond is a 44 year old male with a pertinent history of asthma, degeneration of intervertebral disc, OCD, chronic pain, obesity, bipolar I, spondylolisthesis of lumbar region, spinal stenosis of lumbar region, TYRELL, hypertension, and T2DM who presents to this ED via walk-in for evaluation of rectal bleeding.     Today, the patient was on a walk but had to look for his kid. He then went to the bathroom for a bowel movement and noticed bright red blood. He has had internal bleeding issues for the past 20 years which has been due to hemorrhoids or fissures from stress. He has never had bright red blood like today. Additionally, the patient has been intermittently fasting for 20 hours at a time due to diabetes.     Denies abdominal pain or any other complaints at this time.    Per chart  review,  7/8/24 The patient visited [facility] ED for vomiting. Following his initial evaluation the patient was given a dose of IV Zofran to treat his nausea. The patient's white blood cell count was slightly elevated at 14. Remainder the CBC labs were unremarkable. CMP, lipase, and magnesium were all reassuring. The patient was then discharged home with Zofran to use as needed.     --------------- MEDICAL HISTORY ---------------  PAST MEDICAL HISTORY:  Reviewed independently by me.  No past medical history on file.  Patient Active Problem List   Diagnosis    Cerebellar tonsillar ectopia (H)    Asthma, moderate persistent    Degeneration of intervertebral disc of lumbar region    OCD (obsessive compulsive disorder)    Myofascial pain syndrome    Obesity, morbid (H)    Chronic pain syndrome    Bipolar I disorder (H)    Spondylolisthesis of lumbar region    Spinal stenosis of lumbar region    TYRELL (generalized anxiety disorder)    Primary hypertension    Atypical chest pain    Diabetes mellitus, type 2 (H)       PAST SURGICAL HISTORY:  Reviewed independently by me.  Past Surgical History:   Procedure Laterality Date    ARTHROSCOPY KNEE Right     no date given    C CORTISONE INJECTION  06/08/2016    COLONOSCOPY  08/04/2015    Internal/external hemorrhoids.  Repeat at age 50    ESOPHAGOGASTRODUODENOSCOPY W/ BANDING  08/04/2015    EYE SURGERY      x 3 no dates given    HEMORRHOIDECTOMY  09/08/2015    PSYCH SVC, INTENSIVE OUTPT  2002       CURRENT MEDICATIONS:    Reviewed independently by me.  No current facility-administered medications for this encounter.    Current Outpatient Medications:     hydrocortisone, Perianal, (HYDROCORTISONE) 2.5 % cream, Place rectally 2 times daily as needed for hemorrhoids, Disp: 30 g, Rfl: 2    albuterol (PROAIR HFA/PROVENTIL HFA/VENTOLIN HFA) 108 (90 Base) MCG/ACT inhaler, INHALE 2 PUFFS BY MOUTH EVERY 6 HOURS, Disp: 8.5 g, Rfl: 4    amLODIPine (NORVASC) 5 MG tablet, TAKE 1 TABLET(5 MG)  BY MOUTH DAILY, Disp: 90 tablet, Rfl: 5    blood glucose (NO BRAND SPECIFIED) test strip, Use to test blood sugar 3 times daily or as directed. To accompany: Blood Glucose Monitor Brands: per insurance., Disp: 100 strip, Rfl: 6    blood glucose monitoring (NO BRAND SPECIFIED) meter device kit, Use to test blood sugar 1 times daily or as directed. Preferred blood glucose meter OR supplies to accompany: Blood Glucose Monitor Brands: per insurance., Disp: 1 kit, Rfl: 0    busPIRone (BUSPAR) 10 MG tablet, Take 1 tablet (10 mg) by mouth 3 times daily, Disp: 120 tablet, Rfl: 5    Cyanocobalamin (B-12) 1000 MCG SUBL, Take once weekly., Disp: 50 tablet, Rfl: 1    gabapentin (NEURONTIN) 300 MG capsule, Take 1 capsule by mouth at bedtime, Disp: , Rfl:     hydrochlorothiazide (MICROZIDE) 12.5 MG capsule, Take 1 capsule (12.5 mg) by mouth every morning, Disp: 90 capsule, Rfl: 3    ketoconazole (NIZORAL) 2 % external shampoo, Apply topically daily as needed for itching or irritation, Disp: 100 mL, Rfl: 1    losartan (COZAAR) 100 MG tablet, Take 1 tablet (100 mg) by mouth daily, Disp: 90 tablet, Rfl: 1    metFORMIN (GLUCOPHAGE XR) 500 MG 24 hr tablet, TAKE 1 TABLET EVERY DAY FOR 7 DAYS, 2 TABLETS EVERY DAY FOR 7 DAYS, 3 TABLETS EVERY DAY FOR 7 DAYS, THEN 4 TABLETS EVERY DAY. TAKE WITH DINNER. (Patient taking differently: 500 mg 2 times daily (with meals)), Disp: 70 tablet, Rfl: 0    metoprolol succinate ER (TOPROL XL) 100 MG 24 hr tablet, Take 1 tablet (100 mg) by mouth daily, Disp: 30 tablet, Rfl: 5    ondansetron (ZOFRAN ODT) 4 MG ODT tab, Take 1 tablet (4 mg) by mouth every 8 hours as needed for nausea, Disp: 10 tablet, Rfl: 0    oxyCODONE (ROXICODONE) 5 MG tablet, Take 1 tablet (5 mg) by mouth 3 times daily as needed for severe pain, Disp: 90 tablet, Rfl: 0    pantoprazole (PROTONIX) 40 MG EC tablet, TAKE 1 TABLET BY MOUTH DAILY, Disp: 90 tablet, Rfl: 2    semaglutide (OZEMPIC, 0.25 OR 0.5 MG/DOSE,) 2 MG/3ML pen, Start  0.25mg/week for 4 weeks then increase, if tolerated, to 0.5mg/week., Disp: 3 mL, Rfl: 2    [START ON 9/5/2024] Semaglutide, 1 MG/DOSE, (OZEMPIC) 4 MG/3ML pen, Inject 1 mg Subcutaneous every 7 days for 180 days, Disp: 3 mL, Rfl: 5    thin (NO BRAND SPECIFIED) lancets, Use with lanceting device. To accompany: Blood Glucose Monitor Brands: per insurance., Disp: 100 each, Rfl: 6    Vitamin D3 (CHOLECALCIFEROL) 25 mcg (1000 units) tablet, Take 1 tablet (25 mcg) by mouth daily for 270 days, Disp: 90 tablet, Rfl: 2    ALLERGIES:  Reviewed independently by me.  Allergies   Allergen Reactions    Banana     Bee Venom     Latex        FAMILY HISTORY:  Reviewed independently by me.  Family History   Problem Relation Age of Onset    Diabetes Type 2  Mother     Heart Disease Father          SOCIAL HISTORY:   Reviewed independently by me.  Social History     Socioeconomic History    Marital status: Single   Tobacco Use    Smoking status: Every Day     Current packs/day: 0.50     Types: Cigarettes     Passive exposure: Current    Smokeless tobacco: Never   Vaping Use    Vaping status: Never Used   Substance and Sexual Activity    Alcohol use: Not Currently     Comment: rarely    Drug use: Yes     Types: Oxycodone     Comment: takes oxycodone for pain mgt     Social Determinants of Health     Financial Resource Strain: Low Risk  (5/15/2024)    Financial Resource Strain     Within the past 12 months, have you or your family members you live with been unable to get utilities (heat, electricity) when it was really needed?: No   Food Insecurity: High Risk (5/15/2024)    Food Insecurity     Within the past 12 months, did you worry that your food would run out before you got money to buy more?: Yes     Within the past 12 months, did the food you bought just not last and you didn t have money to get more?: Yes   Transportation Needs: Low Risk  (5/15/2024)    Transportation Needs     Within the past 12 months, has lack of transportation  kept you from medical appointments, getting your medicines, non-medical meetings or appointments, work, or from getting things that you need?: No    Received from [facility], [facility]    Social Connections   Interpersonal Safety: Low Risk  (5/13/2024)    Interpersonal Safety     Do you feel physically and emotionally safe where you currently live?: Yes     Within the past 12 months, have you been hit, slapped, kicked or otherwise physically hurt by someone?: No     Within the past 12 months, have you been humiliated or emotionally abused in other ways by your partner or ex-partner?: No   Housing Stability: Low Risk  (5/15/2024)    Housing Stability     Do you have housing? : Yes     Are you worried about losing your housing?: No       --------------- PHYSICAL EXAM ---------------  Nursing notes and vitals independently reviewed by me.  VITALS:  Vitals:    08/06/24 1746 08/06/24 1918   BP: 134/86 122/69   Pulse: 98 87   Resp: 20    Temp: 98  F (36.7  C)    TempSrc: Temporal    SpO2: 96% 95%       PHYSICAL EXAM:    General:  alert, interactive, no distress  Eyes:  conjunctivae clear, conjugate gaze  HENT:  atraumatic, nose with no rhinorrhea, oropharynx clear  Neck:  no meningismus  Cardiovascular:  HR 80s during exam, regular rhythm, no murmurs, brisk cap refill  Chest:  no chest wall tenderness  Pulmonary:  no stridor, normal phonation, normal work of breathing, clear lungs bilaterally  Abdomen:  soft, nondistended, nontender  Rectal: Moderate sized external hemorrhoids with no bleeding or tenderness. No perianal tenderness. No gross blood with MARTINEZ.   :  no CVA tenderness.   Back:  no midline spinal tenderness  Musculoskeletal:  no pretibial edema, no calf tenderness. Gross ROM intact to joints of extremities with no obvious deformities.  Skin:  warm, dry, no rash  Neuro:  awake, alert, answers questions appropriately, follows commands, moves  all limbs  Psych:  anxious affect      --------------- RESULTS ---------------  LAB:  Reviewed and independently interpreted by me.  Results for orders placed or performed during the hospital encounter of 08/06/24   CTA Abdomen Pelvis with Contrast    Impression    IMPRESSION:    1.  No evidence of active gastrointestinal bleeding, noting that evaluation is somewhat limited due to suboptimal opacification of the arteries on arterial phase images.     2.  Colonic diverticulosis. No inflamed diverticuli.    3.  Fatty liver.    Result Value Ref Range    INR 0.95 0.85 - 1.15   Comprehensive metabolic panel   Result Value Ref Range    Sodium 139 135 - 145 mmol/L    Potassium 4.0 3.4 - 5.3 mmol/L    Carbon Dioxide (CO2) 25 22 - 29 mmol/L    Anion Gap 13 7 - 15 mmol/L    Urea Nitrogen 20.2 (H) 6.0 - 20.0 mg/dL    Creatinine 1.28 (H) 0.67 - 1.17 mg/dL    GFR Estimate 71 >60 mL/min/1.73m2    Calcium 9.3 8.8 - 10.4 mg/dL    Chloride 101 98 - 107 mmol/L    Glucose 172 (H) 70 - 99 mg/dL    Alkaline Phosphatase 91 40 - 150 U/L    AST 40 0 - 45 U/L    ALT 35 0 - 70 U/L    Protein Total 7.3 6.4 - 8.3 g/dL    Albumin 4.3 3.5 - 5.2 g/dL    Bilirubin Total 0.5 <=1.2 mg/dL   CBC with platelets and differential   Result Value Ref Range    WBC Count 11.8 (H) 4.0 - 11.0 10e3/uL    RBC Count 5.20 4.40 - 5.90 10e6/uL    Hemoglobin 15.0 13.3 - 17.7 g/dL    Hematocrit 44.7 40.0 - 53.0 %    MCV 86 78 - 100 fL    MCH 28.8 26.5 - 33.0 pg    MCHC 33.6 31.5 - 36.5 g/dL    RDW 14.2 10.0 - 15.0 %    Platelet Count 175 150 - 450 10e3/uL    % Neutrophils 74 %    % Lymphocytes 20 %    % Monocytes 5 %    % Eosinophils 1 %    % Basophils 0 %    % Immature Granulocytes 0 %    NRBCs per 100 WBC 0 <1 /100    Absolute Neutrophils 8.7 (H) 1.6 - 8.3 10e3/uL    Absolute Lymphocytes 2.3 0.8 - 5.3 10e3/uL    Absolute Monocytes 0.6 0.0 - 1.3 10e3/uL    Absolute Eosinophils 0.1 0.0 - 0.7 10e3/uL    Absolute Basophils 0.0 0.0 - 0.2 10e3/uL    Absolute Immature  Granulocytes 0.1 <=0.4 10e3/uL    Absolute NRBCs 0.0 10e3/uL   Extra Red Top Tube   Result Value Ref Range    Hold Specimen JIC    Extra Green Top (Lithium Heparin) Tube   Result Value Ref Range    Hold Specimen JIC    Result Value Ref Range    Lipase 51 13 - 60 U/L   Result Value Ref Range    CRP Inflammation 17.90 (H) <5.00 mg/L   Result Value Ref Range    Magnesium 1.9 1.7 - 2.3 mg/dL   TSH with free T4 reflex   Result Value Ref Range    TSH 1.41 0.30 - 4.20 uIU/mL   Glucose by meter   Result Value Ref Range    GLUCOSE BY METER POCT 193 (H) 70 - 99 mg/dL   Adult Type and Screen   Result Value Ref Range    ABO/RH(D) O POS     Antibody Screen Negative Negative    SPECIMEN EXPIRATION DATE [id number]        RADIOLOGY:  Reviewed and independently interpreted by me. Please see official radiology report.  Recent Results (from the past 24 hour(s))   CTA Abdomen Pelvis with Contrast    Narrative    EXAM: CTA ABDOMEN PELVIS WITH CONTRAST  LOCATION: [facility]  DATE: 8/6/2024    INDICATION: Pain. Bloody stool.   COMPARISON: CT abdomen pelvis 6/25/2021.   TECHNIQUE: CT angiogram abdomen pelvis during arterial phase of injection of IV contrast. 2D and 3D MIP reconstructions were performed by the CT technologist. Dose reduction techniques were used.  CONTRAST: Isovue 370 100ml    FINDINGS:    ANGIOGRAM ABDOMEN/PELVIS: Arteries are suboptimally opacified on arterial phase images. Within this limitation, no evidence of active gastrointestinal bleeding. No abdominal aortic aneurysm. Celiac artery and its branches are patent. Superior mesenteric   artery and its visualized branches are patent. Bilateral renal arteries are patent. Inferior mesenteric artery and its visualized branches are patent. Bilateral iliofemoral arteries are patent. Portal, splenic, and superior mesenteric veins are patent.     LOWER CHEST: Normal.    HEPATOBILIARY: Diffuse hepatic steatosis. No calcified gallstones or biliary  ductal dilation.     PANCREAS: Normal.    SPLEEN: Normal.    ADRENAL GLANDS: Normal.    KIDNEYS/BLADDER: No urinary calculi or hydronephrosis. Normal bladder.     BOWEL: Scattered noninflamed diverticuli within the ascending, descending, and sigmoid colon. Normal appendix. No bowel obstruction or inflammation. No perianal inflammation or fluid collections.     LYMPH NODES: No enlarged lymph node.     PELVIC ORGANS: Normal.    MUSCULOSKELETAL: Multilevel degenerative changes of the spine. No acute bony abnormality.       Impression    IMPRESSION:    1.  No evidence of active gastrointestinal bleeding, noting that evaluation is somewhat limited due to suboptimal opacification of the arteries on arterial phase images.     2.  Colonic diverticulosis. No inflamed diverticuli.    3.  Fatty liver.              --------------- ADDITIONAL MDM ---------------  History:  - I considered systemic symptoms of the presenting illness.  - Supplemental history from:       -- patient  - External Record(s) reviewed:       -- Inpatient/outpatient record (clinic visit 8/1/24), prior labs (blood 7/8/24), prior imaging (MRI brain 11/16/23)       -- see above ED course & MDM for further details    Workup:  - Chart documentation above includes differential considered and any EKGs or imaging independently interpreted by provider.  - emergent/severe conditions considered and evaluated for: see above differential & MDM  - In additional to work up documented, I considered the following work up:       -- MRI L-spine       -- see above ED course & MDM for further details    External Consultation:  - Discussion of management with another provider:       -- ED pharmacist re: meds       -- see above charting for additional    Complicating Factors:  - Care impacted by chronic illness:       -- see above MDM, past medical history, & problem list  - Care affected by social determinants of health:       -- see above social history       -- Access to  [facility]       -- Access to Medical Care    Disposition Considerations:  - Discharge       -- I considered escalation of care with admission to the hospital, but ultimately discharged the patient per decision making above       -- I recommended the patient continue their current prescription strength medication(s) as charted above in current medications list       -- I prescribed prescription strength medication(s) as charted above       -- I recommended over-the-counter medication(s) as charted above & in discharge instructions           I, Vel Hendrix, am serving as a scribe to document services personally performed by Dr. Domo Culver based on my observation and the provider's statements to me. I, Domo Culver MD attest that Vel Hendrix is acting in a scribe capacity, has observed my performance of the services and has documented them in accordance with my direction.      Domo Culver MD  08/06/24  Emergency Medicine  [facility] EMERGENCY DEPARTMENT  [address] [place] [id number]  [phone]  Dept: [phone]     Domo Culver MD  08/06/24 1526

## 2024-08-07 NOTE — DISCHARGE INSTRUCTIONS
Use the hemorrhoid cream to help with your hemorrhoids.    I made a referral to GI for you; they will be calling you in the next couple days. You can also call them to arrange follow up.    Follow up with your Primary Care provider in 2 days for a recheck.    Return to the Emergency Department for any persistent bleeding, new or worsening symptoms, or any other concerns.

## 2024-08-08 DIAGNOSIS — G89.4 CHRONIC PAIN SYNDROME: Primary | ICD-10-CM

## 2024-08-08 RX ORDER — GABAPENTIN 300 MG/1
300 CAPSULE ORAL AT BEDTIME
Qty: 90 CAPSULE | Refills: 2 | Status: SHIPPED | OUTPATIENT
Start: 2024-08-08 | End: 2024-09-25

## 2024-08-13 ENCOUNTER — TELEPHONE (OUTPATIENT)
Dept: GASTROENTEROLOGY | Facility: CLINIC | Age: 44
End: 2024-08-13
Payer: COMMERCIAL

## 2024-08-13 ENCOUNTER — MYC MEDICAL ADVICE (OUTPATIENT)
Dept: FAMILY MEDICINE | Facility: CLINIC | Age: 44
End: 2024-08-13
Payer: COMMERCIAL

## 2024-08-13 DIAGNOSIS — M51.369 DEGENERATION OF INTERVERTEBRAL DISC OF LUMBAR REGION: Primary | ICD-10-CM

## 2024-08-13 DIAGNOSIS — K62.5 RECTAL BLEEDING: Primary | ICD-10-CM

## 2024-08-13 NOTE — TELEPHONE ENCOUNTER
Patient had a virtual visit 8/1/24. Referral for physical therapy pended.  Cailin St CMA ............... 11:13 AM, 08/13/24

## 2024-08-13 NOTE — TELEPHONE ENCOUNTER
Reached out to patient and explained scheduling requirements pt explained he has severe back pain and longer car rides have sent him to the ER in the past. Pt will reach out to [facility] to see if they have closer locations to him.

## 2024-08-13 NOTE — TELEPHONE ENCOUNTER
"Endoscopy Scheduling Screen    Have you had a positive Covid test in the last 14 days?  No    What is your communication preference for Instructions and/or Bowel Prep?   MyChart    What insurance is in the chart?  Other:  [facility]/MEDICARE    Ordering/Referring Provider:     ZOE CENTENO      (If ordering provider performs procedure, schedule with ordering provider unless otherwise instructed. )    BMI: Estimated body mass index is 47.92 kg/m  as calculated from the following:    Height as of 7/8/24: 1.778 m (5' 10\").    Weight as of 7/8/24: 151.5 kg (334 lb).     Sedation Ordered  moderate sedation.   If patient BMI > 50 do not schedule in [facility].    If patient BMI > 45 do not schedule at [facility].    Are you taking methadone or Suboxone?  No    Have you had difficulties, pain, or discomfort during past endoscopy procedures?  No    Are you taking any prescription medications for pain 3 or more times per week?   YES, RN review needed to determine if MAC is required.  (RN Review required.)     Do you have a history of malignant hyperthermia?  No    (Females) Are you currently pregnant?   No     Have you been diagnosed or told you have pulmonary hypertension?   No    Do you have an LVAD?  No    Have you been told you have moderate to severe sleep apnea?  No    Have you been told you have COPD, asthma, or any other lung disease?  Yes     What breathing problems do you have?  Asthma     Do you use home oxygen?  No    Have your breathing problems required an ED visit or hospitalization in the last year?  No    Do you have any heart conditions?  Yes     In the past year, have you had any hospitalizations for heart related issues including cardiomyopathy, heart attack, or stent placement?  No    Do you have any implantable devices in your body (pacemaker, ICD)?  No    Do you take nitroglycerine?  No    Have you ever had or are you waiting for an organ transplant?  No. Continue scheduling, no site restrictions.    Have you had a " "stroke or transient ischemic attack (TIA aka \"mini stroke\" in the last 6 months?   No    Have you been diagnosed with or been told you have cirrhosis of the liver?   No    Are you currently on dialysis?   No    Do you need assistance transferring?   No    BMI: Estimated body mass index is 47.92 kg/m  as calculated from the following:    Height as of 7/8/24: 1.778 m (5' 10\").    Weight as of 7/8/24: 151.5 kg (334 lb).     Is patients BMI > 40 and scheduling location [facility]?  No    Do you take an injectable medication for weight loss or diabetes (excluding insulin)?  Yes, hold time can be up to 7 days. Please consult with you prescribing provider to discuss endoscopy recommendations.     Do you take the medication Naltrexone?  No    Do you take blood thinners?  No       Prep   Are you currently on dialysis or do you have chronic kidney disease?  No    Do you have a diagnosis of diabetes?  Yes (Golytely Prep)    Do you have a diagnosis of cystic fibrosis (CF)?  No    On a regular basis do you go 3 -5 days between bowel movements?  No    BMI > 40?  Yes (Extended Prep)    Preferred Pharmacy:    [facility][id number] [address]  [address] [facility] [address]  Phone: [phone] Fax: [phone]    [id number] [[place]][address] [address]  Phone: [phone] Fax: [phone]      Final Scheduling Details   Pt had to take another call. Pt requires MAC. Pt mentioned they can only go to [place]. Pt weight himself yesterday. Updated weight 324 lbs, height: 5'10\" updated BMI reading is 46.49. This is under the BMI of 48 exclusion for MAC at [facility]. However, given the patients procedure is diagnostic, [place] would be inappropriate given we have no providers capable of performing a diagnostic colonoscopy. Pt requested a call back after 1:00pm today. I will reach out to the pt and explain we need to " schedule at a different site.

## 2024-08-14 ENCOUNTER — PATIENT OUTREACH (OUTPATIENT)
Dept: CARE COORDINATION | Facility: CLINIC | Age: 44
End: 2024-08-14
Payer: COMMERCIAL

## 2024-08-14 NOTE — PROGRESS NOTES
Clinic Care Coordination Contact    Situation: [facility] reached out to pt for regular outreach.     Background: Pt is enrolled in care coordination and followed to assist with goal(s) progression. Pt has completed all goals and has had no new goals to complete for 2 months.     Assessment: [facility] outreached to pt for 2 month follow up to assess for needs. Per Chart Review pt has continued to follow the plan of care and denies any new needs or concerns.    Enrollment status: Graduated.    Plan/Recommendations:  No further outreaches at this time.  Patient will continue to follow the plan of care.  If new needs arise a new Care Coordination referral may be placed or pt can outreach directly to pt to request assistance. Pt noted they are working with [facility] weekly and therapist 2x weekly. Pt is working with disability HUB nurse to help schedule appointments as needed. No additional supports or resources needed at this time.     Information sent to pt per his request:     [facility] accepting new patients :   Dr. Caty Sage - [phone]  Dr. Zeynep Bradley - [phone]    [facility] - referral has been sent  [phone]  ADULT GI  REFERRAL - PROCEDURE ONLY - 8/13/24    [facility] - referral has been sent  [phone]  PAIN MANAGEMENT  REFERRAL - 8/1/24

## 2024-08-19 NOTE — TELEPHONE ENCOUNTER
Please sign PT order or place new order so it can be faxed to [facility].

## 2024-08-21 NOTE — TELEPHONE ENCOUNTER
Refaxed order as well as ER note from 08/06/2024 to [facility] at [phone].   MC message sent to pt informing him of this.

## 2024-08-22 NOTE — TELEPHONE ENCOUNTER
"With pt's BMI >45 he might not be able to have procedure done at [facility].   He is wanting to go somewhere in [place] or [facility].   Where would you recommend he go to have this done?    Message below came from 08/13/2024 TE:    Final Scheduling Details    Pt had to take another call. Pt requires MAC. Pt mentioned they can only go to [place]. Pt weight himself yesterday. Updated weight 324 lbs, height: 5'10\" updated BMI reading is 46.49. This is under the BMI of 48 exclusion for MAC at [facility]. However, given the patients procedure is diagnostic, [place] would be inappropriate given we have no providers capable of performing a diagnostic colonoscopy. Pt requested a call back after 1:00pm today. I will reach out to the pt and explain we need to schedule at a different site.             "

## 2024-08-27 NOTE — PROGRESS NOTES
Patient:   JOEL SUTTON            MRN: [id number]            FIN: [id number]               Age:   38 years     Sex:  Male     :  1980   Associated Diagnoses:   Back pain   Author:   Jules Garcia MD      Visit Information      Date of Service: 2018 01:00 pm  Performing Location: [facility]  Encounter#: [id number]      Primary Care Provider (PCP):  Nena King MD    NPI# [id number]      Referring Provider:  Jules Garcia MD    NPI# [id number]      Subjective   Chief complaint 3/5/2018 1:22 PM CST     Injections.  Additional information A 38 year-old who is here requesting trigger point injections.  He has been having them and has noted marked success.  His last set was on the .  He complains of pain.  Numb in the left lower back.  Left and right ribs and then in the flank area of the right side of the back.  He said they hurt when he pushes on them.   Patient knows he has a lot of anxiety.  He has been working with it.  In fact, he is here with a dog and considers it his service dog although I note it is not trained as one.    .        Health Status   Allergies:    Allergic Reactions (Selected)  Severity Not Documented  Bee Stings (No reactions were documented)  Latex (No reactions were documented)   Medications:  (Selected)   Prescriptions  Prescribed  ProAir HFA 90 mcg/inh inhalation aerosol: 2 puff(s), inh, qid, PRN: as needed for wheezing, # 1 EA, 2 Refill(s), Type: Maintenance, Pharmacy: [facility]  Qvar 80 mcg/inh inhalation aerosol: 1 puff(s), inh, bid, # 1 EA, 3 Refill(s), Type: Maintenance, Pharmacy: [facility], 1 puff(s) inh bid  cyclobenzaprine 10 mg oral tablet: 1 tab(s) ( 10 mg ), PO, TID, PRN: for spasm, # 30 tab(s), 0 Refill(s), Type: Maintenance, Pharmacy: [facility], 1 tab(s) po tid,PRN:for spasm  diazePAM 5 mg oral tablet: See Instructions, Instructions: TAKE ONE TABLET BY MOUTH  THREE TIMES A DAY AS NEEDED FOR ANXIETY, # 10 tab(s), 0 Refill(s), Type: Soft Stop, Pharmacy: [facility], TAKE ONE TABLET BY MOUTH THREE TIMES A DAY AS NEEDED FOR ANXIETY...  gabapentin 300 mg oral capsule: See Instructions, Instructions: 1 cap(s) po qd on day 1  1 cap po bid on day 2  then 1 cap po tid, # 90 EA, 1 Refill(s), Type: Maintenance, Pharmacy: [facility], 1 cap(s) po qd on day 1; 1 cap po bid on day 2; then 1 cap po tid...  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  Anxiety / 67632443 / Confirmed  Obese / 278.00 / Probable  Tobacco user / 884905480 / Probable  Smoking / 270555261 / Confirmed  Low back pain / 810441514 / Confirmed  Chronic insomnia / 031653963 / Confirmed  Myalgia / 238607532 / Confirmed  Trochanteric bursitis, right hip / 86734991 / Confirmed      Objective   Vital Signs   3/5/2018 1:22 PM CST Peripheral Pulse Rate 74 bpm    Systolic Blood Pressure 122 mmHg    Diastolic Blood Pressure 74 mmHg    Mean Arterial Pressure 90 mmHg      Measurements from flowsheet : Measurements   3/5/2018 1:22 PM CST Height Measured 72.8 in    Weight Measured 281 lb    BSA 2.56 m2    Body Mass Index 37.27 kg/m2  HI      General:  Alert and oriented, No acute distress.    Psychiatric:  Cooperative, Appropriate mood & affect, Normal judgment.       Impression and Plan   Assessment and Plan:          Diagnosis: Back pain (TSI54-LZ M54.9).         Course: He does have trigger points that he able to consistently identify.   There are no skin changes around those areas.  After discussion with him, I injected 2 cc's of lidocaine into each point  total of 4.  He seemed to tolerate this well.  He said it did seem to relieve the local tenderness in those areas.   I encouraged him to follow-up with his regular physician to discuss ongoing care about his pain.     .   tinnitus

## 2024-09-08 DIAGNOSIS — E11.9 TYPE 2 DIABETES MELLITUS WITHOUT COMPLICATION, WITHOUT LONG-TERM CURRENT USE OF INSULIN (H): ICD-10-CM

## 2024-09-09 ENCOUNTER — MYC MEDICAL ADVICE (OUTPATIENT)
Dept: FAMILY MEDICINE | Facility: CLINIC | Age: 44
End: 2024-09-09
Payer: COMMERCIAL

## 2024-09-09 DIAGNOSIS — K21.00 GASTROESOPHAGEAL REFLUX DISEASE WITH ESOPHAGITIS, UNSPECIFIED WHETHER HEMORRHAGE: ICD-10-CM

## 2024-09-09 RX ORDER — PANTOPRAZOLE SODIUM 40 MG/1
40 TABLET, DELAYED RELEASE ORAL DAILY
Qty: 90 TABLET | Refills: 2 | OUTPATIENT
Start: 2024-09-09

## 2024-09-09 RX ORDER — METFORMIN HCL 500 MG
1000 TABLET, EXTENDED RELEASE 24 HR ORAL 2 TIMES DAILY WITH MEALS
Qty: 120 TABLET | Refills: 5 | Status: SHIPPED | OUTPATIENT
Start: 2024-09-09

## 2024-09-10 ENCOUNTER — TELEPHONE (OUTPATIENT)
Dept: FAMILY MEDICINE | Facility: CLINIC | Age: 44
End: 2024-09-10
Payer: COMMERCIAL

## 2024-09-10 DIAGNOSIS — M51.369 DEGENERATION OF INTERVERTEBRAL DISC OF LUMBAR REGION: ICD-10-CM

## 2024-09-10 RX ORDER — OXYCODONE HYDROCHLORIDE 5 MG/1
5 TABLET ORAL 3 TIMES DAILY PRN
Qty: 90 TABLET | Refills: 0 | Status: SHIPPED | OUTPATIENT
Start: 2024-09-10

## 2024-09-10 NOTE — TELEPHONE ENCOUNTER
Order/Referral Request    Who is requesting: patient    Orders being requested: steroid injection for back pain    Reason service is needed/diagnosis: back pain    When are orders needed by: as soon as possible    Has this been discussed with Provider: No    Does patient have a preference on a Group/Provider/Facility?     Does patient have an appointment scheduled?: No    Where to send orders: Place orders within Epic    Could we send this information to you in [facility] or would you prefer to receive a phone call?:   Patient would prefer a phone call   Okay to leave a detailed message?: Yes at Cell number on file:    Telephone Information:   Mobile [phone]

## 2024-09-12 DIAGNOSIS — E11.9 TYPE 2 DIABETES, HBA1C GOAL < 7% (H): Primary | ICD-10-CM

## 2024-09-12 DIAGNOSIS — M48.062 SPINAL STENOSIS OF LUMBAR REGION WITH NEUROGENIC CLAUDICATION: Primary | ICD-10-CM

## 2024-09-12 NOTE — TELEPHONE ENCOUNTER
I have changed the order to keep it in the F system.  He can request [facility] when they call him

## 2024-09-17 ENCOUNTER — TELEPHONE (OUTPATIENT)
Dept: PALLIATIVE MEDICINE | Facility: OTHER | Age: 44
End: 2024-09-17
Payer: COMMERCIAL

## 2024-09-17 DIAGNOSIS — R58 INTERNAL BLEEDING: Primary | ICD-10-CM

## 2024-09-17 NOTE — TELEPHONE ENCOUNTER
"Screening Questions for Radiology Injections:    Injection to be done at which interventional clinic site? [facility]    If choosing [facility] for location, please inform patient:  \"[facility] is a Hospital based clinic. Before your visit, you should check with your insurance about how it covers the charges for facility services in a hospital-based clinic.     Procedure ordered by Mata Hearn MD in [facility] FP/IM/PEDS     Procedure ordered? right L5 TFESI   Transforaminal Cervical THOMAS - Send to [facility] - No [facility] Site providers perform this procedure    What insurance would patient like us to bill for this procedure? UC[employer]  IF SCHEDULING IN [facility] PLEASE SCHEDULE AT LEAST 7-10 BUSINESS DAYS OUT SO A PA CAN BE OBTAINED  Worker's comp or MVA (motor vehicle accident) -Any injection DO NOT SCHEDULE and route to Siva Gomez.    [employer] insurance - For ALL INJECTIONS DO NOT SCHEDULE and route to Laura Cuellar.     ALL BCBS, [employer] and [employer] CIGNA - DO NOT SCHEDULE and route to Laura Cuellar  MEDICA- ALL INJECTIONS- route to [facility]    Is patient scheduled at [facility]? NO   If YES, route every encounter to [facility] SPINE CENTER CARE NAVIGATION POOL [[phone]]    Is an  needed? No     Patient has a  home? (Review Grid) YES: Informed     Any chance of pregnancy? Not Applicable   If YES, do NOT schedule and route to RN jen  - Dr. Ndiaye route to Taylor De Jesus and PM&R Nurse  [[id number]]      Is patient actively being treated for cancer or immunocompromised? No  If YES, do NOT schedule and route to RN pool/ Dr. Ndiaye's Team    Does the patient have a bleeding or clotting disorder? No, but pt stated that he has some minimal internal bleeding due to stress. Please review  If YES, okay to schedule AND route to PRUDENCIO alvarado / Dr. Ndiaye's Team   (For any patients with platelet count <100, RN must forward to provider)    Is patient taking any " Blood Thinners OR Antiplatelet medication?  No   If hold needed, do NOT schedule, route to RN pool/ Dr. Ndiaye's Team  Examples:   Blood Thinners: (Coumadin, Warfarin, Jantoven, Pradaxa, Xarelto, Eliquis, Edoxaban, Enoxaparin, Lovenox, Heparin, Arixtra, Fondaparinux or Fragmin)  Antiplatelet Medications: (Plavix, Brilinta or Effient)     Is patient taking any aspirin products (includes Excedrin and Fiorinal)? No   If yes route to RN pool/ Dr. Ndiaye's Team - Do not schedule    Is patient taking any GLP-1 Antagonist (hold needed for sedation patients only) Yes - Ozempic   (semaglutide (Ozempic, Wegovy), dulaglutide (Trulicity), exenatide ER (Bydureon), tirzepatide (Mounjaro), Liraglutide (Saxenda, Victoza), semaglutide (Rybelsus), Terzepatide (Zepbound)  If YES, okay to schedule AND route to RN nurse / Dr. Ndiaye's Team      Any allergies to contrast dye, iodine, shellfish, or numbing and steroid medications? No  If YES, schedule and add allergy information to appointment notes AND route to the RN pool/ Dr. Ndiaye's Team  If THOMAS and Contrast Dye / Iodine Allergy? DO NOT SCHEDULE, route to RN pool/ Dr. Ndiaye's Team  Allergies: Banana, Bee venom, and Latex     Does patient have an active infection or treated for one within the past week? No  Is patient currently taking any antibiotics or steroid medications?  No   For patients on chronic, preventative, or prophylactic antibiotics, procedures may be scheduled.   For patients on antibiotics for active or recent infection, schedule 4 days after completed.  For patients on steroid medications, schedule 4 days after completed.     Has the patient had a flu shot or any other vaccinations within the past 7 days? No  If yes, explain that for the vaccine to work best they need to:     wait 1 week before and 1 week after getting any Vaccine  wait 1 week before and 2 weeks after getting any Covid Vaccine   If patient has concerns about the timing, send to RN pool/ Dr. Ndiaye's  Team    Does patient have an MRI/CT?  YES: 11/16/23 Include Date and Check Procedure Scheduling Grid to see if required.  Was the MRI/CT done within the last 3 years?  Yes   If no route to RN Pool/ Dr. Faiths Team  If yes, where was the MRI/CT done?  [facility]  Refer to PACS Transmissions list for approved external locations and route to RN Pool High Priority/ Dr. Faiths Team  If MRI was not done at approved external location do NOT schedule and route to RN pool/ Dr. Jackson Team    If patient has an imaging disc, the injection MAY be scheduled but patient must bring disc to appt or appt will be cancelled.    Is patient able to transfer to a procedure table with minimal or no assistance? Yes   If no, do NOT schedule and route to RN Pool/ Dr. Ndiaye's Team    Procedure Specific Instructions:  If celiac plexus block, informed patient NPO for 6 hours and that it is okay to take medications with sips of water, especially blood pressure medications Not Applicable       If this is for a cervical procedure, informed patient that aspirin needs to be held for 6 days.   Not Applicable    Sedation, If Sedation is ordered for any procedure, patient must be NPO for 6 hours prior to procedure Not Applicable    If IV needed:  Do not schedule procedures requiring IV placement in the first appointment of the day or first appointment after lunch. Do NOT schedule at 0745, 0815 or 1245.     Instructed patient to arrive 30 minutes early for IV start if required. (Check Procedure Scheduling Grid)  Not Applicable    Reminders:  If you are started on any steroids or antibiotics between now and your appointment, you must contact us because the procedure may need to be cancelled.  Yes    As a reminder, receiving steroids can decrease your body's ability to fight infection.   Would you still like to move forward with scheduling the injection?  Yes    IV Sedation is not provided for procedures. If oral anti-anxiety medication is needed, the  patient should request this from their referring provider.    Instruct patient to arrive as directed prior to the scheduled appointment time:  If IV needed 30 minutes before appointment time     For patients 85 or older we recommend having an adult stay w/ them for the remainder of the day.     If the patient is Diabetic, remind them to bring their glucometer.      Does the patient have any questions?  NO  Kaye Lewis  [facility]

## 2024-09-17 NOTE — TELEPHONE ENCOUNTER
Patient requesting to be scheduled for:Lumbar epidural steroid injection Transforaminal      The following red flags noted on patient screening:   Clotting / Bleeding:  No, but pt stated that he has some minimal internal bleeding due to stress. Please review     Latest Reference Range & Units 08/06/24 18:18   WBC 4.0 - 11.0 10e3/uL 11.8 (H)   Hemoglobin 13.3 - 17.7 g/dL 15.0   Hematocrit 40.0 - 53.0 % 44.7   Platelet Count 150 - 450 10e3/uL 175   RBC Count 4.40 - 5.90 10e6/uL 5.20   MCV 78 - 100 fL 86   MCH 26.5 - 33.0 pg 28.8   MCHC 31.5 - 36.5 g/dL 33.6   RDW 10.0 - 15.0 % 14.2   (H): Data is abnormally high    Injection imaging orders Not Placed    Scheduled:  10/10/2024    Routing:  flag information to provider for review.    Lisa Smith RN

## 2024-09-18 ENCOUNTER — VIRTUAL VISIT (OUTPATIENT)
Dept: EDUCATION SERVICES | Facility: CLINIC | Age: 44
End: 2024-09-18
Payer: COMMERCIAL

## 2024-09-18 DIAGNOSIS — E11.9 TYPE 2 DIABETES MELLITUS WITHOUT COMPLICATION, WITHOUT LONG-TERM CURRENT USE OF INSULIN (H): Primary | ICD-10-CM

## 2024-09-18 PROCEDURE — G0108 DIAB MANAGE TRN  PER INDIV: HCPCS | Mod: 93 | Performed by: DIETITIAN, REGISTERED

## 2024-09-18 NOTE — Clinical Note
Daron Thomas has an in person appointment with you on 10/18. Would you please have his A1C checked at that time, the order is in.   Thank you, Romina Boyle

## 2024-09-18 NOTE — TELEPHONE ENCOUNTER
Ok to proceed   Will place an order for CBC to be obtained closer to the procedure date: 10/10/24    Please assist in obtaining an updated CBC closer to the timeframe of upcoming procedure scheduled for 10/10/24  Please obtain the week of 10/7/24 as schedule permits

## 2024-09-18 NOTE — PATIENT INSTRUCTIONS
Test blood sugar once daily: either in the morning before eating, with goal to be under 130 and two hours after one meal per day,  with goal to be under 180.  Inject Ozempic 2.0 mg weekly.   Continue to take Metformin  mg: two tablets daily.   Aim to have 1/2 of your plate filled with vegetables( all vegetables except: corn, peas, potatoes), 1/4 lean protein and 1/4 carbohydrate containing foods: rice, pasta, bread, potatoes.   Add in a serving of fruit between meals: a piece of fruit the size of a tennis ball or 1 cup cut up melon or berries or 1/2 arslan or 1/2 banana.   Have A1C checked at your weight management appointment scheduled for 10/18/24.

## 2024-09-18 NOTE — LETTER
9/18/2024         RE: Daron Bond  [address]  [place], [place] [address]        Dear Colleague,    Thank you for referring your patient, Daron Bond, to the [facility]. Please see a copy of my visit note below.    Diabetes Education Follow-up/ 37 minutes  Telephone visit:  Location of patient during call: home  Location of provider during call: [facility]    Subjective/Objective:    Follow up on BG log and goals with Daron Bond. Last date of communication was: 7/31/24.    Diabetes is being managed with Lifestyle (diet/activity), Diabetes Medications   Diabetes Medication(s)       Biguanides       metFORMIN (GLUCOPHAGE XR) 500 MG 24 hr tablet Take 2 tablets (1,000 mg) by mouth 2 times daily (with meals).       Incretin Mimetic Agents       Semaglutide, 1 MG/DOSE, (OZEMPIC) 4 MG/3ML pen Inject 1 mg Subcutaneous every 7 days for 180 days     Patient not taking: Reported on 9/18/2024     Semaglutide, 2 MG/DOSE, (OZEMPIC) 8 MG/3ML pen Inject 2 mg subcutaneously every 7 days.            BG Log:   FBS: 108  PP: 118,114,131,109        Assessment:    Documented BG readings given are meeting glycemic goals. Daron increased Ozempic to 2 mg weekly on 9/13/24, he is also taking Metformin XR 1000 mg, he was not able to tolerate the higher dose of 2000 mg. Weight has decreased from 346# in June 2024 to most recent reported weight os 317#. Daron expresses high level of stress with his health issues, he is sleeping poorly due to back pain. Daron meets with a therapist bi-weekly. Daron will follow up with the weight management program in October.  Nutrition and exercise were not addressed today due to high stress noted from Daron from his health concerns. He is interested in using a CGM system, writer will check on coverage.       Plan/Response:  See Patient Instructions for co-developed, patient-stated behavior change goals.  Test blood sugar once daily: either in the  morning before eating, with goal to be under 130 and two hours after one meal per day,  with goal to be under 180.  Inject Ozempic 2.0 mg weekly.   Continue to take Metformin  mg: two tablets daily.   Aim to have 1/2 of your plate filled with vegetables( all vegetables except: corn, peas, potatoes), 1/4 lean protein and 1/4 carbohydrate containing foods: rice, pasta, bread, potatoes.   Add in a serving of fruit between meals: a piece of fruit the size of a tennis ball or 1 cup cut up melon or berries or 1/2 arslan or 1/2 banana.   Have A1C checked at your weight management appointment scheduled for 10/18/24.             Any diabetes medication dose changes were made via the CDE Protocol and Collaborative Practice Agreement with the patient's . A copy of this encounter was shared with the provider.

## 2024-09-18 NOTE — PROGRESS NOTES
Diabetes Education Follow-up/ 37 minutes  Telephone visit:  Location of patient during call: home  Location of provider during call: [facility]    Subjective/Objective:    Follow up on BG log and goals with Daron HAND Varun. Last date of communication was: 7/31/24.    Diabetes is being managed with Lifestyle (diet/activity), Diabetes Medications   Diabetes Medication(s)       Biguanides       metFORMIN (GLUCOPHAGE XR) 500 MG 24 hr tablet Take 2 tablets (1,000 mg) by mouth 2 times daily (with meals).       Incretin Mimetic Agents       Semaglutide, 1 MG/DOSE, (OZEMPIC) 4 MG/3ML pen Inject 1 mg Subcutaneous every 7 days for 180 days     Patient not taking: Reported on 9/18/2024     Semaglutide, 2 MG/DOSE, (OZEMPIC) 8 MG/3ML pen Inject 2 mg subcutaneously every 7 days.            BG Log:   FBS: 108  PP: 118,114,131,109        Assessment:    Documented BG readings given are meeting glycemic goals. Daron increased Ozempic to 2 mg weekly on 9/13/24, he is also taking Metformin XR 1000 mg, he was not able to tolerate the higher dose of 2000 mg. Weight has decreased from 346# in June 2024 to most recent reported weight os 317#. Daron expresses high level of stress with his health issues, he is sleeping poorly due to back pain. Daron meets with a therapist bi-weekly. Daron will follow up with the weight management program in October.  Nutrition and exercise were not addressed today due to high stress noted from Daron from his health concerns. He is interested in using a CGM system, writer will check on coverage.       Plan/Response:  See Patient Instructions for co-developed, patient-stated behavior change goals.  Test blood sugar once daily: either in the morning before eating, with goal to be under 130 and two hours after one meal per day,  with goal to be under 180.  Inject Ozempic 2.0 mg weekly.   Continue to take Metformin  mg: two tablets daily.   Aim to have 1/2 of your plate filled with vegetables( all  vegetables except: corn, peas, potatoes), 1/4 lean protein and 1/4 carbohydrate containing foods: rice, pasta, bread, potatoes.   Add in a serving of fruit between meals: a piece of fruit the size of a tennis ball or 1 cup cut up melon or berries or 1/2 arslan or 1/2 banana.   Have A1C checked at your weight management appointment scheduled for 10/18/24.             Any diabetes medication dose changes were made via the CDE Protocol and Collaborative Practice Agreement with the patient's . A copy of this encounter was shared with the provider.

## 2024-09-19 ENCOUNTER — MYC MEDICAL ADVICE (OUTPATIENT)
Dept: EDUCATION SERVICES | Facility: CLINIC | Age: 44
End: 2024-09-19
Payer: COMMERCIAL

## 2024-09-19 DIAGNOSIS — E11.9 TYPE 2 DIABETES MELLITUS WITHOUT COMPLICATION, WITHOUT LONG-TERM CURRENT USE OF INSULIN (H): Primary | ICD-10-CM

## 2024-09-19 DIAGNOSIS — J45.40 MODERATE PERSISTENT ASTHMA, UNSPECIFIED WHETHER COMPLICATED: ICD-10-CM

## 2024-09-19 RX ORDER — ALBUTEROL SULFATE 90 UG/1
AEROSOL, METERED RESPIRATORY (INHALATION)
Qty: 8.5 G | Refills: 4 | Status: SHIPPED | OUTPATIENT
Start: 2024-09-19

## 2024-09-20 ENCOUNTER — TELEPHONE (OUTPATIENT)
Dept: EDUCATION SERVICES | Facility: CLINIC | Age: 44
End: 2024-09-20
Payer: COMMERCIAL

## 2024-09-23 RX ORDER — BLOOD-GLUCOSE SENSOR
1 EACH MISCELLANEOUS CONTINUOUS
Qty: 2 EACH | Refills: 11 | Status: SHIPPED | OUTPATIENT
Start: 2024-09-23

## 2024-09-25 ENCOUNTER — VIRTUAL VISIT (OUTPATIENT)
Dept: FAMILY MEDICINE | Facility: CLINIC | Age: 44
End: 2024-09-25
Payer: COMMERCIAL

## 2024-09-25 DIAGNOSIS — E66.01 MORBID OBESITY (H): Primary | ICD-10-CM

## 2024-09-25 DIAGNOSIS — R36.1 HEMATOSPERMIA: ICD-10-CM

## 2024-09-25 DIAGNOSIS — F41.9 ANXIETY: ICD-10-CM

## 2024-09-25 DIAGNOSIS — L21.9 SEBORRHEIC DERMATITIS OF SCALP: ICD-10-CM

## 2024-09-25 DIAGNOSIS — F41.1 GAD (GENERALIZED ANXIETY DISORDER): ICD-10-CM

## 2024-09-25 DIAGNOSIS — E11.9 TYPE 2 DIABETES MELLITUS WITHOUT COMPLICATION, WITHOUT LONG-TERM CURRENT USE OF INSULIN (H): ICD-10-CM

## 2024-09-25 DIAGNOSIS — M48.062 SPINAL STENOSIS OF LUMBAR REGION WITH NEUROGENIC CLAUDICATION: ICD-10-CM

## 2024-09-25 DIAGNOSIS — G89.4 CHRONIC PAIN SYNDROME: ICD-10-CM

## 2024-09-25 PROCEDURE — 99214 OFFICE O/P EST MOD 30 MIN: CPT | Mod: 95 | Performed by: FAMILY MEDICINE

## 2024-09-25 RX ORDER — GABAPENTIN 300 MG/1
300 CAPSULE ORAL 3 TIMES DAILY
Qty: 90 CAPSULE | Refills: 2 | Status: SHIPPED | OUTPATIENT
Start: 2024-09-25

## 2024-09-25 RX ORDER — ONDANSETRON 4 MG/1
4 TABLET, ORALLY DISINTEGRATING ORAL EVERY 8 HOURS PRN
Qty: 10 TABLET | Refills: 1 | Status: SHIPPED | OUTPATIENT
Start: 2024-09-25

## 2024-09-25 RX ORDER — KETOCONAZOLE 20 MG/ML
SHAMPOO TOPICAL DAILY PRN
Qty: 100 ML | Refills: 1 | Status: SHIPPED | OUTPATIENT
Start: 2024-09-25

## 2024-09-25 RX ORDER — BUSPIRONE HYDROCHLORIDE 15 MG/1
15 TABLET ORAL 3 TIMES DAILY
Qty: 90 TABLET | Refills: 5 | Status: SHIPPED | OUTPATIENT
Start: 2024-09-25

## 2024-09-25 NOTE — PROGRESS NOTES
Daron is a 44 year old who is being evaluated via a billable video visit.    How would you like to obtain your AVS? MyChart  If the video visit is dropped, the invitation should be resent by: Text to cell phone: [phone]  Will anyone else be joining your video visit? No      Assessment & Plan     Seborrheic dermatitis of scalp  Progressing as expected, continue with topical shampoo  - ketoconazole (NIZORAL) 2 % external shampoo; Apply topically daily as needed for itching or irritation.    Morbid obesity (H)  He is continuing with follow-up through weight loss clinic.  He has lost about 40 pounds using semaglutide and dietary changes.  He is congratulated on his weight loss    Chronic pain syndrome  Increase gabapentin to 300 mg 3 times daily.  He has an injection scheduled in the coming weeks.  - gabapentin (NEURONTIN) 300 MG capsule; Take 1 capsule (300 mg) by mouth 3 times daily.    Type 2 diabetes mellitus without complication, without long-term current use of insulin (H)  Improving with weight loss and semaglutide, continue current dose of metformin    TYRELL (generalized anxiety disorder)  He continues to have trouble with his anxiety.  This is associated with nausea.  Ondansetron refilled.  I have increased buspirone to 15 mg 3 times daily  - ondansetron (ZOFRAN ODT) 4 MG ODT tab; Take 1 tablet (4 mg) by mouth every 8 hours as needed for nausea.    Spinal stenosis of lumbar region with neurogenic claudication  I advised continued follow-up with his pain management symptoms.    Anxiety  - busPIRone (BUSPAR) 15 MG tablet; Take 1 tablet (15 mg) by mouth 3 times daily.    Hematospermia  I have ordered a urinalysis.  - UA Macroscopic with reflex to Microscopic and Culture - Lab Collect; Future              Subjective   Daron is a 44 year old, presenting for the following health issues:  Medication Follow-up (Discuss medications, diabetes.   States he doesn't need refills.   Verbal consent given for video  visit)      9/25/2024    11:57 AM   Additional Questions   Roomed by Fariba JOSUE CMA   Accompanied by Self     Via the Health Maintenance questionnaire, the patient has reported the following services have been completed -Eye Exam: Pt forgot name of place 2024-07-15, this information has been sent to the abstraction team.    History of Present Illness       Back Pain:  He presents for follow up of back pain. Patient's back pain is a chronic problem.  Location of back pain:  Right lower back, left lower back, right middle of back, left middle of back, right side of waist and left side of waist  Description of back pain: dull ache, gnawing, shooting and stabbing  Back pain spreads: right buttocks, left buttocks, right thigh, left thigh, right knee, left knee, right foot and left foot    Since patient first noticed back pain, pain is: unchanged  Does back pain interfere with his job:  Not applicable       Diabetes:   He presents for follow up of diabetes.  He is checking home blood glucose three times daily.   He checks blood glucose before and after meals.  Blood glucose is sometimes over 200 and never under 70.  When his blood glucose is low, the patient is asymptomatic for confusion, blurred vision, lethargy and reports not feeling dizzy, shaky, or weak.  He is concerned about blood sugar frequently over 200.   He is having numbness in feet.  The patient has not had a diabetic eye exam in the last 12 months.          He eats 2-3 servings of fruits and vegetables daily.He consumes 0 sweetened beverage(s) daily.He exercises with enough effort to increase his heart rate 9 or less minutes per day.  He exercises with enough effort to increase his heart rate 3 or less days per week.   He is taking medications regularly.     He has been losing weight with semaglutide.  He is attending a weight loss clinic.  Blood glucose is under better control.  Back pain and anxiety are still problematic.  He has an THOMAS scheduled in the near  future.  He is having trouble with sleeping.    He had two episodes of hematospermia            Objective           Vitals:  No vitals were obtained today due to virtual visit.    Physical Exam   GENERAL: alert and no distress  EYES: Eyes grossly normal to inspection.  No discharge or erythema, or obvious scleral/conjunctival abnormalities.  RESP: No audible wheeze, cough, or visible cyanosis.    SKIN: Visible skin clear. No significant rash, abnormal pigmentation or lesions.  NEURO: Cranial nerves grossly intact.  Mentation and speech appropriate for age.  PSYCH: Appropriate affect, tone, and pace of words          Video-Visit Details    Type of service:  Video Visit   Originating Location (pt. Location): Home    Distant Location (provider location):  On-site  Platform used for Video Visit: Mery  Signed Electronically by: Mata Hearn MD

## 2024-09-29 SDOH — HEALTH STABILITY: PHYSICAL HEALTH: ON AVERAGE, HOW MANY DAYS PER WEEK DO YOU ENGAGE IN MODERATE TO STRENUOUS EXERCISE (LIKE A BRISK WALK)?: 2 DAYS

## 2024-09-29 SDOH — HEALTH STABILITY: PHYSICAL HEALTH: ON AVERAGE, HOW MANY MINUTES DO YOU ENGAGE IN EXERCISE AT THIS LEVEL?: 10 MIN

## 2024-09-29 ASSESSMENT — PATIENT HEALTH QUESTIONNAIRE - PHQ9: SUM OF ALL RESPONSES TO PHQ QUESTIONS 1-9: 19

## 2024-09-29 ASSESSMENT — SOCIAL DETERMINANTS OF HEALTH (SDOH): HOW OFTEN DO YOU GET TOGETHER WITH FRIENDS OR RELATIVES?: NEVER

## 2024-09-30 ENCOUNTER — TELEPHONE (OUTPATIENT)
Dept: FAMILY MEDICINE | Facility: CLINIC | Age: 44
End: 2024-09-30

## 2024-09-30 ENCOUNTER — ANCILLARY PROCEDURE (OUTPATIENT)
Dept: GENERAL RADIOLOGY | Facility: CLINIC | Age: 44
End: 2024-09-30
Attending: PHYSICIAN ASSISTANT
Payer: COMMERCIAL

## 2024-09-30 ENCOUNTER — OFFICE VISIT (OUTPATIENT)
Dept: FAMILY MEDICINE | Facility: CLINIC | Age: 44
End: 2024-09-30
Payer: COMMERCIAL

## 2024-09-30 VITALS
RESPIRATION RATE: 16 BRPM | BODY MASS INDEX: 42.39 KG/M2 | SYSTOLIC BLOOD PRESSURE: 135 MMHG | WEIGHT: 313 LBS | DIASTOLIC BLOOD PRESSURE: 88 MMHG | TEMPERATURE: 98.2 F | HEIGHT: 72 IN | HEART RATE: 82 BPM | OXYGEN SATURATION: 99 %

## 2024-09-30 DIAGNOSIS — G89.29 CHRONIC PAIN OF BOTH SHOULDERS: Primary | ICD-10-CM

## 2024-09-30 DIAGNOSIS — F42.9 OBSESSIVE-COMPULSIVE DISORDER, UNSPECIFIED TYPE: ICD-10-CM

## 2024-09-30 DIAGNOSIS — Q04.8 CEREBELLAR TONSILLAR ECTOPIA (H): ICD-10-CM

## 2024-09-30 DIAGNOSIS — M25.511 CHRONIC PAIN OF BOTH SHOULDERS: Primary | ICD-10-CM

## 2024-09-30 DIAGNOSIS — F31.9 BIPOLAR I DISORDER (H): ICD-10-CM

## 2024-09-30 DIAGNOSIS — F11.20 CONTINUOUS OPIOID DEPENDENCE (H): ICD-10-CM

## 2024-09-30 DIAGNOSIS — R69 MULTIPLE MEDICAL PROBLEMS: ICD-10-CM

## 2024-09-30 DIAGNOSIS — R41.3 MEMORY LOSS: ICD-10-CM

## 2024-09-30 DIAGNOSIS — M54.2 NECK PAIN: ICD-10-CM

## 2024-09-30 DIAGNOSIS — M25.512 CHRONIC PAIN OF BOTH SHOULDERS: Primary | ICD-10-CM

## 2024-09-30 DIAGNOSIS — E66.01 OBESITY, MORBID (H): ICD-10-CM

## 2024-09-30 DIAGNOSIS — F41.1 GAD (GENERALIZED ANXIETY DISORDER): ICD-10-CM

## 2024-09-30 DIAGNOSIS — Z23 ENCOUNTER FOR IMMUNIZATION: ICD-10-CM

## 2024-09-30 DIAGNOSIS — E11.9 TYPE 2 DIABETES MELLITUS WITHOUT COMPLICATION, WITHOUT LONG-TERM CURRENT USE OF INSULIN (H): ICD-10-CM

## 2024-09-30 LAB
ALBUMIN UR-MCNC: NEGATIVE MG/DL
APPEARANCE UR: CLEAR
BASOPHILS # BLD AUTO: 0 10E3/UL (ref 0–0.2)
BASOPHILS NFR BLD AUTO: 0 %
BILIRUB UR QL STRIP: NEGATIVE
COLOR UR AUTO: YELLOW
EOSINOPHIL # BLD AUTO: 0.1 10E3/UL (ref 0–0.7)
EOSINOPHIL NFR BLD AUTO: 1 %
ERYTHROCYTE [DISTWIDTH] IN BLOOD BY AUTOMATED COUNT: 13.8 % (ref 10–15)
EST. AVERAGE GLUCOSE BLD GHB EST-MCNC: 111 MG/DL
GLUCOSE UR STRIP-MCNC: NEGATIVE MG/DL
HBA1C MFR BLD: 5.5 % (ref 0–5.6)
HCT VFR BLD AUTO: 41.8 % (ref 40–53)
HGB BLD-MCNC: 14.4 G/DL (ref 13.3–17.7)
HGB UR QL STRIP: NEGATIVE
IMM GRANULOCYTES # BLD: 0 10E3/UL
IMM GRANULOCYTES NFR BLD: 0 %
KETONES UR STRIP-MCNC: NEGATIVE MG/DL
LEUKOCYTE ESTERASE UR QL STRIP: NEGATIVE
LYMPHOCYTES # BLD AUTO: 2.5 10E3/UL (ref 0.8–5.3)
LYMPHOCYTES NFR BLD AUTO: 25 %
MCH RBC QN AUTO: 28.9 PG (ref 26.5–33)
MCHC RBC AUTO-ENTMCNC: 34.4 G/DL (ref 31.5–36.5)
MCV RBC AUTO: 84 FL (ref 78–100)
MONOCYTES # BLD AUTO: 0.6 10E3/UL (ref 0–1.3)
MONOCYTES NFR BLD AUTO: 6 %
NEUTROPHILS # BLD AUTO: 6.6 10E3/UL (ref 1.6–8.3)
NEUTROPHILS NFR BLD AUTO: 67 %
NITRATE UR QL: NEGATIVE
PH UR STRIP: 5.5 [PH] (ref 5–8)
PLATELET # BLD AUTO: 167 10E3/UL (ref 150–450)
RBC # BLD AUTO: 4.99 10E6/UL (ref 4.4–5.9)
SP GR UR STRIP: 1.02 (ref 1–1.03)
UROBILINOGEN UR STRIP-ACNC: 0.2 E.U./DL
WBC # BLD AUTO: 9.9 10E3/UL (ref 4–11)

## 2024-09-30 PROCEDURE — 72040 X-RAY EXAM NECK SPINE 2-3 VW: CPT | Mod: TC | Performed by: STUDENT IN AN ORGANIZED HEALTH CARE EDUCATION/TRAINING PROGRAM

## 2024-09-30 PROCEDURE — 83036 HEMOGLOBIN GLYCOSYLATED A1C: CPT | Performed by: PHYSICIAN ASSISTANT

## 2024-09-30 PROCEDURE — 36415 COLL VENOUS BLD VENIPUNCTURE: CPT | Performed by: PHYSICIAN ASSISTANT

## 2024-09-30 PROCEDURE — 90656 IIV3 VACC NO PRSV 0.5 ML IM: CPT | Performed by: PHYSICIAN ASSISTANT

## 2024-09-30 PROCEDURE — 81003 URINALYSIS AUTO W/O SCOPE: CPT | Performed by: PHYSICIAN ASSISTANT

## 2024-09-30 PROCEDURE — 99214 OFFICE O/P EST MOD 30 MIN: CPT | Mod: 25 | Performed by: PHYSICIAN ASSISTANT

## 2024-09-30 PROCEDURE — G0008 ADMIN INFLUENZA VIRUS VAC: HCPCS | Performed by: PHYSICIAN ASSISTANT

## 2024-09-30 PROCEDURE — 85025 COMPLETE CBC W/AUTO DIFF WBC: CPT | Performed by: PHYSICIAN ASSISTANT

## 2024-09-30 PROCEDURE — 80061 LIPID PANEL: CPT | Performed by: PHYSICIAN ASSISTANT

## 2024-09-30 ASSESSMENT — PATIENT HEALTH QUESTIONNAIRE - PHQ9
SUM OF ALL RESPONSES TO PHQ QUESTIONS 1-9: 19
10. IF YOU CHECKED OFF ANY PROBLEMS, HOW DIFFICULT HAVE THESE PROBLEMS MADE IT FOR YOU TO DO YOUR WORK, TAKE CARE OF THINGS AT HOME, OR GET ALONG WITH OTHER PEOPLE: EXTREMELY DIFFICULT

## 2024-09-30 NOTE — LETTER
October 1, 2024      Daron Bond  [address]   [place] [address]        To Whom It May Concern,     Patient should adhere to diabetic diet.       Key elements include:  Fruits and vegetables  Whole grains  Lean proteins  Nonfat or low-fat dairy    The goal is to control blood sugar levels and maintain overall health.          Sincerely,        Nola Zavaleta PA-C

## 2024-09-30 NOTE — PROGRESS NOTES
[id number]  Assessment & Plan     Chronic pain of both shoulders  Neck pain  Patient complains of chronic neck and shoulder pain, no injury.  Xray today shows some OA, recommend PT to start.  Advised if not improving, follow up with Ortho.  - Primary Care - Care Coordination Referral  - Physical Therapy  Referral      TYRELL (generalized anxiety disorder)  Obsessive-compulsive disorder, unspecified type  Bipolar I disorder (H)  Patient has significant history of mental health issues, very tangential and hard to focus on exam. Suspect needs to establish with psychiatry for medication treatment plan.   - Adult Mental Health  Referral    Multiple medical problems  Patient has multiple medical problems which he feels is very hard to manage, referral to care coordination for assistance.   - Primary Care - Care Coordination Referral    Obesity, morbid (H)  Chronic issue, continue to work on diet and exercise. Consider bariatric weight referral in future.     Memory loss  Cerebellar tonsillar ectopia (H)  Patient endorses significant memory issues, unclear if related to psychiatric conditions or other etiology.  Referral to re-establish with Neurology given.  - Adult Neurology  Referral    Encounter for immunization  - INFLUENZA VACCINE, SPLIT VIRUS, TRIVALENT,PF (FLUZONE\FLUARIX)    Type 2 diabetes mellitus without complication, without long-term current use of insulin (H)  Chronic issue, due for A1C while here and Lipid.  Recommend diabetic check in future.   - Hemoglobin A1c  - Lipid Profile (Chol, Trig, HDL, LDL calc)  - Hemoglobin A1c  - Lipid Profile (Chol, Trig, HDL, LDL calc)    Continuous opioid dependence (H)  Follows with pain clinic.      Patient has been advised of split billing requirements and indicates understanding: Yes      Blood sugar testing frequency justification:  Patient modifying lifestyle changes (diet, exercise) with blood sugars  Depression  Screening Follow Up        9/29/2024     9:21 PM   PHQ   PHQ-9 Total Score 19   Q9: Thoughts of better off dead/self-harm past 2 weeks Several days   F/U: Thoughts of suicide or self-harm No   F/U: Safety concerns Yes     Follow Up Actions Taken  Crisis resource information provided in the After Visit Summary  Mental Health Referral placed    Discussed the following ways the patient can remain in a safe environment:  be around others  Counseling  Appropriate preventive services were addressed with this patient via screening, questionnaire, or discussion as appropriate for fall prevention, nutrition, physical activity, Tobacco-use cessation, social engagement, weight loss and cognition.  Checklist reviewing preventive services available has been given to the patient.  Reviewed patient's diet, addressing concerns and/or questions.   He is at risk for lack of exercise and has been provided with information to increase physical activity for the benefit of his well-being.   Patient is at risk for social isolation and has been provided with information about the benefit of social connection.   The patient was instructed to see the dentist every 6 months.   Discussed possible causes of fatigue. Updated plan of care.  Patient reported difficulty with activities of daily living were addressed today.Patient reported safety concerns were addressed today.Addressed any concerns about safety while driving.  The patient was provided with written information regarding signs of hearing loss.   Information on urinary incontinence and treatment options given to patient.   The patient's PHQ-9 score is consistent with moderate depression. He was provided with information regarding depression.   I have reviewed Opioid Use Disorder and Substance Use Disorder risk factors and made any needed referrals.       Risks, benefits and alternatives were discussed with patient. Agreeable to the plan of care.      Russell Neri is a 44 year  "old, presenting for the following health issues:  Establish Care, Arm Pain (Has pain running from his neck and down his right arm and a little in the left arm.   ), Diabetes, Spine issues, and Mental health issues      9/30/2024    11:25 AM   Additional Questions   Roomed by SANTA Dos Santos CMA([facility])         9/30/2024   Forms   Any forms needing to be completed Yes        Arm Pain           Patient is here today to establish care  He has numerous medical conditions and complaints and notes his memory is poor and cannot remember to make it to his appointments    His big concerns today are bilateral shoulder pain  No fall or injury  Hurts all the time  Has some numbness into fingers R>L  He feels that his arms are weak  Taking Oxycodone for chronic back pain, does not help  Never had imaging or done PT    He also notes that his mood is very poor  Previously managed by PCP, seeing therapist but has not seen psychiatry  Feels like he is just down all the time and having memory issues which may or may not be related  He notes he cannot remember when to go places, get appointments, take meds    He also is a known diabetic, checking sugars and feels like they are pretty well managed, glucose is around 120. Due for labs.          Review of Systems  Constitutional, HEENT, cardiovascular, pulmonary, gi and gu systems are negative, except as otherwise noted.      Objective    /88   Pulse 82   Temp 98.2  F (36.8  C) (Oral)   Resp 16   Ht 1.816 m (5' 11.5\")   Wt 142 kg (313 lb)   SpO2 99%   BMI 43.05 kg/m    Body mass index is 43.05 kg/m .  Physical Exam   GENERAL: alert and no distress  NECK: no adenopathy, no asymmetry, masses, or scars  RESP: lungs clear to auscultation - no rales, rhonchi or wheezes  CV: regular rate and rhythm, normal S1 S2, no S3 or S4, no murmur, click or rub, no peripheral edema  ABDOMEN: soft, nontender, no hepatosplenomegaly, no masses and bowel sounds normal  MS: no gross musculoskeletal " defects noted, no edema, normal ROM of shoulder, normal strength bilateral shoulder tests including rotator cuff tests, + Phalen sign    Xray - Reviewed and interpreted by me.  Neck xray shows some OA        Signed Electronically by: Nola Zavaleta PA-C    Answers submitted by the patient for this visit:  Patient Health Questionnaire (Submitted on 9/30/2024)  If you checked off any problems, how difficult have these problems made it for you to do your work, take care of things at home, or get along with other people?: Extremely difficult  PHQ9 TOTAL SCORE: 19

## 2024-09-30 NOTE — TELEPHONE ENCOUNTER
"Patient sent in MyC message along with message originally dated 9/09/2024 that has already been addressed by PCP.    \"Hello, would you be able to put a diet change recommendation in writing for me? It may help me get more food assistance so i can eat better. Right now all we have here are carbs from the food pantry.\"  "

## 2024-10-01 LAB
CHOLEST SERPL-MCNC: 156 MG/DL
FASTING STATUS PATIENT QL REPORTED: NO
HDLC SERPL-MCNC: 41 MG/DL
LDLC SERPL CALC-MCNC: 84 MG/DL
NONHDLC SERPL-MCNC: 115 MG/DL
TRIGL SERPL-MCNC: 157 MG/DL

## 2024-10-02 ENCOUNTER — PATIENT OUTREACH (OUTPATIENT)
Dept: CARE COORDINATION | Facility: CLINIC | Age: 44
End: 2024-10-02
Payer: COMMERCIAL

## 2024-10-02 DIAGNOSIS — Z71.89 OTHER SPECIFIED COUNSELING: Primary | Chronic | ICD-10-CM

## 2024-10-02 NOTE — PROGRESS NOTES
Clinic Care Coordination Contact  RUST/Voicemail    Clinical Data: Care Coordinator Outreach    Outreach Documentation Number of Outreach Attempt   10/2/2024  12:23 PM 1       Left message on patient's voicemail with call back information and requested return call.    Plan: Care Coordinator will try to reach patient again in 1-2 business days.    VIRIDIANA Barrera  [phone]  [facility]

## 2024-10-04 NOTE — PROGRESS NOTES
Clinic Care Coordination Contact  Community Health Worker Initial Outreach    CHW Initial Information Gathering:  Referral Source: PCP  Current living arrangement:: I live in a private home with family  Type of residence:: Apartment  Community Resources: Financial/Insurance, Other (see comment) (Housing service)  Supplies Currently Used at Home: None  Equipment Currently Used at Home: none  Informal Support system:: Children  No PCP office visit in Past Year: No  Transportation means:: Public transportation  CHW Additional Questions  MyChart active?: Yes  Patient sent Social Determinants of Health questionnaire?: Yes    Patient accepts CC: Yes. Patient scheduled for assessment with CC RN on 10/9/24 at 11:00 am. Patient noted desire to discuss numerous referral and coordination of appointments.    Patient is dealing with trauma with family which has taken a large toll on his health and body. Patient may benefit from re-enrolling with CC SW as he is struggling to afford food, can not drive or hardly use transportation in general as it activates seizures, other financial help, and needs assistance finding a  to help with probate. CHW discussed [facility] with the patient, but it involves driving so he declined. Patient is currently seeing a therapist.    CHW placed FRW referral to discuss MA with the patient as he states he is struggling to afford insurance.      VIRIDIANA Carson  [phone]  [facility]

## 2024-10-07 ENCOUNTER — MYC REFILL (OUTPATIENT)
Dept: FAMILY MEDICINE | Facility: CLINIC | Age: 44
End: 2024-10-07
Payer: COMMERCIAL

## 2024-10-07 DIAGNOSIS — G89.4 CHRONIC PAIN SYNDROME: ICD-10-CM

## 2024-10-07 DIAGNOSIS — I10 PRIMARY HYPERTENSION: ICD-10-CM

## 2024-10-07 RX ORDER — AMLODIPINE BESYLATE 5 MG/1
5 TABLET ORAL DAILY
Qty: 90 TABLET | Refills: 5 | Status: CANCELLED | OUTPATIENT
Start: 2024-10-07

## 2024-10-07 RX ORDER — GABAPENTIN 300 MG/1
300 CAPSULE ORAL 3 TIMES DAILY
Qty: 90 CAPSULE | Refills: 2 | Status: CANCELLED | OUTPATIENT
Start: 2024-10-07

## 2024-10-08 ENCOUNTER — TELEPHONE (OUTPATIENT)
Dept: PALLIATIVE MEDICINE | Facility: OTHER | Age: 44
End: 2024-10-08
Payer: COMMERCIAL

## 2024-10-08 ENCOUNTER — PATIENT OUTREACH (OUTPATIENT)
Dept: CARE COORDINATION | Facility: CLINIC | Age: 44
End: 2024-10-08
Payer: COMMERCIAL

## 2024-10-08 DIAGNOSIS — M54.16 LUMBAR RADICULOPATHY: Primary | ICD-10-CM

## 2024-10-08 NOTE — TELEPHONE ENCOUNTER
Call placed to pt to inform that his A1c and CBC looked ok to proceed to the L-THOMAS.  Requested that pt have a , arrive at 1110, and informed that blood sugar limit is 250. Pt agrees to check glucose the morning of the procedure. Pt agrees to call back if any questions.

## 2024-10-09 ENCOUNTER — PATIENT OUTREACH (OUTPATIENT)
Dept: CARE COORDINATION | Facility: CLINIC | Age: 44
End: 2024-10-09

## 2024-10-09 ENCOUNTER — PATIENT OUTREACH (OUTPATIENT)
Dept: NURSING | Facility: CLINIC | Age: 44
End: 2024-10-09
Payer: COMMERCIAL

## 2024-10-09 ASSESSMENT — ACTIVITIES OF DAILY LIVING (ADL): DEPENDENT_IADLS:: INDEPENDENT

## 2024-10-09 NOTE — TELEPHONE ENCOUNTER
Returned call to the pt. He reports that he does exercises daily that he learned in PT and stretching exercises several times/day. States he has chiropractic appts weekly. This information was sent to the PA team.  Advised pt that we will contact him as soon as there is a decision from his insurance.

## 2024-10-09 NOTE — TELEPHONE ENCOUNTER
Reason for call:  Other   Patient called regarding (reason for call): appointment and call back  Additional comments: Pt called requesting to talk to the nurse or the PA to make sure that there is no issues regarding his insurance covering the procedure scheduled for tomorrow. Pt is frustrated because he just got a call a day before his procedure to let him know there may be an issue. Pt hopes he doesn't have to reschedule the procedure tomorrow because he is in a lot of pain and he also rearrange his family and his schedule to be able to come in tomorrow. Please call pt back ASAP for any further questions regarding this request.     Phone number to reach patient:  Home number on file [phone] (home)    Best Time:  ASAP    Can we leave a detailed message on this number?  YES    Kaye Lewis      [facility]  Pain Management

## 2024-10-09 NOTE — TELEPHONE ENCOUNTER
Called pt to inform the L-THOMAS has been approved for tomorrow. Reminded him also of Blood sugar limit of 250. Pt agrees to check BS in the morning.

## 2024-10-10 ENCOUNTER — OFFICE VISIT (OUTPATIENT)
Dept: FAMILY MEDICINE | Facility: CLINIC | Age: 44
End: 2024-10-10
Payer: COMMERCIAL

## 2024-10-10 ENCOUNTER — HOSPITAL ENCOUNTER (EMERGENCY)
Facility: HOSPITAL | Age: 44
Discharge: HOME | End: 2024-10-11
Payer: COMMERCIAL

## 2024-10-10 ENCOUNTER — RADIOLOGY INJECTION OFFICE VISIT (OUTPATIENT)
Dept: PALLIATIVE MEDICINE | Facility: OTHER | Age: 44
End: 2024-10-10
Attending: FAMILY MEDICINE
Payer: COMMERCIAL

## 2024-10-10 VITALS — OXYGEN SATURATION: 98 % | HEART RATE: 79 BPM | SYSTOLIC BLOOD PRESSURE: 146 MMHG | DIASTOLIC BLOOD PRESSURE: 85 MMHG

## 2024-10-10 VITALS
DIASTOLIC BLOOD PRESSURE: 88 MMHG | TEMPERATURE: 97.6 F | HEART RATE: 92 BPM | SYSTOLIC BLOOD PRESSURE: 133 MMHG | RESPIRATION RATE: 20 BRPM | OXYGEN SATURATION: 95 %

## 2024-10-10 DIAGNOSIS — E11.65 TYPE 2 DIABETES MELLITUS WITH HYPERGLYCEMIA, WITHOUT LONG-TERM CURRENT USE OF INSULIN (H): ICD-10-CM

## 2024-10-10 DIAGNOSIS — K61.1 PERIRECTAL ABSCESS: Primary | ICD-10-CM

## 2024-10-10 DIAGNOSIS — M48.062 SPINAL STENOSIS OF LUMBAR REGION WITH NEUROGENIC CLAUDICATION: ICD-10-CM

## 2024-10-10 DIAGNOSIS — L02.31 ABSCESS, GLUTEAL, LEFT: ICD-10-CM

## 2024-10-10 DIAGNOSIS — E11.9 TYPE 2 DIABETES MELLITUS WITHOUT COMPLICATION, WITHOUT LONG-TERM CURRENT USE OF INSULIN (H): ICD-10-CM

## 2024-10-10 DIAGNOSIS — M54.16 LUMBAR RADICULOPATHY: ICD-10-CM

## 2024-10-10 LAB
ANION GAP SERPL CALCULATED.3IONS-SCNC: 14 MMOL/L (ref 7–15)
B-OH-BUTYR SERPL-SCNC: <0.18 MMOL/L
BASE EXCESS BLDV CALC-SCNC: 1.9 MMOL/L (ref -3–3)
BASOPHILS # BLD AUTO: 0 10E3/UL (ref 0–0.2)
BASOPHILS NFR BLD AUTO: 0 %
BUN SERPL-MCNC: 16.3 MG/DL (ref 6–20)
CALCIUM SERPL-MCNC: 9.5 MG/DL (ref 8.8–10.4)
CHLORIDE SERPL-SCNC: 102 MMOL/L (ref 98–107)
CREAT SERPL-MCNC: 1.06 MG/DL (ref 0.67–1.17)
EGFRCR SERPLBLD CKD-EPI 2021: 89 ML/MIN/1.73M2
EOSINOPHIL # BLD AUTO: 0 10E3/UL (ref 0–0.7)
EOSINOPHIL NFR BLD AUTO: 0 %
ERYTHROCYTE [DISTWIDTH] IN BLOOD BY AUTOMATED COUNT: 14.3 % (ref 10–15)
GLUCOSE BLDC GLUCOMTR-MCNC: 167 MG/DL (ref 70–99)
GLUCOSE SERPL-MCNC: 135 MG/DL (ref 70–99)
HCO3 BLDV-SCNC: 26 MMOL/L (ref 21–28)
HCO3 SERPL-SCNC: 23 MMOL/L (ref 22–29)
HCT VFR BLD AUTO: 43.4 % (ref 40–53)
HGB BLD-MCNC: 14.8 G/DL (ref 13.3–17.7)
IMM GRANULOCYTES # BLD: 0.1 10E3/UL
IMM GRANULOCYTES NFR BLD: 1 %
LYMPHOCYTES # BLD AUTO: 1.2 10E3/UL (ref 0.8–5.3)
LYMPHOCYTES NFR BLD AUTO: 9 %
MAGNESIUM SERPL-MCNC: 2 MG/DL (ref 1.7–2.3)
MCH RBC QN AUTO: 29 PG (ref 26.5–33)
MCHC RBC AUTO-ENTMCNC: 34.1 G/DL (ref 31.5–36.5)
MCV RBC AUTO: 85 FL (ref 78–100)
MONOCYTES # BLD AUTO: 0.4 10E3/UL (ref 0–1.3)
MONOCYTES NFR BLD AUTO: 3 %
NEUTROPHILS # BLD AUTO: 11.3 10E3/UL (ref 1.6–8.3)
NEUTROPHILS NFR BLD AUTO: 87 %
NRBC # BLD AUTO: 0 10E3/UL
NRBC BLD AUTO-RTO: 0 /100
O2/TOTAL GAS SETTING VFR VENT: 21 %
OXYHGB MFR BLDV: 83 % (ref 70–75)
PCO2 BLDV: 37 MM HG (ref 40–50)
PH BLDV: 7.45 [PH] (ref 7.32–7.43)
PLATELET # BLD AUTO: 201 10E3/UL (ref 150–450)
PO2 BLDV: 49 MM HG (ref 25–47)
POTASSIUM SERPL-SCNC: 4.6 MMOL/L (ref 3.4–5.3)
RBC # BLD AUTO: 5.1 10E6/UL (ref 4.4–5.9)
SAO2 % BLDV: 88.7 % (ref 70–75)
SODIUM SERPL-SCNC: 139 MMOL/L (ref 135–145)
WBC # BLD AUTO: 13 10E3/UL (ref 4–11)

## 2024-10-10 PROCEDURE — 255N000002 HC RX 255 OP 636: Performed by: STUDENT IN AN ORGANIZED HEALTH CARE EDUCATION/TRAINING PROGRAM

## 2024-10-10 PROCEDURE — 250N000011 HC RX IP 250 OP 636: Performed by: STUDENT IN AN ORGANIZED HEALTH CARE EDUCATION/TRAINING PROGRAM

## 2024-10-10 PROCEDURE — 80048 BASIC METABOLIC PNL TOTAL CA: CPT | Performed by: FAMILY MEDICINE

## 2024-10-10 PROCEDURE — 99214 OFFICE O/P EST MOD 30 MIN: CPT | Performed by: PHYSICIAN ASSISTANT

## 2024-10-10 PROCEDURE — 85025 COMPLETE CBC W/AUTO DIFF WBC: CPT | Performed by: FAMILY MEDICINE

## 2024-10-10 PROCEDURE — 36415 COLL VENOUS BLD VENIPUNCTURE: CPT | Performed by: FAMILY MEDICINE

## 2024-10-10 PROCEDURE — 83735 ASSAY OF MAGNESIUM: CPT | Performed by: FAMILY MEDICINE

## 2024-10-10 PROCEDURE — 82805 BLOOD GASES W/O2 SATURATION: CPT | Performed by: FAMILY MEDICINE

## 2024-10-10 PROCEDURE — 64483 NJX AA&/STRD TFRM EPI L/S 1: CPT | Mod: RT | Performed by: STUDENT IN AN ORGANIZED HEALTH CARE EDUCATION/TRAINING PROGRAM

## 2024-10-10 PROCEDURE — 99283 EMERGENCY DEPT VISIT LOW MDM: CPT | Mod: 25

## 2024-10-10 PROCEDURE — 82010 KETONE BODYS QUAN: CPT | Performed by: FAMILY MEDICINE

## 2024-10-10 PROCEDURE — 250N000009 HC RX 250: Performed by: STUDENT IN AN ORGANIZED HEALTH CARE EDUCATION/TRAINING PROGRAM

## 2024-10-10 PROCEDURE — 82962 GLUCOSE BLOOD TEST: CPT

## 2024-10-10 PROCEDURE — 10060 I&D ABSCESS SIMPLE/SINGLE: CPT

## 2024-10-10 RX ORDER — LIDOCAINE HYDROCHLORIDE 10 MG/ML
1 INJECTION, SOLUTION EPIDURAL; INFILTRATION; INTRACAUDAL; PERINEURAL ONCE
Status: COMPLETED | OUTPATIENT
Start: 2024-10-10 | End: 2024-10-10

## 2024-10-10 RX ORDER — DEXAMETHASONE SODIUM PHOSPHATE 10 MG/ML
10 INJECTION INTRAMUSCULAR; INTRAVENOUS ONCE
Status: COMPLETED | OUTPATIENT
Start: 2024-10-10 | End: 2024-10-10

## 2024-10-10 RX ORDER — SACCHAROMYCES BOULARDII 250 MG
250 CAPSULE ORAL 2 TIMES DAILY
Qty: 28 CAPSULE | Refills: 0 | Status: SHIPPED | OUTPATIENT
Start: 2024-10-10 | End: 2024-10-24

## 2024-10-10 RX ADMIN — LIDOCAINE HYDROCHLORIDE 1 ML: 10 INJECTION, SOLUTION EPIDURAL; INFILTRATION; INTRACAUDAL; PERINEURAL at 11:48

## 2024-10-10 RX ADMIN — DEXAMETHASONE SODIUM PHOSPHATE 10 MG: 10 INJECTION, SOLUTION INTRAMUSCULAR; INTRAVENOUS at 11:48

## 2024-10-10 RX ADMIN — IOHEXOL 10 ML: 180 INJECTION INTRAVENOUS at 11:44

## 2024-10-10 ASSESSMENT — PAIN SCALES - GENERAL
PAINLEVEL: MODERATE PAIN (4)
PAINLEVEL: MODERATE PAIN (5)

## 2024-10-10 NOTE — PROGRESS NOTES
SUBJECTIVE:  Daron Bond is a 44 year old male who presents to the clinic today for a lump upper thigh approaching rectum.  Onset of rash was 2 day(s) ago.   Rash is sudden onset.  Left leg approaching perineum  Quality/symptoms of rash: painful   Symptoms are moderate and rash seems to be worsening.      Past Medical History:   Diagnosis Date    Arthritis     Cerebral infarction (H) 2022    Depressive disorder     Heart disease     Hypertension     Uncomplicated asthma      Current Outpatient Medications   Medication Sig Dispense Refill    albuterol (PROAIR HFA/PROVENTIL HFA/VENTOLIN HFA) 108 (90 Base) MCG/ACT inhaler INHALE 2 PUFFS BY MOUTH EVERY 6 HOURS 8.5 g 4    amLODIPine (NORVASC) 5 MG tablet TAKE 1 TABLET(5 MG) BY MOUTH DAILY 90 tablet 5    amoxicillin-clavulanate (AUGMENTIN) 875-125 MG tablet Take 1 tablet by mouth 2 times daily for 7 days. 14 tablet 0    blood glucose (NO BRAND SPECIFIED) test strip Use to test blood sugar 3 times daily or as directed. To accompany: Blood Glucose Monitor Brands: per insurance. 100 strip 6    blood glucose monitoring (NO BRAND SPECIFIED) meter device kit Use to test blood sugar 1 times daily or as directed. Preferred blood glucose meter OR supplies to accompany: Blood Glucose Monitor Brands: per insurance. 1 kit 0    busPIRone (BUSPAR) 15 MG tablet Take 1 tablet (15 mg) by mouth 3 times daily. 90 tablet 5    Continuous Glucose Sensor (FREESTYLE ANNE MARIE 3 SENSOR) MISC 1 each continuously. 2 each 11    Cyanocobalamin (B-12) 1000 MCG SUBL Take once weekly. 50 tablet 1    gabapentin (NEURONTIN) 300 MG capsule Take 1 capsule (300 mg) by mouth 3 times daily. 90 capsule 2    hydrochlorothiazide (MICROZIDE) 12.5 MG capsule Take 1 capsule (12.5 mg) by mouth every morning 90 capsule 3    ketoconazole (NIZORAL) 2 % external shampoo Apply topically daily as needed for itching or irritation. 100 mL 1    losartan (COZAAR) 100 MG tablet Take 1 tablet (100 mg) by mouth daily 90 tablet 1     metFORMIN (GLUCOPHAGE XR) 500 MG 24 hr tablet Take 2 tablets (1,000 mg) by mouth 2 times daily (with meals). (Patient taking differently: Take 500 mg by mouth 2 times daily (with meals).) 120 tablet 5    metoprolol succinate ER (TOPROL XL) 100 MG 24 hr tablet Take 1 tablet (100 mg) by mouth daily 30 tablet 5    ondansetron (ZOFRAN ODT) 4 MG ODT tab Take 1 tablet (4 mg) by mouth every 8 hours as needed for nausea. 10 tablet 1    oxyCODONE (ROXICODONE) 5 MG tablet Take 1 tablet (5 mg) by mouth 3 times daily as needed for severe pain. 90 tablet 0    pantoprazole (PROTONIX) 40 MG EC tablet TAKE 1 TABLET BY MOUTH DAILY 90 tablet 2    saccharomyces boulardii (FLORASTOR) 250 MG capsule Take 1 capsule (250 mg) by mouth 2 times daily for 14 days. Take 2 hours after each Augmentin dose 28 capsule 0    Semaglutide, 2 MG/DOSE, (OZEMPIC) 8 MG/3ML pen Inject 2 mg subcutaneously every 7 days. 9 mL 3    thin (NO BRAND SPECIFIED) lancets Use with lanceting device. To accompany: Blood Glucose Monitor Brands: per insurance. 100 each 6    Vitamin D3 (CHOLECALCIFEROL) 25 mcg (1000 units) tablet Take 1 tablet (25 mcg) by mouth daily for 270 days 90 tablet 2     Social History     Tobacco Use    Smoking status: Every Day     Current packs/day: 1.00     Average packs/day: 1 pack/day for 25.0 years (25.0 ttl pk-yrs)     Types: Cigarettes     Passive exposure: Current    Smokeless tobacco: Never   Substance Use Topics    Alcohol use: Never     Comment: rarely       ROS:  Review of systems negative except as stated above.    EXAM:   /88   Pulse 92   Temp 97.6  F (36.4  C) (Tympanic)   Resp 20   SpO2 95%   GENERAL: alert, no acute distress.  SKIN: palpable mass deep   GENERAL APPEARANCE: healthy, alert and no distress  RESP: lungs clear to auscultation - no rales, rhonchi or wheezes  CV: regular rates and rhythm, normal S1 S2, no murmur noted  NEURO: Normal strength and tone, sensory exam grossly normal,  normal speech and  mentation    ASSESSMENT:  (K61.1) Perirectal abscess  (primary encounter diagnosis)  (E11.9) Type 2 diabetes mellitus without complication, without long-term current use of insulin (H)  Plan: amoxicillin-clavulanate (AUGMENTIN) 875-125 MG         tablet, Adult Colorectal Surgery          Referral, saccharomyces boulardii (FLORASTOR)         250 MG capsule       Red flags and emergent follow up discussed, and understood by patient  Follow up with PCP if symptoms worsen or fail to improve

## 2024-10-10 NOTE — PROGRESS NOTES
Pre-procedure Intake  If YES to any questions or NO to having a   Please complete laminated checklist and leave on the computer keyboard for Provider, verbally inform provider if able.        Are you taking any any blood thinners such as Coumadin, Warfarin, Jantoven, Pradaxa Xarelto, Eliquis, Edoxaban, Enoxaparin, Lovenox, Heparin, Arixtra, Fondaparinux, or Fragmin? OR Antiplatelet medication such as Plavix, Brilinta, or Effient?   No   If yes, when did you take your last dose? no    Do you take aspirin?  No  If cervical procedure, have you held aspirin for 6 days?   NA      Do you have any allergies to contrast dye, iodine, steroid and/or numbing medications?  NO    Are you currently taking antibiotics or have an active infection?  NO    Have you had a fever/elevated temperature within the past week? NO    Are you currently taking oral steroids? NO    Do you have a ? Yes    Are you pregnant or breastfeeding?  Not Applicable    Have you received any vaccinations in the last week? NO    Notify provider and RNs if systolic BP >170, diastolic BP >100, P >100 or O2 sats < 90%

## 2024-10-10 NOTE — PROGRESS NOTES
Pre procedure Diagnosis: lumbar radiculopathy   Post procedure Diagnosis: Same  Procedure performed: right L5 transforaminal epidural steroid injection, CPT code 85027  Anesthesia: none  Complications: none immediately  Operators: Beto Avalos MD    Indications:   Daron Bond is a 44 year old male was sent for a lumbar transforaminal epidural steroid injection      Options/alternatives, benefits and risks were discussed with the patient including bleeding, infection, tissue trauma, numbness, weakness, paralysis, spinal cord injury, radiation exposure, headache and reaction to medications. Questions were answered to his satisfaction and he agrees to proceed. Voluntary informed consent was obtained and signed.     Vitals were reviewed: Yes  Allergies were reviewed:  Yes   Medications were reviewed:  Yes   Pre-procedure pain score: 5/10    Procedure:  After getting informed consent, patient was brought into the procedure suite and was placed in a prone position on the procedure table.   A Pause for the Cause was performed.  Patient was prepped and draped in sterile fashion.     After identifying the right L5-S1 neuroforamen, the C-arm was rotated to a right lateral oblique angle.  A total of 3ml of Lidocaine 1% was used to anesthetize the skin and the needle track at a skin entry site coaxial with the fluoroscopy beam, and overriding the superior aspect of the neuroforamen.  A 22 gauge 5 inch spinal needle was advanced under intermittent fluoroscopy until it entered the foramen superiorly.    The position was then inspected from anteroposterior and lateral views, and the needle adjusted appropriately.  A total of 1.5ml of Omnipaque-180 was injected, confirming appropriate position, with spread into the nerve root sheath and the epidural space, with no intravascular uptake. Visualization of active injection under live fluoroscopy or digital subtraction angiography further confirmed the above appropriate  contrast spread.    1ml of preservative free 1% lidocaine with 10mg of dexamethasone was injected.  The needle was flushed with lidocaine and removed.    During the procedure, the patient DID experience paresthesias corresponding to the nerve root near final needle placement.  Hemostasis was achieved, the area was cleaned, and bandaids were placed when appropriate.  The patient tolerated the procedure well, and was taken to the recovery room.    Images were saved to PACS.    Post-procedure pain score: 4/10  Follow-up includes:   -f/u with referring provider    Beto Avalos MD  Interventional Pain Medicine  [facility]

## 2024-10-10 NOTE — NURSING NOTE
Discharge Information    IV Discontiued Time:  NA    Amount of Fluid Infused:  NA    Discharge Criteria = When patient returns to baseline or as per MD order    Consciousness:  Pt is fully awake    Circulation:  BP +/- 20% of pre-procedure level    Respiration:  Patient is able to breathe deeply    O2 Sat:  Patient is able to maintain O2 Sat >92% on room air    Activity:  Moves 4 extremities on command    Ambulation:  Patient is able to stand and walk or stand and pivot into wheelchair    Dressing:  Clean/dry or No Dressing    Notes:   Discharge instructions and AVS given to patient    Patient meets criteria for discharge?  YES    Admitted to PCU?  No    Responsible adult present to accompany patient home?  Yes    Signature/Title:    Monalisa Love RN  RN Care Coordinator  [facility] [place]

## 2024-10-10 NOTE — PATIENT INSTRUCTIONS
[facility] Procedure Discharge Instructions    Today you saw:   Dr. Avalos    Your procedure: Right L5 transforaminal epidural steroid injection     Medications used:  Lidocaine (anesthetic)    Dexamethasone (steroid)        Omnipaque (contrast)              Be cautious when walking as numbness and/or weakness in the legs may occur up to 6-8 hours after the procedure due to effect of the local anesthetic  Do not drive for 6 hours. The effect of the local anesthetic could slow your reflexes.   Avoid strenuous activity for the first 24 hours. You may resume your regular activities after that.   You may shower, however avoid swimming, tub baths or hot tubs for 24 hours following your procedure  You may have a mild to moderate increase in pain for several days following the injection.    You may use ice packs for 10-15 minutes, 3 to 4 times a day at the injection site for comfort  Do not use heat to painful areas for 6 to 8 hours. This will give the local anesthetic time to wear off and prevent you from accidentally burning your skin.  Unless you have been directed to avoid the use of anti-inflammatory medications (NSAIDS-ibuprofen, Aleve, Motrin), you may use these medications or Tylenol for pain control if needed.   With diabetes, check your blood sugar more frequently than usual as your blood sugar may be higher than normal for 10-14 days following a steroid injection. Contact your doctor who manages your diabetes if your blood sugar is higher than usual  Possible side effects of steroids that you may experience include flushing, elevated blood pressure, increased appetite, mild headaches and restlessness.  All of these symptoms will get better with time.  It may take up to 14 days for the steroid medication to start working although you may feel the effect as early as a few days after the procedure.   Follow up with your referring provider in 2-3 weeks    If you experience any of the following,  call the pain center line during work hours at [phone] or on-call physician after hours at [phone]:  -Fever over 100 degree F  -Swelling, bleeding, redness, drainage, warmth at the injection site  -Progressive weakness or numbness in your legs or arms  -Loss of bowel or bladder function  -Unusual headache that is not relieved by Tylenol or your regular headache medication  -Unusual new onset of pain that is not improving

## 2024-10-11 VITALS
WEIGHT: 313 LBS | OXYGEN SATURATION: 99 % | SYSTOLIC BLOOD PRESSURE: 122 MMHG | TEMPERATURE: 97.5 F | BODY MASS INDEX: 43.82 KG/M2 | HEART RATE: 86 BPM | DIASTOLIC BLOOD PRESSURE: 77 MMHG | RESPIRATION RATE: 16 BRPM | HEIGHT: 71 IN

## 2024-10-11 PROCEDURE — 250N000011 HC RX IP 250 OP 636

## 2024-10-11 PROCEDURE — 90715 TDAP VACCINE 7 YRS/> IM: CPT

## 2024-10-11 PROCEDURE — 90471 IMMUNIZATION ADMIN: CPT

## 2024-10-11 RX ADMIN — CLOSTRIDIUM TETANI TOXOID ANTIGEN (FORMALDEHYDE INACTIVATED), CORYNEBACTERIUM DIPHTHERIAE TOXOID ANTIGEN (FORMALDEHYDE INACTIVATED), BORDETELLA PERTUSSIS TOXOID ANTIGEN (GLUTARALDEHYDE INACTIVATED), BORDETELLA PERTUSSIS FILAMENTOUS HEMAGGLUTININ ANTIGEN (FORMALDEHYDE INACTIVATED), BORDETELLA PERTUSSIS PERTACTIN ANTIGEN, AND BORDETELLA PERTUSSIS FIMBRIAE 2/3 ANTIGEN 0.5 ML: 5; 2; 2.5; 5; 3; 5 INJECTION, SUSPENSION INTRAMUSCULAR at 00:47

## 2024-10-11 NOTE — DISCHARGE INSTRUCTIONS
You were seen in the ER for evaluation of abscess, which was drained today. Start Augmentin as previously prescribed.    Rest, ice, elevate your extremity, Tylenol and/or ibuprofen as needed for pain. Keep your bandage in place and clean and dry for the first 24 hours, then only clean water - no dirty water (swimming pools, hot tubes, saunas, lakes, etc.) until your wound is healed.     Tylenol (Acetaminophen) Discharge Instructions:  You may take 2 tablets of regular strength, over-the-counter, Tylenol (acetaminophen) every 4-6 hours as needed for pain.  Take no more than 4000 mg of Tylenol in a 24-hour period.      Avoid taking more than 1 acetaminophen-containing product at a time and be aware that many over-the-counter medications contain a combination of acetaminophen and other products.  If you are taking Tylenol in addition to a combination product please keep track of your daily acetaminophen dose to make sure you do not exceed the recommended 4000 mg.  Taking too much acetaminophen can cause permanent damage to your liver.    Ibuprofen/Naproxen Discharge Instructions:  You may take ibuprofen for pain control.  The maximum dose of (ibuprofen is 3200 mg ) in a 24-hour period.    Take this medication with food to prevent stomach irritation.  With long-term use this medication can irritate the stomach causing pain and lead to development of a stomach ulcer.  If you notice stomach pain or vomiting of coffee-ground colored vomit or blood, please be seen by a healthcare provider.  Attempt to use this medication for the shortest time possible.      Follow-up with your primary care provider and/or colorectal as previously instructed for reevaluation.     Return to the emergency department for any new or worsening symptoms including worsening pain, redness/warmth/drainage/swelling, streaking redness up your extremity, fever/chills, any involvement of your genitals (testicles, anus), or any other concerning  symptoms.    Take Care!  - Elizabeth Stubbs PA-C

## 2024-10-11 NOTE — ED PROVIDER NOTES
EMERGENCY DEPARTMENT ENCOUNTER      NAME: Daron Bond  AGE: 44 year old male  YOB: 1980  MRN: [id number]  EVALUATION DATE & TIME: No admission date for patient encounter.    PCP: Mata Hearn    ED PROVIDER: Elizabeth Stubbs PA-C      Chief Complaint   Patient presents with    Hyperglycemia         FINAL IMPRESSION:  1. Abscess, gluteal, left    2. Type 2 diabetes mellitus with hyperglycemia, without long-term current use of insulin (H)          ED COURSE & MEDICAL DECISION MAKING:    10:48 PM Met with patient for initial interview. Plan for care discussed.  11:55 PM Reevaluated and updated patient. Bedside US performed. I&D performed with success. I discussed the plan for discharge with the patient, and patient is agreeable. We discussed supportive cares at home and reasons for return to the ER including new or worsening symptoms. All questions and concerns addressed. Patient to be discharged by RN.    44 year old male with a history of HTN, DM II, TYRELL, asthma, tobacco use, obesity, bipolar 1 disorder presents to the Emergency Department for evaluation of hyperglycemia and perirectal abscess. Per chart review, patient was evaluated at primary care clinic earlier today with perirectal abscess.  He was started on Augmentin and discharged home.  He states that they had put in a referral for colorectal see he states that his blood sugar was elevated follow-up, but they did not drain the abscess.  In the 200s and up to 300.  He states he had a steroid spinal injection today, otherwise his blood sugars have been under good control in the mid 100s.  He is on metformin, no insulin. Upon exam, patient is afebrile and in no acute distress. Focal area of fluctuance with overlying erythema, warmth. No crepitus or perineal involvement. Differential diagnosis includes but not limited to DKA, electrolyte abnormality, anemia, dehydration, abscess. No evidence of any tracking, abdominal or rectal pain;  therefore, using shared decision making, CT abdomen/pelvis deferred. Based on patient's presenting symptoms, laboratories were ordered.     CBC with leukocytosis at 13.0 likely secondary to abscess/focal infection. BMP with mild hyperglycemia at 135, no anion gap or clinical signs of DKA. B-hydroxybutyrate negative. VBG with mild respiratory alkalosis. Patient does have a history of asthma, tobacco use, and anxiety and seems to have some tachypnea.     Tetanus was updated in the ED today. Bedside US performed confirming abscess. Incision and drainage performed with significant purulent debris expressed. Symptoms and workup most consistent with gluteal abscess. Patient was made aware of the above findings. HR improved after I&D. Plan to discharge patient home with prescription Augmentin, strict return precautions, and close follow up with their PCP for reevaluation and ongoing management. The patient was stable and well appearing upon discharge. The patient was advised to return to the ER if any new or worsening symptoms develop. The patient verbalizes understanding and agrees with the plan.     Medical Decision Making  Obtained supplemental history:Supplemental history obtained?: No  Reviewed external records: External records reviewed?: Outpatient Record: [facility] 10/10  Care impacted by chronic illness:Chronic Pain, Diabetes, and Mental Health  Did you consider but not order tests?: Work up considered but not performed and documented in chart, if applicable  Did you interpret images independently?: Independent interpretation of ECG and images noted in documentation, when applicable.  Consultation discussion with other provider:Did you involve another provider (consultant, , pharmacy, etc.)?: No  Discharge. I recommended the patient continue their current prescription strength medication(s): Augmentin. See documentation for any additional details.    MEDICATIONS GIVEN IN THE EMERGENCY:  Medications   Tdap  (tetanus-diphtheria-acell pertussis) (ADACEL) injection 0.5 mL (has no administration in time range)       NEW PRESCRIPTIONS STARTED AT TODAY'S ER VISIT  New Prescriptions    No medications on file          =================================================================    HPI    Patient information was obtained from: patient    Use of : N/A    Daron Bond is a 44 year old male with a pertinent history of DM2, cerebella tonsillar ectopia, spinal stenosis, bipolar I, and asthma who presents to this ED via private car for evaluation of hyperglycemia. Per chart review, patient was evaluated at primary care clinic earlier today with perirectal abscess.  He was started on Augmentin and discharged home.  He states that they had put in a referral for colorectal see he states that his blood sugar was elevated follow-up, but they did not drain the abscess.  In the 200s and up to 300.  He states he had a steroid spinal injection today, otherwise his blood sugars have been under good control in the mid 100s.  He is on metformin, no insulin.  He denies any history of abscesses in the past.  He denies any fevers or chills.  He did denies any nausea, vomiting, abdominal pain, shortness of breath, chest pain, or any other complaints.  He does note a long history of chronic low back pain for which she gets injections intermittently.  He states that he is trying to be scheduled for surgery, but has to quit smoking tobacco and get health under better control before he is allowed to have that surgery.  He follows with pain clinic for injections.  He states that he has not yet filled his antibiotic prescription from earlier today.  He has not taken anything for his pain prior to arrival.  He states that the abscess was initially the size of a golf ball, but seems to have decreased in size since coming to the ED.  Again, patient denies incision at the clinic prior to arrival.  He states he does not think that it has  drained.  He denies any radiation of pain into his abdomen or rectum.  He denies any genital involvement.    [facility] 10/10: Presented with a lump on the upper thigh, there for two days. The rash is painful and worsening. Given Augmentin and Florastor.    REVIEW OF SYSTEMS   See HPI for pertinent positives and negatives.    PAST MEDICAL HISTORY:  Past Medical History:   Diagnosis Date    Arthritis     Cerebral infarction (H) 2022    Depressive disorder     Heart disease     Hypertension     Uncomplicated asthma        PAST SURGICAL HISTORY:  Past Surgical History:   Procedure Laterality Date    ARTHROSCOPY KNEE Right     no date given    BACK SURGERY  2019    C CORTISONE INJECTION  06/08/2016    COLONOSCOPY  08/04/2015    Internal/external hemorrhoids.  Repeat at age 50    ENT SURGERY  2008    Had my tonsils out.    ESOPHAGOGASTRODUODENOSCOPY W/ BANDING  08/04/2015    EYE SURGERY      x 3 no dates given    HEMORRHOIDECTOMY  09/08/2015    PSYCH SVC, INTENSIVE OUTPT  2002           CURRENT MEDICATIONS:    albuterol (PROAIR HFA/PROVENTIL HFA/VENTOLIN HFA) 108 (90 Base) MCG/ACT inhaler  amLODIPine (NORVASC) 5 MG tablet  amoxicillin-clavulanate (AUGMENTIN) 875-125 MG tablet  blood glucose (NO BRAND SPECIFIED) test strip  blood glucose monitoring (NO BRAND SPECIFIED) meter device kit  busPIRone (BUSPAR) 15 MG tablet  Continuous Glucose Sensor (FREESTYLE ANNE MARIE 3 SENSOR) MISC  Cyanocobalamin (B-12) 1000 MCG SUBL  gabapentin (NEURONTIN) 300 MG capsule  hydrochlorothiazide (MICROZIDE) 12.5 MG capsule  ketoconazole (NIZORAL) 2 % external shampoo  losartan (COZAAR) 100 MG tablet  metFORMIN (GLUCOPHAGE XR) 500 MG 24 hr tablet  metoprolol succinate ER (TOPROL XL) 100 MG 24 hr tablet  ondansetron (ZOFRAN ODT) 4 MG ODT tab  oxyCODONE (ROXICODONE) 5 MG tablet  pantoprazole (PROTONIX) 40 MG EC tablet  saccharomyces boulardii (FLORASTOR) 250 MG capsule  Semaglutide, 2 MG/DOSE, (OZEMPIC) 8 MG/3ML pen  thin (NO BRAND  SPECIFIED) lancets  Vitamin D3 (CHOLECALCIFEROL) 25 mcg (1000 units) tablet        ALLERGIES:  Allergies   Allergen Reactions    Banana     Bee Venom     Latex        FAMILY HISTORY:  Family History   Problem Relation Age of Onset    Diabetes Type 2  Mother     Diabetes Mother     Other Cancer Mother     Depression Mother     Heart Disease Father     Coronary Artery Disease Father     Hypertension Father     Hyperlipidemia Father     Mental Illness Father     Substance Abuse Father        SOCIAL HISTORY:   Social History     Socioeconomic History    Marital status: Single   Tobacco Use    Smoking status: Every Day     Current packs/day: 1.00     Average packs/day: 1 pack/day for 25.0 years (25.0 ttl pk-yrs)     Types: Cigarettes     Passive exposure: Current    Smokeless tobacco: Never   Vaping Use    Vaping status: Never Used   Substance and Sexual Activity    Alcohol use: Never     Comment: rarely    Drug use: Yes     Types: Marijuana, Oxycodone     Comment: takes oxycodone for pain mgt    Sexual activity: Never     Social Determinants of Health     Financial Resource Strain: Low Risk  (9/30/2024)    Financial Resource Strain     Within the past 12 months, have you or your family members you live with been unable to get utilities (heat, electricity) when it was really needed?: No   Food Insecurity: High Risk (9/30/2024)    Food Insecurity     Within the past 12 months, did you worry that your food would run out before you got money to buy more?: Yes     Within the past 12 months, did the food you bought just not last and you didn t have money to get more?: Yes   Transportation Needs: High Risk (9/30/2024)    Transportation Needs     Within the past 12 months, has lack of transportation kept you from medical appointments, getting your medicines, non-medical meetings or appointments, work, or from getting things that you need?: Yes   Physical Activity: Insufficiently Active (9/29/2024)    Exercise Vital Sign      "Days of Exercise per Week: 2 days     Minutes of Exercise per Session: 10 min   Stress: Stress Concern Present (9/29/2024)    [employer] [place] [employer] - Occupational Stress Questionnaire     Feeling of Stress : Very much   Social Connections: Unknown (9/29/2024)    Social Connection and Isolation Panel [NHANES]     Frequency of Social Gatherings with Friends and Family: Never   Interpersonal Safety: Low Risk  (5/13/2024)    Interpersonal Safety     Do you feel physically and emotionally safe where you currently live?: Yes     Within the past 12 months, have you been hit, slapped, kicked or otherwise physically hurt by someone?: No     Within the past 12 months, have you been humiliated or emotionally abused in other ways by your partner or ex-partner?: No   Housing Stability: High Risk (9/30/2024)    Housing Stability     Do you have housing? : Yes     Are you worried about losing your housing?: Yes       VITALS:  /77   Pulse 86   Temp 97.5  F (36.4  C)   Resp 16   Ht 1.803 m (5' 11\")   Wt 142 kg (313 lb)   SpO2 99%   BMI 43.65 kg/m      PHYSICAL EXAM    Constitutional:  Alert, in no acute distress. Cooperative.  EYES: Conjunctivae clear.   HENT:  Atraumatic, normocephalic.  Respiratory:  Respirations even, unlabored, in no acute respiratory distress. Lungs clear to auscultation bilaterally without wheeze, rhonchi, or rales. No cough. Speaks in full sentences easily.  Cardiovascular:  Regular rate and rhythm, good peripheral perfusion. No peripheral edema. No chest wall tenderness.  GI: Morbidly obese. Soft. No tenderness to palpation. No guarding, rebound, or other peritoneal signs.  : ED tech chaperone. Focal area of fluctuance left gluteal crease with overlying erythema, no significant warmth or purulence. No crepitus. No perineal erythema, warmth, edema, ecchymosis, crepitus. No testicular or perineal edema, erythema, warmth, purulence, crepitus. No perianal tenderness to " palpation.   Musculoskeletal:  No edema. No cyanosis. Range of motion major extremities intact.    Integument: Warm, Dry. No rash to visualized skin.   Neurologic:  Alert & oriented. No focal deficits noted.   Psych: Normal mood and affect.      LAB:  All pertinent labs reviewed and interpreted.  Results for orders placed or performed during the hospital encounter of 10/10/24   Glucose by meter   Result Value Ref Range    GLUCOSE BY METER POCT 167 (H) 70 - 99 mg/dL   Basic metabolic panel   Result Value Ref Range    Sodium 139 135 - 145 mmol/L    Potassium 4.6 3.4 - 5.3 mmol/L    Chloride 102 98 - 107 mmol/L    Carbon Dioxide (CO2) 23 22 - 29 mmol/L    Anion Gap 14 7 - 15 mmol/L    Urea Nitrogen 16.3 6.0 - 20.0 mg/dL    Creatinine 1.06 0.67 - 1.17 mg/dL    GFR Estimate 89 >60 mL/min/1.73m2    Calcium 9.5 8.8 - 10.4 mg/dL    Glucose 135 (H) 70 - 99 mg/dL   Result Value Ref Range    Magnesium 2.0 1.7 - 2.3 mg/dL   Blood gas venous   Result Value Ref Range    pH Venous 7.45 (H) 7.32 - 7.43    pCO2 Venous 37 (L) 40 - 50 mm Hg    pO2 Venous 49 (H) 25 - 47 mm Hg    Bicarbonate Venous 26 21 - 28 mmol/L    Base Excess/Deficit Venous 1.9 -3.0 - 3.0 mmol/L    FIO2 21     Oxyhemoglobin Venous 83 (H) 70 - 75 %    O2 Sat, Venous 88.7 (H) 70.0 - 75.0 %   Ketone Beta-Hydroxybutyrate Quantitative   Result Value Ref Range    Ketone (Beta-Hydroxybutyrate) Quantitative <0.18 <=0.30 mmol/L   CBC with platelets and differential   Result Value Ref Range    WBC Count 13.0 (H) 4.0 - 11.0 10e3/uL    RBC Count 5.10 4.40 - 5.90 10e6/uL    Hemoglobin 14.8 13.3 - 17.7 g/dL    Hematocrit 43.4 40.0 - 53.0 %    MCV 85 78 - 100 fL    MCH 29.0 26.5 - 33.0 pg    MCHC 34.1 31.5 - 36.5 g/dL    RDW 14.3 10.0 - 15.0 %    Platelet Count 201 150 - 450 10e3/uL    % Neutrophils 87 %    % Lymphocytes 9 %    % Monocytes 3 %    % Eosinophils 0 %    % Basophils 0 %    % Immature Granulocytes 1 %    NRBCs per 100 WBC 0 <1 /100    Absolute Neutrophils 11.3 (H) 1.6  - 8.3 10e3/uL    Absolute Lymphocytes 1.2 0.8 - 5.3 10e3/uL    Absolute Monocytes 0.4 0.0 - 1.3 10e3/uL    Absolute Eosinophils 0.0 0.0 - 0.7 10e3/uL    Absolute Basophils 0.0 0.0 - 0.2 10e3/uL    Absolute Immature Granulocytes 0.1 <=0.4 10e3/uL    Absolute NRBCs 0.0 10e3/uL       RADIOLOGY:  Reviewed all pertinent imaging. Please see official radiology report.  No orders to display     I, Marielos Bhat, am serving as a scribe to document services personally performed by Elizabeth Stubbs PA-C based on my observation and the provider's statements to me. I, Elizabeth Stubbs PA-C, attest that Marielos Bhat is acting in a scribe capacity, has observed my performance of the services and has documented them in accordance with my direction.    Elizabeth Stubbs PA-C  [facility] EMERGENCY DEPARTMENT  [facility] [facility] [address]  [phone]      Elizabeth Stubbs PA-C  10/11/24 0047

## 2024-10-11 NOTE — ED TRIAGE NOTES
Patient was instructed to come in if he had consistent blood sugar spikes. They have been in the 200s. Patient got a steroid injection and now his BS is in 300s.           Patient is very long winded in triage      Triage Assessment (Adult)       Row Name 10/10/24 3953          Triage Assessment    Airway WDL WDL        Respiratory WDL    Respiratory WDL WDL        Skin Circulation/Temperature WDL    Skin Circulation/Temperature WDL WDL        Cardiac WDL    Cardiac WDL WDL        Peripheral/Neurovascular WDL    Peripheral Neurovascular WDL WDL        Cognitive/Neuro/Behavioral WDL    Cognitive/Neuro/Behavioral WDL WDL

## 2024-10-14 NOTE — PROGRESS NOTES
Daron Bond is a 44 year old who is being evaluated via a billable video visit.     How would you like to obtain your AVS? MyChart  If the video visit is dropped, the invitation should be resent by: Text to cell phone: [phone]  Will anyone else be joining your video visit? No        Medical  Weight Loss Follow-Up Diet Evaluation  Assessment:  Daron is presenting today for a follow up weight management nutrition consultation.  This patient has had an initial appointment and was referred by Dr. Steiner for MNT as treatment for Morbid obesity  Weight loss medication:  Ozempic .   Pt's weight is 313 lbs (10/10/2024)  Initial weight: 346.9 lbs  Weight change: 33.9 lbs, 9.8% weight loss        10/13/2024     5:16 PM   Changes and Difficulties   With regards to my diet, I am still struggling with: Eating carbohydrates. I cant afford enough keab protean, fruits and veggies. I get rice and pasta free from the food pantry.   With regards to my activity/exercise, I am still struggling with: Getting to the gym.     BMI: 43.65  Ideal body weight: 75.3 kg (166 lb 0.1 oz)  Adjusted ideal body weight: 102 kg (224 lb 12.9 oz)    Estimated RMR ([place]-St Jeor equation):   2,473 kcals x 1.2 (sedentary) = 2,968 kcals (for weight maintenance)  Recommended Protein Intake: 100 - 110 grams of protein/day  Patient Active Problem List:  Patient Active Problem List   Diagnosis    Cerebellar tonsillar ectopia (H)    Asthma, moderate persistent    Degeneration of intervertebral disc of lumbar region    OCD (obsessive compulsive disorder)    Myofascial pain syndrome    Obesity, morbid (H)    Chronic pain syndrome    Bipolar I disorder (H)    Spondylolisthesis of lumbar region    Spinal stenosis of lumbar region    TYRELL (generalized anxiety disorder)    Primary hypertension    Atypical chest pain    Diabetes mellitus, type 2 (H)    Continuous opioid dependence (H)   Diabetes: T2DM, A1C of 5.5% on 9/30/2024  7.4% on 5/13/2024  "    Nutrition History:   Food allergies/intolerances/cultural or religous food customs: Yes - dee  Per MD note 6/14/2024: \"Today we discussed our Bariatric Surgery program to address their weight related health. He is not appropriate for the surgical program today and we focused on non surgical options per his request. Should he struggle with non surgical weight loss, quit smoking and have motivation to live the bariatric lifestyle, we can revisit that option next year.\"  -patient has been having cognitive issues and issues with sleeping  -patient is trying to work on his mental health and starting with a new therapist within the next couple of weeks  -patient expressed that he feels overwhelmed with the various appointments and medications that he has to take  -patient has been doing intermittent fasting and trying to cut back on oral intake - has been limiting dinner  -patient is hoping to lose enough weight to have back surgery    Progress on goals from last visit: Patient stated that he has had multiple ER visits within the past few days - stated that he had issues with a leg abscess. Patient stated that he has been very tired from medications. Patient has been increasing CHO intake. Discussed the importance of protein and fiber with CHO items. Patient feels like he is eating less CHO than he use to - helping his A1C go down within the past few months. Patient has been utilizing food pantry donations - gets a delivery 1x/month. Patient has been having issues with transportation. Patient stated that he has been having issues with stress.     Goals:  Aim to have a protein intake with every meal/snack  Try to fuel every 4-5 hours to help with blood sugar control and for portion control from meal to meal    Exercise:   Patient has not been going to the gym for PT d/t no car insurance and limited ability to drive  Nutrition Diagnosis:    Morbid obesity related to excessive energy intake as evidence by " inconsistent meals, inconsistent protein intake and BMI of 43.65      Intervention:  Food and/or nutrient delivery: aim to have protein and fiber with CHO items. Look into different [facility] resources for food options. Gradually increase exercise.   Nutrition education: budget bytes website, [facility] Food Sources, low impact workout videos    Monitoring/Evaluation:    Goals:  Aim to have a protein intake with every meal/snack  Try to fuel every 4-5 hours to help with blood sugar control and for portion control from meal to meal    Patient to follow up in <1 month(s) with bariatrician and 3 month(s) with RD    Video-Visit Details    Type of service:  Video Visit    Video Start Time (time video started): 9:26 AM    Video End Time (time video stopped): 10:01 AM    Originating Location (pt. Location): Home      Distant Location (provider location):  Off-site    Mode of Communication:  Video Conference via [facility]    Physician has received verbal consent for a Video Visit from the patient? Yes      Ramona Roper RD

## 2024-10-15 ENCOUNTER — VIRTUAL VISIT (OUTPATIENT)
Dept: SURGERY | Facility: CLINIC | Age: 44
End: 2024-10-15
Payer: COMMERCIAL

## 2024-10-15 DIAGNOSIS — E11.9 TYPE 2 DIABETES, HBA1C GOAL < 7% (H): ICD-10-CM

## 2024-10-15 DIAGNOSIS — E66.01 MORBID OBESITY WITH BMI OF 40.0-44.9, ADULT (H): Primary | ICD-10-CM

## 2024-10-15 PROCEDURE — 97803 MED NUTRITION INDIV SUBSEQ: CPT | Mod: 95 | Performed by: DIETITIAN, REGISTERED

## 2024-10-15 NOTE — PATIENT INSTRUCTIONS
[email or web address]    https://www.Scayl.us/sites/default/files/Assistance%20and%20Support/Find%20Food%20and%20Basic%20Supplies%20ENGLISH%208.23.pdf        Low-Impact Workout Videos (click on title for link)  Improved Health - effective low impact workouts (gentle, beginner and intermediate level workouts)  Senior Shape with Liv -  easy to follow, low impact exercises for beginners  Vitality Life with Cisco Ivan - exercises are completed seated, standing or a combination of both making the exercise just right for them  Chelsey's Fit Pilates -  easy to follow Pilates, Strength & Cardio workouts - look for wall pilates workouts here!  Nourish.Move.Love - quick + effective home workouts that are 10-30 minutes  Body Project - low impact workout videos that includes HIIT cardio, resistance training, pilates and yoga  Flora  - low impact workout videos  Walk at Home - low impact, easy to follow movements to walk in place  [email or web address] - easy to follow, seated or standing workouts for beginners!  Chair Fit Camp - full-body workout from your chair

## 2024-10-15 NOTE — LETTER
10/15/2024      Daron Bond  [address] [place] [address]  [place] [address]      Dear Colleague,    Thank you for referring your patient, Daron Bond, to the [facility] [place]. Please see a copy of my visit note below.    Daron Bond is a 44 year old who is being evaluated via a billable video visit.     How would you like to obtain your AVS? MyChart  If the video visit is dropped, the invitation should be resent by: Text to cell phone: [phone]  Will anyone else be joining your video visit? No        Medical  Weight Loss Follow-Up Diet Evaluation  Assessment:  Daron is presenting today for a follow up weight management nutrition consultation.  This patient has had an initial appointment and was referred by Dr. Steiner for MNT as treatment for Morbid obesity  Weight loss medication:  Ozempic .   Pt's weight is 313 lbs (10/10/2024)  Initial weight: 346.9 lbs  Weight change: 33.9 lbs, 9.8% weight loss        10/13/2024     5:16 PM   Changes and Difficulties   With regards to my diet, I am still struggling with: Eating carbohydrates. I cant afford enough keab protean, fruits and veggies. I get rice and pasta free from the food pantry.   With regards to my activity/exercise, I am still struggling with: Getting to the gym.     BMI: 43.65  Ideal body weight: 75.3 kg (166 lb 0.1 oz)  Adjusted ideal body weight: 102 kg (224 lb 12.9 oz)    Estimated RMR ([place]-St Jeor equation):   2,473 kcals x 1.2 (sedentary) = 2,968 kcals (for weight maintenance)  Recommended Protein Intake: 100 - 110 grams of protein/day  Patient Active Problem List:  Patient Active Problem List   Diagnosis     Cerebellar tonsillar ectopia (H)     Asthma, moderate persistent     Degeneration of intervertebral disc of lumbar region     OCD (obsessive compulsive disorder)     Myofascial pain syndrome     Obesity, morbid (H)     Chronic pain syndrome     Bipolar I disorder (H)      "Spondylolisthesis of lumbar region     Spinal stenosis of lumbar region     TYRELL (generalized anxiety disorder)     Primary hypertension     Atypical chest pain     Diabetes mellitus, type 2 (H)     Continuous opioid dependence (H)   Diabetes: T2DM, A1C of 5.5% on 9/30/2024  7.4% on 5/13/2024     Nutrition History:   Food allergies/intolerances/cultural or religous food customs: Yes - dee  Per MD note 6/14/2024: \"Today we discussed our Bariatric Surgery program to address their weight related health. He is not appropriate for the surgical program today and we focused on non surgical options per his request. Should he struggle with non surgical weight loss, quit smoking and have motivation to live the bariatric lifestyle, we can revisit that option next year.\"  -patient has been having cognitive issues and issues with sleeping  -patient is trying to work on his mental health and starting with a new therapist within the next couple of weeks  -patient expressed that he feels overwhelmed with the various appointments and medications that he has to take  -patient has been doing intermittent fasting and trying to cut back on oral intake - has been limiting dinner  -patient is hoping to lose enough weight to have back surgery    Progress on goals from last visit: Patient stated that he has had multiple ER visits within the past few days - stated that he had issues with a leg abscess. Patient stated that he has been very tired from medications. Patient has been increasing CHO intake. Discussed the importance of protein and fiber with CHO items. Patient feels like he is eating less CHO than he use to - helping his A1C go down within the past few months. Patient has been utilizing food pantry donations - gets a delivery 1x/month. Patient has been having issues with transportation. Patient stated that he has been having issues with stress.     Goals:  Aim to have a protein intake with every meal/snack  Try to fuel every " 4-5 hours to help with blood sugar control and for portion control from meal to meal    Exercise:   Patient has not been going to the gym for PT d/t no car insurance and limited ability to drive  Nutrition Diagnosis:    Morbid obesity related to excessive energy intake as evidence by inconsistent meals, inconsistent protein intake and BMI of 43.65      Intervention:  Food and/or nutrient delivery: aim to have protein and fiber with CHO items. Look into different [facility] resources for food options. Gradually increase exercise.   Nutrition education: [employer] website, [facility] Food Sources, low impact workout videos    Monitoring/Evaluation:    Goals:  Aim to have a protein intake with every meal/snack  Try to fuel every 4-5 hours to help with blood sugar control and for portion control from meal to meal    Patient to follow up in <1 month(s) with bariatrician and 3 month(s) with RD    Video-Visit Details    Type of service:  Video Visit    Video Start Time (time video started): 9:26 AM    Video End Time (time video stopped): 10:01 AM    Originating Location (pt. Location): Home      Distant Location (provider location):  Off-site    Mode of Communication:  Video Conference via [facility]    Physician has received verbal consent for a Video Visit from the patient? Yes      Ramona Roper RD           Again, thank you for allowing me to participate in the care of your patient.        Sincerely,        Ramona Roper RD
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
## 2024-10-18 ENCOUNTER — OFFICE VISIT (OUTPATIENT)
Dept: SURGERY | Facility: CLINIC | Age: 44
End: 2024-10-18
Payer: COMMERCIAL

## 2024-10-18 VITALS
DIASTOLIC BLOOD PRESSURE: 78 MMHG | HEIGHT: 71 IN | BODY MASS INDEX: 43.54 KG/M2 | SYSTOLIC BLOOD PRESSURE: 126 MMHG | WEIGHT: 311 LBS

## 2024-10-18 DIAGNOSIS — K59.03 DRUG INDUCED CONSTIPATION: Primary | ICD-10-CM

## 2024-10-18 DIAGNOSIS — M72.2 PLANTAR FASCIITIS OF LEFT FOOT: ICD-10-CM

## 2024-10-18 PROCEDURE — 99215 OFFICE O/P EST HI 40 MIN: CPT | Performed by: EMERGENCY MEDICINE

## 2024-10-18 RX ORDER — POLYETHYLENE GLYCOL 3350 17 G/17G
1 POWDER, FOR SOLUTION ORAL DAILY
Qty: 510 G | Refills: 0 | Status: SHIPPED | OUTPATIENT
Start: 2024-10-18 | End: 2024-11-17

## 2024-10-18 RX ORDER — MAGNESIUM CARB/ALUMINUM HYDROX 105-160MG
TABLET,CHEWABLE ORAL
Qty: 296 ML | Refills: 0 | Status: SHIPPED | OUTPATIENT
Start: 2024-10-18

## 2024-10-18 NOTE — PATIENT INSTRUCTIONS
57 lbs down from peak weight of 368 lbs, and down 35 lbs from our introductory visit earlier this summer, a 15.5% and 10% weight reduction in total body weight respectively.      https://www.hungersolutions.org/find-help/       Plan:   1.  Diet: aiming for 1800-2000kcal/day with 100-110g grams of lean protein to support your muscle mass well. Consider a protein shake if affordable, otherwise good eggs/cottage cheese/chicken/canned tuna can be more affordable protein options. Coupons provided.    2. Exercise: referral back to podiatry per your request given ongoing issues with left foot.     3. Medication: Ozempic 2mg/week tolerated ok but with some constipation/loose stools. You can use 2 teaspoons (10ml) of mag citrate nightly and miralax if you have gone more than 2-3 days without a Bowel Movement.    4. A1c much improved on current regimen, we'll continue Ozempic.    5. Goals: down over 10% already, aiming to get BMI under 40 this winter if all is going well.       Wegovy/Ozempic (semaglutide) is a very effective satiety boosting appetite suppressant. It needs to be ramped up slowly to be tolerated adequately.  About 1/10 people will not tolerate this medication. Each month, you move up to a higher dose until eventually reaching the 2.4mg/week dose over 5 months, if tolerating the ramping process well. When in plentiful supply, one try at re-ramping is recommended if the initial ramping has too many side effects/isn't tolerated well and if that attempt at  re-ramping isn't tolerated well, it's best to find an alternative.       Wegovy starts at 0.25mg/week for 4 weeks then increases to 0.5mg/week for 4 weeks then 1mg/week for 4 weeks then 1.7mg/week for 4 weeksthen 2.4mg/week usually for 6-9 months if tolerated well.  Injections can be given after cleansing the skin with alcohol prep pad or swab (available OTC).  Warming to room temperature 20-60 minutes prior to injection by placing on a table/counter reduces  potential irritation at the time of injection.    Stop Wegovy if severe abdominal pain/vomiting/rash/throat swelling or constant nausea that prevents adequate food/water intake. Stop 2-3 weeks prior to any planned general anesthesia surgeries to reduce risk for something called a post operative ileus. If unexpected illness/injury that requires surgery, plan to go off Wegovy until fully recovered.    Gallstones can occur in about 1% of patients on this medication so update me if increase right upper abdominal pain after eating.     Start meals with protein first, separate beverages from meals by 20 minutes and work hard in between meals to get your 64-75 oz of water daily to reduce risks for severe constipation. Consider a fiber supplement like powdered psyllium husk in 12 oz water each night.    For Prevention and Treatment of Constipation when on Semaglutide     From least aggressive to most aggressive:     Move: Wallking is essential - the more we move, the more our bowels move  Water: Drink water - 64oz-80oz per day suits most people well. About an one ounce of water per gram of protein eaten.  Go when you need to go. Don't wait. The longer you wait, the harder it gets.  Fiber: Fruit, raw veggies, nuts, whole grains, prune each night, flax/jaylan seeds added into meals can all be helpful.   Stool Softeners: if constipation is mild and for maintenance  Gentle laxatives: Miralax, senokot, dulcolax , Smooth move tea as needed     More aggressive (and typically won't get to this point)  Milk of Magnesia to empty out. Don't go anywhere far from a toilet for 6 hours.  Mag Citrate (what you drink before a colonoscopy).   Suppositories  Enema      Check out [employer] for patient resources.      If you have weekends off, I recommend dosing Thursday or Friday evenings for best control over weekends and ability to ride out some transient side effects should they occur.    Some people starve on this medication if not mindful  about food intake. I recommend starting meals with the protein part of your meal first, chew thoroughly and separate beverages from meals by about 20 minutes to make sure you get your nourishment in first. Include vegetables/complex carbohydrates and unsaturated fat as part of your balanced diet but group these at the end of the meal, after protein is mostly gone. Satiety will kick in too early if drinking too much with meals and under nourishment can result.  If under nourished, more muscle mass losses and metabolic slowing will result and work against us in the long run.    It's not a bad idea to take a complete multivitamin most days of the week if using this medication. Low Iron formulas may be less constipating.    Pancreatitis is a very rare but potentially serious side effect. Stop Wegovy if severe mid abdominal pain/burning in nature or if unable to eat/drink due to severe nausea/discomfort.   People with strong history of pancreatitis without clear cause should stay clear of this medication as should those with strong personal or family history for medullary thyroid cancer or Multiple Endocrine Neoplasia (rare).     Kind Regards,  Ricardo Steiner MD  [facility]          LEAN PROTEIN SOURCES  Getting 20-30 grams of protein, 3 meals daily, is appropriate for most people, some need more but more than about 40 grams per meal is not useful.  General rule is drinking one ounce of water per gram of protein eaten over the course of the day:  70 grams of protein each day, drink 70 oz of water.  Protein Source Portion Calories Grams of Protein                           Nonfat, plain Greek yogurt    (10 grams sugar or less) 3/4 cup (6 oz)  12-17   Light Yogurt (10 grams sugar or less) 3/4 cup (6 oz)  6-8   Protein Shake 1 shake 110-180 15-30   Skim/1% Milk or lactose-free milk 1 cup ( 8 oz)  8   Plain or light, flavored soymilk 1 cup  7-8   Plain or light,  hemp milk 1 cup 110 6   Fat Free or 1% Cottage Cheese 1/2 cup 90 15   Part skim ricotta cheese 1/2 cup 100 14   Part skim or reduced fat cheese slices 1 ounce 65-80 8     Mozzarella String Cheese 1 80 8   Canned tuna, chicken, crab or salmon  (canned in water)  1/2 cup 100 15-20   White fish (broiled, grilled, baked) 3 ounces 100 21   [place]/Tuna (broiled, grilled, baked) 3 ounces 150-180 21   Shrimp, Scallops, Lobster, Crab 3 ounces 100 21   Pork loin, Pork Tenderloin 3 ounces 150 21   Boneless, skinless chicken /turkey breast                          (broiled, grilled, baked) 3 ounces 120 21   [place], [place], [place], and Venison 3 ounces 120 21   Lean cuts of red meat and pork (sirloin,   round, tenderloin, flank, ground 93%-96%) 3 ounces 170 21   Lean or Extra Lean Ground Turkey 1/2 cup 150 20   90-95% Lean Topanga Burger 1 brit 140-180 21   Low-fat casserole with lean meat 3/4 cup 200 17   Luncheon Meats                                                        (turkey, lean ham, roast beef, chicken) 3 ounces 100 21   Egg (boiled, poached, scrambled) 1 Egg 60 7   Egg Substitute 1/2 cup 70 10   Nuts (limit to 1 serving per day)  3 Tbsp. 150 7   Nut Pierson (peanut, almond)  Limit to 1 serving or less daily 1 Tbsp. 90 4   Soy Burger (varies) 1  15   Garbanzo, Black, Goldsmith Beans 1/2 cup 110 7   Refried Beans 1/2 cup 100 7   Kidney and Lima beans 1/2 cup 110 7   Tempeh 3 oz 175 18   Vegan crumbles 1/2 cup 100 14   Tofu 1/2 cup 110 14   Chili (beans and extra lean beef or turkey) 1 cup 200 23   Lentil Stew/Soup 1 cup 150 12   Black Bean Soup 1 cup 175 12       On-the-Go Breakfast Ideas  As of 2015, the latest research shows what a huge impact eating breakfast has on losing weight and feeling your best. People lose more weight when they make breakfast their biggest meal of the day compared to Dinner, but even if you cannot go to that degree, getting a breakfast that has at least 20 grams of protein and even a moderate  amount of fat is ideal for maintaining good energy through the day and limits overeating in the evening hours.  The following are some quick and easy suggestions for at least getting something of substance into your body in the morning.  Enjoy!    Eating breakfast within 90 minutes of waking up is an important part of taking care of your body on a restricted calorie diet plan.  After sleeping for hours, your body is in need of fuel.  An ideal breakfast is a combination of protein, whole-grain carbohydrates, or fruit.  Here s why:    -Protein digests very slowly in the body, helping you feel more satisfied.  -Whole grains provide dietary fiber, which also digests slowly and helps keep your gut clean.  -Fruit is a great source of vitamins, minerals, and fiber.     Each one of these breakfast combinations has between 200-300 calories and 15-20 grams of protein.  Feel free to mix and match!    Bone Broth (chicken bone broth or beef bone broth) is a great way to boost protein content. 8oz of bone broth will typically have 9-12grams of protein for 40kcal of energy.    Protein: Choose  -1/2 cup low-fat cottage cheese  -2 hard boiled eggs , or one cooked in olive oil (low/slow heat).  -1 low fat string cheese stick  -1 TablesFORA.tvon natural peanut butter  -SourceYourCity vegetarian sausage brit (found in freezer section)  -1 slice lowfat cheese  -6 oz 2% or lowfat Greek yogurt, such as Fage or Oikos.    PLUS    Whole Grains:  Choose   -1 whole wheat English muffin  -1 whole wheat bernardo, half  -1/2 Fiber One frozen muffin, thawed  -1/2 Fiber One toaster pastry  -1 whole wheat bagel thin  -1/2 cup Kashi cereal  -1 Kashi waffle (or other whole grain high-fiber waffle)  Aim for whole grain/sprouted breads with at least 3g of fiber/slice if having bread. [place] is one such brand.    OR    Fruit: Choose  -1/3 cup blueberries  -1/2 banana (or a plantain- similar to a banana, yet smaller)  -1/2 cup cantaloupe cubes  -1 small  apple  -1 small orange  -1/2 cup strawberries  -handful raspberries/blackberries (each berry is about 1 calorie).    *Adapted from Diabetes Living, Fall 20    Ten Breakfasts Under 250 calories    Ideally, getting between 350-600 calories  (depending on starting height and weight)for breakfast is ideal for avoiding hunger later in the day, adjust/add to the following accordingly:    One- 250 calories, 8.5 g protein  1 slice whole-grain toast   1 Tbsp peanut butter    banana    Two- 250 calories, 8 g protein    cup nonfat/lowfat yogurt  1/3rd cup diced no-sugar peaches  1/3rd cup cereal (like Special K, Cheerios, or bran flakes)    Three- 250 calories, 25 g protein  1 egg scrambled with 1 oz skim milk    cup shredded cheddar    whole grain English muffin  1 oz [place] santacruz  1 tsp margarine spread    Four- 225 calories, 25 g protein  1/2 cup Kashi Go-Lean cereal    cup skim milk mixed with 1 scoop Bariatric Advantage protein powder    cup no-sugar diced pears    Five- 250 calories, 20 g protein    cup oatmeal prepared with skim milk, 1 scoop protein powder, and sugar-free maple syrup    Six- 200 calories, 5 g protein  1 whole grain waffle, toasted  1 tablespoon creamy peanut or almond butter    Seven-  250 calories, 19 g protein  Breakfast sandwich: 1 slice whole grain toast, cut in half.  Add 1 scrambled egg and one slice cheddar  cheese.    Eight-  250 calories, 15 g protein  2 eggs scrambled with 1/3 cup frozen spinach (heat before adding to eggs) and 2 tablespoons low fat cream cheese.    Nine-  150 calories, 15 g protein  2/3rd cup cottage cheese    cup cantaloupe    Ten- 200 calories, 20 g protein  Fruit smoothie made with 4 oz. nonfat Greek yogurt,   cup berries, 1 scoop protein powder, and 4 oz skim milk.    Ten Lunches Under 250 Calories    Aim for lunch to be around 300-400 calories a day when trying to lose weight and get that protein in!    One- 200 calories, 11 g protein  1/3 cup tuna salad made with  light arthur on 1 slice whole grain bread  1 small peeled apple    Two- 250 calories, 16 g protein  1/3 cup lowfat cottage cheese    cup cooked green beans    small fruit cocktail (in natural juice)    Three- 200 calories, 11 g protein    grilled cheese sandwich on whole grain bread with lowfat cheese  2/3rd cup of tomato soup    Four- 250 calories, 22 g protein  Deli wrap: 1 oz sliced turkey, 1 oz sliced ham, 1 oz sliced chicken rolled up with 1 slice low-fat cheese  1 small orange    Five- 250 calories, 28 g protein  2/3rd cup chili with 1 oz shredded cheese  4 saltine crackers    Six- 250 calories, 22 g protein  1 cup fresh spinach with 2 oz chicken, 1/3rd cup mandarin oranges, and 2 tablespoons sliced almonds with 1 tablespoon  vinaigrette dressing    Seven- 200 calories, 11 g protein  1 Tbsp sugar-free preserves and 1 Tbsp peanut butter on 1 slice whole grain toast    cup nonfat/lowfat Greek yogurt    Eight- 250 calories, 18 g protein  1 small soft-shell chicken taco with 1 oz shredded cheese, lettuce, tomato, salsa, and 1 Tbsp light sour cream    cup black beans    Nine- 225 calories, 13 g protein  2 ounces baked chicken  1/4 cup mashed potatoes    cup green beans    Ten- 200 calories, 21 g protein  Deli bernardo: 2 oz roast beef or other deli meat with 1 tsp Vikram mayonnaise and sliced tomato, onion, and lettuce  1/3rd cup cottage cheese      Ten Dinners Under 300 calories    If you're eating a large breakfast and medium lunch, keep dinner small.  300-400 calories is ideal for most people depending on their caloric needs.    One- 300 calories, 12 g protein  1-inch thick slice of turkey meatloaf    cup baked butternut squash    Two- 200 calories, 9 g protein  Bread-less BLT: 3 slices turkey santacruz, sliced tomato, wrapped in a large lettuce leaf    cup peeled fruit    Three- 275 calories, 36 g protein  3 oz roasted chicken    cup cooked broccoli    cup shredded cheddar cheese    cup unsweetened applesauce    Four- 200  calories, 25 g protein  3 oz baked tilapia  1/3rd cup cooked carrots    cup yogurt    Five- 250 calories, 20 g protein  Grilled ham  n  Swiss: spread 2 tsp ghee or butter on 1 slice of whole grain bread.  Cut bread in half, layer 2 oz deli ham with 1 piece of Swiss cheese and grill until cheese is melted.    cup cooked vegetables    Six- 250 calories, 18 g protein  Vegetarian cheeseburger: 1 [place] cheeseburger topped with lettuce, onion, tomato, and ketchup/mustard    cup sweet potato fries    Seven- 250 calories, 18 g protein  Pork pot roast: 2 oz roasted pork loin, 1/3rd cup roasted carrots,   medium potato, cooked with   cup gravy    Eight- 330 calories, 25 g protein  2 oz meatballs (about 2 small meatballs)    cup spaghetti sauce  1/2 piece toast topped with 1 tsp ghee or butterand topped with garlic powder, toasted in oven    Nine- 250 calories, 16 g protein  Mexican pizza: one 8  corn tortilla topped with 2 oz chicken,   cup salsa, 2 tablespoons black beans, 2 tablespoons shredded cheese.  Bake until cheese is melted.    Ten- 250 calories, 22 g protein  Shrimp stir-quintana: 3 oz cooked shrimp, 1/6th onion,   pepper,   cup chopped carrots sautéed in 1 tablespoon olive oil, topped with 2 tablespoons stir quintana sauce and a pinch of sesame seeds        150 Calories or Less Snack Ideas   1 hardboiled egg with   cup berries  1 small apple with 1 hardboiled egg  10 almonds with   cup berries  2 clementines with 1 light string cheese  1 light string cheese with   sliced apple  1 light string cheese wrapped in 2 slices of turkey  4 100% whole wheat crackers (e.g. Triscuit) with 1 light string cheese    c. cottage cheese with   cup fruit and 1 Tbsp sunflower seeds     cup cottage cheese with   of an avocado     can tuna fish with 1 cup sliced cucumbers     cup roasted garbanzo beans with paprika and cayenne pepper    baked sweet potato with   cup chili beans or   cup cottage cheese  2 oz. nitrate free turkey slices with 1 cup  carrots  1 container (6 oz) of low sugar (less than 10 grams of sugar) greek yogurt   3 Tablespoons of hummus with 1 cup sliced bell peppers   2 Tablespoons of hummus with 15 baby carrots  4 Tablespoons ranch dip made with plain Greek Yogurt and 3 mini cucumbers  1/4 cup nuts (any kind)  1 Tablespoon peanut butter with 1 stalk celery   1 dill pickle wrapped in 1-2 slices of deli ham with 1 tsp of mayonnaise/mustard.

## 2024-10-18 NOTE — LETTER
10/18/2024  10:34 AM      RE: Daron Bond  1980  [address]   [facility] [place] [address]        Regarding funds for EBT card:     I've been following Mr. Bond for his complicated Morbid obesity with co-morbid type II diabetes and chronic back pain/plantar fasciitis. As part of his reduced calorie diet he requires 100grams of lean protein daily to nourish his body adequately and this can run up against his current grocery budget. If such policies exist to allocate more funds to allow for increased protein content, I'd advocate for the granting of such funds for this patient in light of his serious weight related conditions.    Thank you for consideration of this patient's nutritional needs in this year ahead.  He has had good success thus far and continued nutritional support will be a vital health care need for him.       Kind Regards,  Ricardo Steiner MD  [facility]

## 2024-10-18 NOTE — LETTER
10/18/2024      Daron Bond  [address]  [facility] [address]      Dear Colleague,    Thank you for referring your patient, Daron Bond, to the [facility] [place]. Please see a copy of my visit note below.    Bariatric Clinic Follow-Up Visit:    Daron Bond is a 44 year old  male with Body mass index is 43.38 kg/m .  presenting here today for follow-up on non-surgical efforts for weight loss. Original Intake visit occurred on 6/14/24 with a weight of 346 lbs and BMI of 47.7.  Along with diet and behavior changes, he has been using semaglutide (Ozempic)  and metformin for improving glycemic control/type II diabetes and to assist his weight loss goals.  He's currently ramped up to 2mg/week and finds it tolerable with some alternating constipation/diarrhea as of our 10/18/24 check up.      See his intake visit notes for details on identified contributors to weight gain in the past. Chart review shows Dietician calculated RMR of 2473kcal/day and protein intake goal of 100-110g/day.    Has leg abscess issues in summer and fall of 2024 that are hindering his exercise tools/capacity.    Weight:   Wt Readings from Last 5 Encounters:   10/18/24 141.1 kg (311 lb)   10/10/24 142 kg (313 lb)   09/30/24 142 kg (313 lb)   07/08/24 (!) 151.5 kg (334 lb)   06/27/24 (!) 156.9 kg (346 lb)    pounds  Lab Results   Component Value Date    A1C 5.5 09/30/2024    A1C 7.4 05/13/2024    A1C 5.0 07/02/2018     57 lbs down from peak weight of 368 lbs, and down 35 lbs from our introductory visit earlier this summer, a 15.5% and 10% weight reduction in total body weight respectively.  Max weight of 390 lbs he recalls.  Comorbidities:  Patient Active Problem List   Diagnosis     Cerebellar tonsillar ectopia (H)     Asthma, moderate persistent     Degeneration of intervertebral disc of lumbar region     OCD (obsessive compulsive disorder)     Myofascial pain syndrome      Obesity, morbid (H)     Chronic pain syndrome     Bipolar I disorder (H)     Spondylolisthesis of lumbar region     Spinal stenosis of lumbar region     TYRELL (generalized anxiety disorder)     Primary hypertension     Atypical chest pain     Diabetes mellitus, type 2 (H)     Continuous opioid dependence (H)       Current Outpatient Medications:      albuterol (PROAIR HFA/PROVENTIL HFA/VENTOLIN HFA) 108 (90 Base) MCG/ACT inhaler, INHALE 2 PUFFS BY MOUTH EVERY 6 HOURS, Disp: 8.5 g, Rfl: 4     amLODIPine (NORVASC) 5 MG tablet, TAKE 1 TABLET(5 MG) BY MOUTH DAILY, Disp: 90 tablet, Rfl: 5     blood glucose (NO BRAND SPECIFIED) test strip, Use to test blood sugar 3 times daily or as directed. To accompany: Blood Glucose Monitor Brands: per insurance., Disp: 100 strip, Rfl: 6     blood glucose monitoring (NO BRAND SPECIFIED) meter device kit, Use to test blood sugar 1 times daily or as directed. Preferred blood glucose meter OR supplies to accompany: Blood Glucose Monitor Brands: per insurance., Disp: 1 kit, Rfl: 0     busPIRone (BUSPAR) 15 MG tablet, Take 1 tablet (15 mg) by mouth 3 times daily., Disp: 90 tablet, Rfl: 5     Continuous Glucose Sensor (FREESTYLE ANNE MARIE 3 SENSOR) MISC, 1 each continuously., Disp: 2 each, Rfl: 11     Cyanocobalamin (B-12) 1000 MCG SUBL, Take once weekly., Disp: 50 tablet, Rfl: 1     gabapentin (NEURONTIN) 300 MG capsule, Take 1 capsule (300 mg) by mouth 3 times daily., Disp: 90 capsule, Rfl: 2     hydrochlorothiazide (MICROZIDE) 12.5 MG capsule, Take 1 capsule (12.5 mg) by mouth every morning, Disp: 90 capsule, Rfl: 3     ketoconazole (NIZORAL) 2 % external shampoo, Apply topically daily as needed for itching or irritation., Disp: 100 mL, Rfl: 1     losartan (COZAAR) 100 MG tablet, Take 1 tablet (100 mg) by mouth daily, Disp: 90 tablet, Rfl: 1     metFORMIN (GLUCOPHAGE XR) 500 MG 24 hr tablet, Take 2 tablets (1,000 mg) by mouth 2 times daily (with meals). (Patient taking differently: Take  "500 mg by mouth 2 times daily (with meals).), Disp: 120 tablet, Rfl: 5     metoprolol succinate ER (TOPROL XL) 100 MG 24 hr tablet, Take 1 tablet (100 mg) by mouth daily, Disp: 30 tablet, Rfl: 5     ondansetron (ZOFRAN ODT) 4 MG ODT tab, Take 1 tablet (4 mg) by mouth every 8 hours as needed for nausea., Disp: 10 tablet, Rfl: 1     oxyCODONE (ROXICODONE) 5 MG tablet, Take 1 tablet (5 mg) by mouth 3 times daily as needed for severe pain., Disp: 90 tablet, Rfl: 0     pantoprazole (PROTONIX) 40 MG EC tablet, TAKE 1 TABLET BY MOUTH DAILY, Disp: 90 tablet, Rfl: 2     Semaglutide, 2 MG/DOSE, (OZEMPIC) 8 MG/3ML pen, Inject 2 mg subcutaneously every 7 days., Disp: 9 mL, Rfl: 3     thin (NO BRAND SPECIFIED) lancets, Use with lanceting device. To accompany: Blood Glucose Monitor Brands: per insurance., Disp: 100 each, Rfl: 6     Vitamin D3 (CHOLECALCIFEROL) 25 mcg (1000 units) tablet, Take 1 tablet (25 mcg) by mouth daily for 270 days, Disp: 90 tablet, Rfl: 2     saccharomyces boulardii (FLORASTOR) 250 MG capsule, Take 1 capsule (250 mg) by mouth 2 times daily for 14 days. Take 2 hours after each Augmentin dose (Patient not taking: Reported on 10/18/2024), Disp: 28 capsule, Rfl: 0      Interim: Since our last visit, he has had disrupted sleep over probate issues due to inheritance being \"robbed\" from him.  Finances are tight.   Traveling is hard for him to limit options.   Had leg abscess last week get drained.  Some steroid injection last week, bumped his blood sugars.  Numerous questions answered.     Needs letter to support higher protein diet.     Plan:   1.  Diet: aiming for 1800-2000kcal/day with 100-110g grams of lean protein to support your muscle mass well. Consider a protein shake if affordable, otherwise good eggs/cottage cheese/chicken/canned tuna can be more affordable protein options. Coupons provided.    2. Exercise: referral back to podiatry per your request given ongoing issues with left foot.     3. " "Medication: Ozempic 2mg/week tolerated ok but with some constipation/loose stools. You can use 2 teaspoons (10ml) of mag citrate nightly and miralax if you have gone more than 2-3 days without a Bowel Movement.    4. A1c much improved on current regimen, we'll continue Ozempic.    5. Goals: down over 10% already, aiming to get BMI under 40 this winter if all is going well.       We discussed [facility] Bariat[facility] Basics including:  -eating 3 meals daily  -reviewed metabolic needs for weight loss based on Resting Metabolic Rate  -protein goals supportive of healthy weight loss  -avoiding/limiting calorie containing beverages  -We discussed the importance of restorative sleep and stress management in maintaining a healthy weight.  -We discussed the National Weight Control Registry healthy weight maintenance strategies and ways to optimize metabolism.  -We discussed the importance of physical activity including cardiovascular and strength training in maintaining a healthier weight and explored viable options.      Most recent labs:  Lab Results   Component Value Date    WBC 13.0 (H) 10/10/2024    HGB 14.8 10/10/2024    HCT 43.4 10/10/2024    MCV 85 10/10/2024     10/10/2024     Lab Results   Component Value Date    CHOL 156 09/30/2024     Lab Results   Component Value Date    HDL 41 09/30/2024     No components found for: \"LDLCALC\"  Lab Results   Component Value Date    TRIG 157 (H) 09/30/2024     No results found for: \"CHOLHDL\"  Lab Results   Component Value Date    ALT 35 08/06/2024    AST 40 08/06/2024    ALKPHOS 91 08/06/2024     No results found for: \"HGBA1C\"  Lab Results   Component Value Date    B12 652 06/14/2024     No components found for: \"VITDT1\"  No results found for: \"PATTI\"  Lab Results   Component Value Date    PTHI 54 06/14/2024     No results found for: \"ZN\"  No results found for: \"VIB1WB\"  Lab Results   Component Value Date    TSH 1.41 08/06/2024     No results found for: \"TEST\"    DIETARY " "HISTORY  Limited by finances, he inquires about letter to endorse higher EBT balance to accommodate his protein needs.      PHYSICAL ACTIVITY PATTERNS:  Cardiovascular: limited by chronic pain in back/foot/radicular sx.   Strength Training: limited to chores.    REVIEW OF SYSTEMS  Pain in left foot comes and goes. Some improvement since Podiatry injection but requests referral for re-evaluation.   Intermittent radicular back pain to right thigh, long standing. No perineal numbness.   Constipation has produced some hemorrhoids  Abscess reportedly healing well s/p I and D.  PHYSICAL EXAM:  Vitals: /78 (BP Location: Right arm, Patient Position: Sitting, Cuff Size: Adult Small)   Ht 1.803 m (5' 11\")   Wt 141.1 kg (311 lb)   BMI 43.38 kg/m    Weight:   Wt Readings from Last 3 Encounters:   10/18/24 141.1 kg (311 lb)   10/10/24 142 kg (313 lb)   09/30/24 142 kg (313 lb)         GEN: Pleasant, well groomed, in no acute distress  HEENT:  unkempt hair.  NECK: No swelling.  HEART: RRR without murmur.  LUNGS: No respiratory difficulty noted. No cough.  ABDOMEN: obese, nontender.  EXTREMITIES: No tremor. Ambulation is independent...  NEURO: Alert and Oriented X3, fluent speech. .  SKIN: No visible rashes. .    Interim study results:   Lab Results   Component Value Date    A1C 5.5 09/30/2024    A1C 7.4 05/13/2024    A1C 5.0 07/02/2018     Last Comprehensive Metabolic Panel:  Sodium   Date Value Ref Range Status   10/10/2024 139 135 - 145 mmol/L Final     Potassium   Date Value Ref Range Status   10/10/2024 4.6 3.4 - 5.3 mmol/L Final     Comment:     Specimen slightly hemolyzed. The reported potassium value may be falsely elevated. Analysis of a non-hemolyzed specimen (i.e. re-draw) may result in a lower potassium value.     Chloride   Date Value Ref Range Status   10/10/2024 102 98 - 107 mmol/L Final     Carbon Dioxide (CO2)   Date Value Ref Range Status   10/10/2024 23 22 - 29 mmol/L Final     Anion Gap   Date Value " Ref Range Status   10/10/2024 14 7 - 15 mmol/L Final     Glucose   Date Value Ref Range Status   10/10/2024 135 (H) 70 - 99 mg/dL Final   04/27/2018 83 65 - 99 mg/dL Final     Comment:                   Fasting reference interval     Lab test performed by:  Lab Mnemonic:[email or web address]  Octonius-Roland Kasper  [address] [facility] [facility] [place] [address]  Saul De Jesus M.D.  QUEST Specimen received date and time: 27-APR-2018 14:55:00.00       GLUCOSE BY METER POCT   Date Value Ref Range Status   10/10/2024 167 (H) 70 - 99 mg/dL Final     Urea Nitrogen   Date Value Ref Range Status   10/10/2024 16.3 6.0 - 20.0 mg/dL Final     Creatinine   Date Value Ref Range Status   10/10/2024 1.06 0.67 - 1.17 mg/dL Final     GFR Estimate   Date Value Ref Range Status   10/10/2024 89 >60 mL/min/1.73m2 Final     Comment:     eGFR calculated using 2021 CKD-EPI equation.     Calcium   Date Value Ref Range Status   10/10/2024 9.5 8.8 - 10.4 mg/dL Final     Comment:     Reference intervals for this test were updated on 7/16/2024 to reflect our healthy population more accurately. There may be differences in the flagging of prior results with similar values performed with this method. Those prior results can be interpreted in the context of the updated reference intervals.     Bilirubin Total   Date Value Ref Range Status   08/06/2024 0.5 <=1.2 mg/dL Final     Alkaline Phosphatase   Date Value Ref Range Status   08/06/2024 91 40 - 150 U/L Final     ALT   Date Value Ref Range Status   08/06/2024 35 0 - 70 U/L Final     AST   Date Value Ref Range Status   08/06/2024 40 0 - 45 U/L Final     Comment:     Specimen is hemolyzed which can falsely elevate AST. Analysis of a non-hemolyzed specimen may result in a lower value.               TSH   Date Value Ref Range Status   08/06/2024 1.41 0.30 - 4.20 uIU/mL Final     Lab Results   Component Value Date    WBC 13.0 10/10/2024     Lab Results   Component Value Date    RBC 5.10 10/10/2024     Lab Results    Component Value Date    HGB 14.8 10/10/2024     Lab Results   Component Value Date    HCT 43.4 10/10/2024     Lab Results   Component Value Date    MCV 85 10/10/2024     Lab Results   Component Value Date    MCH 29.0 10/10/2024     Lab Results   Component Value Date    MCHC 34.1 10/10/2024     Lab Results   Component Value Date    RDW 14.3 10/10/2024     Lab Results   Component Value Date     10/10/2024     .      40 minutes spent by me on the date of the encounter doing chart review, history and exam, letter writing, documentation and further activities per the note   Ricardo Steiner MD  [facility]  4:22 PM  10/17/2024    Again, thank you for allowing me to participate in the care of your patient.        Sincerely,        Ricardo Steiner MD

## 2024-10-19 ENCOUNTER — NURSE TRIAGE (OUTPATIENT)
Dept: NURSING | Facility: CLINIC | Age: 44
End: 2024-10-19
Payer: COMMERCIAL

## 2024-10-19 ENCOUNTER — TELEPHONE (OUTPATIENT)
Dept: FAMILY MEDICINE | Facility: CLINIC | Age: 44
End: 2024-10-19
Payer: COMMERCIAL

## 2024-10-19 DIAGNOSIS — F41.9 ANXIETY: ICD-10-CM

## 2024-10-19 NOTE — TELEPHONE ENCOUNTER
"  Medication Question or Refill    Contacts       Contact Date/Time Type Contact Phone/Fax    10/19/2024 11:47 AM CDT Phone (Incoming) Mack Bond (Self) [phone] (M)            What medication are you calling about (include dose and sig)?: busPIRone (BUSPAR) 15 MG tablet     Preferred Pharmacy:   [id number] [[place]], [place] - [address] [place] [address]  [address] [facility] [address]  Phone: [phone] Fax: [phone]      Controlled Substance Agreement on file:   CSA -- Patient Level:     [Media Unavailable] Controlled Substance Agreement - Opioid - Scan on 6/23/2023  8:42 AM   [Media Unavailable] Controlled Substance Agreement - Opioid - Scan on 5/27/2022  7:39 PM       Who prescribed the medication?: PCP    Do you need a refill? Yes, Pt only has enough to get through Sunday but will be completely out on Monday. Needs refill routed to a new location. On a lot of steroids & also mentioned he spoke to PCP about possible dosage increase. Would like to increase dosage.     When did you use the medication last? 10/19/2024    Patient offered an appointment? No    Do you have any questions or concerns?  Yes: Stress level is very high, pt reports having \"Roid rage\" outbursts & punching out a wall. Pt also mentioned harming himself & others. Routed to [facility] per red flag protocol.     Could we [facility] this [facility] you [facility] or would you prefer to receive a phone call?:   Patient would prefer a phone call   Okay to leave a detailed message?: Yes at Cell number on file:    Telephone Information:   Mobile [phone]       "

## 2024-10-19 NOTE — TELEPHONE ENCOUNTER
Nurse Triage SBAR    Is this a 2nd Level Triage? NO    Situation: Patient calling looking to have prescription transferred to another pharmacy.      Background: Reports needing to have prescription transferred to another pharmacy due to transportation needs as he has a ride to another pharmacy to  several other medications and he will be out of medication after tomorrow.  He states he has refills on file.      Assessment: prescription transfer needed    Recommendation: Advised patient to speak with pharmacy about transferring prescription and to call back if more questions.     Reason for Disposition    Caller with prescription and triager answers question    Protocols used: Medication Refill and Renewal Call-A-

## 2024-10-21 ENCOUNTER — PATIENT OUTREACH (OUTPATIENT)
Dept: CARE COORDINATION | Facility: CLINIC | Age: 44
End: 2024-10-21
Payer: COMMERCIAL

## 2024-10-21 RX ORDER — BUSPIRONE HYDROCHLORIDE 15 MG/1
15 TABLET ORAL 3 TIMES DAILY
Qty: 90 TABLET | Refills: 5 | Status: SHIPPED | OUTPATIENT
Start: 2024-10-21

## 2024-10-23 ENCOUNTER — PATIENT OUTREACH (OUTPATIENT)
Dept: CARE COORDINATION | Facility: CLINIC | Age: 44
End: 2024-10-23
Payer: COMMERCIAL

## 2024-10-25 ENCOUNTER — VIRTUAL VISIT (OUTPATIENT)
Dept: FAMILY MEDICINE | Facility: CLINIC | Age: 44
End: 2024-10-25
Payer: COMMERCIAL

## 2024-10-25 ENCOUNTER — TELEPHONE (OUTPATIENT)
Dept: FAMILY MEDICINE | Facility: CLINIC | Age: 44
End: 2024-10-25

## 2024-10-25 DIAGNOSIS — F41.9 ANXIETY: Primary | ICD-10-CM

## 2024-10-25 PROCEDURE — 99213 OFFICE O/P EST LOW 20 MIN: CPT | Mod: 95

## 2024-10-25 RX ORDER — HYDROXYZINE HYDROCHLORIDE 25 MG/1
25 TABLET, FILM COATED ORAL 3 TIMES DAILY PRN
Qty: 30 TABLET | Refills: 0 | Status: SHIPPED | OUTPATIENT
Start: 2024-10-25

## 2024-10-25 ASSESSMENT — ANXIETY QUESTIONNAIRES
1. FEELING NERVOUS, ANXIOUS, OR ON EDGE: NEARLY EVERY DAY
GAD7 TOTAL SCORE: 18
IF YOU CHECKED OFF ANY PROBLEMS ON THIS QUESTIONNAIRE, HOW DIFFICULT HAVE THESE PROBLEMS MADE IT FOR YOU TO DO YOUR WORK, TAKE CARE OF THINGS AT HOME, OR GET ALONG WITH OTHER PEOPLE: EXTREMELY DIFFICULT
2. NOT BEING ABLE TO STOP OR CONTROL WORRYING: NEARLY EVERY DAY
6. BECOMING EASILY ANNOYED OR IRRITABLE: MORE THAN HALF THE DAYS
3. WORRYING TOO MUCH ABOUT DIFFERENT THINGS: NEARLY EVERY DAY
GAD7 TOTAL SCORE: 18
4. TROUBLE RELAXING: NEARLY EVERY DAY
GAD7 TOTAL SCORE: 18
5. BEING SO RESTLESS THAT IT IS HARD TO SIT STILL: SEVERAL DAYS
7. FEELING AFRAID AS IF SOMETHING AWFUL MIGHT HAPPEN: NEARLY EVERY DAY
8. IF YOU CHECKED OFF ANY PROBLEMS, HOW DIFFICULT HAVE THESE MADE IT FOR YOU TO DO YOUR WORK, TAKE CARE OF THINGS AT HOME, OR GET ALONG WITH OTHER PEOPLE?: EXTREMELY DIFFICULT
7. FEELING AFRAID AS IF SOMETHING AWFUL MIGHT HAPPEN: NEARLY EVERY DAY

## 2024-10-25 ASSESSMENT — ENCOUNTER SYMPTOMS: NERVOUS/ANXIOUS: 1

## 2024-10-25 NOTE — PROGRESS NOTES
Mack is a 44 year old who is being evaluated via a billable video visit.    What phone number would you like to be contacted at? listed  How would you like to obtain your AVS? Kasi      Assessment & Plan     Anxiety  Patient having increased anxiety related to situation with his brother that is now in probate per his report.  He was recently seen in urgent care and was given hydroxyzine which she felt was effective.  He is prescribed with a prescription of hydroxyzine, discussed side effects and use.  We also discussed propranolol if he feels he needs additional support for his anxiety.  He is taking buspirone 15 mg daily.he has follow-up with primary care provider in 4 days.  He also requests follow-up with writer and 1 is scheduled for 2 weeks virtually.  Patient reports his anxiety is so severe that he is unable to leave his home.  - hydrOXYzine HCl (ATARAX) 25 MG tablet; Take 1 tablet (25 mg) by mouth 3 times daily as needed for anxiety.            Subjective   Will is a 44 year old, presenting for the following health issues:  Anxiety (Increased symptoms)      10/25/2024     2:12 PM   Additional Questions   Roomed by alex   Accompanied by n/a     Changed to telephone     If you don't hear from a representative within 2 business days, please call [phone].    Is working on situation that is in probate with his brother.      Had a reaction to an injection and an abscess. He was given       History of Present Illness       Mental Health Follow-up:  Patient presents to follow-up on Anxiety.    Patient's anxiety since last visit has been:  Worse  The patient is having other symptoms associated with anxiety.  Any significant life events: No  Patient is feeling anxious or having panic attacks.  Patient has no concerns about alcohol or drug use.    He eats 0-1 servings of fruits and vegetables daily.He consumes 0 sweetened beverage(s) daily.He exercises with enough effort to increase his heart rate 9 or less  minutes per day.  He exercises with enough effort to increase his heart rate 3 or less days per week.   He is taking medications regularly.         8/30/2023    11:46 AM 11/13/2023    12:50 PM 10/25/2024     2:21 PM   TYRELL-7 SCORE   Total Score 16 (severe anxiety) 18 (severe anxiety) 18 (severe anxiety)   Total Score 16 18 18        Patient-reported                      Objective           Vitals:  No vitals were obtained today due to virtual visit.    Physical Exam             Telephone-Visit Details  Telephone    Originating Location (pt. Location): Home    Distant Location (provider location):  On-site  Platform used for Video Visit: Telephone  Signed Electronically by: ILIANA Sun CNP

## 2024-10-25 NOTE — TELEPHONE ENCOUNTER
FYI - Status Update    Who is Calling: patient    Update: Patient calling to report he was in UC over the weekend due to increased anxiety. He states he was given 2 tablets of Hydroxyzine and reports positive experience with this decreasing his overall anxiety level.   Patient would like to discuss with PCP about possible changes in medications. Scheduled for virtual visit on 10/29/24.    He also reports he does not drive and depends on his insurance for delivery of any medications. Patient requesting appointment with provider today to discuss any options to treat increased anxiety management for over the weekend. Scheduled with Chelsey Haddad.

## 2024-10-28 NOTE — PROGRESS NOTES
Clinic Care Coordination Contact  Clinic Care Coordination Contact  OUTREACH    Referral Information:  Referral Source: PCP    Primary Diagnosis: Psychosocial    Chief Complaint   Patient presents with    Clinic Care Coordination - Initial        Universal Utilization: see below  Clinic Utilization  Difficulty keeping appointments:: No  Compliance Concerns: No  No-Show Concerns: No  No PCP office visit in Past Year: No  Utilization      No Show Count (past year)  2             ED Visits  3             Hospital Admissions  0                    Current as of: 10/27/2024  8:23 PM                Clinical Concerns:  Current Medical Concerns:    Patient Active Problem List   Diagnosis    Cerebellar tonsillar ectopia (H)    Asthma, moderate persistent    Degeneration of intervertebral disc of lumbar region    OCD (obsessive compulsive disorder)    Myofascial pain syndrome    Obesity, morbid (H)    Chronic pain syndrome    Bipolar I disorder (H)    Spondylolisthesis of lumbar region    Spinal stenosis of lumbar region    TYRELL (generalized anxiety disorder)    Primary hypertension    Atypical chest pain    Diabetes mellitus, type 2 (H)    Continuous opioid dependence (H)         Current Behavioral Concerns:     Education Provided to patient: yes   Pain  Pain (GOAL):: No  Health Maintenance Reviewed: Up to date  Clinical Pathway: None    Medication Management:  Medication review status: Medications reviewed and no changes reported per patient.        Patient manages medication independently     Functional Status:  Dependent ADLs:: Ambulation-cane  Dependent IADLs:: Independent  Bed or wheelchair confined:: No  Mobility Status: Independent w/Device  Fallen 2 or more times in the past year?: No  Any fall with injury in the past year?: No    Living Situation:  Current living arrangement:: I live in a private home with family  Type of residence:: Apartment    Lifestyle & Psychosocial Needs:    Social Drivers of Health     Food  Insecurity: High Risk (9/30/2024)    Food Insecurity     Within the past 12 months, did you worry that your food would run out before you got money to buy more?: Yes     Within the past 12 months, did the food you bought just not last and you didn t have money to get more?: Yes   Depression: At risk (9/30/2024)    PHQ-2     PHQ-2 Score: 6   Housing Stability: High Risk (9/30/2024)    Housing Stability     Do you have housing? : Yes     Are you worried about losing your housing?: Yes   Tobacco Use: High Risk (10/25/2024)    Patient History     Smoking Tobacco Use: Every Day     Smokeless Tobacco Use: Never     Passive Exposure: Current   Financial Resource Strain: Low Risk  (9/30/2024)    Financial Resource Strain     Within the past 12 months, have you or your family members you live with been unable to get utilities (heat, electricity) when it was really needed?: No   Alcohol Use: Not on file   Transportation Needs: High Risk (9/30/2024)    Transportation Needs     Within the past 12 months, has lack of transportation kept you from medical appointments, getting your medicines, non-medical meetings or appointments, work, or from getting things that you need?: Yes   Physical Activity: Insufficiently Active (9/29/2024)    Exercise Vital Sign     Days of Exercise per Week: 2 days     Minutes of Exercise per Session: 10 min   Interpersonal Safety: Low Risk  (5/13/2024)    Interpersonal Safety     Do you feel physically and emotionally safe where you currently live?: Yes     Within the past 12 months, have you been hit, slapped, kicked or otherwise physically hurt by someone?: No     Within the past 12 months, have you been humiliated or emotionally abused in other ways by your partner or ex-partner?: No   Stress: Stress Concern Present (9/29/2024)    [employer] [place] [employer] - Occupational Stress Questionnaire     Feeling of Stress : Very much   Social Connections: Unknown (9/29/2024)    Social  Connection and Isolation Panel [NHANES]     Frequency of Communication with Friends and Family: Not on file     Frequency of Social Gatherings with Friends and Family: Never     Attends Jain Services: Not on file     Active Member of Clubs or Organizations: Not on file     Attends Club or Organization Meetings: Not on file     Marital Status: Not on file   Health Literacy: Not on file     Diet:: Diabetic diet  Inadequate nutrition (GOAL):: No  Tube Feeding: No  Inadequate activity/exercise (GOAL):: No  Significant changes in sleep pattern (GOAL): No  Transportation means:: Medical transport, Public transportation     Jain or spiritual beliefs that impact treatment:: No  Mental health DX:: Yes  Mental health DX how managed:: Medication, Outpatient Counseling  Mental health management concern (GOAL):: No  Informal Support system:: Family        44 yr M PMH: as listed above.  Patient known to [facility] as he graduated from the program 8/14/24 with [facility].  Spoke with Will and he was in a Taxi.  Asked if this writer could call at a different time and he stated no.  He was verbally upset as he described needing an injection at the pain clinic tomorrow and was informed today that he needed physical therapy.  He was asking this writer to get him into PT today so he could go to his injection tomorrow.  Writer gave him the phone number to PT to schedule and also instructed him to call his  to determine if therapy was indeed needed.  He stated an understanding.  Mack currently has a [facility], [facility]  and an [facility]'s worker.  Redirected him throughout the conversation.   Unable to establish any goals due to the nature of the call.  Patient not enrolled to [facility].  Resources given for him to make the above calls.  Per chart review Mack was able to attend his injection the following day.     Resources and Interventions:  Current Resources:      Community Resources: Financial/Insurance, Other (see comment)  Supplies  Currently Used at Home: None  Equipment Currently Used at Home: none  Employment Status: disabled         Advance Care Plan/Directive  Advanced Care Plans/Directives on file:: No  Discussed with patient/caregiver:: Declined Further Information    Referrals Placed: Other, Community Resources, Financial Services, [facility] Resources         Care Plan:      Patient/Caregiver understanding: patient stated an understanding    Outreach Frequency:   Future Appointments                Tom[place] M[place] Hearn [place] [facility] [place]    In 4 days Romina Boyle [facility]    In 1 week Chelsey Haddad APRN CNP [facility] [place]    In 2 months Ramona Roper RD [facility]    In 4 months Ricardo Steiner MD [facility]            Plan: Patient Not Enrolled to [facility]

## 2024-10-29 DIAGNOSIS — I10 PRIMARY HYPERTENSION: ICD-10-CM

## 2024-10-29 RX ORDER — LOSARTAN POTASSIUM 100 MG/1
100 TABLET ORAL DAILY
Qty: 90 TABLET | Refills: 2 | Status: SHIPPED | OUTPATIENT
Start: 2024-10-29

## 2024-11-01 ENCOUNTER — VIRTUAL VISIT (OUTPATIENT)
Dept: EDUCATION SERVICES | Facility: CLINIC | Age: 44
End: 2024-11-01
Payer: COMMERCIAL

## 2024-11-01 DIAGNOSIS — E11.9 TYPE 2 DIABETES MELLITUS WITHOUT COMPLICATION, WITHOUT LONG-TERM CURRENT USE OF INSULIN (H): Primary | ICD-10-CM

## 2024-11-01 PROCEDURE — G0108 DIAB MANAGE TRN  PER INDIV: HCPCS | Mod: 93 | Performed by: DIETITIAN, REGISTERED

## 2024-11-01 RX ORDER — BLOOD SUGAR DIAGNOSTIC
STRIP MISCELLANEOUS
Qty: 200 STRIP | Refills: 3 | Status: SHIPPED | OUTPATIENT
Start: 2024-11-01

## 2024-11-01 NOTE — PROGRESS NOTES
Diabetes Self-Management Education & Support/ 45minutes    Presents for: Follow-up    Type of Service: Telephone Visit    Originating Location (Patient Location): Home  Distant Location (Provider Location): [facility]  Mode of Communication:  Telephone    Telephone Visit Start Time:  11am  Telephone Visit End Time (telephone visit stop time): 11:45    How would patient like to obtain AVS? Kasi      ASSESSMENT:  Weight was 346# on 6/14/24 when Will first met with the weight management program, most recent weight was 311# on 10/18/24. He continues to inject Ozempic 2 mg weekly and 1000 mg Metformin. Will is checking his up 3-5x/day. We discussed reducing testing to 1-2x/day:  aiming for fasting and one PP reading/day, or alternating.  Recent A1c was 5.5% on 9/30/24 which we reviewed. Will has not been able to get to the gym for the past two months due to transportation issues. He did arrange transportation for 3x/week starting next week through [facility] to get to the gym, and he is looking forward to resuming his workouts. Will has reduced portions of carbohydrate foods significantly. He does struggle with food scarcity. We discussed having our food navigator to reach out with resources.          Patient's most recent   Lab Results   Component Value Date    A1C 5.5 09/30/2024     is meeting goal of <7.0    Diabetes knowledge and skills assessment:   Patient is knowledgeable in diabetes management concepts related to: Healthy Eating, Being Active, Monitoring, and Taking Medication    Continue education with the following diabetes management concepts: Being Active, Monitoring, and Taking Medication    Based on learning assessment above, most appropriate setting for further diabetes education would be: Individual setting.      PLAN  test blood sugar once or twice daily: either in the morning before eating, with goal to be under 130 and two hours after one meal per day,  with goal to be under  "180.  Inject Ozempic 2.0 mg weekly.   Continue to take Metformin  mg: two tablets daily.   Aim to have 1/2 of your plate filled with vegetables( all vegetables except: corn, peas, potatoes), 1/4 lean protein and 1/4 carbohydrate containing foods: rice, pasta, bread, potatoes.   Add in a serving of fruit between meals: a piece of fruit the size of a tennis ball or 1 cup cut up melon or berries or 1/2 arslan or 1/2 banana.   Add in exercise at the gym as able: 1-3x/week.     Topics to cover at upcoming visits: Being Active, Monitoring, and Taking Medication    Follow-up: 1/7/25    See Care Plan for co-developed, patient-state behavior change goals.  AVS provided for patient today.    Education Materials Provided:  No new materials provided today      SUBJECTIVE/OBJECTIVE:  Presents for: Follow-up  Accompanied by: Self  Diabetes education in the past 24mo: Yes  Focus of Visit: Monitoring, Reducing Risks, Taking Medication, Being Active, Healthy Eating  Diabetes type: Type 2  Disease course: Improving  Transportation concerns: Yes  Other concerns:: None  Cultural Influences/Ethnic Background:  Not  or       Diabetes Symptoms & Complications:  Weight trend: Decreasing  Disease course: Improving       Patient Problem List and Family Medical History reviewed for relevant medical history, current medical status, and diabetes risk factors.    Vitals:  There were no vitals taken for this visit.  Estimated body mass index is 43.38 kg/m  as calculated from the following:    Height as of 10/18/24: 1.803 m (5' 11\").    Weight as of 10/18/24: 141.1 kg (311 lb).   Last 3 BP:   BP Readings from Last 3 Encounters:   10/18/24 126/78   10/10/24 122/77   10/10/24 133/88       History   Smoking Status    Every Day    Types: Cigarettes   Smokeless Tobacco    Never       Labs:  Lab Results   Component Value Date    A1C 5.5 09/30/2024     Lab Results   Component Value Date     10/10/2024     10/10/2024    " "GLC 83 04/27/2018     Lab Results   Component Value Date    LDL 84 09/30/2024     Direct Measure HDL   Date Value Ref Range Status   09/30/2024 41 >=40 mg/dL Final   ]  GFR Estimate   Date Value Ref Range Status   10/10/2024 89 >60 mL/min/1.73m2 Final     Comment:     eGFR calculated using 2021 CKD-EPI equation.     No results found for: \"GFRESTBLACK\"  Lab Results   Component Value Date    CR 1.06 10/10/2024     No results found for: \"MICROALBUMIN\"    Healthy Eating:  Healthy Eating Assessed Today: Yes  Meal planning/habits: Low carb  Who cooks/prepares meals for you?: Self  Who purchases food in  your home?: Self  Meals include: Breakfast, Dinner  Other: used to eat way too many cho, now eating a fraction of what he was.  Beverages: Water (zero calorie monster drinks, sugar free green tea)  Has patient met with a dietitian in the past?: Yes    Being Active:  Being Active Assessed Today: Yes  Exercise:: Currently not exercising (light weights, limited for cardio due to back)  Days per week of moderate to strenuous exercise (like a brisk walk): 0  On average, minutes per day of exercise at this level: 20  Exercise Minutes per Week: 0    Monitoring:  Monitoring Assessed Today: Yes  Did patient bring glucose meter to appointment? : Yes  Blood Glucose Meter: Unknown  Times checking blood sugar at home (number): 4  Times checking blood sugar at home (per): Day    FBS: 105,105,97,129,96,102,97,96  Other BG checks throughout the day: vary between .    Taking Medications:  Diabetes Medication(s)       Biguanides       metFORMIN (GLUCOPHAGE XR) 500 MG 24 hr tablet Take 2 tablets (1,000 mg) by mouth 2 times daily (with meals).     Patient taking differently: Take 500 mg by mouth 2 times daily (with meals).       Incretin Mimetic Agents       Semaglutide, 2 MG/DOSE, (OZEMPIC) 8 MG/3ML pen Inject 2 mg subcutaneously every 7 days.            Taking Medication Assessed Today: Yes  Current Treatments: Non-insulin " Injectables, Oral Medication (taken by mouth)  Problems taking diabetes medications regularly?: Yes  Diabetes medication side effects?: Yes (metformin: GI affects)    Problem Solving:  Problem Solving Assessed Today: Yes (some problems seeing the dial up dose for ozempic)              Reducing Risks:  Reducing Risks Assessed Today: Yes  Diabetes Risks: Sedentary Lifestyle  CAD Risks: Diabetes Mellitus, Male sex  Has dilated eye exam at least once a year?: Yes  Sees dentist every 6 months?: No    Healthy Coping:  Healthy Coping Assessed Today: Yes  Emotional response to diabetes: Helplessness, Uncertain, Concern for health and well-being  Informal Support system:: None  Stage of change: ACTION (Actively working towards change)  Patient Activation Measure Survey Score:      6/10/2024    11:49 AM   DILIA Score (Last Two)   DILIA Raw Score 24   Activation Score 40.9   DILIA Level 1         Care Plan and Education Provided:  Being Active: Finding a physical activity routine that works for you, Monitoring: Frequency of monitoring and Individual glucose targets, and Reducing Risks: Goal for A1c, how it relates to glucose and how often to check        Time Spent: 45 minutes  Encounter Type: Individual    Any diabetes medication dose changes were made via the CDE Protocol per the patient's primary care provider. A copy of this encounter was shared with the provider.

## 2024-11-01 NOTE — PATIENT INSTRUCTIONS
Test blood sugar once or twice daily: either in the morning before eating, with goal to be under 130 and two hours after one meal per day,  with goal to be under 180.  Inject Ozempic 2.0 mg weekly.   Continue to take Metformin  mg: two tablets daily.   Aim to have 1/2 of your plate filled with vegetables( all vegetables except: corn, peas, potatoes), 1/4 lean protein and 1/4 carbohydrate containing foods: rice, pasta, bread, potatoes.   Add in a serving of fruit between meals: a piece of fruit the size of a tennis ball or 1 cup cut up melon or berries or 1/2 arslan or 1/2 banana.   Add in exercise at the gym as able: 1-3x/week.

## 2024-11-01 NOTE — LETTER
11/1/2024         RE: Daron Bond  [address]  [place] [address]        Dear Colleague,    Thank you for referring your patient, Daron Bond, to the [facility]. Please see a copy of my visit note below.    Diabetes Self-Management Education & Support/ 45minutes    Presents for: Follow-up    Type of Service: Telephone Visit    Originating Location (Patient Location): Home  Distant Location (Provider Location): [facility]  Mode of Communication:  Telephone    Telephone Visit Start Time:  11am  Telephone Visit End Time (telephone visit stop time): 11:45    How would patient like to obtain AVS? MyChart      ASSESSMENT:  Weight was 346# on 6/14/24 when Will first met with the weight management program, most recent weight was 311# on 10/18/24. He continues to inject Ozempic 2 mg weekly and 1000 mg Metformin. Will is checking his up 3-5x/day. We discussed reducing testing to 1-2x/day:  aiming for fasting and one PP reading/day, or alternating.  Recent A1c was 5.5% on 9/30/24 which we reviewed. Will has not been able to get to the gym for the past two months due to transportation issues. He did arrange transportation for 3x/week starting next week [facility] to get to the gym, and he is looking forward to resuming his workouts. Will has reduced portions of carbohydrate foods significantly. He does struggle with food scarcity. We discussed having our food navigator to reach out with resources.          Patient's most recent   Lab Results   Component Value Date    A1C 5.5 09/30/2024     is meeting goal of <7.0    Diabetes knowledge and skills assessment:   Patient is knowledgeable in diabetes management concepts related to: Healthy Eating, Being Active, Monitoring, and Taking Medication    Continue education with the following diabetes management concepts: Being Active, Monitoring, and Taking Medication    Based on learning  "assessment above, most appropriate setting for further diabetes education would be: Individual setting.      PLAN  test blood sugar once or twice daily: either in the morning before eating, with goal to be under 130 and two hours after one meal per day,  with goal to be under 180.  Inject Ozempic 2.0 mg weekly.   Continue to take Metformin  mg: two tablets daily.   Aim to have 1/2 of your plate filled with vegetables( all vegetables except: corn, peas, potatoes), 1/4 lean protein and 1/4 carbohydrate containing foods: rice, pasta, bread, potatoes.   Add in a serving of fruit between meals: a piece of fruit the size of a tennis ball or 1 cup cut up melon or berries or 1/2 arslan or 1/2 banana.   Add in exercise at the gym as able: 1-3x/week.     Topics to cover at upcoming visits: Being Active, Monitoring, and Taking Medication    Follow-up: 1/7/25    See Care Plan for co-developed, patient-state behavior change goals.  AVS provided for patient today.    Education Materials Provided:  No new materials provided today      SUBJECTIVE/OBJECTIVE:  Presents for: Follow-up  Accompanied by: Self  Diabetes education in the past 24mo: Yes  Focus of Visit: Monitoring, Reducing Risks, Taking Medication, Being Active, Healthy Eating  Diabetes type: Type 2  Disease course: Improving  Transportation concerns: Yes  Other concerns:: None  Cultural Influences/Ethnic Background:  Not  or       Diabetes Symptoms & Complications:  Weight trend: Decreasing  Disease course: Improving       Patient Problem List and Family Medical History reviewed for relevant medical history, current medical status, and diabetes risk factors.    Vitals:  There were no vitals taken for this visit.  Estimated body mass index is 43.38 kg/m  as calculated from the following:    Height as of 10/18/24: 1.803 m (5' 11\").    Weight as of 10/18/24: 141.1 kg (311 lb).   Last 3 BP:   BP Readings from Last 3 Encounters:   10/18/24 126/78   10/10/24 " "122/77   10/10/24 133/88       History   Smoking Status     Every Day     Types: Cigarettes   Smokeless Tobacco     Never       Labs:  Lab Results   Component Value Date    A1C 5.5 09/30/2024     Lab Results   Component Value Date     10/10/2024     10/10/2024    GLC 83 04/27/2018     Lab Results   Component Value Date    LDL 84 09/30/2024     Direct Measure HDL   Date Value Ref Range Status   09/30/2024 41 >=40 mg/dL Final   ]  GFR Estimate   Date Value Ref Range Status   10/10/2024 89 >60 mL/min/1.73m2 Final     Comment:     eGFR calculated using 2021 CKD-EPI equation.     No results found for: \"GFRESTBLACK\"  Lab Results   Component Value Date    CR 1.06 10/10/2024     No results found for: \"MICROALBUMIN\"    Healthy Eating:  Healthy Eating Assessed Today: Yes  Meal planning/habits: Low carb  Who cooks/prepares meals for you?: Self  Who purchases food in  your home?: Self  Meals include: Breakfast, Dinner  Other: used to eat way too many cho, now eating a fraction of what he was.  Beverages: Water (zero calorie monster drinks, sugar free green tea)  Has patient met with a dietitian in the past?: Yes    Being Active:  Being Active Assessed Today: Yes  Exercise:: Currently not exercising (light weights, limited for cardio due to back)  Days per week of moderate to strenuous exercise (like a brisk walk): 0  On average, minutes per day of exercise at this level: 20  Exercise Minutes per Week: 0    Monitoring:  Monitoring Assessed Today: Yes  Did patient bring glucose meter to appointment? : Yes  Blood Glucose Meter: Unknown  Times checking blood sugar at home (number): 4  Times checking blood sugar at home (per): Day    FBS: 105,105,97,129,96,102,97,96  Other BG checks throughout the day: vary between .    Taking Medications:  Diabetes Medication(s)       Biguanides       metFORMIN (GLUCOPHAGE XR) 500 MG 24 hr tablet Take 2 tablets (1,000 mg) by mouth 2 times daily (with meals).     Patient taking " differently: Take 500 mg by mouth 2 times daily (with meals).       Incretin Mimetic Agents       Semaglutide, 2 MG/DOSE, (OZEMPIC) 8 MG/3ML pen Inject 2 mg subcutaneously every 7 days.            Taking Medication Assessed Today: Yes  Current Treatments: Non-insulin Injectables, Oral Medication (taken by mouth)  Problems taking diabetes medications regularly?: Yes  Diabetes medication side effects?: Yes (metformin: GI affects)    Problem Solving:  Problem Solving Assessed Today: Yes (some problems seeing the dial up dose for ozempic)              Reducing Risks:  Reducing Risks Assessed Today: Yes  Diabetes Risks: Sedentary Lifestyle  CAD Risks: Diabetes Mellitus, Male sex  Has dilated eye exam at least once a year?: Yes  Sees dentist every 6 months?: No    Healthy Coping:  Healthy Coping Assessed Today: Yes  Emotional response to diabetes: Helplessness, Uncertain, Concern for health and well-being  Informal Support system:: None  Stage of change: ACTION (Actively working towards change)  Patient Activation Measure Survey Score:      6/10/2024    11:49 AM   DILIA Score (Last Two)   DILIA Raw Score 24   Activation Score 40.9   DILIA Level 1         Care Plan and Education Provided:  Being Active: Finding a physical activity routine that works for you, Monitoring: Frequency of monitoring and Individual glucose targets, and Reducing Risks: Goal for A1c, how it relates to glucose and how often to check        Time Spent: 45 minutes  Encounter Type: Individual    Any diabetes medication dose changes were made via the CDE Protocol per the patient's primary care provider. A copy of this encounter was shared with the provider.

## 2024-11-05 ENCOUNTER — PATIENT OUTREACH (OUTPATIENT)
Dept: CARE COORDINATION | Facility: CLINIC | Age: 44
End: 2024-11-05
Payer: COMMERCIAL

## 2024-11-05 DIAGNOSIS — J45.40 MODERATE PERSISTENT ASTHMA, UNSPECIFIED WHETHER COMPLICATED: ICD-10-CM

## 2024-11-05 DIAGNOSIS — G89.4 CHRONIC PAIN SYNDROME: ICD-10-CM

## 2024-11-05 DIAGNOSIS — E66.813 CLASS 3 SEVERE OBESITY DUE TO EXCESS CALORIES WITH SERIOUS COMORBIDITY AND BODY MASS INDEX (BMI) OF 45.0 TO 49.9 IN ADULT (H): ICD-10-CM

## 2024-11-05 DIAGNOSIS — E66.01 CLASS 3 SEVERE OBESITY DUE TO EXCESS CALORIES WITH SERIOUS COMORBIDITY AND BODY MASS INDEX (BMI) OF 45.0 TO 49.9 IN ADULT (H): ICD-10-CM

## 2024-11-05 RX ORDER — VITAMIN B COMPLEX
1 TABLET ORAL DAILY
Qty: 90 TABLET | Refills: 2 | Status: SHIPPED | OUTPATIENT
Start: 2024-11-05

## 2024-11-05 RX ORDER — ALBUTEROL SULFATE 90 UG/1
INHALANT RESPIRATORY (INHALATION)
Qty: 8.5 G | Refills: 4 | Status: CANCELLED | OUTPATIENT
Start: 2024-11-05

## 2024-11-05 RX ORDER — GABAPENTIN 300 MG/1
300 CAPSULE ORAL 3 TIMES DAILY
Qty: 90 CAPSULE | Refills: 2 | Status: SHIPPED | OUTPATIENT
Start: 2024-11-05

## 2024-11-05 RX ORDER — ALBUTEROL SULFATE 90 UG/1
INHALANT RESPIRATORY (INHALATION)
Qty: 8.5 G | Refills: 4 | Status: SHIPPED | OUTPATIENT
Start: 2024-11-05

## 2024-11-05 NOTE — PROGRESS NOTES
Clinic Care Coordination Contact  Community Health Worker Initial Outreach    CHW Initial Information Gathering:  Referral Source: PCP  Preferred Hospital: [facility]  [phone]  Current living arrangement:: I live in a private home with family  Type of residence:: Apartment  Community Resources: Financial/Insurance, Other (see comment) (Medical Assistance and SNAP)  Supplies Currently Used at Home: None  Equipment Currently Used at Home: none  Informal Support system:: Family  No PCP office visit in Past Year: No (9/25/24 with Dr. Hearn)  Transportation means:: Medical transport, [employer]  CHW Additional Questions  Dariant active?: Yes  Patient sent Social Drivers of Health questionnaire?: Yes    Patient accepts CC: Yes. Patient scheduled for assessment with FLOWER Raygoza on 11/21/24 at 10:00. Patient noted desire to discuss:     Increase on EBT- (Will is being seen by the weight management clinic through [facility]. His MD recommend that he increase his protein intake and consider looking into protein supplements.  Will is not able to afford the extra protein and he mentioned today something about seeing if he would be able to get extra $ with his EBT.  I am not sure how that works.  Is this based on income or can the PCP send in any documentation that he recommends more protein that would allow more money?- Message received from Romina Boyle RD  )    Transportation    Food Shelf delivery resources and food delivery resources    ** CHW sent Care Coordination Questionnaires through Kasi Urena  [facility] Worker  [facility]  Clinic Care Coordination   [facility], [facility], [place] and [facility]  Office: [phone]

## 2024-11-06 ENCOUNTER — PATIENT OUTREACH (OUTPATIENT)
Dept: NURSING | Facility: CLINIC | Age: 44
End: 2024-11-06
Payer: COMMERCIAL

## 2024-11-06 ENCOUNTER — PATIENT OUTREACH (OUTPATIENT)
Dept: CARE COORDINATION | Facility: CLINIC | Age: 44
End: 2024-11-06

## 2024-11-06 NOTE — PROGRESS NOTES
Food Resource Navigator Contact    FRN - Initial Outreach    Reason for call: Type 2 Diabetes  Other: Generalized Anxiety    Food Insecurity: High Risk (9/30/2024)    Food Insecurity     Within the past 12 months, did you worry that your food would run out before you got money to buy more?: Yes     Within the past 12 months, did the food you bought just not last and you didn t have money to get more?: Yes     Housing Stability: High Risk (9/30/2024)    Housing Stability     Do you have housing? : Yes     Are you worried about losing your housing?: Yes     Financial Resource Strain: Low Risk  (9/30/2024)    Financial Resource Strain     Within the past 12 months, have you or your family members you live with been unable to get utilities (heat, electricity) when it was really needed?: No     Transportation Needs: High Risk (9/30/2024)    Transportation Needs     Within the past 12 months, has lack of transportation kept you from medical appointments, getting your medicines, non-medical meetings or appointments, work, or from getting things that you need?: Yes       The patient was provided with the following food resources:  Food Resource Packet  [employer] online request for delivery  [place] [employer] delivery  [employer]--declined for now, no transportation and pt prefers to not have to get onto highways/freeways to get food/errands. Informed me of past traumatic experiences.     The patient was provided the following community resources:  None at this time    Patient provided verbal consent for FRN to share contact information with OC: Yes    Plan: Pt would appreciate online request for food shelves mentioned above. I will send him a FRP with $40 Cub gc. His [facility] [employer] [facility] ([employer]) insurance also covers (2) grocery trips a month.     [employer] - Fare for All would have ideally 10lbs + of protein meat and vegetables for pt and his household. It would be using [employer] $80 voucher/ monthly. However, I explained to him  that FFA only set up pop up shops in churches and community center places, which would involve driving. Offered him that if he is interested he can have family/friend go get it for him, of which he says no, he declined.     Will followup in a month.       Spent 15 minutes in consult with the patient.       Carlos Cavazos  Community Advancement Food Resource Navigator  [employer]   Cell: [phone]

## 2024-11-06 NOTE — PROGRESS NOTES
Clinic Care Coordination Contact    Situation: Patient chart reviewed by care coordinator.    Background: SWCC and pt talked over needs and concerns for initial assessment.     Assessment: Pt noted they have a new diet (high protein) and this is a financial strain for them. Pt would like resources for food assistance.     Plan/Recommendations: SWCC and pt reviewed resources - [facility] sent information via email. Writer communicated the risks of unencrypted electronic communication and the patient and/or patient representative has agreed to accept the risks and receive unencrypted communication related to the information or resources we have discussed. We reviewed that no PHI will be included.  Email address verified with the patient. Pt noted they are still using their health rides and are considering starting to drive again if they are able to find insurance for their car. Pt noted this is a long term plan. They continue to work with [facility] on other things such as legal (situation with their brother and money from their mother) and pt is waiting on new med management from [facility] as well. Pt noted they have therapy weekly and enjoy this. Pt is hoping for additional food resources as the number one concerns. Resources will be reviewed by pt who can reach out to this [facility] at any point in time. No additional outreach at this time; [facility] program closed.     Special Diet : https://www.[facility]./sites/default/files/Departments/Community%20Human%20Services/Alternative%20or%20Specialized%20Diet%20Request.pdf  Have your provider complete this form for you (whoever is recommending the special diet). Once completed; submit the form to [facility].   [phone]  8:30-3:30  [address] [place] [address]    Food Resources :     [employer] delivery - https://[email or web address]/food-shelf/food-delivery/    [place] Delivery Program - https://[email or web address]/[email or web address]/grocery-delivery/    Food Shelf  [employer] [facility]  [address] [place] [address]  3rd Monday of the month from 3:00 p.m. - 5:00 p.m.  Madeline Deleon  [phone]    Other options in [facility] - https://www.[facility]./sites/default/files/Assistance%20and%20Support/Find%20Food%20and%20Basic%20Supplies%20ENGLISH%208.23.pdf

## 2024-11-26 ENCOUNTER — TELEPHONE (OUTPATIENT)
Dept: FAMILY MEDICINE | Facility: CLINIC | Age: 44
End: 2024-11-26
Payer: COMMERCIAL

## 2024-11-26 DIAGNOSIS — E11.9 TYPE 2 DIABETES MELLITUS WITHOUT COMPLICATION, WITHOUT LONG-TERM CURRENT USE OF INSULIN (H): ICD-10-CM

## 2024-11-26 RX ORDER — BLOOD SUGAR DIAGNOSTIC
STRIP MISCELLANEOUS
Qty: 300 STRIP | Refills: 3 | Status: SHIPPED | OUTPATIENT
Start: 2024-11-26

## 2024-11-26 NOTE — TELEPHONE ENCOUNTER
Medication Question or Refill    Contacts       Contact Date/Time Type Contact Phone/Fax    11/26/2024 09:42 AM CST Phone (Incoming) Mack Bond (Self) [phone] (M)            What medication are you calling about (include dose and sig)?: Test strips for blood sugar. (Not One touch Verio IQ). Doesn't remember which ones he has. Said it was sent to [facility].     Preferred Pharmacy:       [id number] - [place]], [place] - [address] [place] [address]  [address] [facility] [address]  Phone: [phone] Fax: [phone]        Controlled Substance Agreement on file:   CSA -- Patient Level:     [Media Unavailable] Controlled Substance Agreement - Opioid - Scan on 6/23/2023  8:42 AM   [Media Unavailable] Controlled Substance Agreement - Opioid - Scan on 5/27/2022  7:39 PM       Who prescribed the medication?: Dr. Hearn    Do you need a refill? Yes, patient said he tests 4-5 times/day so he can learn more about what spikes his blood sugars so he is running out sooner than expected. Requesting a higher quantity. Said that some test strips are malfunctioning as well.     When did you use the medication last? Daily    Patient offered an appointment? No    Do you have any questions or concerns?  Yes: Please see above.       Could we send this information to you in [employer] or would you prefer to receive a phone call?:   Patient would prefer a phone call   Okay to leave a detailed message?: Yes at Home number on file [phone] (home)

## 2024-12-02 DIAGNOSIS — K21.00 GASTROESOPHAGEAL REFLUX DISEASE WITH ESOPHAGITIS, UNSPECIFIED WHETHER HEMORRHAGE: ICD-10-CM

## 2024-12-02 DIAGNOSIS — E11.9 TYPE 2 DIABETES MELLITUS WITHOUT COMPLICATION, WITHOUT LONG-TERM CURRENT USE OF INSULIN (H): ICD-10-CM

## 2024-12-02 RX ORDER — PANTOPRAZOLE SODIUM 40 MG/1
40 TABLET, DELAYED RELEASE ORAL DAILY
Qty: 90 TABLET | Refills: 2 | Status: SHIPPED | OUTPATIENT
Start: 2024-12-02

## 2024-12-03 ASSESSMENT — ASTHMA QUESTIONNAIRES
QUESTION_3 LAST FOUR WEEKS HOW OFTEN DID YOUR ASTHMA SYMPTOMS (WHEEZING, COUGHING, SHORTNESS OF BREATH, CHEST TIGHTNESS OR PAIN) WAKE YOU UP AT NIGHT OR EARLIER THAN USUAL IN THE MORNING: FOUR OR MORE NIGHTS A WEEK
ACT_TOTALSCORE: 15
QUESTION_4 LAST FOUR WEEKS HOW OFTEN HAVE YOU USED YOUR RESCUE INHALER OR NEBULIZER MEDICATION (SUCH AS ALBUTEROL): TWO OR THREE TIMES PER WEEK
QUESTION_5 LAST FOUR WEEKS HOW WOULD YOU RATE YOUR ASTHMA CONTROL: SOMEWHAT CONTROLLED
QUESTION_1 LAST FOUR WEEKS HOW MUCH OF THE TIME DID YOUR ASTHMA KEEP YOU FROM GETTING AS MUCH DONE AT WORK, SCHOOL OR AT HOME: A LITTLE OF THE TIME
ACT_TOTALSCORE: 15
QUESTION_2 LAST FOUR WEEKS HOW OFTEN HAVE YOU HAD SHORTNESS OF BREATH: ONCE OR TWICE A WEEK

## 2024-12-04 ENCOUNTER — VIRTUAL VISIT (OUTPATIENT)
Dept: FAMILY MEDICINE | Facility: CLINIC | Age: 44
End: 2024-12-04
Payer: COMMERCIAL

## 2024-12-04 DIAGNOSIS — E11.9 TYPE 2 DIABETES MELLITUS WITHOUT COMPLICATION, WITHOUT LONG-TERM CURRENT USE OF INSULIN (H): ICD-10-CM

## 2024-12-04 DIAGNOSIS — G89.4 CHRONIC PAIN SYNDROME: ICD-10-CM

## 2024-12-04 DIAGNOSIS — F41.1 GAD (GENERALIZED ANXIETY DISORDER): Primary | ICD-10-CM

## 2024-12-04 PROCEDURE — 99213 OFFICE O/P EST LOW 20 MIN: CPT | Mod: 95 | Performed by: FAMILY MEDICINE

## 2024-12-04 RX ORDER — DOXYCYCLINE 100 MG/1
100 CAPSULE ORAL 2 TIMES DAILY
COMMUNITY
Start: 2024-11-30 | End: 2024-12-07

## 2024-12-04 RX ORDER — BLOOD SUGAR DIAGNOSTIC
STRIP MISCELLANEOUS
Qty: 300 STRIP | Refills: 3 | Status: SHIPPED | OUTPATIENT
Start: 2024-12-04

## 2024-12-04 NOTE — PROGRESS NOTES
"Mack is a 44 year old who is being evaluated via a billable video visit.    How would you like to obtain your AVS? MyChart  If the video visit is dropped, the invitation should be resent by: Text to cell phone: [phone]  Will anyone else be joining your video visit? No      Assessment & Plan     TYRELL (generalized anxiety disorder)  Reports doing better with anxiety.  He will continue with current medication    Type 2 diabetes mellitus without complication, without long-term current use of insulin (H)  He reports verbal blood glucose numbers.  He would like to have the flexibility to check his blood glucose more frequently.  - blood glucose (ONETOUCH VERIO IQ) test strip; Use to test blood sugar five times daily or as directed.    Chronic pain syndrome  Currently fairly well-controlled.  He has been taking gabapentin.  He has not been on opioids recently.          BMI  Estimated body mass index is 43.38 kg/m  as calculated from the following:    Height as of 10/18/24: 1.803 m (5' 11\").    Weight as of 10/18/24: 141.1 kg (311 lb).             Subjective   Mack is a 44 year old, presenting for the following health issues:  Recheck Medication      12/4/2024    12:51 PM   Additional Questions   Roomed by Morena SHIPMAN CMA   Accompanied by None     Video Start Time:     History of Present Illness       Reason for visit:  Need to check on status of orthapedic referals, need a referal to have a giant abscess or growth removed from my rught leg. And need newbdiabetes test strips?   He is taking medications regularly.                   Objective           Vitals:  No vitals were obtained today due to virtual visit.    Physical Exam   GENERAL: alert and no distress  EYES: Eyes grossly normal to inspection.  No discharge or erythema, or obvious scleral/conjunctival abnormalities.  RESP: No audible wheeze, cough, or visible cyanosis.    SKIN: Visible skin clear. No significant rash, abnormal pigmentation or lesions.  NEURO: " Cranial nerves grossly intact.  Mentation and speech appropriate for age.  PSYCH: Appropriate affect, tone, and pace of words          Video-Visit Details    Type of service:  Video Visit   Originating Location (pt. Location): Home    Distant Location (provider location):  On-site  Platform used for Video Visit: Mery  Signed Electronically by: Mata Hearn MD

## 2024-12-17 DIAGNOSIS — F41.9 ANXIETY: ICD-10-CM

## 2024-12-17 RX ORDER — HYDROXYZINE HYDROCHLORIDE 25 MG/1
25 TABLET, FILM COATED ORAL 3 TIMES DAILY PRN
Qty: 30 TABLET | Refills: 1 | Status: SHIPPED | OUTPATIENT
Start: 2024-12-17

## 2024-12-19 ENCOUNTER — TELEPHONE (OUTPATIENT)
Dept: FAMILY MEDICINE | Facility: CLINIC | Age: 44
End: 2024-12-19
Payer: COMMERCIAL

## 2024-12-19 NOTE — TELEPHONE ENCOUNTER
Incoming call from Jesenia  with surgery services at [facility].  She states that patient is scheduled for colonoscopy on 12/27 and requested that he be scheduled at the [facility] for a preop for this procedure.    Called patient and scheduled preop visit at [facility].  Patient also had some confusion because on his calendar he is scheduled for a preop/physical with a Chelsey Leiva on 12/26 at 10:55 AM.  There is a Chelsey Whitman with [facility] but she is a  and not a provider, I clarified this with the patient and patient requested that I contact Chelsey via teams to inquire if she knows what this appointment is for as I cannot see anything scheduled in [facility] with Chelsey.  Sent a message to Chelsey via teams and requested that she reach out to patient for clarification.

## 2024-12-22 DIAGNOSIS — I10 PRIMARY HYPERTENSION: ICD-10-CM

## 2024-12-23 ENCOUNTER — OFFICE VISIT (OUTPATIENT)
Dept: FAMILY MEDICINE | Facility: CLINIC | Age: 44
End: 2024-12-23
Payer: COMMERCIAL

## 2024-12-23 VITALS
OXYGEN SATURATION: 95 % | HEIGHT: 72 IN | WEIGHT: 299.2 LBS | TEMPERATURE: 97.6 F | RESPIRATION RATE: 16 BRPM | BODY MASS INDEX: 40.52 KG/M2 | DIASTOLIC BLOOD PRESSURE: 81 MMHG | HEART RATE: 99 BPM | SYSTOLIC BLOOD PRESSURE: 115 MMHG

## 2024-12-23 DIAGNOSIS — F42.9 OBSESSIVE-COMPULSIVE DISORDER, UNSPECIFIED TYPE: ICD-10-CM

## 2024-12-23 DIAGNOSIS — E11.9 TYPE 2 DIABETES MELLITUS WITHOUT COMPLICATION, WITHOUT LONG-TERM CURRENT USE OF INSULIN (H): ICD-10-CM

## 2024-12-23 DIAGNOSIS — I10 PRIMARY HYPERTENSION: ICD-10-CM

## 2024-12-23 DIAGNOSIS — Z01.818 PREOP GENERAL PHYSICAL EXAM: Primary | ICD-10-CM

## 2024-12-23 DIAGNOSIS — G89.4 CHRONIC PAIN SYNDROME: ICD-10-CM

## 2024-12-23 DIAGNOSIS — F31.9 BIPOLAR I DISORDER (H): ICD-10-CM

## 2024-12-23 DIAGNOSIS — E66.01 OBESITY, MORBID (H): ICD-10-CM

## 2024-12-23 DIAGNOSIS — F41.1 GAD (GENERALIZED ANXIETY DISORDER): ICD-10-CM

## 2024-12-23 DIAGNOSIS — Z12.11 SCREENING FOR COLON CANCER: ICD-10-CM

## 2024-12-23 PROCEDURE — 99214 OFFICE O/P EST MOD 30 MIN: CPT | Performed by: PHYSICIAN ASSISTANT

## 2024-12-23 RX ORDER — AMLODIPINE BESYLATE 5 MG/1
5 TABLET ORAL DAILY
Qty: 90 TABLET | Refills: 1 | Status: SHIPPED | OUTPATIENT
Start: 2024-12-23

## 2024-12-23 NOTE — PROGRESS NOTES
Preoperative Evaluation  [facility]  [address] [facility] [address]  Phone: [phone]  Fax: [phone]  Primary Provider: Mata Hearn MD  Pre-op Performing Provider: Nola Zavaleta PA-C  Dec 23, 2024   {Provider  Link to PREOP SmartSet  REQUIRED to apply standard patient instructions and medication directions to the AVS :091482}  {ROOMER review and update patient entered surgical information if needed :145356}  { After Pre-op is completed, use lists to pull documentation into note Link to complete Pre-Op    :015027}  {Pull Surgical Information into note after flowsheet completed:274334}  Fax number for surgical facility: Note does not need to be faxed, will be available electronically in Epic.    {Provider Charting Preference for Preop :494187}    Subjective   Will is a 44 year old, presenting for the following:  Pre-Op Exam (Colonoscopy, 12/27/24, [facility] )          12/23/2024     2:09 PM   Additional Questions   Roomed by Cecily LIANG CMA     HPI related to upcoming procedure: ***  {Pull Pre-Op Questionnaire into note after flowsheet completed:266251}  Health Care Directive  Patient does not have a Health Care Directive: {ADVANCE_DIRECTIVE_STATUS:368947}    Preoperative Review of   {Mnpmpreport:927027}  {Review MNPMP for all patients per ICSI MNPMP Profile:616285}    {Chronic problem details (Optional) :194312}    Patient Active Problem List    Diagnosis Date Noted    Continuous opioid dependence (H) 09/30/2024     Priority: Medium    Diabetes mellitus, type 2 (H) 05/13/2024     Priority: Medium    Atypical chest pain 03/08/2024     Priority: Medium    Primary hypertension 09/15/2023     Priority: Medium    TYRELL (generalized anxiety disorder) 08/02/2023     Priority: Medium    Chronic pain syndrome 09/12/2022     Priority: Medium    Degeneration of intervertebral disc of lumbar region 03/23/2022     Priority: Medium    Myofascial pain  syndrome 05/19/2021     Priority: Medium    Obesity, morbid (H) 05/19/2021     Priority: Medium    OCD (obsessive compulsive disorder) 01/21/2021     Priority: Medium    Spondylolisthesis of lumbar region 01/22/2020     Priority: Medium    Spinal stenosis of lumbar region 01/22/2020     Priority: Medium    Asthma, moderate persistent 05/02/2018     Priority: Medium    Cerebellar tonsillar ectopia (H) 04/10/2018     Priority: Medium    Bipolar I disorder (H) 03/03/2005     Priority: Medium      Past Medical History:   Diagnosis Date    Arthritis     Cerebral infarction (H) 2022    Depressive disorder     Heart disease     Hypertension     Uncomplicated asthma      Past Surgical History:   Procedure Laterality Date    ARTHROSCOPY KNEE Right     no date given    BACK SURGERY  2019    C CORTISONE INJECTION  06/08/2016    COLONOSCOPY  08/04/2015    Internal/external hemorrhoids.  Repeat at age 50    ENT SURGERY  2008    Had my tonsils out.    ESOPHAGOGASTRODUODENOSCOPY W/ BANDING  08/04/2015    EYE SURGERY      x 3 no dates given    HEMORRHOIDECTOMY  09/08/2015    PSYCH SVC, INTENSIVE OUTPT  2002     Current Outpatient Medications   Medication Sig Dispense Refill    albuterol (PROAIR HFA/PROVENTIL HFA/VENTOLIN HFA) 108 (90 Base) MCG/ACT inhaler INHALE 2 PUFFS BY MOUTH EVERY 6 HOURS 8.5 g 4    amLODIPine (NORVASC) 5 MG tablet TAKE 1 TABLET(5 MG) BY MOUTH DAILY 90 tablet 5    busPIRone (BUSPAR) 15 MG tablet Take 1 tablet (15 mg) by mouth 3 times daily. 90 tablet 5    Cyanocobalamin (B-12) 1000 MCG SUBL Take once weekly. 50 tablet 1    gabapentin (NEURONTIN) 300 MG capsule Take 1 capsule (300 mg) by mouth 3 times daily. 90 capsule 2    hydrochlorothiazide (MICROZIDE) 12.5 MG capsule Take 1 capsule (12.5 mg) by mouth every morning 90 capsule 3    hydrOXYzine HCl (ATARAX) 25 MG tablet Take 1 tablet (25 mg) by mouth 3 times daily as needed for anxiety. 30 tablet 1    ketoconazole (NIZORAL) 2 % external shampoo Apply topically  daily as needed for itching or irritation. 100 mL 1    losartan (COZAAR) 100 MG tablet Take 1 tablet (100 mg) by mouth daily. 90 tablet 2    magnesium citrate 1.745 GM/30ML solution 2 teaspoons(10ml) nightly before bed. 296 mL 0    metFORMIN (GLUCOPHAGE XR) 500 MG 24 hr tablet Take 2 tablets (1,000 mg) by mouth 2 times daily (with meals). 120 tablet 5    metoprolol succinate ER (TOPROL XL) 100 MG 24 hr tablet Take 1 tablet (100 mg) by mouth daily 30 tablet 5    ondansetron (ZOFRAN ODT) 4 MG ODT tab Take 1 tablet (4 mg) by mouth every 8 hours as needed for nausea. 10 tablet 1    oxyCODONE (ROXICODONE) 5 MG tablet Take 1 tablet (5 mg) by mouth 3 times daily as needed for severe pain. 90 tablet 0    pantoprazole (PROTONIX) 40 MG EC tablet Take 1 tablet (40 mg) by mouth daily. 90 tablet 2    Semaglutide, 2 MG/DOSE, (OZEMPIC) 8 MG/3ML pen Inject 2 mg subcutaneously every 7 days. 9 mL 3    thin (NO BRAND SPECIFIED) lancets Use with lanceting device. To accompany: Blood Glucose Monitor Brands: per insurance. 100 each 6    Vitamin D3 (CHOLECALCIFEROL) 25 mcg (1000 units) tablet Take 1 tablet (25 mcg) by mouth daily. 90 tablet 2    blood glucose (NO BRAND SPECIFIED) test strip Use to test blood sugar 3 times daily or as directed. To accompany: Blood Glucose Monitor Brands: per insurance. 100 strip 6    blood glucose (ONETOUCH VERIO IQ) test strip Use to test blood sugar five times daily or as directed. 300 strip 3    blood glucose monitoring (NO BRAND SPECIFIED) meter device kit Use to test blood sugar 1 times daily or as directed. Preferred blood glucose meter OR supplies to accompany: Blood Glucose Monitor Brands: per insurance. 1 kit 0    Continuous Glucose Sensor (FREESTYLE ANNE MARIE 3 SENSOR) [facility] 1 each continuously. 2 each 11       Allergies   Allergen Reactions    Banana     Bee Venom     Latex         Social History     Tobacco Use    Smoking status: Every Day     Current packs/day: 1.00     Average packs/day: 1  "pack/day for 25.0 years (25.0 ttl pk-yrs)     Types: Cigarettes     Passive exposure: Current    Smokeless tobacco: Never   Substance Use Topics    Alcohol use: Never     Comment: rarely     {FAMILY HISTORY (Optional):668470066}  History   Drug Use    Types: Marijuana, Oxycodone     Comment: takes oxycodone for pain mgt           {ROS Picklists (Optional):833214}    Objective    /81 (BP Location: Left arm, Patient Position: Sitting, Cuff Size: Adult Large)   Pulse 99   Temp 97.6  F (36.4  C) (Oral)   Resp 16   Ht 1.816 m (5' 11.5\")   Wt 135.7 kg (299 lb 3.2 oz)   SpO2 95%   BMI 41.15 kg/m     Estimated body mass index is 41.15 kg/m  as calculated from the following:    Height as of this encounter: 1.816 m (5' 11.5\").    Weight as of this encounter: 135.7 kg (299 lb 3.2 oz).  Physical Exam  {Exam List :981314}    Recent Labs   Lab Test 24  1610 10/10/24  2213 24  1237 24  1818 24  1812   HGB 15.8 14.8 14.4 15.0  --     201 167 175  --    INR  --   --   --  0.95  --    NA  --  139  --   --  139   POTASSIUM  --  4.6  --   --  4.0   CR  --  1.06  --   --  1.28*   A1C 5.4  --  5.5  --   --         Diagnostics  {LABS:212571}   {EK}    Revised Cardiac Risk Index (RCRI)  The patient has the following serious cardiovascular risks for perioperative complications:  {PREOP REVISED CARDIAC RISK INDEX (RCRI) :620313}     RCRI Interpretation: {REVISED CARDIAC RISK INTERPRETATION :864494}         Signed Electronically by: Nola Zavaleta PA-C  A copy of this evaluation report is provided to the requesting physician.    {Provider Resources  Preop [facility] Preop Guidelines  Revised Cardiac Risk Index :224517}   {Email feedback regarding this note to [email or web address]   :875707}  Answers submitted by the patient for this visit:  General Questionnaire (Submitted on 2024)  Chief Complaint: Chronic problems general " questions HPI Form  How many days per week do you miss taking your medication?: 0  General Concern (Submitted on 12/18/2024)  Chief Complaint: Chronic problems general questions HPI Form  What is the reason for your visit today?: Open sores on my right foot, excessive dry skin and peeling.  When did your symptoms begin?: 3-7 days ago  What are your symptoms?: Opej wounds on roght foot from light scratching. Soreness.  How would you describe these symptoms?: Moderate  Are your symptoms:: Improving  Have you had these symptoms before?: No  Is there anything that makes you feel worse?: Walking on it.  Is there anything that makes you feel better?: antibacterial ointment, and skin lotion.  Questionnaire about: Chronic problems general questions HPI Form (Submitted on 12/18/2024)  Chief Complaint: Chronic problems general questions HPI Form

## 2024-12-23 NOTE — PROGRESS NOTES
Preoperative Evaluation  [facility]  [address] [facility] [address]  Phone: [phone]  Fax: [phone]  Primary Provider: Mata Hearn MD  Pre-op Performing Provider: Nola Zavaleta PA-C  Dec 23, 2024   {Provider  Link to PREOP SmartSet  REQUIRED to apply standard patient instructions and medication directions to the AVS :991040}  {ROOMER review and update patient entered surgical information if needed :299868}  { After Pre-op is completed, use lists to pull documentation into note Link to complete Pre-Op    :043831}  {Pull Surgical Information into note after flowsheet completed:912869}  Fax number for surgical facility: {:239419}    {Provider Charting Preference for Preop :476041}    Subjective   Will is a 44 year old, presenting for the following:  Pre-Op Exam (Colonoscopy, 12/27/24, [facility] )          12/23/2024     2:09 PM   Additional Questions   Roomed by Cecily CISNEROS related to upcoming procedure: ***         No data to display              Health Care Directive  Patient does not have a Health Care Directive: {ADVANCE_DIRECTIVE_STATUS:587647}    Preoperative Review of   {Mnpmpreport:142640}  {Review MNPMP for all patients per [facility] MNPMP Profile:466080}    {Chronic problem details (Optional) :884410}    Patient Active Problem List    Diagnosis Date Noted    Continuous opioid dependence (H) 09/30/2024     Priority: Medium    Diabetes mellitus, type 2 (H) 05/13/2024     Priority: Medium    Atypical chest pain 03/08/2024     Priority: Medium    Primary hypertension 09/15/2023     Priority: Medium    TYRELL (generalized anxiety disorder) 08/02/2023     Priority: Medium    Chronic pain syndrome 09/12/2022     Priority: Medium    Degeneration of intervertebral disc of lumbar region 03/23/2022     Priority: Medium    Myofascial pain syndrome 05/19/2021     Priority: Medium    Obesity, morbid (H) 05/19/2021     Priority: Medium    OCD  (obsessive compulsive disorder) 01/21/2021     Priority: Medium    Spondylolisthesis of lumbar region 01/22/2020     Priority: Medium    Spinal stenosis of lumbar region 01/22/2020     Priority: Medium    Asthma, moderate persistent 05/02/2018     Priority: Medium    Cerebellar tonsillar ectopia (H) 04/10/2018     Priority: Medium    Bipolar I disorder (H) 03/03/2005     Priority: Medium      Past Medical History:   Diagnosis Date    Arthritis     Cerebral infarction (H) 2022    Depressive disorder     Heart disease     Hypertension     Uncomplicated asthma      Past Surgical History:   Procedure Laterality Date    ARTHROSCOPY KNEE Right     no date given    BACK SURGERY  2019    C CORTISONE INJECTION  06/08/2016    COLONOSCOPY  08/04/2015    Internal/external hemorrhoids.  Repeat at age 50    ENT SURGERY  2008    Had my tonsils out.    ESOPHAGOGASTRODUODENOSCOPY W/ BANDING  08/04/2015    EYE SURGERY      x 3 no dates given    HEMORRHOIDECTOMY  09/08/2015    PSYCH SVC, INTENSIVE OUTPT  2002     Current Outpatient Medications   Medication Sig Dispense Refill    albuterol (PROAIR HFA/PROVENTIL HFA/VENTOLIN HFA) 108 (90 Base) MCG/ACT inhaler INHALE 2 PUFFS BY MOUTH EVERY 6 HOURS 8.5 g 4    amLODIPine (NORVASC) 5 MG tablet TAKE 1 TABLET(5 MG) BY MOUTH DAILY 90 tablet 5    busPIRone (BUSPAR) 15 MG tablet Take 1 tablet (15 mg) by mouth 3 times daily. 90 tablet 5    Cyanocobalamin (B-12) 1000 MCG SUBL Take once weekly. 50 tablet 1    gabapentin (NEURONTIN) 300 MG capsule Take 1 capsule (300 mg) by mouth 3 times daily. 90 capsule 2    hydrochlorothiazide (MICROZIDE) 12.5 MG capsule Take 1 capsule (12.5 mg) by mouth every morning 90 capsule 3    hydrOXYzine HCl (ATARAX) 25 MG tablet Take 1 tablet (25 mg) by mouth 3 times daily as needed for anxiety. 30 tablet 1    ketoconazole (NIZORAL) 2 % external shampoo Apply topically daily as needed for itching or irritation. 100 mL 1    losartan (COZAAR) 100 MG tablet Take 1 tablet  (100 mg) by mouth daily. 90 tablet 2    magnesium citrate 1.745 GM/30ML solution 2 teaspoons(10ml) nightly before bed. 296 mL 0    metFORMIN (GLUCOPHAGE XR) 500 MG 24 hr tablet Take 2 tablets (1,000 mg) by mouth 2 times daily (with meals). 120 tablet 5    metoprolol succinate ER (TOPROL XL) 100 MG 24 hr tablet Take 1 tablet (100 mg) by mouth daily 30 tablet 5    ondansetron (ZOFRAN ODT) 4 MG ODT tab Take 1 tablet (4 mg) by mouth every 8 hours as needed for nausea. 10 tablet 1    oxyCODONE (ROXICODONE) 5 MG tablet Take 1 tablet (5 mg) by mouth 3 times daily as needed for severe pain. 90 tablet 0    pantoprazole (PROTONIX) 40 MG EC tablet Take 1 tablet (40 mg) by mouth daily. 90 tablet 2    Semaglutide, 2 MG/DOSE, (OZEMPIC) 8 MG/3ML pen Inject 2 mg subcutaneously every 7 days. 9 mL 3    thin (NO BRAND SPECIFIED) lancets Use with lanceting device. To accompany: Blood Glucose Monitor Brands: per insurance. 100 each 6    Vitamin D3 (CHOLECALCIFEROL) 25 mcg (1000 units) tablet Take 1 tablet (25 mcg) by mouth daily. 90 tablet 2    blood glucose (NO BRAND SPECIFIED) test strip Use to test blood sugar 3 times daily or as directed. To accompany: Blood Glucose Monitor Brands: per insurance. 100 strip 6    blood glucose (ONETOUCH VERIO IQ) test strip Use to test blood sugar five times daily or as directed. 300 strip 3    blood glucose monitoring (NO BRAND SPECIFIED) meter device kit Use to test blood sugar 1 times daily or as directed. Preferred blood glucose meter OR supplies to accompany: Blood Glucose Monitor Brands: per insurance. 1 kit 0    Continuous Glucose Sensor (FREESTYLE ANNE MARIE 3 SENSOR) MIS 1 each continuously. 2 each 11       Allergies   Allergen Reactions    Banana     Bee Venom     Latex         Social History     Tobacco Use    Smoking status: Every Day     Current packs/day: 1.00     Average packs/day: 1 pack/day for 25.0 years (25.0 ttl pk-yrs)     Types: Cigarettes     Passive exposure: Current    Smokeless  "tobacco: Never   Substance Use Topics    Alcohol use: Never     Comment: rarely     {FAMILY HISTORY (Optional):398303307}  History   Drug Use    Types: Marijuana, Oxycodone     Comment: takes oxycodone for pain mgt           {ROS Picklists (Optional):372860}    Objective    /81 (BP Location: Left arm, Patient Position: Sitting, Cuff Size: Adult Large)   Pulse 99   Temp 97.6  F (36.4  C) (Oral)   Resp 16   Ht 1.816 m (5' 11.5\")   Wt 135.7 kg (299 lb 3.2 oz)   SpO2 95%   BMI 41.15 kg/m     Estimated body mass index is 41.15 kg/m  as calculated from the following:    Height as of this encounter: 1.816 m (5' 11.5\").    Weight as of this encounter: 135.7 kg (299 lb 3.2 oz).  Physical Exam  {Exam List :176318}    Recent Labs   Lab Test 24  1610 10/10/24  2213 24  1237 24  1818 24  1812   HGB 15.8 14.8 14.4 15.0  --     201 167 175  --    INR  --   --   --  0.95  --    NA  --  139  --   --  139   POTASSIUM  --  4.6  --   --  4.0   CR  --  1.06  --   --  1.28*   A1C 5.4  --  5.5  --   --         Diagnostics  {LABS:144214}   {EK}    Revised Cardiac Risk Index (RCRI)  The patient has the following serious cardiovascular risks for perioperative complications:  {PREOP REVISED CARDIAC RISK INDEX (RCRI) :981626}     RCRI Interpretation: {REVISED CARDIAC RISK INTERPRETATION :977326}         Signed Electronically by: Nola Zavaleta PA-C  A copy of this evaluation report is provided to the requesting physician.    {Provider Resources  Preop [facility] hospital Preop Guidelines  Revised Cardiac Risk Index :594759}   {Email feedback regarding this note to [email or web address]   :459896}  "

## 2024-12-23 NOTE — PATIENT INSTRUCTIONS
How to Take Your Medication Before Surgery  Preoperative Medication Instructions   Antiplatelet or Anticoagulation Medication Instructions   - Patient is on no antiplatelet or anticoagulation medications.    Additional Medication Instructions   - ACE/ARB/ARNI (lisinopril, enalapril, losartan, valsartan, olmesartan, sacubritril/valsartan) : DO NOT TAKE on day of surgery (minimum 11 hours for general anesthesia).   - Beta Blockers (atenolol, metoprolol, propranolol) : Continue taking on the day of surgery.   - Calcium Channel Blockers (amlodipine, diltiazem, felodipine): May be continued on the day of surgery.   - metformin: DO NOT TAKE day of surgery.   - GLP-1 oral semaglutide: DO NOT TAKE 7 days before surgery   - SSRIs, SNRIs, TCAs, Antipsychotics: Continue without modification.     Okay to take Gabapentin morning of procedure.    Okay to take hydrochlorothiazide morning of procedure.    Oxycodone: hold morning of procedure    Hold all vitamins and supplements 14 days days prior to procedure.    Bring inhaler (Albuterol) along to the hospital.    Protonix- okay to take that morning of procedure.                 Patient Education   Preparing for Your Surgery  For Adults  Getting started  In most cases, a nurse will call to review your health history and instructions. They will give you an arrival time based on your scheduled surgery time. Please be ready to share:  Your doctor's clinic name and phone number  Your medical, surgical, and anesthesia history  A list of allergies and sensitivities  A list of medicines, including herbal treatments and over-the-counter drugs  Whether the patient has a legal guardian (ask how to send us the papers in advance)  Note: You may not receive a call if you were seen at our PAC (Preoperative Assessment Center).  Please tell us if you're pregnant--or if there's any chance you might be pregnant. Some surgeries may injure a fetus (unborn baby), so they require a pregnancy test.  Surgeries that are safe for a fetus don't always need a test, and you can choose whether to have one.   Preparing for surgery  Within 10 to 30 days of surgery: Have a pre-op exam (sometimes called an H&P, or History and Physical). This can be done at a clinic or pre-operative center.  If you're having a , you may not need this exam. Talk to your care team.  At your pre-op exam, talk to your care team about all medicines you take. (This includes CBD oil and any drugs, such as THC, marijuana, and other forms of cannabis.) If you need to stop any medicine before surgery, ask when to start taking it again.  This is for your safety. Many medicines and drugs can make you bleed too much during surgery. Some change how well surgery (anesthesia) drugs work.  Call your insurance company to let them know you're having surgery. (If you don't have insurance, call [phone].)  Call your clinic if there's any change in your health. This includes a scrape or scratch near the surgery site, or any signs of a cold (sore throat, runny nose, cough, rash, fever).  Eating and drinking guidelines  For your safety: Unless your surgeon tells you otherwise, follow the guidelines below.  Eat and drink as normal until 8 hours before you arrive for surgery. After that, no food or milk. You can spit out gum when you arrive.  Drink clear liquids until 2 hours before you arrive. These are liquids you can see through, like water, Gatorade, and Propel Water. They also include plain black coffee and tea (no cream or milk).  No alcohol for 24 hours before you arrive. The night before surgery, stop any drinks that contain THC.  If your care team tells you to take medicine on the morning of surgery, it's okay to take it with a sip of water. No other medicines or drugs are allowed (including CBD oil)--follow your care team's instructions.  If you have questions the day of surgery, call your hospital or surgery center.   Preventing  infection  Shower or bathe the night before and the morning of surgery. Follow the instructions your clinic gave you. (If no instructions, use regular soap.)  Don't shave or clip hair near your surgery site. We'll remove the hair if needed.  Don't smoke or vape the morning of surgery. No chewing tobacco for 6 hours before you arrive. A nicotine patch is okay. You may spit out nicotine gum when you arrive.  For some surgeries, the surgeon will tell you to fully quit smoking and nicotine.  We will make every effort to keep you safe from infection. We will:  Clean our hands often with soap and water (or an alcohol-based hand rub).  Clean the skin at your surgery site with a special soap that kills germs.  Give you a special gown to keep you warm. (Cold raises the risk of infection.)  Wear hair covers, masks, gowns, and gloves during surgery.  Give antibiotic medicine, if prescribed. Not all surgeries need this medicine.  What to bring on the day of surgery  Photo ID and insurance card  Copy of your health care directive, if you have one  Glasses and hearing aids (bring cases)  You can't wear contacts during surgery  Inhaler and eye drops, if you use them (tell us about these when you arrive)  CPAP machine or breathing device, if you use them  A few personal items, if spending the night  If you have . . .  A pacemaker, ICD (cardiac defibrillator), or other implant: Bring the ID card.  An implanted stimulator: Bring the remote control.  A legal guardian: Bring a copy of the certified (court-stamped) guardianship papers.  Please remove any jewelry, including body piercings. Leave jewelry and other valuables at home.  If you're going home the day of surgery  You must have a responsible adult drive you home. They should stay with you overnight as well.  If you don't have someone to stay with you, and you aren't safe to go home alone, we may keep you overnight. Insurance often won't pay for this.  After surgery  If it's hard  to control your pain or you need more pain medicine, please call your surgeon's office.  Questions?   If you have any questions for your care team, list them here:   ____________________________________________________________________________________________________________________________________________________________________________________________________________________________________________________________  For informational purposes only. Not to replace the advice of your health care provider. Copyright   2003, 2019 [facility]. All rights reserved. Clinically reviewed by Kj Alcala MD. [employer] [id number] - REV 08/24.

## 2024-12-23 NOTE — PROGRESS NOTES
Preoperative Evaluation  [facility]  [address] [facility] [address]  Phone: [phone]  Fax: [phone]  Primary Provider: Mata Hearn MD  Pre-op Performing Provider: Nola Zavaleta PA-C  Dec 23, 2024             12/23/2024   Surgical Information   What procedure is being done? Colonoscopy    Facility or Hospital where procedure/surgery will be performed: [facility]    Who is doing the procedure / surgery? unknown    Date of surgery / procedure: 12/27/2024    Time of surgery / procedure: 8am    Where do you plan to recover after surgery? at home alone        Proxy-reported     Fax number for surgical facility: Note does not need to be faxed, will be available electronically in Epic.    Assessment & Plan     The proposed surgical procedure is considered LOW risk.    Preop general physical exam  Screening for colon cancer  Patient is here today for pre op for colonoscopy.  NPO and medication recommendations reviewed.  No labs or EKG prior.    Obesity, morbid (H)  Chronic issue, BMI at 41, instructed to hold Ozempic 7 days prior.    Type 2 diabetes mellitus without complication, without long-term current use of insulin (H)  Chronic issue, A1C is at goal.  Hold Metformin morning of procedure.    Primary hypertension  Chronic issue, BP at goal on multiple medications.    Bipolar I disorder (H)  Obsessive-compulsive disorder, unspecified type  TYRELL (generalized anxiety disorder)  Chronic issue, on multiple psychiatric medications. Stable.    Chronic pain syndrome  Chronic pain, stable on Oxycodone and Gabapentin.        Possible Sleep Apnea: consider sleep study in future.        Risks and Recommendations  The patient has the following additional risks and recommendations for perioperative complications:   - Morbid obesity (BMI >40)  Diabetes:  - Patient is not on insulin therapy: regular NPO guidelines can be followed.   Social and Substance:    - Patient  is taking medications for chronic pain    Preoperative Medication Instructions  Antiplatelet or Anticoagulation Medication Instructions   - Patient is on no antiplatelet or anticoagulation medications.    Additional Medication Instructions   - ACE/ARB/ARNI (lisinopril, enalapril, losartan, valsartan, olmesartan, sacubritril/valsartan) : DO NOT TAKE on day of surgery (minimum 11 hours for general anesthesia).   - Beta Blockers (atenolol, metoprolol, propranolol) : Continue taking on the day of surgery.   - Calcium Channel Blockers (amlodipine, diltiazem, felodipine): May be continued on the day of surgery.   - metformin: DO NOT TAKE day of surgery.   - GLP-1 oral semaglutide: DO NOT TAKE 7 days before surgery   - SSRIs, SNRIs, TCAs, Antipsychotics: Continue without modification.     Okay to take Gabapentin morning of procedure.    Okay to take hydrochlorothiazide morning of procedure.    Oxycodone: hold morning of procedure    Hold all vitamins and supplements 14 days days prior to procedure.    Bring inhaler (Albuterol) along to the hospital.    Protonix- okay to take that morning of procedure.            Recommendation  Approval given to proceed with proposed procedure, without further diagnostic evaluation.    Subjective   Will is a 44 year old, presenting for the following:  Pre-Op Exam (Colonoscopy, 12/27/24, [facility] )          12/23/2024     2:09 PM   Additional Questions   Roomed by Cecily CISNEROS related to upcoming procedure: patient is having colonoscopy under general anesthesia        12/23/2024   Pre-Op Questionnaire   Have you ever had a heart attack or stroke? (!) YES - both MI 2021 and told he had two strokes in 2022- per patient not seen in chart    Have you ever had surgery on your heart or blood vessels, such as a stent placement, a coronary artery bypass, or surgery on an artery in your head, neck, heart, or legs? No    Do you have chest pain with activity? No    Do you have a history  of heart failure? No    Do you currently have a cold, bronchitis or symptoms of other infection? No    Do you have a cough, shortness of breath, or wheezing? No    Do you or anyone in your family have previous history of blood clots? (!) UNKNOWN     Do you or does anyone in your family have a serious bleeding problem such as prolonged bleeding following surgeries or cuts? (!) UNKNOWN     Have you ever had problems with anemia or been told to take iron pills? No    Have you had any abnormal blood loss such as black, tarry or bloody stools? (!) YES - has had both about 1 week ago    Have you ever had a blood transfusion? No    Are you willing to have a blood transfusion if it is medically needed before, during, or after your surgery? Yes    Have you or any of your relatives ever had problems with anesthesia? No    Do you have sleep apnea, excessive snoring or daytime drowsiness? No- but patient thinks he may    Do you have a CPAP machine? (!) NO     Do you have any artifical heart valves or other implanted medical devices like a pacemaker, defibrillator, or continuous glucose monitor? No    Do you have artificial joints? No    Are you allergic to latex? (!) YES        Proxy-reported     Health Care Directive  Patient does not have a Health Care Directive: Discussed advance care planning with patient; however, patient declined at this time.    Preoperative Review of    reviewed - controlled substances reflected in medication list.      Status of Chronic Conditions:  See problem list for active medical problems.  Problems all longstanding and stable, except as noted/documented.  See ROS for pertinent symptoms related to these conditions.    Patient Active Problem List    Diagnosis Date Noted    Continuous opioid dependence (H) 09/30/2024     Priority: Medium    Diabetes mellitus, type 2 (H) 05/13/2024     Priority: Medium    Atypical chest pain 03/08/2024     Priority: Medium    Primary hypertension 09/15/2023      Priority: Medium    TYRELL (generalized anxiety disorder) 08/02/2023     Priority: Medium    Chronic pain syndrome 09/12/2022     Priority: Medium    Degeneration of intervertebral disc of lumbar region 03/23/2022     Priority: Medium    Myofascial pain syndrome 05/19/2021     Priority: Medium    Obesity, morbid (H) 05/19/2021     Priority: Medium    OCD (obsessive compulsive disorder) 01/21/2021     Priority: Medium    Spondylolisthesis of lumbar region 01/22/2020     Priority: Medium    Spinal stenosis of lumbar region 01/22/2020     Priority: Medium    Asthma, moderate persistent 05/02/2018     Priority: Medium    Cerebellar tonsillar ectopia (H) 04/10/2018     Priority: Medium    Bipolar I disorder (H) 03/03/2005     Priority: Medium      Past Medical History:   Diagnosis Date    Arthritis     Cerebral infarction (H) 2022    Depressive disorder     Heart disease     Hypertension     Uncomplicated asthma      Past Surgical History:   Procedure Laterality Date    ARTHROSCOPY KNEE Right     no date given    BACK SURGERY  2019    C CORTISONE INJECTION  06/08/2016    COLONOSCOPY  08/04/2015    Internal/external hemorrhoids.  Repeat at age 50    ENT SURGERY  2008    Had my tonsils out.    ESOPHAGOGASTRODUODENOSCOPY W/ BANDING  08/04/2015    EYE SURGERY      x 3 no dates given    HEMORRHOIDECTOMY  09/08/2015    PSYCH SVC, INTENSIVE OUTPT  2002     Current Outpatient Medications   Medication Sig Dispense Refill    albuterol (PROAIR HFA/PROVENTIL HFA/VENTOLIN HFA) 108 (90 Base) MCG/ACT inhaler INHALE 2 PUFFS BY MOUTH EVERY 6 HOURS 8.5 g 4    amLODIPine (NORVASC) 5 MG tablet TAKE 1 TABLET(5 MG) BY MOUTH DAILY 90 tablet 5    busPIRone (BUSPAR) 15 MG tablet Take 1 tablet (15 mg) by mouth 3 times daily. 90 tablet 5    Cyanocobalamin (B-12) 1000 MCG SUBL Take once weekly. 50 tablet 1    gabapentin (NEURONTIN) 300 MG capsule Take 1 capsule (300 mg) by mouth 3 times daily. 90 capsule 2    hydrochlorothiazide (MICROZIDE) 12.5 MG  capsule Take 1 capsule (12.5 mg) by mouth every morning 90 capsule 3    hydrOXYzine HCl (ATARAX) 25 MG tablet Take 1 tablet (25 mg) by mouth 3 times daily as needed for anxiety. 30 tablet 1    ketoconazole (NIZORAL) 2 % external shampoo Apply topically daily as needed for itching or irritation. 100 mL 1    losartan (COZAAR) 100 MG tablet Take 1 tablet (100 mg) by mouth daily. 90 tablet 2    magnesium citrate 1.745 GM/30ML solution 2 teaspoons(10ml) nightly before bed. 296 mL 0    metFORMIN (GLUCOPHAGE XR) 500 MG 24 hr tablet Take 2 tablets (1,000 mg) by mouth 2 times daily (with meals). 120 tablet 5    metoprolol succinate ER (TOPROL XL) 100 MG 24 hr tablet Take 1 tablet (100 mg) by mouth daily 30 tablet 5    ondansetron (ZOFRAN ODT) 4 MG ODT tab Take 1 tablet (4 mg) by mouth every 8 hours as needed for nausea. 10 tablet 1    oxyCODONE (ROXICODONE) 5 MG tablet Take 1 tablet (5 mg) by mouth 3 times daily as needed for severe pain. 90 tablet 0    pantoprazole (PROTONIX) 40 MG EC tablet Take 1 tablet (40 mg) by mouth daily. 90 tablet 2    Semaglutide, 2 MG/DOSE, (OZEMPIC) 8 MG/3ML pen Inject 2 mg subcutaneously every 7 days. 9 mL 3    thin (NO BRAND SPECIFIED) lancets Use with lanceting device. To accompany: Blood Glucose Monitor Brands: per insurance. 100 each 6    Vitamin D3 (CHOLECALCIFEROL) 25 mcg (1000 units) tablet Take 1 tablet (25 mcg) by mouth daily. 90 tablet 2    blood glucose (NO BRAND SPECIFIED) test strip Use to test blood sugar 3 times daily or as directed. To accompany: Blood Glucose Monitor Brands: per insurance. 100 strip 6    blood glucose (ONETOUCH VERIO IQ) test strip Use to test blood sugar five times daily or as directed. 300 strip 3    blood glucose monitoring (NO BRAND SPECIFIED) meter device kit Use to test blood sugar 1 times daily or as directed. Preferred blood glucose meter OR supplies to accompany: Blood Glucose Monitor Brands: per insurance. 1 kit 0    Continuous Glucose Sensor  "(FREESTYLE ANNE MARIE 3 SENSOR) [facility] 1 each continuously. 2 each 11       Allergies   Allergen Reactions    Banana     Bee Venom     Latex         Social History     Tobacco Use    Smoking status: Every Day     Current packs/day: 1.00     Average packs/day: 1 pack/day for 25.0 years (25.0 ttl pk-yrs)     Types: Cigarettes     Passive exposure: Current    Smokeless tobacco: Never   Substance Use Topics    Alcohol use: Never     Comment: rarely     History   Drug Use    Types: Marijuana, Oxycodone     Comment: takes oxycodone for pain mgt             Review of Systems  Constitutional, HEENT, cardiovascular, pulmonary, gi and gu systems are negative, except as otherwise noted.    Objective    /81 (BP Location: Left arm, Patient Position: Sitting, Cuff Size: Adult Large)   Pulse 99   Temp 97.6  F (36.4  C) (Oral)   Resp 16   Ht 1.816 m (5' 11.5\")   Wt 135.7 kg (299 lb 3.2 oz)   SpO2 95%   BMI 41.15 kg/m     Estimated body mass index is 41.15 kg/m  as calculated from the following:    Height as of this encounter: 1.816 m (5' 11.5\").    Weight as of this encounter: 135.7 kg (299 lb 3.2 oz).  Physical Exam  GENERAL: alert and no distress  NECK: no adenopathy, no asymmetry, masses, or scars  RESP: lungs clear to auscultation - no rales, rhonchi or wheezes  CV: regular rate and rhythm, normal S1 S2, no S3 or S4, no murmur, click or rub, no peripheral edema  ABDOMEN: soft, nontender, no hepatosplenomegaly, no masses and bowel sounds normal  MS: no gross musculoskeletal defects noted, no edema    Recent Labs   Lab Test 12/20/24  1610 10/10/24  2213 09/30/24  1237 08/06/24  1818 08/06/24  1812   HGB 15.8 14.8 14.4 15.0  --     201 167 175  --    INR  --   --   --  0.95  --    NA  --  139  --   --  139   POTASSIUM  --  4.6  --   --  4.0   CR  --  1.06  --   --  1.28*   A1C 5.4  --  5.5  --   --         Diagnostics  No labs were ordered during this visit.   No EKG required for low risk surgery (cataract, skin " procedure, breast biopsy, etc).    Revised Cardiac Risk Index (RCRI)  The patient has the following serious cardiovascular risks for perioperative complications:   - No serious cardiac risks = 0 points     RCRI Interpretation: 0 points: Class I (very low risk - 0.4% complication rate)         Signed Electronically by: Nola Zavaleta PA-C  A copy of this evaluation report is provided to the requesting physician.

## 2024-12-27 ENCOUNTER — HOSPITAL ENCOUNTER (OUTPATIENT)
Facility: HOSPITAL | Age: 44
Discharge: HOME | End: 2024-12-27
Attending: INTERNAL MEDICINE | Admitting: INTERNAL MEDICINE
Payer: COMMERCIAL

## 2024-12-27 VITALS
DIASTOLIC BLOOD PRESSURE: 77 MMHG | HEART RATE: 72 BPM | TEMPERATURE: 97.1 F | SYSTOLIC BLOOD PRESSURE: 135 MMHG | WEIGHT: 298.1 LBS | BODY MASS INDEX: 41 KG/M2 | RESPIRATION RATE: 20 BRPM | OXYGEN SATURATION: 98 %

## 2024-12-27 LAB
COLONOSCOPY: NORMAL
GLUCOSE BLDC GLUCOMTR-MCNC: 118 MG/DL (ref 70–99)

## 2024-12-27 PROCEDURE — 272N000001 HC OR GENERAL SUPPLY STERILE: Performed by: INTERNAL MEDICINE

## 2024-12-27 PROCEDURE — 999N000141 HC STATISTIC PRE-PROCEDURE NURSING ASSESSMENT: Performed by: INTERNAL MEDICINE

## 2024-12-27 PROCEDURE — 360N000075 HC SURGERY LEVEL 2, PER MIN: Performed by: INTERNAL MEDICINE

## 2024-12-27 PROCEDURE — 710N000012 HC RECOVERY PHASE 2, PER MINUTE: Performed by: INTERNAL MEDICINE

## 2024-12-27 PROCEDURE — 370N000017 HC ANESTHESIA TECHNICAL FEE, PER MIN: Performed by: INTERNAL MEDICINE

## 2024-12-27 PROCEDURE — 82962 GLUCOSE BLOOD TEST: CPT

## 2024-12-27 RX ORDER — LIDOCAINE 40 MG/G
CREAM TOPICAL
Status: DISCONTINUED | OUTPATIENT
Start: 2024-12-27 | End: 2024-12-27 | Stop reason: HOSPADM

## 2024-12-27 RX ORDER — SODIUM CHLORIDE, SODIUM LACTATE, POTASSIUM CHLORIDE, CALCIUM CHLORIDE 600; 310; 30; 20 MG/100ML; MG/100ML; MG/100ML; MG/100ML
INJECTION, SOLUTION INTRAVENOUS CONTINUOUS
Status: DISCONTINUED | OUTPATIENT
Start: 2024-12-27 | End: 2024-12-27 | Stop reason: HOSPADM

## 2024-12-27 ASSESSMENT — ACTIVITIES OF DAILY LIVING (ADL)
ADLS_ACUITY_SCORE: 32
ADLS_ACUITY_SCORE: 32

## 2024-12-27 NOTE — H&P
Pre-procedure Note    Reason for procedure: Hematochezia    History and Physical Reviewed: Reviewed, no changes.    Pre-sedation assessment:    General: alert, appears stated age, and cooperative  Airway: normal  Heart: regular rate and rhythm  Lungs: clear to auscultation bilaterally    Sedation Plan based on assessment: MAC3    Mallampati score: Class III (visualization of the soft palate and base of uvula)    ASA Classification: ASA 3 - Patient with moderate systemic disease with functional limitations    Impression: Patient deemed adequate candidate for MAC sedation    Risks, benefits and alternatives were discussed with the patient and informed consent was obtained.    Plan: colonoscopy    Thank you for the opportunity to participate in the care of this patient. Please feel free to call with any questions or concerns.     Phone number [phone].     Fernando Cervantes MD 12/27/2024 9:09 AM

## 2025-01-06 ENCOUNTER — MYC MEDICAL ADVICE (OUTPATIENT)
Dept: FAMILY MEDICINE | Facility: CLINIC | Age: 45
End: 2025-01-06
Payer: COMMERCIAL

## 2025-01-06 DIAGNOSIS — M51.360 DEGENERATION OF INTERVERTEBRAL DISC OF LUMBAR REGION WITH DISCOGENIC BACK PAIN: Primary | ICD-10-CM

## 2025-01-06 DIAGNOSIS — M48.062 SPINAL STENOSIS OF LUMBAR REGION WITH NEUROGENIC CLAUDICATION: ICD-10-CM

## 2025-01-07 ENCOUNTER — VIRTUAL VISIT (OUTPATIENT)
Dept: EDUCATION SERVICES | Facility: CLINIC | Age: 45
End: 2025-01-07
Payer: COMMERCIAL

## 2025-01-07 DIAGNOSIS — E11.9 TYPE 2 DIABETES MELLITUS WITHOUT COMPLICATION, WITHOUT LONG-TERM CURRENT USE OF INSULIN (H): Primary | ICD-10-CM

## 2025-01-07 PROCEDURE — 98968 PH1 ASSMT&MGMT NQHP 21-30: CPT | Mod: 93 | Performed by: DIETITIAN, REGISTERED

## 2025-01-07 NOTE — PROGRESS NOTES
Diabetes Self-Management Education & Support    Presents for: Follow-up    Type of Service: Telephone Visit    Originating Location (Patient Location): Home  Distant Location (Provider Location): [facility]  Mode of Communication:  Telephone    Telephone Visit Start Time:  11:00  Telephone Visit End Time (telephone visit stop time): 11:22    How would patient like to obtain AVS? MyChart      Assessment  Will continues to take Ozempic 2 mg and Metformin 1000 mg. Recent A1C at 5.4% on 12/20/24. Will is checking BG at home 1-3x/day.  Weight loss to 199#, weight was 346# on 6/14/24.  Will will follow up with the weight management program on 1/14 and 1/17. Will started going to the gym 3x/week after our last visit on 11/1/24, he is doing some strengthening exercises and using a stationery bike. He continues to limit his CHO content of foods, he is adding more vegetables and protein.     Patient's most recent   Lab Results   Component Value Date    A1C 5.4 12/20/2024     is meeting goal of <7.0    Diabetes knowledge and skills assessment:   Patient is knowledgeable in diabetes management concepts related to: Healthy Eating, Being Active, Monitoring, Taking Medication, and Reducing Risks    Based on learning assessment above, most appropriate setting for further diabetes education would be: Individual setting.    Care Plan and Education Provided:  Healthy Eating: Balanced meals and Portion control, Being Active: Finding a physical activity routine that works for you, Monitoring: Frequency of monitoring and Individual glucose targets, Taking Medication: Side effects of prescribed medication(s), Reducing Risks: Goal for A1c, how it relates to glucose and how often to check, and Healthy Coping: Benefits of making appropriate lifestyle changes    Patient verbalized understanding of diabetes self-management education concepts discussed, opportunities for ongoing education and support, and  recommendations provided today.    Plan  Test blood sugar once or twice daily: either in the morning before eating, with goal to be under 130 and two hours after one meal per day,  with goal to be under 180.  Inject Ozempic 2.0 mg weekly.   Continue to take Metformin  mg: two tablets daily.   Aim to have 1/2 of your plate filled with vegetables( all vegetables except: corn, peas, potatoes), 1/4 lean protein and 1/4 carbohydrate containing foods: rice, pasta, bread, potatoes.   Add in a serving of fruit between meals: a piece of fruit the size of a tennis ball or 1 cup cut up melon or berries or 1/2 arslan or 1/2 banana.   Continue to exercise 30 minutes, three times weekly.     Topics to cover at upcoming visits: Healthy Eating, Being Active, Monitoring, and Taking Medication    Follow-up:  Upcoming Diabetes Ed Appointments     Visit Type Date Time Department    DIABETES ED 1/7/2025 11:00 AM [facility] DIABETES EDUCATION        See Care Plan for co-developed, patient-state behavior change goals.    Education Materials Provided:  No new materials provided today      Subjective/Objective  Will is an 44 year old year old, presenting for the following diabetes education related to: Presents for: Follow-up  Accompanied by: Self  Diabetes education in the past 24mo: Yes  Focus of Visit: Monitoring, Reducing Risks, Taking Medication, Healthy Eating  Diabetes type: Type 2  Disease course: Stable  How confident are you filling out medical forms by yourself:: A little bit  Diabetes management related comments/concerns: Yes.  Transportation concerns: Yes  Other concerns:: None  Cultural Influences/Ethnic Background:  Not  or       Diabetes Symptoms & Complications:  Diabetes Related Symptoms: Fatigue, Visual change  Weight trend: Decreasing  Symptom course: Worsening  Disease course: Stable       Patient Problem List and Family Medical History reviewed for relevant medical history, current medical status, and  "diabetes risk factors.    Vitals:  There were no vitals taken for this visit.  Estimated body mass index is 41 kg/m  as calculated from the following:    Height as of 12/23/24: 1.816 m (5' 11.5\").    Weight as of 12/27/24: 135.2 kg (298 lb 1.6 oz).   Last 3 BP:   BP Readings from Last 3 Encounters:   12/27/24 135/77   12/23/24 115/81   12/20/24 (!) 144/90       History   Smoking Status    Every Day    Types: Cigarettes   Smokeless Tobacco    Never       Labs:  Lab Results   Component Value Date    A1C 5.4 12/20/2024     Lab Results   Component Value Date     12/27/2024    GLC 83 04/27/2018     Lab Results   Component Value Date    LDL 84 09/30/2024     Direct Measure HDL   Date Value Ref Range Status   09/30/2024 41 >=40 mg/dL Final   ]  GFR Estimate   Date Value Ref Range Status   10/10/2024 89 >60 mL/min/1.73m2 Final     Comment:     eGFR calculated using 2021 CKD-EPI equation.     No results found for: \"GFRESTBLACK\"  Lab Results   Component Value Date    CR 1.06 10/10/2024     No results found for: \"MICROALBUMIN\"    Healthy Eating:  Healthy Eating Assessed Today: Yes  Cultural/[place] diet restrictions?: No  Meal planning/habits: Low carb  How many times a week on average do you eat food made away from home (restaurant/take-out)?: 0  Meals include: Dinner, Evening Snack, Lunch  Breakfast: skip breakfast more often  Lunch: lefovers, or frozen burrito, more steamed vegetables  Dinner: meat/vegetables, some CHO,  Other: much less CHO, less sweets overall,  Beverages: Tea, Coffee, Energy drinks, Other (see Comments)  Please elaborate:: Sparkling water.  Has patient met with a dietitian in the past?: Yes    Being Active:  Being Active Assessed Today: Yes  Exercise:: Yes (strengthening exercises, biking)  Days per week of moderate to strenuous exercise (like a brisk walk): 3  On average, minutes per day of exercise at this level: 30  Exercise Minutes per Week: 90  Barrier to exercise: Physical " limitation    Monitoring:  Monitoring Assessed Today: Yes  Did patient bring glucose meter to appointment? : Yes  Blood Glucose Meter: One Touch  Times checking blood sugar at home (number): 5+  Times checking blood sugar at home (per): Day    FBS: averaging between 113-120 mg/dl    Taking Medications:  Diabetes Medication(s)       Biguanides       metFORMIN (GLUCOPHAGE XR) 500 MG 24 hr tablet Take 2 tablets (1,000 mg) by mouth 2 times daily (with meals).       Incretin Mimetic Agents       Semaglutide, 2 MG/DOSE, (OZEMPIC) 8 MG/3ML pen Inject 2 mg subcutaneously every 7 days.          Taking Medication Assessed Today: Yes  Current Treatments: Oral Medication (taken by mouth), Non-insulin Injectables, Diet  Problems taking diabetes medications regularly?: No  Reducing Risks:  Has dilated eye exam at least once a year?: Yes  Sees dentist every 6 months?: No  Feet checked by healthcare provider in the last year?: No    Healthy Coping:  Emotional response to diabetes: Ready to learn  Informal Support system:: None  Stage of change: ACTION (Actively working towards change)    Romina Boyle RD  Time Spent: 22 minutes  Encounter Type: Individual    Any diabetes medication dose changes were made via the CDCES Standing Orders under the patient's referring provider.

## 2025-01-07 NOTE — LETTER
1/7/2025         RE: Daron Bond  [address]  [facility] [address]        Dear Colleague,    Thank you for referring your patient, Daron Bond, to the [facility]. Please see a copy of my visit note below.    Diabetes Self-Management Education & Support    Presents for: Follow-up    Type of Service: Telephone Visit    Originating Location (Patient Location): Home  Distant Location (Provider Location): [facility]  Mode of Communication:  Telephone    Telephone Visit Start Time:  11:00  Telephone Visit End Time (telephone visit stop time): 11:22    How would patient like to obtain AVS? MyChart      Assessment  Will continues to take Ozempic 2 mg and Metformin 1000 mg. Recent A1C at 5.4% on 12/20/24. Will is checking BG at home 1-3x/day.  Weight loss to 199#, weight was 346# on 6/14/24.  Will will follow up with the weight management program on 1/14 and 1/17. Will started going to the gym 3x/week after our last visit on 11/1/24, he is doing some strengthening exercises and using a stationery bike. He continues to limit his CHO content of foods, he is adding more vegetables and protein.     Patient's most recent   Lab Results   Component Value Date    A1C 5.4 12/20/2024     is meeting goal of <7.0    Diabetes knowledge and skills assessment:   Patient is knowledgeable in diabetes management concepts related to: Healthy Eating, Being Active, Monitoring, Taking Medication, and Reducing Risks    Based on learning assessment above, most appropriate setting for further diabetes education would be: Individual setting.    Care Plan and Education Provided:  Healthy Eating: Balanced meals and Portion control, Being Active: Finding a physical activity routine that works for you, Monitoring: Frequency of monitoring and Individual glucose targets, Taking Medication: Side effects of prescribed medication(s), Reducing Risks: Goal for A1c, how it  relates to glucose and how often to check, and Healthy Coping: Benefits of making appropriate lifestyle changes    Patient verbalized understanding of diabetes self-management education concepts discussed, opportunities for ongoing education and support, and recommendations provided today.    Plan  Test blood sugar once or twice daily: either in the morning before eating, with goal to be under 130 and two hours after one meal per day,  with goal to be under 180.  Inject Ozempic 2.0 mg weekly.   Continue to take Metformin  mg: two tablets daily.   Aim to have 1/2 of your plate filled with vegetables( all vegetables except: corn, peas, potatoes), 1/4 lean protein and 1/4 carbohydrate containing foods: rice, pasta, bread, potatoes.   Add in a serving of fruit between meals: a piece of fruit the size of a tennis ball or 1 cup cut up melon or berries or 1/2 arslan or 1/2 banana.   Continue to exercise 30 minutes, three times weekly.     Topics to cover at upcoming visits: Healthy Eating, Being Active, Monitoring, and Taking Medication    Follow-up:  Upcoming Diabetes Ed Appointments     Visit Type Date Time Department    DIABETES ED 1/7/2025 11:00 AM [facility] DIABETES EDUCATION        See Care Plan for co-developed, patient-state behavior change goals.    Education Materials Provided:  No new materials provided today      Subjective/Objective  Will is an 44 year old year old, presenting for the following diabetes education related to: Presents for: Follow-up  Accompanied by: Self  Diabetes education in the past 24mo: Yes  Focus of Visit: Monitoring, Reducing Risks, Taking Medication, Healthy Eating  Diabetes type: Type 2  Disease course: Stable  How confident are you filling out medical forms by yourself:: A little bit  Diabetes management related comments/concerns: Yes.  Transportation concerns: Yes  Other concerns:: None  Cultural Influences/Ethnic Background:  Not  or       Diabetes Symptoms &  "Complications:  Diabetes Related Symptoms: Fatigue, Visual change  Weight trend: Decreasing  Symptom course: Worsening  Disease course: Stable       Patient Problem List and Family Medical History reviewed for relevant medical history, current medical status, and diabetes risk factors.    Vitals:  There were no vitals taken for this visit.  Estimated body mass index is 41 kg/m  as calculated from the following:    Height as of 12/23/24: 1.816 m (5' 11.5\").    Weight as of 12/27/24: 135.2 kg (298 lb 1.6 oz).   Last 3 BP:   BP Readings from Last 3 Encounters:   12/27/24 135/77   12/23/24 115/81   12/20/24 (!) 144/90       History   Smoking Status     Every Day     Types: Cigarettes   Smokeless Tobacco     Never       Labs:  Lab Results   Component Value Date    A1C 5.4 12/20/2024     Lab Results   Component Value Date     12/27/2024    GLC 83 04/27/2018     Lab Results   Component Value Date    LDL 84 09/30/2024     Direct Measure HDL   Date Value Ref Range Status   09/30/2024 41 >=40 mg/dL Final   ]  GFR Estimate   Date Value Ref Range Status   10/10/2024 89 >60 mL/min/1.73m2 Final     Comment:     eGFR calculated using 2021 CKD-EPI equation.     No results found for: \"GFRESTBLACK\"  Lab Results   Component Value Date    CR 1.06 10/10/2024     No results found for: \"MICROALBUMIN\"    Healthy Eating:  Healthy Eating Assessed Today: Yes  Cultural/Moravian diet restrictions?: No  Meal planning/habits: Low carb  How many times a week on average do you eat food made away from home (restaurant/take-out)?: 0  Meals include: Dinner, Evening Snack, Lunch  Breakfast: skip breakfast more often  Lunch: lefovers, or frozen burrito, more steamed vegetables  Dinner: meat/vegetables, some CHO,  Other: much less CHO, less sweets overall,  Beverages: Tea, Coffee, Energy drinks, Other (see Comments)  Please elaborate:: Sparkling water.  Has patient met with a dietitian in the past?: Yes    Being Active:  Being Active Assessed " Today: Yes  Exercise:: Yes (strengthening exercises, biking)  Days per week of moderate to strenuous exercise (like a brisk walk): 3  On average, minutes per day of exercise at this level: 30  Exercise Minutes per Week: 90  Barrier to exercise: Physical limitation    Monitoring:  Monitoring Assessed Today: Yes  Did patient bring glucose meter to appointment? : Yes  Blood Glucose Meter: One Touch  Times checking blood sugar at home (number): 5+  Times checking blood sugar at home (per): Day    FBS: averaging between 113-120 mg/dl    Taking Medications:  Diabetes Medication(s)       Biguanides       metFORMIN (GLUCOPHAGE XR) 500 MG 24 hr tablet Take 2 tablets (1,000 mg) by mouth 2 times daily (with meals).       Incretin Mimetic Agents       Semaglutide, 2 MG/DOSE, (OZEMPIC) 8 MG/3ML pen Inject 2 mg subcutaneously every 7 days.          Taking Medication Assessed Today: Yes  Current Treatments: Oral Medication (taken by mouth), Non-insulin Injectables, Diet  Problems taking diabetes medications regularly?: No  Reducing Risks:  Has dilated eye exam at least once a year?: Yes  Sees dentist every 6 months?: No  Feet checked by healthcare provider in the last year?: No    Healthy Coping:  Emotional response to diabetes: Ready to learn  Informal Support system:: None  Stage of change: ACTION (Actively working towards change)    Romina Boyle RD  Time Spent: 22 minutes  Encounter Type: Individual    Any diabetes medication dose changes were made via the CDCES Standing Orders under the patient's referring provider.

## 2025-01-07 NOTE — PATIENT INSTRUCTIONS
Test blood sugar once or twice daily: either in the morning before eating, with goal to be under 130 and two hours after one meal per day,  with goal to be under 180.  Inject Ozempic 2.0 mg weekly.   Continue to take Metformin  mg: two tablets daily.   Aim to have 1/2 of your plate filled with vegetables( all vegetables except: corn, peas, potatoes), 1/4 lean protein and 1/4 carbohydrate containing foods: rice, pasta, bread, potatoes.   Add in a serving of fruit between meals: a piece of fruit the size of a tennis ball or 1 cup cut up melon or berries or 1/2 arslan or 1/2 banana.   Continue to exercise 30 minutes, three times weekly.

## 2025-01-08 NOTE — TELEPHONE ENCOUNTER
Pt calling regarding status of referral. Stated the last referral was incorrect. Stated the referral needs to be for Comprehensive Pain Evaluation. Please fax to [facility] in [place]. Fax # [phone]. Please send ASAP. Pt may be able to get in the next couple of weeks. Please let pt know when the referral is placed.

## 2025-01-08 NOTE — TELEPHONE ENCOUNTER
[facility]    message sent to pt re: faxed referral.

## 2025-01-09 ENCOUNTER — TRANSFERRED RECORDS (OUTPATIENT)
Dept: HEALTH INFORMATION MANAGEMENT | Facility: CLINIC | Age: 45
End: 2025-01-09

## 2025-01-13 ENCOUNTER — VIRTUAL VISIT (OUTPATIENT)
Dept: SURGERY | Facility: CLINIC | Age: 45
End: 2025-01-13
Payer: COMMERCIAL

## 2025-01-13 DIAGNOSIS — E66.01 MORBID OBESITY WITH BMI OF 40.0-44.9, ADULT (H): ICD-10-CM

## 2025-01-13 DIAGNOSIS — E11.9 TYPE 2 DIABETES, HBA1C GOAL < 7% (H): ICD-10-CM

## 2025-01-13 DIAGNOSIS — Z71.3 NUTRITIONAL COUNSELING: Primary | ICD-10-CM

## 2025-01-13 PROCEDURE — 97803 MED NUTRITION INDIV SUBSEQ: CPT | Mod: 95 | Performed by: DIETITIAN, REGISTERED

## 2025-01-13 ASSESSMENT — PAIN SCALES - PAIN ENJOYMENT GENERAL ACTIVITY SCALE (PEG)
PEG_TOTALSCORE: 8
AVG_PAIN_PASTWEEK: 6
INTERFERED_ENJOYMENT_LIFE: 10 - COMPLETELY INTERFERES
INTERFERED_GENERAL_ACTIVITY: 8

## 2025-01-13 NOTE — LETTER
1/13/2025      Daron Bond  [address] [place] [address]  [place] [address]      Dear Colleague,    Thank you for referring your patient, Daron Bond, to the [facility] [place]. Please see a copy of my visit note below.    Daron Bond is a 44 year old who is being evaluated via a billable video visit.      How would you like to obtain your AVS? MyChart  If the video visit is dropped, the invitation should be resent by: Text to cell phone: [phone]  Will anyone else be joining your video visit? No        Medical  Weight Loss Follow-Up Diet Evaluation  Assessment:  Daron is presenting today for a follow up weight management nutrition consultation.  This patient has had an initial appointment and was referred by Dr. Steiner for MNT as treatment for Morbid obesity   Weight loss medication:  Ozempic .   Pt's weight is 298.1 lbs  Initial weight: 346.9 lbs  Weight change: 48 lbs, 13.9% weight loss        10/13/2024     5:16 PM   Changes and Difficulties   With regards to my diet, I am still struggling with: Eating carbohydrates. I cant afford enough keab protean, fruits and veggies. I get rice and pasta free from the food pantry.   With regards to my activity/exercise, I am still struggling with: Getting to the gym.     BMI: 41.15  Ideal body weight: 76.5 kg (168 lb 8.7 oz)  Adjusted ideal body weight: 100 kg (220 lb 5.8 oz)    Estimated RMR ([place]-St Jeor equation):   2,272 kcals x 1.2 (sedentary) = 2,727 kcals (for weight maintenance)  Recommended Protein Intake: 100 - 110 grams of protein/day  Patient Active Problem List:  Patient Active Problem List   Diagnosis     Cerebellar tonsillar ectopia (H)     Asthma, moderate persistent     Degeneration of intervertebral disc of lumbar region     OCD (obsessive compulsive disorder)     Myofascial pain syndrome     Obesity, morbid (H)     Chronic pain syndrome     Bipolar I disorder (H)      "Spondylolisthesis of lumbar region     Spinal stenosis of lumbar region     TYRELL (generalized anxiety disorder)     Primary hypertension     Atypical chest pain     Diabetes mellitus, type 2 (H)     Continuous opioid dependence (H)   Diabetes: T2DM, A1C of 5.4% on 12/20/2024  5.5% on 9/30/2024  7.4% on 5/13/2024  Metformin  Nutrition History:   Food allergies/intolerances/cultural or religous food customs: Yes - dee  Per MD note 6/14/2024: \"Today we discussed our Bariatric Surgery program to address their weight related health. He is not appropriate for the surgical program today and we focused on non surgical options per his request. Should he struggle with non surgical weight loss, quit smoking and have motivation to live the bariatric lifestyle, we can revisit that option next year.\"  -patient has been having cognitive issues and issues with sleeping  -patient is trying to work on his mental health and starting with a new therapist within the next couple of weeks  -patient expressed that he feels overwhelmed with the various appointments and medications that he has to take  -patient has been doing intermittent fasting and trying to cut back on oral intake - has been limiting dinner  -patient is hoping to lose enough weight to have back surgery    Progress on goals from last visit: Patient has been limiting CHO intake and being mindful portions with meals. Patient has been planning out meals. Patient stated that his blood sugars have been between 100-150 throughout the day. Patient has been going to the gym consistently 3x/week and would like to increase to 5x/week now that his car has insurance - hoping to take his son with him to the gym.  Goals:  Aim to have a protein intake with every meal/snack  Try to fuel every 4-5 hours to help with blood sugar control and for portion control from meal to meal    Dietary Recall:  Breakfast: smoothie OR eggs fried in butter and santacruz with toast (sometimes)  Lunch: " salmon and orange  Dinner: chicken and broccoli  Beverages:   Zero Sugar [employer] - 1 can/day (2-3x/week will blend this with fruit and a high protein yogurt and 1/4 cup limeade) - will have this for breakfast and lunch  Exercise:   Patient continues to have pain with movement  3x/week (goal to increase to 5x/week at [employer]) for 30 minute workouts - varies different weight machines and cardio at times  -has been trying to do yoga for stretching  -does have a pool at his complex - only open in the summer  Nutrition Diagnosis:    Morbid obesity related to excessive energy intake as evidence by BMI of 41.15    Intervention:  Food and/or nutrient delivery: stay consistent with focusing on CHO intake with meals and having three protein rich meals. Continue exercise routine.   Nutrition education: pre-workout options (supplements and food) and workout options    Monitoring/Evaluation:    Goals:  Stay consistent with three meals per day  Continue to gradually increase movement during the week    Patient to follow up in <1 week with bariatrician and 1 month(s) with CINTIA      Video-Visit Details    Type of service:  Video Visit    Video Start Time (time video started): 11:38 AM    Video End Time (time video stopped): 12:09 PM    Originating Location (pt. Location): Home      Distant Location (provider location):  Off-site    Mode of Communication:  Video Conference via [facility]    Physician has received verbal consent for a Video Visit from the patient? Yes      Ramona Roper RD           Again, thank you for allowing me to participate in the care of your patient.        Sincerely,        Ramona Roper RD    Electronically signed

## 2025-01-13 NOTE — PROGRESS NOTES
Daron Bond is a 44 year old who is being evaluated via a billable video visit.      How would you like to obtain your AVS? MyChart  If the video visit is dropped, the invitation should be resent by: Text to cell phone: [phone]  Will anyone else be joining your video visit? No        Medical  Weight Loss Follow-Up Diet Evaluation  Assessment:  Daron is presenting today for a follow up weight management nutrition consultation.  This patient has had an initial appointment and was referred by Dr. Steiner for MNT as treatment for Morbid obesity   Weight loss medication:  Ozempic .   Pt's weight is 298.1 lbs  Initial weight: 346.9 lbs  Weight change: 48 lbs, 13.9% weight loss        10/13/2024     5:16 PM   Changes and Difficulties   With regards to my diet, I am still struggling with: Eating carbohydrates. I cant afford enough keab protean, fruits and veggies. I get rice and pasta free from the food pantry.   With regards to my activity/exercise, I am still struggling with: Getting to the gym.     BMI: 41.15  Ideal body weight: 76.5 kg (168 lb 8.7 oz)  Adjusted ideal body weight: 100 kg (220 lb 5.8 oz)    Estimated RMR ([place]-St Jeor equation):   2,272 kcals x 1.2 (sedentary) = 2,727 kcals (for weight maintenance)  Recommended Protein Intake: 100 - 110 grams of protein/day  Patient Active Problem List:  Patient Active Problem List   Diagnosis    Cerebellar tonsillar ectopia (H)    Asthma, moderate persistent    Degeneration of intervertebral disc of lumbar region    OCD (obsessive compulsive disorder)    Myofascial pain syndrome    Obesity, morbid (H)    Chronic pain syndrome    Bipolar I disorder (H)    Spondylolisthesis of lumbar region    Spinal stenosis of lumbar region    TYRELL (generalized anxiety disorder)    Primary hypertension    Atypical chest pain    Diabetes mellitus, type 2 (H)    Continuous opioid dependence (H)   Diabetes: T2DM, A1C of 5.4% on 12/20/2024  5.5% on 9/30/2024  7.4% on  "5/13/2024  Metformin  Nutrition History:   Food allergies/intolerances/cultural or religous food customs: Yes - dee  Per MD note 6/14/2024: \"Today we discussed our Bariatric Surgery program to address their weight related health. He is not appropriate for the surgical program today and we focused on non surgical options per his request. Should he struggle with non surgical weight loss, quit smoking and have motivation to live the bariatric lifestyle, we can revisit that option next year.\"  -patient has been having cognitive issues and issues with sleeping  -patient is trying to work on his mental health and starting with a new therapist within the next couple of weeks  -patient expressed that he feels overwhelmed with the various appointments and medications that he has to take  -patient has been doing intermittent fasting and trying to cut back on oral intake - has been limiting dinner  -patient is hoping to lose enough weight to have back surgery    Progress on goals from last visit: Patient has been limiting CHO intake and being mindful portions with meals. Patient has been planning out meals. Patient stated that his blood sugars have been between 100-150 throughout the day. Patient has been going to the gym consistently 3x/week and would like to increase to 5x/week now that his car has insurance - hoping to take his son with him to the gym.  Goals:  Aim to have a protein intake with every meal/snack  Try to fuel every 4-5 hours to help with blood sugar control and for portion control from meal to meal    Dietary Recall:  Breakfast: smoothie OR eggs fried in butter and santacruz with toast (sometimes)  Lunch: salmon and orange  Dinner: chicken and broccoli  Beverages:   Zero Sugar [employer] - 1 can/day (2-3x/week will blend this with fruit and a high protein yogurt and 1/4 cup limeade) - will have this for breakfast and lunch  Exercise:   Patient continues to have pain with movement  3x/week (goal to " increase to 5x/week at [facility] fitness) for 30 minute workouts - varies different weight machines and cardio at times  -has been trying to do yoga for stretching  -does have a pool at his complex - only open in the summer  Nutrition Diagnosis:    Morbid obesity related to excessive energy intake as evidence by BMI of 41.15    Intervention:  Food and/or nutrient delivery: stay consistent with focusing on CHO intake with meals and having three protein rich meals. Continue exercise routine.   Nutrition education: pre-workout options (supplements and food) and workout options    Monitoring/Evaluation:    Goals:  Stay consistent with three meals per day  Continue to gradually increase movement during the week    Patient to follow up in <1 week with bariatrician and 1 month(s) with CINTIA      Video-Visit Details    Type of service:  Video Visit    Video Start Time (time video started): 11:38 AM    Video End Time (time video stopped): 12:09 PM    Originating Location (pt. Location): Home      Distant Location (provider location):  Off-site    Mode of Communication:  Video Conference via [facility]    Physician has received verbal consent for a Video Visit from the patient? Yes      Ramona Roper RD

## 2025-01-13 NOTE — PATIENT INSTRUCTIONS
Pre-workout options:  Supplements:  Optimum Nutrition Gold Standard Pre-Workout  Cellucor C4 Pre-workout  Nutricost Pre-X Pre-Workout  Transparent Labs pre-workout    Food:  Fruit and cheese sticks  Cheese and whole wheat crackers  Deli meat and fruit  Low sugar granola bar (under 10 g sugar per serving)  Trail mix with nuts and fried fruit      Low-Impact Workout Videos (click on title for link)  Improved Health - effective low impact workouts (gentle, beginner and intermediate level workouts)  Vitality Life with [employer] Ivan - exercises are completed seated, standing or a combination of both making the exercise just right for them  Chelsey's Fit Pilates -  easy to follow Pilates, Strength & Cardio workouts - look for wall pilates workouts here!  Nourish.Move.Love - quick + effective home workouts that are 10-30 minutes  Body Project - low impact workout videos that includes HIIT cardio, resistance training, pilates and yoga  Walk at Home - low impact, easy to follow movements to walk in place  [id number] - easy to follow, seated or standing workouts for beginners!  Chair Fit Camp - full-body workout from your chair       Additional Exercises:    1.     Learning About Being Physically Active  or  Fitness: Getting and Staying Active     2.      Muscle Conditionin Exercises    3.     Resistance Training with Free Weights: 3 Exercises or  Resistance Training with Surgical Tubin Exercises    4.     Stretchin Exercises    5.     Seated Exercises for Arms and Legs: 11 Exercises    6.       Walking for Exercise: Care Instructions      Exercises for Relaxing    Mindfulness Meditation     Conscious Breathing      American Heart Association Recommendations    American Heart Association Exercise Recommendations    1.      Get at least 150 minutes per week of moderate-intensity aerobic activity or 75 minutes per week of vigorous aerobic activity, or a combination of both, preferably spread throughout the week.    2.       Add moderate- to high-intensity muscle-strengthening activity (such as resistance or weights) on at least 2 days per week.    3.     Spend less time sitting. Even light-intensity activity can offset some of the risks of being sedentary.    4.     Gain even more benefits by being active at least 300 minutes (5 hours) per week.    5.     Increase amount and intensity gradually over time.  https://www.heart.org/en/healthy-living/fitness/fitness-basics/aha-recs-for-physical-activity-in-adults

## 2025-01-14 ASSESSMENT — ANXIETY QUESTIONNAIRES: GAD7 TOTAL SCORE: INCOMPLETE

## 2025-01-14 ASSESSMENT — PAIN SCALES - PAIN ENJOYMENT GENERAL ACTIVITY SCALE (PEG)
PEG_TOTALSCORE: 8
AVG_PAIN_PASTWEEK: 6
INTERFERED_ENJOYMENT_LIFE: 10
INTERFERED_GENERAL_ACTIVITY: 8

## 2025-01-21 ENCOUNTER — OFFICE VISIT (OUTPATIENT)
Dept: PALLIATIVE MEDICINE | Facility: OTHER | Age: 45
End: 2025-01-21
Attending: FAMILY MEDICINE
Payer: COMMERCIAL

## 2025-01-21 VITALS — SYSTOLIC BLOOD PRESSURE: 135 MMHG | OXYGEN SATURATION: 97 % | DIASTOLIC BLOOD PRESSURE: 74 MMHG | HEART RATE: 96 BPM

## 2025-01-21 DIAGNOSIS — Z79.899 MEDICAL CANNABIS USE: ICD-10-CM

## 2025-01-21 DIAGNOSIS — F11.90 CHRONIC, CONTINUOUS USE OF OPIOIDS: ICD-10-CM

## 2025-01-21 DIAGNOSIS — M48.062 SPINAL STENOSIS OF LUMBAR REGION WITH NEUROGENIC CLAUDICATION: ICD-10-CM

## 2025-01-21 DIAGNOSIS — F32.A ANXIETY AND DEPRESSION: ICD-10-CM

## 2025-01-21 DIAGNOSIS — G89.29 CHRONIC INTRACTABLE PAIN: ICD-10-CM

## 2025-01-21 DIAGNOSIS — M54.16 LUMBAR RADICULOPATHY: Primary | ICD-10-CM

## 2025-01-21 DIAGNOSIS — F41.9 ANXIETY AND DEPRESSION: ICD-10-CM

## 2025-01-21 DIAGNOSIS — F41.0 PANIC DISORDER WITHOUT AGORAPHOBIA: ICD-10-CM

## 2025-01-21 DIAGNOSIS — M51.360 DEGENERATION OF INTERVERTEBRAL DISC OF LUMBAR REGION WITH DISCOGENIC BACK PAIN: ICD-10-CM

## 2025-01-21 PROCEDURE — 99215 OFFICE O/P EST HI 40 MIN: CPT | Performed by: NURSE PRACTITIONER

## 2025-01-21 PROCEDURE — 99417 PROLNG OP E/M EACH 15 MIN: CPT | Performed by: NURSE PRACTITIONER

## 2025-01-21 PROCEDURE — G2211 COMPLEX E/M VISIT ADD ON: HCPCS | Performed by: NURSE PRACTITIONER

## 2025-01-21 PROCEDURE — G0463 HOSPITAL OUTPT CLINIC VISIT: HCPCS | Performed by: NURSE PRACTITIONER

## 2025-01-21 ASSESSMENT — PAIN SCALES - GENERAL: PAINLEVEL_OUTOF10: MODERATE PAIN (6)

## 2025-01-21 NOTE — PATIENT INSTRUCTIONS
Plan on initial consult on 1/13/2025:  A multimodal plan was developed today to treat your pain.  Multimodal analgesia is a strategy that reduces reliance on opioids through the use of non-opioid analgesics and therapies that have different mechanisms of action.      Diagnostics:   Reviewed lumbar MRI 11/2023.        Medications:  Discussed increasing gabapentin by 1 tab every 5 days until taking 2 tabs three times daily.  Continue all other medications.    Recertified for medical cannabis today.    The following OTC pain medications may be helpful, use as directed: Voltaren Gel 1%, CBD products, Arnica products, Capsaicin products, Australian Dream Cream, Epson It, Lidocaine Patch, Solanpas, Biofreeze, Aspercream, Tiger Balm and Shiva Emu cream.  Apply heat or cold PRN.      Therapies:  Discussed Pain Psychology - Will refer today.  Psychological treatments are also important part of pain management.  Understanding and managing the thoughts, emotions and behaviors that accompany the discomfort can help you cope more effectively with your pain and can actually reduce the intensity of your pain.      PHYSICAL THERAPY - He has been referred to PT in [place].  Continue to do exercises learned at PT on a daily basis.  Discussed the importance of core strengthening, ROM, stretching exercises with the patient and how each of these entities is important in decreasing pain.  Explained to the patient that the purpose of physical therapy is to teach the patient a home exercise program.  These exercises need to be performed every day in order to decrease pain and prevent future occurrences of pain.        Discussed Grounding Mat - handout provided  https://www.[employer].com/watch?v=CzDMOD7Pa7L    Discussed Frequency Specific Microcurrent - handout provided  Treatment for Neuropathic Pain.   [facility] [phone]  [facility] [phone]  [facility] [phone]  [facility]  [phone]  May be able to be billed as a chiropractic service depending on your insurance coverage.     Discussed Acupuncture.    He has aTENs unit.      Interventions:  R) TF LESI L4-5 and L5-S1 with Dr. Avalos.      Follow up:   He has been referred to PT in [place].    He will continue Chiropractic Care      Return to clinic 2 wks after R) TF LESI L4-5 and L5-S1 with Dr. Avalos.        ILIANA Willis, ARNOLD  [facility]    Clinic Number:  [phone]  Call with any questions about your care and for scheduling assistance.   Calls are returned Monday through Friday between 8 AM and 4:30 PM. We usually get back to you within 2 business days depending on the issue/request.    If we are prescribing your medications:  For opioid medication refills, call the clinic or send a Antavo message 7 days in advance.  Please include:  Name of requested medication  Name of the pharmacy.  For non-opioid medications, call your pharmacy directly to request a refill. Please allow 3-4 days to be processed.   Per [place] State Law:  All controlled substance prescriptions must be filled within 30 days of being written.    For those controlled substances allowing refills, pickup must occur within 30 days of last fill.      We believe regular attendance is key to your success in our program!    Any time you are unable to keep your appointment we ask that you call us at least 24 hours in advance to cancel.This will allow us to offer the appointment time to another patient.   Multiple missed appointments may lead to dismissal from the clinic.

## 2025-01-21 NOTE — PROGRESS NOTES
Patient presents to the clinic today for a visit  with ILIANA Disla CNP            10/25/2022     1:35 PM 12/20/2024     3:09 PM 1/21/2025     1:22 PM   PEG Score   PEG Total Score 8.67 8.33 6       UDS/CSA-NA    Medications-NA     QUESTIONS:    Gini Briceño MA  [facility]    Answers submitted by the patient for this visit:  Patient Health Questionnaire (G7) (Submitted on 1/14/2025)  TYRELL 7 TOTAL SCORE: Incomplete

## 2025-01-28 ENCOUNTER — VIRTUAL VISIT (OUTPATIENT)
Dept: FAMILY MEDICINE | Facility: CLINIC | Age: 45
End: 2025-01-28
Payer: COMMERCIAL

## 2025-01-28 DIAGNOSIS — F41.1 GAD (GENERALIZED ANXIETY DISORDER): ICD-10-CM

## 2025-01-28 DIAGNOSIS — R11.0 NAUSEA: ICD-10-CM

## 2025-01-28 DIAGNOSIS — M48.062 SPINAL STENOSIS OF LUMBAR REGION WITH NEUROGENIC CLAUDICATION: Primary | ICD-10-CM

## 2025-01-28 DIAGNOSIS — K64.9 HEMORRHOIDS, UNSPECIFIED HEMORRHOID TYPE: ICD-10-CM

## 2025-01-28 PROCEDURE — 98013 SYNCH AUDIO-ONLY EST LOW 20: CPT | Performed by: FAMILY MEDICINE

## 2025-01-28 RX ORDER — ONDANSETRON 4 MG/1
4 TABLET, ORALLY DISINTEGRATING ORAL EVERY 8 HOURS PRN
Qty: 10 TABLET | Refills: 1 | Status: SHIPPED | OUTPATIENT
Start: 2025-01-28

## 2025-01-28 ASSESSMENT — ASTHMA QUESTIONNAIRES
QUESTION_3 LAST FOUR WEEKS HOW OFTEN DID YOUR ASTHMA SYMPTOMS (WHEEZING, COUGHING, SHORTNESS OF BREATH, CHEST TIGHTNESS OR PAIN) WAKE YOU UP AT NIGHT OR EARLIER THAN USUAL IN THE MORNING: NOT AT ALL
QUESTION_5 LAST FOUR WEEKS HOW WOULD YOU RATE YOUR ASTHMA CONTROL: WELL CONTROLLED
QUESTION_1 LAST FOUR WEEKS HOW MUCH OF THE TIME DID YOUR ASTHMA KEEP YOU FROM GETTING AS MUCH DONE AT WORK, SCHOOL OR AT HOME: NONE OF THE TIME
ACT_TOTALSCORE: 22
QUESTION_2 LAST FOUR WEEKS HOW OFTEN HAVE YOU HAD SHORTNESS OF BREATH: NOT AT ALL
QUESTION_4 LAST FOUR WEEKS HOW OFTEN HAVE YOU USED YOUR RESCUE INHALER OR NEBULIZER MEDICATION (SUCH AS ALBUTEROL): TWO OR THREE TIMES PER WEEK
ACT_TOTALSCORE: 22

## 2025-01-28 NOTE — PROGRESS NOTES
Mack is a 44 year old who is being evaluated via a billable telephone visit.    What phone number would you like to be contacted at? [phone]  How would you like to obtain your AVS? Kasi  Originating Location (pt. Location): Home    Distant Location (provider location):  On-site  Telephone visit completed due to the patient did not have access to video, while the distant provider did.    Assessment & Plan     TYRELL (generalized anxiety disorder)  Improved, continue current paln    Spinal stenosis of lumbar region with neurogenic claudication  Advise injections  - Chiropractic Referral; Future    Nausea  - ondansetron (ZOFRAN ODT) 4 MG ODT tab; Take 1 tablet (4 mg) by mouth every 8 hours as needed for nausea.    Bleeding hemorrhoids  Follow up with GI      Nicotine/Tobacco Cessation  He reports that he has been smoking cigarettes. He has a 25 pack-year smoking history. He has been exposed to tobacco smoke. He has never used smokeless tobacco.  Nicotine/Tobacco Cessation Plan  Information offered: Patient not interested at this time            Subjective   Mack is a 44 year old, presenting for the following health issues:  Follow Up (Follow up--discuss spinal injection, issues with hemorrhoid banding, would like handicap parking permit.   Verbal consent given for telephone visit)      1/28/2025     3:52 PM   Additional Questions   Roomed by Fariba JOSUE CMA   Accompanied by Self     HPI     Patient is wanting to discuss a few concerns.  He is wanting a second opinion on getting a steroid injection.    He recently had hemorrhoid banding done 01/22/2025 and he wants to discuss what other procedure can be done to eliminate issues with hemorrhoids.       He is also wanting a handicap parking permit.            Objective           Vitals:  No vitals were obtained today due to virtual visit.    Physical Exam   General: Alert and no distress //Respiratory: No audible wheeze, cough, or shortness of breath // Psychiatric:   Appropriate affect, tone, and pace of words            Phone call duration: 20 minutes  Signed Electronically by: Mata Hearn MD

## 2025-02-07 ENCOUNTER — TELEPHONE (OUTPATIENT)
Dept: FAMILY MEDICINE | Facility: CLINIC | Age: 45
End: 2025-02-07
Payer: COMMERCIAL

## 2025-02-07 NOTE — TELEPHONE ENCOUNTER
Patient Returning Call    Reason for call:  Got note for handicap sticker for car, was going over it with a  and they state it looks like it is only set up for [place] not [place]. Would prefer a call back on 02/10/2025. He has bad anxiety and this being messed up has worked him up a lot today and needs time to calm himself down before speaking with a RN from Care Team .     Information relayed to patient:  Patient is aware it may take 1-2 business days to hear a response.       Patient has additional questions:  No      Could we send this information to you in [employer] or would you prefer to receive a phone call?:   Patient would prefer a phone call   Okay to leave a detailed message?: Yes at Cell number on file:    Telephone Information:   Mobile [phone]

## 2025-02-10 ENCOUNTER — OFFICE VISIT (OUTPATIENT)
Dept: URGENT CARE | Facility: URGENT CARE | Age: 45
End: 2025-02-10
Payer: COMMERCIAL

## 2025-02-10 ENCOUNTER — NURSE TRIAGE (OUTPATIENT)
Dept: FAMILY MEDICINE | Facility: CLINIC | Age: 45
End: 2025-02-10
Payer: COMMERCIAL

## 2025-02-10 VITALS
HEART RATE: 98 BPM | OXYGEN SATURATION: 98 % | DIASTOLIC BLOOD PRESSURE: 75 MMHG | TEMPERATURE: 98 F | SYSTOLIC BLOOD PRESSURE: 121 MMHG | RESPIRATION RATE: 18 BRPM

## 2025-02-10 DIAGNOSIS — L03.011 PARONYCHIA, FINGER, RIGHT: Primary | ICD-10-CM

## 2025-02-10 PROCEDURE — 10060 I&D ABSCESS SIMPLE/SINGLE: CPT | Performed by: PHYSICIAN ASSISTANT

## 2025-02-10 NOTE — PROGRESS NOTES
Assessment & Plan     Paronychia, finger, right  Offered trial at I and D vs oral abx only given milder severity.  Pt prefers trial of I and D but does want local anesthesia.  Given this the area was prepped in the usual sterile fashion.  3 ml of 2% lidocaine without epi was used to anesthetize the area with digital block.  After adequate anesthesia, 18 gauzre needed was used to incise the area at the epnicium medial and proximally with only a small amount of purulent material expressed and moderate blood.  Did attempt over the dome of the swelling laterally without results.  Wound care with topical abx and light gauze dressing.  Will cover with Augmentin given he did pull hang nail and belives he bit it off thus will cover oral pathogens with augmentin.  Frequent warm compresses, elevation, follow-up with pcp for persistent or worsening sx.  Culture not obtained as only a small amount of purulent debris expressed in blood primarily not able to get adequate culture sample.  PI given and discussed for paronychia.     - amoxicillin-clavulanate (AUGMENTIN) 875-125 MG tablet  Dispense: 20 tablet; Refill: 0             Eileen Cm PA-C  [facility] [place]    Subjective     Will is a 45 year old male who presents to clinic today for the following health issues:  Chief Complaint   Patient presents with    Abscess     Yesterday Abscess Right index       HPI    Pt is a 45 year old male, presents to urgent care with concern re: finger redness, swelling and pain.  He believes he had a hang nail prior to onset that he pulled off, believes with biting.  No fevers. No drainage.      Review of Systems  Constitutional, HEENT, cardiovascular, pulmonary, gi and gu systems are negative, except as otherwise noted.      Objective    /75   Pulse 98   Temp 98  F (36.7  C) (Oral)   Resp 18   SpO2 98%   Physical Exam   Exam of the R index finger reveals erythema, swelling, induration of the medial nail  fold and eponichium with TTP.    Sensation and peripheral pulses intact, cap refill brisk.    No results found for any visits on 02/10/25.

## 2025-02-10 NOTE — TELEPHONE ENCOUNTER
CSS printed off [place] application for disability parking certificate for Dr. Hearn to fill out and sign.

## 2025-02-10 NOTE — TELEPHONE ENCOUNTER
"Nurse Triage SBAR    Is this a 2nd Level Triage? NO    Situation: abscess on finger    Background: started a couple days ago and has been getting worse.    Assessment: States he wants to get this drained as it is starting to look infected and is painful. States it is \"adding around 20% size\" to his right pointer finger. Denies injury but states he thinks he pulled off a cuticle a few days ago prior to this starting. States the area is red and swollen. Denies fever.    Protocol Recommended Disposition:   Go To ED/UCC Now (Or To Office With PCP Approval)    Recommendation: Pt would like to be seen at either [place] or [place] to have this looked at and drained. Advised no clinic appointments available at either clinic so he should present to urgent care. He declines to be seen at [place] urgent care as he states he has not had good experiences there in the past. States he will instead go to the [facility] in [place].        Does the patient meet one of the following criteria for ADS visit consideration? 16+ years old, with an [facility] PCP     TIP  Providers, please consider if this condition is appropriate for management at one of our Acute and Diagnostic Services sites.     If patient is a good candidate, please use dotphrase <dot>triageresponse and select Refer to ADS to document.      Reason for Disposition   Looks infected (spreading redness, red streak, pus) and severe pain with movement    Additional Information   Negative: Followed an injury   Negative: Wound looks infected (e.g., spreading redness, pus)   Negative: Caused by an animal bite   Negative: Caused by frostbite   Negative: Hand or wrist pain is main symptom    Protocols used: Finger Pain-A-[place]    "

## 2025-02-12 NOTE — TELEPHONE ENCOUNTER
Dr. Hearn filled out HCP portion of [place] handicap parking permit form.   CSS called/LM for pt re: signed form and will mail to pt.

## 2025-02-17 NOTE — PROGRESS NOTES
Daron Bond is a 45 year old who is being evaluated via a billable telephone visit.      What phone number would you like to be contacted at? [phone]  How would you like to obtain your AVS? [facility]      Medical  Weight Loss Follow-Up Diet Evaluation  Assessment:  Daron is presenting today for a follow up weight management nutrition consultation.  This patient has had an initial appointment and was referred by Dr. Steiner for MNT as treatment for Morbid obesity which is impacting T2DM   Weight loss medication: Ozempic. Metformin.   Pt's weight is 298.3 lbs  Initial weight: 346.9 lbs  Weight change: 48.6 lbs, 14% weight loss        10/13/2024     5:16 PM   Changes and Difficulties   With regards to my diet, I am still struggling with: Eating carbohydrates. I cant afford enough keab protean, fruits and veggies. I get rice and pasta free from the food pantry.   With regards to my activity/exercise, I am still struggling with: Getting to the gym.     BMI: 41.02  Ideal body weight: 76.5 kg (168 lb 8.7 oz)  Adjusted ideal body weight: 100 kg (220 lb 7.1 oz)    Estimated RMR ([place]-St Jeor equation):   2,268 kcals x 1.2 (sedentary) = 2,722 kcals (for weight maintenance)  Recommended Protein Intake: 100 - 110 grams of protein/day  Recommended Calorie Intake: 1,800 - 2,000 kcals/day  Patient Active Problem List:  Patient Active Problem List   Diagnosis    Cerebellar tonsillar ectopia (H)    Asthma, moderate persistent    Degeneration of intervertebral disc of lumbar region    OCD (obsessive compulsive disorder)    Myofascial pain syndrome    Obesity, morbid (H)    Chronic pain syndrome    Bipolar I disorder (H)    Spondylolisthesis of lumbar region    Spinal stenosis of lumbar region    TYRELL (generalized anxiety disorder)    Primary hypertension    Atypical chest pain    Diabetes mellitus, type 2 (H)    Continuous opioid dependence (H)   Diabetes: T2DM, A1C of 5.4% on 12/20/2024  5.5% on 9/30/2024  7.4% on  "5/13/2024  Metformin  Nutrition History:   Food allergies/intolerances/cultural or religous food customs: Yes - dee  Per MD note 6/14/2024: \"Today we discussed our Bariatric Surgery program to address their weight related health. He is not appropriate for the surgical program today and we focused on non surgical options per his request. Should he struggle with non surgical weight loss, quit smoking and have motivation to live the bariatric lifestyle, we can revisit that option next year.\"  -patient has been having cognitive issues and issues with sleeping  -patient is trying to work on his mental health and starting with a new therapist within the next couple of weeks  -patient expressed that he feels overwhelmed with the various appointments and medications that he has to take  -patient has been doing intermittent fasting and trying to cut back on oral intake - has been limiting dinner  -patient is hoping to lose enough weight to have back surgery    Progress on goals from last visit: Patient stated that he has been having stressful situations pop up in the past few weeks and talked through these. Patient has been trying to catch himself to increase movement rather than eating. Patient stated that when his kids are away, he has been having issues with making meals more consistently. Patient stated that he needs to have a spinal injection - stated that the last one he had did not go well d/t an abscess forming.   Goals:  Stay consistent with three meals per day  Continue to gradually increase movement during the week    Dietary Recall:  Breakfast: smoothie (1/2 cup yogurt, berries/pineapple/cherries, small chocolate bar, Zero Sugar [employer], 1/4 cup limeade)  Lunch: leftovers OR smoothie (1/2 cup yogurt, berries/pineapple/cherries, small chocolate bar, Zero Sugar [employer], 1/4 cup limeade)  Dinner: protein and vegetable or fruit OR stuffed mushrooms  Beverages:   Zero Sugar [employer] - 1 " can/day (2-3x/week will blend this with fruit and a high protein yogurt and 1/4 cup limeade) - will have this for breakfast and lunch  Exercise:   Patient continues to have pain with movement  3-4x/week (goal to increase to 5x/week at [employer]) for 30 minute workouts - varies different weight machines and cardio at times  -has been trying to do yoga for stretching  -does have a pool at his complex - only open in the summer  Nutrition Diagnosis:    Morbid obesity related to excessive energy intake as evidence by BMI of 41.02    Intervention:  Food and/or nutrient delivery: Stay consistent with three meals per day. Add in fibrous and protein items with CHO items. Stay consistent with three meals per day  Nutrition education: financial/budget friendly     Monitoring/Evaluation:    Goals:  Add in a fibrous and protein item with CHO containing items  Stay consistent with cardio and weight training at the gym  Stay consistent with three meals per day    Patient to follow up in <1 month(s) with bariatrician and 1 month(s) with CINTIA        Phone call duration: 26 minutes    Originating Location (pt. Location): Home      Distant Location (provider location):  On-site    Mode of Communication:  Telephone      Ramona Roper RD

## 2025-02-19 NOTE — TELEPHONE ENCOUNTER
"Med Refill  Patient is requesting medication. I do not see medication in patient's chart. States this was a medication prescribed after oxycodone 5 MG, was needing a \"stronger medication\". States the prescriber on the bottle is \"Dr. Larsen\" Thnks this may be from [facility] in [place].  Patient will reach out to pharmacy and other clinic. Will follow up with us if needed.     Medication: Buprenorphine/Nalox 2.0/0.5 MG  Pharmacy: [id number] [[address]    "

## 2025-02-24 ENCOUNTER — VIRTUAL VISIT (OUTPATIENT)
Dept: SURGERY | Facility: CLINIC | Age: 45
End: 2025-02-24
Payer: COMMERCIAL

## 2025-02-24 DIAGNOSIS — E66.01 MORBID OBESITY WITH BMI OF 40.0-44.9, ADULT (H): ICD-10-CM

## 2025-02-24 DIAGNOSIS — E66.01 CLASS 3 SEVERE OBESITY DUE TO EXCESS CALORIES WITH SERIOUS COMORBIDITY AND BODY MASS INDEX (BMI) OF 40.0 TO 44.9 IN ADULT (H): ICD-10-CM

## 2025-02-24 DIAGNOSIS — E66.813 CLASS 3 SEVERE OBESITY DUE TO EXCESS CALORIES WITH SERIOUS COMORBIDITY AND BODY MASS INDEX (BMI) OF 40.0 TO 44.9 IN ADULT (H): ICD-10-CM

## 2025-02-24 DIAGNOSIS — Z71.3 NUTRITIONAL COUNSELING: ICD-10-CM

## 2025-02-24 DIAGNOSIS — E11.9 TYPE 2 DIABETES MELLITUS WITHOUT COMPLICATION, WITHOUT LONG-TERM CURRENT USE OF INSULIN (H): Primary | ICD-10-CM

## 2025-02-24 PROCEDURE — 97803 MED NUTRITION INDIV SUBSEQ: CPT | Mod: 93 | Performed by: DIETITIAN, REGISTERED

## 2025-02-24 NOTE — LETTER
2/24/2025      Daron Bond  [address] [place] [address]  [place] [address]      Dear Colleague,    Thank you for referring your patient, Daron Bond, to the [facility] AND [facility]ARE [place]. Please see a copy of my visit note below.    Daron Bond is a 45 year old who is being evaluated via a billable telephone visit.      What phone number would you like to be contacted at? [phone]  How would you like to obtain your AVS? [facility]  Weight Loss Follow-Up Diet Evaluation  Assessment:  Daron is presenting today for a follow up weight management nutrition consultation.  This patient has had an initial appointment and was referred by Dr. Steiner for MNT as treatment for Morbid obesity which is impacting T2DM   Weight loss medication: Ozempic. Metformin.   Pt's weight is 298.3 lbs  Initial weight: 346.9 lbs  Weight change: 48.6 lbs, 14% weight loss        10/13/2024     5:16 PM   Changes and Difficulties   With regards to my diet, I am still struggling with: Eating carbohydrates. I cant afford enough keab protean, fruits and veggies. I get rice and pasta free from the food pantry.   With regards to my activity/exercise, I am still struggling with: Getting to the gym.     BMI: 41.02  Ideal body weight: 76.5 kg (168 lb 8.7 oz)  Adjusted ideal body weight: 100 kg (220 lb 7.1 oz)    Estimated RMR ([place]-St Jeor equation):   2,268 kcals x 1.2 (sedentary) = 2,722 kcals (for weight maintenance)  Recommended Protein Intake: 100 - 110 grams of protein/day  Recommended Calorie Intake: 1,800 - 2,000 kcals/day  Patient Active Problem List:  Patient Active Problem List   Diagnosis     Cerebellar tonsillar ectopia (H)     Asthma, moderate persistent     Degeneration of intervertebral disc of lumbar region     OCD (obsessive compulsive disorder)     Myofascial pain syndrome     Obesity, morbid (H)     Chronic pain syndrome     Bipolar I disorder (H)      "Spondylolisthesis of lumbar region     Spinal stenosis of lumbar region     TYRELL (generalized anxiety disorder)     Primary hypertension     Atypical chest pain     Diabetes mellitus, type 2 (H)     Continuous opioid dependence (H)   Diabetes: T2DM, A1C of 5.4% on 12/20/2024  5.5% on 9/30/2024  7.4% on 5/13/2024  Metformin  Nutrition History:   Food allergies/intolerances/cultural or religous food customs: Yes - dee  Per MD note 6/14/2024: \"Today we discussed our Bariatric Surgery program to address their weight related health. He is not appropriate for the surgical program today and we focused on non surgical options per his request. Should he struggle with non surgical weight loss, quit smoking and have motivation to live the bariatric lifestyle, we can revisit that option next year.\"  -patient has been having cognitive issues and issues with sleeping  -patient is trying to work on his mental health and starting with a new therapist within the next couple of weeks  -patient expressed that he feels overwhelmed with the various appointments and medications that he has to take  -patient has been doing intermittent fasting and trying to cut back on oral intake - has been limiting dinner  -patient is hoping to lose enough weight to have back surgery    Progress on goals from last visit: Patient stated that he has been having stressful situations pop up in the past few weeks and talked through these. Patient has been trying to catch himself to increase movement rather than eating. Patient stated that when his kids are away, he has been having issues with making meals more consistently. Patient stated that he needs to have a spinal injection - stated that the last one he had did not go well d/t an abscess forming.   Goals:  Stay consistent with three meals per day  Continue to gradually increase movement during the week    Dietary Recall:  Breakfast: smoothie (1/2 cup yogurt, berries/pineapple/cherries, small " chocolate bar, Zero Sugar [employer], 1/4 cup limeade)  Lunch: leftovers OR smoothie (1/2 cup yogurt, berries/pineapple/cherries, small chocolate bar, Zero Sugar [employer], 1/4 cup limeade)  Dinner: protein and vegetable or fruit OR stuffed mushrooms  Beverages:   Zero Sugar [employer] - 1 can/day (2-3x/week will blend this with fruit and a high protein yogurt and 1/4 cup limeade) - will have this for breakfast and lunch  Exercise:   Patient continues to have pain with movement  3-4x/week (goal to increase to 5x/week at [employer]) for 30 minute workouts - varies different weight machines and cardio at times  -has been trying to do yoga for stretching  -does have a pool at his complex - only open in the summer  Nutrition Diagnosis:    Morbid obesity related to excessive energy intake as evidence by BMI of 41.02    Intervention:  Food and/or nutrient delivery: Stay consistent with three meals per day. Add in fibrous and protein items with CHO items. Stay consistent with three meals per day  Nutrition education: financial/budget friendly     Monitoring/Evaluation:    Goals:  Add in a fibrous and protein item with CHO containing items  Stay consistent with cardio and weight training at the gym  Stay consistent with three meals per day    Patient to follow up in <1 month(s) with bariatrician and 1 month(s) with RD        Phone call duration: 26 minutes    Originating Location (pt. Location): Home      Distant Location (provider location):  On-site    Mode of Communication:  Telephone      Ramona Roper RD           Again, thank you for allowing me to participate in the care of your patient.        Sincerely,        Ramona Roper RD    Electronically signed

## 2025-02-26 DIAGNOSIS — E66.01 OBESITY, MORBID (H): ICD-10-CM

## 2025-02-26 DIAGNOSIS — G89.4 CHRONIC PAIN SYNDROME: ICD-10-CM

## 2025-02-26 DIAGNOSIS — I10 PRIMARY HYPERTENSION: ICD-10-CM

## 2025-02-26 DIAGNOSIS — R07.89 ATYPICAL CHEST PAIN: ICD-10-CM

## 2025-02-26 RX ORDER — HYDROCHLOROTHIAZIDE 12.5 MG/1
12.5 CAPSULE ORAL EVERY MORNING
Qty: 90 CAPSULE | Refills: 3 | Status: SHIPPED | OUTPATIENT
Start: 2025-02-26

## 2025-02-27 ENCOUNTER — VIRTUAL VISIT (OUTPATIENT)
Dept: EDUCATION SERVICES | Facility: CLINIC | Age: 45
End: 2025-02-27
Payer: COMMERCIAL

## 2025-02-27 DIAGNOSIS — E11.9 TYPE 2 DIABETES MELLITUS WITHOUT COMPLICATION, WITHOUT LONG-TERM CURRENT USE OF INSULIN (H): Primary | ICD-10-CM

## 2025-02-27 NOTE — LETTER
2/27/2025         RE: Daron Bond  [address]  [facility] [address]        Dear Colleague,    Thank you for referring your patient, Daron Bond, to the [facility]. Please see a copy of my visit note below.    Diabetes Self-Management Education & Support 24 minutes    Presents for: Follow-up    Type of Service: Telephone Visit    Originating Location (Patient Location): Home  Distant Location (Provider Location): [facility]  Mode of Communication:  Telephone    Telephone Visit Start Time:  1:00p  Telephone Visit End Time (telephone visit stop time): 1:24p    How would patient like to obtain AVS? MyChart      Assessment  Last DM visit for Will was 1/7/25. Will continues to take Metformin 1000 mg/daily plus Ozempic 2 mg weekly. Weight loss at 14% since June 2024 per wt mgmt program. BG remains in excellent control with FBS averaging between 105-110, with a few < 100 mg/dl and PP < 140 mg/dl. Will is checking his BG 4-5x/day. We did discuss that 1-2x/day is more than adequate, but Will states that more frequent testing works better for him. Will has been going to the gym 5x/week for 30 minutes: yoga, cardio and strengthening. He is consuming smoothies for breakfast/lunch, and meal of protein, vegetables, small of CHO for dinner. Will did mention difficulty with affording food items at times, he has been contacted by our food navigator with food resources.     Patient's most recent   Lab Results   Component Value Date    A1C 5.4 12/20/2024     is meeting goal of <7.0    Diabetes knowledge and skills assessment:   Patient is knowledgeable in diabetes management concepts related to: Healthy Eating, Being Active, Monitoring, and Taking Medication    Based on learning assessment above, most appropriate setting for further diabetes education would be: Individual setting.    Care Plan and Education Provided:  Healthy Eating: protein intake,  Being Active: Amount recommended (150 minutes moderate or 75 minutes vigorous activity and 2-3 days strength training per week), Monitoring: Frequency of monitoring and Individual glucose targets, Taking Medication: NA, Reducing Risks: Goal for A1c, how it relates to glucose and how often to check and Smoking cessation, and Healthy Coping: Benefits of making appropriate lifestyle changes and Utilize support systems    Patient verbalized understanding of diabetes self-management education concepts discussed, opportunities for ongoing education and support, and recommendations provided today.    Plan  Test blood sugar once or twice daily: either in the morning before eating, with goal to be under 130 and two hours after one meal per day,  with goal to be under 180.  Inject Ozempic 2.0 mg weekly.   Continue to take Metformin  mg: two tablets daily.   Aim to have 1/2 of your plate filled with vegetables( all vegetables except: corn, peas, potatoes), 1/4 lean protein and 1/4 carbohydrate containing foods: rice, pasta, bread, potatoes.   Add in a serving of fruit between meals: a piece of fruit the size of a tennis ball or 1 cup cut up melon or berries or 1/2 arslan or 1/2 banana.   Continue to exercise 30 minutes, five times weekly.     Topics to cover at upcoming visits: per patient request or need    Follow-up:  Upcoming Diabetes Ed Appointments     Visit Type Date Time Department    DIABETES ED 2/27/2025  1:00 PM [facility] DIABETES EDUCATION        See Care Plan for co-developed, patient-state behavior change goals.    Education Materials Provided:  No new materials provided today      Subjective/Objective  Will is an 45 year old year old, presenting for the following diabetes education related to: Presents for: Follow-up  Accompanied by: Self  Diabetes education in the past 24mo: Yes  Focus of Visit: Monitoring, Taking Medication, Healthy Eating, Being Active  Diabetes type: Type 2  Disease course: Stable  How  "confident are you filling out medical forms by yourself:: A little bit  Diabetes management related comments/concerns: Yes.  Transportation concerns: Yes  Other concerns:: None  Cultural Influences/Ethnic Background:  Not  or       Diabetes Symptoms & Complications:  Diabetes Related Symptoms: Fatigue, Visual change  Weight trend: Decreasing (14% weight loss since 6/2024 per wt mgmt program)  Symptom course: Worsening  Disease course: Stable       Patient Problem List and Family Medical History reviewed for relevant medical history, current medical status, and diabetes risk factors.    Vitals:  There were no vitals taken for this visit.  Estimated body mass index is 41.02 kg/m  as calculated from the following:    Height as of 1/17/25: 1.816 m (5' 11.5\").    Weight as of 1/17/25: 135.3 kg (298 lb 4.8 oz).   Last 3 BP:   BP Readings from Last 3 Encounters:   02/10/25 121/75   01/21/25 135/74   01/17/25 132/72       History   Smoking Status     Every Day     Types: Cigarettes   Smokeless Tobacco     Never       Labs:  Lab Results   Component Value Date    A1C 5.4 12/20/2024     Lab Results   Component Value Date     12/27/2024    GLC 83 04/27/2018     Lab Results   Component Value Date    LDL 84 09/30/2024     Direct Measure HDL   Date Value Ref Range Status   09/30/2024 41 >=40 mg/dL Final   ]  GFR Estimate   Date Value Ref Range Status   10/10/2024 89 >60 mL/min/1.73m2 Final     Comment:     eGFR calculated using 2021 CKD-EPI equation.     No results found for: \"GFRESTBLACK\"  Lab Results   Component Value Date    CR 1.06 10/10/2024     No results found for: \"MICROALBUMIN\"    Healthy Eating:  Healthy Eating Assessed Today: Yes  Cultural/Scientologist diet restrictions?: No  Meal planning/habits: Low carb  How many times a week on average do you eat food made away from home (restaurant/take-out)?: 0  Meals include: Dinner, Evening Snack, Lunch  Breakfast: smoothie: frozen fruit, 1/4 c lemonade, low " CHO yogurt, chocolate  Lunch: smoothie: simliar to am,  Dinner: meat/vegetables, some CHO,  Other: much less CHO, less sweets overall,  Beverages: Tea, Coffee, Energy drinks, Other (see Comments)  Please elaborate:: Sparkling water.  Has patient met with a dietitian in the past?: Yes    Being Active:  Being Active Assessed Today: Yes  Exercise:: Yes (strengthening exercises, biking)  Days per week of moderate to strenuous exercise (like a brisk walk): 3  On average, minutes per day of exercise at this level: 30  Exercise Minutes per Week: 90  Barrier to exercise: Physical limitation    Monitoring:  Monitoring Assessed Today: Yes  Did patient bring glucose meter to appointment? : Yes  Blood Glucose Meter: One Touch  Times checking blood sugar at home (number): 5+  Times checking blood sugar at home (per): Day    FBS: averaging 105-110 mg/dl  PP: averaging < 140 mg/dl     Reducing Risks:  Has dilated eye exam at least once a year?: Yes  Sees dentist every 6 months?: No  Feet checked by healthcare provider in the last year?: No    Healthy Coping:  Emotional response to diabetes: Ready to learn  Informal Support system:: None  Stage of change: ACTION (Actively working towards change)    Romina Boyle RD  Time Spent: 24 minutes  Encounter Type: Individual    Any diabetes medication dose changes were made via the CDCES Standing Orders under the patient's referring provider.

## 2025-02-27 NOTE — PROGRESS NOTES
Diabetes Self-Management Education & Support 24 minutes    Presents for: Follow-up    Type of Service: Telephone Visit    Originating Location (Patient Location): Home  Distant Location (Provider Location): [facility]  Mode of Communication:  Telephone    Telephone Visit Start Time:  1:00p  Telephone Visit End Time (telephone visit stop time): 1:24p    How would patient like to obtain AVS? MyChart      Assessment  Last DM visit for Mack was 1/7/25. Will continues to take Metformin 1000 mg/daily plus Ozempic 2 mg weekly. Weight loss at 14% since June 2024 per wt mgmt program. BG remains in excellent control with FBS averaging between 105-110, with a few < 100 mg/dl and PP < 140 mg/dl. Will is checking his BG 4-5x/day. We did discuss that 1-2x/day is more than adequate, but Will states that more frequent testing works better for him. Will has been going to the gym 5x/week for 30 minutes: yoga, cardio and strengthening. He is consuming smoothies for breakfast/lunch, and meal of protein, vegetables, small of CHO for dinner. Will did mention difficulty with affording food items at times, he has been contacted by our food navigator with food resources.     Patient's most recent   Lab Results   Component Value Date    A1C 5.4 12/20/2024     is meeting goal of <7.0    Diabetes knowledge and skills assessment:   Patient is knowledgeable in diabetes management concepts related to: Healthy Eating, Being Active, Monitoring, and Taking Medication    Based on learning assessment above, most appropriate setting for further diabetes education would be: Individual setting.    Care Plan and Education Provided:  Healthy Eating: protein intake, Being Active: Amount recommended (150 minutes moderate or 75 minutes vigorous activity and 2-3 days strength training per week), Monitoring: Frequency of monitoring and Individual glucose targets, Taking Medication: NA, Reducing Risks: Goal for A1c, how it relates to  glucose and how often to check and Smoking cessation, and Healthy Coping: Benefits of making appropriate lifestyle changes and Utilize support systems    Patient verbalized understanding of diabetes self-management education concepts discussed, opportunities for ongoing education and support, and recommendations provided today.    Plan  Test blood sugar once or twice daily: either in the morning before eating, with goal to be under 130 and two hours after one meal per day,  with goal to be under 180.  Inject Ozempic 2.0 mg weekly.   Continue to take Metformin  mg: two tablets daily.   Aim to have 1/2 of your plate filled with vegetables( all vegetables except: corn, peas, potatoes), 1/4 lean protein and 1/4 carbohydrate containing foods: rice, pasta, bread, potatoes.   Add in a serving of fruit between meals: a piece of fruit the size of a tennis ball or 1 cup cut up melon or berries or 1/2 arslan or 1/2 banana.   Continue to exercise 30 minutes, five times weekly.     Topics to cover at upcoming visits: per patient request or need    Follow-up:  Upcoming Diabetes Ed Appointments     Visit Type Date Time Department    DIABETES ED 2/27/2025  1:00 PM [facility] DIABETES EDUCATION        See Care Plan for co-developed, patient-state behavior change goals.    Education Materials Provided:  No new materials provided today      Subjective/Objective  Will is an 45 year old year old, presenting for the following diabetes education related to: Presents for: Follow-up  Accompanied by: Self  Diabetes education in the past 24mo: Yes  Focus of Visit: Monitoring, Taking Medication, Healthy Eating, Being Active  Diabetes type: Type 2  Disease course: Stable  How confident are you filling out medical forms by yourself:: A little bit  Diabetes management related comments/concerns: Yes.  Transportation concerns: Yes  Other concerns:: None  Cultural Influences/Ethnic Background:  Not  or       Diabetes Symptoms &  "Complications:  Diabetes Related Symptoms: Fatigue, Visual change  Weight trend: Decreasing (14% weight loss since 6/2024 per wt mgmt program)  Symptom course: Worsening  Disease course: Stable       Patient Problem List and Family Medical History reviewed for relevant medical history, current medical status, and diabetes risk factors.    Vitals:  There were no vitals taken for this visit.  Estimated body mass index is 41.02 kg/m  as calculated from the following:    Height as of 1/17/25: 1.816 m (5' 11.5\").    Weight as of 1/17/25: 135.3 kg (298 lb 4.8 oz).   Last 3 BP:   BP Readings from Last 3 Encounters:   02/10/25 121/75   01/21/25 135/74   01/17/25 132/72       History   Smoking Status    Every Day    Types: Cigarettes   Smokeless Tobacco    Never       Labs:  Lab Results   Component Value Date    A1C 5.4 12/20/2024     Lab Results   Component Value Date     12/27/2024    GLC 83 04/27/2018     Lab Results   Component Value Date    LDL 84 09/30/2024     Direct Measure HDL   Date Value Ref Range Status   09/30/2024 41 >=40 mg/dL Final   ]  GFR Estimate   Date Value Ref Range Status   10/10/2024 89 >60 mL/min/1.73m2 Final     Comment:     eGFR calculated using 2021 CKD-EPI equation.     No results found for: \"GFRESTBLACK\"  Lab Results   Component Value Date    CR 1.06 10/10/2024     No results found for: \"MICROALBUMIN\"    Healthy Eating:  Healthy Eating Assessed Today: Yes  Cultural/Quaker diet restrictions?: No  Meal planning/habits: Low carb  How many times a week on average do you eat food made away from home (restaurant/take-out)?: 0  Meals include: Dinner, Evening Snack, Lunch  Breakfast: smoothie: frozen fruit, 1/4 c lemonade, low CHO yogurt, chocolate  Lunch: smoothie: simliar to am,  Dinner: meat/vegetables, some CHO,  Other: much less CHO, less sweets overall,  Beverages: Tea, Coffee, Energy drinks, Other (see Comments)  Please elaborate:: Sparkling water.  Has patient met with a dietitian in " the past?: Yes    Being Active:  Being Active Assessed Today: Yes  Exercise:: Yes (strengthening exercises, biking)  Days per week of moderate to strenuous exercise (like a brisk walk): 3  On average, minutes per day of exercise at this level: 30  Exercise Minutes per Week: 90  Barrier to exercise: Physical limitation    Monitoring:  Monitoring Assessed Today: Yes  Did patient bring glucose meter to appointment? : Yes  Blood Glucose Meter: One Touch  Times checking blood sugar at home (number): 5+  Times checking blood sugar at home (per): Day    FBS: averaging 105-110 mg/dl  PP: averaging < 140 mg/dl     Reducing Risks:  Has dilated eye exam at least once a year?: Yes  Sees dentist every 6 months?: No  Feet checked by healthcare provider in the last year?: No    Healthy Coping:  Emotional response to diabetes: Ready to learn  Informal Support system:: None  Stage of change: ACTION (Actively working towards change)    Romina Boyle RD  Time Spent: 24 minutes  Encounter Type: Individual    Any diabetes medication dose changes were made via the CDCES Standing Orders under the patient's referring provider.

## 2025-02-27 NOTE — PATIENT INSTRUCTIONS
Test blood sugar once or twice daily: either in the morning before eating, with goal to be under 130 and two hours after one meal per day,  with goal to be under 180.  Inject Ozempic 2.0 mg weekly.   Continue to take Metformin  mg: two tablets daily.   Aim to have 1/2 of your plate filled with vegetables( all vegetables except: corn, peas, potatoes), 1/4 lean protein and 1/4 carbohydrate containing foods: rice, pasta, bread, potatoes.   Add in a serving of fruit between meals: a piece of fruit the size of a tennis ball or 1 cup cut up melon or berries or 1/2 arslan or 1/2 banana.   Continue to exercise 30 minutes, five times weekly.

## 2025-03-02 DIAGNOSIS — G89.4 CHRONIC PAIN SYNDROME: ICD-10-CM

## 2025-03-03 RX ORDER — GABAPENTIN 300 MG/1
300 CAPSULE ORAL 3 TIMES DAILY
Qty: 90 CAPSULE | Refills: 0 | Status: SHIPPED | OUTPATIENT
Start: 2025-03-03

## 2025-03-05 ENCOUNTER — TELEPHONE (OUTPATIENT)
Dept: FAMILY MEDICINE | Facility: CLINIC | Age: 45
End: 2025-03-05
Payer: COMMERCIAL

## 2025-03-05 NOTE — TELEPHONE ENCOUNTER
Patient has been trying to get an appt with Dr Larsen (?) at pain clinic for aprx month and half.   Has been unable to get clinic to schedule him for pain management refill.  Is reaching out to PCP for assistance to see if he can get refill with PCP or to help with pain management guidance.    PRUDENCIO Osborn  [facility]  [phone]    [facility]   Monday  - Thursday 7 AM - 6 PM    Friday  7 AM - 5 PM     -Please call your clinic for assistance from a nurse after hours.

## 2025-03-06 ENCOUNTER — VIRTUAL VISIT (OUTPATIENT)
Dept: FAMILY MEDICINE | Facility: CLINIC | Age: 45
End: 2025-03-06
Payer: COMMERCIAL

## 2025-03-06 ENCOUNTER — PATIENT OUTREACH (OUTPATIENT)
Dept: CARE COORDINATION | Facility: CLINIC | Age: 45
End: 2025-03-06

## 2025-03-06 DIAGNOSIS — M48.062 SPINAL STENOSIS OF LUMBAR REGION WITH NEUROGENIC CLAUDICATION: Primary | ICD-10-CM

## 2025-03-06 DIAGNOSIS — Q04.8 CEREBELLAR TONSILLAR ECTOPIA (H): ICD-10-CM

## 2025-03-06 RX ORDER — BUPRENORPHINE AND NALOXONE 2; .5 MG/1; MG/1
1 FILM, SOLUBLE BUCCAL; SUBLINGUAL DAILY
Qty: 15 FILM | Refills: 0 | Status: SHIPPED | OUTPATIENT
Start: 2025-03-06

## 2025-03-06 NOTE — PROGRESS NOTES
Food Resource Navigator Contact    FRN - Follow Up    Reason for call: Obesity  Type 2 Diabetes  Hypertension    Intervention and Education during outreach: 3 Months Followup    Food Resource Navigator Plan: Enroll in [employer] for his diabetes. We will be able to cover him and his two children on delivery meals. I will begin application today.     Patient provided verbal consent for FRN to share contact information with OA: Yes    I have discussed the following goals with the patient: Prev patient from 11/06/2024 outreach, declined the other food resources, which is fine. I will put him on my Veggie Rx Waitlist.     [employer] program recently paused but is opened again for enrollment and patient would gladly receive meals.     Followup in 3 months.    Spent 20 minutes in consult with the patient.       Carlos Cavazos  [facility] Food Resource Navigator  [employer]   Cell: [phone]

## 2025-03-06 NOTE — PROGRESS NOTES
Will is a 45 year old who is being evaluated via a billable video visit.    What phone number would you like to be contacted at? [phone]  How would you like to obtain your AVS? MyChart      Assessment & Plan     Spinal stenosis of lumbar region with neurogenic claudication  I have agreed to a short-term refill for Suboxone.  He received 15 films.  He needs to follow-up with his pain clinic for further prescriptions.  - buprenorphine HCl-naloxone HCl (SUBOXONE) 2-0.5 MG per film; Place 1 Film under the tongue daily.    Cerebellar tonsillar ectopia (H)  Patient is requesting neurology referral.  He has not seen neurology in some time.  - Adult Neurology  Referral; Future                Subjective   Will is a 45 year old, presenting for the following health issues:  No chief complaint on file.        3/6/2025    10:42 AM   Additional Questions   Roomed by Morena SHIPMAN CMA   Accompanied by None       20  HPI      Visit today for discussion of pain management.  He has been seeing pain and palliative care previously.  He was prescribed low-dose Suboxone.  He has been taking this sparingly.  He has been off opioids for well over 3 months.  Pain is improved with weight loss.  He is considering surgical option for his back pain.        Objective           Vitals:  No vitals were obtained today due to virtual visit.    Physical Exam             Video-Visit Details    Type of service: Telephone visit  Time:  20 min  Originating Location (pt. Location): Home    Distant Location (provider location):  On-site    Signed Electronically by: Mata Hearn MD

## 2025-03-12 ENCOUNTER — TRANSFERRED RECORDS (OUTPATIENT)
Dept: HEALTH INFORMATION MANAGEMENT | Facility: CLINIC | Age: 45
End: 2025-03-12
Payer: COMMERCIAL

## 2025-03-24 NOTE — PROGRESS NOTES
Daron Bond is a 45 year old who is being evaluated via a billable telephone visit.      What phone number would you like to be contacted at? [phone]  How would you like to obtain your AVS? [facility]      Medical  Weight Loss Follow-Up Diet Evaluation  Assessment:  Daron is presenting today for a follow up weight management nutrition consultation.  This patient has had an initial appointment and was referred by Dr. Steiner for MNT as treatment for Morbid obesity which is impacting T2DM     Weight loss medication: Ozempic. Metformin.    Pt's weight is 283 lbs  Initial weight: 349.9 lbs  Weight change: 66.9 lbs, 19.1% weight loss        10/13/2024     5:16 PM   Changes and Difficulties   With regards to my diet, I am still struggling with: Eating carbohydrates. I cant afford enough keab protean, fruits and veggies. I get rice and pasta free from the food pantry.   With regards to my activity/exercise, I am still struggling with: Getting to the gym.     BMI: 39.62  Ideal body weight: 76.5 kg (168 lb 8.7 oz)  Adjusted ideal body weight: 98.8 kg (217 lb 11.6 oz)    Estimated RMR ([place]-St Jeor equation):   2,183 kcals x 1.2 (sedentary) = 2,619 kcals (for weight maintenance)  Recommended Protein Intake: 100 - 110 grams of protein/day  Recommended Calorie Intake: 1,800 - 2,000 kcals/day  Patient Active Problem List:  Patient Active Problem List   Diagnosis    Cerebellar tonsillar ectopia (H)    Asthma, moderate persistent    Degeneration of intervertebral disc of lumbar region    OCD (obsessive compulsive disorder)    Myofascial pain syndrome    Obesity, morbid (H)    Chronic pain syndrome    Bipolar I disorder (H)    Spondylolisthesis of lumbar region    Spinal stenosis of lumbar region    TYRELL (generalized anxiety disorder)    Primary hypertension    Atypical chest pain    Diabetes mellitus, type 2 (H)    Continuous opioid dependence (H)   Diabetes: T2DM, A1C of 5.4% on 12/20/2024  5.5% on 9/30/2024  7.4%  on 5/13/2024   Nutrition History:   Food allergies/intolerances/cultural or religous food customs: Yes - bananas  -patient has been having cognitive issues and issues with sleeping  -patient is trying to work on his mental health and starting with a new therapist within the next couple of weeks  -patient expressed that he feels overwhelmed with the various appointments and medications that he has to take  -patient has been doing intermittent fasting and trying to cut back on oral intake - has been limiting dinner  -patient is hoping to lose enough weight to have back surgery    Progress on goals from last visit: Patient stated that he has been having issues with finances and issues with his family - has struggled with finding a . Patient stated that his stress has been high. Patient stated that he has been having issues with a mini abscess on his finger and toe, anal fissure and continues to have issues with his skin. Patient stated that he got a healthy cupboard box.   Goals:  Add in a fibrous and protein item with CHO containing items  Stay consistent with cardio and weight training at the gym  Stay consistent with three meals per day    Dietary Recall:  Drinks a smoothie throughout the day (breakfast and lunch)  Small portion of evening meal with kids OR last night had 1 lb with jerky  Beverages:   Water - 1-2 bottles/day  Exercise:   Patient stated that he will use the gym to help with stress management  Walking on the treadmill, light weight lifting with free weights and machines  Nutrition Diagnosis:    Obesity related to excessive energy intake as evidence by BMI of 39.62    Intervention:  Food and/or nutrient delivery: stay consistent with lifestyle changes  Nutrition education: Recipes    Monitoring/Evaluation:    Goals:  Add in a fibrous and protein item with CHO containing items  Stay consistent with cardio and weight training at the gym  Stay consistent with three meals per day    Patient to follow  up in 2 & 5 month(s) with bariatrician and TBD with RD    Phone visit duration: 31 minutes    Originating Location (pt. Location): Home      Distant Location (provider location):  Off-site    Mode of Communication:  Telephone      Ramona Roper RD

## 2025-03-26 ENCOUNTER — OFFICE VISIT (OUTPATIENT)
Dept: URGENT CARE | Facility: URGENT CARE | Age: 45
End: 2025-03-26
Payer: COMMERCIAL

## 2025-03-26 VITALS
WEIGHT: 283 LBS | SYSTOLIC BLOOD PRESSURE: 127 MMHG | BODY MASS INDEX: 39.62 KG/M2 | OXYGEN SATURATION: 99 % | TEMPERATURE: 98 F | DIASTOLIC BLOOD PRESSURE: 85 MMHG | HEIGHT: 71 IN | RESPIRATION RATE: 20 BRPM | HEART RATE: 78 BPM

## 2025-03-26 DIAGNOSIS — L03.116 CELLULITIS OF FOOT, LEFT: Primary | ICD-10-CM

## 2025-03-26 PROCEDURE — 3079F DIAST BP 80-89 MM HG: CPT | Performed by: PHYSICIAN ASSISTANT

## 2025-03-26 PROCEDURE — 99214 OFFICE O/P EST MOD 30 MIN: CPT | Performed by: PHYSICIAN ASSISTANT

## 2025-03-26 PROCEDURE — 3074F SYST BP LT 130 MM HG: CPT | Performed by: PHYSICIAN ASSISTANT

## 2025-03-26 RX ORDER — CEFADROXIL 500 MG/1
500 CAPSULE ORAL 2 TIMES DAILY
Qty: 20 CAPSULE | Refills: 0 | Status: SHIPPED | OUTPATIENT
Start: 2025-03-26 | End: 2025-04-05

## 2025-03-26 NOTE — PATIENT INSTRUCTIONS
Continue to monitor blood glucose levels to ensure that they do not get too high with your infection    Hot packs to the area 3 times per day 20 minutes on each hour.  Do not fall asleep with the hot packs on the skin.  Take the antibiotic as written  Symptoms should not be getting worse over the next 24 hours after taking the antibiotic.  Should have start of improvement after 48 hours on the antibiotic  Use of probiotics to help prevent diarrhea.  Separate dosing of the antibiotic from the probiotic by 2 hours so they are not in the stomach at the same time.  Use of over-the-counter yogurt for helping with stomach upset and diarrhea.  Return to clinic if not getting good resolution or if new symptoms or concerns arise.

## 2025-03-26 NOTE — PROGRESS NOTES
Patient presents with:  Urgent Care: L Toe abscess pain/red  L hand pointer finger bump painful       Clinical Decision Making:  Patient is treated for cellulitis with cefadroxil for both the left index finger and left hallux.  Hot packs to the area 10 minutes on 3 times a day.  Monitor symptoms until resolution.  He also will monitor his blood glucoses while having the infection. Expected course of resolution and indication for return was gone over and questions were answered to patient/parent's satisfaction before discharge.      ICD-10-CM    1. Cellulitis of foot, left  L03.116 cefadroxil (DURICEF) 500 MG capsule          Patient Instructions   Continue to monitor blood glucose levels to ensure that they do not get too high with your infection    Hot packs to the area 3 times per day 20 minutes on each hour.  Do not fall asleep with the hot packs on the skin.  Take the antibiotic as written  Symptoms should not be getting worse over the next 24 hours after taking the antibiotic.  Should have start of improvement after 48 hours on the antibiotic  Use of probiotics to help prevent diarrhea.  Separate dosing of the antibiotic from the probiotic by 2 hours or they are not in the stomach at the same time.  Use of over-the-counter yogurt for helping with stomach upset and diarrhea.  Return to clinic if not getting good resolution or if new symptoms or concerns arise.      HPI:  Daron Bond is a 45 year old male who has DMT2 who presents today for concern for five day history of left index finger and left hallux crack in the skin and possible infection.  Patient has not had abscess swelling or drainage from the area.  Has not had constitutional symptoms to include fever chills night sweats or fatigue patient has been monitoring his fasting blood glucose in the mornings have been 110's.  No treatment was tried for this at home.    Tetanus is up to date 10/2024    History obtained from chart review and the  "patient.    Problem List:  2024-09: Continuous opioid dependence (H)  2024-05: Diabetes mellitus, type 2 (H)  2024-03: Atypical chest pain  2023-09: Primary hypertension  2023-08: TYRELL (generalized anxiety disorder)  2022-09: Chronic pain syndrome  2022-03: Degeneration of intervertebral disc of lumbar region  2021-05: Myofascial pain syndrome  2021-05: Obesity, morbid (H)  2021-01: OCD (obsessive compulsive disorder)  2020-01: Spondylolisthesis of lumbar region  2020-01: Spinal stenosis of lumbar region  2018-05: Asthma, moderate persistent  2018-04: Cerebellar tonsillar ectopia (H)  2005-03: Bipolar I disorder (H)      Past Medical History:   Diagnosis Date    Arthritis     Asthma     Atypical chest pain     Bipolar disorder (H)     TYPE I    Cerebellar tonsillar ectopia (H)     Cerebral infarction (H) 2022    Chronic pain syndrome     Continuous opioid dependence (H)     Degeneration of lumbar intervertebral disc     Depressive disorder     Diabetes mellitus, type 2 (H)     TYRELL (generalized anxiety disorder)     Heart disease     Hypertension     Morbid obesity (H)     Myofascial pain syndrome     OCD (obsessive compulsive disorder)     Old myocardial infarction 2021    Spinal stenosis, lumbar     Uncomplicated asthma        Social History     Tobacco Use    Smoking status: Every Day     Current packs/day: 1.00     Average packs/day: 1 pack/day for 25.0 years (25.0 ttl pk-yrs)     Types: Cigarettes     Passive exposure: Current    Smokeless tobacco: Never   Substance Use Topics    Alcohol use: Never     Comment: rarely       Review of Systems  As above in HPI otherwise negative.    Vitals:    03/26/25 1235   BP: 127/85   Pulse: 78   Resp: 20   Temp: 98  F (36.7  C)   SpO2: 99%   Weight: 128.4 kg (283 lb)   Height: 1.8 m (5' 10.87\")       General: Patient is resting comfortably no acute distress is afebrile  HEENT: Head is normocephalic atraumatic   Skin: With left index finger has a small irritation with " transillumination there is no abscess or induration to palpation no discharge to palpation  Next my attention was turned to the left hallux and again on the distal end of the toe there was a small cut in the skin that was erythematous but it was not indurated there is no discharge no warmth to touch.    Physical Exam    At the end of the encounter, I discussed results, diagnosis, medications. Discussed red flags for immediate return to clinic/ER, as well as indications for follow up if no improvement. Patient understood and agreed to plan. Patient was stable for discharge.

## 2025-03-27 ENCOUNTER — VIRTUAL VISIT (OUTPATIENT)
Dept: SURGERY | Facility: CLINIC | Age: 45
End: 2025-03-27
Payer: COMMERCIAL

## 2025-03-27 DIAGNOSIS — E11.9 TYPE 2 DIABETES MELLITUS WITHOUT COMPLICATION, WITHOUT LONG-TERM CURRENT USE OF INSULIN (H): ICD-10-CM

## 2025-03-27 DIAGNOSIS — Z71.3 NUTRITIONAL COUNSELING: ICD-10-CM

## 2025-03-27 DIAGNOSIS — E66.9 OBESITY (BMI 30-39.9): Primary | ICD-10-CM

## 2025-03-27 NOTE — LETTER
3/27/2025      Daron Bond  [address] [place] [address]  [place] [address]      Dear Colleague,    Thank you for referring your patient, Daron Bond, to the [facility] [place]. Please see a copy of my visit note below.    Daron Bond is a 45 year old who is being evaluated via a billable telephone visit.      What phone number would you like to be contacted at? [phone]  How would you like to obtain your AVS? [facility]      Medical  Weight Loss Follow-Up Diet Evaluation  Assessment:  Daron is presenting today for a follow up weight management nutrition consultation.  This patient has had an initial appointment and was referred by Dr. Steiner for MNT as treatment for Morbid obesity which is impacting T2DM     Weight loss medication: Ozempic. Metformin.    Pt's weight is 283 lbs  Initial weight: 349.9 lbs  Weight change: 66.9 lbs, 19.1% weight loss        10/13/2024     5:16 PM   Changes and Difficulties   With regards to my diet, I am still struggling with: Eating carbohydrates. I cant afford enough keab protean, fruits and veggies. I get rice and pasta free from the food pantry.   With regards to my activity/exercise, I am still struggling with: Getting to the gym.     BMI: 39.62  Ideal body weight: 76.5 kg (168 lb 8.7 oz)  Adjusted ideal body weight: 98.8 kg (217 lb 11.6 oz)    Estimated RMR (Sevier-St Jeor equation):   2,183 kcals x 1.2 (sedentary) = 2,619 kcals (for weight maintenance)  Recommended Protein Intake: 100 - 110 grams of protein/day  Recommended Calorie Intake: 1,800 - 2,000 kcals/day  Patient Active Problem List:  Patient Active Problem List   Diagnosis     Cerebellar tonsillar ectopia (H)     Asthma, moderate persistent     Degeneration of intervertebral disc of lumbar region     OCD (obsessive compulsive disorder)     Myofascial pain syndrome     Obesity, morbid (H)     Chronic pain syndrome     Bipolar I disorder (H)      Spondylolisthesis of lumbar region     Spinal stenosis of lumbar region     TYRELL (generalized anxiety disorder)     Primary hypertension     Atypical chest pain     Diabetes mellitus, type 2 (H)     Continuous opioid dependence (H)   Diabetes: T2DM, A1C of 5.4% on 12/20/2024  5.5% on 9/30/2024  7.4% on 5/13/2024   Nutrition History:   Food allergies/intolerances/cultural or religous food customs: Yes - bananas  -patient has been having cognitive issues and issues with sleeping  -patient is trying to work on his mental health and starting with a new therapist within the next couple of weeks  -patient expressed that he feels overwhelmed with the various appointments and medications that he has to take  -patient has been doing intermittent fasting and trying to cut back on oral intake - has been limiting dinner  -patient is hoping to lose enough weight to have back surgery    Progress on goals from last visit: Patient stated that he has been having issues with finances and issues with his family - has struggled with finding a . Patient stated that his stress has been high. Patient stated that he has been having issues with a mini abscess on his finger and toe, anal fissure and continues to have issues with his skin. Patient stated that he got a healthy cupboard box.   Goals:  Add in a fibrous and protein item with CHO containing items  Stay consistent with cardio and weight training at the gym  Stay consistent with three meals per day    Dietary Recall:  Drinks a smoothie throughout the day (breakfast and lunch)  Small portion of evening meal with kids OR last night had 1 lb with jerky  Beverages:   Water - 1-2 bottles/day  Exercise:   Patient stated that he will use the gym to help with stress management  Walking on the treadmill, light weight lifting with free weights and machines  Nutrition Diagnosis:    Obesity related to excessive energy intake as evidence by BMI of 39.62    Intervention:  Food and/or  nutrient delivery: stay consistent with lifestyle changes  Nutrition education: Recipes    Monitoring/Evaluation:    Goals:  Add in a fibrous and protein item with CHO containing items  Stay consistent with cardio and weight training at the gym  Stay consistent with three meals per day    Patient to follow up in 2 & 5 month(s) with bariatrician and TBD with RD    Phone visit duration: 31 minutes    Originating Location (pt. Location): Home      Distant Location (provider location):  Off-site    Mode of Communication:  Telephone      Ramona Roper RD           Again, thank you for allowing me to participate in the care of your patient.        Sincerely,        Ramona Roper RD    Electronically signed

## 2025-03-27 NOTE — PATIENT INSTRUCTIONS
Recipes:  https://wwwNichewith/pineapple-protein-smoothie/    https://Orckestra/frozen-smoothie-packs/    https://Sold/high-protein-strawberry-cheesecake-overnight-oats/    https://Orckestra/go-to-green-smoothie/#tasty-recipes-9662    https://www.Clutch.Enkia/recipes/cherry-superfood-smoothie/    https://diabetesfoodhub.org/recipes/easy-turkey-chili    https://diabetesfoodhub.org/recipes/unstuffed-eggroll    https://diabetesfoodhub.org/recipes/mediterranean-chicken-bernardo

## 2025-04-04 DIAGNOSIS — M48.062 SPINAL STENOSIS OF LUMBAR REGION WITH NEUROGENIC CLAUDICATION: ICD-10-CM

## 2025-04-04 RX ORDER — BUPRENORPHINE AND NALOXONE 2; .5 MG/1; MG/1
1 FILM, SOLUBLE BUCCAL; SUBLINGUAL DAILY
Qty: 15 FILM | Refills: 0 | OUTPATIENT
Start: 2025-04-04

## 2025-04-04 NOTE — TELEPHONE ENCOUNTER
Medication Question or Refill    Contacts       Contact Date/Time Type Contact Phone/Fax    04/04/2025 03:02 PM CDT Phone (Incoming) Mack Bond (Self) [phone] (M)            What medication are you calling about (include dose and sig)?: buprenorphine HCl-naloxone HCl (SUBOXONE) 2-0.5 MG per film     Preferred Pharmacy:   [id number] - [place] [[place]], [place] - [address] [place] [id number]  Phone: [phone] Fax: [phone]      Controlled Substance Agreement on file:   CSA -- Patient Level:     [Media Unavailable] Controlled Substance Agreement - Opioid - Scan on 6/23/2023  8:42 AM   [Media Unavailable] Controlled Substance Agreement - Opioid - Scan on 5/27/2022  7:39 PM       Who prescribed the medication?: PCP    Do you need a refill? Yes    Patient offered an appointment? No    Do you have any questions or concerns?  No      Could we send this information to you in [employer] or would you prefer to receive a phone call?:   Patient would like to be contacted via [employer]

## 2025-04-05 ENCOUNTER — TELEPHONE (OUTPATIENT)
Dept: FAMILY MEDICINE | Facility: CLINIC | Age: 45
End: 2025-04-05
Payer: COMMERCIAL

## 2025-04-05 DIAGNOSIS — M48.062 SPINAL STENOSIS OF LUMBAR REGION WITH NEUROGENIC CLAUDICATION: ICD-10-CM

## 2025-04-06 ENCOUNTER — HEALTH MAINTENANCE LETTER (OUTPATIENT)
Age: 45
End: 2025-04-06

## 2025-04-06 DIAGNOSIS — G89.4 CHRONIC PAIN SYNDROME: ICD-10-CM

## 2025-04-07 RX ORDER — BUPRENORPHINE AND NALOXONE 2; .5 MG/1; MG/1
1 FILM, SOLUBLE BUCCAL; SUBLINGUAL DAILY
Qty: 15 FILM | Refills: 0 | OUTPATIENT
Start: 2025-04-07

## 2025-04-07 RX ORDER — GABAPENTIN 300 MG/1
300 CAPSULE ORAL 3 TIMES DAILY
Qty: 90 CAPSULE | Refills: 0 | Status: SHIPPED | OUTPATIENT
Start: 2025-04-07 | End: 2025-04-09

## 2025-04-07 NOTE — TELEPHONE ENCOUNTER
Gabapentin was refilled today.        buprenorphine HCl-naloxone HCl (SUBOXONE) 2-0.5 MG per film  was denied,    Refusal reason: Patient needs appointment

## 2025-04-07 NOTE — TELEPHONE ENCOUNTER
Pt called in to check on refill for Gabapentin. Assisted Pt with scheduling video visit with Dr. Hearn on 4/16 at 130pm. Dr. Watkins discontinued medication today. Can be filled?

## 2025-04-09 ENCOUNTER — OFFICE VISIT (OUTPATIENT)
Dept: FAMILY MEDICINE | Facility: CLINIC | Age: 45
End: 2025-04-09
Payer: COMMERCIAL

## 2025-04-09 VITALS
RESPIRATION RATE: 20 BRPM | SYSTOLIC BLOOD PRESSURE: 120 MMHG | BODY MASS INDEX: 39.76 KG/M2 | HEIGHT: 71 IN | TEMPERATURE: 98.1 F | WEIGHT: 284 LBS | HEART RATE: 80 BPM | DIASTOLIC BLOOD PRESSURE: 85 MMHG | OXYGEN SATURATION: 97 %

## 2025-04-09 DIAGNOSIS — R39.15 URINARY URGENCY: ICD-10-CM

## 2025-04-09 DIAGNOSIS — K60.2 ANAL FISSURE: ICD-10-CM

## 2025-04-09 DIAGNOSIS — N34.2 URETHRITIS: ICD-10-CM

## 2025-04-09 DIAGNOSIS — F11.20 CONTINUOUS OPIOID DEPENDENCE (H): ICD-10-CM

## 2025-04-09 DIAGNOSIS — E11.9 TYPE 2 DIABETES MELLITUS WITHOUT COMPLICATION, WITHOUT LONG-TERM CURRENT USE OF INSULIN (H): ICD-10-CM

## 2025-04-09 DIAGNOSIS — Z12.5 SCREENING FOR PROSTATE CANCER: Primary | ICD-10-CM

## 2025-04-09 DIAGNOSIS — G89.4 CHRONIC PAIN SYNDROME: ICD-10-CM

## 2025-04-09 DIAGNOSIS — F33.2 SEVERE EPISODE OF RECURRENT MAJOR DEPRESSIVE DISORDER, WITHOUT PSYCHOTIC FEATURES (H): ICD-10-CM

## 2025-04-09 DIAGNOSIS — F41.1 GAD (GENERALIZED ANXIETY DISORDER): ICD-10-CM

## 2025-04-09 LAB
ALBUMIN UR-MCNC: NEGATIVE MG/DL
APPEARANCE UR: CLEAR
BILIRUB UR QL STRIP: NEGATIVE
COLOR UR AUTO: YELLOW
EST. AVERAGE GLUCOSE BLD GHB EST-MCNC: 103 MG/DL
GLUCOSE UR STRIP-MCNC: NEGATIVE MG/DL
HBA1C MFR BLD: 5.2 % (ref 0–5.6)
HGB UR QL STRIP: NEGATIVE
KETONES UR STRIP-MCNC: NEGATIVE MG/DL
LEUKOCYTE ESTERASE UR QL STRIP: NEGATIVE
NITRATE UR QL: NEGATIVE
PH UR STRIP: 5.5 [PH] (ref 5–8)
SP GR UR STRIP: 1.02 (ref 1–1.03)
UROBILINOGEN UR STRIP-ACNC: 0.2 E.U./DL

## 2025-04-09 PROCEDURE — 84153 ASSAY OF PSA TOTAL: CPT | Performed by: PHYSICIAN ASSISTANT

## 2025-04-09 PROCEDURE — G2211 COMPLEX E/M VISIT ADD ON: HCPCS | Performed by: PHYSICIAN ASSISTANT

## 2025-04-09 PROCEDURE — 36415 COLL VENOUS BLD VENIPUNCTURE: CPT | Performed by: PHYSICIAN ASSISTANT

## 2025-04-09 PROCEDURE — 83036 HEMOGLOBIN GLYCOSYLATED A1C: CPT | Performed by: PHYSICIAN ASSISTANT

## 2025-04-09 PROCEDURE — 96127 BRIEF EMOTIONAL/BEHAV ASSMT: CPT | Performed by: PHYSICIAN ASSISTANT

## 2025-04-09 PROCEDURE — 99215 OFFICE O/P EST HI 40 MIN: CPT | Performed by: PHYSICIAN ASSISTANT

## 2025-04-09 PROCEDURE — 3079F DIAST BP 80-89 MM HG: CPT | Performed by: PHYSICIAN ASSISTANT

## 2025-04-09 PROCEDURE — 81003 URINALYSIS AUTO W/O SCOPE: CPT | Performed by: PHYSICIAN ASSISTANT

## 2025-04-09 PROCEDURE — 3074F SYST BP LT 130 MM HG: CPT | Performed by: PHYSICIAN ASSISTANT

## 2025-04-09 PROCEDURE — 99417 PROLNG OP E/M EACH 15 MIN: CPT | Performed by: PHYSICIAN ASSISTANT

## 2025-04-09 RX ORDER — POLYETHYLENE GLYCOL 3350 17 G/17G
1 POWDER, FOR SOLUTION ORAL DAILY
COMMUNITY
Start: 2025-04-09

## 2025-04-09 RX ORDER — LIDOCAINE 50 MG/G
OINTMENT TOPICAL PRN
Qty: 50 G | Refills: 11 | Status: SHIPPED | OUTPATIENT
Start: 2025-04-09

## 2025-04-09 RX ORDER — GABAPENTIN 300 MG/1
CAPSULE ORAL
Qty: 180 CAPSULE | Refills: 3 | Status: SHIPPED | OUTPATIENT
Start: 2025-04-09

## 2025-04-09 ASSESSMENT — ANXIETY QUESTIONNAIRES
GAD7 TOTAL SCORE: 15
7. FEELING AFRAID AS IF SOMETHING AWFUL MIGHT HAPPEN: NEARLY EVERY DAY
3. WORRYING TOO MUCH ABOUT DIFFERENT THINGS: NEARLY EVERY DAY
8. IF YOU CHECKED OFF ANY PROBLEMS, HOW DIFFICULT HAVE THESE MADE IT FOR YOU TO DO YOUR WORK, TAKE CARE OF THINGS AT HOME, OR GET ALONG WITH OTHER PEOPLE?: EXTREMELY DIFFICULT
GAD7 TOTAL SCORE: 15
1. FEELING NERVOUS, ANXIOUS, OR ON EDGE: NEARLY EVERY DAY
GAD7 TOTAL SCORE: 15
2. NOT BEING ABLE TO STOP OR CONTROL WORRYING: NEARLY EVERY DAY
6. BECOMING EASILY ANNOYED OR IRRITABLE: NOT AT ALL
IF YOU CHECKED OFF ANY PROBLEMS ON THIS QUESTIONNAIRE, HOW DIFFICULT HAVE THESE PROBLEMS MADE IT FOR YOU TO DO YOUR WORK, TAKE CARE OF THINGS AT HOME, OR GET ALONG WITH OTHER PEOPLE: EXTREMELY DIFFICULT
5. BEING SO RESTLESS THAT IT IS HARD TO SIT STILL: NOT AT ALL
4. TROUBLE RELAXING: NEARLY EVERY DAY
7. FEELING AFRAID AS IF SOMETHING AWFUL MIGHT HAPPEN: NEARLY EVERY DAY

## 2025-04-09 ASSESSMENT — PATIENT HEALTH QUESTIONNAIRE - PHQ9
SUM OF ALL RESPONSES TO PHQ QUESTIONS 1-9: 20
10. IF YOU CHECKED OFF ANY PROBLEMS, HOW DIFFICULT HAVE THESE PROBLEMS MADE IT FOR YOU TO DO YOUR WORK, TAKE CARE OF THINGS AT HOME, OR GET ALONG WITH OTHER PEOPLE: EXTREMELY DIFFICULT
SUM OF ALL RESPONSES TO PHQ QUESTIONS 1-9: 20

## 2025-04-09 NOTE — PROGRESS NOTES
Assessment & Plan     Type 2 diabetes mellitus without complication, without long-term current use of insulin (H)  Known history well-controlled.  Will check A1c again today if persistently well-controlled we will likely stop metformin and decrease medication load on patient.  - HEMOGLOBIN A1C; Future  - HEMOGLOBIN A1C    Continuous opioid dependence (H)  Ongoing also followed by pain management.  Consider possible constipation etiology to below fissure concerns.  Recommending attempting daily MiraLAX May use as needed Dulcolax if he wishes.  Otherwise if persistent constipation continues may need to consider chronic management with medication such as Linzess or alternative similar options.    TYRELL (generalized anxiety disorder)  Quite anxious today tangential and very uncontrolled.  Consider possible uncontrolled bipolar.  Has had this diagnosed in his chart in the past but unclear if formally assessed for this.  He is very hesitant to start any additional medications and honestly, if bipolar 1 truly is present I am hesitant to start a new SSRI or SNRI.  Cymbalta would be a valid option given history of chronic pain but first would recommend consultation with psychiatry.  Collaborative psychiatry referral placed.  Will increase gabapentin to 600 mg at night while maintaining 300 mg in the morning and afternoon.  May increase to a maximum of 600 mg 3 times daily in the future and recommending follow-up with pain management as well as psychiatry for reassessment  - Adult Mental Health  Referral; Future  - gabapentin (NEURONTIN) 300 MG capsule; Take 300 mg in the am and afternoon with 600 mg in the PM. If no improvement in 1 week in pain or anxiety, may continue to increase gabapentin weekly to a maximum of 600 mg tid.  Medication use and side effects discussed with the patient. Patient is in complete understanding and agreement with plan.     Severe episode of recurrent major depressive disorder, without  psychotic features (H)  As above.  Denies any active thoughts of self-harm or intent.  Patient to go to the ER if this is experienced.  - Adult Mental Health  Referral; Future    Anal fissure  Known history through [facility].  Has yet to schedule her follow-up with recommended colorectal surgery.  Referral renewed.  Cannot afford compounded nifedipine with lidocaine.  Instead will attempt lidocaine only for symptomatic relief.  Stool softeners as above.  - Adult Colorectal Surgery  Referral; Future  - lidocaine (XYLOCAINE) 5 % external ointment; Apply topically as needed for moderate pain.  Medication use and side effects discussed with the patient. Patient is in complete understanding and agreement with plan.     Urethritis  Diagnosis based off of history.  Exam benign.  Will check for possible infectious etiologies.  Patient is adamant no concerns for STIs.  If workup is negative likely chemical irritation and recommend avoiding any scented soaps.  If no improvement in 1 week consider consultation with urology.  PSA also pending given localized inflammation and description of possible urinary urgency  - UA Macroscopic with reflex to Microscopic and Culture - Lab Collect; Future  - UA Macroscopic with reflex to Microscopic and Culture - Lab Collect    Urinary urgency  As above  - PSA, tumor marker; Future  - PSA, tumor marker    Screening for prostate cancer  As above  - PSA, tumor marker; Future  - PSA, tumor marker    Chronic pain syndrome  As above  - gabapentin (NEURONTIN) 300 MG capsule; Take 300 mg in the am and afternoon with 600 mg in the PM. If no improvement in 1 week in pain or anxiety, may continue to increase gabapentin weekly to a maximum of 600 mg tid.      58 minutes. Greater than 50% of the time was spent face to face counseling regarding his conditions and treatment options as described above.       Depression Screening Follow Up        4/9/2025     1:49 PM   PHQ   PHQ-9 Total  Score 20    Q9: Thoughts of better off dead/self-harm past 2 weeks Several days   F/U: Thoughts of suicide or self-harm No   F/U: Safety concerns Yes       Patient-reported       Follow Up Actions Taken  Mental Health Referral placed    Discussed the following ways the patient can remain in a safe environment:  be around others      Subjective   Will is a 45 year old, presenting for the following health issues:  Rectal Problem, Penis/Scrotum Problem (Pain), Mouth/Lip Problem (Split lip), Hand Problem, and Recheck Medication        4/9/2025     1:54 PM   Additional Questions   Roomed by Eli BARILLAS CMA   Accompanied by Self     History of Present Illness       Reason for visit:  Help with Weight loss.    He eats 2-3 servings of fruits and vegetables daily.He consumes 0 sweetened beverage(s) daily.He exercises with enough effort to increase his heart rate 10 to 19 minutes per day.  He exercises with enough effort to increase his heart rate 5 days per week. He is missing 1 dose(s) of medications per week.  He is not taking prescribed medications regularly due to cost of medication, side effects, remembering to take and other.        Patient is a 45-year-old male with a past medical history of depression and anxiety and previously subjective bipolar 1 as well as OCD, also has a past history of hypertension, cerebral and cerebellar tonsillar ectopia, continuous opioid dependence on Suboxone followed through pain management, spinal stenosis with spondylolisthesis of the lumbar region, as well as ongoing chronic concerns of fissures with external hemorrhoids and rectal discomfort.  He presents today for multiple concerns.  See below    In regards to chronic diabetes, his last A1c approximately 3 months ago was well-controlled at 5.4%.  His metformin was decreased to once daily with his current Ozempic.  Takes as prescribed.  No significant side effects.  Not checking his own sugars    In regards to depression and anxiety,  "he states these have progressively worsened over the years which he relates to his uncontrolled chronic pain.  He currently takes BuSpar 15 mg 3 times daily through his PCP and patient denies any other medications used in the past.  In review of his chart, it appears he has been diagnosed with both OCD and bipolar 1 in the past however I see no formal evaluation or psychology/psychiatry evaluation referring to this.  It appears when this was first mentioned it was a subjective history by patient.  Today patient denies any known history of either 1 of these.  However, he does admit to concerns of \"manic behavior\".  He admits to passive thoughts of death but denies any thoughts of self-harm or harming others.  He has been referred to psychiatry in the past but states he has never had a referral call him for this.  He is hesitant to start additional medications.  He would like to mitigate meds if possible    In regards to chronic pain, he does follow pain management.  He is currently taking Suboxone which was recently prescribed as a bridging prescription to allow him to see his pain management center.  It does not appear his PCP was meant to prescribe this long-term.  He states this does help to some extent in combination with the gabapentin.  He is currently taking gabapentin 300 mg 3 times daily.  He has a future appointments with pain management on 23 April.  Has a future appointment with PCP on 16 April to discuss medication management    In regards to concerns of fissures and external hemorrhoids, he was seen by [facility] and patient states he did not have a good experience during this visit.  He has significant pain and discomfort with bleeding at the rectal region.  He has the sensation of swelling and inability to defecate.  He has attempted using stool softener such as Dulcolax with little to no improvement.  He was referred to colorectal surgery through [facility] however he states he has yet to hear " "from them.  He was given lidocaine with nifedipine however given the cost he elected not to purchase this.  No other treatments tried    Patient also would like to discuss ongoing intermittent thickened skin on the palmar aspect of his hands.  No treatments tried and comes and goes but can result in fissures of the skin and bleeding.    He also has had similar symptoms on his lower lip despite attempting significant Chapstick.  No steroids used no redness or warmth.    Patient also has a 1 to 2-week history of ongoing painful urination.  He describes this as pain in the shaft of his penis that occurs towards the end of urination.  No blood in the urine.  No decrease in flow or hesitancy.  He does admit to having surprised urgency at times when it does not feel like he needs to urinate.  No testicular pain.  Denies any known new exposures however does state he has a new body wash that he has been using for a few weeks.  Patient is not sexually active for years.  Denies concerns of STIs.      Objective    /85 (BP Location: Left arm, Patient Position: Sitting, Cuff Size: Adult Regular)   Pulse 80   Temp 98.1  F (36.7  C) (Oral)   Resp 20   Ht 1.8 m (5' 10.87\")   Wt 128.8 kg (284 lb)   SpO2 97%   BMI 39.76 kg/m    Body mass index is 39.76 kg/m .  Physical Exam   GENERAL: alert and no distress   (male): testicles normal without atrophy or masses and penis normal without urethral discharge  PSYCH: mentation appears normal, inattentive, tangential, anxious, and appearance disheveled  LYMPH: no cervical, supraclavicular, axillary, or inguinal adenopathy  : No testicular masses or discomfort.  No penile visual abnormalities or discharge or erythema.        Signed Electronically by: Andrew Ward PA-C  The longitudinal plan of care for the diagnosis(es)/condition(s) as documented were addressed during this visit. Due to the added complexity in care, I will continue to support Will in the subsequent " management and with ongoing continuity of care.

## 2025-04-10 LAB — PSA SERPL DL<=0.01 NG/ML-MCNC: 0.66 NG/ML (ref 0–2.5)

## 2025-04-14 ENCOUNTER — VIRTUAL VISIT (OUTPATIENT)
Dept: PSYCHIATRY | Facility: CLINIC | Age: 45
End: 2025-04-14
Attending: PHYSICIAN ASSISTANT
Payer: COMMERCIAL

## 2025-04-14 DIAGNOSIS — F31.9 BIPOLAR I DISORDER (H): Primary | ICD-10-CM

## 2025-04-14 DIAGNOSIS — F41.1 GAD (GENERALIZED ANXIETY DISORDER): ICD-10-CM

## 2025-04-14 DIAGNOSIS — F33.2 SEVERE EPISODE OF RECURRENT MAJOR DEPRESSIVE DISORDER, WITHOUT PSYCHOTIC FEATURES (H): ICD-10-CM

## 2025-04-14 RX ORDER — ARIPIPRAZOLE 10 MG/1
10 TABLET ORAL DAILY
Qty: 30 TABLET | Refills: 1 | Status: SHIPPED | OUTPATIENT
Start: 2025-04-14

## 2025-04-14 ASSESSMENT — PAIN SCALES - GENERAL: PAINLEVEL_OUTOF10: SEVERE PAIN (7)

## 2025-04-14 ASSESSMENT — PATIENT HEALTH QUESTIONNAIRE - PHQ9
SUM OF ALL RESPONSES TO PHQ QUESTIONS 1-9: 23
10. IF YOU CHECKED OFF ANY PROBLEMS, HOW DIFFICULT HAVE THESE PROBLEMS MADE IT FOR YOU TO DO YOUR WORK, TAKE CARE OF THINGS AT HOME, OR GET ALONG WITH OTHER PEOPLE: EXTREMELY DIFFICULT
SUM OF ALL RESPONSES TO PHQ QUESTIONS 1-9: 23
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
## 2025-04-14 NOTE — NURSING NOTE
Is the patient currently in the state of [place]? YES    Current patient location: [address]   [place] [address]    Visit mode:Video    If the visit is dropped, the patient can be reconnected by: VIDEO VISIT: Text to cell phone:   Telephone Information:   Mobile [phone]       Will anyone else be joining the visit? No  (If patient encounters technical issues they should call [phone])    Are changes needed to the allergy or medication list? Pt stated no changes to allergies and Pt stated no med changes    Are refills needed on medications prescribed by this physician? No    Rooming Documentation: Attendance Guidelines - Care team has reviewed attendance agreement with patient. Patient advised that two failed appointments within 6 months may lead to termination of current episode of care.     VF unable to complete qnrs with pt due to time.     Reason for visit: Consult     SHERI Amaya

## 2025-04-16 ENCOUNTER — VIRTUAL VISIT (OUTPATIENT)
Dept: FAMILY MEDICINE | Facility: CLINIC | Age: 45
End: 2025-04-16
Payer: COMMERCIAL

## 2025-04-16 DIAGNOSIS — K59.00 CONSTIPATION, UNSPECIFIED CONSTIPATION TYPE: ICD-10-CM

## 2025-04-16 DIAGNOSIS — N34.2 CHRONIC URETHRITIS: Primary | ICD-10-CM

## 2025-04-16 DIAGNOSIS — K60.2 ANAL FISSURE: ICD-10-CM

## 2025-04-16 PROCEDURE — 98005 SYNCH AUDIO-VIDEO EST LOW 20: CPT | Performed by: FAMILY MEDICINE

## 2025-04-16 NOTE — PROGRESS NOTES
Will is a 45 year old who is being evaluated via a billable video visit.    How would you like to obtain your AVS? MyChart  If the video visit is dropped, the invitation should be resent by: Text to cell phone: [phone]  Will anyone else be joining your video visit? No      Assessment & Plan     Chronic urethritis  UA negative, since symptoms are persisting referral to urology   - Adult Urology  Referral; Future    Anal fissure  Has follow up with surgery    Constipation, unspecified constipation type  Continue with PEG                Subjective   Will is a 45 year old, presenting for the following health issues:  Follow Up (Verbal consent given for video visit.   Follow up chronic/continuing health problems, getting referrals)      4/16/2025     1:15 PM   Additional Questions   Roomed by Fariba JOSUE CMA   Accompanied by Self     Video Start Time: 1:32 PM    HPI        Patient is wanting to follow up on his chronic/continuing health conditions, discuss referrals.   States he doesn't need refills on his prescriptions.  He has seen psychiatry early this week and was started on aripiprazole.  Urethral burning persists, negative UA              Objective           Vitals:  No vitals were obtained today due to virtual visit.    Physical Exam   GENERAL: alert and no distress  EYES: Eyes grossly normal to inspection.  No discharge or erythema, or obvious scleral/conjunctival abnormalities.  RESP: No audible wheeze, cough, or visible cyanosis.    SKIN: Visible skin clear. No significant rash, abnormal pigmentation or lesions.  NEURO: Cranial nerves grossly intact.  Mentation and speech appropriate for age.  PSYCH: Appropriate affect, tone, and pace of words          Video-Visit Details    Type of service:  Video Visit   Video End Time:1:57 PM  Originating Location (pt. Location): Home    Distant Location (provider location):  On-site  Platform used for Video Visit: Mery  Signed Electronically by: Mata  MD Dhiraj

## 2025-04-17 ENCOUNTER — VIRTUAL VISIT (OUTPATIENT)
Dept: PSYCHOLOGY | Facility: CLINIC | Age: 45
End: 2025-04-17
Attending: STUDENT IN AN ORGANIZED HEALTH CARE EDUCATION/TRAINING PROGRAM
Payer: COMMERCIAL

## 2025-04-17 DIAGNOSIS — F33.2 SEVERE EPISODE OF RECURRENT MAJOR DEPRESSIVE DISORDER, WITHOUT PSYCHOTIC FEATURES (H): ICD-10-CM

## 2025-04-17 DIAGNOSIS — F41.1 GAD (GENERALIZED ANXIETY DISORDER): ICD-10-CM

## 2025-04-17 DIAGNOSIS — F31.9 BIPOLAR I DISORDER (H): ICD-10-CM

## 2025-04-17 DIAGNOSIS — F43.10 POSTTRAUMATIC STRESS DISORDER: Primary | ICD-10-CM

## 2025-04-17 ASSESSMENT — PATIENT HEALTH QUESTIONNAIRE - PHQ9
10. IF YOU CHECKED OFF ANY PROBLEMS, HOW DIFFICULT HAVE THESE PROBLEMS MADE IT FOR YOU TO DO YOUR WORK, TAKE CARE OF THINGS AT HOME, OR GET ALONG WITH OTHER PEOPLE: EXTREMELY DIFFICULT
SUM OF ALL RESPONSES TO PHQ QUESTIONS 1-9: 21
SUM OF ALL RESPONSES TO PHQ QUESTIONS 1-9: 21
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
## 2025-04-22 ENCOUNTER — TELEPHONE (OUTPATIENT)
Dept: PALLIATIVE MEDICINE | Facility: OTHER | Age: 45
End: 2025-04-22

## 2025-04-22 ENCOUNTER — VIRTUAL VISIT (OUTPATIENT)
Dept: EDUCATION SERVICES | Facility: CLINIC | Age: 45
End: 2025-04-22
Payer: COMMERCIAL

## 2025-04-22 DIAGNOSIS — E11.9 TYPE 2 DIABETES MELLITUS WITHOUT COMPLICATION, WITHOUT LONG-TERM CURRENT USE OF INSULIN (H): Primary | ICD-10-CM

## 2025-04-22 PROCEDURE — 98968 PH1 ASSMT&MGMT NQHP 21-30: CPT | Mod: 93 | Performed by: DIETITIAN, REGISTERED

## 2025-04-22 NOTE — PATIENT INSTRUCTIONS
Test blood sugar once or twice daily: either in the morning before eating, with goal to be under 130 and two hours after one meal per day,  with goal to be under 180.  Inject Ozempic 2.0 mg weekly.   Aim to have 1/2 of your plate filled with vegetables( all vegetables except: corn, peas, potatoes), 1/4 lean protein and 1/4 carbohydrate containing foods: rice, pasta, bread, potatoes.   Add in a serving of fruit between meals: a piece of fruit the size of a tennis ball or 1 cup cut up melon or berries or 1/2 arslan or 1/2 banana.   Continue to exercise 30 minutes, five times weekly.   
none

## 2025-04-22 NOTE — PROGRESS NOTES
Diabetes Self-Management Education & Support 22 minutes    Presents for: Follow-up    Type of Service: Telephone Visit    Originating Location (Patient Location): Other car  Distant Location (Provider Location): Offsite  Mode of Communication:  Telephone    Telephone Visit Start Time:  1:01  Telephone Visit End Time (telephone visit stop time): 1:23    How would patient like to obtain AVS? MyChart      Assessment  Will has not been able to exercise for the last month due to  health related issues. He said he would be able to start swimming, which he hopes to start. Will's weight has been stable around 285#, which has been a bit frustrating for Will. He is currently taking Ozempic 2 mg. Metformin has been discontinued per PCP due to well managed A1C. Will reported that he continues to check his BG and FBS remain under 130 mg/dl and PP remain below 140. Will receives meals through [facility] at this time.     Patient's most recent   Lab Results   Component Value Date    A1C 5.2 04/09/2025     is meeting goal of <7.0    Diabetes knowledge and skills assessment:   Patient is knowledgeable in diabetes management concepts related to: Healthy Eating, Being Active, Monitoring, and Taking Medication    Based on learning assessment above, most appropriate setting for further diabetes education would be: Individual setting.    Care Plan and Education Provided:  Being Active: Finding a physical activity routine that works for you  Monitoring: Frequency of monitoring and Individual glucose targets  Taking Medication: ozempic dose    Patient verbalized understanding of diabetes self-management education concepts discussed, opportunities for ongoing education and support, and recommendations provided today.    Plan  Test blood sugar once or twice daily: either in the morning before eating, with goal to be under 130 and two hours after one meal per day,  with goal to be under 180.  Inject Ozempic 2.0 mg weekly.   Aim to have 1/2 of  "your plate filled with vegetables( all vegetables except: corn, peas, potatoes), 1/4 lean protein and 1/4 carbohydrate containing foods: rice, pasta, bread, potatoes.   Add in a serving of fruit between meals: a piece of fruit the size of a tennis ball or 1 cup cut up melon or berries or 1/2 arslan or 1/2 banana.   Continue to exercise 30 minutes, five times weekly.     Topics to cover at upcoming visits: Monitoring and Taking Medication    Follow-up:  Upcoming Diabetes Ed Appointments     Visit Type Date Time Department    DIABETES ED SHORT 4/22/2025  1:00 PM [facility] DIABETES EDUCATION    DIABETES ED SHORT 6/24/2025  1:00 PM [facility] DIABETES EDUCATION        See Care Plan for co-developed, patient-state behavior change goals.    Education Materials Provided:  No new materials provided today      Subjective/Objective  Will is an 45 year old, presenting for the following diabetes education related to: Follow-up  Cultural Influences/Ethnic Background:  Not  or       Diabetes Symptoms & Complications:  Diabetes Related Symptoms: Fatigue, Visual change  Weight trend: Stable (14% weight loss since 6/2024 per wt mgmt program)  Symptom course: Worsening  Disease course: Stable       Patient Problem List and Family Medical History reviewed for relevant medical history, current medical status, and diabetes risk factors.    Vitals:  There were no vitals taken for this visit.  Estimated body mass index is 39.76 kg/m  as calculated from the following:    Height as of 4/9/25: 1.8 m (5' 10.87\").    Weight as of 4/9/25: 128.8 kg (284 lb).   Last 3 BP:   BP Readings from Last 3 Encounters:   04/09/25 120/85   03/26/25 127/85   02/28/25 126/74       History   Smoking Status    Every Day    Types: Cigarettes   Smokeless Tobacco    Never       Labs:  Lab Results   Component Value Date    A1C 5.2 04/09/2025     Lab Results   Component Value Date     12/27/2024    GLC 83 04/27/2018     Lab Results   Component Value Date    " "LDL 84 09/30/2024     Direct Measure HDL   Date Value Ref Range Status   09/30/2024 41 >=40 mg/dL Final   ]  GFR Estimate   Date Value Ref Range Status   10/10/2024 89 >60 mL/min/1.73m2 Final     Comment:     eGFR calculated using 2021 CKD-EPI equation.     No results found for: \"GFRESTBLACK\"  Lab Results   Component Value Date    CR 1.06 10/10/2024     Lab Results   Component Value Date    MICROL <12.0 06/14/2024    UMALCR  06/14/2024      Comment:      Unable to calculate, urine albumin and/or urine creatinine is outside detectable limits.  Microalbuminuria is defined as an albumin:creatinine ratio of 17 to 299 for males and 25 to 299 for females. A ratio of albumin:creatinine of 300 or higher is indicative of overt proteinuria.  Due to biologic variability, positive results should be confirmed by a second, first-morning random or 24-hour timed urine specimen. If there is discrepancy, a third specimen is recommended. When 2 out of 3 results are in the microalbuminuria range, this is evidence for incipient nephropathy and warrants increased efforts at glucose control, blood pressure control, and institution of therapy with an angiotensin-converting-enzyme (ACE) inhibitor (if the patient can tolerate it).      UCRR 172.0 06/14/2024 4/22/2025   Being Active   Being Active Assessed Today Yes   Exercise: Currently not exercising   Days per week of moderate to strenuous exercise (like a brisk walk) 0   On average, minutes per day of exercise at this level 30   Exercise Minutes per Week 0   Barrier to exercise Physical limitation         4/22/2025   Monitoring   Monitoring Assessed Today Yes   Did patient bring glucose meter to appointment?  Yes   Blood Glucose Meter One Touch   Times checking blood sugar at home (number) 2   Times checking blood sugar at home (per) Day       Diabetes Medication(s)       Incretin Mimetic Agents       Semaglutide, 2 MG/DOSE, (OZEMPIC) 8 MG/3ML pen Inject 2 mg subcutaneously " every 7 days.              4/22/2025   Taking Medications   Taking Medication Assessed Today Yes   Current Treatments Non-insulin Injectables;Diet   Problems taking diabetes medications regularly? No       CINTIA Cochran [facility]  Time Spent: 22 minutes  Encounter Type: Individual    Any diabetes medication dose changes were made via the [facility] Standing Orders under the patient's referring provider.

## 2025-04-22 NOTE — LETTER
4/22/2025         RE: Daron Bond  [address]  [facility] [address]        Dear Colleague,    Thank you for referring your patient, Daron Bond, to the [facility]. Please see a copy of my visit note below.    Diabetes Self-Management Education & Support 22 minutes    Presents for: Follow-up    Type of Service: Telephone Visit    Originating Location (Patient Location): Other car  Distant Location (Provider Location): Offsite  Mode of Communication:  Telephone    Telephone Visit Start Time:  1:01  Telephone Visit End Time (telephone visit stop time): 1:23    How would patient like to obtain AVS? MyChart      Assessment  Will has not been able to exercise for the last month due to  health related issues. He said he would be able to start swimming, which he hopes to start. Will's weight has been stable around 285#, which has been a bit frustrating for Will. He is currently taking Ozempic 2 mg. Metformin has been discontinued per PCP due to well managed A1C. Will reported that he continues to check his BG and FBS remain under 130 mg/dl and PP remain below 140. Will receives meals through [facility] at this time.     Patient's most recent   Lab Results   Component Value Date    A1C 5.2 04/09/2025     is meeting goal of <7.0    Diabetes knowledge and skills assessment:   Patient is knowledgeable in diabetes management concepts related to: Healthy Eating, Being Active, Monitoring, and Taking Medication    Based on learning assessment above, most appropriate setting for further diabetes education would be: Individual setting.    Care Plan and Education Provided:  Being Active: Finding a physical activity routine that works for you  Monitoring: Frequency of monitoring and Individual glucose targets  Taking Medication: ozempic dose    Patient verbalized understanding of diabetes self-management education concepts discussed, opportunities for ongoing education and  "support, and recommendations provided today.    Plan  Test blood sugar once or twice daily: either in the morning before eating, with goal to be under 130 and two hours after one meal per day,  with goal to be under 180.  Inject Ozempic 2.0 mg weekly.   Aim to have 1/2 of your plate filled with vegetables( all vegetables except: corn, peas, potatoes), 1/4 lean protein and 1/4 carbohydrate containing foods: rice, pasta, bread, potatoes.   Add in a serving of fruit between meals: a piece of fruit the size of a tennis ball or 1 cup cut up melon or berries or 1/2 arslan or 1/2 banana.   Continue to exercise 30 minutes, five times weekly.     Topics to cover at upcoming visits: Monitoring and Taking Medication    Follow-up:  Upcoming Diabetes Ed Appointments     Visit Type Date Time Department    DIABETES ED SHORT 4/22/2025  1:00 PM [facility] DIABETES EDUCATION    DIABETES ED SHORT 6/24/2025  1:00 PM [facility] DIABETES EDUCATION        See Care Plan for co-developed, patient-state behavior change goals.    Education Materials Provided:  No new materials provided today      Subjective/Objective  Will is an 45 year old, presenting for the following diabetes education related to: Follow-up  Cultural Influences/Ethnic Background:  Not  or       Diabetes Symptoms & Complications:  Diabetes Related Symptoms: Fatigue, Visual change  Weight trend: Stable (14% weight loss since 6/2024 per wt mgmt program)  Symptom course: Worsening  Disease course: Stable       Patient Problem List and Family Medical History reviewed for relevant medical history, current medical status, and diabetes risk factors.    Vitals:  There were no vitals taken for this visit.  Estimated body mass index is 39.76 kg/m  as calculated from the following:    Height as of 4/9/25: 1.8 m (5' 10.87\").    Weight as of 4/9/25: 128.8 kg (284 lb).   Last 3 BP:   BP Readings from Last 3 Encounters:   04/09/25 120/85   03/26/25 127/85   02/28/25 126/74       History " "  Smoking Status     Every Day     Types: Cigarettes   Smokeless Tobacco     Never       Labs:  Lab Results   Component Value Date    A1C 5.2 04/09/2025     Lab Results   Component Value Date     12/27/2024    GLC 83 04/27/2018     Lab Results   Component Value Date    LDL 84 09/30/2024     Direct Measure HDL   Date Value Ref Range Status   09/30/2024 41 >=40 mg/dL Final   ]  GFR Estimate   Date Value Ref Range Status   10/10/2024 89 >60 mL/min/1.73m2 Final     Comment:     eGFR calculated using 2021 CKD-EPI equation.     No results found for: \"GFRESTBLACK\"  Lab Results   Component Value Date    CR 1.06 10/10/2024     Lab Results   Component Value Date    MICROL <12.0 06/14/2024    UMALCR  06/14/2024      Comment:      Unable to calculate, urine albumin and/or urine creatinine is outside detectable limits.  Microalbuminuria is defined as an albumin:creatinine ratio of 17 to 299 for males and 25 to 299 for females. A ratio of albumin:creatinine of 300 or higher is indicative of overt proteinuria.  Due to biologic variability, positive results should be confirmed by a second, first-morning random or 24-hour timed urine specimen. If there is discrepancy, a third specimen is recommended. When 2 out of 3 results are in the microalbuminuria range, this is evidence for incipient nephropathy and warrants increased efforts at glucose control, blood pressure control, and institution of therapy with an angiotensin-converting-enzyme (ACE) inhibitor (if the patient can tolerate it).      UCRR 172.0 06/14/2024 4/22/2025   Being Active   Being Active Assessed Today Yes   Exercise: Currently not exercising   Days per week of moderate to strenuous exercise (like a brisk walk) 0   On average, minutes per day of exercise at this level 30   Exercise Minutes per Week 0   Barrier to exercise Physical limitation         4/22/2025   Monitoring   Monitoring Assessed Today Yes   Did patient bring glucose meter to " appointment?  Yes   Blood Glucose Meter One Touch   Times checking blood sugar at home (number) 2   Times checking blood sugar at home (per) Day       Diabetes Medication(s)       Incretin Mimetic Agents       Semaglutide, 2 MG/DOSE, (OZEMPIC) 8 MG/3ML pen Inject 2 mg subcutaneously every 7 days.              4/22/2025   Taking Medications   Taking Medication Assessed Today Yes   Current Treatments Non-insulin Injectables;Diet   Problems taking diabetes medications regularly? No       CINTIA Cochran [facility]  Time Spent: 22 minutes  Encounter Type: Individual    Any diabetes medication dose changes were made via the [facility] Standing Orders under the patient's referring provider.

## 2025-04-23 ENCOUNTER — RADIOLOGY INJECTION OFFICE VISIT (OUTPATIENT)
Dept: PALLIATIVE MEDICINE | Facility: OTHER | Age: 45
End: 2025-04-23
Attending: NURSE PRACTITIONER
Payer: COMMERCIAL

## 2025-04-23 VITALS — HEART RATE: 76 BPM | OXYGEN SATURATION: 98 % | SYSTOLIC BLOOD PRESSURE: 121 MMHG | DIASTOLIC BLOOD PRESSURE: 74 MMHG

## 2025-04-23 DIAGNOSIS — M54.16 LUMBAR RADICULOPATHY: Primary | ICD-10-CM

## 2025-04-23 PROCEDURE — 64483 NJX AA&/STRD TFRM EPI L/S 1: CPT | Mod: RT | Performed by: STUDENT IN AN ORGANIZED HEALTH CARE EDUCATION/TRAINING PROGRAM

## 2025-04-23 PROCEDURE — 64484 NJX AA&/STRD TFRM EPI L/S EA: CPT | Mod: RT | Performed by: STUDENT IN AN ORGANIZED HEALTH CARE EDUCATION/TRAINING PROGRAM

## 2025-04-23 PROCEDURE — 250N000009 HC RX 250: Performed by: STUDENT IN AN ORGANIZED HEALTH CARE EDUCATION/TRAINING PROGRAM

## 2025-04-23 PROCEDURE — 255N000002 HC RX 255 OP 636: Performed by: STUDENT IN AN ORGANIZED HEALTH CARE EDUCATION/TRAINING PROGRAM

## 2025-04-23 PROCEDURE — 250N000011 HC RX IP 250 OP 636: Performed by: STUDENT IN AN ORGANIZED HEALTH CARE EDUCATION/TRAINING PROGRAM

## 2025-04-23 RX ORDER — LIDOCAINE HYDROCHLORIDE 10 MG/ML
3 INJECTION, SOLUTION EPIDURAL; INFILTRATION; INTRACAUDAL; PERINEURAL ONCE
Status: COMPLETED | OUTPATIENT
Start: 2025-04-23 | End: 2025-04-23

## 2025-04-23 RX ORDER — DEXAMETHASONE SODIUM PHOSPHATE 10 MG/ML
10 INJECTION, SOLUTION INTRA-ARTICULAR; INTRALESIONAL; INTRAMUSCULAR; INTRAVENOUS; SOFT TISSUE ONCE
Status: COMPLETED | OUTPATIENT
Start: 2025-04-23 | End: 2025-04-23

## 2025-04-23 RX ADMIN — IOHEXOL 10 ML: 180 INJECTION INTRAVENOUS at 16:01

## 2025-04-23 RX ADMIN — LIDOCAINE HYDROCHLORIDE 3 ML: 10 INJECTION, SOLUTION EPIDURAL; INFILTRATION; INTRACAUDAL; PERINEURAL at 16:06

## 2025-04-23 RX ADMIN — DEXAMETHASONE SODIUM PHOSPHATE 10 MG: 10 INJECTION INTRAMUSCULAR; INTRAVENOUS at 16:06

## 2025-04-23 ASSESSMENT — PAIN SCALES - GENERAL
PAINLEVEL_OUTOF10: MODERATE PAIN (6)
PAINLEVEL_OUTOF10: MODERATE PAIN (6)

## 2025-04-23 NOTE — PATIENT INSTRUCTIONS
[facility]   Procedure Discharge Instructions    Today you saw:    Dr. Avalos    You had an:  Epidural steroid injection      Medications used:  Lidocaine   Dexamethasone   Omnipaque      If you were holding your blood thinning medication, please restart taking it: N/A  Be cautious when walking. Numbness and/or weakness in the lower extremities may occur for up to 6-8 hours after the procedure due to effect of the local anesthetic  Do not drive for 6 hours. The effect of the local anesthetic could slow your reflexes.   You may resume your regular activities after 24 hours  Avoid strenuous activity for the first 24 hours  You may shower, however avoid swimming, tub baths or hot tubs for 24 hours following your procedure  You may have a mild to moderate increase in pain for several days following the injection.  It may take up to 14 days for the steroid medication to start working although you may feel the effect as early as a few days after the procedure.     You may use ice packs for 10-15 minutes, 3 to 4 times a day at the injection site for comfort  Do not use heat to painful areas for 6 to 8 hours. This will give the local anesthetic time to wear off and prevent you from accidentally burning your skin.   Unless you have been directed to avoid the use of anti-inflammatory medications (NSAIDS), you may use medications such as ibuprofen, Aleve or Tylenol for pain control if needed.   If you were fasting, you may resume your normal diet and medications after the procedure  If you have diabetes, check your blood sugar more frequently than usual as your blood sugar may be higher than normal for 10-14 days following a steroid injection. Contact your doctor who manages your diabetes if your blood sugar is higher than usual  Possible side effects of steroids that you may experience include flushing, elevated blood pressure, increased appetite, mild headaches and restlessness.  All  of these symptoms will get better with time.  If you experience any of the following, call the Pain Clinic at [phone]:  -Fever over 100 degree F  -Swelling, bleeding, redness, drainage, warmth at the injection site  -Progressive weakness or numbness in your legs or arms  -Loss of bowel or bladder function  -Unusual headache that is not relieved by Tylenol or other pain reliever  -Unusual new onset of pain that is not improving

## 2025-04-23 NOTE — PROGRESS NOTES
Discharge Information    IV Discontiued Time:  NA    Amount of Fluid Infused:  NA    Discharge Criteria = When patient returns to baseline or as per MD order    Consciousness:  Pt is fully awake    Circulation:  BP +/- 20% of pre-procedure level    Respiration:  Patient is able to breathe deeply    O2 Sat:  Patient is able to maintain O2 Sat >92% on room air    Activity:  Moves 4 extremities on command    Ambulation:  Patient is able to stand and walk or stand and pivot into wheelchair    Dressing:  Clean/dry or No Dressing    Notes:   Discharge instructions and AVS given to patient    Patient meets criteria for discharge?  YES    Admitted to PCU?  No    Responsible adult present to accompany patient home?  Yes    Signature/Title:    Mandie Gilbert RN  RN Care Coordinator  [facility] [place]

## 2025-04-23 NOTE — PROGRESS NOTES
Pre procedure Diagnosis: lumbar radiculopathy   Post procedure Diagnosis: Same  Procedure performed: right L4 and L5 transforaminal epidural steroid injection, CPT code 23546, 58840  Anesthesia: none  Complications: none immediately  Operators: Beto Avalos MD; Walter Eng DO     Indications:   Daron Bond is a 45 year old male was sent for a lumbar transforaminal epidural steroid injection      Options/alternatives, benefits and risks were discussed with the patient including bleeding, infection, tissue trauma, numbness, weakness, paralysis, spinal cord injury, radiation exposure, headache and reaction to medications. Questions were answered to his satisfaction and he agrees to proceed. Voluntary informed consent was obtained and signed.     Vitals were reviewed: Yes  Allergies were reviewed:  Yes   Medications were reviewed:  Yes   Pre-procedure pain score: 6/10    Procedure:  After getting informed consent, patient was brought into the procedure suite and was placed in a prone position on the procedure table.   A Pause for the Cause was performed.  Patient was prepped and draped in sterile fashion.     After identifying the right L4 and L5 neuroforamina, the C-arm was rotated to a left lateral oblique angle.  A total of 5ml of Lidocaine 1% was used to anesthetize the skin and the needle track at a skin entry site coaxial with the fluoroscopy beam, and overriding the superior aspect of the neuroforamen.  Two 22 gauge 5 inch spinal needles were advanced under intermittent fluoroscopy until they entered the foramen superiorly.    The position was then inspected from anteroposterior and lateral views, and the needles adjusted appropriately.  A total of 3.5 ml of Omnipaque-180 was injected, confirming appropriate position, with spread into the nerve root sheath and the epidural space, with no intravascular uptake. Visualization of active injection under live fluoroscopy or digital subtraction angiography  or live fluoroscopy further confirmed the above appropriate contrast spread.    A total of 3ml of preservative free 1% lidocaine with 10mg of dexamethasone was injected, split evenly among the above two sites.  The needles were flushed with lidocaine and removed.    During the procedure, the patient DID NOT experience paresthesias corresponding to the nerve root near final needle placement.    Hemostasis was achieved, the area was cleaned, and bandaids were placed when appropriate.    The patient tolerated the procedure well, and was taken to the recovery room. Images were saved to PACS.    Post-procedure pain score: 6/10  Follow-up includes:   -f/u with referring provider    Patient was discussed with attending physician, Dr. Avalos.     Walter Eng DO  Pain Fellow  [facility]    Physician Attestation   I, Beto Avalos MD, was present for all key and critical portions of the procedure, and I was immediately available during the remainder of the procedure.  Any changes to the documentation have been made above.    Beto Avalos MD  Interventional Pain Medicine  [facility]

## 2025-05-05 ENCOUNTER — VIRTUAL VISIT (OUTPATIENT)
Dept: PSYCHIATRY | Facility: CLINIC | Age: 45
End: 2025-05-05
Payer: COMMERCIAL

## 2025-05-05 DIAGNOSIS — F33.2 SEVERE EPISODE OF RECURRENT MAJOR DEPRESSIVE DISORDER, WITHOUT PSYCHOTIC FEATURES (H): ICD-10-CM

## 2025-05-05 DIAGNOSIS — F42.9 OBSESSIVE-COMPULSIVE DISORDER, UNSPECIFIED TYPE: ICD-10-CM

## 2025-05-05 DIAGNOSIS — F41.0 PANIC DISORDER WITHOUT AGORAPHOBIA: ICD-10-CM

## 2025-05-05 DIAGNOSIS — F31.9 BIPOLAR I DISORDER (H): ICD-10-CM

## 2025-05-05 DIAGNOSIS — F41.1 GAD (GENERALIZED ANXIETY DISORDER): Primary | ICD-10-CM

## 2025-05-05 DIAGNOSIS — F43.10 POSTTRAUMATIC STRESS DISORDER: ICD-10-CM

## 2025-05-05 ASSESSMENT — ANXIETY QUESTIONNAIRES
7. FEELING AFRAID AS IF SOMETHING AWFUL MIGHT HAPPEN: NEARLY EVERY DAY
GAD7 TOTAL SCORE: 16
5. BEING SO RESTLESS THAT IT IS HARD TO SIT STILL: SEVERAL DAYS
6. BECOMING EASILY ANNOYED OR IRRITABLE: NOT AT ALL
3. WORRYING TOO MUCH ABOUT DIFFERENT THINGS: NEARLY EVERY DAY
7. FEELING AFRAID AS IF SOMETHING AWFUL MIGHT HAPPEN: NEARLY EVERY DAY
GAD7 TOTAL SCORE: 16
IF YOU CHECKED OFF ANY PROBLEMS ON THIS QUESTIONNAIRE, HOW DIFFICULT HAVE THESE PROBLEMS MADE IT FOR YOU TO DO YOUR WORK, TAKE CARE OF THINGS AT HOME, OR GET ALONG WITH OTHER PEOPLE: EXTREMELY DIFFICULT
1. FEELING NERVOUS, ANXIOUS, OR ON EDGE: NEARLY EVERY DAY
GAD7 TOTAL SCORE: 16
2. NOT BEING ABLE TO STOP OR CONTROL WORRYING: NEARLY EVERY DAY
4. TROUBLE RELAXING: NEARLY EVERY DAY
8. IF YOU CHECKED OFF ANY PROBLEMS, HOW DIFFICULT HAVE THESE MADE IT FOR YOU TO DO YOUR WORK, TAKE CARE OF THINGS AT HOME, OR GET ALONG WITH OTHER PEOPLE?: EXTREMELY DIFFICULT

## 2025-05-05 ASSESSMENT — PAIN SCALES - GENERAL: PAINLEVEL_OUTOF10: MODERATE PAIN (4)

## 2025-05-05 NOTE — NURSING NOTE
Current patient location: [address]   [place] [facility] [address]    Is the patient currently in the state of [place]? YES    Visit mode: VIDEO    If the visit is dropped, the patient can be reconnected by:VIDEO VISIT: Text to cell phone:   Telephone Information:   Mobile [phone]       Will anyone else be joining the visit? NO  (If patient encounters technical issues they should call [phone]    Are changes needed to the allergy or medication list? No    Are refills needed on medications prescribed by this physician? Discuss with provider    Rooming Documentation:  Questionnaire(s) completed    Reason for visit: RECHECK    Abdullahi WADE

## 2025-05-05 NOTE — PATIENT INSTRUCTIONS
Patient Education   Escitalopram  (howard bautistae rickie' oh prabernarda)  Brand Name(s): Lexapro ; also available generically  IMPORTANT WARNING:  A small number of children, teenagers, and young adults (up to 24 years of age) who took antidepressants ('mood elevators') such as escitalopram during clinical studies became suicidal (thinking about harming or killing oneself or planning or trying to do so). Children, teenagers, and young adults who take antidepressants to treat depression or other mental illnesses may be more likely to become suicidal than children, teenagers, and young adults who do not take antidepressants to treat these conditions. However, experts are not sure about how great this risk is and how much it should be considered in deciding whether a child or teenager should take an antidepressant. Children younger than 12 years of age should not normally take escitalopram, but in some cases, a doctor may decide that escitalopram is the best medication to treat a child's condition.  You should know that your mental health may change in unexpected ways when you take escitalopram or other antidepressants even if you are an adult over 24 years of age. You may become suicidal, especially at the beginning of your treatment and any time that your dose is increased or decreased. You, your family, or your caregiver should call your doctor right away if you experience any of the following symptoms: new or worsening depression; thinking about harming or killing yourself, or planning or trying to do so; extreme worry; agitation; panic attacks; difficulty falling asleep or staying asleep; aggressive behavior; irritability; acting without thinking; severe restlessness; and frenzied abnormal excitement. Be sure that your family or caregiver knows which symptoms may be serious so they can call the doctor if you are unable to seek treatment on your own.  Your healthcare provider will want to see you often while you are taking  escitalopram, especially at the beginning of your treatment. Be sure to keep all appointments for office visits with your doctor.  The doctor or pharmacist will give you the 's patient information sheet (Medication Guide) when you begin treatment with escitalopram. Read the information carefully and ask your doctor or pharmacist if you have any questions. You also can obtain the Medication Guide from the FDA website: http://www.fda.gov/Drugs/DrugSafety/akd636500.htm.  No matter your age, before you take an antidepressant, you, your parent, or your caregiver should talk to your doctor about the risks and benefits of treating your condition with an antidepressant or with other treatments. You should also talk about the risks and benefits of not treating your condition. You should know that having depression or another mental illness greatly increases the risk that you will become suicidal. Tell your doctor if you or anyone in your family has or has ever had bipolar disorder (mood that changes from depressed to abnormally excited) or juve (frenzied, abnormally excited mood) or has thought about or attempted suicide. Talk to your doctor about your condition, symptoms, and personal and family medical history. You and your doctor will decide what type of treatment is right for you.  WHY is this medicine prescribed?  Escitalopram is used to treat depression in adults and children and teenagers 12 years of ago or older. Escitalopram is also used to treat generalized anxiety disorder (TYRELL; excessive worry and tension that disrupts daily life and lasts for 6 months or longer) in adults, teenagers, and children 7 years of age and older. Escitalopram is in a class of antidepressants called selective serotonin reuptake inhibitors (SSRIs). It works by increasing the amount of serotonin, a natural substance in the brain that helps maintain mental balance.  Are there OTHER USES for this medicine?  This medication may  be prescribed for other uses; ask your doctor or pharmacist for more information.  HOW should this medicine be used?  Escitalopram comes as a tablet and a solution (liquid) to take by mouth. It is usually taken once a day with or without food. To help you remember to take escitalopram, take it at around the same time every day, in the morning or in the evening. Follow the directions on your prescription label carefully, and ask your doctor or pharmacist to explain any part you do not understand. Take escitalopram exactly as directed. Do not take more or less of it or take it more often than prescribed by your doctor.  Your doctor may start you on a low dose of escitalopram and increase your dose after 1 week in adults and after 2 or 3 weeks in teenagers and children 7 years of age and older.  It may take 1 to 4 weeks or longer before you feel the full benefit of escitalopram. Continue to take escitalopram even if you feel well. Do not stop taking escitalopram without talking to your doctor. Your doctor will probably decrease your dose gradually. If you suddenly stop taking escitalopram, you may experience withdrawal symptoms such as mood changes, irritability, agitation, nausea, dizziness, burning, numbness, or tingling in the hands or feet, anxiety, confusion, headache, sweating, shaking, frenzied or abnormally excited mood, tiredness, and difficulty falling asleep or staying asleep. Tell your doctor if you experience any of these symptoms while you are decreasing your dose of escitalopram or soon after you stop taking escitalopram.  What SPECIAL PRECAUTIONS should I follow?  Before taking escitalopram,  tell your doctor or pharmacist if you are allergic to escitalopram, citalopram (Celexa), any other medications, or any of the ingredients in the tablets or solution. Ask your pharmacist or check the Medication Guide for a list of the ingredients.  tell your doctor or pharmacist if you are taking the following  medications or have stopped taking them within the past two weeks: a monoamine oxidase (MAO) inhibitor such as isocarboxazid (Marplan), linezolid (Zyvox), methylene blue, phenelzine (Nardil), selegiline (Emsam, Zelapar), and tranylcypromine (Parnate).  you should know that escitalopram is very similar to another SSRI, citalopram (Celexa). You should not take these two medications together.  Some medications should not be taken with escitalopram. Other medications may cause dosing changes or extra monitoring when taken with escitalopram. Make sure you have discussed any medications you are currently taking or plan to take before starting escitalopram with your doctor and pharmacist. Before starting, stopping, or changing any medications while taking escitalopram, please get the advice of your doctor or pharmacist.  The following nonprescription or herbal products may interact with escitalopram: aspirin, ibuprofen (Advil, Motrin IB), naproxen (Aleve), Lilibeth's wort, or tryptophan. Be sure to let your doctor and pharmacist know that you are taking these medications before you start taking escitalopram. Do not start any of these medications while taking escitalopram without discussing with your healthcare provider.  tell your doctor if you have a low level of sodium in your blood, if you drink or have ever drunk large amounts of alcohol, or use or have ever used street drugs or have ever overused prescription medications. Also tell your doctor if you have recently had a heart attack and if you have or have ever had bleeding problems; seizures; glaucoma (a condition in which increased pressure in the eye can lead to gradual loss of vision); or liver, kidney, or heart disease.  tell your doctor if you are pregnant, especially if you are in the last few months of your pregnancy, or if you plan to become pregnant or are breast-feeding. If you become pregnant while taking escitalopram, call your doctor. Escitalopram may  cause problems in newborns following delivery if it is taken during the last months of pregnancy.  you should know that escitalopram may make you drowsy and may affect your judgment, thinking, and movements. Do not drive a car or operate machinery until you know how this medication affects you.  remember that alcohol can add to the drowsiness caused by this medication.  you should know that escitalopram may cause angle-closure glaucoma (a condition where the fluid is suddenly blocked and unable to flow out of the eye causing a quick, severe increase in eye pressure which may lead to a loss of vision). Talk to your doctor about having an eye examination before you start taking this medication. If you have nausea, eye pain, changes in vision, such as seeing colored rings around lights, and swelling or redness in or around the eye, call your doctor or get emergency medical treatment right away.  What SPECIAL DIETARY instructions should I follow?  Unless your doctor tells you otherwise, continue your normal diet.  What should I do IF I FORGET to take a dose?  Take the missed dose as soon as you remember it. However, if it is almost time for the next dose, skip the missed dose and continue your regular dosing schedule. Do not take a double dose to make up for a missed one.  What SIDE EFFECTS can this medicine cause?  Escitalopram may cause side effects. Tell your doctor if any of these symptoms are severe or do not go away:  nausea  diarrhea  constipation  sexual problems in males; decreased sex drive, inability to get or keep an erection, or delayed or absent ejaculation  sexual problems in females; decreased sex drive, or delayed orgasm or unable to have an orgasm  drowsiness  yawning  shaking  difficulty falling asleep or staying asleep  increased sweating  dizziness  heartburn  stomach pain  excessive tiredness  dry mouth  decreased appetite  weight loss  flu-like symptoms  runny nose  sneezing  Some side effects can  be serious. If you experience either of the following symptoms or those listed in the IMPORTANT WARNING or SPECIAL PRECAUTIONS sections, call your doctor immediately:  unusual excitement  seeing things or hearing voices that do not exist (hallucinating)  rash  hives or blisters  itching  fever  joint pain  difficulty breathing or swallowing  swelling of the face, throat, tongue, lips, or eyes  fever, sweating, confusion, fast or irregular heartbeat, severe muscle stiffness or twitching, agitation, hallucinations, loss of coordination, nausea, vomiting, or diarrhea  abnormal bleeding or bruising  nose bleeding  headache  unsteadiness  problems with thinking, concentration, or memory  seizures  difficult or painful urination  Escitalopram may decrease appetite and cause weight loss and height in children. Your child's doctor will watch his or her growth carefully. Talk to your child's doctor if you have concerns about your child's growth or weight while he or she is taking this medication. Talk to your child's doctor about the risks of giving escitalopram to your child.  Escitalopram may cause other side effects. Call your doctor if you have any unusual problems while taking this medication.  If you experience a serious side effect, you or your doctor may send a report to the Food and Drug Administration's (FDA) MedWatch Adverse Event Reporting program online (http://www.fda.gov/Safety/MedWatch) or by phone (1-419.912.1962).  What should I know about STORAGE and DISPOSAL of this medication?  Keep this medication in the container it came in, tightly closed, and out of reach of children. Store it at room temperature and away from excess heat and moisture (not in the bathroom).  Unneeded medications should be disposed of in special ways to ensure that pets, children, and other people cannot consume them. However, you should not flush this medication down the toilet. Instead, the best way to dispose of your medication is  through a medicine take-back program. Talk to your pharmacist or contact your local garbage/recycling department to learn about take-back programs in your community. See the FDA's Safe Disposal of Medicines website (http://goo.gl/c4Rm4p) for more information if you do not have access to a take-back program.  It is important to keep all medication out of sight and reach of children as many containers (such as weekly pill minders and those for eye drops, creams, patches, and inhalers) are not child-resistant and young children can open them easily. To protect young children from poisoning, always lock safety caps and immediately place the medication in a safe location - one that is up and away and out of their sight and reach. http://www.upandaway.org  What should I do in case of OVERDOSE?  In case of overdose, call the poison control helpline at 1-979.847.3991. Information is also available online at https://www.poisonhelp.org/help. If the victim has collapsed, had a seizure, has trouble breathing, or can't be awakened, immediately call emergency services at [phone].  Symptoms of overdose may include:  dizziness  nausea  vomiting  drowsiness  fast or pounding heartbeat  seizures  coma (loss of consciousness for a period of time)  What OTHER INFORMATION should I know?  Keep all appointments with your doctor.  Before having any laboratory test (especially those that involve methylene blue), tell your doctor and the laboratory personnel that you are taking escitalopram.  Do not let anyone else take your medication. Ask your pharmacist any questions you have about refilling your prescription.  It is important for you to keep a written list of all of the prescription and nonprescription (over-the-counter) medicines you are taking, as well as any products such as vitamins, minerals, or other dietary supplements. You should bring this list with you each time you visit a doctor or if you are admitted to a hospital. It is also  important information to carry with you in case of emergencies.  This report on medications is for your information only, and is not considered individual patient advice. Because of the changing nature of drug information, please consult your physician or pharmacist about specific clinical use.  The American Society [employer] represents that the information provided hereunder was formulated with a reasonable standard of care, and in conformity with professional standards in the field. The [employer] makes no representations or warranties, express or implied, including, but not limited to, any implied warranty of merchantability and/or fitness for a particular purpose, with respect to such information and specifically disclaims all such warranties. Users are advised that decisions regarding drug therapy are complex medical decisions requiring the independent, informed decision of an appropriate health care professional, and the information is provided for informational purposes only. The entire monograph for a drug should be reviewed for a thorough understanding of the drug's actions, uses and side effects. The [employer] does not endorse or recommend the use of any drug. The information is not a substitute for medical care.  [facility]  Patient Medication Information .   Copyright, 2024. The American Society [employer] , [address], [address], [place], [place]. All Rights Reserved. Duplication for commercial use must be authorized by [facility].  Selected Revisions: July 15, 2023.  Care instructions adapted under license by your healthcare professional. If you have questions about a medical condition or this instruction, always ask your healthcare professional. [employer], [employer] disclaims any warranty or liability for your use of this information.         Patient Education    Citalopram  (lilye rickie' oh pram)  Brand Name(s): Celexa ; also available generically  IMPORTANT WARNING:  A small number of children, teenagers, and young adults (up to 24 years of age) who took antidepressants ('mood elevators') such as citalopram during clinical studies became suicidal (thinking about harming or killing oneself or planning or trying to do so). Children, teenagers, and young adults who take antidepressants to treat depression or other mental illnesses may be more likely to become suicidal than children, teenagers, and young adults who do not take antidepressants to treat these conditions. However, experts are not sure about how great this risk is and how much it should be considered in deciding whether a child or teenager should take an antidepressant. Children younger than 18 years of age should not normally take citalopram, but in some cases, a doctor may decide that citalopram is the best medication to treat a child's condition.  You should know that your mental health may change in unexpected ways when you take citalopram or other antidepressants even if you are an adult over 24 years of age. You may become suicidal, especially at the beginning of your treatment and any time that your dose is increased or decreased. You, your family, or your caregiver should call your doctor right away if you experience any of the following symptoms: new or worsening depression; thinking about harming or killing yourself, or planning or trying to do so; extreme worry; agitation; panic attacks; difficulty falling asleep or staying asleep; aggressive behavior; irritability; acting without thinking; severe restlessness; and frenzied abnormal excitement. Be sure that your family or caregiver knows which symptoms may be serious so they can call the doctor if you are unable to seek treatment on your own.  Your healthcare provider will want to see you often while you are taking citalopram, especially at the beginning  of your treatment. Be sure to keep all appointments for office visits with your doctor.  The doctor or pharmacist will give you the 's patient information sheet (Medication Guide) when you begin treatment with citalopram. Read the information carefully and ask your doctor or pharmacist if you have any questions. You also can obtain the Medication Guide from the FDA website: http://www.fda.gov/Drugs/DrugSafety/wgw400487.htm.  No matter your age, before you take an antidepressant, you, your parent, or your caregiver should talk to your doctor about the risks and benefits of treating your condition with an antidepressant or with other treatments. You should also talk about the risks and benefits of not treating your condition. You should know that having depression or another mental illness greatly increases the risk that you will become suicidal. This risk is higher if you or anyone in your family has or has ever had bipolar disorder (mood that changes from depressed to abnormally excited) or juve (frenzied, abnormally excited mood), or has thought about or attempted suicide. Talk to your doctor about your condition, symptoms, and personal and family medical history. You and your doctor will decide what type of treatment is right for you.  WHY is this medicine prescribed?  Citalopram is used to treat depression. Citalopram is in a class of antidepressants called selective serotonin reuptake inhibitors (SSRIs). It works by increasing the amount of serotonin, a natural substance in the brain that helps maintain mental balance.  Are there OTHER USES for this medicine?  Citalopram is also sometimes used to treat obsessive-compulsive disorder (bothersome thoughts that won't go away and the need to perform certain actions over and over), eating disorders, alcoholism, panic disorder (condition that causes sudden attacks of extreme fear with no apparent cause), premenstrual dysphoric disorder (a group of physical  and emotional symptoms that occur before the menstrual period each month), social anxiety disorder (extreme fear of interacting with others or performing in front of others that interferes with normal life), posttraumatic stress disorder, tingling in the hands and feet caused by diabetes, and certain male sexual problems. Talk to your doctor about the possible risks of using this medication for your condition.  This medication may be prescribed for other uses; ask your doctor or pharmacist for more information.  HOW should this medicine be used?  Citalopram comes as a tablet and a solution (liquid) to take by mouth. It is usually taken once a day, in the morning or in the evening, with or without food. Take citalopram at around the same time every day. Follow the directions on your prescription label carefully, and ask your doctor or pharmacist to explain any part you do not understand. Take citalopram exactly as directed. Do not take more or less of it or take it more often than prescribed by your doctor.  Your doctor may start you on a low dose of citalopram and gradually increase your dose, not more often than once a week.  It may take 1 to 4 weeks before you notice the full benefit of citalopram. Continue to take citalopram even if you feel well. Do not stop taking citalopram without talking to your doctor. Your doctor will probably decrease your dose gradually. If you suddenly stop taking citalopram, you may experience withdrawal symptoms such as mood changes, irritability, agitation, dizziness, numbness, tingling or electric shock-like sensations in the hands or feet, anxiety, confusion, headache, tiredness, nausea, sweating, shaking, frenzied or abnormally excited mood, and difficulty falling asleep or staying asleep. Tell your doctor if you experience any of these symptoms while you are decreasing your dose of citalopram or soon after you stop taking citalopram.  What SPECIAL PRECAUTIONS should I  follow?  Before taking citalopram,  tell your doctor and pharmacist if you are allergic to citalopram, escitalopram (Lexapro), any other medications, or any of the ingredients in the citalopram product you are taking. Talk to your pharmacist or check the Medication Guide for a list of the ingredients.  tell your doctor if you are taking pimozide (Orap) or a monoamine oxidase (MAO) inhibitor such as isocarboxazid (Marplan), linezolid (Zyvox), methylene blue, phenelzine (Nardil), selegiline (Emsam, Zelapar), or tranylcypromine (Parnate), or if you have stopped taking an MAO inhibitor within the past 14 days. Your doctor will probably tell you not to take citalopram. If you stop taking citalopram, you should wait at least 14 days before you start to take an MAO inhibitor.  you should know that citalopram is very similar to another SSRI, escitalopram (Lexapro). You should not take these two medications together.  tell your doctor and pharmacist what other prescription and nonprescription medications and vitamins you are taking or plan to take. Be sure to mention any of the following: amiodarone (Nexterone, Pacerone); amphetamines such as amphetamine (in Adderall, in Mydayis), dextroamphetamine (Dexedrine, in Adderall), and methamphetamine (Desoxyn); anticoagulants ('blood thinners') such as warfarin (Coumadin, Jantoven); aspirin and other nonsteroidal anti-inflammatory drugs (NSAIDs) such as ibuprofen (Advil, Motrin) and naproxen (Aleve, Naprosyn); carbamazepine (Carbatrol, Equetro, Tegretol, others); chlorpromazine; cimetidine (Tagamet); diuretics ('water pills); disopyramide (Norpace); dofetilide (Tikosyn); erythromycin (E.E.S. Sarmad-Tab, Erythrocin); fentanyl (Actiq, Duragesic, Fentora, Subsys); lithium (Lithobid); medications for anxiety, chronic pain, mental illness, and seizures; medications for migraine headaches such as almotriptan, eletriptan (Relpax), frovatriptan (Frova), naratriptan (Amerge), rizatriptan  (Maxalt), sumatriptan (Imitrex, Tosymra, in Treximet), and zolmitriptan (Zomig); methadone (Methadose); metoprolol (Lopressor, Toprol XL); moxifloxacin; omeprazole (Prilosec, Zegerid); pentamidine (Nebupent, Pentam); other SSRIs such as fluoxetine (Prozac, in Symbyax), fluvoxamine (Luvox), paroxetine (Paxil), and sertraline (Zoloft); serotonin-norepinephrine reuptake inhibitors (SNRIs) such as duloxetine (Cymbalta); procainamide; quinidine (in Nuedexta); sedatives; sleeping pills; sotalol (Betapace, Sorine, Sotylize); thioridazine (Mellaril); tramadol (Conzip, Qdolo, Ultram, in Ultracet); tranquilizers; and tricyclic antidepressants such as amitriptyline, amoxapine, clomipramine (Anafranil), desipramine (Norpramin), doxepin (Silenor), imipramine (Tofranil), nortriptyline (Pamelor), protriptyline, and trimipramine. Your doctor may need to change the doses of your medications or monitor you carefully for side effects. Many other medications may also interact with citalopram, so be sure to tell your doctor about all the medications you are taking, even those that do not appear on this list.  tell your doctor what nutritional supplements and herbal products you are taking, especially products that contain [place]'s wort or tryptophan.  tell your doctor if you drink or have ever drunk large amounts of alcohol or use or have ever used street drugs or have ever overused prescription medications. Also tell your doctor if you or anyone in your family has or has ever had long QT syndrome (a rare heart problem that may cause irregular heartbeat, fainting, or sudden death),if you have recently had a heart attack, or if you have or have ever had a slow or irregular heartbeat, heart failure (condition in which the heart cannot pump enough blood to other parts of the body) or other heart conditions; high blood pressure; bleeding problems; stroke; low levels of magnesium, potassium, or sodium in your blood; seizures; or kidney or  liver disease.  tell your doctor if you are pregnant, especially if you are in the last few months of your pregnancy, or if you plan to become pregnant or are breast-feeding. If you become pregnant while taking citalopram, call your doctor. Citalopram may cause problems in newborns following delivery if it is taken during the last months of pregnancy.  you should know that citalopram may make you drowsy and may affect your judgment, thinking, and movements. Do not drive a car or operate machinery until you know how this medication affects you.  talk to your doctor about the safe use of alcoholic beverages during your treatment with citalopram. Alcohol can make the side effects of citalopram worse.  you should know that citalopram may cause angle-closure glaucoma (a condition where the fluid is suddenly blocked and unable to flow out of the eye causing a quick, severe increase in eye pressure which may lead to a loss of vision). Talk to your doctor about having an eye examination before you start taking this medication. If you have nausea, eye pain, changes in vision, such as seeing colored rings around lights, and swelling or redness in or around the eye, call your doctor or get emergency medical treatment right away.  What SPECIAL DIETARY instructions should I follow?  Unless your doctor tells you otherwise, continue your normal diet.  What should I do IF I FORGET to take a dose?  Take the missed dose as soon as you remember it. However, if it is almost time for the next dose, skip the missed dose and continue your regular dosing schedule. Do not take a double dose to make up for a missed one.  What SIDE EFFECTS can this medicine cause?  Citalopram may cause side effects. Tell your doctor if any of these symptoms are severe or do not go away:  nausea  diarrhea  constipation  vomiting  stomach pain  heartburn  decreased appetite  weight loss  increased sweating  increased thirst  frequent urination  difficulty  falling asleep or staying asleep  drowsiness  excessive tiredness  yawning  weakness  uncontrollable shaking of a part of the body  muscle or joint pain  dry mouth  sexual problems in males; decreased sex drive, inability to get or keep an erection, or delayed or absent ejaculation  sexual problems in females; decreased sex drive, or delayed orgasm or inability to have an orgasm  heavy menstrual periods  runny nose  Some side effects can be serious. If you experience any of the following symptoms, or those listed in the IMPORTANT WARNING or SPECIAL PRECAUTIONS sections, call your doctor immediately or get emergency medical treatment:  chest pain  shortness of breath  dizziness  fainting  fever, sweating, confusion, fast or irregular heartbeat, severe muscle stiffness or twitching, agitation, hallucinations, loss of coordination, nausea, vomiting, or diarrhea  coma (loss of consciousness)  hives or blisters  rash  itching  difficulty breathing or swallowing  swelling of the face, throat, tongue, lips, eyes, hands, feet, ankles, or lower legs  hoarseness  unusual bleeding or bruising  nose bleeding  headache  unsteadiness  problems with thinking, concentration, or memory  seizures  Citalopram may decrease appetite and cause weight loss in children. Your child's doctor will watch his or her growth carefully. Talk to your child's doctor if you have concerns about your child's growth or weight while he or she is taking this medication. Talk to your child's doctor about the risks of giving citalopram to your child.  Citalopram may cause other side effects. Call your doctor if you have any unusual problems while taking this medication.  If you experience a serious side effect, you or your doctor may send a report to the Food and Drug Administration's (FDA) MedWatch Adverse Event Reporting program online (http://www.fda.gov/Safety/MedWatch) or by phone (1-225.180.3270).  What should I know about STORAGE and DISPOSAL of this  medication?  Keep this medication in the container it came in, tightly closed, and out of reach of children. Store it at room temperature and away from excess heat and moisture (not in the bathroom).  It is important to keep all medication out of sight and reach of children as many containers (such as weekly pill minders and those for eye drops, creams, patches, and inhalers) are not child-resistant and young children can open them easily. To protect young children from poisoning, always lock safety caps and immediately place the medication in a safe location - one that is up and away and out of their sight and reach. http://www.[employer].org  Unneeded medications should be disposed of in special ways to ensure that pets, children, and other people cannot consume them. However, you should not flush this medication down the toilet. Instead, the best way to dispose of your medication is through a medicine take-back program. Talk to your pharmacist or contact your local garbage/recycling department to learn about take-back programs in your community. See the FDA's Safe Disposal of Medicines website (http://goo.gl/c4Rm4p) for more information if you do not have access to a take-back program.  What should I do in case of OVERDOSE?  In case of overdose, call the poison control helpline at 1-928.479.9882. Information is also available online at https://www.poisonhelp.org/help. If the victim has collapsed, had a seizure, has trouble breathing, or can't be awakened, immediately call emergency services at 793.  Symptoms of overdose may include the following:  dizziness  sweating  nausea  vomiting  uncontrollable shaking of a part of the body  drowsiness  fast, irregular, or pounding heartbeat  memory loss  confusion  seizures  coma (loss of consciousness)  fast breathing  bluish color around mouth, fingers, or fingernails  muscle pain  dark-colored urine  What OTHER INFORMATION should I know?  Keep all appointments with your  doctor and the laboratory. Your doctor may order certain laboratory tests and electrocardiograms (EKG; a test to monitor your heart rate and rhythm) before you start taking citalopram and during your treatment with this medication.  Do not let anyone else take your medication. Ask your pharmacist any questions you have about refilling your prescription.  It is important for you to keep a written list of all of the prescription and nonprescription (over-the-counter) medicines you are taking, as well as any products such as vitamins, minerals, or other dietary supplements. You should bring this list with you each time you visit a doctor or if you are admitted to a hospital. It is also important information to carry with you in case of emergencies.  This report on medications is for your information only, and is not considered individual patient advice. Because of the changing nature of drug information, please consult your physician or pharmacist about specific clinical use.  The American Society of Health-System Pharmacists, Inc. represents that the information provided hereunder was formulated with a reasonable standard of care, and in conformity with professional standards in the field. The American Society of Health-System Pharmac[employer], Inc. makes no representations or warranties, express or implied, including, but not limited to, any implied warranty of merchantability and/or fitness for a particular purpose, with respect to such information and specifically disclaims all such warranties. Users are advised that decisions regarding drug therapy are complex medical decisions requiring the independent, informed decision of an appropriate health care professional, and the information is provided for informational purposes only. The entire monograph for a drug should be reviewed for a thorough understanding of the drug's actions, uses and side effects. The American Society of Health-System Pharmacists, Inc. does not  endorse or recommend the use of any drug. The information is not a substitute for medical care.  [facility]  Patient Medication Information .   Copyright, 2024. The American Society of Health-System Pharmacists , [address], [place], [place]. All Rights Reserved. Duplication for commercial use must be authorized by [facility].  Selected Revisions: January 15, 2022.  Care instructions adapted under license by your healthcare professional. If you have questions about a medical condition or this instruction, always ask your healthcare professional. [employer] disclaims any warranty or liability for your use of this information.
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
## 2025-05-06 ENCOUNTER — PATIENT OUTREACH (OUTPATIENT)
Dept: CARE COORDINATION | Facility: CLINIC | Age: 45
End: 2025-05-06
Payer: COMMERCIAL

## 2025-05-06 NOTE — PROGRESS NOTES
Clinic Care Coordination Contact  [facility]/Voicemail    Clinical Data: Care Coordinator Outreach    Outreach Documentation Number of Outreach Attempt   5/6/2025  10:58 AM 1       Left message on patient's voicemail with call back information and requested return call.      Plan: Care Coordinator will send care coordination introduction letter with care coordinator contact information and explanation of care coordination services via [employer]. Care Coordinator will try to reach patient again in 1-2 business days.    TERENCE Cronin  Social Work Care Coordinator  [facility] Sexual [facility]  [phone]  [email or web address]  Pronouns: They/He

## 2025-05-08 ENCOUNTER — PATIENT OUTREACH (OUTPATIENT)
Dept: CARE COORDINATION | Facility: CLINIC | Age: 45
End: 2025-05-08

## 2025-05-08 ENCOUNTER — VIRTUAL VISIT (OUTPATIENT)
Dept: SURGERY | Facility: CLINIC | Age: 45
End: 2025-05-08
Payer: COMMERCIAL

## 2025-05-08 VITALS — BODY MASS INDEX: 40.18 KG/M2 | HEIGHT: 71 IN | WEIGHT: 287 LBS

## 2025-05-08 DIAGNOSIS — E66.813 CLASS 3 SEVERE OBESITY DUE TO EXCESS CALORIES WITH BODY MASS INDEX (BMI) OF 40.0 TO 44.9 IN ADULT (H): ICD-10-CM

## 2025-05-08 DIAGNOSIS — I10 PRIMARY HYPERTENSION: ICD-10-CM

## 2025-05-08 DIAGNOSIS — K59.03 DRUG INDUCED CONSTIPATION: ICD-10-CM

## 2025-05-08 DIAGNOSIS — E11.9 TYPE 2 DIABETES MELLITUS WITHOUT COMPLICATION, WITHOUT LONG-TERM CURRENT USE OF INSULIN (H): ICD-10-CM

## 2025-05-08 DIAGNOSIS — T14.8XXA OPEN WOUND OF SKIN: Primary | ICD-10-CM

## 2025-05-08 RX ORDER — ZINC GLUCONATE 50 MG
50 TABLET ORAL DAILY
Qty: 21 TABLET | Refills: 0 | Status: SHIPPED | OUTPATIENT
Start: 2025-05-08 | End: 2025-05-29

## 2025-05-08 RX ORDER — MULTIVIT WITH MINERALS/LUTEIN
250 TABLET ORAL DAILY
Qty: 21 TABLET | Refills: 0 | Status: SHIPPED | OUTPATIENT
Start: 2025-05-08 | End: 2025-05-29

## 2025-05-08 ASSESSMENT — PAIN SCALES - GENERAL: PAINLEVEL_OUTOF10: MODERATE PAIN (5)

## 2025-05-08 ASSESSMENT — ACTIVITIES OF DAILY LIVING (ADL): DEPENDENT_IADLS:: TRANSPORTATION

## 2025-05-08 NOTE — LETTER
[facility] - BEHAVIORAL HEALTH CARE COORDINATION  Sexual and Gender Health Clinic     May 8, 2025        Daron Bond  [address]   [place] [address]      Dear Will,    Below is your plan of care as a patient in the behavioral health care coordination program. Please let us know if we can help you with other questions, concerns, or goals.      [facility]   Patient-Centered Plan of Care     About Me    Patient Name: Daron Bond  YOB: 1980  Age: 45 year old     Roz MRN: [id number] Telephone Numbers:      Home Phone [phone]   Mobile [phone]   Home Phone Not on file.       Address: [address]  [place] [address] Email address: [email or web address]      Emergency Contact(s)   Name Relationship Lgl Grd Work Phone Home Phone Mobile Phone     Name Relationship Lgl Grd Work Phone Home Phone Mobile Phone        Primary language: English   needed? No   [place] [employer] [facility]: [phone] op. 1  Other communication barriers: Physical impairment; Lack of coping    Preferred Method of Communication:    Current living arrangement: I live in a private home with family        Mobility Status/ Medical Equipment:   My Care Team Members  Patient Care Team         Relationship Specialty Notifications Start End     Patient Care Team         Relationship Specialty Notifications Start End    Mata Hearn MD PCP - General Family Medicine  6/4/24     Phone: [phone] Fax: [phone]         [address] [facility] [address]    Mata Hearn MD Assigned PCP   2/27/22     Phone: [phone] Fax: [phone]         [address] [facility] [address]    Gold Little MD MD Cardiovascular Disease  3/1/24     Phone: [phone] Fax: [phone]         [address] [facility] [address] [facility] [address]    Gold Little MD Assigned Heart and Vascular Provider   3/15/24     Phone: [phone] Fax:  [phone]         [address] [facility] [address]    Kait Singletary RD Diabetes Educator Dietitian, Registered  5/13/24     Phone: [phone] Fax: [phone]         [facility] [address] [facility] [address]    Elijah Mcdermott DPM Assigned Surgical Provider   7/23/24     Phone: [phone] Fax: [phone]         [address] [facility] [address]    Carlos Cavazos MA Food Resource Navigator   11/6/24     Vik Pop LMFT Assigned Behavioral Health Provider   4/23/25     Phone: [phone] Fax: [phone]          [facility] [address] [place] [address]    Cong Hogan LSW Specialty Care Coordinator  Admissions 5/6/25                My Access Plan     Medical Emergency 911   Primary Clinic Line (available 24 hours) Mata Hearn MD   [facility] Nurse Triage [phone] ([phone]) and ask to be directed to the [facility] Nurse Triage Line    Preferred Pharmacy [id number] [[place]], [place] - [address]     Behavioral Health Crisis Line 988 Suicide and Crisis [facility]       My Care Plans   Self Management and Treatment Plan    Care Plan  Care Plan: General       Problem: Patient is in need of specialty care       Goal: Community Resources       Start Date: 5/8/2025    This Visit's Progress: 30%    Priority: Medium    Note:     Barriers: Complex Systems & Convoluted Solutions  Strengths: Responsive  Patient expressed understanding of goal: yes  Action steps to achieve this goal:  1. I will determine if and when my children are able to move out of state and continue to interface with former partner and child custody system  2. I will follow up with legal resources to remedy estate concerns  3. I will coordinate and collaborate with the sexual and gender health clinic  to navigate any challenges should they arise.                                 Resources    Disability  Hub  Website: www.disabilityhubmn.org  Phone: 1-473.175.2613      My Medical and Care Information  Problem List:   Patient Active Problem List   Diagnosis    Cerebellar tonsillar ectopia (H)    Asthma, moderate persistent    Degeneration of intervertebral disc of lumbar region    OCD (obsessive compulsive disorder)    Myofascial pain syndrome    Obesity, morbid (H)    Chronic pain syndrome    Bipolar I disorder (H)    Spondylolisthesis of lumbar region    Spinal stenosis of lumbar region    TYRELL (generalized anxiety disorder)    Primary hypertension    Atypical chest pain    Diabetes mellitus, type 2 (H)    Continuous opioid dependence (H)       Current Medications:   Please refer to the most recent medication list provided to you by your medical team and reach out to your provider with any questions or to make any corrections.      Behavioral Health Care Coordination Enrollment     Behavioral Health Care Coordination Start Date:   5/5/2025   Frequency of Care Coordination: monthly, more frequently as needed     Form Last Updated: 05/08/2025

## 2025-05-08 NOTE — LETTER
5/8/2025      Daron Bond  [address]  [place] [address]      Dear Colleague,    Thank you for referring your patient, Daron Bond, to the [facility] SURGERY CLINIC AND BARIATRICS CARE [place]. Please see a copy of my visit note below.    Virtual Visit Details    Type of service:  Video Visit   Video Start Time: 11:03 AM  Video End Time:11:32 AM    Originating Location (pt. Location): Home    Distant Location (provider location):  On-site  Platform used for Video Visit: [facility]    Bariatric Clinic Follow-Up Visit:    Daron Bond is a 45 year old  male with Body mass index is 40.25 kg/m .  presenting here today for follow-up on non-surgical efforts for weight loss. Original Intake visit occurred on 6/14/24 with a weight of 346 lbs and BMI of 47.7.  Along with diet and behavior changes, he has been using semaglutide (Ozempic)  and metformin for improving glycemic control/type II diabetes and to assist his weight loss goals.  He's currently ramped up to 2mg/week and finds it tolerable with some alternating constipation/diarrhea as of our 10/18/24 check up.  Overall tolerating med well and good success with diabetes control and weight loss as of our 2/28/25 visit with A1c of 5.4% and weight of 291 lbs.      See his intake visit notes for details on identified contributors to weight gain in the past. Chart review shows Dietician calculated RMR of 2473kcal/day and protein intake goal of 100-110g/day.    Weight:   Wt Readings from Last 5 Encounters:   05/08/25 130.2 kg (287 lb)   04/09/25 128.8 kg (284 lb)   03/26/25 128.4 kg (283 lb)   02/28/25 132.2 kg (291 lb 8 oz)   01/17/25 135.3 kg (298 lb 4.8 oz)    pounds    Lab Results   Component Value Date    A1C 5.2 04/09/2025    A1C 5.4 12/20/2024    A1C 5.5 09/30/2024    A1C 7.4 05/13/2024    A1C 5.0 07/02/2018     Some early plateau in weight loss but maintaining excellent 82 lb weight reduction from peak weight of 369 lbs on  chart above, a 22.2% total body weight reduction.   Comorbidities:  Patient Active Problem List   Diagnosis     Cerebellar tonsillar ectopia (H)     Asthma, moderate persistent     Degeneration of intervertebral disc of lumbar region     OCD (obsessive compulsive disorder)     Myofascial pain syndrome     Obesity, morbid (H)     Chronic pain syndrome     Bipolar I disorder (H)     Spondylolisthesis of lumbar region     Spinal stenosis of lumbar region     TYRELL (generalized anxiety disorder)     Primary hypertension     Atypical chest pain     Diabetes mellitus, type 2 (H)     Continuous opioid dependence (H)       Current Outpatient Medications:      vitamin C (ASCORBIC ACID) 250 MG tablet, Take 1 tablet (250 mg) by mouth daily for 21 days., Disp: 21 tablet, Rfl: 0     zinc gluconate 50 MG tablet, Take 1 tablet (50 mg) by mouth daily for 21 days. One daily after supper, Disp: 21 tablet, Rfl: 0     albuterol (PROAIR HFA/PROVENTIL HFA/VENTOLIN HFA) 108 (90 Base) MCG/ACT inhaler, INHALE 2 PUFFS BY MOUTH EVERY 6 HOURS, Disp: 8.5 g, Rfl: 4     amLODIPine (NORVASC) 5 MG tablet, TAKE 1 TABLET BY MOUTH ONE TIME DAILY, Disp: 90 tablet, Rfl: 1     ARIPiprazole (ABILIFY) 10 MG tablet, Take 1 tablet (10 mg) by mouth daily., Disp: 30 tablet, Rfl: 1     blood glucose (NO BRAND SPECIFIED) test strip, Use to test blood sugar 3 times daily or as directed. To accompany: Blood Glucose Monitor Brands: per insurance., Disp: 100 strip, Rfl: 6     blood glucose (ONETOUCH VERIO IQ) test strip, Use to test blood sugar five times daily or as directed., Disp: 300 strip, Rfl: 3     blood glucose monitoring (NO BRAND SPECIFIED) meter device kit, Use to test blood sugar 1 times daily or as directed. Preferred blood glucose meter OR supplies to accompany: Blood Glucose Monitor Brands: per insurance., Disp: 1 kit, Rfl: 0     buprenorphine HCl-naloxone HCl (SUBOXONE) 2-0.5 MG per film, Place 1 Film under the tongue daily., Disp: 15 Film, Rfl: 0      busPIRone (BUSPAR) 15 MG tablet, Take 1 tablet (15 mg) by mouth 3 times daily., Disp: 90 tablet, Rfl: 5     Cyanocobalamin (B-12) 1000 MCG SUBL, Take once weekly., Disp: 50 tablet, Rfl: 1     gabapentin (NEURONTIN) 300 MG capsule, Take 300 mg in the am and afternoon with 600 mg in the PM. If no improvement in 1 week in pain or anxiety, may continue to increase gabapentin weekly to a maximum of 600 mg tid., Disp: 180 capsule, Rfl: 3     hydrochlorothiazide (MICROZIDE) 12.5 MG capsule, Take 1 capsule (12.5 mg) by mouth every morning., Disp: 90 capsule, Rfl: 3     hydrOXYzine HCl (ATARAX) 25 MG tablet, Take 1 tablet (25 mg) by mouth 3 times daily as needed for anxiety., Disp: 30 tablet, Rfl: 1     ketoconazole (NIZORAL) 2 % external shampoo, Apply topically daily as needed for itching or irritation., Disp: 100 mL, Rfl: 1     lidocaine (XYLOCAINE) 5 % external ointment, Apply topically as needed for moderate pain., Disp: 50 g, Rfl: 11     losartan (COZAAR) 100 MG tablet, Take 1 tablet (100 mg) by mouth daily., Disp: 90 tablet, Rfl: 2     magnesium citrate 1.745 GM/30ML solution, 2 teaspoons(10ml) nightly before bed., Disp: 296 mL, Rfl: 0     metoprolol succinate ER (TOPROL XL) 100 MG 24 hr tablet, Take 1 tablet (100 mg) by mouth daily, Disp: 30 tablet, Rfl: 5     ondansetron (ZOFRAN ODT) 4 MG ODT tab, Take 1 tablet (4 mg) by mouth every 8 hours as needed for nausea., Disp: 10 tablet, Rfl: 1     pantoprazole (PROTONIX) 40 MG EC tablet, Take 1 tablet (40 mg) by mouth daily., Disp: 90 tablet, Rfl: 2     polyethylene glycol (MIRALAX) 17 GM/Dose powder, Take 17 g (1 Capful) by mouth daily., Disp: , Rfl:      Semaglutide, 2 MG/DOSE, (OZEMPIC) 8 MG/3ML pen, Inject 2 mg subcutaneously every 7 days., Disp: 9 mL, Rfl: 3     thin (NO BRAND SPECIFIED) lancets, Use with lanceting device. To accompany: Blood Glucose Monitor Brands: per insurance., Disp: 100 each, Rfl: 6     Vitamin D3 (CHOLECALCIFEROL) 25 mcg (1000 units) tablet,  "Take 1 tablet (25 mcg) by mouth daily., Disp: 90 tablet, Rfl: 2      Interim: Since our last visit, he has gone through a bit of a hiccup in weight loss. Started Abilify recently for some intrusive thoughts.  Had some increased comfort eating but overall pretty \"solid\". Doing some smoothies regularly.  Having some lightheaded issues.  Less working out due to anal fissure (had been up to 5 day/week regimen prior).   Has some miralax.   Plan:   1.  Diet: with some light activity while healing up, aiming for 2000-2150kcal/day with 100-110 grams of lean protein daily should keep things moving in the proper direction this summer. Continue some fiber supplement like metamucil to ease discomfort of your anal fissure. Stay well hydrated with water to hit 100 oz/day of fluids.     Vitamin C and zinc to help with some skin healing for 3 weeks.     2. Exercise: ease back into the pool for good aerobic/strengthening  exercise.     3. Medication: Ozempic working well, stay at 2mg/week dose if tolerated. A1c is right on track at 5.2% (peak fo 7.4%).     4. No labs needed today.    5. Goals: down 82 lbs and nearly 8 BMI points. Aiming to get BMI under 35 in the long run (currently at BMI of 40.3, started at 47.7).    6. Dizziness: given weight loss, I'd recommend a blood pressure check with your primary care clinic to see if you could reduce your metoprolol if still using it. If off Metoprolol, then dose reduction of losartan would be next in line.       We discussed [facility] Bariatric Basics including:  -eating 3 meals daily  -reviewed metabolic needs for weight loss based on Resting Metabolic Rate  -protein goals supportive of healthy weight loss  -avoiding/limiting calorie containing beverages  -We discussed the importance of restorative sleep and stress management in maintaining a healthy weight.  -We discussed the National Weight Control Registry healthy weight maintenance strategies and ways to optimize metabolism.  -We " "discussed the importance of physical activity including cardiovascular and strength training in maintaining a healthier weight and explored viable options.      Most recent labs:  Lab Results   Component Value Date    WBC 11.4 (H) 12/20/2024    HGB 15.8 12/20/2024    HCT 46.4 12/20/2024    MCV 84 12/20/2024     12/20/2024     Lab Results   Component Value Date    CHOL 156 09/30/2024     Lab Results   Component Value Date    HDL 41 09/30/2024     No components found for: \"LDLCALC\"  Lab Results   Component Value Date    TRIG 157 (H) 09/30/2024     No results found for: \"CHOLHDL\"  Lab Results   Component Value Date    ALT 35 08/06/2024    AST 40 08/06/2024    ALKPHOS 91 08/06/2024     No results found for: \"HGBA1C\"  Lab Results   Component Value Date    B12 652 06/14/2024     No components found for: \"VITDT1\"  No results found for: \"PATTI\"  Lab Results   Component Value Date    PTHI 54 06/14/2024     No results found for: \"ZN\"  No results found for: \"VIB1WB\"  Lab Results   Component Value Date    TSH 1.41 08/06/2024     No results found for: \"TEST\"    DIETARY HISTORY  Improved protein intake. Some occ restaurant over indulgence      PHYSICAL ACTIVITY PATTERNS:  Cardiovascular: on the mend from anal fissure so hadn't been working out at gym 5x weekly in the last month  Strength Training: same    REVIEW OF SYSTEMS  Feeling positive. Abilify started for intrusive thoughts. .  PHYSICAL EXAM:  Vitals: Ht 1.798 m (5' 10.8\")   Wt 130.2 kg (287 lb)   BMI 40.25 kg/m    Weight:   Wt Readings from Last 3 Encounters:   05/08/25 130.2 kg (287 lb)   04/09/25 128.8 kg (284 lb)   03/26/25 128.4 kg (283 lb)         GEN: Pleasant. NEURO: Alert and Oriented X3, fluent speech.  .    Interim study results:   Lab Results   Component Value Date    A1C 5.2 04/09/2025    A1C 5.4 12/20/2024    A1C 5.5 09/30/2024    A1C 7.4 05/13/2024    A1C 5.0 07/02/2018     Last Comprehensive Metabolic Panel:  Lab Results   Component Value Date    NA " 139 10/10/2024    POTASSIUM 4.6 10/10/2024    CHLORIDE 102 10/10/2024    CO2 23 10/10/2024    ANIONGAP 14 10/10/2024     (H) 12/27/2024    BUN 16.3 10/10/2024    CR 1.06 10/10/2024    GFRESTIMATED 89 10/10/2024    KIYA 9.5 10/10/2024       .      30 minutes spent by me on the date of the encounter doing chart review, history and exam, documentation and further activities per the note   Ricardo Steiner MD  [facility]  9:10 AM  5/8/2025    Again, thank you for allowing me to participate in the care of your patient.        Sincerely,        Ricardo Steiner MD    Electronically signed

## 2025-05-08 NOTE — LETTER
[facility] - BEHAVIORAL HEALTH CARE COORDINATION  Sexual and Gender Health Clinic     May 8, 2025        Daron PEACE Varun  [address]   [place] [address]      Dear Mack,    I am a social work care coordinator who works with the providers and staff at the [facility] to manage patient care. I wanted to introduce myself and provide my contact information. Thank you for taking the time to speak with me. I am here to answer any questions or concerns about your care.  A social work care coordinator knows the health care system. My goals are to help you during your care and to improve your access to care. I will work with your care team to help you identify your health and social needs. Our services are voluntary and are offered without charge to you personally.    We will:  Create a care plan that supports communication between you and your care team.   Help you obtain health care and local resources.   Give you the information and knowledge you may need for your care.   Work with you to remove any barriers you may have during your care.     We work to give you the highest quality healthcare. Please call me at [phone] with any questions about your care.    Sincerely,      TERENCE Cronin  Social Work Care Coordinator  [facility] Sexual [facility]  [phone]  [email or web address]  Pronouns: They/He

## 2025-05-08 NOTE — PATIENT INSTRUCTIONS
Some early plateau in weight loss but maintaining excellent 82 lb weight reduction from peak weight of 369 lbs on chart above, a 22.2% total body weight reduction.     Plan:   1.  Diet: with some light activity while healing up, aiming for 2000-2150kcal/day with 100-110 grams of lean protein daily should keep things moving in the proper direction this summer. Continue some fiber supplement like metamucil to ease discomfort of your anal fissure. Stay well hydrated with water to hit 100 oz/day of fluids.     Vitamin C and zinc to help with some skin healing for 3 weeks.     2. Exercise: ease back into the pool for good aerobic/strengthening  exercise.     3. Medication: Ozempic working well, stay at 2mg/week dose if tolerated. A1c is right on track at 5.2% (peak fo 7.4%).     4. No labs needed today.    5. Goals: down 82 lbs and nearly 8 BMI points. Aiming to get BMI under 35 in the long run (currently at BMI of 40.3, started at 47.7).    6. Dizziness: given weight loss, I'd recommend a blood pressure check with your primary care clinic to see if you could reduce your metoprolol if still using it. If off Metoprolol, then dose reduction of losartan would be next in line. Hit your hydration targets to make sure you're not dehydrated.         Wegovy (semaglutide) is a very effective satiety boosting appetite suppressant. It needs to be ramped up slowly to be tolerated adequately.  About 1/10 people will not tolerate this medication. Each month, you move up to a higher dose until eventually reaching the 2.4mg/week dose over 5 months, if tolerating the ramping process well. When in plentiful supply, one try at re-ramping is recommended if the initial ramping has too many side effects/isn't tolerated well and if that attempt at  re-ramping isn't tolerated well, it's best to find an alternative.       Wegovy starts at 0.25mg/week for 4 weeks then increases to 0.5mg/week for 4 weeks then 1mg/week for 4 weeks then 1.7mg/week  for 4 weeksthen 2.4mg/week usually for 6-9 months if tolerated well.  Injections can be given after cleansing the skin with alcohol prep pad or swab (available OTC).  Warming to room temperature 20-60 minutes prior to injection by placing on a table/counter reduces potential irritation at the time of injection.    Stop Wegovy if severe abdominal pain/vomiting/rash/throat swelling or constant nausea that prevents adequate food/water intake. Stop 2-3 weeks prior to any planned general anesthesia surgeries to reduce risk for something called a post operative ileus. If unexpected illness/injury that requires surgery, plan to go off Wegovy until fully recovered.    Gallstones can occur in about 1% of patients on this medication so update me if increase right upper abdominal pain after eating.     Start meals with protein first, separate beverages from meals by 20 minutes and work hard in between meals to get your 64-75 oz of water daily to reduce risks for severe constipation. Consider a fiber supplement like powdered psyllium husk in 12 oz water each night.    For Prevention and Treatment of Constipation when on Semaglutide     From least aggressive to most aggressive:     Move: Wallking is essential - the more we move, the more our bowels move  Water: Drink water - 64oz-80oz per day suits most people well. About an one ounce of water per gram of protein eaten.  Go when you need to go. Don't wait. The longer you wait, the harder it gets.  Fiber: Fruit, raw veggies, nuts, whole grains, prune each night, flax/jaylan seeds added into meals can all be helpful.   Stool Softeners: if constipation is mild and for maintenance  Gentle laxatives: Miralax, senokot, dulcolax , Smooth move tea as needed     More aggressive (and typically won't get to this point)  Milk of Magnesia to empty out. Don't go anywhere far from a toilet for 6 hours.  Mag Citrate (what you drink before a colonoscopy).   Suppositories  Enema      Check out  [email or web address].com for patient resources.      If you have weekends off, I recommend dosing Thursday or Friday evenings for best control over weekends and ability to ride out some transient side effects should they occur.    Some people starve on this medication if not mindful about food intake. I recommend starting meals with the protein part of your meal first, chew thoroughly and separate beverages from meals by about 20 minutes to make sure you get your nourishment in first. Include vegetables/complex carbohydrates and unsaturated fat as part of your balanced diet but group these at the end of the meal, after protein is mostly gone. Satiety will kick in too early if drinking too much with meals and under nourishment can result.  If under nourished, more muscle mass losses and metabolic slowing will result and work against us in the long run.    It's not a bad idea to take a complete multivitamin most days of the week if using this medication. Low Iron formulas may be less constipating.    Pancreatitis is a very rare but potentially serious side effect. Stop Wegovy if severe mid abdominal pain/burning in nature or if unable to eat/drink due to severe nausea/discomfort.   People with strong history of pancreatitis without clear cause should stay clear of this medication as should those with strong personal or family history for medullary thyroid cancer or Multiple Endocrine Neoplasia (rare).     Kind Regards,  Ricardo Steiner MD  [facility]    LEAN PROTEIN SOURCES  Getting 20-30 grams of protein, 3 meals daily, is appropriate for most people, some need more but more than about 40 grams per meal is not useful.  General rule is drinking one ounce of water per gram of protein eaten over the course of the day:  70 grams of protein each day, drink 70 oz of water.  Protein Source Portion Calories Grams of Protein                           Nonfat, plain Greek yogurt    (10 grams sugar or  less) 3/4 cup (6 oz)  12-17   Light Yogurt (10 grams sugar or less) 3/4 cup (6 oz)  6-8   Protein Shake 1 shake 110-180 15-30   Skim/1% Milk or lactose-free milk 1 cup ( 8 oz)  8   Plain or light, flavored soymilk 1 cup  7-8   Plain or light, hemp milk 1 cup 110 6   Fat Free or 1% Cottage Cheese 1/2 cup 90 15   Part skim ricotta cheese 1/2 cup 100 14   Part skim or reduced fat cheese slices 1 ounce 65-80 8     Mozzarella String Cheese 1 80 8   Canned tuna, chicken, crab or salmon  (canned in water)  1/2 cup 100 15-20   White fish (broiled, grilled, baked) 3 ounces 100 21   [place]/Tuna (broiled, grilled, baked) 3 ounces 150-180 21   Shrimp, Scallops, Lobster, Crab 3 ounces 100 21   Pork loin, Pork Tenderloin 3 ounces 150 21   Boneless, skinless chicken /turkey breast                          (broiled, grilled, baked) 3 ounces 120 21   [place], [place], [place], and Venison 3 ounces 120 21   Lean cuts of red meat and pork (sirloin,   round, tenderloin, flank, ground 93%-96%) 3 ounces 170 21   Lean or Extra Lean Ground Turkey 1/2 cup 150 20   90-95% Lean [place] Burger 1 brit 140-180 21   Low-fat casserole with lean meat 3/4 cup 200 17   Luncheon Meats                                                        (turkey, lean ham, roast beef, chicken) 3 ounces 100 21   Egg (boiled, poached, scrambled) 1 Egg 60 7   Egg Substitute 1/2 cup 70 10   Nuts (limit to 1 serving per day)  3 Tbsp. 150 7   Nut [place] (peanut, almond)  Limit to 1 serving or less daily 1 Tbsp. 90 4   Soy Burger (varies) 1  15   Garbanzo, Black, [place] Beans 1/2 cup 110 7   Refried Beans 1/2 cup 100 7   Kidney and Lima beans 1/2 cup 110 7   Tempeh 3 oz 175 18   Vegan crumbles 1/2 cup 100 14   Tofu 1/2 cup 110 14   Chili (beans and extra lean beef or turkey) 1 cup 200 23   Lentil Stew/Soup 1 cup 150 12   Black Bean Soup 1 cup 175 12       On-the-Go Breakfast Ideas  As of 2015, the latest research shows what a huge impact eating  breakfast has on losing weight and feeling your best. People lose more weight when they make breakfast their biggest meal of the day compared to Dinner, but even if you cannot go to that degree, getting a breakfast that has at least 20 grams of protein and even a moderate amount of fat is ideal for maintaining good energy through the day and limits overeating in the evening hours.  The following are some quick and easy suggestions for at least getting something of substance into your body in the morning.  Enjoy!    Eating breakfast within 90 minutes of waking up is an important part of taking care of your body on a restricted calorie diet plan.  After sleeping for hours, your body is in need of fuel.  An ideal breakfast is a combination of protein, whole-grain carbohydrates, or fruit.  Here s why:    -Protein digests very slowly in the body, helping you feel more satisfied.  -Whole grains provide dietary fiber, which also digests slowly and helps keep your gut clean.  -Fruit is a great source of vitamins, minerals, and fiber.     Each one of these breakfast combinations has between 200-300 calories and 15-20 grams of protein.  Feel free to mix and match!    Bone Broth (chicken bone broth or beef bone broth) is a great way to boost protein content. 8oz of bone broth will typically have 9-12grams of protein for 40kcal of energy.    Protein: Choose  -1/2 cup low-fat cottage cheese  -2 hard boiled eggs , or one cooked in olive oil (low/slow heat).  -1 low fat string cheese stick  -1 Tablespoon natural peanut butter  -[employer] vegetarian sausage brit (found in freezer section)  -1 slice lowfat cheese  -6 oz 2% or lowfat Greek yogurt, such as Fage or Oikos.    PLUS    Whole Grains:  Choose   -1 whole wheat English muffin  -1 whole wheat bernardo, half  -1/2 Fiber One frozen muffin, thawed  -1/2 Fiber One toaster pastry  -1 whole wheat bagel thin  -1/2 cup Kashi cereal  -1 Kashi waffle (or other whole grain  high-fiber waffle)  Aim for whole grain/sprouted breads with at least 3g of fiber/slice if having bread. [place] is one such brand.    OR    Fruit: Choose  -1/3 cup blueberries  -1/2 banana (or a plantain- similar to a banana, yet smaller)  -1/2 cup cantaloupe cubes  -1 small apple  -1 small orange  -1/2 cup strawberries  -handful raspberries/blackberries (each berry is about 1 calorie).    *Adapted from [employer], Fall 20    Ten Breakfasts Under 250 calories    Ideally, getting between 350-600 calories  (depending on starting height and weight)for breakfast is ideal for avoiding hunger later in the day, adjust/add to the following accordingly:    One- 250 calories, 8.5 g protein  1 slice whole-grain toast   1 Tbsp peanut butter    banana    Two- 250 calories, 8 g protein    cup nonfat/lowfat yogurt  1/3rd cup diced no-sugar peaches  1/3rd cup cereal (like Special K, Cheerios, or bran flakes)    Three- 250 calories, 25 g protein  1 egg scrambled with 1 oz skim milk    cup shredded cheddar    whole grain English muffin  1 oz [place] santacruz  1 tsp margarine spread    Four- 225 calories, 25 g protein  1/2 cup Kashi Go-Lean cereal    cup skim milk mixed with 1 scoop Bariatric Advantage protein powder    cup no-sugar diced pears    Five- 250 calories, 20 g protein    cup oatmeal prepared with skim milk, 1 scoop protein powder, and sugar-free maple syrup    Six- 200 calories, 5 g protein  1 whole grain waffle, toasted  1 tablespoon creamy peanut or almond butter    Seven-  250 calories, 19 g protein  Breakfast sandwich: 1 slice whole grain toast, cut in half.  Add 1 scrambled egg and one slice cheddar  cheese.    Eight-  250 calories, 15 g protein  2 eggs scrambled with 1/3 cup frozen spinach (heat before adding to eggs) and 2 tablespoons low fat cream cheese.    Nine-  150 calories, 15 g protein  2/3rd cup cottage cheese    cup cantaloupe    Ten- 200 calories, 20 g protein  Fruit smoothie made with 4 oz.  nonfat Greek yogurt,   cup berries, 1 scoop protein powder, and 4 oz skim milk.    Ten Lunches Under 250 Calories    Aim for lunch to be around 300-400 calories a day when trying to lose weight and get that protein in!    One- 200 calories, 11 g protein  1/3 cup tuna salad made with light arthur on 1 slice whole grain bread  1 small peeled apple    Two- 250 calories, 16 g protein  1/3 cup lowfat cottage cheese    cup cooked green beans    small fruit cocktail (in natural juice)    Three- 200 calories, 11 g protein    grilled cheese sandwich on whole grain bread with lowfat cheese  2/3rd cup of tomato soup    Four- 250 calories, 22 g protein  Deli wrap: 1 oz sliced turkey, 1 oz sliced ham, 1 oz sliced chicken rolled up with 1 slice low-fat cheese  1 small orange    Five- 250 calories, 28 g protein  2/3rd cup chili with 1 oz shredded cheese  4 saltine crackers    Six- 250 calories, 22 g protein  1 cup fresh spinach with 2 oz chicken, 1/3rd cup mandarin oranges, and 2 tablespoons sliced almonds with 1 tablespoon  vinaigrette dressing    Seven- 200 calories, 11 g protein  1 Tbsp sugar-free preserves and 1 Tbsp peanut butter on 1 slice whole grain toast    cup nonfat/lowfat Greek yogurt    Eight- 250 calories, 18 g protein  1 small soft-shell chicken taco with 1 oz shredded cheese, lettuce, tomato, salsa, and 1 Tbsp light sour cream    cup black beans    Nine- 225 calories, 13 g protein  2 ounces baked chicken  1/4 cup mashed potatoes    cup green beans    Ten- 200 calories, 21 g protein  Deli bernardo: 2 oz roast beef or other deli meat with 1 tsp [place]ram mayonnaise and sliced tomato, onion, and lettuce  1/3rd cup cottage cheese      Ten Dinners Under 300 calories    If you're eating a large breakfast and medium lunch, keep dinner small.  300-400 calories is ideal for most people depending on their caloric needs.    One- 300 calories, 12 g protein  1-inch thick slice of turkey meatloaf    cup baked butternut squash    Two- 200  calories, 9 g protein  Bread-less BLT: 3 slices turkey santacruz, sliced tomato, wrapped in a large lettuce leaf    cup peeled fruit    Three- 275 calories, 36 g protein  3 oz roasted chicken    cup cooked broccoli    cup shredded cheddar cheese    cup unsweetened applesauce    Four- 200 calories, 25 g protein  3 oz baked tilapia  1/3rd cup cooked carrots    cup yogurt    Five- 250 calories, 20 g protein  Grilled ham  n  Swiss: spread 2 tsp ghee or butter on 1 slice of whole grain bread.  Cut bread in half, layer 2 oz deli ham with 1 piece of Swiss cheese and grill until cheese is melted.    cup cooked vegetables    Six- 250 calories, 18 g protein  Vegetarian cheeseburger: 1 [place] cheeseburger topped with lettuce, onion, tomato, and ketchup/mustard    cup sweet potato fries    Seven- 250 calories, 18 g protein  Pork pot roast: 2 oz roasted pork loin, 1/3rd cup roasted carrots,   medium potato, cooked with   cup gravy    Eight- 330 calories, 25 g protein  2 oz meatballs (about 2 small meatballs)    cup spaghetti sauce  1/2 piece toast topped with 1 tsp ghee or butterand topped with garlic powder, toasted in oven    Nine- 250 calories, 16 g protein  Mexican pizza: one 8  corn tortilla topped with 2 oz chicken,   cup salsa, 2 tablespoons black beans, 2 tablespoons shredded cheese.  Bake until cheese is melted.    Ten- 250 calories, 22 g protein  Shrimp stir-quintana: 3 oz cooked shrimp, 1/6th onion,   pepper,   cup chopped carrots sautéed in 1 tablespoon olive oil, topped with 2 tablespoons stir quintana sauce and a pinch of sesame seeds        150 Calories or Less Snack Ideas   1 hardboiled egg with   cup berries  1 small apple with 1 hardboiled egg  10 almonds with   cup berries  2 clementines with 1 light string cheese  1 light string cheese with   sliced apple  1 light string cheese wrapped in 2 slices of turkey  4 100% whole wheat crackers (e.g. Triscuit) with 1 light string cheese    c. cottage cheese with   cup fruit and 1  Tbsp sunflower seeds     cup cottage cheese with   of an avocado     can tuna fish with 1 cup sliced cucumbers     cup roasted garbanzo beans with paprika and cayenne pepper    baked sweet potato with   cup chili beans or   cup cottage cheese  2 oz. nitrate free turkey slices with 1 cup carrots  1 container (6 oz) of low sugar (less than 10 grams of sugar) greek yogurt   3 Tablespoons of hummus with 1 cup sliced bell peppers   2 Tablespoons of hummus with 15 baby carrots  4 Tablespoons ranch dip made with plain Greek Yogurt and 3 mini cucumbers  1/4 cup nuts (any kind)  1 Tablespoon peanut butter with 1 stalk celery   1 dill pickle wrapped in 1-2 slices of deli ham with 1 tsp of mayonnaise/mustard.      Meals of about 600-650kcal and 33-38 grams of lean protein would be ideal for this next phase of your weight loss season.

## 2025-05-08 NOTE — PROGRESS NOTES
Clinic Care Coordination Contact  Clinic Care Coordination Contact  OUTREACH    Referral Information:  Referral Source:  (Reg Pérez PA-C)    Primary Diagnosis: Psychosocial    Chief Complaint   Patient presents with    Clinic Care Coordination - Initial        Universal Utilization:     This worker spoke with Mack who confirms ongoing concerns with custody over his children.     Mack also shares some financial and legal concerns. Mack states that he is disabled and had surgery he was not able to heal from properly. Mack expressed frustration around how his parent's estate was handled and feels he was left out of the decisions and was not provided the inheritance he was entitled to. Mack shares that he has tried to work with , but has had a hard time finding a person to take his case. When this worker inquired where he has reached out to, Mack remembered calling someone last week, but did not have specific agencies or law firms that he has reached out to. Mack states he is unable to go to a free legal clinic due to transportation restrictions related to back surgery and pain.     Mack shares a desire to move out of state in hope that it would help him put some of these issues behind him as he often finds himself desiring justice for himself and his kids, but is feeling defeated that they will be able to get it here.     Mack then shares some concerns with recent medication changes related to decreased frequency of intrusive thoughts, but increase in severity when intrusive thoughts do happen. Mack also shares some concerns with sleep pattern changes as well. At this point, Mack expresses the need to end the call, and dyad agree to follow up in a week  and a half.     This worker used validation and motivational interviewing techniques from a ecosystems and strengths based perspective.     Mack reports understanding and denies any additional questions or concerns at this time. FLOWER GEORGE engaged in AIDET  communication during encounter.     Clinic Utilization  Compliance Concerns: No  No-Show Concerns: No  No PCP office visit in Past Year: No  Utilization      No Show Count (past year)  2             ED Visits  3             Hospital Admissions  1                    Current as of: 5/8/2025  8:32 AM                Clinical Concerns:  Current Medical Concerns:  Some medication concerns-- followed up with [facility] RNCC    Current Behavioral Concerns: Rumination, intrusive thoughts-- NO SI, delusions or hallucinations noted from this encounter    Education Provided to patient: Legal services, Care coordination      Health Maintenance Reviewed:    Clinical Pathway:    Medication Management:  Medication review status:   Followed up with  HC RNCC regarding pt expressed medication concerns-- pt expresses no immediate or life threatening concerns.      Functional Status:  Dependent ADLs:: Ambulation-cane  Dependent IADLs:: Transportation  Bed or wheelchair confined:: No  Mobility Status: Independent w/Device  Fallen 2 or more times in the past year?: No  Any fall with injury in the past year?: No    Living Situation:  Current living arrangement:: I live in a private home with family  Type of residence:: Apartment    Lifestyle & Psychosocial Needs:    Social Drivers of Health     Food Insecurity: High Risk (11/12/2024)    Food Insecurity     Within the past 12 months, did you worry that your food would run out before you got money to buy more?: Yes     Within the past 12 months, did the food you bought just not last and you didn t have money to get more?: Yes   Depression: At risk (4/17/2025)    PHQ-2     PHQ-2 Score: 6   Housing Stability: High Risk (11/12/2024)    Housing Stability     Do you have housing? : Yes     Are you worried about losing your housing?: Yes   Tobacco Use: High Risk (5/5/2025)    Patient History     Smoking Tobacco Use: Every Day     Smokeless Tobacco Use: Never     Passive Exposure: Current   Financial  Resource Strain: High Risk (11/12/2024)    Financial Resource Strain     Within the past 12 months, have you or your family members you live with been unable to get utilities (heat, electricity) when it was really needed?: Yes   Alcohol Use: Not on file   Transportation Needs: High Risk (11/12/2024)    Transportation Needs     Within the past 12 months, has lack of transportation kept you from medical appointments, getting your medicines, non-medical meetings or appointments, work, or from getting things that you need?: Yes   Physical Activity: Insufficiently Active (11/12/2024)    Exercise Vital Sign     Days of Exercise per Week: 3 days     Minutes of Exercise per Session: 20 min   Interpersonal Safety: Low Risk  (12/27/2024)    Interpersonal Safety     Do you feel physically and emotionally safe where you currently live?: Yes     Within the past 12 months, have you been hit, slapped, kicked or otherwise physically hurt by someone?: No     Within the past 12 months, have you been humiliated or emotionally abused in other ways by your partner or ex-partner?: No   Stress: Stress Concern Present (11/12/2024)    [employer] [place] [employer] - Occupational Stress Questionnaire     Feeling of Stress : Very much   Social Connections: Unknown (11/12/2024)    Social Connection and Isolation Panel [NHANES]     Frequency of Communication with Friends and Family: Not on file     Frequency of Social Gatherings with Friends and Family: Never     Attends Orthodox Services: Not on file     Active Member of Clubs or Organizations: Not on file     Attends Club or Organization Meetings: Not on file     Marital Status: Not on file   Health Literacy: Not on file     Diet:: Diabetic diet  Inadequate nutrition (GOAL):: No  Tube Feeding: No  Inadequate activity/exercise (GOAL):: No  Significant changes in sleep pattern (GOAL): Yes (Started new medication and has noticed a change in sleep)  Transportation means:: Medical  transport, [place] mobility     Mormon or spiritual beliefs that impact treatment:: No  Mental health DX:: Yes  Mental health DX how managed:: Medication, Outpatient Counseling  Mental health management concern (GOAL):: No  Informal Support system:: Family        Resources and Interventions:  Current Resources:      Community Resources: Financial/Insurance, Other (see comment)  Supplies Currently Used at Home: None  Equipment Currently Used at Home: none  Employment Status: disabled              Referrals Placed: Community Resources, County Resources, Financial Services, Other  (Legal-- to be placed pt set up time to discuss furhter.)         Care Plan:  Care Plan: General       Problem: Patient is in need of specialty care       Goal: Community Resources       Start Date: 5/8/2025    This Visit's Progress: 30%    Priority: Medium    Note:     Barriers: Complex Systems & Convoluted Solutions  Strengths: Responsive  Patient expressed understanding of goal: yes  Action steps to achieve this goal:  1. I will determine if and when my children are able to move out of state and continue to interface with former partner and child custody system  2. I will follow up with legal resources to remedy estate concerns  3. I will coordinate and collaborate with the sexual and gender health clinic  to navigate any challenges should they arise.                                 Patient/Caregiver understanding: yes    Outreach Frequency: monthly, more frequently as needed  Future Appointments                Today Ricardo Steiner MD [facility]    In 2 weeks Beto Avalos MD [facility]    In 1 month Vik Pop LMFT [facility], [facility]    In 1 month Vel Pérez PA-C [facility]    In 1 month  Vik Pop LMFT [facility], [facility]    In 1 month Romina Boyle RD [facility]    In 2 months Vik Pop LMFT [facility], [facility]    In 3 months Ricardo Steiner MD [facility]            Plan:   Will to follow up with this worker as needed prior to next scheduled outreach  Dyad to reconvene to discuss resource options in the next two weeks.       TERENCE Cronin  Social Work Care Coordinator  [facility]  [phone]  [email or web address]  Pronouns: They/He

## 2025-05-08 NOTE — PROGRESS NOTES
Virtual Visit Details    Type of service:  Video Visit   Video Start Time: 11:03 AM  Video End Time:11:32 AM    Originating Location (pt. Location): Home    Distant Location (provider location):  On-site  Platform used for Video Visit: [facility]    Bariatric Clinic Follow-Up Visit:    Daron Bond is a 45 year old  male with Body mass index is 40.25 kg/m .  presenting here today for follow-up on non-surgical efforts for weight loss. Original Intake visit occurred on 6/14/24 with a weight of 346 lbs and BMI of 47.7.  Along with diet and behavior changes, he has been using semaglutide (Ozempic)  and metformin for improving glycemic control/type II diabetes and to assist his weight loss goals.  He's currently ramped up to 2mg/week and finds it tolerable with some alternating constipation/diarrhea as of our 10/18/24 check up.  Overall tolerating med well and good success with diabetes control and weight loss as of our 2/28/25 visit with A1c of 5.4% and weight of 291 lbs.      See his intake visit notes for details on identified contributors to weight gain in the past. Chart review shows Dietician calculated RMR of 2473kcal/day and protein intake goal of 100-110g/day.    Weight:   Wt Readings from Last 5 Encounters:   05/08/25 130.2 kg (287 lb)   04/09/25 128.8 kg (284 lb)   03/26/25 128.4 kg (283 lb)   02/28/25 132.2 kg (291 lb 8 oz)   01/17/25 135.3 kg (298 lb 4.8 oz)    pounds    Lab Results   Component Value Date    A1C 5.2 04/09/2025    A1C 5.4 12/20/2024    A1C 5.5 09/30/2024    A1C 7.4 05/13/2024    A1C 5.0 07/02/2018     Some early plateau in weight loss but maintaining excellent 82 lb weight reduction from peak weight of 369 lbs on chart above, a 22.2% total body weight reduction.   Comorbidities:  Patient Active Problem List   Diagnosis    Cerebellar tonsillar ectopia (H)    Asthma, moderate persistent    Degeneration of intervertebral disc of lumbar region    OCD (obsessive compulsive disorder)    Myofascial  pain syndrome    Obesity, morbid (H)    Chronic pain syndrome    Bipolar I disorder (H)    Spondylolisthesis of lumbar region    Spinal stenosis of lumbar region    TYRELL (generalized anxiety disorder)    Primary hypertension    Atypical chest pain    Diabetes mellitus, type 2 (H)    Continuous opioid dependence (H)       Current Outpatient Medications:     vitamin C (ASCORBIC ACID) 250 MG tablet, Take 1 tablet (250 mg) by mouth daily for 21 days., Disp: 21 tablet, Rfl: 0    zinc gluconate 50 MG tablet, Take 1 tablet (50 mg) by mouth daily for 21 days. One daily after supper, Disp: 21 tablet, Rfl: 0    albuterol (PROAIR HFA/PROVENTIL HFA/VENTOLIN HFA) 108 (90 Base) MCG/ACT inhaler, INHALE 2 PUFFS BY MOUTH EVERY 6 HOURS, Disp: 8.5 g, Rfl: 4    amLODIPine (NORVASC) 5 MG tablet, TAKE 1 TABLET BY MOUTH ONE TIME DAILY, Disp: 90 tablet, Rfl: 1    ARIPiprazole (ABILIFY) 10 MG tablet, Take 1 tablet (10 mg) by mouth daily., Disp: 30 tablet, Rfl: 1    blood glucose (NO BRAND SPECIFIED) test strip, Use to test blood sugar 3 times daily or as directed. To accompany: Blood Glucose Monitor Brands: per insurance., Disp: 100 strip, Rfl: 6    blood glucose (ONETOUCH VERIO IQ) test strip, Use to test blood sugar five times daily or as directed., Disp: 300 strip, Rfl: 3    blood glucose monitoring (NO BRAND SPECIFIED) meter device kit, Use to test blood sugar 1 times daily or as directed. Preferred blood glucose meter OR supplies to accompany: Blood Glucose Monitor Brands: per insurance., Disp: 1 kit, Rfl: 0    buprenorphine HCl-naloxone HCl (SUBOXONE) 2-0.5 MG per film, Place 1 Film under the tongue daily., Disp: 15 Film, Rfl: 0    busPIRone (BUSPAR) 15 MG tablet, Take 1 tablet (15 mg) by mouth 3 times daily., Disp: 90 tablet, Rfl: 5    Cyanocobalamin (B-12) 1000 MCG SUBL, Take once weekly., Disp: 50 tablet, Rfl: 1    gabapentin (NEURONTIN) 300 MG capsule, Take 300 mg in the am and afternoon with 600 mg in the PM. If no improvement in  "1 week in pain or anxiety, may continue to increase gabapentin weekly to a maximum of 600 mg tid., Disp: 180 capsule, Rfl: 3    hydrochlorothiazide (MICROZIDE) 12.5 MG capsule, Take 1 capsule (12.5 mg) by mouth every morning., Disp: 90 capsule, Rfl: 3    hydrOXYzine HCl (ATARAX) 25 MG tablet, Take 1 tablet (25 mg) by mouth 3 times daily as needed for anxiety., Disp: 30 tablet, Rfl: 1    ketoconazole (NIZORAL) 2 % external shampoo, Apply topically daily as needed for itching or irritation., Disp: 100 mL, Rfl: 1    lidocaine (XYLOCAINE) 5 % external ointment, Apply topically as needed for moderate pain., Disp: 50 g, Rfl: 11    losartan (COZAAR) 100 MG tablet, Take 1 tablet (100 mg) by mouth daily., Disp: 90 tablet, Rfl: 2    magnesium citrate 1.745 GM/30ML solution, 2 teaspoons(10ml) nightly before bed., Disp: 296 mL, Rfl: 0    metoprolol succinate ER (TOPROL XL) 100 MG 24 hr tablet, Take 1 tablet (100 mg) by mouth daily, Disp: 30 tablet, Rfl: 5    ondansetron (ZOFRAN ODT) 4 MG ODT tab, Take 1 tablet (4 mg) by mouth every 8 hours as needed for nausea., Disp: 10 tablet, Rfl: 1    pantoprazole (PROTONIX) 40 MG EC tablet, Take 1 tablet (40 mg) by mouth daily., Disp: 90 tablet, Rfl: 2    polyethylene glycol (MIRALAX) 17 GM/Dose powder, Take 17 g (1 Capful) by mouth daily., Disp: , Rfl:     Semaglutide, 2 MG/DOSE, (OZEMPIC) 8 MG/3ML pen, Inject 2 mg subcutaneously every 7 days., Disp: 9 mL, Rfl: 3    thin (NO BRAND SPECIFIED) lancets, Use with lanceting device. To accompany: Blood Glucose Monitor Brands: per insurance., Disp: 100 each, Rfl: 6    Vitamin D3 (CHOLECALCIFEROL) 25 mcg (1000 units) tablet, Take 1 tablet (25 mcg) by mouth daily., Disp: 90 tablet, Rfl: 2      Interim: Since our last visit, he has gone through a bit of a hiccup in weight loss. Started Abilify recently for some intrusive thoughts.  Had some increased comfort eating but overall pretty \"solid\". Doing some smoothies regularly.  Having some lightheaded " issues.  Less working out due to anal fissure (had been up to 5 day/week regimen prior).   Has some miralax.   Plan:   1.  Diet: with some light activity while healing up, aiming for 2000-2150kcal/day with 100-110 grams of lean protein daily should keep things moving in the proper direction this summer. Continue some fiber supplement like metamucil to ease discomfort of your anal fissure. Stay well hydrated with water to hit 100 oz/day of fluids.     Vitamin C and zinc to help with some skin healing for 3 weeks.     2. Exercise: ease back into the pool for good aerobic/strengthening  exercise.     3. Medication: Ozempic working well, stay at 2mg/week dose if tolerated. A1c is right on track at 5.2% (peak fo 7.4%).     4. No labs needed today.    5. Goals: down 82 lbs and nearly 8 BMI points. Aiming to get BMI under 35 in the long run (currently at BMI of 40.3, started at 47.7).    6. Dizziness: given weight loss, I'd recommend a blood pressure check with your primary care clinic to see if you could reduce your metoprolol if still using it. If off Metoprolol, then dose reduction of losartan would be next in line.       We discussed [facility] Bariatric Basics including:  -eating 3 meals daily  -reviewed metabolic needs for weight loss based on Resting Metabolic Rate  -protein goals supportive of healthy weight loss  -avoiding/limiting calorie containing beverages  -We discussed the importance of restorative sleep and stress management in maintaining a healthy weight.  -We discussed the National Weight Control Registry healthy weight maintenance strategies and ways to optimize metabolism.  -We discussed the importance of physical activity including cardiovascular and strength training in maintaining a healthier weight and explored viable options.      Most recent labs:  Lab Results   Component Value Date    WBC 11.4 (H) 12/20/2024    HGB 15.8 12/20/2024    HCT 46.4 12/20/2024    MCV 84 12/20/2024     12/20/2024  "    Lab Results   Component Value Date    CHOL 156 09/30/2024     Lab Results   Component Value Date    HDL 41 09/30/2024     No components found for: \"LDLCALC\"  Lab Results   Component Value Date    TRIG 157 (H) 09/30/2024     No results found for: \"CHOLHDL\"  Lab Results   Component Value Date    ALT 35 08/06/2024    AST 40 08/06/2024    ALKPHOS 91 08/06/2024     No results found for: \"HGBA1C\"  Lab Results   Component Value Date    B12 652 06/14/2024     No components found for: \"VITDT1\"  No results found for: \"PATTI\"  Lab Results   Component Value Date    PTHI 54 06/14/2024     No results found for: \"ZN\"  No results found for: \"VIB1WB\"  Lab Results   Component Value Date    TSH 1.41 08/06/2024     No results found for: \"TEST\"    DIETARY HISTORY  Improved protein intake. Some occ restaurant over indulgence      PHYSICAL ACTIVITY PATTERNS:  Cardiovascular: on the mend from anal fissure so hadn't been working out at gym 5x weekly in the last month  Strength Training: same    REVIEW OF SYSTEMS  Feeling positive. Abilify started for intrusive thoughts. .  PHYSICAL EXAM:  Vitals: Ht 1.798 m (5' 10.8\")   Wt 130.2 kg (287 lb)   BMI 40.25 kg/m    Weight:   Wt Readings from Last 3 Encounters:   05/08/25 130.2 kg (287 lb)   04/09/25 128.8 kg (284 lb)   03/26/25 128.4 kg (283 lb)         GEN: Pleasant. NEURO: Alert and Oriented X3, fluent speech.  .    Interim study results:   Lab Results   Component Value Date    A1C 5.2 04/09/2025    A1C 5.4 12/20/2024    A1C 5.5 09/30/2024    A1C 7.4 05/13/2024    A1C 5.0 07/02/2018     Last Comprehensive Metabolic Panel:  Lab Results   Component Value Date     10/10/2024    POTASSIUM 4.6 10/10/2024    CHLORIDE 102 10/10/2024    CO2 23 10/10/2024    ANIONGAP 14 10/10/2024     (H) 12/27/2024    BUN 16.3 10/10/2024    CR 1.06 10/10/2024    GFRESTIMATED 89 10/10/2024    KIYA 9.5 10/10/2024       .      30 minutes spent by me on the date of the encounter doing chart review, history " and exam, documentation and further activities per the note   Ricardo Steiner MD  [facility]  9:10 AM  5/8/2025

## 2025-05-08 NOTE — Clinical Note
Not sure if this is something you would like or need to follow up on, but just spoke with this patient and I know he recently started a new med, and has noted a decrease in intrusive thoughts overall, but when they do still happen, they are much more intense than before and leads to more depressive symptoms. I was going to offer to have him speak to you before he ended the call, but he seemed to be in the middle of a few things, and requested to end the call. He did not specifically ask to speak with a medical professional, but thought I would let you know!

## 2025-05-08 NOTE — NURSING NOTE
Is the patient currently in the state of [place]? YES    Location: home    Visit mode:VIDEO    If the visit is dropped, the patient can be reconnected by: VIDEO VISIT: Text to cell phone:   Telephone Information:   Mobile [phone]       Will anyone else be joining the visit? NO  (If patient encounters technical issues they should call [phone]    Are changes needed to the allergy or medication list? No    Are refills needed on medications prescribed by this physician? NO    Reason for visit: BERNIE Christian, Virtual Visit Facilitator    QNR Status: completed

## 2025-05-20 DIAGNOSIS — I10 PRIMARY HYPERTENSION: ICD-10-CM

## 2025-05-20 NOTE — TELEPHONE ENCOUNTER
Medication Question or Refill    Contacts       Contact Date/Time Type Contact Phone/Fax    05/20/2025 08:14 AM CDT Fax (Incoming) [id number] [[place]], [address] (Pharmacy) [phone]            What medication are you calling about (include dose and sig)?: losartan 100 mg daily; #90    Preferred Pharmacy:   [id number] [[place]], [address]  [address] [facility] [address]  Phone: [phone] Fax: [phone]      Controlled Substance Agreement on file:   CSA -- Patient Level:     [Media Unavailable] Controlled Substance Agreement - Opioid - Scan on 6/23/2023  8:42 AM   [Media Unavailable] Controlled Substance Agreement - Opioid - Scan on 5/27/2022  7:39 PM       Who prescribed the medication?: Dr. Hearn    Do you need a refill? Yes    When did you use the medication last? ?--refill request per fax    Patient offered an appointment? No    Do you have any questions or concerns?  No      Could we send this information to you in [employer] or would you prefer to receive a phone call?:   Patient would like to be contacted via Flip[employer]   Partial Purse String (Intermediate) Text: Given the location of the defect and the characteristics of the surrounding skin an intermediate purse string closure was deemed most appropriate.  Undermining was performed circumferentially around the surgical defect.  A purse string suture was then placed and tightened. Wound tension of the circular defect prevented complete closure of the wound.

## 2025-05-21 RX ORDER — LOSARTAN POTASSIUM 100 MG/1
100 TABLET ORAL DAILY
Qty: 90 TABLET | Refills: 0 | Status: SHIPPED | OUTPATIENT
Start: 2025-05-21

## 2025-05-22 ENCOUNTER — PATIENT OUTREACH (OUTPATIENT)
Dept: CARE COORDINATION | Facility: CLINIC | Age: 45
End: 2025-05-22
Payer: COMMERCIAL

## 2025-05-22 ENCOUNTER — OFFICE VISIT (OUTPATIENT)
Dept: PALLIATIVE MEDICINE | Facility: OTHER | Age: 45
End: 2025-05-22
Attending: STUDENT IN AN ORGANIZED HEALTH CARE EDUCATION/TRAINING PROGRAM
Payer: COMMERCIAL

## 2025-05-22 VITALS — DIASTOLIC BLOOD PRESSURE: 82 MMHG | SYSTOLIC BLOOD PRESSURE: 130 MMHG | OXYGEN SATURATION: 100 % | HEART RATE: 85 BPM

## 2025-05-22 DIAGNOSIS — M47.817 LUMBOSACRAL SPONDYLOSIS WITHOUT MYELOPATHY: Primary | ICD-10-CM

## 2025-05-22 DIAGNOSIS — M54.16 LUMBAR RADICULOPATHY: ICD-10-CM

## 2025-05-22 PROCEDURE — G0463 HOSPITAL OUTPT CLINIC VISIT: HCPCS | Performed by: STUDENT IN AN ORGANIZED HEALTH CARE EDUCATION/TRAINING PROGRAM

## 2025-05-22 ASSESSMENT — PAIN SCALES - GENERAL: PAINLEVEL_OUTOF10: SEVERE PAIN (7)

## 2025-05-22 NOTE — PROGRESS NOTES
"[facility] Interventional Follow-up    Date of visit: 5/22/2025    Chief complaint: Lumbar radiculopathy, right    Brief History:   Patient endorses chronic low back pain R > L that started around 2016 due to a fall on ice while taking out trash. Patient had one spine surgery on 11/5/2019 with Dr. Hector Maurice in which nerve pain in right leg improved slightly. Patient has been deferred for disk replacement surgery due to weight. Patient is planning on having further weight loss. Previously describes pain as constant, sharp, and shooting in right leg and aching to sharp pain in right lower back. Patient had numbness and tingligness in right leg from buttock to toes. Patient previously reported some weakness in right leg. Recent MR lumbar spine in 11/2023 showing L5-S1 moderate to advanced left neural foraminal stenosis at L5-S1. Pain made worse by bending, lifting. Previously had lumbar TF L5-S1 THOMAS in 10/2024 and 4/23/2025.    Interval history:  Since his last visit, Daron Bond reports:    Continues to have right lower back with pain radiating down the lateral thigh and down to the all toes but particularly in the right pinky toe. Patient describes the pain as \"cutting\" pain.   Aggravating factors are inactivity as well as activity.   Patient has a anal fissure in which reports that aggravates the pain.  Relieving factors including alternating hot and cool pool water.   Having difficulty with sleep due to both physical pain and mental health.  At this time, back pain bothers patient the worse. 80% on the right back, 20% on the left back. 70% pain on the right leg compared to 30% on the left leg.    Patient also reports having chronic bilateral shoulder pain, chronic neck pain, as well as intermittent shooting pain down the right arm. Patient reports that this has been going on since 2023. No inciting events or injuries. Has some numbness from this as " well.     R L4 and L5 transforaminal THOMAS on 4/23/25 - 20-30% improvement from procedure.     Pain scores:  Pain intensity currently is 7 on a scale of 0-10.     Current pain treatments:   Gabapentin 300 mg BID and 600mg qEvening (helps)  Buprenoprhine (helps)  Buspirone (helps)  Ibuprofen as needed (helps)  Cannabis (less than 1 gram, helps)    Chiropractor weekly  Exercises    Past pain treatments:  Cyclobenzaprine (nonhelpful, felt like he was fatigued with medication)    Previously participated in PT  Spine surgery on 11/5/2019 with Dr. Hector Maurice - some improvement  Right L5 transforaminal THOMAS on 10/10/24 with Dr. Avalos    Side Effects: no side effect    Medications:  Current Outpatient Medications   Medication Sig Dispense Refill    albuterol (PROAIR HFA/PROVENTIL HFA/VENTOLIN HFA) 108 (90 Base) MCG/ACT inhaler INHALE 2 PUFFS BY MOUTH EVERY 6 HOURS 8.5 g 4    amLODIPine (NORVASC) 5 MG tablet TAKE 1 TABLET BY MOUTH ONE TIME DAILY 90 tablet 1    ARIPiprazole (ABILIFY) 10 MG tablet Take 1 tablet (10 mg) by mouth daily. 30 tablet 1    blood glucose (NO BRAND SPECIFIED) test strip Use to test blood sugar 3 times daily or as directed. To accompany: Blood Glucose Monitor Brands: per insurance. 100 strip 6    blood glucose (ONETOUCH VERIO IQ) test strip Use to test blood sugar five times daily or as directed. 300 strip 3    blood glucose monitoring (NO BRAND SPECIFIED) meter device kit Use to test blood sugar 1 times daily or as directed. Preferred blood glucose meter OR supplies to accompany: Blood Glucose Monitor Brands: per insurance. 1 kit 0    buprenorphine HCl-naloxone HCl (SUBOXONE) 2-0.5 MG per film Place 1 Film under the tongue daily. 15 Film 0    busPIRone (BUSPAR) 15 MG tablet Take 1 tablet (15 mg) by mouth 3 times daily. 90 tablet 5    Cyanocobalamin (B-12) 1000 MCG SUBL Take once weekly. 50 tablet 1    gabapentin (NEURONTIN) 300 MG capsule Take 300 mg in the am and afternoon with 600 mg in the PM. If no  improvement in 1 week in pain or anxiety, may continue to increase gabapentin weekly to a maximum of 600 mg tid. 180 capsule 3    hydrochlorothiazide (MICROZIDE) 12.5 MG capsule Take 1 capsule (12.5 mg) by mouth every morning. 90 capsule 3    hydrOXYzine HCl (ATARAX) 25 MG tablet Take 1 tablet (25 mg) by mouth 3 times daily as needed for anxiety. 30 tablet 1    ketoconazole (NIZORAL) 2 % external shampoo Apply topically daily as needed for itching or irritation. 100 mL 1    lidocaine (XYLOCAINE) 5 % external ointment Apply topically as needed for moderate pain. 50 g 11    losartan (COZAAR) 100 MG tablet Take 1 tablet (100 mg) by mouth daily. 90 tablet 0    magnesium citrate 1.745 GM/30ML solution 2 teaspoons(10ml) nightly before bed. 296 mL 0    metoprolol succinate ER (TOPROL XL) 100 MG 24 hr tablet Take 1 tablet (100 mg) by mouth daily 30 tablet 5    ondansetron (ZOFRAN ODT) 4 MG ODT tab Take 1 tablet (4 mg) by mouth every 8 hours as needed for nausea. 10 tablet 1    pantoprazole (PROTONIX) 40 MG EC tablet Take 1 tablet (40 mg) by mouth daily. 90 tablet 2    polyethylene glycol (MIRALAX) 17 GM/Dose powder Take 17 g (1 Capful) by mouth daily.      Semaglutide, 2 MG/DOSE, (OZEMPIC) 8 MG/3ML pen Inject 2 mg subcutaneously every 7 days. 9 mL 3    thin (NO BRAND SPECIFIED) lancets Use with lanceting device. To accompany: Blood Glucose Monitor Brands: per insurance. 100 each 6    vitamin C (ASCORBIC ACID) 250 MG tablet Take 1 tablet (250 mg) by mouth daily for 21 days. 21 tablet 0    Vitamin D3 (CHOLECALCIFEROL) 25 mcg (1000 units) tablet Take 1 tablet (25 mcg) by mouth daily. 90 tablet 2    zinc gluconate 50 MG tablet Take 1 tablet (50 mg) by mouth daily for 21 days. One daily after supper 21 tablet 0       Medical History: any changes in medical history since they were last seen? No    Physical Exam:  Blood pressure 130/82, pulse 85, SpO2 100%.  Constitutional: Well developed, NAD  Head: normocephalic. Atraumatic.    Eyes: no redness or jaundice noted   CV: warm, well perfused extremities   Skin: no suspicious lesions or rashes   Psychiatric: mentation appears normal and affect full  Focused MSK exam: Tenderness upon palpation at the right lumbar paraspinal area.     Diagnostics:  EXAM: CTA ABDOMEN PELVIS WITH CONTRAST  LOCATION: [facility]  DATE: 8/6/2024     INDICATION: Pain. Bloody stool.   COMPARISON: CT abdomen pelvis 6/25/2021.   TECHNIQUE: CT angiogram abdomen pelvis during arterial phase of injection of IV contrast. 2D and 3D MIP reconstructions were performed by the CT technologist. Dose reduction techniques were used.  CONTRAST: Isovue 370 100ml     FINDINGS:     ANGIOGRAM ABDOMEN/PELVIS: Arteries are suboptimally opacified on arterial phase images. Within this limitation, no evidence of active gastrointestinal bleeding. No abdominal aortic aneurysm. Celiac artery and its branches are patent. Superior mesenteric   artery and its visualized branches are patent. Bilateral renal arteries are patent. Inferior mesenteric artery and its visualized branches are patent. Bilateral iliofemoral arteries are patent. Portal, splenic, and superior mesenteric veins are patent.      LOWER CHEST: Normal.     HEPATOBILIARY: Diffuse hepatic steatosis. No calcified gallstones or biliary ductal dilation.      PANCREAS: Normal.     SPLEEN: Normal.     ADRENAL GLANDS: Normal.     KIDNEYS/BLADDER: No urinary calculi or hydronephrosis. Normal bladder.      BOWEL: Scattered noninflamed diverticuli within the ascending, descending, and sigmoid colon. Normal appendix. No bowel obstruction or inflammation. No perianal inflammation or fluid collections.      LYMPH NODES: No enlarged lymph node.      PELVIC ORGANS: Normal.     MUSCULOSKELETAL: Multilevel degenerative changes of the spine. No acute bony abnormality.                                                                       IMPRESSION:     1.  No evidence of  active gastrointestinal bleeding, noting that evaluation is somewhat limited due to suboptimal opacification of the arteries on arterial phase images.      2.  Colonic diverticulosis. No inflamed diverticuli.     3.  Fatty liver.     Personally reviewed: yes    Assessment:   Lumbar radiculopathy, right  Lumbar spondylosis    Daron Bond is a 45 year old male with PMHx of  discogenic back pain, spinal stenosis with neurogenic claudication, bipolar disorder, OCD, anxiety/depression, seizures, HTN, DMII, CVAx2, Mix1, myofascial pain, and morbid obesity who is seen at the pain clinic for evaluation of right lumbar radiculopathy. Patient recently had a right L4 and L5 transforaminal epidural steroid injection on 4/23/25.  This provided about 20% relief.  Patient notes pain is primarily in the back, with the majority of the pain in the leg.  He notes his pain is worse with prolonged sitting or standing.  Examination today shows tenderness to palpation at the right lumbar paraspinals.  Patient has tried physical therapy and medications.  Would be appropriate to proceed with right-sided L3-5 MBB/RFA process.  This was ordered.  Patient will follow-up with Yael for comprehensive management.    Plan:  Procedures: R L3-L5 MBB. If successful, then can proceed with R L3-L5 radiofrequency ablation.   Physical Therapy:  Continue HEP  Diagnostic Studies:  N/A  Medication Management:  Per Yael.  Follow up: Per procedure    Discussed and saw patient with interventional pain physician, Dr. Beto Avalos.    Hakeem Veloz DO  Resident Physician, PGY-2  Physical Medicine and Rehabilitation  [facility]    Physician Attestation   I, Beto Avalos MD, saw and evaluated this patient and agree with the findings and plan of care as documented in the note.  Any changes have been made above.    Beto Avalos MD  Interventional Pain Medicine  [facility]

## 2025-05-22 NOTE — PROGRESS NOTES
Clinic Care Coordination Contact  [facility]/Voicemail    Clinical Data: Care Coordinator Outreach    Outreach Documentation Number of Outreach Attempt   5/6/2025  10:58 AM 1   5/22/2025   9:54 AM 1       Left message on patient's voicemail with call back information and requested return call.      Plan: Care Coordinator will send unable to contact letter with care coordinator contact information via [employer]. Care Coordinator will continue outreach attempts.    TERENCE Cronin  Social Work Care Coordinator  [facility]  [phone]  [email or web address]  Pronouns: They/He

## 2025-05-22 NOTE — PROGRESS NOTES
Patient presents to the clinic today for a visit  with Beto Avalos MD            12/20/2024     3:09 PM 1/21/2025     1:22 PM 5/22/2025     2:36 PM   PEG Score   PEG Total Score 8.33 6 6.33       UDS/CSA-na    Medications-na     QUESTIONS:    Gini Briceño MA  [facility] [place]

## 2025-05-22 NOTE — PATIENT INSTRUCTIONS
Plan:  Procedures: R L3-L5 MBB. If successful, then can proceed with R L3-L5 radiofrequency ablation.   Physical Therapy:  Continue HEP  Diagnostic Studies:  N/A  Medication Management:  Per Yael.  Follow up: Per procedure    [facility] Number:  [phone]  Call with any questions about your care and for scheduling assistance.   Calls are returned Monday through Friday between 8 AM and 4:30 PM. We usually get back to you within 2 business days depending on the issue/request.    If we are prescribing your medications:  For opioid medication refills, call the clinic or send a [employer] message 7 days in advance.  Please include:  Name of requested medication  Name of the pharmacy.  For non-opioid medications, call your pharmacy directly to request a refill. Please allow 3-4 days to be processed.   Per [place] State Law:  All controlled substance prescriptions must be filled within 30 days of being written.    For those controlled substances allowing refills, pickup must occur within 30 days of last fill.      We believe regular attendance is key to your success in our program!    Any time you are unable to keep your appointment we ask that you call us at least 24 hours in advance to cancel.This will allow us to offer the appointment time to another patient.   Multiple missed appointments may lead to dismissal from the clinic.

## 2025-06-03 ENCOUNTER — VIRTUAL VISIT (OUTPATIENT)
Dept: PSYCHIATRY | Facility: CLINIC | Age: 45
End: 2025-06-03
Payer: COMMERCIAL

## 2025-06-03 DIAGNOSIS — F31.9 BIPOLAR I DISORDER (H): Primary | ICD-10-CM

## 2025-06-03 DIAGNOSIS — Z59.9 FINANCIAL DIFFICULTIES: ICD-10-CM

## 2025-06-03 DIAGNOSIS — F43.10 POSTTRAUMATIC STRESS DISORDER: ICD-10-CM

## 2025-06-03 DIAGNOSIS — F41.0 PANIC DISORDER WITHOUT AGORAPHOBIA: ICD-10-CM

## 2025-06-03 DIAGNOSIS — F33.2 SEVERE EPISODE OF RECURRENT MAJOR DEPRESSIVE DISORDER, WITHOUT PSYCHOTIC FEATURES (H): ICD-10-CM

## 2025-06-03 DIAGNOSIS — F41.1 GAD (GENERALIZED ANXIETY DISORDER): ICD-10-CM

## 2025-06-03 DIAGNOSIS — F42.9 OBSESSIVE-COMPULSIVE DISORDER, UNSPECIFIED TYPE: ICD-10-CM

## 2025-06-03 PROCEDURE — G2211 COMPLEX E/M VISIT ADD ON: HCPCS | Mod: 95 | Performed by: STUDENT IN AN ORGANIZED HEALTH CARE EDUCATION/TRAINING PROGRAM

## 2025-06-03 PROCEDURE — 98006 SYNCH AUDIO-VIDEO EST MOD 30: CPT | Performed by: STUDENT IN AN ORGANIZED HEALTH CARE EDUCATION/TRAINING PROGRAM

## 2025-06-03 RX ORDER — LITHIUM CARBONATE 300 MG/1
300 TABLET, FILM COATED, EXTENDED RELEASE ORAL 2 TIMES DAILY
Qty: 60 TABLET | Refills: 1 | Status: SHIPPED | OUTPATIENT
Start: 2025-06-03

## 2025-06-03 SDOH — ECONOMIC STABILITY - INCOME SECURITY: PROBLEM RELATED TO HOUSING AND ECONOMIC CIRCUMSTANCES, UNSPECIFIED: Z59.9
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
## 2025-06-09 ENCOUNTER — VIRTUAL VISIT (OUTPATIENT)
Dept: PSYCHOLOGY | Facility: CLINIC | Age: 45
End: 2025-06-09
Payer: COMMERCIAL

## 2025-06-09 DIAGNOSIS — F43.10 POSTTRAUMATIC STRESS DISORDER: Primary | ICD-10-CM

## 2025-06-09 DIAGNOSIS — F41.1 GAD (GENERALIZED ANXIETY DISORDER): ICD-10-CM

## 2025-06-09 DIAGNOSIS — F33.2 SEVERE EPISODE OF RECURRENT MAJOR DEPRESSIVE DISORDER, WITHOUT PSYCHOTIC FEATURES (H): ICD-10-CM

## 2025-06-09 PROCEDURE — 90791 PSYCH DIAGNOSTIC EVALUATION: CPT | Mod: 95 | Performed by: MARRIAGE & FAMILY THERAPIST

## 2025-06-09 NOTE — Clinical Note
[place], Dr. Hearn and Vel.  I met with Will for Diagnostic Assessment/therapy intake.  Plan for therapy will be to focus on mood improvement, increasing calm mindset, and (re)processing of past trauma.  Thank you for the referral!
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
## 2025-06-11 ENCOUNTER — OFFICE VISIT (OUTPATIENT)
Dept: URGENT CARE | Facility: URGENT CARE | Age: 45
End: 2025-06-11
Payer: COMMERCIAL

## 2025-06-11 VITALS
SYSTOLIC BLOOD PRESSURE: 131 MMHG | BODY MASS INDEX: 40.02 KG/M2 | TEMPERATURE: 98 F | RESPIRATION RATE: 20 BRPM | WEIGHT: 285.3 LBS | OXYGEN SATURATION: 96 % | HEART RATE: 87 BPM | DIASTOLIC BLOOD PRESSURE: 74 MMHG

## 2025-06-11 DIAGNOSIS — I83.93 ASYMPTOMATIC VARICOSE VEINS OF BOTH LOWER EXTREMITIES: Primary | ICD-10-CM

## 2025-06-11 DIAGNOSIS — M79.18 MYOFASCIAL PAIN SYNDROME: ICD-10-CM

## 2025-06-11 PROCEDURE — 99214 OFFICE O/P EST MOD 30 MIN: CPT | Performed by: PHYSICIAN ASSISTANT

## 2025-06-11 PROCEDURE — 3075F SYST BP GE 130 - 139MM HG: CPT | Performed by: PHYSICIAN ASSISTANT

## 2025-06-11 PROCEDURE — 3078F DIAST BP <80 MM HG: CPT | Performed by: PHYSICIAN ASSISTANT
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
## 2025-06-11 NOTE — PATIENT INSTRUCTIONS
Apply topical Voltaren gel to the varicose veins up to 4 times per day    Follow-up with your primary care provider for reevaluation and treatment or return to the urgent care if new symptoms or concerns arise

## 2025-06-14 DIAGNOSIS — I10 PRIMARY HYPERTENSION: ICD-10-CM

## 2025-06-16 NOTE — TELEPHONE ENCOUNTER
Last OV with DR. Hearn 8/16/2023.  Please call patient and confirm if patient is having care with another PCP.    Please assist with scheduling visit with DR. Hearn for annual visit and medication management.

## 2025-06-17 RX ORDER — AMLODIPINE BESYLATE 5 MG/1
5 TABLET ORAL DAILY
Qty: 60 TABLET | Refills: 0 | Status: SHIPPED | OUTPATIENT
Start: 2025-06-17

## 2025-06-17 NOTE — TELEPHONE ENCOUNTER
Called patient to schedule appt with Dr. Hearn. He states he has an appt next month to establish care elsewhere, but for the meanwhile Dr. Hearn is his primary. He reports unable to come in for an appt, is wondering what will happen if he quits taking the medication in the meanwhile. He is wondering if would have any adverse effects. Let him know I will have medical staff review his question and give him a call back.

## 2025-06-19 ENCOUNTER — PATIENT OUTREACH (OUTPATIENT)
Dept: CARE COORDINATION | Facility: CLINIC | Age: 45
End: 2025-06-19
Payer: COMMERCIAL

## 2025-06-23 ENCOUNTER — VIRTUAL VISIT (OUTPATIENT)
Dept: PSYCHIATRY | Facility: CLINIC | Age: 45
End: 2025-06-23
Payer: COMMERCIAL

## 2025-06-23 DIAGNOSIS — F41.0 PANIC DISORDER WITHOUT AGORAPHOBIA: ICD-10-CM

## 2025-06-23 DIAGNOSIS — F33.2 SEVERE EPISODE OF RECURRENT MAJOR DEPRESSIVE DISORDER, WITHOUT PSYCHOTIC FEATURES (H): ICD-10-CM

## 2025-06-23 DIAGNOSIS — F31.9 BIPOLAR I DISORDER (H): ICD-10-CM

## 2025-06-23 DIAGNOSIS — F43.10 POSTTRAUMATIC STRESS DISORDER: ICD-10-CM

## 2025-06-23 DIAGNOSIS — F17.218 CIGARETTE NICOTINE DEPENDENCE WITH OTHER NICOTINE-INDUCED DISORDER: Primary | ICD-10-CM

## 2025-06-23 DIAGNOSIS — F41.1 GAD (GENERALIZED ANXIETY DISORDER): ICD-10-CM

## 2025-06-23 DIAGNOSIS — F42.9 OBSESSIVE-COMPULSIVE DISORDER, UNSPECIFIED TYPE: ICD-10-CM

## 2025-06-23 PROCEDURE — G2211 COMPLEX E/M VISIT ADD ON: HCPCS | Mod: 95 | Performed by: STUDENT IN AN ORGANIZED HEALTH CARE EDUCATION/TRAINING PROGRAM

## 2025-06-23 PROCEDURE — 98006 SYNCH AUDIO-VIDEO EST MOD 30: CPT | Performed by: STUDENT IN AN ORGANIZED HEALTH CARE EDUCATION/TRAINING PROGRAM

## 2025-06-23 PROCEDURE — 1125F AMNT PAIN NOTED PAIN PRSNT: CPT | Mod: 95 | Performed by: STUDENT IN AN ORGANIZED HEALTH CARE EDUCATION/TRAINING PROGRAM

## 2025-06-23 RX ORDER — NICOTINE 21 MG/24HR
1 PATCH, TRANSDERMAL 24 HOURS TRANSDERMAL EVERY 24 HOURS
Qty: 14 PATCH | Refills: 0 | Status: SHIPPED | OUTPATIENT
Start: 2025-06-23

## 2025-06-23 RX ORDER — NICOTINE 21 MG/24HR
1 PATCH, TRANSDERMAL 24 HOURS TRANSDERMAL EVERY 24 HOURS
Qty: 42 PATCH | Refills: 0 | Status: SHIPPED | OUTPATIENT
Start: 2025-06-23

## 2025-06-23 RX ORDER — POLYETHYLENE GLYCOL 3350 17 G
2 POWDER IN PACKET (EA) ORAL
Qty: 20 LOZENGE | Refills: 0 | Status: SHIPPED | OUTPATIENT
Start: 2025-06-23

## 2025-06-23 ASSESSMENT — PAIN SCALES - GENERAL: PAINLEVEL_OUTOF10: SEVERE PAIN (7)

## 2025-06-23 ASSESSMENT — ANXIETY QUESTIONNAIRES
IF YOU CHECKED OFF ANY PROBLEMS ON THIS QUESTIONNAIRE, HOW DIFFICULT HAVE THESE PROBLEMS MADE IT FOR YOU TO DO YOUR WORK, TAKE CARE OF THINGS AT HOME, OR GET ALONG WITH OTHER PEOPLE: EXTREMELY DIFFICULT
GAD7 TOTAL SCORE: 19
3. WORRYING TOO MUCH ABOUT DIFFERENT THINGS: NEARLY EVERY DAY
8. IF YOU CHECKED OFF ANY PROBLEMS, HOW DIFFICULT HAVE THESE MADE IT FOR YOU TO DO YOUR WORK, TAKE CARE OF THINGS AT HOME, OR GET ALONG WITH OTHER PEOPLE?: EXTREMELY DIFFICULT
GAD7 TOTAL SCORE: 19
1. FEELING NERVOUS, ANXIOUS, OR ON EDGE: NEARLY EVERY DAY
6. BECOMING EASILY ANNOYED OR IRRITABLE: SEVERAL DAYS
GAD7 TOTAL SCORE: 19
7. FEELING AFRAID AS IF SOMETHING AWFUL MIGHT HAPPEN: NEARLY EVERY DAY
2. NOT BEING ABLE TO STOP OR CONTROL WORRYING: NEARLY EVERY DAY
7. FEELING AFRAID AS IF SOMETHING AWFUL MIGHT HAPPEN: NEARLY EVERY DAY
5. BEING SO RESTLESS THAT IT IS HARD TO SIT STILL: NEARLY EVERY DAY
4. TROUBLE RELAXING: NEARLY EVERY DAY
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
## 2025-06-23 NOTE — PROGRESS NOTES
Clinic Care Coordination Contact  [facility]/Voicemail    Clinical Data: Care Coordinator Outreach    Outreach Documentation Number of Outreach Attempt   5/22/2025   9:54 AM 1   6/23/2025   9:13 AM 1       Left message on patient's voicemail with call back information and requested return call.      Plan: Care Coordinator will send unable to contact letter with care coordinator contact information via [employer]. Care Coordinator will continue outreach attempts.    TERENCE Cronin  Social Work Care Coordinator  [facility]  [phone]  [email or web address]  Pronouns: They/He

## 2025-06-23 NOTE — NURSING NOTE
Current patient location: [address]   [place] [address]    Is the patient currently in the state of [place]? YES    Visit mode: VIDEO    If the visit is dropped, the patient can be reconnected by:VIDEO VISIT: Text to cell phone:   Telephone Information:   Mobile [phone]       Will anyone else be joining the visit? NO  (If patient encounters technical issues they should call [phone])    Are changes needed to the allergy or medication list? Yes nicotine patches, hasn't started lithium 300 mg yet.    Are refills needed on medications prescribed by this physician? Discuss with provider    Rooming Documentation:  Questionnaire(s) completed    Reason for visit: RECHECK    Roxanna WILKERSONF

## 2025-06-23 NOTE — Clinical Note
Irvin Gar, pt wanted me to reach out to you to try and reach out again. I did let the pt that you attempted to contact him and left a voicemail.

## 2025-06-24 ENCOUNTER — VIRTUAL VISIT (OUTPATIENT)
Dept: EDUCATION SERVICES | Facility: CLINIC | Age: 45
End: 2025-06-24
Payer: COMMERCIAL

## 2025-06-24 DIAGNOSIS — E11.9 TYPE 2 DIABETES MELLITUS WITHOUT COMPLICATION, WITHOUT LONG-TERM CURRENT USE OF INSULIN (H): Primary | ICD-10-CM

## 2025-06-24 PROCEDURE — 98967 PH1 ASSMT&MGMT NQHP 11-20: CPT | Mod: 93 | Performed by: DIETITIAN, REGISTERED

## 2025-06-24 NOTE — LETTER
6/24/2025         RE: Daron Bond  [address]  [place] [address]        Dear Colleague,    Thank you for referring your patient, Daron Bond, to the [facility]. Please see a copy of my visit note below.    Diabetes Self-Management Education & Support 18 minutes    Presents for: Follow-up    Type of Service: Telephone Visit    Originating Location (Patient Location): Home  Distant Location (Provider Location): [facility]  Mode of Communication:  Telephone    Telephone Visit Start Time: 1:01  Telephone Visit End Time (telephone visit stop time): 1:19    How would patient like to obtain AVS? MyChart      Assessment  Last DM visit was 4/9/25. Mack is taking Ozempic 2 mg/daily. Wt loss per bariatric program reports an 82# weight loss since starting with their non-surgical program. Mack is checking BG at home: 1-4+/day.  He reports FBS in the 90's and PP in the 140's. He reports anxiety when seeing his BG change and often will continue to check BG which increases anxiety. We discussed that with his A1C of 5.2% his BG are managed well and that 1x/day checking is more than adequate. We discussed that BG can change frequently throughout the day due to food intake, activity level, stress, medications and poor sleep. Mack was receiving meals from [facility], but he discontinued their service due to not caring for their meals and feeling like they elevated his BG. He is having 2-3 meals/day, hydrating with sparking water and water: 48 oz/day. We discussed increasing fluid intake per Bariatric MD recommendations. Mack is struggling with an anal fissure which has decreased his activity level. He finds that exercise helped him feel better in the past: physically and mentally, so he hopes to be more active in the future.     Patient's most recent   Lab Results   Component Value Date    A1C 5.2 04/09/2025     is meeting goal of <7.0    Diabetes  knowledge and skills assessment:   Patient is knowledgeable in diabetes management concepts related to: Healthy Eating, Monitoring, and Taking Medication    Based on learning assessment above, most appropriate setting for further diabetes education would be: Individual setting.    Care Plan and Education Provided:  Being Active: Finding a physical activity routine that works for you  Monitoring: Frequency of monitoring    Patient verbalized understanding of diabetes self-management education concepts discussed, opportunities for ongoing education and support, and recommendations provided today.    Plan  Test blood sugar once daily: either in the morning before eating, with goal to be under 130 OR two hours after one meal per day,  with goal to be under 180.  Inject Ozempic 2.0 mg weekly.   Aim to have 1/2 of your plate filled with vegetables( all vegetables except: corn, peas, potatoes), 1/4 lean protein and 1/4 carbohydrate containing foods: rice, pasta, bread, potatoes.   Add in a serving of fruit between meals: a piece of fruit the size of a tennis ball or 1 cup cut up melon or berries or 1/2 arslan or 1/2 banana.   As able, resume exercise aiming for 30 minutes, two to three times weekly.     Topics to cover at upcoming visits: Monitoring, Taking Medication, and Reducing Risks    See Care Plan for co-developed, patient-state behavior change goals.    Education Materials Provided:  No new materials provided today      Subjective/Objective  Will is an 45 year old, presenting for the following diabetes education related to: Follow-up  Accompanied by: Self  Diabetes education in the past 24mo: Yes  Focus of Visit: Monitoring, Taking Medication  Diabetes type: Type 2  Disease course: Stable  Transportation concerns: Yes  Other concerns: Physical impairment, Lack of coping  Cultural Influences/Ethnic Background:  Not  or       Diabetes Symptoms & Complications:  Weight trend: Stable  Disease course:  "Stable       Patient Problem List and Family Medical History reviewed for relevant medical history, current medical status, and diabetes risk factors.    Vitals:  There were no vitals taken for this visit.  Estimated body mass index is 40.02 kg/m  as calculated from the following:    Height as of 5/8/25: 1.798 m (5' 10.8\").    Weight as of 6/11/25: 129.4 kg (285 lb 4.8 oz).   Last 3 BP:   BP Readings from Last 3 Encounters:   06/11/25 131/74   05/22/25 130/82   04/23/25 121/74       History   Smoking Status     Every Day     Types: Cigarettes   Smokeless Tobacco     Never       Labs:  Lab Results   Component Value Date    A1C 5.2 04/09/2025     Lab Results   Component Value Date     12/27/2024    GLC 83 04/27/2018     Lab Results   Component Value Date    LDL 84 09/30/2024     Direct Measure HDL   Date Value Ref Range Status   09/30/2024 41 >=40 mg/dL Final     GFR Estimate   Date Value Ref Range Status   10/10/2024 89 >60 mL/min/1.73m2 Final     Comment:     eGFR calculated using 2021 CKD-EPI equation.     No results found for: \"GFRESTBLACK\"  Lab Results   Component Value Date    CR 1.06 10/10/2024     Lab Results   Component Value Date    MICROL <12.0 06/14/2024    UMALCR  06/14/2024      Comment:      Unable to calculate, urine albumin and/or urine creatinine is outside detectable limits.  Microalbuminuria is defined as an albumin:creatinine ratio of 17 to 299 for males and 25 to 299 for females. A ratio of albumin:creatinine of 300 or higher is indicative of overt proteinuria.  Due to biologic variability, positive results should be confirmed by a second, first-morning random or 24-hour timed urine specimen. If there is discrepancy, a third specimen is recommended. When 2 out of 3 results are in the microalbuminuria range, this is evidence for incipient nephropathy and warrants increased efforts at glucose control, blood pressure control, and institution of therapy with an angiotensin-converting-enzyme " (ACE) inhibitor (if the patient can tolerate it).      UCRR 172.0 06/14/2024 6/24/2025   Being Active   Being Active Assessed Today Yes   Exercise: Currently not exercising   Barrier to exercise Physical limitation         6/24/2025   Monitoring   Monitoring Assessed Today Yes   Did patient bring glucose meter to appointment?  Yes   Blood Glucose Meter Unknown   Times checking blood sugar at home (number) Other (see Comments)   Please elaborate: varies from 1x//day to 4+ /day   Times checking blood sugar at home (per) Day         Romina Boyle RD,LD,[facility]/Diabetes Education  [phone]   Time Spent: 18 minutes  Encounter Type: Individual    Any diabetes medication dose changes were made via the [facility] Standing Orders under the patient's referring provider.

## 2025-06-24 NOTE — PROGRESS NOTES
Diabetes Self-Management Education & Support 18 minutes    Presents for: Follow-up    Type of Service: Telephone Visit    Originating Location (Patient Location): Home  Distant Location (Provider Location): [facility]  Mode of Communication:  Telephone    Telephone Visit Start Time: 1:01  Telephone Visit End Time (telephone visit stop time): 1:19    How would patient like to obtain AVS? MyChart      Assessment  Last DM visit was 4/9/25. Mack is taking Ozempic 2 mg/daily. Wt loss per bariatric program reports an 82# weight loss since starting with their non-surgical program. Mack is checking BG at home: 1-4+/day.  He reports FBS in the 90's and PP in the 140's. He reports anxiety when seeing his BG change and often will continue to check BG which increases anxiety. We discussed that with his A1C of 5.2% his BG are managed well and that 1x/day checking is more than adequate. We discussed that BG can change frequently throughout the day due to food intake, activity level, stress, medications and poor sleep. Mack was receiving meals from [employer], but he discontinued their service due to not caring for their meals and feeling like they elevated his BG. He is having 2-3 meals/day, hydrating with sparking water and water: 48 oz/day. We discussed increasing fluid intake per Bariatric MD recommendations. Mack is struggling with an anal fissure which has decreased his activity level. He finds that exercise helped him feel better in the past: physically and mentally, so he hopes to be more active in the future.     Patient's most recent   Lab Results   Component Value Date    A1C 5.2 04/09/2025     is meeting goal of <7.0    Diabetes knowledge and skills assessment:   Patient is knowledgeable in diabetes management concepts related to: Healthy Eating, Monitoring, and Taking Medication    Based on learning assessment above, most appropriate setting for further diabetes education would be: Individual  setting.    Care Plan and Education Provided:  Being Active: Finding a physical activity routine that works for you  Monitoring: Frequency of monitoring    Patient verbalized understanding of diabetes self-management education concepts discussed, opportunities for ongoing education and support, and recommendations provided today.    Plan  Test blood sugar once daily: either in the morning before eating, with goal to be under 130 OR two hours after one meal per day,  with goal to be under 180.  Inject Ozempic 2.0 mg weekly.   Aim to have 1/2 of your plate filled with vegetables( all vegetables except: corn, peas, potatoes), 1/4 lean protein and 1/4 carbohydrate containing foods: rice, pasta, bread, potatoes.   Add in a serving of fruit between meals: a piece of fruit the size of a tennis ball or 1 cup cut up melon or berries or 1/2 arslan or 1/2 banana.   As able, resume exercise aiming for 30 minutes, two to three times weekly.     Topics to cover at upcoming visits: Monitoring, Taking Medication, and Reducing Risks    See Care Plan for co-developed, patient-state behavior change goals.    Education Materials Provided:  No new materials provided today      Subjective/Objective  Will is an 45 year old, presenting for the following diabetes education related to: Follow-up  Accompanied by: Self  Diabetes education in the past 24mo: Yes  Focus of Visit: Monitoring, Taking Medication  Diabetes type: Type 2  Disease course: Stable  Transportation concerns: Yes  Other concerns: Physical impairment, Lack of coping  Cultural Influences/Ethnic Background:  Not  or       Diabetes Symptoms & Complications:  Weight trend: Stable  Disease course: Stable       Patient Problem List and Family Medical History reviewed for relevant medical history, current medical status, and diabetes risk factors.    Vitals:  There were no vitals taken for this visit.  Estimated body mass index is 40.02 kg/m  as calculated from the  "following:    Height as of 5/8/25: 1.798 m (5' 10.8\").    Weight as of 6/11/25: 129.4 kg (285 lb 4.8 oz).   Last 3 BP:   BP Readings from Last 3 Encounters:   06/11/25 131/74   05/22/25 130/82   04/23/25 121/74       History   Smoking Status    Every Day    Types: Cigarettes   Smokeless Tobacco    Never       Labs:  Lab Results   Component Value Date    A1C 5.2 04/09/2025     Lab Results   Component Value Date     12/27/2024    GLC 83 04/27/2018     Lab Results   Component Value Date    LDL 84 09/30/2024     Direct Measure HDL   Date Value Ref Range Status   09/30/2024 41 >=40 mg/dL Final     GFR Estimate   Date Value Ref Range Status   10/10/2024 89 >60 mL/min/1.73m2 Final     Comment:     eGFR calculated using 2021 CKD-EPI equation.     No results found for: \"GFRESTBLACK\"  Lab Results   Component Value Date    CR 1.06 10/10/2024     Lab Results   Component Value Date    MICROL <12.0 06/14/2024    UMALCR  06/14/2024      Comment:      Unable to calculate, urine albumin and/or urine creatinine is outside detectable limits.  Microalbuminuria is defined as an albumin:creatinine ratio of 17 to 299 for males and 25 to 299 for females. A ratio of albumin:creatinine of 300 or higher is indicative of overt proteinuria.  Due to biologic variability, positive results should be confirmed by a second, first-morning random or 24-hour timed urine specimen. If there is discrepancy, a third specimen is recommended. When 2 out of 3 results are in the microalbuminuria range, this is evidence for incipient nephropathy and warrants increased efforts at glucose control, blood pressure control, and institution of therapy with an angiotensin-converting-enzyme (ACE) inhibitor (if the patient can tolerate it).      UCRR 172.0 06/14/2024 6/24/2025   Being Active   Being Active Assessed Today Yes   Exercise: Currently not exercising   Barrier to exercise Physical limitation         6/24/2025   Monitoring   Monitoring " Assessed Today Yes   Did patient bring glucose meter to appointment?  Yes   Blood Glucose Meter Unknown   Times checking blood sugar at home (number) Other (see Comments)   Please elaborate: varies from 1x//day to 4+ /day   Times checking blood sugar at home (per) Day         Romina Boyle RD,LD,[facility]/Diabetes Education  [phone]   Time Spent: 18 minutes  Encounter Type: Individual    Any diabetes medication dose changes were made via the [facility] Standing Orders under the patient's referring provider.

## 2025-06-24 NOTE — PATIENT INSTRUCTIONS
Test blood sugar once daily: either in the morning before eating, with goal to be under 130 OR two hours after one meal per day,  with goal to be under 180.  Inject Ozempic 2.0 mg weekly.   Aim to have 1/2 of your plate filled with vegetables( all vegetables except: corn, peas, potatoes), 1/4 lean protein and 1/4 carbohydrate containing foods: rice, pasta, bread, potatoes.   Add in a serving of fruit between meals: a piece of fruit the size of a tennis ball or 1 cup cut up melon or berries or 1/2 arslan or 1/2 banana.   As able, resume exercise aiming for 30 minutes, two to three times weekly.

## 2025-06-29 ENCOUNTER — HEALTH MAINTENANCE LETTER (OUTPATIENT)
Age: 45
End: 2025-06-29

## 2025-07-03 ENCOUNTER — RADIOLOGY INJECTION OFFICE VISIT (OUTPATIENT)
Dept: PALLIATIVE MEDICINE | Facility: OTHER | Age: 45
End: 2025-07-03
Attending: STUDENT IN AN ORGANIZED HEALTH CARE EDUCATION/TRAINING PROGRAM
Payer: COMMERCIAL

## 2025-07-03 VITALS
SYSTOLIC BLOOD PRESSURE: 117 MMHG | OXYGEN SATURATION: 98 % | HEART RATE: 80 BPM | BODY MASS INDEX: 40.54 KG/M2 | DIASTOLIC BLOOD PRESSURE: 71 MMHG | WEIGHT: 289 LBS

## 2025-07-03 DIAGNOSIS — M47.817 SPONDYLOSIS OF LUMBOSACRAL REGION WITHOUT MYELOPATHY OR RADICULOPATHY: ICD-10-CM

## 2025-07-03 DIAGNOSIS — M47.817 LUMBOSACRAL SPONDYLOSIS WITHOUT MYELOPATHY: ICD-10-CM

## 2025-07-03 PROCEDURE — 250N000011 HC RX IP 250 OP 636: Performed by: STUDENT IN AN ORGANIZED HEALTH CARE EDUCATION/TRAINING PROGRAM

## 2025-07-03 PROCEDURE — 64493 INJ PARAVERT F JNT L/S 1 LEV: CPT | Mod: RT | Performed by: STUDENT IN AN ORGANIZED HEALTH CARE EDUCATION/TRAINING PROGRAM

## 2025-07-03 PROCEDURE — 255N000002 HC RX 255 OP 636: Performed by: STUDENT IN AN ORGANIZED HEALTH CARE EDUCATION/TRAINING PROGRAM

## 2025-07-03 RX ORDER — ROPIVACAINE HYDROCHLORIDE 5 MG/ML
1.5 INJECTION, SOLUTION EPIDURAL; INFILTRATION; PERINEURAL ONCE
Status: COMPLETED | OUTPATIENT
Start: 2025-07-03 | End: 2025-07-03

## 2025-07-03 RX ADMIN — IOHEXOL 1 ML: 180 INJECTION INTRAVENOUS at 12:33

## 2025-07-03 RX ADMIN — ROPIVACAINE HYDROCHLORIDE 1.5 ML: 5 INJECTION EPIDURAL; INFILTRATION; PERINEURAL at 12:45

## 2025-07-03 ASSESSMENT — PAIN SCALES - GENERAL
PAINLEVEL_OUTOF10: SEVERE PAIN (9)
PAINLEVEL_OUTOF10: MODERATE PAIN (5)

## 2025-07-03 NOTE — PATIENT INSTRUCTIONS
[facility] [place]  Medial Branch Block Discharge Instructions      Your procedure was performed by:  Dr. Avalos    Medications used include:  Lidocaine  Ropivicaine  Omnipaque      You will need to complete the Pain Scale Log form and return it to us as soon as possible.  Once we have received the form, we will review it and call you to determine the next steps.     The form can be faxed to, upload to my chart  or mailed to: -  [facility]    [address], [address]     [place] [address]    You may resume your regular medications  You may resume your regular activities  Be cautious with walking as numbness and/or weakness in the lower extremities may occur for up to 6-8 hours due to the effects of the anesthetic.  Avoid driving for 6 hours. The local anesthetic could slow your reflexes.   You may shower, however no swimming or tub baths or hot tubs for 24 hours following your procedure.  Your pain will return after the numbing medications have worn off.  You may use your current pain medications as needed.  Unless you have been directed to avoid the use of anti-inflammatory medications (NSAIDS), you may use medications such as ibuprofen, Aleve or Tylenol for pain control if needed.  Some people find it helpful to alternate ibuprofen and Tylenol every 3 hours for a couple of days.  You may use ice packs 10-15 minutes three to four times a day at the injection site for comfort.   Do not use heat to painful areas for 6 to 8 hours. This will give the local anesthetic time to wear off and prevent you from accidentally burning your skin.   If you experience any of the following, call the Pain Clinic at [phone]:  -Fever over 100 degree F  -Swelling, bleeding, redness, drainage, warmth at the injection site  -Progressive weakness or numbness in your legs or arms  -If lumbar, call if you have a loss of bowel or bladder function  -If cervical, call if you  have any unusual headache that is not relieved by Tylenol  -Unusual new onset of pain that is not improving-

## 2025-07-03 NOTE — NURSING NOTE
Discharge Information    IV Discontiued Time:  NA    Amount of Fluid Infused:  NA    Discharge Criteria = When patient returns to baseline or as per MD order    Consciousness:  Pt is fully awake    Circulation:  BP +/- 20% of pre-procedure level    Respiration:  Patient is able to breathe deeply    O2 Sat:  Patient is able to maintain O2 Sat >92% on room air    Activity:  Moves 4 extremities on command    Ambulation:  Patient is able to stand and walk or stand and pivot into wheelchair    Dressing:  Clean/dry or No Dressing    Notes:   Discharge instructions and AVS given to patient    Patient meets criteria for discharge?  YES    Admitted to PCU?  No    Responsible adult present to accompany patient home?  Yes    Signature/Title:    BRYNN PEDRAZA, RN  RN Care Coordinator  [facility]

## 2025-07-03 NOTE — PROGRESS NOTES
Pre procedure Diagnosis: Lumbar Spondylosis  Post procedure Diagnosis: Same  Procedure performed: right L3-5 diagnostic medial branch blocks #1, CPT code 10387 and 70819   Anesthesia: none  Complications: none immediate  Operators: Beto Avalos MD    Indications:   Daron Bond is a 45 year old male was sent by myself for right L3-5 medial branch blocks.    Options/alternatives, benefits and risks were discussed with the patient including bleeding, infection, tissue trauma, exposure to radiation, reaction to medications, spinal cord injury, weakness, numbness and paralysis.  Questions were answered to his satisfaction and he agrees to proceed. Voluntary informed consent was obtained and signed.     Vitals were reviewed: Yes  Allergies were reviewed:  Yes   Medications were reviewed:  Yes   Pre-procedure pain score: 9/10    Procedure:  After getting informed consent, patient was brought into the procedure suite and was placed in a prone position on the procedure table.   A procedural pause was performed.  Patient was prepped and draped in sterile fashion.     Under AP fluoroscopic guidance the L4 and L5 vertebral bodies and sacral ala were identified. The C-arm was rotated to the oblique view to afford optimal visualization the pedicles.  Lidocaine 1% was used to anesthetize the skin at each level.  Under intermittent fluoroscopy, 25G 4.69inch Quincke spinal needles were positioned inferior and lateral to the intersection of the transverse process and pedicle at the level of the right L4-5 vertebral bodies, as well as the corresponding sacral alar notch. The needle positions were verified and optimized from the AP view.    The anatomic targets for the L3 & L4 medial branch nerve and L5 dorsal ramus (which functionally incorporates the medial branch) were the  L4 & L5 transverse processes and sacral alar notch, with laterality as described above, resulting in blockade of the L4/5 and L5/S1 facet  joints.    0.2ml Omnipaque-180 was injected at each site, and appropriate spread of contrast was noted without vascular uptake.  A total of 0.6ml of Omnipaque was used. Ropivacaine 0.5% 0.5 ml was injected at each location.  The needles were removed.      Hemostasis was achieved, the area was cleaned, and bandaids were placed when appropriate.    The patient tolerated the procedure well, and was taken to the recovery room.    Images were saved to PACS.    The patient will continue to monitor progress, and they were given a pain diary to complete at home.  They will either fax or mail this back to us or bring it to their next appointment. We will determine the treatment plan after we review the diary.      Post-procedure pain score: 5/10  Follow-up includes:   -will await diary for further planning.    Beto Avalos MD  Interventional Pain Medicine  [facility]

## 2025-07-07 ENCOUNTER — TELEPHONE (OUTPATIENT)
Dept: PALLIATIVE MEDICINE | Facility: OTHER | Age: 45
End: 2025-07-07

## 2025-07-11 ENCOUNTER — RESULTS FOLLOW-UP (OUTPATIENT)
Dept: INTERNAL MEDICINE | Facility: CLINIC | Age: 45
End: 2025-07-11

## 2025-07-11 ENCOUNTER — OFFICE VISIT (OUTPATIENT)
Dept: INTERNAL MEDICINE | Facility: CLINIC | Age: 45
End: 2025-07-11
Payer: COMMERCIAL

## 2025-07-11 VITALS
RESPIRATION RATE: 20 BRPM | DIASTOLIC BLOOD PRESSURE: 76 MMHG | HEART RATE: 82 BPM | OXYGEN SATURATION: 97 % | WEIGHT: 285.5 LBS | SYSTOLIC BLOOD PRESSURE: 111 MMHG | TEMPERATURE: 97.7 F | BODY MASS INDEX: 39.97 KG/M2 | HEIGHT: 71 IN

## 2025-07-11 DIAGNOSIS — K60.2 RECTAL FISSURE: ICD-10-CM

## 2025-07-11 DIAGNOSIS — E66.01 OBESITY, MORBID (H): ICD-10-CM

## 2025-07-11 DIAGNOSIS — F31.9 BIPOLAR I DISORDER (H): ICD-10-CM

## 2025-07-11 DIAGNOSIS — F41.1 GAD (GENERALIZED ANXIETY DISORDER): ICD-10-CM

## 2025-07-11 DIAGNOSIS — I10 PRIMARY HYPERTENSION: ICD-10-CM

## 2025-07-11 DIAGNOSIS — K59.09 CHRONIC CONSTIPATION: ICD-10-CM

## 2025-07-11 DIAGNOSIS — J45.20 MILD INTERMITTENT ASTHMA WITHOUT COMPLICATION: ICD-10-CM

## 2025-07-11 DIAGNOSIS — Z72.0 TOBACCO ABUSE: ICD-10-CM

## 2025-07-11 DIAGNOSIS — Z76.89 ENCOUNTER TO ESTABLISH CARE WITH NEW DOCTOR: ICD-10-CM

## 2025-07-11 DIAGNOSIS — E11.9 TYPE 2 DIABETES MELLITUS WITHOUT COMPLICATION, WITHOUT LONG-TERM CURRENT USE OF INSULIN (H): Primary | ICD-10-CM

## 2025-07-11 PROBLEM — F11.20 CONTINUOUS OPIOID DEPENDENCE (H): Chronic | Status: ACTIVE | Noted: 2024-09-30

## 2025-07-11 PROBLEM — M43.16 SPONDYLOLISTHESIS OF LUMBAR REGION: Status: RESOLVED | Noted: 2020-01-22 | Resolved: 2025-07-11

## 2025-07-11 PROBLEM — J45.40 ASTHMA, MODERATE PERSISTENT: Chronic | Status: ACTIVE | Noted: 2018-05-02

## 2025-07-11 PROBLEM — K64.9 HEMORRHOIDS: Status: ACTIVE | Noted: 2025-01-22

## 2025-07-11 LAB
ALBUMIN SERPL BCG-MCNC: 4.2 G/DL (ref 3.5–5.2)
ALP SERPL-CCNC: 89 U/L (ref 40–150)
ALT SERPL W P-5'-P-CCNC: 9 U/L (ref 0–70)
ANION GAP SERPL CALCULATED.3IONS-SCNC: 9 MMOL/L (ref 7–15)
AST SERPL W P-5'-P-CCNC: 26 U/L (ref 0–45)
BILIRUB SERPL-MCNC: 0.4 MG/DL
BUN SERPL-MCNC: 19.2 MG/DL (ref 6–20)
CALCIUM SERPL-MCNC: 9.4 MG/DL (ref 8.8–10.4)
CHLORIDE SERPL-SCNC: 103 MMOL/L (ref 98–107)
CHOLEST SERPL-MCNC: 150 MG/DL
CREAT SERPL-MCNC: 1 MG/DL (ref 0.67–1.17)
CREAT UR-MCNC: 123 MG/DL
EGFRCR SERPLBLD CKD-EPI 2021: >90 ML/MIN/1.73M2
EST. AVERAGE GLUCOSE BLD GHB EST-MCNC: 103 MG/DL
FASTING STATUS PATIENT QL REPORTED: NO
FASTING STATUS PATIENT QL REPORTED: NO
GLUCOSE SERPL-MCNC: 85 MG/DL (ref 70–99)
HBA1C MFR BLD: 5.2 % (ref 0–5.6)
HCO3 SERPL-SCNC: 27 MMOL/L (ref 22–29)
HDLC SERPL-MCNC: 45 MG/DL
LDLC SERPL CALC-MCNC: 93 MG/DL
LITHIUM SERPL-SCNC: <0.1 MMOL/L (ref 0.6–1.2)
MAGNESIUM SERPL-MCNC: 2.2 MG/DL (ref 1.7–2.3)
MICROALBUMIN UR-MCNC: 13.2 MG/L
MICROALBUMIN/CREAT UR: 10.73 MG/G CR (ref 0–17)
NONHDLC SERPL-MCNC: 105 MG/DL
POTASSIUM SERPL-SCNC: 4.1 MMOL/L (ref 3.4–5.3)
PROT SERPL-MCNC: 7.2 G/DL (ref 6.4–8.3)
SODIUM SERPL-SCNC: 139 MMOL/L (ref 135–145)
TRIGL SERPL-MCNC: 62 MG/DL

## 2025-07-11 PROCEDURE — 3044F HG A1C LEVEL LT 7.0%: CPT | Performed by: INTERNAL MEDICINE

## 2025-07-11 PROCEDURE — 36415 COLL VENOUS BLD VENIPUNCTURE: CPT | Performed by: INTERNAL MEDICINE

## 2025-07-11 PROCEDURE — 3074F SYST BP LT 130 MM HG: CPT | Performed by: INTERNAL MEDICINE

## 2025-07-11 PROCEDURE — 83036 HEMOGLOBIN GLYCOSYLATED A1C: CPT | Performed by: INTERNAL MEDICINE

## 2025-07-11 PROCEDURE — G2211 COMPLEX E/M VISIT ADD ON: HCPCS | Performed by: INTERNAL MEDICINE

## 2025-07-11 PROCEDURE — 80053 COMPREHEN METABOLIC PANEL: CPT | Performed by: INTERNAL MEDICINE

## 2025-07-11 PROCEDURE — 82043 UR ALBUMIN QUANTITATIVE: CPT | Performed by: INTERNAL MEDICINE

## 2025-07-11 PROCEDURE — 3048F LDL-C <100 MG/DL: CPT | Performed by: INTERNAL MEDICINE

## 2025-07-11 PROCEDURE — 99215 OFFICE O/P EST HI 40 MIN: CPT | Performed by: INTERNAL MEDICINE

## 2025-07-11 PROCEDURE — 80061 LIPID PANEL: CPT | Performed by: INTERNAL MEDICINE

## 2025-07-11 PROCEDURE — 99417 PROLNG OP E/M EACH 15 MIN: CPT | Performed by: INTERNAL MEDICINE

## 2025-07-11 PROCEDURE — 96127 BRIEF EMOTIONAL/BEHAV ASSMT: CPT | Performed by: INTERNAL MEDICINE

## 2025-07-11 PROCEDURE — 83735 ASSAY OF MAGNESIUM: CPT | Performed by: INTERNAL MEDICINE

## 2025-07-11 PROCEDURE — 82570 ASSAY OF URINE CREATININE: CPT | Performed by: INTERNAL MEDICINE

## 2025-07-11 PROCEDURE — 3078F DIAST BP <80 MM HG: CPT | Performed by: INTERNAL MEDICINE

## 2025-07-11 PROCEDURE — 80178 ASSAY OF LITHIUM: CPT | Performed by: INTERNAL MEDICINE

## 2025-07-11 RX ORDER — NITROGLYCERIN 4 MG/G
OINTMENT RECTAL
COMMUNITY
Start: 2025-07-01

## 2025-07-11 ASSESSMENT — PATIENT HEALTH QUESTIONNAIRE - PHQ9
SUM OF ALL RESPONSES TO PHQ QUESTIONS 1-9: 19
SUM OF ALL RESPONSES TO PHQ QUESTIONS 1-9: 19
10. IF YOU CHECKED OFF ANY PROBLEMS, HOW DIFFICULT HAVE THESE PROBLEMS MADE IT FOR YOU TO DO YOUR WORK, TAKE CARE OF THINGS AT HOME, OR GET ALONG WITH OTHER PEOPLE: EXTREMELY DIFFICULT

## 2025-07-11 NOTE — PROGRESS NOTES
Depression Screening Follow Up    Follow Up Actions Taken  Referred patient back to mental health provider      Wt Readings from Last 20 Encounters:   07/11/25 129.5 kg (285 lb 8 oz)   07/03/25 131.1 kg (289 lb)   06/11/25 129.4 kg (285 lb 4.8 oz)   05/08/25 130.2 kg (287 lb)   04/09/25 128.8 kg (284 lb)   03/26/25 128.4 kg (283 lb)   02/28/25 132.2 kg (291 lb 8 oz)   01/17/25 135.3 kg (298 lb 4.8 oz)   12/27/24 135.2 kg (298 lb 1.6 oz)   12/23/24 135.7 kg (299 lb 3.2 oz)   12/20/24 135.9 kg (299 lb 9.6 oz)   10/18/24 141.1 kg (311 lb)   10/10/24 142 kg (313 lb)   09/30/24 142 kg (313 lb)   07/08/24 (!) 151.5 kg (334 lb)   06/27/24 (!) 156.9 kg (346 lb)   06/14/24 (!) 157.4 kg (346 lb 14.4 oz)   06/04/24 (!) 158.2 kg (348 lb 12.8 oz)   05/13/24 (!) 160.8 kg (354 lb 9.6 oz)   03/08/24 (!) 164.2 kg (362 lb)     BP Readings from Last 20 Encounters:   07/11/25 111/76   07/03/25 117/71   06/11/25 131/74   05/22/25 130/82   04/23/25 121/74   04/09/25 120/85   03/26/25 127/85   02/28/25 126/74   02/10/25 121/75   01/21/25 135/74   01/17/25 132/72   12/27/24 135/77   12/23/24 115/81   12/20/24 (!) 144/90   10/18/24 126/78   10/10/24 122/77   10/10/24 133/88   10/10/24 (!) 146/85   09/30/24 135/88   08/06/24 122/69      Pulse Readings from Last 20 Encounters:   07/11/25 82   07/03/25 80   06/11/25 87   05/22/25 85   04/23/25 76   04/09/25 80   03/26/25 78   02/10/25 98   01/21/25 96   12/27/24 72   12/23/24 99   12/20/24 91   10/11/24 86   10/10/24 92   10/10/24 79   09/30/24 82   08/06/24 87   07/08/24 90   06/27/24 75   06/04/24 95     SpO2 Readings from Last 20 Encounters:   07/11/25 97%   07/03/25 98%   06/11/25 96%   05/22/25 100%   04/23/25 98%   04/09/25 97%   03/26/25 99%   02/10/25 98%   01/21/25 97%   12/27/24 98%   12/23/24 95%   12/20/24 96%   10/11/24 99%   10/10/24 95%   10/10/24 98%   09/30/24 99%   08/06/24 95%   07/08/24 97%   06/27/24 98%   06/04/24 97%

## 2025-07-11 NOTE — PROGRESS NOTES
January 21, 2025      COMPREHENSIVE PAIN CLINIC INITIAL EVALUATION    I had the pleasure of meeting Mr. Daron Bond on 1/14/2025 in the [facility] in consult for Dr. Mata Hearn, PCP with regards to his pain.  The patient is a 44 year old male with past medical history of degeneration of intervertebral disc of lumbar region, discogenic back pain, spinal stenosis with neurogenic claudication, Bipolar 1, OCD, anxiety/depression/trauma, seizures, HTN, DMII, h/o TIB, MI x 1, CVA x 2, myofacial pain, morbidly obese, chronic intractable pain,  who presents for evaluation of chronic pain.  UDS and opioid agreement completed on    .    History of of chronic pain on initial exam 1/21/2025                               Subjective:  January 14, 2025 cxl'd appt due to lack of transportation.    Patient endorses chronic pain in low back  R>L that started approximately 2016 due to a fall on ice taking out the trash.    Patient had 1 spine surgery 11/5/2019 by Dr. Hector Maurice and the nerve pain in R) leg improved slightly. He has more back pain 70% vs R) leg pain 30%.    He was contemplating a disk replacement but surgery was deferred due to his weight.  He is planning on further weight loss and make an appt with Dr. Maurice for the disc replacement surgery.  He also needs to stop smoking.  10/10/2024 Dr. Avalos - R) TF L5-S1 THOMAS and he can't remember how much benefit he got from the injection because he went to the ER the same day for an abscess which was very  painful as well.  He is willing to repeat the injection.    The patient describes the pain as constant, sharp and shooting in R) leg, aching to sharp in R) low back.  Patient has numbness and tingling in R) leg from buttock to toes.  Patient reports weakness in R) leg.  He reports that the pain is made worse by bending, lifting.  His pain is improved with chiro care, gym workouts.   He rates his currenty pain score at 5/10, but it can be as low as 3/10  or as severe as 10/10.  Physical therapy was completed a couple years ago in [place], [place].     Patient endorses anxiety and depression.  Patient does follow with a mental health care provider q 2 wks.  Patient exercises by going to the gym and yoga.    He has a service dog.      Progress Notes Reviewed:  2024 Romina Gomespranav, [facility] - Ozempic 2mg and Metformin 1000mg  2024 Dr. Fernando Cervantes, [facility] - colonoscopy  2025 Dr. Mata Hearn, PCP -  2024 Nola Zavaleta PA-C, [facility] - pre op  2024 Dr. Juan A Barnes  2024 Dr. Mata Hearn, Family Medicine  10/14/2024 ED - abscess on upper medial leg  10/10/2024 ED - hyperglycemia and perirectal abscess  10/10/2024 Dr. Avalos - R) TF L5-S1  THOMAS  10/02/2024 Be[facility] Viera Patient Outreach  2024 Dr. Mata Hearn, PCP virtual visit  2024 Ramona Roper, [facility] - weight loss, ozempic  2024 Dr. Elijah Mcdermott, Podiatry - L) heel injection and orthotics recommended  2023 Dr. Mata Hearn, PCP - oxycodone 5mg TID  2023 Dr. Hector Maurice, Ortho Spine Surgeon - recommendation R) TF L5 LESI Rayus      He denies any new problems with falls or balance, any new numbness or weakness of the arms or legs, any new bowel or bladder incontinence, any night sweats or unexplained fevers, or any sudden or unexpected weight loss.  He denies saddle anesthesia.     Daron Bond has not been seen at a pain clinic in the past.        Current Treatments:  Oxycodone 5mg usually once at night max BID  Gabapentin 300mg 1 tab TID - for last 2 years, denies negative SE  Hydroxyzine 25mg 1/2 to 1/4 tab PRN  Medical cannabis   last month P#[id number], [phone], [email or web address]  Buspar 15mg TID  Ice packs once a week  Heated blanket nightly    Anticoagulation:  none  Implanted Devices:     Previous Medication Treatments Included:  Anti-convulsants: never tried lyrica  Muscle relaxors: never  tried duloxetine  Anti-depressants: no  Benzodiazapine's: no  Acetaminophen/NSAIDs: causes GI bleed  Topicals: icy hot, lidocaine patches, cream,   Opioids: oxycodone 5mg for the last 3 years per PCP      Other Treatments Have Included:  Physical therapy:   Pain Psychology:   Chiropractic: no  Acupuncture: no  TENs Unit: no  Injections: 10/10/2024 Dr. Avalos - R) TF L5-S1 THOMAS, R) TF L5 LESI Rayus, R) knee 3 surgeries  Surgeries: no  Dry Needling: no  Massage:no    Implantable devices:  none      Past Medical History:  Medical history reviewed.  Past Medical History:   Diagnosis Date    Arthritis     Asthma     Atypical chest pain     Bipolar disorder (H)     TYPE I    Cerebellar tonsillar ectopia (H)     Cerebral infarction (H) 2022    Chronic pain syndrome     Continuous opioid dependence (H)     Degeneration of lumbar intervertebral disc     Depressive disorder     Diabetes mellitus, type 2 (H)     TYRELL (generalized anxiety disorder)     Heart disease     Hypertension     Morbid obesity (H)     Myofascial pain syndrome     OCD (obsessive compulsive disorder)     Old myocardial infarction 2021    Spinal stenosis, lumbar     Uncomplicated asthma       Patient Active Problem List   Diagnosis    Cerebellar tonsillar ectopia (H)    Asthma, moderate persistent    Degeneration of intervertebral disc of lumbar region    OCD (obsessive compulsive disorder)    Myofascial pain syndrome    Obesity, morbid (H)    Chronic pain syndrome    Bipolar I disorder (H)    Spondylolisthesis of lumbar region    Spinal stenosis of lumbar region    TYRELL (generalized anxiety disorder)    Primary hypertension    Atypical chest pain    Diabetes mellitus, type 2 (H)    Continuous opioid dependence (H)         Past Surgical History:  Pertinent surgical history reviewed.  Past Surgical History:   Procedure Laterality Date    ARTHROSCOPY KNEE Right     no date given    BACK SURGERY  2019    C CORTISONE INJECTION  06/08/2016    COLONOSCOPY   08/04/2015    Internal/external hemorrhoids.  Repeat at age 50    COLONOSCOPY N/A 12/27/2024    Procedure: COLONOSCOPY;  Surgeon: Fernando Cervantes MD;  [place] [facility] [place]    ENT SURGERY  2008    Had my tonsils out.    ESOPHAGOGASTRODUODENOSCOPY W/ BANDING  08/04/2015    EYE SURGERY      x 3 no dates given    HEMORRHOIDECTOMY  09/08/2015    PSYCH SVC, INTENSIVE OUTPT  2002          Medications: Pertinent medications reviewed.  Current Outpatient Medications   Medication Sig Dispense Refill    albuterol (PROAIR HFA/PROVENTIL HFA/VENTOLIN HFA) 108 (90 Base) MCG/ACT inhaler INHALE 2 PUFFS BY MOUTH EVERY 6 HOURS 8.5 g 4    amLODIPine (NORVASC) 5 MG tablet TAKE 1 TABLET BY MOUTH ONE TIME DAILY 90 tablet 1    blood glucose (NO BRAND SPECIFIED) test strip Use to test blood sugar 3 times daily or as directed. To accompany: Blood Glucose Monitor Brands: per insurance. 100 strip 6    blood glucose (ONETOUCH VERIO IQ) test strip Use to test blood sugar five times daily or as directed. 300 strip 3    blood glucose monitoring (NO BRAND SPECIFIED) meter device kit Use to test blood sugar 1 times daily or as directed. Preferred blood glucose meter OR supplies to accompany: Blood Glucose Monitor Brands: per insurance. 1 kit 0    busPIRone (BUSPAR) 15 MG tablet Take 1 tablet (15 mg) by mouth 3 times daily. 90 tablet 5    Continuous Glucose Sensor (FREESTYLE ANNE MARIE 3 SENSOR) MISC 1 each continuously. 2 each 11    Cyanocobalamin (B-12) 1000 MCG SUBL Take once weekly. 50 tablet 1    gabapentin (NEURONTIN) 300 MG capsule Take 1 capsule (300 mg) by mouth 3 times daily. 90 capsule 2    hydrochlorothiazide (MICROZIDE) 12.5 MG capsule Take 1 capsule (12.5 mg) by mouth every morning 90 capsule 3    hydrOXYzine HCl (ATARAX) 25 MG tablet Take 1 tablet (25 mg) by mouth 3 times daily as needed for anxiety. 30 tablet 1    ketoconazole (NIZORAL) 2 % external shampoo Apply topically daily as needed for itching or irritation. 100 mL 1     losartan (COZAAR) 100 MG tablet Take 1 tablet (100 mg) by mouth daily. 90 tablet 2    magnesium citrate 1.745 GM/30ML solution 2 teaspoons(10ml) nightly before bed. 296 mL 0    metFORMIN (GLUCOPHAGE XR) 500 MG 24 hr tablet Take 2 tablets (1,000 mg) by mouth 2 times daily (with meals). 120 tablet 5    metoprolol succinate ER (TOPROL XL) 100 MG 24 hr tablet Take 1 tablet (100 mg) by mouth daily 30 tablet 5    ondansetron (ZOFRAN ODT) 4 MG ODT tab Take 1 tablet (4 mg) by mouth every 8 hours as needed for nausea. 10 tablet 1    oxyCODONE (ROXICODONE) 5 MG tablet Take 1 tablet (5 mg) by mouth 3 times daily as needed for severe pain. 90 tablet 0    pantoprazole (PROTONIX) 40 MG EC tablet Take 1 tablet (40 mg) by mouth daily. 90 tablet 2    Semaglutide, 2 MG/DOSE, (OZEMPIC) 8 MG/3ML pen Inject 2 mg subcutaneously every 7 days. 9 mL 3    thin (NO BRAND SPECIFIED) lancets Use with lanceting device. To accompany: Blood Glucose Monitor Brands: per insurance. 100 each 6    Vitamin D3 (CHOLECALCIFEROL) 25 mcg (1000 units) tablet Take 1 tablet (25 mcg) by mouth daily. 90 tablet 2     [place] Prescription Monitoring Program reviewed 1/13/2025.  No concern for abuse or misuse of controlled medications based on this report.  12/30/2024 gabapentin 300mg 90 tabs for 30 days  12/01/2024 gabapentin 300mg 90 tabs for 30 days  11/05/2024 gabapentin 300mg 90 tabs for 30 days  09/19/2024 Bupre-Nalox 2-0.5mg films 30 films for 30 days per Timbo Moya  09/10/2024 Oxycodone 5mg 90 tabs for 30 days    Allergies: Pertinent allergies reviewed.     Allergies   Allergen Reactions    Banana     Bee Venom     Latex        Family History:   family history includes Coronary Artery Disease in his father; Depression in his mother; Diabetes in his mother; Diabetes Type 2  in his mother; Heart Disease in his father; Hyperlipidemia in his father; Hypertension in his father; Mental Illness in his father; Other Cancer in his mother; Substance Abuse in his  father.    Social History:   He is  and lives in an apartment with his children.  He is on SSD.  He is independent in ADL's. His children help with chores around the house. He continues to smoke cigarettes.  He  reports that he has been smoking cigarettes. He has a 25 pack-year smoking history. He has been exposed to tobacco smoke. He has never used smokeless tobacco. He reports current drug use. Drugs: Marijuana and Oxycodone. He reports that he does not drink alcohol.  Social History     Social History Narrative    Not on file         Review of Systems:      (Positive responses bolded)  GENERAL: fever/chills, fatigue, general unwell feeling, weight gain/loss  HEAD/EYES:  headache, dizziness, or vision changes  EARS/NOSE/THROAT: nosebleeds, hearing loss, sinus infection, earache, tinnitus  IMMUNE:  allergies, cancer, immune deficiency, or infections  SKIN:  itching, rash, hives  HEME/Lymphatic: anemia, easy bruising, easy bleeding  RESPIRATORY: cough, wheezing, or shortness of breath  CARDIOVASCULAR/Circulation: extremity edema, syncope, hypertension, tachycardia, or angina  GASTROINTESTINAL: abdominal pain, nausea/emesis, diarrhea, constipation, hematochezia, or melena  ENDOCRINE:  diabetes, steroid use, thyroid disease or osteoporosis  MUSCULOSKELETAL: myalgias, joint pain, stiffness, neck pain, back pain, arthritis, or gout  GENITOURINARY: frequency, urgency, dysuria, difficulty voiding, hematuria or incontinence  NEUROLOGIC: weakness, numbness, paresthesias, seizure, tremor, stroke or memory loss  PSYCHIATRIC: depression, anxiety, stress, suicidal thoughts/attempts or mood swings    Limited exam due to his transportation came early  Physical Exam:  /74   Pulse 96   SpO2 97%   Constitutional: He is oriented to person, place, and time.  He is morbidly obese. He is not in acute distress.   HENT:     Head: Normocephalic and atraumatic.     Eyes: Pupils are equal, round, and reactive to light. EOM  are normal. No scleral icterus.   Pulmonary/Chest:  NWOB. No respiratory distress.   Neurological: He is alert and oriented to person, place, and time. Coordination grossly normal.    Skin: Skin is warm and dry. He is not diaphoretic.   Psychiatric: He has a normal mood and affect. His behavior is normal. Judgment and thought content normal.  Patient answers questions appropriately.  MSK: Gait is normal.        DIRE Score for ongoing opioid management is calculated as follows:    Diagnosis = 2    Intractability = 2    Risk: Psych = 1  Chem Hlth = 3  Reliability = 2  Social = 1    Efficacy = 2    Total DIRE Score = 13 (14 or higher predicts good candidate for ongoing opioid management; 13 or lower predicts poor candidate for opioid management)         Imaging:  XR LUMBAR SPINE G/E 4 VIEWS  Order: [id number]  Impression      Nomenclature is based on five lumbar-type vertebral bodies.    Vertebral body heights are unremarkable.    Moderate L4-L5 and L5-S1 interbody degenerative change. There is vacuum phenomenon of the L4-L5 and L5-S1 levels, which is demonstrated predominantly on flexion and extension radiographs.      In the neutral position, alignment is unremarkable. In the extended position, L4-L5 retrolisthesis measures 2 mm. In the flexed position, L4-L5 retrolisthesis also measures 2 mm.    Pedicles appear symmetric.    The imaged portion of the sacrum appears grossly intact. However, it is partially obscured by stool and bowel gas.        EMG:  na      Diagnosis:  (M54.16) Lumbar radiculopathy  (primary encounter diagnosis)  Comment:   Plan:     (M51.360) Degeneration of intervertebral disc of lumbar region with discogenic back pain  Comment:   Plan: PAIN INJECTION EVAL/TREAT/FOLLOW UP            (M48.062) Spinal stenosis of lumbar region with neurogenic claudication  Comment:   Plan: PAIN INJECTION EVAL/TREAT/FOLLOW UP            (F41.9,  F32.A) Anxiety and depression  Comment:   Plan: PAIN PHD  EVAL/TREAT/FOLLOW UP            (F41.0) Panic disorder without agoraphobia  Comment:   Plan: PAIN PHD EVAL/TREAT/FOLLOW UP            (G89.29) Chronic intractable pain  Comment:   Plan: PAIN PHD EVAL/TREAT/FOLLOW UP            (F11.90) Chronic, continuous use of opioids  Comment:   Plan:     (Z79.899) Medical cannabis use  Comment:   Plan:           Plan on initial consult on 1/13/2025:  A multimodal plan was developed today to treat your pain.  Multimodal analgesia is a strategy that reduces reliance on opioids through the use of non-opioid analgesics and therapies that have different mechanisms of action.      Diagnostics:   Reviewed lumbar MRI 11/2023.        Medications:  Discussed increasing gabapentin by 1 tab every 5 days until taking 2 tabs three times daily.  Continue all other medications.    Recertified for medical cannabis today.    The following OTC pain medications may be helpful, use as directed: Voltaren Gel 1%, CBD products, Arnica products, Capsaicin products, Australian Dream Cream, Epson It, Lidocaine Patch, Solanpas, Biofreeze, Aspercream, Tiger Balm and Shiva Emu cream.  Apply heat or cold PRN.      Therapies:  Discussed Pain Psychology - Will refer today.  Psychological treatments are also important part of pain management.  Understanding and managing the thoughts, emotions and behaviors that accompany the discomfort can help you cope more effectively with your pain and can actually reduce the intensity of your pain.      PHYSICAL THERAPY - He has been referred to PT in [place].  Continue to do exercises learned at PT on a daily basis.  Discussed the importance of core strengthening, ROM, stretching exercises with the patient and how each of these entities is important in decreasing pain.  Explained to the patient that the purpose of physical therapy is to teach the patient a home exercise program.  These exercises need to be performed every day in order to decrease pain and prevent future  occurrences of pain.        Discussed Grounding Mat - handout provided  https://www.youRENTISHube.com/watch?v=[email or web address]    Discussed Frequency Specific Microcurrent - handout provided  Treatment for Neuropathic Pain.   [facility] [phone]  [facility] [phone]  [facility] [phone]  [facility] [phone]  May be able to be billed as a chiropractic service depending on your insurance coverage.     Discussed Acupuncture.    He has aTENs unit.      Interventions:  R) TF LESI L4-5 and L5-S1 with Dr. Avalos.      Follow up:   He has been referred to PT in [place].    He will continue Chiropractic Care      Return to clinic 2 wks after R) TF LESI L4-5 and L5-S1 with Dr. Avalos.        ILIANA Willis, BILLP  [facility]        BILLING TIME DOCUMENTATION:   The total TIME spent on this patient on the date of the encounter/appointment was 88 minutes.          itted on 1/13/2025)  TYRELL 7 TOTAL SCORE: Incomplete  Answers submitted by the patient for this visit:  Patient Health Questionnaire (G7) (Submitted on 1/14/2025)  TYRELL 7 TOTAL SCORE: Incomplete     Yes

## 2025-07-12 NOTE — PATIENT INSTRUCTIONS
Future Appointments   Date Time Provider Department Center   7/14/2025 12:00 PM [facility]   7/15/2025  1:00 PM Chelsey Becerra, [facility]   7/22/2025  2:00 PM [facility]   8/6/2025  2:30 PM [facility]   8/11/2025 12:00 PM [facility]   8/18/2025 10:30 AM Vel Pérez, MAN [facility]   8/19/2025  1:00 PM [facility]   9/23/2025  1:00 PM Romina Boyle RD [facility]   11/6/2025  1:00 PM [facility]iner [facility]   1/12/2026 11:30 AM Neal Weinberg [facility]

## 2025-07-14 ENCOUNTER — VIRTUAL VISIT (OUTPATIENT)
Dept: PSYCHOLOGY | Facility: CLINIC | Age: 45
End: 2025-07-14
Payer: COMMERCIAL

## 2025-07-14 DIAGNOSIS — F34.1 PERSISTENT DEPRESSIVE DISORDER: ICD-10-CM

## 2025-07-14 DIAGNOSIS — F41.0 GENERALIZED ANXIETY DISORDER WITH PANIC ATTACKS: ICD-10-CM

## 2025-07-14 DIAGNOSIS — F43.10 POSTTRAUMATIC STRESS DISORDER: Primary | ICD-10-CM

## 2025-07-14 DIAGNOSIS — F41.1 GENERALIZED ANXIETY DISORDER WITH PANIC ATTACKS: ICD-10-CM

## 2025-07-14 PROCEDURE — 90791 PSYCH DIAGNOSTIC EVALUATION: CPT | Mod: 95

## 2025-07-14 ASSESSMENT — ANXIETY QUESTIONNAIRES
1. FEELING NERVOUS, ANXIOUS, OR ON EDGE: NEARLY EVERY DAY
7. FEELING AFRAID AS IF SOMETHING AWFUL MIGHT HAPPEN: NEARLY EVERY DAY
5. BEING SO RESTLESS THAT IT IS HARD TO SIT STILL: NEARLY EVERY DAY
IF YOU CHECKED OFF ANY PROBLEMS ON THIS QUESTIONNAIRE, HOW DIFFICULT HAVE THESE PROBLEMS MADE IT FOR YOU TO DO YOUR WORK, TAKE CARE OF THINGS AT HOME, OR GET ALONG WITH OTHER PEOPLE: EXTREMELY DIFFICULT
GAD7 TOTAL SCORE: 21
GAD7 TOTAL SCORE: 21
3. WORRYING TOO MUCH ABOUT DIFFERENT THINGS: NEARLY EVERY DAY
6. BECOMING EASILY ANNOYED OR IRRITABLE: NEARLY EVERY DAY
2. NOT BEING ABLE TO STOP OR CONTROL WORRYING: NEARLY EVERY DAY

## 2025-07-14 ASSESSMENT — PATIENT HEALTH QUESTIONNAIRE - PHQ9
SUM OF ALL RESPONSES TO PHQ QUESTIONS 1-9: 21
5. POOR APPETITE OR OVEREATING: NEARLY EVERY DAY
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
## 2025-07-14 NOTE — PROGRESS NOTES
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 not, as indicated either by subjective account or observation by others, for at least 2 years. Note: In children and adolescents, mood can be irritable and duration must be at least 1 year.   B. Presence, while depressed, of two (or more) of the following:        - insomnia or hypersomnia       - low energy or fatigue        - low self-esteem        - poor concentration or difficulty making decisions        - feelings of hopelessness   C. During the 2-year period (1 year for children or adolescents) of the disturbance, the person has never been without the symptoms in Criteria A and B for more than 2 months at a time.  D. Criteria for a major depressive disorder may be continously present for 2 years  E. There has never been a Manic Episode, a Mixed Episode, or a Hypomanic Episode, and criteria have never been met for Cyclothymic Disorder.   F. The disturbance is not better explained by a persistent schizoaffective disorder, schizophrenia, delusional disorder, or other specified or unspecified schizophrenia spectrum and other psychotic disorder  G. The symptoms are not attributable to the physiological effects of a substance (e.g., a drug of abuse, a medication) or another medical condition (e.g., hypothyroidism).   H. The  symptoms cause clinically significant distress or impairment in social, occupational, or other important areas of functioning.        - Early Onset: onset is before age 21 years  Post- Traumatic Stress Disorder  A. The person has been exposed to a traumatic event in which both of the following were present:     (1) the person experienced, witnessed, or was confronted with an event or events that involved actual or threatened death or serious injury, or a threat to the physical integrity of self or others     (2) the person's response involved intense fear, helplessness, or horror. Note: In children, this may be expressed instead by disorganized or agitated behavior  B. The traumatic event is persistently reexperienced in one (or more) of the following ways:     - Recurrent and intrusive distressing recollections of the event, including images, thoughts, or perceptions. Note: In young children, repetitive play may occur in which themes or aspects of the trauma are expressed.      - Recurrent distressing dreams of the event. Note: In children, there may be frightening dreams without recognizable content.      - Acting or feeling as if the traumatic event were recurring (includes a sense of reliving the experience, illusions, hallucinations, and dissociative flashback episodes, including those that occur on awakening or when intoxicated). Note: In young children, trauma-specific reenactment may occur.      - Intense psychological distress at exposure to internal or external cues that symbolize or resemble an aspect of the traumatic event.      - Physiological reactivity on exposure to internal or external cues that symbolize or resemble an aspect of the traumatic event.   C. Persistent avoidance of stimuli associated with the trauma and numbing of general responsiveness (not present before the trauma), as indicated by three (or more) of the following:     - Efforts to avoid thoughts, feelings, or conversations  associated with the trauma.      - Efforts to avoid activities, places, or people that arouse recollections of the trauma.      - Inability to recall an important aspect of the trauma.      - Markedly diminished interest or participation in significant activities.      - Feeling of detachment or estrangement from others.      - Sense of a foreshortened future (e.g., does not expect to have a career, marriage, children, or a normal life span).   D. Persistent symptoms of increased arousal (not present before the trauma), as indicated by two (or more) of the following:     - Difficulty falling or staying asleep.      - Irritability or outbursts of anger.      - Difficulty concentrating.      - Hypervigilance.      - Exaggerated startle response.   E. Duration of the disturbance is more than 1 month.  F. The disturbance causes clinically significant distress or impairment in social, occupational, or other important areas of functioning.    - The aformentioned symptoms began 30 year(s) ago and occurs 7 days per week and is experienced as severe.    Functional Status:  Patient reports the following functional impairments:  chronic disease management, health maintenance, money management, and relationship(s).     Nonprogrammatic care:  Patient is requesting basic services to address current mental health concerns.    Clinical Summary:  1. Psychosocial Factors:  Medical complexities, Trauma history, Relationship concerns, Interpersonal concerns, Financial strain.  Cultural and Contextual Factors:  Individual Factors chronic health conditions, Family Factors toxic relationship with his brother, and Societal Factors on disability.   2. Principal DSM5 Diagnoses  (Sustained by DSM5 Criteria Listed Above):   300.4 (F34.1) Persistent Depressive Disorder, With persistent major depressive episode and Moderate  300.02 (F41.1) Generalized Anxiety Disorder  309.81 (F43.10) Posttraumatic Stress Disorder (includes Posttraumatic Stress  Disorder for Children 6 Years and Younger)  With dissociative symptoms.  3. Other Diagnoses that is relevant to services:  N/A.  4. Provisional Diagnosis:  N/A.  5. Prognosis: Unknown.  6. Likely consequences of symptoms if not treated: May need a higher level of care.  7. Patient strengths include:  caring, creative, empathetic, has a previous history of therapy, insightful, intelligent, motivated, open to learning, open to suggestions / feedback, responsible parent, wants to learn, willing to ask questions, and willing to relate to others .     Recommendations:     1. Plan for Safety and Risk Management:   Safety and Risk: A safety and risk management plan has been developed including: Patient consented to co-developed safety plan.  Safety and risk management plan was completed - see below.  Patient agreed to use safety plan should any safety concerns arise.  A copy was given to the patient.        Report to child / adult protection services was NA.     2. Patient's identified mental health, physical health, and substance use concerns with a cultural influence will be addressed by individual therapy.     3. Initial Treatment will focus on:    Depressed Mood - Reduce depression symptoms in the next three months.  Anxiety - Reduce anxious symptoms in the next three months..      4. Resources/Service Plan:    services are not indicated.   Modifications to assist communication are not indicated.   Additional disability accommodations are not indicated.      5. Collaboration:   Collaboration / coordination of treatment will be initiated with the following  support professionals: N/A.      6.  Referrals:   The following referral(s) will be initiated: N/A.       A Release of Information has been obtained for the following: N/A.     Clinical Substantiation/medical necessity for the above recommendations:  N/A.    7. ROSAURA:    ROSAURA:  Discussed the general effects of drugs and alcohol on health and well-being.  Provider gave patient printed information about the  effects of chemical use on their health and well being. Recommendations:  Discuss substance use primary care provider .     8. Records:   These were reviewed at time of assessment.   Information in this assessment was obtained from the medical record and  provided by patient who is a good historian.    Patient will have open access to their mental health medical record.    9.   Interactive Complexity: No    10. Safety Plan:   Win[facility]red Safety Plan      Creation Date: 7/14/25 Last Update Date: 7/14/25      Step 1: Warning signs:    Warning Signs    Having conflicts with others, when I feel really overwhelmed      Step 2: Internal coping strategies - Things I can do to take my mind off my problems without contacting another person:    Strategies    video games and food      Step 3: People and social settings that provide distraction:    Name Contact Information    Spending time with my kids          Step 4: People whom I can ask for help during a crisis:   No contacts identified     Step 5: Professionals or agencies I can contact during a crisis:    Clinician/Agency Name Phone Emergency Contact    Deangelo Combs [facility] [phone]       [facility] Emergency Department Emergency Department Address Emergency Department Phone    [address], [address] [phone]      Suicide Prevention Lifeline Phone: Call or Text 678  Crisis Text Line: Text HOME to 324864     Step 6: Making the environment safer (plan for lethal means safety):   Did not identify any lethal methods     Optional: What is most important to me and worth living for?:   My children and my pets     Winifred Safety Plan. Shannon Silverio and Mata Schmidt. Used with permission of the authors.          Provider Name/ Credentials:  Deangelo Combs [facility]   July 14, 2025

## 2025-07-15 ENCOUNTER — VIRTUAL VISIT (OUTPATIENT)
Dept: PHARMACY | Facility: CLINIC | Age: 45
End: 2025-07-15
Attending: STUDENT IN AN ORGANIZED HEALTH CARE EDUCATION/TRAINING PROGRAM
Payer: COMMERCIAL

## 2025-07-15 DIAGNOSIS — F31.9 BIPOLAR I DISORDER (H): Primary | ICD-10-CM

## 2025-07-15 DIAGNOSIS — F41.1 GAD (GENERALIZED ANXIETY DISORDER): ICD-10-CM

## 2025-07-15 DIAGNOSIS — E11.9 DIABETES MELLITUS, TYPE 2 (H): Chronic | ICD-10-CM

## 2025-07-15 DIAGNOSIS — F43.10 PTSD (POST-TRAUMATIC STRESS DISORDER): ICD-10-CM

## 2025-07-15 DIAGNOSIS — I10 PRIMARY HYPERTENSION: Chronic | ICD-10-CM

## 2025-07-15 DIAGNOSIS — G89.4 CHRONIC PAIN SYNDROME: ICD-10-CM

## 2025-07-15 PROCEDURE — 99605 MTMS BY PHARM NP 15 MIN: CPT | Mod: 93 | Performed by: PHARMACIST

## 2025-07-15 RX ORDER — BUPRENORPHINE AND NALOXONE 2; .5 MG/1; MG/1
1 FILM, SOLUBLE BUCCAL; SUBLINGUAL 2 TIMES DAILY
COMMUNITY

## 2025-07-15 NOTE — PROGRESS NOTES
Medication Therapy Management (MTM) Encounter    ASSESSMENT:                              Mental Health   Bipolar I, Anxiety, PTSD, Panic:   Patient had a lot going on during the time of our call - interupted by maintenance at his home; children, and animals in the background, but declined rescheduling visit. Did provide some education on lithium (drug interactions, side effects, monitoring). He may consider starting it.     Hypertension   Stable.    Pain   Follow-up with pain provider to claify how you should be taking suboxone and ensure the pharmacy has an up to date prescription on file.     Diabetes  Stable.      PLAN:                            Psychiatry MTM follow-up visit scheduled 8/4/25.   If you are comfortable, ok to start lithium as previously directed by Reg Pérez    Follow-up:   Appointments in Next Year      Jul 23, 2025 2:00 PM  (Arrive by 1:45 PM)  Adult Psychotherapy with Deangelo Combs [facility] [place] ([facility]) [phone]     Aug 04, 2025 2:00 PM  MTM New with Chelsey Becerra [facility] ([facility] ) [phone]     Aug 06, 2025 2:30 PM  (Arrive by 2:15 PM)  Return Weight Management Visit with Ricardo Steiner MD  [facility] [place] ([facility]) [phone]     Aug 18, 2025 10:30 AM  [facility] PSYCHIATRY RETURN with Vel Pérez PA-C  [facility] [place] ([facility]) [phone]     Aug 19, 2025 1:00 PM  (Arrive by 12:40 PM)  Provider Visit with Brian Klein MD  [facility] ([facility]) [phone]     Sep 23, 2025 1:00 PM  (Arrive by 12:55 PM)  Diabetes ED Short with Romina Boyle RD  [facility]  Clinic [facility] ([facility]) [phone]     Nov 06, 2025 1:00 PM  (Arrive by 12:45 PM)  Return Weight Management Visit with Ricardo Steiner MD  [facility] [place] ([facility]) [phone]     Jan 12, 2026 11:30 AM  (Arrive by 11:15 AM)  New Neurology with Neal Weinberg MD  [facility] [place] ([facility]) [phone]            SUBJECTIVE/OBJECTIVE:                          Mack Bond is a 45 year old male seen for an initial visit. He was referred to me from psychiatry.    Reason for visit: Pt is hesitant with starting lithium as he experienced akathisisa on abilify. Hx of bipolar disorder and anxiety, and TBI    Allergies/ADRs: Reviewed in chart  Past Medical History: Reviewed in chart  Tobacco: He reports that he has been smoking cigarettes. He has a 25 pack-year smoking history. He has been exposed to tobacco smoke. He has never used smokeless tobacco.Nicotine/Tobacco Cessation Plan  Nicotine patch - unclear if using  Alcohol: Reviewed in chart      Mental Health   Bipolar I, Anxiety, PTSD, Panic  Buspar 15mg three times daily    Lithium - hasn't started yet    Today, patient reports:   - lots of stressors; appointments; pain, issues at home, health issues  - hasn't wanted to start lithium due to concern it will make things worse  - is willing to hear more about lithium    Per psychiatrist notes:   Treatment Course and [place] Events since  JULY 2023 4/14/25: established care. Started Abilify 10mg.  5/20/25: discontinued abilify due to akathisia   6/3/25: started lithium ER 300mg BID.   6/23/25: placed referral for [facility] pharmacy.        Hypertension   Amlodipine 5mg daily  Hydrochlorothiazide 12.5mg daily  Losartan 100mg daily    Patient reports no current medication side effects  Patient reports blood pressure was elevated in the past; but has now  been well-controlled.          Pain   Suboxone 2-0.5mg  - 1 film daily; patient reports he is prescribed 1 film twice daily   Gabapentin 300mg in the morning, 300mg in the afternoon, and 600mg nightly     Seen at [facility] who prescribes suboxone - has appt with this provider tomorrow.   Reports was having issues with getting suboxone prescription filled; states he was told to taking 1 film twice daily but has only been taking 1/2 film twice daily due to almost running out.            Diabetes   Ozempic 2mg weekly  Metformin - no longer taking - has been able stop due improved A1C  Has lost about 75lbs since starting ozempic  Patient is not experiencing side effects.  Current diabetes symptoms: none  Diet/Exercise: reports eating less rice/potatoes  Was exercising, but anal fissure is currently preventing him from doing so       Eye exam is up to date  Foot exam: due    *unable to review all conditions today due to time and multiple interruptions during call.        ----------------      I spent 60 minutes with this patient today.     A summary of these recommendations was sent via [employer].    Chelsey Becerra, PharmD, BCPP  Medication Therapy Management Pharmacist  [facility]      Telemedicine Visit Details  The patient's medications can be safely assessed via a telemedicine encounter.  Type of service:  Telephone visit  Originating Location (pt. Location): Home    Distant Location (provider location):  Off-site  Start Time: 1p  End Time: 205p     Medication Therapy Recommendations  No medication therapy recommendations to display

## 2025-07-15 NOTE — LETTER
July 15, 2025  Daron Bond  [address]   [place] [address]    Dear Mr. Bond, [facility]ENT[facility] HE[facility]     Thank you for talking with me on Jul 15, 2025 about your health and medications. As a follow-up to our conversation, I have included two documents:      Your Recommended To-Do List has steps you should take to get the best results from your medications.  Your Medication List will help you keep track of your medications and how to take them.    If you want to talk about these documents, please call Chelsey Becerra RPH at phone: [phone], Monday-Friday 8-4:30pm.    I look forward to working with you and your doctors to make sure your medications work well for you.    Sincerely,  Chelsey Becerra RPH  [facility] Pharmacist, [facility]

## 2025-07-15 NOTE — PATIENT INSTRUCTIONS
"Recommendations from today's MTM visit:                                                      Psychiatry MTM follow-up visit scheduled 8/4/25.   If you are comfortable, ok to start lithium as previously directed by Reg Pérez    It was great speaking with you today.  I value your experience and would be very thankful for your time in providing feedback in our clinic survey. In the next few days, you may receive an email or text message from [facility] [employer] with a link to a survey related to your  clinical pharmacist.\"     To schedule another MTM appointment, please call the clinic directly or you may call the MTM scheduling line at [phone] or toll-free at [phone].     My Clinical Pharmacist's contact information:                                                      Please feel free to contact me with any questions or concerns you have.      Chelsey Becerra, PharmD, BCPP  Medication Therapy Management Pharmacist  [facility]    "

## 2025-07-15 NOTE — LETTER
_  Medication List        Prepared on: Jul 15, 2025     Bring your Medication List when you go to the doctor, hospital, or   emergency room. And, share it with your family or caregivers.     Note any changes to how you take your medications.  Cross out medications when you no longer use them.    Medication How I take it Why I use it Prescriber   albuterol (PROAIR HFA/PROVENTIL HFA/VENTOLIN HFA) 108 (90 Base) MCG/ACT inhaler INHALE 2 PUFFS BY MOUTH EVERY 6 HOURS Moderate persistent asthma, unspecified whether complicated Mata Hearn MD   amLODIPine (NORVASC) 5 MG tablet TAKE 1 TABLET BY MOUTH ONE TIME DAILY Primary Hypertension Mata Hearn MD   blood glucose (NO BRAND SPECIFIED) test strip Use to test blood sugar 3 times daily or as directed. To accompany: Blood Glucose Monitor Brands: per insurance. Type 2 diabetes mellitus without complication, without long-term current use of insulin (H) Mata Hearn MD   blood glucose (ONETOUCH VERIO IQ) test strip Use to test blood sugar five times daily or as directed. Type 2 diabetes mellitus without complication, without long-term current use of insulin (H) Mata Hearn MD   blood glucose monitoring (NO BRAND SPECIFIED) meter device kit Use to test blood sugar 1 times daily or as directed. Preferred blood glucose meter OR supplies to accompany: Blood Glucose Monitor Brands: per insurance. Type 2 diabetes mellitus without complication, without long-term current use of insulin (H) Juan A Barnes MD   buprenorphine HCl-naloxone HCl (SUBOXONE) 2-0.5 MG per film Place 1 Film under the tongue 2 times daily.  Pain ILIANA Ledbetter CNP   busPIRone (BUSPAR) 15 MG tablet Take 1 tablet (15 mg) by mouth 3 times daily. Anxiety Mata Hearn MD   Cyanocobalamin (B-12) 1000 MCG SUBL Take once weekly. Type 2 Diabetes, HbA1c Goal < 7% (H) Ricardo Steiner MD   diclofenac (VOLTAREN) 1 % topical gel Apply 2 g topically 4 times daily. Myofascial Pain Syndrome  Flo Mullins PA-C   gabapentin (NEURONTIN) 300 MG capsule Take 300 mg in the am and afternoon with 600 mg in the PM. If no improvement in 1 week in pain or anxiety, may continue to increase gabapentin weekly to a maximum of 600 mg tid. TYRELL (Generalized Anxiety Disorder); Chronic Pain Syndrome Andrew Ward PA-C   hydrochlorothiazide (MICROZIDE) 12.5 MG capsule Take 1 capsule (12.5 mg) by mouth every morning. Obesity, Morbid (H); Primary Hypertension; Atypical Chest Pain; Chronic Pain Syndrome Mata Hearn MD   hydrOXYzine HCl (ATARAX) 25 MG tablet Take 1 tablet (25 mg) by mouth 3 times daily as needed for anxiety. Anxiety Mata Hearn MD   ketoconazole (NIZORAL) 2 % external shampoo Apply topically daily as needed for itching or irritation. Seborrheic Dermatitis of Scalp Mata Hearn MD   lidocaine (XYLOCAINE) 5 % external ointment Apply topically as needed for moderate pain. Anal Fissure Andrew Ward PA-C   lithium ER (LITHOBID) 300 MG CR tablet Take 1 tablet (300 mg) by mouth 2 times daily. Bipolar I disorder (H) Vel Pérez PA-C   losartan (COZAAR) 100 MG tablet Take 1 tablet (100 mg) by mouth daily. Primary Hypertension Mata Hearn MD   magnesium citrate 1.745 GM/30ML solution 2 teaspoons(10ml) nightly before bed. Drug induced constipation Ricardo Steiner MD   nicotine (COMMIT) 2 MG lozenge Place 1 lozenge (2 mg) inside cheek every hour as needed for nicotine withdrawal symptoms. Cigarette nicotine dependence with other nicotine-induced disorder Vel Pérez PA-C   nicotine (NICODERM CQ) 14 MG/24HR 24 hr patch Place 1 patch over 24 hours onto the skin every 24 hours. Cigarette nicotine dependence with other nicotine-induced disorder Vel Pérez PA-C   nicotine (NICODERM CQ) 21 MG/24HR 24 hr patch Place 1 patch over 24 hours onto the skin every 24 hours. Cigarette nicotine dependence with other nicotine-induced disorder Vel Pérez PA-C   nicotine  (NICODERM CQ) 7 MG/24HR 24 hr patch Place 1 patch over 24 hours onto the skin every 24 hours. Cigarette nicotine dependence with other nicotine-induced disorder Vel Pérez PA-C   nitroGLYcerin (RECTIV) 0.4 % OINT rectal ointment  Apply as needed Anal fissure Mata Hearn MD   ondansetron (ZOFRAN ODT) 4 MG ODT tab Take 1 tablet (4 mg) by mouth every 8 hours as needed for nausea. Nausea Mata Heanr MD   pantoprazole (PROTONIX) 40 MG EC tablet Take 1 tablet (40 mg) by mouth daily. Gastroesophageal reflux disease with esophagitis, unspecified whether hemorrhage Mata Hearn MD   polyethylene glycol (MIRALAX) 17 GM/Dose powder Take 17 g (1 Capful) by mouth daily.  constipation Andrew Ward PA-C   Semaglutide, 2 MG/DOSE, (OZEMPIC) 8 MG/3ML pen Inject 2 mg subcutaneously every 7 days. Type 2 Diabetes, HbA1c Goal < 7% (H) Ricardo Steiner MD   thin (NO BRAND SPECIFIED) lancets Use with lanceting device. To accompany: Blood Glucose Monitor Brands: per insurance. Type 2 diabetes mellitus without complication, without long-term current use of insulin (H) Juan A Barnes MD   UNABLE TO FIND Nifedipine 0.2% and Lidocaine 5% RECTAL GEL (ML) - Place a pea-sized amount on finger in place on anterior anus 3 times a day x 2 months  Anal fissure Mata Hearn MD   Vitamin D3 (CHOLECALCIFEROL) 25 mcg (1000 units) tablet Take 1 tablet (25 mcg) by mouth daily. Class 3 severe obesity due to excess calories with serious comorbidity and body mass index (BMI) of 45.0 to 49.9 in adult (H) Mata Hearn MD         Add new medications, over-the-counter drugs, herbals, vitamins, or  minerals in the blank rows below.    Medication How I take it Why I use it Prescriber                                      Allergies:      - Latex - Anaphylaxis, Hives  - Abilify [aripiprazole] - Anxiety  - Banana  - Bee Venom  - Sodium Hypochlorite        Side effects I have had:      Not on File        Other Information:               My notes and questions:

## 2025-07-15 NOTE — LETTER
"Recommended To-Do List      Prepared on: Jul 15, 2025       You can get the best results from your medications by completing the items on this \"To-Do List.\"      Bring your To-Do List when you go to your doctor. And, share it with your family or caregivers.    My To-Do List:  What we talked about: What I should do:    What my medicines are for, how to know if my medicines are working, made sure my medicines are safe for me and reviewed how to take my medicines.      Take my medicines every day               "

## 2025-07-23 ENCOUNTER — VIRTUAL VISIT (OUTPATIENT)
Dept: PSYCHOLOGY | Facility: CLINIC | Age: 45
End: 2025-07-23
Payer: COMMERCIAL

## 2025-07-23 DIAGNOSIS — F41.1 GAD (GENERALIZED ANXIETY DISORDER): ICD-10-CM

## 2025-07-23 DIAGNOSIS — F41.0 GENERALIZED ANXIETY DISORDER WITH PANIC ATTACKS: Primary | ICD-10-CM

## 2025-07-23 DIAGNOSIS — F41.1 GENERALIZED ANXIETY DISORDER WITH PANIC ATTACKS: Primary | ICD-10-CM

## 2025-07-23 DIAGNOSIS — F34.1 PERSISTENT DEPRESSIVE DISORDER: ICD-10-CM

## 2025-07-23 DIAGNOSIS — F43.10 POSTTRAUMATIC STRESS DISORDER: ICD-10-CM

## 2025-07-23 PROCEDURE — 90837 PSYTX W PT 60 MINUTES: CPT | Mod: 95

## 2025-07-23 NOTE — PROGRESS NOTES
[facility]                                     Progress Note    Patient Name: Daron Bond  Date: 2025          Service Type: Individual      Session Start Time: 14:00  Session End Time: 15:00     Session Length: 60 minutes    Session #: 1    Attendees: Client attended alone    Service Modality:  Video Visit:      Provider verified identity through the following two step process.  Patient provided:  Patient  and Patient is known previously to provider    Telemedicine Visit: The patient's condition can be safely assessed and treated via synchronous audio and visual telemedicine encounter.      Reason for Telemedicine Visit: Patient convenience (e.g. access to timely appointments / distance to available provider)    Originating Site (Patient Location): Patient's home    Distant Site (Provider Location): Provider Remote Setting- Home Office    Consent:  The patient/guardian has verbally consented to: the potential risks and benefits of telemedicine (video visit) versus in person care; bill my insurance or make self-payment for services provided; and responsibility for payment of non-covered services.     Patient would like the video invitation sent by:  My Chart    Mode of Communication:  [facility]    Distant Location (Provider):  Off-site    As the provider I attest to compliance with applicable laws and regulations related to telemedicine.    DATA  Extended Session (53+ minutes):   - High distress and under complex circumstances.  See Data section for details  Interactive Complexity: No  Crisis: No        Progress Since Last Session (Related to Symptoms / Goals / Homework):   Symptoms: No change      Homework: Did not complete      Episode of Care Goals: No improvement - PRECONTEMPLATION (Not seeing need for change); Intervened by educating the patient about the effects of current behavior on health.  Evoked information about reasons to continue behavior,  express concern / recommendations, and explored any change talk     Current / Ongoing Stressors and Concerns:  Writer and patient processed patient's concerns about his circumstances. Patient was able to identify that he is frustrated, anxious, and disappointed that he is having so many challenges and not having anyone helping him. Writer and patient also processed patient's concerns about feeling overwhelmed. Patient was able to identify that he is frustrated, anxious, and disappointed that he doesn't have anyone who can help him when he needs support with his children. Writer and patient also processed patient's concerns about his brother. Patient was able to identify that he is angry and frustrated that he isn't able to hold his brother for all of what his brother has done.      Treatment Objective(s) Addressed in This Session:   Patient will use cognitive strategies identified in therapy to challenge anxious thoughts, Decrease frequency and intensity of feeling down, depressed, hopeless, learn 3 affect regulation skills and practice daily over next 3 months       Intervention:  Motivational Interviewing: Writer utilized active listen, reflective listening, empathic understanding, open ended questions and invitations, clarification & summarization, self-exploration encouragement, validation & affirmation, and non-directive guidance as the patient processed patient's concerns.  Psychodynamic: Writer utilized interpretation with patient. Writer provided insights and analysis into the unconscious patterns, defense mechanisms, and relational dynamics that the patient was displaying and reporting.  Solution Focused: Writer and patient also engaged in problem solving therapy allowing the patient to break down patient's concerns into manageable steps regarding patient's responsibilities, developing coping skills, and relationships.    Assessments completed prior to visit:  The following assessments were completed by  patient for this visit:  PHQ9:       4/9/2025     1:49 PM 4/14/2025     1:04 PM 4/17/2025    12:22 PM 6/27/2025     9:57 AM 7/11/2025    11:54 AM 7/13/2025     4:06 PM 7/14/2025     3:28 PM   PHQ-9 SCORE   PHQ-9 Total Score MyChart 20 (Severe depression) 23 (Severe depression) 21 (Severe depression) 17 (Moderately severe depression) 19 (Moderately severe depression) 20 (Severe depression)    PHQ-9 Total Score 20  23  21  17  19  20  21       Patient-reported    Proxy-reported     GAD7:       10/25/2024     2:21 PM 1/13/2025    11:12 AM 4/9/2025     1:49 PM 5/5/2025     1:55 PM 6/23/2025    10:41 AM 7/13/2025     4:06 PM 7/14/2025     3:28 PM   TYRELL-7 SCORE   Total Score 18 (severe anxiety) Incomplete 15 (severe anxiety) 16 (severe anxiety) 19 (severe anxiety) 18 (severe anxiety)    Total Score 18   15  16  19  18  21       Patient-reported    Proxy-reported     CAGE-AID:       4/17/2025    12:41 PM 7/14/2025     3:31 PM   CAGE-AID Total Score   Total Score 0  0   Total Score MyChart 0 (A total score of 2 or greater is considered clinically significant)        Patient-reported     PROMIS 10-Global Health (all questions and answers displayed):       6/10/2024    12:02 PM 10/13/2024     5:14 PM 4/14/2025     1:07 PM 4/17/2025    12:40 PM 7/14/2025     3:28 PM   PROMIS 10   In general, would you say your health is: Poor Poor Poor Poor    In general, would you say your quality of life is: Poor Poor Poor Poor    In general, how would you rate your physical health? Poor Poor Poor Poor    In general, how would you rate your mental health, including your mood and your ability to think? Poor Poor Poor Fair    In general, how would you rate your satisfaction with your social activities and relationships? Poor Poor Poor Poor    In general, please rate how well you carry out your usual social activities and roles Poor Poor Poor Poor    To what extent are you able to carry out your everyday physical activities such as walking,  climbing stairs, carrying groceries, or moving a chair? A little A little A little A little    In the past 7 days, how often have you been bothered by emotional problems such as feeling anxious, depressed, or irritable? Always Always Always Always    In the past 7 days, how would you rate your fatigue on average? Very severe Very severe Severe Severe    In the past 7 days, how would you rate your pain on average, where 0 means no pain, and 10 means worst imaginable pain? 8 6 7 7    In general, would you say your health is: 1 1 1  1 1   In general, would you say your quality of life is: 1 1 1  1 1   In general, how would you rate your physical health? 1 1 1  1 1   In general, how would you rate your mental health, including your mood and your ability to think? 1 1 1  2 1   In general, how would you rate your satisfaction with your social activities and relationships? 1 1 1  1 1   In general, please rate how well you carry out your usual social activities and roles. (This includes activities at home, at work and in your community, and responsibilities as a parent, child, spouse, employee, friend, etc.) 1 1 1  1 1   To what extent are you able to carry out your everyday physical activities such as walking, climbing stairs, carrying groceries, or moving a chair? 2 2 2  2 2   In the past 7 days, how often have you been bothered by emotional problems such as feeling anxious, depressed, or irritable? 5 5 5  5 5   In the past 7 days, how would you rate your fatigue on average? 5 5 4  4 4   In the past 7 days, how would you rate your pain on average, where 0 means no pain, and 10 means worst imaginable pain? 8 6 7  7 7   Global Mental Health Score 4 4 4  5  4   Global Physical Health Score 6 7 7  7  7   PROMIS TOTAL - SUBSCORES 10 11 11  12  11       Patient-reported    Proxy-reported     [place] Suicide Severity Rating Scale (Lifetime/Recent)      1/26/2021     3:00 PM 7/8/2024     7:32 PM 8/6/2024     5:51 PM 10/10/2024      9:22 PM 7/14/2025     3:34 PM   [place] Suicide Severity Rating (Lifetime/Recent)   Q1 Wish to be Dead (Lifetime) No        Q2 Non-Specific Active Suicidal Thoughts (Lifetime) No        Q1 Wished to be Dead (Past Month)  0-->no 0-->no 0-->no    Q2 Suicidal Thoughts (Past Month)  0-->no 0-->no 0-->no    Q6 Suicide Behavior (Lifetime)  0-->no 0-->no 0-->no    Level of Risk per Screen  no risks indicated  no risks indicated  no risks indicated     RETIRED: 1. Wish to be Dead (Recent) No        RETIRED: 2. Non-Specific Active Suicidal Thoughts (Recent) No        3. Active Suicidal Ideation with any Methods (Not Plan) Without Intent to Act (Lifetime) No        RETIRED: 3. Active Suicidal Ideation with any Methods (Not Plan) Without Intent to Act (Recent) No        RETIRE: 4. Active Suicidal Ideation with Some Intent to Act, Without Specific Plan (Lifetime) No        4. Active Suicidal Ideation with Some Intent to Act, Without Specific Plan (Recent) No        RETIRE: 5. Active Suicidal Ideation with Specific Plan and Intent (Lifetime) No        RETIRED: 5. Active Suicidal Ideation with Specific Plan and Intent (Recent) No        1. Wish to be Dead (Lifetime)     Y   Wish to be Dead Description (Lifetime)     Passivel thoughts due to life circumstances and health,   1. Wish to be Dead (Past 1 Month)     Y   Wish to be Dead Description (Past 1 Month)     Passivel thoughts due to life circumstances and health,   2. Non-Specific Active Suicidal Thoughts (Lifetime)     N   Controllability (Lifetime)     1   Controllability (Past 1 Month)     1   Deterrents (Lifetime)     1   Deterrents (Past 1 Month)     1   Reasons for Ideation (Lifetime)     4   Reasons for Ideation (Past 1 Month)     0   Actual Attempt (Lifetime)     N   Has subject engaged in non-suicidal self-injurious behavior? (Lifetime)     N   Interrupted Attempts (Lifetime)     N   Aborted or Self-Interrupted Attempt (Lifetime)     N   Preparatory Acts or  Behavior (Lifetime)     N   Calculated C-SSRS Risk Score (Lifetime/Recent)     Low Risk       Data saved with a previous flowsheet row definition         ASSESSMENT: Current Emotional / Mental Status (status of significant symptoms):   Risk status (Self / Other harm or suicidal ideation)   Patient denies current fears or concerns for personal safety.   Patient denies current or recent suicidal ideation or behaviors.   Patient denies current or recent homicidal ideation or behaviors.   Patient denies current or recent self injurious behavior or ideation.   Patient denies other safety concerns.   Patient reports there has been no change in risk factors since their last session.     Patient reports there has been no change in protective factors since their last session.     A safety and risk management plan has been developed including: Patient consented to co-developed safety plan on 7/14/2025.  Safety and risk management plan was reviewed.   Patient agreed to use safety plan should any safety concerns arise.  A copy was made available to the patient.     Appearance:   Appropriate    Eye Contact:   Good    Psychomotor Behavior: Normal    Attitude:   Cooperative    Orientation:   All   Speech    Rate / Production: Normal/ Responsive    Volume:  Normal    Mood:    Angry  Anxious  Depressed  Dysphoric   Affect:    Appropriate    Thought Content:  Clear    Thought Form:  Coherent  Logical    Insight:    Good      Medication Review:   No changes to current psychiatric medication(s)     Medication Compliance:   Yes     Changes in Health Issues:   None reported     Chemical Use Review:   Substance Use: Chemical use reviewed, no active concerns identified      Tobacco Use: No current tobacco use.      Diagnosis:  1. Generalized anxiety disorder with panic attacks    2. Posttraumatic stress disorder    3. Persistent depressive disorder    4. TYRELL (generalized anxiety disorder)        Collateral Reports Completed:   Not  Applicable    PLAN: (Patient Tasks / Therapist Tasks / Other)  Patient will use cognitive strategies identified in therapy to challenge anxious thoughts, Decrease frequency and intensity of feeling down, depressed, hopeless, learn 3 affect regulation skills and practice daily over next 3 months        Deangelo Combs, [facility]                                                         ______________________________________________________________________    Individual Treatment Plan    Patient's Name: Daron Bond  YOB: 1980    Date of Creation: July 23, 2025   Date Treatment Plan Last Reviewed/Revised: July 23, 2025     DSM5 Diagnoses: 300.4 (F34.1) Persistent Depressive Disorder, Early onset, With persistent major depressive episode, and Moderate, 300.02 (F41.1) Generalized Anxiety Disorder, or 309.81 (F43.10) Posttraumatic Stress Disorder (includes Posttraumatic Stress Disorder for Children 6 Years and Younger)  With dissociative symptoms  Psychosocial / Contextual Factors: Individual Factors chronic health conditions, Family Factors toxic relationship with his brother, and Societal Factors on disability.   PROMIS (reviewed every 90 days):   PROMIS TOTAL - SUBSCORES     11       Referral / Collaboration:  Referral to another professional/service is not indicated at this time..    Anticipated number of session for this episode of care: 9-12 sessions  Anticipation frequency of session: Weekly  Anticipated Duration of each session: 53 or more minutes  Treatment plan will be reviewed in 90 days or when goals have been changed.       MeasurableTreatment Goal(s) related to diagnosis / functional impairment(s)  Goal 1: Patient will Reduce overall frequency, intensity, and duration of the symptoms of depression so that daily functioning will improve in the next three months measured in a reduction of the PHQ-9 score from 21 to 11.    I will know I've met my goal when I don't feel like depressed like this  anymore.      Objective #A (Patient Action)    Patient will Decrease frequency and intensity of feeling down, depressed, hopeless.  Status: New - Date: July 23, 2025      Intervention(s)  Therapist will teach the client how to perform a behavioral chain analysis.  .    Objective #B  Patient will Identify negative self-talk and behaviors: challenge core beliefs, myths, and actions.  Status: New - Date: July 23, 2025      Intervention(s)  Therapist will teach Opposite Action strategy.    Objective #C  Patient will identify   strategies for improving mood.  Status: New - Date: July 23, 2025      Intervention(s)  Therapist will assign homework to log daily activities and mood using log resources provided.      Goal 2: Patient will Reduce overall frequency, intensity, and duration of the symptoms of anxiety so that daily functioning will improve in the next three months measured in a reduction of the TYRELL-7 score from 21 to 11.    I will know I've met my goal when my emotions don't take control over me.      Objective #A (Patient Action)    Status: New - Date: July 23, 2025      Patient will use cognitive strategies identified in therapy to challenge anxious thoughts.    Intervention(s)  Therapist will teach distress tolerance skills.    Objective #B  Patient will use thought-stopping strategy daily to reduce intrusive thoughts.    Status: New - Date: July 23, 2025      Intervention(s)  Therapist will teach distress tolerance skills.    Objective #C  Patient will identify three distraction and diversion activities and use those activities to decrease level of anxiety  .  Status: New - Date: July 23, 2025      Intervention(s)  Therapist will teach CBT strategies.      Goal 3: Patiient will Reduce the negative that impact trauma related symptoms have on his social, occupational, and family functioning in the next three months measured by patient's report on symptoms and social, occupational, and family functioning .     I  will know I've met my goal when the trauma I experienced no longer impact me.      Objective #A (Patient Action)    Status: New - Date: July 23, 2025      Patient will learn 3 affect regulation skills and practice daily over next 3 months.    Intervention(s)  Therapist will provide psychoeducation around PTSD symptomology and/or the effects of traumatic stress  provide psychoeducation around trauma and memory.    Objective #B  Patient will reduce symptoms of hyperarousal including irritability, anger, sleep disturbance, and exaggerated startle response to 15% of the time .    Status: New - Date: July 23, 2025      Intervention(s)  Therapist will assign homework at the end of each session.    Objective #C  Patient will reduce nightmares to 15% of days over next 3 months .  Status: New - Date: July 23, 2025      Intervention(s)  Therapist will teach CBT strategies.      Patient has reviewed and agreed to the above plan.      Deangelo Combs, [facility]  July 23, 2025

## 2025-07-28 ENCOUNTER — PATIENT OUTREACH (OUTPATIENT)
Dept: CARE COORDINATION | Facility: CLINIC | Age: 45
End: 2025-07-28
Payer: COMMERCIAL

## 2025-07-28 NOTE — PROGRESS NOTES
Clinic Care Coordination Contact  [facility]    Clinical Data: Care Coordinator Outreach    Outreach Documentation Number of Outreach Attempt   6/23/2025   9:13 AM 1   7/28/2025   4:18 PM 2       Unable to leave a message due to: Busy signal.      Plan: Care Coordinator will continue outreach attempts.    TERENCE Cronin  Social Work Care Coordinator  [facility]  [phone]  [email or web address]  Pronouns: They/He

## 2025-08-01 ENCOUNTER — APPOINTMENT (OUTPATIENT)
Dept: MRI IMAGING | Facility: HOSPITAL | Age: 45
End: 2025-08-01
Attending: EMERGENCY MEDICINE
Payer: COMMERCIAL

## 2025-08-01 ENCOUNTER — HOSPITAL ENCOUNTER (EMERGENCY)
Facility: HOSPITAL | Age: 45
Discharge: HOME | End: 2025-08-01
Attending: EMERGENCY MEDICINE | Admitting: EMERGENCY MEDICINE
Payer: COMMERCIAL

## 2025-08-01 VITALS
RESPIRATION RATE: 18 BRPM | DIASTOLIC BLOOD PRESSURE: 68 MMHG | OXYGEN SATURATION: 99 % | SYSTOLIC BLOOD PRESSURE: 130 MMHG | TEMPERATURE: 98 F | HEIGHT: 71 IN | WEIGHT: 287 LBS | BODY MASS INDEX: 40.18 KG/M2 | HEART RATE: 100 BPM

## 2025-08-01 DIAGNOSIS — M54.41 MIDLINE LOW BACK PAIN WITH RIGHT-SIDED SCIATICA, UNSPECIFIED CHRONICITY: Primary | ICD-10-CM

## 2025-08-01 PROCEDURE — 250N000013 HC RX MED GY IP 250 OP 250 PS 637: Performed by: EMERGENCY MEDICINE

## 2025-08-01 PROCEDURE — 255N000002 HC RX 255 OP 636: Performed by: EMERGENCY MEDICINE

## 2025-08-01 PROCEDURE — A9585 GADOBUTROL INJECTION: HCPCS | Performed by: EMERGENCY MEDICINE

## 2025-08-01 PROCEDURE — 72158 MRI LUMBAR SPINE W/O & W/DYE: CPT

## 2025-08-01 PROCEDURE — 96374 THER/PROPH/DIAG INJ IV PUSH: CPT | Mod: 59 | Performed by: EMERGENCY MEDICINE

## 2025-08-01 PROCEDURE — 250N000011 HC RX IP 250 OP 636: Performed by: EMERGENCY MEDICINE

## 2025-08-01 PROCEDURE — 99285 EMERGENCY DEPT VISIT HI MDM: CPT | Mod: 25 | Performed by: EMERGENCY MEDICINE

## 2025-08-01 RX ORDER — CYCLOBENZAPRINE HCL 10 MG
10 TABLET ORAL
Qty: 10 TABLET | Refills: 0 | Status: SHIPPED | OUTPATIENT
Start: 2025-08-01 | End: 2025-08-01

## 2025-08-01 RX ORDER — LIDOCAINE 4 G/G
1 PATCH TOPICAL EVERY 24 HOURS
Qty: 10 PATCH | Refills: 0 | Status: SHIPPED | OUTPATIENT
Start: 2025-08-01 | End: 2025-08-01

## 2025-08-01 RX ORDER — CYCLOBENZAPRINE HCL 10 MG
10 TABLET ORAL
Qty: 10 TABLET | Refills: 0 | Status: SHIPPED | OUTPATIENT
Start: 2025-08-01

## 2025-08-01 RX ORDER — GADOBUTROL 604.72 MG/ML
13 INJECTION INTRAVENOUS ONCE
Status: COMPLETED | OUTPATIENT
Start: 2025-08-01 | End: 2025-08-01

## 2025-08-01 RX ORDER — LIDOCAINE 4 G/G
1 PATCH TOPICAL EVERY 24 HOURS
Qty: 10 PATCH | Refills: 0 | Status: SHIPPED | OUTPATIENT
Start: 2025-08-01

## 2025-08-01 RX ORDER — KETOROLAC TROMETHAMINE 15 MG/ML
15 INJECTION, SOLUTION INTRAMUSCULAR; INTRAVENOUS ONCE
Status: COMPLETED | OUTPATIENT
Start: 2025-08-01 | End: 2025-08-01

## 2025-08-01 RX ORDER — LIDOCAINE 4 G/G
1 PATCH TOPICAL ONCE
Status: DISCONTINUED | OUTPATIENT
Start: 2025-08-02 | End: 2025-08-02 | Stop reason: HOSPADM

## 2025-08-01 RX ORDER — LORAZEPAM 1 MG/1
1 TABLET ORAL ONCE
Status: COMPLETED | OUTPATIENT
Start: 2025-08-01 | End: 2025-08-01

## 2025-08-01 RX ADMIN — LIDOCAINE 1 PATCH: 4 PATCH TOPICAL at 23:33

## 2025-08-01 RX ADMIN — GADOBUTROL 13 ML: 604.72 INJECTION INTRAVENOUS at 22:48

## 2025-08-01 RX ADMIN — KETOROLAC TROMETHAMINE 15 MG: 15 INJECTION, SOLUTION INTRAMUSCULAR; INTRAVENOUS at 22:24

## 2025-08-01 ASSESSMENT — ACTIVITIES OF DAILY LIVING (ADL)
ADLS_ACUITY_SCORE: 41

## 2025-08-04 ENCOUNTER — VIRTUAL VISIT (OUTPATIENT)
Dept: PHARMACY | Facility: CLINIC | Age: 45
End: 2025-08-04
Payer: COMMERCIAL

## 2025-08-04 DIAGNOSIS — F43.10 PTSD (POST-TRAUMATIC STRESS DISORDER): ICD-10-CM

## 2025-08-04 DIAGNOSIS — F41.1 GAD (GENERALIZED ANXIETY DISORDER): ICD-10-CM

## 2025-08-04 DIAGNOSIS — F31.9 BIPOLAR I DISORDER (H): Primary | ICD-10-CM

## 2025-08-04 PROCEDURE — 99606 MTMS BY PHARM EST 15 MIN: CPT | Mod: 93 | Performed by: PHARMACIST

## 2025-08-06 ENCOUNTER — OFFICE VISIT (OUTPATIENT)
Dept: SURGERY | Facility: CLINIC | Age: 45
End: 2025-08-06
Payer: COMMERCIAL

## 2025-08-06 VITALS
WEIGHT: 289.8 LBS | SYSTOLIC BLOOD PRESSURE: 136 MMHG | BODY MASS INDEX: 40.57 KG/M2 | DIASTOLIC BLOOD PRESSURE: 74 MMHG | HEIGHT: 71 IN

## 2025-08-06 DIAGNOSIS — K59.03 DRUG INDUCED CONSTIPATION: ICD-10-CM

## 2025-08-06 DIAGNOSIS — E66.813 CLASS 3 SEVERE OBESITY DUE TO EXCESS CALORIES WITH BODY MASS INDEX (BMI) OF 40.0 TO 44.9 IN ADULT (H): Primary | ICD-10-CM

## 2025-08-06 DIAGNOSIS — E11.9 TYPE 2 DIABETES MELLITUS WITHOUT COMPLICATION, WITHOUT LONG-TERM CURRENT USE OF INSULIN (H): ICD-10-CM

## 2025-08-06 PROCEDURE — 3078F DIAST BP <80 MM HG: CPT | Performed by: EMERGENCY MEDICINE

## 2025-08-06 PROCEDURE — 99213 OFFICE O/P EST LOW 20 MIN: CPT | Performed by: EMERGENCY MEDICINE

## 2025-08-06 PROCEDURE — 3075F SYST BP GE 130 - 139MM HG: CPT | Performed by: EMERGENCY MEDICINE

## 2025-08-06 RX ORDER — MAGNESIUM OXIDE 400 MG/1
400 TABLET ORAL DAILY
Qty: 30 TABLET | Refills: 0 | Status: SHIPPED | OUTPATIENT
Start: 2025-08-06 | End: 2025-09-05

## 2025-08-06 RX ORDER — DOCUSATE SODIUM 100 MG/1
100 CAPSULE, LIQUID FILLED ORAL 2 TIMES DAILY PRN
Qty: 30 CAPSULE | Refills: 1 | Status: SHIPPED | OUTPATIENT
Start: 2025-08-06 | End: 2025-09-05

## 2025-08-11 ENCOUNTER — VIRTUAL VISIT (OUTPATIENT)
Dept: PSYCHOLOGY | Facility: CLINIC | Age: 45
End: 2025-08-11
Payer: COMMERCIAL

## 2025-08-11 DIAGNOSIS — F41.1 GENERALIZED ANXIETY DISORDER WITH PANIC ATTACKS: Primary | ICD-10-CM

## 2025-08-11 DIAGNOSIS — F34.1 PERSISTENT DEPRESSIVE DISORDER: ICD-10-CM

## 2025-08-11 DIAGNOSIS — F41.0 GENERALIZED ANXIETY DISORDER WITH PANIC ATTACKS: Primary | ICD-10-CM

## 2025-08-11 DIAGNOSIS — F43.10 POSTTRAUMATIC STRESS DISORDER: ICD-10-CM

## 2025-08-11 PROCEDURE — 90837 PSYTX W PT 60 MINUTES: CPT | Mod: 95

## 2025-08-14 ENCOUNTER — TELEPHONE (OUTPATIENT)
Dept: FAMILY MEDICINE | Facility: CLINIC | Age: 45
End: 2025-08-14
Payer: COMMERCIAL

## 2025-08-17 DIAGNOSIS — I10 PRIMARY HYPERTENSION: ICD-10-CM

## 2025-08-19 ENCOUNTER — OFFICE VISIT (OUTPATIENT)
Dept: INTERNAL MEDICINE | Facility: CLINIC | Age: 45
End: 2025-08-19
Payer: COMMERCIAL

## 2025-08-19 ENCOUNTER — TELEPHONE (OUTPATIENT)
Dept: INTERNAL MEDICINE | Facility: CLINIC | Age: 45
End: 2025-08-19

## 2025-08-19 ENCOUNTER — HOSPITAL ENCOUNTER (OUTPATIENT)
Dept: GENERAL RADIOLOGY | Facility: HOSPITAL | Age: 45
Discharge: HOME | End: 2025-08-19
Attending: INTERNAL MEDICINE
Payer: COMMERCIAL

## 2025-08-19 ENCOUNTER — TELEPHONE (OUTPATIENT)
Dept: PALLIATIVE MEDICINE | Facility: OTHER | Age: 45
End: 2025-08-19

## 2025-08-19 VITALS
BODY MASS INDEX: 40.56 KG/M2 | WEIGHT: 289.7 LBS | TEMPERATURE: 97.5 F | SYSTOLIC BLOOD PRESSURE: 124 MMHG | OXYGEN SATURATION: 97 % | HEIGHT: 71 IN | HEART RATE: 89 BPM | RESPIRATION RATE: 18 BRPM | DIASTOLIC BLOOD PRESSURE: 78 MMHG

## 2025-08-19 DIAGNOSIS — M79.645 FINGER PAIN, LEFT: ICD-10-CM

## 2025-08-19 DIAGNOSIS — I10 PRIMARY HYPERTENSION: Chronic | ICD-10-CM

## 2025-08-19 DIAGNOSIS — M48.062 SPINAL STENOSIS OF LUMBAR REGION WITH NEUROGENIC CLAUDICATION: Primary | ICD-10-CM

## 2025-08-19 DIAGNOSIS — M47.817 SPONDYLOSIS OF LUMBOSACRAL REGION WITHOUT MYELOPATHY OR RADICULOPATHY: Primary | ICD-10-CM

## 2025-08-19 DIAGNOSIS — Z09 HOSPITAL DISCHARGE FOLLOW-UP: ICD-10-CM

## 2025-08-19 DIAGNOSIS — F11.20 CONTINUOUS OPIOID DEPENDENCE (H): ICD-10-CM

## 2025-08-19 DIAGNOSIS — L24.9 IRRITANT HAND DERMATITIS: ICD-10-CM

## 2025-08-19 PROCEDURE — G2211 COMPLEX E/M VISIT ADD ON: HCPCS | Performed by: INTERNAL MEDICINE

## 2025-08-19 PROCEDURE — 73140 X-RAY EXAM OF FINGER(S): CPT | Mod: LT

## 2025-08-19 PROCEDURE — 1125F AMNT PAIN NOTED PAIN PRSNT: CPT | Performed by: INTERNAL MEDICINE

## 2025-08-19 PROCEDURE — 3078F DIAST BP <80 MM HG: CPT | Performed by: INTERNAL MEDICINE

## 2025-08-19 PROCEDURE — 99214 OFFICE O/P EST MOD 30 MIN: CPT | Performed by: INTERNAL MEDICINE

## 2025-08-19 PROCEDURE — 3074F SYST BP LT 130 MM HG: CPT | Performed by: INTERNAL MEDICINE

## 2025-08-19 RX ORDER — AMLODIPINE BESYLATE 5 MG/1
5 TABLET ORAL DAILY
Qty: 90 TABLET | Refills: 2 | Status: SHIPPED | OUTPATIENT
Start: 2025-08-19

## 2025-08-19 ASSESSMENT — ANXIETY QUESTIONNAIRES
8. IF YOU CHECKED OFF ANY PROBLEMS, HOW DIFFICULT HAVE THESE MADE IT FOR YOU TO DO YOUR WORK, TAKE CARE OF THINGS AT HOME, OR GET ALONG WITH OTHER PEOPLE?: EXTREMELY DIFFICULT
IF YOU CHECKED OFF ANY PROBLEMS ON THIS QUESTIONNAIRE, HOW DIFFICULT HAVE THESE PROBLEMS MADE IT FOR YOU TO DO YOUR WORK, TAKE CARE OF THINGS AT HOME, OR GET ALONG WITH OTHER PEOPLE: EXTREMELY DIFFICULT
5. BEING SO RESTLESS THAT IT IS HARD TO SIT STILL: SEVERAL DAYS
1. FEELING NERVOUS, ANXIOUS, OR ON EDGE: NEARLY EVERY DAY
6. BECOMING EASILY ANNOYED OR IRRITABLE: MORE THAN HALF THE DAYS
GAD7 TOTAL SCORE: 18
3. WORRYING TOO MUCH ABOUT DIFFERENT THINGS: NEARLY EVERY DAY
7. FEELING AFRAID AS IF SOMETHING AWFUL MIGHT HAPPEN: NEARLY EVERY DAY
7. FEELING AFRAID AS IF SOMETHING AWFUL MIGHT HAPPEN: NEARLY EVERY DAY
2. NOT BEING ABLE TO STOP OR CONTROL WORRYING: NEARLY EVERY DAY
4. TROUBLE RELAXING: NEARLY EVERY DAY
GAD7 TOTAL SCORE: 18
GAD7 TOTAL SCORE: 18

## 2025-08-19 ASSESSMENT — ASTHMA QUESTIONNAIRES
QUESTION_4 LAST FOUR WEEKS HOW OFTEN HAVE YOU USED YOUR RESCUE INHALER OR NEBULIZER MEDICATION (SUCH AS ALBUTEROL): ONE OR TWO TIMES PER DAY
QUESTION_1 LAST FOUR WEEKS HOW MUCH OF THE TIME DID YOUR ASTHMA KEEP YOU FROM GETTING AS MUCH DONE AT WORK, SCHOOL OR AT HOME: ALL OF THE TIME
QUESTION_4 LAST FOUR WEEKS HOW OFTEN HAVE YOU USED YOUR RESCUE INHALER OR NEBULIZER MEDICATION (SUCH AS ALBUTEROL): ONE OR TWO TIMES PER DAY
ACT_TOTALSCORE: 16
QUESTION_1 LAST FOUR WEEKS HOW MUCH OF THE TIME DID YOUR ASTHMA KEEP YOU FROM GETTING AS MUCH DONE AT WORK, SCHOOL OR AT HOME: A LITTLE OF THE TIME
QUESTION_5 LAST FOUR WEEKS HOW WOULD YOU RATE YOUR ASTHMA CONTROL: WELL CONTROLLED
QUESTION_2 LAST FOUR WEEKS HOW OFTEN HAVE YOU HAD SHORTNESS OF BREATH: ONCE OR TWICE A WEEK
QUESTION_3 LAST FOUR WEEKS HOW OFTEN DID YOUR ASTHMA SYMPTOMS (WHEEZING, COUGHING, SHORTNESS OF BREATH, CHEST TIGHTNESS OR PAIN) WAKE YOU UP AT NIGHT OR EARLIER THAN USUAL IN THE MORNING: NOT AT ALL
ACT_TOTALSCORE: 16
QUESTION_3 LAST FOUR WEEKS HOW OFTEN DID YOUR ASTHMA SYMPTOMS (WHEEZING, COUGHING, SHORTNESS OF BREATH, CHEST TIGHTNESS OR PAIN) WAKE YOU UP AT NIGHT OR EARLIER THAN USUAL IN THE MORNING: NOT AT ALL
QUESTION_2 LAST FOUR WEEKS HOW OFTEN HAVE YOU HAD SHORTNESS OF BREATH: ONCE OR TWICE A WEEK
QUESTION_5 LAST FOUR WEEKS HOW WOULD YOU RATE YOUR ASTHMA CONTROL: WELL CONTROLLED

## 2025-08-19 ASSESSMENT — PATIENT HEALTH QUESTIONNAIRE - PHQ9: SUM OF ALL RESPONSES TO PHQ QUESTIONS 1-9: 27

## 2025-08-19 ASSESSMENT — PAIN SCALES - GENERAL: PAINLEVEL_OUTOF10: MODERATE PAIN (6)

## 2025-08-20 ENCOUNTER — VIRTUAL VISIT (OUTPATIENT)
Dept: PSYCHOLOGY | Facility: CLINIC | Age: 45
End: 2025-08-20
Payer: COMMERCIAL

## 2025-08-20 DIAGNOSIS — F41.0 GENERALIZED ANXIETY DISORDER WITH PANIC ATTACKS: Primary | ICD-10-CM

## 2025-08-20 DIAGNOSIS — F43.10 POSTTRAUMATIC STRESS DISORDER: ICD-10-CM

## 2025-08-20 DIAGNOSIS — F34.1 PERSISTENT DEPRESSIVE DISORDER: ICD-10-CM

## 2025-08-20 DIAGNOSIS — F41.1 GENERALIZED ANXIETY DISORDER WITH PANIC ATTACKS: Primary | ICD-10-CM

## 2025-08-20 DIAGNOSIS — F41.1 GAD (GENERALIZED ANXIETY DISORDER): ICD-10-CM

## 2025-08-20 PROCEDURE — 90837 PSYTX W PT 60 MINUTES: CPT | Mod: 95

## 2025-08-20 ASSESSMENT — PATIENT HEALTH QUESTIONNAIRE - PHQ9
10. IF YOU CHECKED OFF ANY PROBLEMS, HOW DIFFICULT HAVE THESE PROBLEMS MADE IT FOR YOU TO DO YOUR WORK, TAKE CARE OF THINGS AT HOME, OR GET ALONG WITH OTHER PEOPLE: EXTREMELY DIFFICULT
SUM OF ALL RESPONSES TO PHQ QUESTIONS 1-9: 18
SUM OF ALL RESPONSES TO PHQ QUESTIONS 1-9: 18

## 2025-08-26 ENCOUNTER — TELEPHONE (OUTPATIENT)
Dept: PALLIATIVE MEDICINE | Facility: OTHER | Age: 45
End: 2025-08-26
Payer: COMMERCIAL

## 2025-08-26 DIAGNOSIS — E11.9 TYPE 2 DIABETES, HBA1C GOAL < 7% (H): ICD-10-CM

## 2025-08-27 ENCOUNTER — RADIOLOGY INJECTION OFFICE VISIT (OUTPATIENT)
Dept: PALLIATIVE MEDICINE | Facility: OTHER | Age: 45
End: 2025-08-27
Payer: COMMERCIAL

## 2025-08-27 VITALS — SYSTOLIC BLOOD PRESSURE: 136 MMHG | HEART RATE: 92 BPM | DIASTOLIC BLOOD PRESSURE: 94 MMHG

## 2025-08-27 DIAGNOSIS — M47.817 SPONDYLOSIS OF LUMBOSACRAL REGION WITHOUT MYELOPATHY OR RADICULOPATHY: ICD-10-CM

## 2025-08-27 PROCEDURE — 250N000011 HC RX IP 250 OP 636: Performed by: STUDENT IN AN ORGANIZED HEALTH CARE EDUCATION/TRAINING PROGRAM

## 2025-08-27 PROCEDURE — 255N000002 HC RX 255 OP 636: Performed by: STUDENT IN AN ORGANIZED HEALTH CARE EDUCATION/TRAINING PROGRAM

## 2025-08-27 PROCEDURE — 64493 INJ PARAVERT F JNT L/S 1 LEV: CPT | Mod: RT | Performed by: STUDENT IN AN ORGANIZED HEALTH CARE EDUCATION/TRAINING PROGRAM

## 2025-08-27 RX ORDER — MAGNESIUM 200 MG
TABLET ORAL
Qty: 50 TABLET | Refills: 1 | OUTPATIENT
Start: 2025-08-27

## 2025-08-27 RX ADMIN — LIDOCAINE HYDROCHLORIDE 1.5 ML: 20 INJECTION, SOLUTION INTRAVENOUS at 15:57

## 2025-08-27 RX ADMIN — IOHEXOL 10 ML: 180 INJECTION INTRAVENOUS at 15:08

## 2025-08-27 ASSESSMENT — PAIN SCALES - GENERAL: PAINLEVEL_OUTOF10: MILD PAIN (3)

## 2025-08-31 DIAGNOSIS — F41.9 ANXIETY: ICD-10-CM

## 2025-09-02 DIAGNOSIS — F41.9 ANXIETY: ICD-10-CM

## 2025-09-02 RX ORDER — BUSPIRONE HYDROCHLORIDE 15 MG/1
15 TABLET ORAL 3 TIMES DAILY
Qty: 90 TABLET | Refills: 5 | OUTPATIENT
Start: 2025-09-02

## 2025-09-03 ENCOUNTER — TELEPHONE (OUTPATIENT)
Dept: INTERNAL MEDICINE | Facility: CLINIC | Age: 45
End: 2025-09-03
Payer: COMMERCIAL

## 2025-09-03 RX ORDER — BUSPIRONE HYDROCHLORIDE 15 MG/1
15 TABLET ORAL 3 TIMES DAILY
Qty: 90 TABLET | Refills: 0 | Status: SHIPPED | OUTPATIENT
Start: 2025-09-03

## 2025-09-04 DIAGNOSIS — K59.03 DRUG INDUCED CONSTIPATION: ICD-10-CM

## 2025-09-04 RX ORDER — MAGNESIUM OXIDE 400 MG/1
400 TABLET ORAL DAILY
Qty: 30 TABLET | Refills: 0 | Status: SHIPPED | OUTPATIENT
Start: 2025-09-04

## (undated) DEVICE — SOL WATER IRRIG 1000ML BOTTLE 2F7114

## (undated) DEVICE — TUBING SUCTION MEDI-VAC 1/4"X20' N620A

## (undated) DEVICE — SUCTION MANIFOLD NEPTUNE 2 SYS 1 PORT 702-025-000

## (undated) RX ORDER — PROPOFOL 10 MG/ML
INJECTION, EMULSION INTRAVENOUS
Status: DISPENSED
Start: 2024-12-27